# Patient Record
Sex: FEMALE | Race: WHITE | Employment: OTHER | URBAN - METROPOLITAN AREA
[De-identification: names, ages, dates, MRNs, and addresses within clinical notes are randomized per-mention and may not be internally consistent; named-entity substitution may affect disease eponyms.]

---

## 2017-03-01 DIAGNOSIS — I10 ESSENTIAL HYPERTENSION: Primary | ICD-10-CM

## 2017-03-01 NOTE — TELEPHONE ENCOUNTER
Pt calls the [facility] needing a refill of her Lisinopril 20 mg tablets; she takes these 2x daily. She would like to have this filled through her [facility] mail order pharmacy, phone number [phone].

## 2017-03-02 RX ORDER — LISINOPRIL 20 MG/1
20 TABLET ORAL DAILY
Qty: 90 TAB | Refills: 3 | Status: SHIPPED | COMMUNITY
Start: 2017-03-02 | End: 2017-03-10 | Stop reason: SDUPTHER

## 2017-03-09 ENCOUNTER — TELEPHONE (OUTPATIENT)
Dept: GERIATRIC MEDICINE | Age: 82
End: 2017-03-09

## 2017-03-09 NOTE — TELEPHONE ENCOUNTER
Pt calls our main office stating that she has received her refill of Lisinopril but the directions say to take once daily when she has been taking this twice daily. I told her that I will let you know of this. Pt can be reached at [phone]. Thank you.

## 2017-03-10 DIAGNOSIS — I10 ESSENTIAL HYPERTENSION: ICD-10-CM

## 2017-03-10 RX ORDER — LISINOPRIL 20 MG/1
20 TABLET ORAL DAILY
Qty: 180 TAB | Refills: 3 | Status: SHIPPED | OUTPATIENT
Start: 2017-03-10 | End: 2017-05-17 | Stop reason: SDUPTHER

## 2017-03-10 NOTE — TELEPHONE ENCOUNTER
Contacted pt stating Geraldine Mercado NP has sent a refill of Lisinopril to the pharmacy with directions to take twice daily. Pt verbalized appreciation for this.

## 2017-05-17 DIAGNOSIS — K21.9 GASTROESOPHAGEAL REFLUX DISEASE WITHOUT ESOPHAGITIS: ICD-10-CM

## 2017-05-17 DIAGNOSIS — E87.6 HYPOKALEMIA: ICD-10-CM

## 2017-05-17 DIAGNOSIS — E78.2 MIXED HYPERLIPIDEMIA: Primary | ICD-10-CM

## 2017-05-17 DIAGNOSIS — I10 ESSENTIAL HYPERTENSION: ICD-10-CM

## 2017-05-17 DIAGNOSIS — E78.2 MIXED HYPERLIPIDEMIA: ICD-10-CM

## 2017-05-17 RX ORDER — LISINOPRIL 20 MG/1
20 TABLET ORAL 2 TIMES DAILY
Qty: 180 TAB | Refills: 1 | Status: SHIPPED | OUTPATIENT
Start: 2017-05-17 | End: 2017-09-12 | Stop reason: SDUPTHER

## 2017-05-17 RX ORDER — FAMOTIDINE 40 MG/1
40 TABLET, FILM COATED ORAL
Qty: 30 TAB | Refills: 5 | Status: SHIPPED | COMMUNITY
Start: 2017-05-17 | End: 2017-05-17 | Stop reason: SDUPTHER

## 2017-05-17 RX ORDER — SIMVASTATIN 20 MG/1
20 TABLET, FILM COATED ORAL
Qty: 90 TAB | Refills: 1 | Status: SHIPPED | OUTPATIENT
Start: 2017-05-17 | End: 2017-07-03 | Stop reason: SDUPTHER

## 2017-05-17 RX ORDER — LISINOPRIL 20 MG/1
20 TABLET ORAL DAILY
Qty: 180 TAB | Refills: 3 | Status: SHIPPED | COMMUNITY
Start: 2017-05-17 | End: 2017-05-17 | Stop reason: SDUPTHER

## 2017-05-17 RX ORDER — FAMOTIDINE 40 MG/1
40 TABLET, FILM COATED ORAL
Qty: 90 TAB | Refills: 1 | Status: SHIPPED | OUTPATIENT
Start: 2017-05-17 | End: 2017-09-12 | Stop reason: SDUPTHER

## 2017-05-17 RX ORDER — SIMVASTATIN 40 MG/1
40 TABLET, FILM COATED ORAL
Qty: 90 TAB | Refills: 1 | Status: SHIPPED | COMMUNITY
Start: 2017-05-17 | End: 2017-05-17 | Stop reason: SDUPTHER

## 2017-05-17 RX ORDER — POTASSIUM CHLORIDE 750 MG/1
10 TABLET, EXTENDED RELEASE ORAL 3 TIMES DAILY
Qty: 90 TAB | Refills: 1 | Status: SHIPPED | OUTPATIENT
Start: 2017-05-17 | End: 2017-10-25 | Stop reason: SDUPTHER

## 2017-05-17 NOTE — TELEPHONE ENCOUNTER
Pt calls our main office stating she needs medication refills and to update the dosage information on a few medications. She states she has been taking 3 potassium capsules daily, and her current directions say to take 6 times daily. She states she currently has 40mg tablets of the Simvostatin and has been cutting them in half to take 20mg daily. She also states she has been taking 2 Lisinopril daily and her last refill said to take once daily. Pt is requesting refills of the following meds:     -Simvostatin 20mg 1x daily  -Famotidine 40mg 1x daily  -Lisinopril 20mg 2x daily    Pt uses the [facility] mail order pharmacy. I informed the pt I will notify the NP to update the dosages of her medications.

## 2017-07-03 DIAGNOSIS — E78.2 MIXED HYPERLIPIDEMIA: ICD-10-CM

## 2017-07-03 DIAGNOSIS — K21.9 GASTROESOPHAGEAL REFLUX DISEASE WITHOUT ESOPHAGITIS: Primary | ICD-10-CM

## 2017-07-03 RX ORDER — HYDROGEN PEROXIDE 3 %
20 SOLUTION, NON-ORAL MISCELLANEOUS DAILY
Status: CANCELLED | OUTPATIENT
Start: 2017-07-03

## 2017-07-03 NOTE — TELEPHONE ENCOUNTER
Pt calls to request for refills for Esomeprazole and Simvastatin. Pt states she is taking Esomeprazole 40mg tablets once daily. Pt would like these medications refilled through her [facility].

## 2017-07-06 RX ORDER — SIMVASTATIN 20 MG/1
20 TABLET, FILM COATED ORAL
Qty: 90 TAB | Refills: 1 | Status: SHIPPED | OUTPATIENT
Start: 2017-07-06 | End: 2017-10-18 | Stop reason: SDUPTHER

## 2017-07-06 RX ORDER — ESOMEPRAZOLE MAGNESIUM 40 MG/1
40 CAPSULE, DELAYED RELEASE ORAL DAILY
Qty: 90 CAP | Refills: 1 | Status: SHIPPED | OUTPATIENT
Start: 2017-07-06 | End: 2017-12-29 | Stop reason: SDUPTHER

## 2017-07-19 ENCOUNTER — OFFICE VISIT (OUTPATIENT)
Dept: FAMILY MEDICINE CLINIC | Age: 82
End: 2017-07-19

## 2017-07-19 VITALS
SYSTOLIC BLOOD PRESSURE: 134 MMHG | WEIGHT: 160.8 LBS | BODY MASS INDEX: 29.59 KG/M2 | HEART RATE: 80 BPM | TEMPERATURE: 98.4 F | RESPIRATION RATE: 18 BRPM | HEIGHT: 62 IN | DIASTOLIC BLOOD PRESSURE: 81 MMHG | OXYGEN SATURATION: 95 %

## 2017-07-19 DIAGNOSIS — E78.2 MIXED HYPERLIPIDEMIA: ICD-10-CM

## 2017-07-19 DIAGNOSIS — E83.42 HYPOMAGNESEMIA: ICD-10-CM

## 2017-07-19 DIAGNOSIS — E53.8 VITAMIN B 12 DEFICIENCY: ICD-10-CM

## 2017-07-19 DIAGNOSIS — I10 ESSENTIAL HYPERTENSION: Primary | ICD-10-CM

## 2017-07-19 DIAGNOSIS — I10 ESSENTIAL HYPERTENSION: ICD-10-CM

## 2017-07-19 DIAGNOSIS — Z96.652 H/O TOTAL KNEE REPLACEMENT, LEFT: ICD-10-CM

## 2017-07-19 PROBLEM — H25.9 AGE-RELATED CATARACT OF BOTH EYES: Status: ACTIVE | Noted: 2017-07-19

## 2017-07-19 PROBLEM — C56.9 OVARIAN CANCER (HCC): Status: ACTIVE | Noted: 2017-07-19

## 2017-07-19 PROBLEM — M21.371 FOOT DROP, BILATERAL: Status: ACTIVE | Noted: 2017-07-19

## 2017-07-19 PROBLEM — G47.00 INSOMNIA: Status: ACTIVE | Noted: 2017-07-19

## 2017-07-19 PROBLEM — Z98.890 PREVIOUS BACK SURGERY: Status: ACTIVE | Noted: 2017-07-19

## 2017-07-19 PROBLEM — Z96.659 H/O TOTAL KNEE REPLACEMENT: Status: ACTIVE | Noted: 2017-07-19

## 2017-07-19 PROBLEM — E87.6 HYPOKALEMIA: Status: ACTIVE | Noted: 2017-07-19

## 2017-07-19 PROBLEM — G62.9 NEUROPATHY: Status: ACTIVE | Noted: 2017-07-19

## 2017-07-19 PROBLEM — Z96.612 HISTORY OF REPLACEMENT OF BOTH SHOULDER JOINTS: Status: ACTIVE | Noted: 2017-07-19

## 2017-07-19 PROBLEM — M41.30 THORACOGENIC SCOLIOSIS: Status: ACTIVE | Noted: 2017-07-19

## 2017-07-19 PROBLEM — Z96.611 HISTORY OF REPLACEMENT OF BOTH SHOULDER JOINTS: Status: ACTIVE | Noted: 2017-07-19

## 2017-07-19 PROBLEM — M21.372 FOOT DROP, BILATERAL: Status: ACTIVE | Noted: 2017-07-19

## 2017-07-19 NOTE — PROGRESS NOTES
Chief Complaint   Patient presents with    Blood Pressure Check     may need labs to test mag and K+     Health Maintenance Due   Topic Date Due    ZOSTER VACCINE AGE 60>  10/09/1993    GLAUCOMA SCREENING Q2Y  10/09/1998    OSTEOPOROSIS SCREENING (DEXA)  10/09/1998    Pneumococcal 65+ Low/Medium Risk (1 of 2 - PCV13) 10/09/1998    MEDICARE YEARLY EXAM  10/09/1998    DTaP/Tdap/Td series (1 - Tdap) 03/25/2011     Coordination of Care Questions    1. Have you been to the ER, urgent care clinic since your last visit? No       Hospitalized since your last visit? No    2. Have you seen or consulted any other health care providers outside of the [address] [place] [address] since your last visit? Include any pap smears or colon screening.  No

## 2017-07-19 NOTE — MR AVS SNAPSHOT
Visit Information Date & Time Provider Department Dept. Phone Encounter #  
 7/19/2017 10:00 AM Meera Josue NP [address] [phone] [id number] Follow-up Instructions Return in about 6 months (around 1/19/2018) for for routine visit. Follow-up and Disposition History Upcoming Health Maintenance Date Due ZOSTER VACCINE AGE 60> 8/9/1993 GLAUCOMA SCREENING Q2Y 10/9/1998 OSTEOPOROSIS SCREENING (DEXA) 10/9/1998 Pneumococcal 65+ High/Highest Risk (1 of 2 - PCV13) 10/9/1998 MEDICARE YEARLY EXAM 10/9/1998 DTaP/Tdap/Td series (1 - Tdap) 3/25/2011 INFLUENZA AGE 9 TO ADULT 8/1/2017 Allergies as of 7/19/2017  Review Complete On: 7/19/2017 By: Xiao Benjamin No Known Allergies Current Immunizations  Never Reviewed Name Date  
 TD Vaccine 3/24/2011  2:38 PM  
  
 Not reviewed this visit You Were Diagnosed With   
  
 Codes Comments Essential hypertension    -  Primary ICD-10-CM: I10 
ICD-9-CM: 401.9 Mixed hyperlipidemia     ICD-10-CM: E78.2 ICD-9-CM: 272.2 Vitamin B 12 deficiency     ICD-10-CM: E53.8 ICD-9-CM: 266.2 Hypomagnesemia     ICD-10-CM: N02.67 
ICD-9-CM: 275.2 H/O total knee replacement, left     ICD-10-CM: R42.438 ICD-9-CM: V43.65 Vitals BP Pulse Temp Resp Height(growth percentile) Weight(growth percentile) 134/81 (BP 1 Location: Left arm, BP Patient Position: Sitting) 80 98.4 °F (36.9 °C) (Oral) 18 5' 2\" (1.575 m) 160 lb 12.8 oz (72.9 kg) SpO2 BMI OB Status Smoking Status 95% 29.41 kg/m2 Hysterectomy Never Smoker Vitals History BMI and BSA Data Body Mass Index Body Surface Area  
 29.41 kg/m 2 1.79 m 2 Preferred Pharmacy Pharmacy Name Phone [address] [facility], [address] [address] [place] [address] [address] Your Updated Medication List  
  
   
This list is accurate as of: 7/19/17 11:59 PM.  Always use your most recent med list.  
  
  
  
  
 amitriptyline 25 mg tablet Commonly known as:  ELAVIL TAKE ONE (1) TABLET(S) DAILY AT BEDTIME CALCIUM 500 + D 500 mg(1,250mg) -400 unit Tab Generic drug:  Calcium-Cholecalciferol (D3) Take  by mouth daily. cholecalciferol 1,000 unit tablet Commonly known as:  VITAMIN D3 Take 1,000 Units by mouth daily. esomeprazole 40 mg capsule Commonly known as:  NexIUM Take 1 Cap by mouth daily. gabapentin 800 mg tablet Commonly known as:  NEURONTIN Take 1 tablet by mouth 4  times daily  
  
 hydroCHLOROthiazide 25 mg tablet Commonly known as:  HYDRODIURIL Take 25 mg by mouth daily. lisinopril 20 mg tablet Commonly known as:  Patrice Sachi Take 1 Tab by mouth two (2) times a day. loperamide 2 mg capsule Commonly known as:  IMODIUM Take 2 mg by mouth four (4) times daily as needed for Diarrhea.  
  
 magnesium oxide 400 mg tablet Commonly known as:  MAG-OX Take 400 mg by mouth two (2) times a day. multivitamin tablet Commonly known as:  ONE A DAY Take 1 Tab by mouth daily. potassium chloride 10 mEq tablet Commonly known as:  KLOR-CON Take 1 Tab by mouth three (3) times daily. simvastatin 20 mg tablet Commonly known as:  ZOCOR Take 1 Tab by mouth nightly. TYLENOL EXTRA STRENGTH 500 mg tablet Generic drug:  acetaminophen Take 500 mg by mouth every six (6) hours as needed. VITAMIN B-12 1,000 mcg tablet Generic drug:  cyanocobalamin Take 1,000 mcg by mouth daily. We Performed the Following CBC WITH AUTOMATED DIFF [02544 CPT(R)] LIPID PANEL [92964 CPT(R)] MAGNESIUM [id number] CPT(R)] METABOLIC PANEL, COMPREHENSIVE [74710 CPT(R)] VITAMIN B12 [id number] CPT(R)] Follow-up Instructions Return in about 6 months (around 1/19/2018) for for routine visit. To-Do List   
 07/19/2017 Lab:  CBC WITH AUTOMATED DIFF Patient Instructions Magnesium (By mouth) Used as a mineral supplement to add to the magnesium in your daily diet. Brand Name(s): CalMag Thins, Coral Calcium, GNC Calcium/Magnesium with Vitamin D, Leader Magnesium, MG Plus Protein, MP Magnesium, Mag-G, Mag-Tab SR, Magimin, Magimin-Forte, Maginex, Magnacaps, Magonate, PharmAssure Magnesium, [place] Else There may be other brand names for this medicine. When This Medicine Should Not Be Used:  
Unless your doctor tells you otherwise, there is no reason for you to not use this medicine. How to Use This Medicine:  
Powder for Suspension, Liquid Filled Capsule, Capsule, Powder, Liquid, Tablet, Packet, Long Acting Tablet · Your doctor will tell you how much medicine to use. Do not use more than directed. · Follow the instructions on the medicine label if you are using this medicine without a prescription. · Drink at least six to eight (8 ounce) cups of liquid each day, unless your doctor tells you otherwise. · Measure the oral liquid medicine with a marked measuring spoon, oral syringe, or medicine cup. If a dose is missed: · Take a dose as soon as you remember. If it is almost time for your next dose, wait until then and take a regular dose. Do not take extra medicine to make up for a missed dose. How to Store and Dispose of This Medicine: · Store the medicine in a closed container at room temperature, away from heat, moisture, and direct light. · Keep all medicine out of the reach of children. Never share your medicine with anyone. · Ask your pharmacist, doctor, or health caregiver about the best way to dispose of any outdated medicine or medicine no longer needed. Drugs and Foods to Avoid: Ask your doctor or pharmacist before using any other medicine, including over-the-counter medicines, vitamins, and herbal products. · There are other medicines that may not work properly if you use them together with magnesium.  Make sure your doctor knows about all other medicines you are using. Warnings While Using This Medicine: · Make sure your doctor knows if you are pregnant or breast feeding, or if you have kidney disease. Possible Side Effects While Using This Medicine: If you notice these less serious side effects, talk with your doctor: · Nausea, diarrhea. If you notice other side effects that you think are caused by this medicine, tell your doctor. Call your doctor for medical advice about side effects. You may report side effects to FDA at 1-490-JKX-4417 © 2017 [employer]ks[employer]r [employer] Information is for End User's use only and may not be sold, redistributed or otherwise used for commercial purposes. The above information is an  only. It is not intended as medical advice for individual conditions or treatments. Talk to your doctor, nurse or pharmacist before following any medical regimen to see if it is safe and effective for you. Patient Instructions History Introducing [facility]! Cade Leon introduces Cent[employer] patient portal. Now you can access parts of your medical record, email your doctor's office, and request medication refills online. 1. In your internet browser, go to [email or web address] 2. Click on the First Time User? Click Here link in the Sign In box. You will see the New Member Sign Up page. 3. Enter your Cent[employer] Access Code exactly as it appears below. You will not need to use this code after youve completed the sign-up process. If you do not sign up before the expiration date, you must request a new code. · Cent[employer] Access Code: [email or web address] Expires: 10/12/2017 10:05 AM 
 
4. Enter the last four digits of your Social Security Number (xxxx) and Date of Birth (mm/dd/yyyy) as indicated and click Submit. You will be taken to the next sign-up page. 5. Create a Centrl ID.  This will be your Cent[employer] login ID and cannot be changed, so think of one that is secure and easy to remember. 6. Create a Fortem password. You can change your password at any time. 7. Enter your Password Reset Question and Answer. This can be used at a later time if you forget your password. 8. Enter your e-mail address. You will receive e-mail notification when new information is available in [address] 9. Click Sign Up. You can now view and download portions of your medical record. 10. Click the Download Summary menu link to download a portable copy of your medical information. If you have questions, please visit the Frequently Asked Questions section of the Fortem website. Remember, Fort[employer] is NOT to be used for urgent needs. For medical emergencies, dial 911. Now available from your iPhone and Android! Please provide this summary of care documentation to your next provider. Your primary care clinician is listed as John Duffy. If you have any questions after today's visit, please call [phone].

## 2017-07-19 NOTE — PROGRESS NOTES
Subjective:   Kwame Lopez is a 80 y.o. female  here for follow up of chronic medical conditions listed has GERD (gastroesophageal reflux disease), Gastric ulcer, Lewis's esophagus without dysplasia, Lewis's esophagus, and Diarrhea on her problem list.. She  is accompanied by patient. She lives as follows: alone and does have Advanced Directives. New Complaints today include: Blood Pressure Check (may need labs to test mag and K+)    Hypertension  Patient is here for follow-up of hypertension. She indicates that she is feeling well and denies any symptoms referable to her hypertension. She is exercising and is adherent to low salt diet. Blood pressure is well controlled at home. Use of agents associated with hypertension: none. She has been  for a year but is doing well at home alone in her apartment. She feels she should exercise more and goes to the exercise room 2X a week at [address] [place] [address]. She denies side effects from her medication. She is sleeping well, has a soft, formed BM daily, has a good appetite, and has regular activities with friends. Recent History includes: None    Review of Systems  Review of Systems   Constitutional: Negative for chills, diaphoresis, fever, malaise/fatigue and weight loss. HENT: Negative for congestion, ear discharge, ear pain, hearing loss, nosebleeds, sore throat and tinnitus. Eyes: Negative for blurred vision, double vision, photophobia, pain, discharge and redness. Respiratory: Negative for cough, hemoptysis, sputum production, shortness of breath, wheezing and stridor. Cardiovascular: Negative for chest pain, palpitations, orthopnea, claudication, leg swelling and PND. Gastrointestinal: Positive for nausea. Negative for abdominal pain, blood in stool, constipation, diarrhea, heartburn, melena and vomiting. Genitourinary: Negative for dysuria, flank pain, frequency, hematuria and urgency. Musculoskeletal: Positive for joint pain. Negative for back pain, falls, myalgias and neck pain. Bilateral foot drop, chronic. Chronic right shoulder pain. Skin: Negative for itching and rash. Neurological: Positive for tingling. Negative for dizziness, tremors, sensory change, speech change, focal weakness, seizures, loss of consciousness, weakness and headaches. Neuropathy is LE's X years since back surgery. Endo/Heme/Allergies: Negative for environmental allergies and polydipsia. Does not bruise/bleed easily. Psychiatric/Behavioral: Negative for depression, hallucinations, memory loss, substance abuse and suicidal ideas. The patient is not nervous/anxious and does not have insomnia. Geriatric Issues: Activities of Daily Living (ADLs):    She is independent in the following: ambulation, bathing and hygiene, feeding, continence, grooming, toileting and dressing  Requires assistance with the following: Transportation  Patient has changes due to:  None    Outside reports reviewed: none. Patient Active Problem List   Diagnosis Code    GERD (gastroesophageal reflux disease) K21.9    Gastric ulcer K25.9    Lewis's esophagus without dysplasia K22.70    Lewis's esophagus K22.70    Diarrhea R19.7     Current Outpatient Prescriptions   Medication Sig Dispense Refill    simvastatin (ZOCOR) 20 mg tablet Take 1 Tab by mouth nightly. 90 Tab 1    esomeprazole (NEXIUM) 40 mg capsule Take 1 Cap by mouth daily. 90 Cap 1    lisinopril (PRINIVIL, ZESTRIL) 20 mg tablet Take 1 Tab by mouth two (2) times a day. 180 Tab 1    potassium chloride (KLOR-CON) 10 mEq tablet Take 1 Tab by mouth three (3) times daily. 90 Tab 1    gabapentin (NEURONTIN) 800 mg tablet Take 1 tablet by mouth 4  times daily 360 Tab 1    cholecalciferol (VITAMIN D3) 1,000 unit tablet Take 1,000 Units by mouth daily.       amitriptyline (ELAVIL) 25 mg tablet TAKE ONE (1) TABLET(S) DAILY AT BEDTIME 30 Tab 1    multivitamin (ONE A DAY) tablet Take 1 Tab by mouth daily.  loperamide (IMODIUM) 2 mg capsule Take 2 mg by mouth four (4) times daily as needed for Diarrhea.  cyanocobalamin (VITAMIN B-12) 1,000 mcg tablet Take 1,000 mcg by mouth daily.  Calcium-Cholecalciferol, D3, (CALCIUM 500 + D) 500 mg(1,250mg) -400 unit Tab Take  by mouth daily.  acetaminophen (TYLENOL EXTRA STRENGTH) 500 mg tablet Take 500 mg by mouth every six (6) hours as needed.  magnesium oxide (MAG-OX) 400 mg tablet Take 400 mg by mouth two (2) times a day.  hydrochlorothiazide (HYDRODIURIL) 25 mg tablet Take 25 mg by mouth daily. No Known Allergies  Past Medical History:   Diagnosis Date    Arthritis     Diarrhea 6/6/2016    Foot drop     Gastric ulcer 6/14/2012    GERD (gastroesophageal reflux disease)     High cholesterol     Hypertension     Neuropathy (HCC)     Ovarian cancer ([facility] 1986    chemo x 9 mos    Scoliosis     Stroke ([facility] 2002    ? stroke with drop feet, per patient CT scans were negative     Past Surgical History:   Procedure Laterality Date    FLEXIBLE SIGMOIDOSCOPY N/A 6/6/2016    SIGMOIDOSCOPY FLEXIBLE performed [facility]    HX ORTHOPAEDIC      back    HX ORTHOPAEDIC      bilateral shoulder replacements    HX ORTHOPAEDIC      left knee replacement    HX LYDIA AND BSO      MO EGD TRANSORAL BIOPSY SINGLE/MULTIPLE  1/26/2012         MO EGD TRANSORAL BIOPSY SINGLE/MULTIPLE  6/14/2012          No family history on file. Social History   Substance Use Topics    Smoking status: Never Smoker    Smokeless tobacco: Never Used    Alcohol use No          Objective:     Visit Vitals    /81 (BP 1 Location: Left arm, BP Patient Position: Sitting)    Pulse 80    Temp 98.4 °F (36.9 °C) (Oral)    Resp 18    Ht 5' 2\" (1.575 m)    Wt 160 lb 12.8 oz (72.9 kg)    SpO2 95%    BMI 29.41 kg/m2     Physical Exam   Constitutional: She is oriented to person, place, and time.  She appears well-developed and well-nourished. No distress. HENT:   Head: Normocephalic and atraumatic. Right Ear: External ear normal.   Left Ear: External ear normal.   Nose: Nose normal.   Mouth/Throat: Oropharynx is clear and moist. No oropharyngeal exudate. Eyes: Conjunctivae and EOM are normal. Pupils are equal, round, and reactive to light. Right eye exhibits no discharge. Left eye exhibits no discharge. No scleral icterus. Neck: Normal range of motion. Neck supple. No JVD present. No tracheal deviation present. No thyromegaly present. Cardiovascular: Normal rate, regular rhythm, normal heart sounds and intact distal pulses. Exam reveals no gallop and no friction rub. No murmur heard. Pulmonary/Chest: Effort normal and breath sounds normal. No stridor. No respiratory distress. She has no wheezes. She has no rales. Abdominal: Soft. Bowel sounds are normal. She exhibits no distension and no mass. There is no tenderness. There is no rebound and no guarding. Musculoskeletal: Normal range of motion. She exhibits no edema or deformity. Splint's on feet/ankles bilaterally. Lymphadenopathy:     She has no cervical adenopathy. Neurological: She is alert and oriented to person, place, and time. She displays abnormal reflex. No cranial nerve deficit. She exhibits abnormal muscle tone. Coordination normal.   DTR's +1 LE\"s. Decreased tone in calves. Skin: Skin is warm and dry. No rash noted. She is not diaphoretic. No erythema. No pallor. Psychiatric: She has a normal mood and affect. Her behavior is normal. Judgment and thought content normal.   Nursing note and vitals reviewed.        Imaging  None    Lab Review  Results for orders placed or performed in visit on 10/58/27   METABOLIC PANEL, BASIC   Result Value Ref Range    Glucose 112 (H) 65 - 99 mg/dL    BUN 21 8 - 27 mg/dL    Creatinine 0.90 0.57 - 1.00 mg/dL    GFR est non-AA 59 (L) >59 mL/min/1.73    GFR est AA 68 >59 mL/min/1.73    BUN/Creatinine ratio 23 11 - 26    Sodium 140 136 - 144 mmol/L    Potassium 4.7 3.5 - 5.2 mmol/L    Chloride 102 97 - 106 mmol/L    CO2 26 18 - 29 mmol/L    Calcium 9.6 8.7 - 10.3 mg/dL   MAGNESIUM   Result Value Ref Range    Magnesium 1.8 1.6 - 2.3 mg/dL        Assessment/Plan:       ICD-10-CM ICD-9-CM    1. Essential hypertension B21 360.2 METABOLIC PANEL, COMPREHENSIVE      CBC WITH AUTOMATED DIFF   2. Mixed hyperlipidemia E78.2 272.2 CBC WITH AUTOMATED DIFF      LIPID PANEL   3. Vitamin B 12 deficiency E53.8 266.2 VITAMIN B12   4. Hypomagnesemia E83.42 275.2 MAGNESIUM     Follow-up Disposition:  Return in about 6 months (around 1/19/2018) for for routine visit. By:We discussed f/uKayla Patton 07 [address] [facility]  [phone]

## 2017-07-19 NOTE — PATIENT INSTRUCTIONS
Magnesium (By mouth)   Used as a mineral supplement to add to the magnesium in your daily diet. Brand Name(s): CalMag Thins, Coral Calcium, GNC Calcium/Magnesium with Vitamin D, Leader Magnesium, MG Plus Protein, MP Magnesium, Mag-G, Mag-Tab SR, Magimin, Magimin-Forte, Maginex, Magnacaps, Magonate, PharmAssure Magnesium, [place]res   There may be other brand names for this medicine. When This Medicine Should Not Be Used:   Unless your doctor tells you otherwise, there is no reason for you to not use this medicine. How to Use This Medicine:   Powder for Suspension, Liquid Filled Capsule, Capsule, Powder, Liquid, Tablet, Packet, Long Acting Tablet  · Your doctor will tell you how much medicine to use. Do not use more than directed. · Follow the instructions on the medicine label if you are using this medicine without a prescription. · Drink at least six to eight (8 ounce) cups of liquid each day, unless your doctor tells you otherwise. · Measure the oral liquid medicine with a marked measuring spoon, oral syringe, or medicine cup. If a dose is missed:   · Take a dose as soon as you remember. If it is almost time for your next dose, wait until then and take a regular dose. Do not take extra medicine to make up for a missed dose. How to Store and Dispose of This Medicine:   · Store the medicine in a closed container at room temperature, away from heat, moisture, and direct light. · Keep all medicine out of the reach of children. Never share your medicine with anyone. · Ask your pharmacist, doctor, or health caregiver about the best way to dispose of any outdated medicine or medicine no longer needed. Drugs and Foods to Avoid:   Ask your doctor or pharmacist before using any other medicine, including over-the-counter medicines, vitamins, and herbal products. · There are other medicines that may not work properly if you use them together with magnesium.  Make sure your doctor knows about all other medicines you are using. Warnings While Using This Medicine:   · Make sure your doctor knows if you are pregnant or breast feeding, or if you have kidney disease. Possible Side Effects While Using This Medicine: If you notice these less serious side effects, talk with your doctor:  · Nausea, diarrhea. If you notice other side effects that you think are caused by this medicine, tell your doctor. Call your doctor for medical advice about side effects. You may report side effects to FDA at 3-956-BJP-0938  © 2017 [facility] Information is for End User's use only and may not be sold, redistributed or otherwise used for commercial purposes. The above information is an  only. It is not intended as medical advice for individual conditions or treatments. Talk to your doctor, nurse or pharmacist before following any medical regimen to see if it is safe and effective for you.

## 2017-07-20 LAB
ALBUMIN SERPL-MCNC: 3.9 G/DL (ref 3.5–4.7)
ALBUMIN/GLOB SERPL: 1.3 {RATIO} (ref 1.2–2.2)
ALP SERPL-CCNC: 58 IU/L (ref 39–117)
ALT SERPL-CCNC: 13 IU/L (ref 0–32)
AST SERPL-CCNC: 18 IU/L (ref 0–40)
BASOPHILS # BLD AUTO: 0.1 X10E3/UL (ref 0–0.2)
BASOPHILS NFR BLD AUTO: 1 %
BILIRUB SERPL-MCNC: <0.2 MG/DL (ref 0–1.2)
BUN SERPL-MCNC: 25 MG/DL (ref 8–27)
BUN/CREAT SERPL: 29 (ref 12–28)
CALCIUM SERPL-MCNC: 9.9 MG/DL (ref 8.7–10.3)
CHLORIDE SERPL-SCNC: 96 MMOL/L (ref 96–106)
CHOLEST SERPL-MCNC: 171 MG/DL (ref 100–199)
CO2 SERPL-SCNC: 24 MMOL/L (ref 18–29)
CREAT SERPL-MCNC: 0.85 MG/DL (ref 0.57–1)
EOSINOPHIL # BLD AUTO: 0.1 X10E3/UL (ref 0–0.4)
EOSINOPHIL NFR BLD AUTO: 2 %
ERYTHROCYTE [DISTWIDTH] IN BLOOD BY AUTOMATED COUNT: 15.1 % (ref 12.3–15.4)
GLOBULIN SER CALC-MCNC: 3.1 G/DL (ref 1.5–4.5)
GLUCOSE SERPL-MCNC: 100 MG/DL (ref 65–99)
HCT VFR BLD AUTO: 30.5 % (ref 34–46.6)
HDLC SERPL-MCNC: 47 MG/DL
HGB BLD-MCNC: 9.4 G/DL (ref 11.1–15.9)
IMM GRANULOCYTES # BLD: 0 X10E3/UL (ref 0–0.1)
IMM GRANULOCYTES NFR BLD: 0 %
LDLC SERPL CALC-MCNC: 92 MG/DL (ref 0–99)
LYMPHOCYTES # BLD AUTO: 1.4 X10E3/UL (ref 0.7–3.1)
LYMPHOCYTES NFR BLD AUTO: 29 %
MAGNESIUM SERPL-MCNC: 1.8 MG/DL (ref 1.6–2.3)
MCH RBC QN AUTO: 23.9 PG (ref 26.6–33)
MCHC RBC AUTO-ENTMCNC: 30.8 G/DL (ref 31.5–35.7)
MCV RBC AUTO: 78 FL (ref 79–97)
MONOCYTES # BLD AUTO: 0.4 X10E3/UL (ref 0.1–0.9)
MONOCYTES NFR BLD AUTO: 9 %
NEUTROPHILS # BLD AUTO: 3 X10E3/UL (ref 1.4–7)
NEUTROPHILS NFR BLD AUTO: 59 %
PLATELET # BLD AUTO: 264 X10E3/UL (ref 150–379)
POTASSIUM SERPL-SCNC: 4.6 MMOL/L (ref 3.5–5.2)
PROT SERPL-MCNC: 7 G/DL (ref 6–8.5)
RBC # BLD AUTO: 3.93 X10E6/UL (ref 3.77–5.28)
SODIUM SERPL-SCNC: 135 MMOL/L (ref 134–144)
TRIGL SERPL-MCNC: 160 MG/DL (ref 0–149)
VIT B12 SERPL-MCNC: 1469 PG/ML (ref 211–946)
VLDLC SERPL CALC-MCNC: 32 MG/DL (ref 5–40)
WBC # BLD AUTO: 5 X10E3/UL (ref 3.4–10.8)

## 2017-07-21 ENCOUNTER — TELEPHONE (OUTPATIENT)
Dept: FAMILY MEDICINE CLINIC | Age: 82
End: 2017-07-21

## 2017-07-24 NOTE — TELEPHONE ENCOUNTER
Patient called today to review labs  had labs CBC with diff and CMP H/H 9.4/30.5 last test 10/12/2015 H/h 12.0/36.2, Bun/ creat 25/0.85,  Glucose 100, lipid panel triglecerids 160 lastone done  8/11 147. Patient reports does not take medication due to being unsure if she should take. Vitamin B12 1469 high on B12 1000 educated to stop taking today, magnesium 1.8 WNL. Educated patient on labs patient have F/u on Friday to discuss labs with provider.

## 2017-07-27 ENCOUNTER — TELEPHONE (OUTPATIENT)
Dept: GERIATRIC MEDICINE | Age: 82
End: 2017-07-27

## 2017-07-27 NOTE — TELEPHONE ENCOUNTER
Left voicemail for pt reminding her of appointment tomorrow, Friday, July 28th at 11:00am at the [facility]. Stated to give us a call with any questions at [phone].

## 2017-07-28 ENCOUNTER — OFFICE VISIT (OUTPATIENT)
Dept: FAMILY MEDICINE CLINIC | Age: 82
End: 2017-07-28

## 2017-07-28 VITALS
RESPIRATION RATE: 16 BRPM | SYSTOLIC BLOOD PRESSURE: 124 MMHG | HEART RATE: 88 BPM | BODY MASS INDEX: 29.74 KG/M2 | TEMPERATURE: 98.8 F | OXYGEN SATURATION: 95 % | WEIGHT: 161.6 LBS | DIASTOLIC BLOOD PRESSURE: 70 MMHG | HEIGHT: 62 IN

## 2017-07-28 DIAGNOSIS — E53.8 VITAMIN B 12 DEFICIENCY: ICD-10-CM

## 2017-07-28 DIAGNOSIS — D50.9 IRON DEFICIENCY ANEMIA, UNSPECIFIED IRON DEFICIENCY ANEMIA TYPE: Primary | ICD-10-CM

## 2017-07-28 DIAGNOSIS — K21.9 GASTROESOPHAGEAL REFLUX DISEASE WITHOUT ESOPHAGITIS: ICD-10-CM

## 2017-07-28 NOTE — PATIENT INSTRUCTIONS
Anemia: Care Instructions  Your Care Instructions    Anemia is a low level of red blood cells, which carry oxygen throughout your body. Many things can cause anemia. Lack of iron is one of the most common causes. Your body needs iron to make hemoglobin, a substance in red blood cells that carries oxygen from the lungs to your body's cells. Without enough iron, the body produces fewer and smaller red blood cells. As a result, your body's cells do not get enough oxygen, and you feel tired and weak. And you may have trouble concentrating. Bleeding is the most common cause of a lack of iron. You may have heavy menstrual bleeding or bleeding caused by conditions such as ulcers, hemorrhoids, or cancer. Regular use of aspirin or other anti-inflammatory medicines (such as ibuprofen) also can cause bleeding in some people. A lack of iron in your diet also can cause anemia, especially at times when the body needs more iron, such as during pregnancy, infancy, and the teen years. Your doctor may have prescribed iron pills. It may take several months of treatment for your iron levels to return to normal. Your doctor also may suggest that you eat foods that are rich in iron, such as meat and beans. There are many other causes of anemia. It is not always due to a lack of iron. Finding the specific cause of your anemia will help your doctor find the right treatment for you. Follow-up care is a key part of your treatment and safety. Be sure to make and go to all appointments, and call your doctor if you are having problems. It's also a good idea to know your test results and keep a list of the medicines you take. How can you care for yourself at home? · Take your medicines exactly as prescribed. Call your doctor if you think you are having a problem with your medicine. · If your doctor recommends iron pills, take them as directed:  ¨ Try to take the pills on an empty stomach about 1 hour before or 2 hours after meals. But you may need to take iron with food to avoid an upset stomach. ¨ Do not take antacids or drink milk or caffeine drinks (such as coffee, tea, or cola) at the same time or within 2 hours of the time that you take your iron. They can make it hard for your body to absorb the iron. ¨ Vitamin C (from food or supplements) helps your body absorb iron. Try taking iron pills with a glass of orange juice or some other food that is high in vitamin C, such as citrus fruits. ¨ Iron pills may cause stomach problems, such as heartburn, nausea, diarrhea, constipation, and cramps. Be sure to drink plenty of fluids, and include fruits, vegetables, and fiber in your diet each day. Iron pills often make your bowel movements dark or green. ¨ If you forget to take an iron pill, do not take a double dose of iron the next time you take a pill. ¨ Keep iron pills out of the reach of small children. An overdose of iron can be very dangerous. · Follow your doctor's advice about eating iron-rich foods. These include red meat, shellfish, poultry, eggs, beans, raisins, whole-grain bread, and leafy green vegetables. · Steam vegetables to help them keep their iron content. When should you call for help? Call 911 anytime you think you may need emergency care. For example, call if:  · You have symptoms of a heart attack. These may include:  ¨ Chest pain or pressure, or a strange feeling in the chest.  ¨ Sweating. ¨ Shortness of breath. ¨ Nausea or vomiting. ¨ Pain, pressure, or a strange feeling in the back, neck, jaw, or upper belly or in one or both shoulders or arms. ¨ Lightheadedness or sudden weakness. ¨ A fast or irregular heartbeat. After you call 911, the  may tell you to chew 1 adult-strength or 2 to 4 low-dose aspirin. Wait for an ambulance. Do not try to drive yourself. · You passed out (lost consciousness).   Call your doctor now or seek immediate medical care if:  · You have new or increased shortness of breath. · You are dizzy or lightheaded, or you feel like you may faint. · Your fatigue and weakness continue or get worse. · You have any abnormal bleeding, such as:  ¨ Nosebleeds. ¨ Vaginal bleeding that is different (heavier, more frequent, at a different time of the month) than what you are used to. ¨ Bloody or black stools, or rectal bleeding. ¨ Bloody or pink urine. Watch closely for changes in your health, and be sure to contact your doctor if:  · You do not get better as expected. Where can you learn more? Go to http://everett-ulises.info/. Enter R301 in the search box to learn more about \"Anemia: Care Instructions. \"  Current as of: October 13, 2016  Content Version: 11.3  © 4366-0353 [employer]. Care instructions adapted under license by [employer] (which disclaims liability or warranty for this information). If you have questions about a medical condition or this instruction, always ask your healthcare professional. [place] [address] any warranty or liability for your use of this information.

## 2017-07-28 NOTE — PROGRESS NOTES
Subjective:   Marcin Okeefe is a 80 y.o. female  here for follow up of chronic medical conditions listed has GERD (gastroesophageal reflux disease), Gastric ulcer, Lewis's esophagus without dysplasia, Lewis's esophagus, Diarrhea, Ovarian cancer ([facility] [address] Insomnia, Essential hypertension, Hypomagnesemia, Hypokalemia, Mixed hyperlipidemia, Thoracogenic scoliosis, Foot drop, bilateral, Age-related cataract of both eyes, Previous back surgery, H/O total knee replacement, History of replacement of both shoulder joints, and Neuropathy ([facility] [address] on her problem list.. She  is accompanied by patient. She lives as follows: alone and does have Advanced Directives. New Complaints today include: Results   Anemia  Patient presents for presents evaluation of anemia. Anemia was found by routine CBC. It has been present for 1 week. Associated signs & symptoms: none. Patient denies dark stools or changes in BM form, pattern. Recent History includes: None    Review of Systems  Review of Systems   Constitutional: Negative for chills, diaphoresis, fever, malaise/fatigue and weight loss. HENT: Negative for congestion, ear discharge, ear pain, hearing loss, nosebleeds, sore throat and tinnitus. Eyes: Negative for blurred vision, double vision, photophobia, pain, discharge and redness. Respiratory: Negative for cough, hemoptysis, shortness of breath and stridor. Cardiovascular: Negative for chest pain, palpitations, orthopnea, claudication, leg swelling and PND. Gastrointestinal: Negative for abdominal pain, blood in stool, constipation, diarrhea, heartburn, melena, nausea and vomiting. Genitourinary: Negative for dysuria, flank pain, frequency, hematuria and urgency. Musculoskeletal: Negative for myalgias. Skin: Negative for itching and rash. Neurological: Negative for dizziness, tingling, tremors, sensory change, speech change, focal weakness, seizures, loss of consciousness, weakness and headaches. Endo/Heme/Allergies: Negative for environmental allergies and polydipsia. Does not bruise/bleed easily. Psychiatric/Behavioral: Negative for depression, hallucinations, memory loss and suicidal ideas. The patient is not nervous/anxious and does not have insomnia. Geriatric Issues: Activities of Daily Living (ADLs):    She is independent in the following: ambulation, bathing and hygiene, feeding, continence, grooming, toileting and dressing  Requires assistance with the following: None. Patient has changes due to:  none    Outside reports reviewed: lab reports. Patient Active Problem List   Diagnosis Code    GERD (gastroesophageal reflux disease) K21.9    Gastric ulcer K25.9    Lewis's esophagus without dysplasia K22.70    Lewis's esophagus K22.70    Diarrhea R19.7    [facility] C56.9    Insomnia G47.00    Essential hypertension I10    Hypomagnesemia E83.42    Hypokalemia E87.6    Mixed hyperlipidemia E78.2    Thoracogenic scoliosis M41.30    Foot drop, bilateral M21.371, M21.372    Age-related cataract of both eyes H25.9    Previous back surgery Z98.890    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    History of replacement of both shoulder joints Z96.611, Z96.612    Neuropathy (HCC) G62.9     Current Outpatient Prescriptions   Medication Sig Dispense Refill    simvastatin (ZOCOR) 20 mg tablet Take 1 Tab by mouth nightly. 90 Tab 1    esomeprazole (NEXIUM) 40 mg capsule Take 1 Cap by mouth daily. 90 Cap 1    lisinopril (PRINIVIL, ZESTRIL) 20 mg tablet Take 1 Tab by mouth two (2) times a day. 180 Tab 1    potassium chloride (KLOR-CON) 10 mEq tablet Take 1 Tab by mouth three (3) times daily. 90 Tab 1    gabapentin (NEURONTIN) 800 mg tablet Take 1 tablet by mouth 4  times daily 360 Tab 1    cholecalciferol (VITAMIN D3) 1,000 unit tablet Take 1,000 Units by mouth daily.       amitriptyline (ELAVIL) 25 mg tablet TAKE ONE (1) TABLET(S) DAILY AT BEDTIME 30 Tab 1    multivitamin (ONE A DAY) tablet Take 1 Tab by mouth daily.  loperamide (IMODIUM) 2 mg capsule Take 2 mg by mouth four (4) times daily as needed for Diarrhea.  Calcium-Cholecalciferol, D3, (CALCIUM 500 + D) 500 mg(1,250mg) -400 unit Tab Take  by mouth daily.  hydrochlorothiazide (HYDRODIURIL) 25 mg tablet Take 25 mg by mouth daily.  acetaminophen (TYLENOL EXTRA STRENGTH) 500 mg tablet Take 500 mg by mouth every six (6) hours as needed.  magnesium oxide (MAG-OX) 400 mg tablet Take 400 mg by mouth two (2) times a day.  famotidine (PEPCID) 40 mg tablet       potassium chloride SA (MICRO-K) 10 mEq capsule       cyanocobalamin (VITAMIN B-12) 1,000 mcg tablet Take 1,000 mcg by mouth daily. No Known Allergies  Past Medical History:   Diagnosis Date    Arthritis     Diarrhea 6/6/2016    Foot drop     Gastric ulcer 6/14/2012    GERD (gastroesophageal reflux disease)     High cholesterol     Hypertension     Neuropathy (HCC)     Ovarian cancer ([facility] [address] 1986    chemo x 9 mos    Scoliosis     Stroke ([facility] [address] 2002    ? stroke with drop feet, per patient CT scans were negative     Past Surgical History:   Procedure Laterality Date    FLEXIBLE SIGMOIDOSCOPY N/A 6/6/2016    SIGMOIDOSCOPY FLEXIBLE performed by Shelbi Valentin MD at [facility] ENDOSCOPY    HX ORTHOPAEDIC      back    HX ORTHOPAEDIC      bilateral shoulder replacements    HX ORTHOPAEDIC      left knee replacement    HX LYDIA AND BSO      OR EGD TRANSORAL BIOPSY SINGLE/MULTIPLE  1/26/2012         OR EGD TRANSORAL BIOPSY SINGLE/MULTIPLE  6/14/2012          No family history on file.   Social History   Substance Use Topics    Smoking status: Never Smoker    Smokeless tobacco: Never Used    Alcohol use No          Objective:     Visit Vitals    /70 (BP 1 Location: Left arm, BP Patient Position: Sitting)    Pulse 88    Temp 98.8 °F (37.1 °C) (Oral)    Resp 16    Ht 5' 2\" (1.575 m)    Wt 161 lb 9.6 oz (73.3 kg)    SpO2 95%    BMI 29.56 kg/m2     Physical Exam   Constitutional: She is oriented to person, place, and time. She appears well-developed and well-nourished. No distress. HENT:   Head: Normocephalic and atraumatic. Right Ear: External ear normal.   Left Ear: External ear normal.   Nose: Nose normal.   Mouth/Throat: Oropharynx is clear and moist. No oropharyngeal exudate. Eyes: Conjunctivae and EOM are normal. Pupils are equal, round, and reactive to light. Right eye exhibits no discharge. Left eye exhibits no discharge. No scleral icterus. Neck: Normal range of motion. Neck supple. No JVD present. No tracheal deviation present. No thyromegaly present. Cardiovascular: Normal rate, regular rhythm, normal heart sounds and intact distal pulses. Exam reveals no gallop and no friction rub. No murmur heard. Pulmonary/Chest: Effort normal and breath sounds normal. No stridor. No respiratory distress. She has no wheezes. She has no rales. She exhibits no tenderness. Abdominal: Soft. Bowel sounds are normal. She exhibits no distension and no mass. There is no tenderness. There is no rebound and no guarding. Musculoskeletal: Normal range of motion. She exhibits no edema or deformity. Lymphadenopathy:     She has no cervical adenopathy. Neurological: She is alert and oriented to person, place, and time. She has normal reflexes. No cranial nerve deficit. She exhibits normal muscle tone. Coordination normal.   Skin: Skin is warm and dry. No rash noted. She is not diaphoretic. No erythema. No pallor. Psychiatric: She has a normal mood and affect. Her behavior is normal. Judgment and thought content normal.   Nursing note and vitals reviewed.        Imaging  None    Lab Review  Results for orders placed or performed in visit on 07/28/17   CBC WITH AUTOMATED DIFF   Result Value Ref Range    WBC 5.6 3.4 - 10.8 x10E3/uL    RBC 3.86 3.77 - 5.28 x10E6/uL    HGB 9.2 (L) 11.1 - 15.9 g/dL    HCT 29.7 (L) 34.0 - 46.6 % MCV 77 (L) 79 - 97 fL    MCH 23.8 (L) 26.6 - 33.0 pg    MCHC 31.0 (L) 31.5 - 35.7 g/dL    RDW 16.0 (H) 12.3 - 15.4 %    PLATELET 395 086 - 206 x10E3/uL    NEUTROPHILS 60 %    Lymphocytes 26 %    MONOCYTES 11 %    EOSINOPHILS 2 %    BASOPHILS 0 %    ABS. NEUTROPHILS 3.4 1.4 - 7.0 x10E3/uL    Abs Lymphocytes 1.4 0.7 - 3.1 x10E3/uL    ABS. MONOCYTES 0.6 0.1 - 0.9 x10E3/uL    ABS. EOSINOPHILS 0.1 0.0 - 0.4 x10E3/uL    ABS. BASOPHILS 0.0 0.0 - 0.2 x10E3/uL    IMMATURE GRANULOCYTES 1 %    ABS. IMM. GRANS. 0.0 0.0 - 0.1 x10E3/uL   IRON PROFILE   Result Value Ref Range    TIBC 333 250 - 450 ug/dL    UIBC 303 118 - 369 ug/dL    Iron 30 27 - 139 ug/dL    Iron % saturation 9 (LL) 15 - 55 %        Assessment/Plan:   If stool cards positive, send to GI. Can stop B12 secondary to high levels and will follow. Will base f/u on labs results. ICD-10-CM ICD-9-CM    1. Iron deficiency anemia, unspecified iron deficiency anemia type D50.9 280.9 AMB POC FECAL BLOOD, OCCULT, QL 3 CARDS      CBC WITH AUTOMATED DIFF      IRON PROFILE   2. Vitamin B 12 deficiency E53.8 266.2    3. Gastroesophageal reflux disease without esophagitis K21.9 530.81      Follow-up Disposition:  Return in about 3 months (around 10/28/2017) for routine f/u, anemia.     By:  Karishma Patton [address]  [phone]

## 2017-07-28 NOTE — PROGRESS NOTES
Chief Complaint   Patient presents with    Results     Health Maintenance Due   Topic Date Due    ZOSTER VACCINE AGE 60>  08/09/1993    GLAUCOMA SCREENING Q2Y  10/09/1998    OSTEOPOROSIS SCREENING (DEXA)  10/09/1998    Pneumococcal 65+ High/Highest Risk (1 of 2 - PCV13) 10/09/1998    MEDICARE YEARLY EXAM  10/09/1998    DTaP/Tdap/Td series (1 - Tdap) 03/25/2011     Coordination of Care Questions    1. Have you been to the ER, urgent care clinic since your last visit? No       Hospitalized since your last visit? No    2. Have you seen or consulted any other health care providers outside of the [address] [place] [address] since your last visit? Include any pap smears or colon screening.  No

## 2017-07-29 LAB
BASOPHILS # BLD AUTO: 0 X10E3/UL (ref 0–0.2)
BASOPHILS NFR BLD AUTO: 0 %
EOSINOPHIL # BLD AUTO: 0.1 X10E3/UL (ref 0–0.4)
EOSINOPHIL NFR BLD AUTO: 2 %
ERYTHROCYTE [DISTWIDTH] IN BLOOD BY AUTOMATED COUNT: 16 % (ref 12.3–15.4)
HCT VFR BLD AUTO: 29.7 % (ref 34–46.6)
HGB BLD-MCNC: 9.2 G/DL (ref 11.1–15.9)
IMM GRANULOCYTES # BLD: 0 X10E3/UL (ref 0–0.1)
IMM GRANULOCYTES NFR BLD: 1 %
IRON SATN MFR SERPL: 9 % (ref 15–55)
IRON SERPL-MCNC: 30 UG/DL (ref 27–139)
LYMPHOCYTES # BLD AUTO: 1.4 X10E3/UL (ref 0.7–3.1)
LYMPHOCYTES NFR BLD AUTO: 26 %
MCH RBC QN AUTO: 23.8 PG (ref 26.6–33)
MCHC RBC AUTO-ENTMCNC: 31 G/DL (ref 31.5–35.7)
MCV RBC AUTO: 77 FL (ref 79–97)
MONOCYTES # BLD AUTO: 0.6 X10E3/UL (ref 0.1–0.9)
MONOCYTES NFR BLD AUTO: 11 %
NEUTROPHILS # BLD AUTO: 3.4 X10E3/UL (ref 1.4–7)
NEUTROPHILS NFR BLD AUTO: 60 %
PLATELET # BLD AUTO: 261 X10E3/UL (ref 150–379)
RBC # BLD AUTO: 3.86 X10E6/UL (ref 3.77–5.28)
TIBC SERPL-MCNC: 333 UG/DL (ref 250–450)
UIBC SERPL-MCNC: 303 UG/DL (ref 118–369)
WBC # BLD AUTO: 5.6 X10E3/UL (ref 3.4–10.8)

## 2017-07-30 RX ORDER — POTASSIUM CHLORIDE 750 MG/1
10 CAPSULE, EXTENDED RELEASE ORAL 3 TIMES DAILY
COMMUNITY
Start: 2017-05-11 | End: 2017-10-25 | Stop reason: ALTCHOICE

## 2017-07-30 RX ORDER — FAMOTIDINE 40 MG/1
TABLET, FILM COATED ORAL
COMMUNITY
Start: 2017-06-13 | End: 2018-05-03

## 2017-07-31 NOTE — PROGRESS NOTES
Pt notified of results and recommendations per Espinoza Ambrocio. Agrees to buy and take OTC iron. Notified that this may cause constipation and she may buy OTC stool softeners if having issue. Pt to call office if still having issues after stool softener. Pt states stools usually run loose so she feels she will be okay.

## 2017-08-02 LAB
HEMOCCULT STL QL: NEGATIVE
VALID INTERNAL CONTROL?: YES

## 2017-08-04 NOTE — PROGRESS NOTES
Pt notified of results and recommendations per Aissatou Ponce.   Pt agrees and will return in 1 mo for lab obly

## 2017-09-05 ENCOUNTER — TELEPHONE (OUTPATIENT)
Dept: FAMILY MEDICINE CLINIC | Age: 82
End: 2017-09-05

## 2017-09-06 ENCOUNTER — LAB ONLY (OUTPATIENT)
Dept: FAMILY MEDICINE CLINIC | Age: 82
End: 2017-09-06

## 2017-09-06 DIAGNOSIS — D50.9 IRON DEFICIENCY ANEMIA, UNSPECIFIED IRON DEFICIENCY ANEMIA TYPE: Primary | ICD-10-CM

## 2017-09-06 NOTE — MR AVS SNAPSHOT
Visit Information Date & Time Provider Department Dept. Phone Encounter #  
 9/6/2017 11:00 AM NURSE [facility] JEANNE Maldonado8 Shakira Mora [id number] Your Appointments 10/18/2017 11:00 AM  
ROUTINE CARE with Samra Stephens MD  
[address] [place] [address] Dr ([facility]) Appt Note: 3 month f/u  
 [phone]  
[address] \[facility]\"" [address] [phone] Upcoming Health Maintenance Date Due ZOSTER VACCINE AGE 60> 8/9/1993 GLAUCOMA SCREENING Q2Y 10/9/1998 OSTEOPOROSIS SCREENING (DEXA) 10/9/1998 Pneumococcal 65+ High/Highest Risk (1 of 2 - PCV13) 10/9/1998 MEDICARE YEARLY EXAM 10/9/1998 DTaP/Tdap/Td series (1 - Tdap) 3/25/2011 INFLUENZA AGE 9 TO ADULT 8/1/2017 Allergies as of 9/6/2017  Review Complete On: 7/30/2017 By: Surjit Toledo NP No Known Allergies Current Immunizations  Never Reviewed Name Date  
 TD Vaccine 3/24/2011  2:38 PM  
  
 Not reviewed this visit You Were Diagnosed With   
  
 Codes Comments Iron deficiency anemia, unspecified iron deficiency anemia type    -  Primary ICD-10-CM: D50.9 ICD-9-CM: 280.9 Vitals OB Status Smoking Status Hysterectomy Never Smoker Preferred Pharmacy Pharmacy Name Phone [facility], [address] [place] [address] [address] Your Updated Medication List  
  
   
This list is accurate as of: 9/6/17 11:59 PM.  Always use your most recent med list.  
  
  
  
  
 amitriptyline 25 mg tablet Commonly known as:  ELAVIL TAKE ONE (1) TABLET(S) DAILY AT BEDTIME CALCIUM 500 + D 500 mg(1,250mg) -400 unit Tab Generic drug:  Calcium-Cholecalciferol (D3) Take  by mouth daily. cholecalciferol 1,000 unit tablet Commonly known as:  VITAMIN D3 Take 1,000 Units by mouth daily. esomeprazole 40 mg capsule Commonly known as:  NexIUM Take 1 Cap by mouth daily. famotidine 40 mg tablet Commonly known as:  PEPCID  
  
 gabapentin 800 mg tablet Commonly known as:  NEURONTIN Take 1 tablet by mouth 4  times daily  
  
 hydroCHLOROthiazide 25 mg tablet Commonly known as:  HYDRODIURIL Take 25 mg by mouth daily. lisinopril 20 mg tablet Commonly known as:  Marcos Peek Take 1 Tab by mouth two (2) times a day. loperamide 2 mg capsule Commonly known as:  IMODIUM Take 2 mg by mouth four (4) times daily as needed for Diarrhea.  
  
 magnesium oxide 400 mg tablet Commonly known as:  MAG-OX Take 400 mg by mouth two (2) times a day. multivitamin tablet Commonly known as:  ONE A DAY Take 1 Tab by mouth daily. * potassium chloride SA 10 mEq capsule Commonly known as:  MICRO-K  
  
 * potassium chloride 10 mEq tablet Commonly known as:  KLOR-CON Take 1 Tab by mouth three (3) times daily. simvastatin 20 mg tablet Commonly known as:  ZOCOR Take 1 Tab by mouth nightly. TYLENOL EXTRA STRENGTH 500 mg tablet Generic drug:  acetaminophen Take 500 mg by mouth every six (6) hours as needed. VITAMIN B-12 1,000 mcg tablet Generic drug:  cyanocobalamin Take 1,000 mcg by mouth daily. * Notice: This list has 2 medication(s) that are the same as other medications prescribed for you. Read the directions carefully, and ask your doctor or other care provider to review them with you. We Performed the Following CBC WITH AUTOMATED DIFF [39205 CPT(R)] FERRITIN [09987 CPT(R)] IRON PROFILE [id number] CPT(R)] To-Do List   
 09/06/2017 Lab:  FERRITIN   
  
 09/06/2017 Lab:  IRON PROFILE Introducing [facility]! Caroline Cabrera introduces [email or web address] patient portal. Now you can access parts of your medical record, email your doctor's office, and request medication refills online. 1. In your internet browser, go to [email or web address] 2. Click on the First Time User? Click Here link in the Sign In box. You will see the New Member Sign Up page. 3. Enter your Anytime DD Access Code exactly as it appears below. You will not need to use this code after youve completed the sign-up process. If you do not sign up before the expiration date, you must request a new code. · Anytime DD Access Code: [email or web address] Expires: 10/12/2017 10:05 AM 
 
4. Enter the last four digits of your Social Security Number (xxxx) and Date of Birth (mm/dd/yyyy) as indicated and click Submit. You will be taken to the next sign-up page. 5. Create a [employer] Mobile Paymentt ID. This will be your Anytime DD login ID and cannot be changed, so think of one that is secure and easy to remember. 6. Create a Anytime DD password. You can change your password at any time. 7. Enter your Password Reset Question and Answer. This can be used at a later time if you forget your password. 8. Enter your e-mail address. You will receive e-mail notification when new information is available in [address] 9. Click Sign Up. You can now view and download portions of your medical record. 10. Click the Download Summary menu link to download a portable copy of your medical information. If you have questions, please visit the Frequently Asked Questions section of the Anytime DD website. Remember, Anytime DD is NOT to be used for urgent needs. For medical emergencies, dial 911. Now available from your iPhone and Android! Please provide this summary of care documentation to your next provider. Your primary care clinician is listed as Rita Monroy. If you have any questions after today's visit, please call [phone].

## 2017-09-07 LAB
BASOPHILS # BLD AUTO: 0 X10E3/UL (ref 0–0.2)
BASOPHILS NFR BLD AUTO: 1 %
EOSINOPHIL # BLD AUTO: 0.1 X10E3/UL (ref 0–0.4)
EOSINOPHIL NFR BLD AUTO: 2 %
ERYTHROCYTE [DISTWIDTH] IN BLOOD BY AUTOMATED COUNT: 20.4 % (ref 12.3–15.4)
FERRITIN SERPL-MCNC: 24 NG/ML (ref 15–150)
HCT VFR BLD AUTO: 34.2 % (ref 34–46.6)
HGB BLD-MCNC: 10.9 G/DL (ref 11.1–15.9)
IMM GRANULOCYTES # BLD: 0 X10E3/UL (ref 0–0.1)
IMM GRANULOCYTES NFR BLD: 0 %
IRON SATN MFR SERPL: 29 % (ref 15–55)
IRON SERPL-MCNC: 77 UG/DL (ref 27–139)
LYMPHOCYTES # BLD AUTO: 1.3 X10E3/UL (ref 0.7–3.1)
LYMPHOCYTES NFR BLD AUTO: 26 %
MCH RBC QN AUTO: 27.1 PG (ref 26.6–33)
MCHC RBC AUTO-ENTMCNC: 31.9 G/DL (ref 31.5–35.7)
MCV RBC AUTO: 85 FL (ref 79–97)
MONOCYTES # BLD AUTO: 0.4 X10E3/UL (ref 0.1–0.9)
MONOCYTES NFR BLD AUTO: 9 %
NEUTROPHILS # BLD AUTO: 3.2 X10E3/UL (ref 1.4–7)
NEUTROPHILS NFR BLD AUTO: 62 %
PLATELET # BLD AUTO: 224 X10E3/UL (ref 150–379)
RBC # BLD AUTO: 4.02 X10E6/UL (ref 3.77–5.28)
TIBC SERPL-MCNC: 266 UG/DL (ref 250–450)
UIBC SERPL-MCNC: 189 UG/DL (ref 118–369)
WBC # BLD AUTO: 5 X10E3/UL (ref 3.4–10.8)

## 2017-09-08 NOTE — PROGRESS NOTES
Patient notified by phone that she has no evidence of chf on xray and labs overall good compared to her baseline.

## 2017-10-12 RX ORDER — AMITRIPTYLINE HYDROCHLORIDE 25 MG/1
TABLET, FILM COATED ORAL
Qty: 90 TAB | Refills: 1 | Status: SHIPPED | OUTPATIENT
Start: 2017-10-12 | End: 2018-01-22 | Stop reason: SDUPTHER

## 2017-10-12 RX ORDER — GABAPENTIN 800 MG/1
TABLET ORAL
Qty: 360 TAB | Refills: 1 | Status: SHIPPED | OUTPATIENT
Start: 2017-10-12 | End: 2017-12-28 | Stop reason: SDUPTHER

## 2017-10-18 DIAGNOSIS — E78.2 MIXED HYPERLIPIDEMIA: ICD-10-CM

## 2017-10-18 RX ORDER — SIMVASTATIN 20 MG/1
40 TABLET, FILM COATED ORAL
Qty: 180 TAB | Refills: 3 | Status: SHIPPED | OUTPATIENT
Start: 2017-10-18 | End: 2017-11-09 | Stop reason: DRUGHIGH

## 2017-10-20 ENCOUNTER — LAB ONLY (OUTPATIENT)
Dept: FAMILY MEDICINE CLINIC | Age: 82
End: 2017-10-20

## 2017-10-20 DIAGNOSIS — D64.9 ANEMIA, UNSPECIFIED TYPE: Primary | ICD-10-CM

## 2017-10-21 LAB
BASOPHILS # BLD AUTO: 0 X10E3/UL (ref 0–0.2)
BASOPHILS NFR BLD AUTO: 1 %
EOSINOPHIL # BLD AUTO: 0.1 X10E3/UL (ref 0–0.4)
EOSINOPHIL NFR BLD AUTO: 2 %
ERYTHROCYTE [DISTWIDTH] IN BLOOD BY AUTOMATED COUNT: 16.8 % (ref 12.3–15.4)
HCT VFR BLD AUTO: 33.9 % (ref 34–46.6)
HGB BLD-MCNC: 10.9 G/DL (ref 11.1–15.9)
IMM GRANULOCYTES # BLD: 0 X10E3/UL (ref 0–0.1)
IMM GRANULOCYTES NFR BLD: 0 %
IRON SERPL-MCNC: 58 UG/DL (ref 27–139)
LYMPHOCYTES # BLD AUTO: 1.5 X10E3/UL (ref 0.7–3.1)
LYMPHOCYTES NFR BLD AUTO: 27 %
MCH RBC QN AUTO: 28 PG (ref 26.6–33)
MCHC RBC AUTO-ENTMCNC: 32.2 G/DL (ref 31.5–35.7)
MCV RBC AUTO: 87 FL (ref 79–97)
MONOCYTES # BLD AUTO: 0.5 X10E3/UL (ref 0.1–0.9)
MONOCYTES NFR BLD AUTO: 8 %
NEUTROPHILS # BLD AUTO: 3.6 X10E3/UL (ref 1.4–7)
NEUTROPHILS NFR BLD AUTO: 62 %
PLATELET # BLD AUTO: 211 X10E3/UL (ref 150–379)
RBC # BLD AUTO: 3.89 X10E6/UL (ref 3.77–5.28)
WBC # BLD AUTO: 5.8 X10E3/UL (ref 3.4–10.8)

## 2017-10-23 NOTE — PROGRESS NOTES
Hgb stable. Iron stores good but have fallen. Dr. Frankie Page will evaluate on Wednesday October 25th.

## 2017-10-24 ENCOUNTER — TELEPHONE (OUTPATIENT)
Dept: FAMILY MEDICINE CLINIC | Age: 82
End: 2017-10-24

## 2017-10-24 NOTE — TELEPHONE ENCOUNTER
Spoke with pt and reminded her of upcoming appointment tomorrow, Wednesday, October 25th at 11:00am with Dr. Jenny Lama at the [facility]. Pt verbalized understanding.

## 2017-10-25 ENCOUNTER — OFFICE VISIT (OUTPATIENT)
Dept: FAMILY MEDICINE CLINIC | Age: 82
End: 2017-10-25

## 2017-10-25 VITALS
SYSTOLIC BLOOD PRESSURE: 114 MMHG | HEART RATE: 76 BPM | BODY MASS INDEX: 30.11 KG/M2 | TEMPERATURE: 98.4 F | WEIGHT: 163.6 LBS | OXYGEN SATURATION: 97 % | HEIGHT: 62 IN | DIASTOLIC BLOOD PRESSURE: 71 MMHG

## 2017-10-25 DIAGNOSIS — D64.9 ANEMIA DUE TO UNKNOWN MECHANISM: ICD-10-CM

## 2017-10-25 DIAGNOSIS — I10 HTN (HYPERTENSION), BENIGN: Primary | ICD-10-CM

## 2017-10-25 DIAGNOSIS — M21.372 FOOT DROP, BILATERAL: ICD-10-CM

## 2017-10-25 DIAGNOSIS — M21.371 FOOT DROP, BILATERAL: ICD-10-CM

## 2017-10-25 DIAGNOSIS — K22.70 BARRETT'S ESOPHAGUS WITHOUT DYSPLASIA: ICD-10-CM

## 2017-10-25 DIAGNOSIS — E87.6 HYPOKALEMIA: ICD-10-CM

## 2017-10-25 RX ORDER — POTASSIUM CHLORIDE 750 MG/1
10 TABLET, EXTENDED RELEASE ORAL 3 TIMES DAILY
Qty: 270 TAB | Refills: 3 | Status: SHIPPED | OUTPATIENT
Start: 2017-10-25 | End: 2018-11-08

## 2017-10-25 NOTE — MR AVS SNAPSHOT
Visit Information Date & Time Provider Department Dept. Phone Encounter #  
 10/25/2017 11:00 AM Robin Mason MD [address] [phone] [id number] Follow-up Instructions Return in about 4 months (around 2/25/2018). Follow-up and Disposition History Your Appointments 2/21/2018 11:00 AM  
ROUTINE CARE with Robin Mason MD  
[address] [place] [address] Dr ([facility]) Appt Note: 4 month f/u  
 [address] [address]  
[address] [facility] [address] [address] Upcoming Health Maintenance Date Due ZOSTER VACCINE AGE 60> 8/9/1993 GLAUCOMA SCREENING Q2Y 10/9/1998 OSTEOPOROSIS SCREENING (DEXA) 10/9/1998 Pneumococcal 65+ High/Highest Risk (1 of 2 - PCV13) 10/9/1998 MEDICARE YEARLY EXAM 10/9/1998 DTaP/Tdap/Td series (1 - Tdap) 3/25/2011 INFLUENZA AGE 9 TO ADULT 8/1/2017 Allergies as of 10/25/2017  Review Complete On: 10/25/2017 By: Robin Mason MD  
 No Known Allergies Current Immunizations  Never Reviewed Name Date  
 TD Vaccine 3/24/2011  2:38 PM  
  
 Not reviewed this visit You Were Diagnosed With   
  
 Codes Comments HTN (hypertension), benign    -  Primary ICD-10-CM: I10 
ICD-9-CM: 401.1 Foot drop, bilateral     ICD-10-CM: M21.371, M21.372 ICD-9-CM: 736.79 Hypokalemia     ICD-10-CM: E87.6 ICD-9-CM: 276.8 Lewis's esophagus without dysplasia     ICD-10-CM: K22.70 ICD-9-CM: 530.85 Anemia due to unknown mechanism     ICD-10-CM: D64.9 ICD-9-CM: 632. 9 Vitals BP Pulse Temp Height(growth percentile) Weight(growth percentile) SpO2  
 114/71 (BP 1 Location: Left arm, BP Patient Position: Sitting) 76 98.4 °F (36.9 °C) (Oral) 5' 2\" (1.575 m) 163 lb 9.6 oz (74.2 kg) 97% BMI OB Status Smoking Status 29.92 kg/m2 Hysterectomy Never Smoker Vitals History BMI and BSA Data Body Mass Index Body Surface Area  
 29.92 kg/m 2 1.8 m 2 Preferred Pharmacy Pharmacy Name Phone 305 [facility], [address] [facility] [address] Marino Alvarez Your Updated Medication List  
  
   
This list is accurate as of: 10/25/17 11:59 PM.  Always use your most recent med list.  
  
  
  
  
 amitriptyline 25 mg tablet Commonly known as:  ELAVIL TAKE 1 TABLET BY MOUTH AT  BEDTIME CALCIUM 500 + D 500 mg(1,250mg) -400 unit Tab Generic drug:  Calcium-Cholecalciferol (D3) Take  by mouth daily. cholecalciferol 1,000 unit tablet Commonly known as:  VITAMIN D3 Take 1,000 Units by mouth daily. esomeprazole 40 mg capsule Commonly known as:  NexIUM Take 1 Cap by mouth daily. * famotidine 40 mg tablet Commonly known as:  PEPCID * famotidine 40 mg tablet Commonly known as:  PEPCID Take 1 tablet by mouth  nightly  
  
 gabapentin 800 mg tablet Commonly known as:  NEURONTIN  
TAKE 1 TABLET BY MOUTH 4  TIMES DAILY  
  
 hydroCHLOROthiazide 25 mg tablet Commonly known as:  HYDRODIURIL Take 25 mg by mouth daily. lisinopril 20 mg tablet Commonly known as:  Arnold Files Take 1 tablet by mouth two  times daily  
  
 loperamide 2 mg capsule Commonly known as:  IMODIUM Take 2 mg by mouth four (4) times daily as needed for Diarrhea.  
  
 magnesium oxide 400 mg tablet Commonly known as:  MAG-OX Take 400 mg by mouth two (2) times a day. multivitamin tablet Commonly known as:  ONE A DAY Take 1 Tab by mouth daily. potassium chloride 10 mEq tablet Commonly known as:  KLOR-CON Take 1 Tab by mouth three (3) times daily. simvastatin 20 mg tablet Commonly known as:  ZOCOR Take 2 Tabs by mouth nightly. TYLENOL EXTRA STRENGTH 500 mg tablet Generic drug:  acetaminophen Take 500 mg by mouth every six (6) hours as needed. VITAMIN B-12 1,000 mcg tablet Generic drug:  cyanocobalamin Take 1,000 mcg by mouth daily. * Notice: This list has 2 medication(s) that are the same as other medications prescribed for you. Read the directions carefully, and ask your doctor or other care provider to review them with you. Prescriptions Sent to Pharmacy Refills  
 potassium chloride (KLOR-CON) 10 mEq tablet 3 Sig: Take 1 Tab by mouth three (3) times daily. Class: Normal  
 Pharmacy: [facility] Ph #: [phone] Route: Oral  
  
Follow-up Instructions Return in about 4 months (around 2/25/2018). Introducing [facility]! X[employer] Lewis introduces MusicSiren patient portal. Now you can access parts of your medical record, email your doctor's office, and request medication refills online. 1. In your internet browser, go to [email or web address] 2. Click on the First Time User? Click Here link in the Sign In box. You will see the New Member Sign Up page. 3. Enter your MusicSiren Access Code exactly as it appears below. You will not need to use this code after youve completed the sign-up process. If you do not sign up before the expiration date, you must request a new code. · MusicSiren Access Code: [email or web address] Expires: 1/18/2018 10:16 AM 
 
4. Enter the last four digits of your Social Security Number (xxxx) and Date of Birth (mm/dd/yyyy) as indicated and click Submit. You will be taken to the next sign-up page. 5. Create a Instilling Valuest ID. This will be your MusicSiren login ID and cannot be changed, so think of one that is secure and easy to remember. 6. Create a MusicSiren password. You can change your password at any time. 7. Enter your Password Reset Question and Answer. This can be used at a later time if you forget your password. 8. Enter your e-mail address. You will receive e-mail notification when new information is available in [address] 9. Click Sign Up. You can now view and download portions of your medical record. 10. Click the Download Summary menu link to download a portable copy of your medical information. If you have questions, please visit the Frequently Asked Questions section of the Squabbler website. Remember, Squabbler is NOT to be used for urgent needs. For medical emergencies, dial 911. Now available from your iPhone and Android! Please provide this summary of care documentation to your next provider. Your primary care clinician is listed as Amber Costa. If you have any questions after today's visit, please call [phone].

## 2017-10-25 NOTE — PROGRESS NOTES
HISTORY OF PRESENT ILLNESS  Graciela Schuster is a 80 y.o. female. HPI Comes in for follow up on chronic health conditions. Takes blood pressure meds for htn. Blood pressure has been under control. On potassium supplements for hypokalemia. Las BMP on 7/2017 shows normal potassium. Also has history of GERD which has been asymptomatic on current regimen. Has been doing pretty good. Denies having any shortness of breath,chest pain or dizziness. Denies having any cough,headache or fever. Denies being depressed. No bladder or bowel complaints. Other R. O.S negative. Has been active both Physically and socially. Taking medications regularly. No side effects from the medications reported. Overall satisfied with her health. Review of Systems   Respiratory: Negative for shortness of breath. Cardiovascular: Negative for chest pain. Gastrointestinal: Negative. Genitourinary: Negative. Musculoskeletal: Positive for joint pain. All other systems reviewed and are negative. Physical Exam   Constitutional: She is oriented to person, place, and time. She appears well-developed and well-nourished. Eyes: Conjunctivae and EOM are normal. Right eye exhibits no discharge. Left eye exhibits no discharge. Neck: No thyromegaly present. Cardiovascular: Normal rate, regular rhythm and normal heart sounds. Pulmonary/Chest: Effort normal and breath sounds normal. No respiratory distress. She has no wheezes. She has no rales. Abdominal: Soft. Bowel sounds are normal. She exhibits no distension. There is no tenderness. There is no rebound. Musculoskeletal: Normal range of motion. She exhibits no edema or deformity. Lymphadenopathy:     She has no cervical adenopathy. Neurological: She is alert and oriented to person, place, and time. No cranial nerve deficit. She exhibits normal muscle tone. Coordination normal.   Bilateral foot drop. Has braces on. Skin: Skin is warm. No rash noted. No erythema.    Psychiatric: Please advise.   She has a normal mood and affect. Her behavior is normal.   Nursing note and vitals reviewed. ASSESSMENT and PLAN  Diagnoses and all orders for this visit:    1. HTN (hypertension), benign  Controlled on present Regimen. Continue  the same. 2. Foot drop, bilateral    3. Hypokalemia  -     potassium chloride (KLOR-CON) 10 mEq tablet; Take 1 Tab by mouth three (3) times daily. 4. Lewis's esophagitis. Follows up with GI regularly. 5. Anemia due to unknown mechanism  Improved.

## 2017-10-25 NOTE — PROGRESS NOTES
Chief Complaint   Patient presents with    Follow-up     3 month follow up     Anemia    GERD     1. Have you been to the ER, urgent care clinic since your last visit? Hospitalized since your last visit? NO    2. Have you seen or consulted any other health care providers outside of the [address] [place] [address] since your last visit? Include any pap smears or colon screening.  NO

## 2017-11-09 ENCOUNTER — TELEPHONE (OUTPATIENT)
Dept: FAMILY MEDICINE CLINIC | Age: 82
End: 2017-11-09

## 2017-11-09 DIAGNOSIS — E78.2 MIXED HYPERLIPIDEMIA: Primary | ICD-10-CM

## 2017-11-09 RX ORDER — SIMVASTATIN 40 MG/1
40 TABLET, FILM COATED ORAL
Qty: 90 TAB | Refills: 3 | Status: SHIPPED | OUTPATIENT
Start: 2017-11-09 | End: 2020-08-14

## 2017-11-09 NOTE — TELEPHONE ENCOUNTER
Pt calls stating [address] Danielle Valera had left a voicemail stating a prior [facility] is still needed for her Simvatatin. Pt states the phone number to the prior [facility] pharmacy is [phone] and the fax number is [phone]. I informed the pt that Eusebio Severino has been working on this prior authorization for a few weeks, but I will reach out to [employer] to clarify what needs to be done at this time. Pt verbalized understanding.

## 2017-11-09 NOTE — TELEPHONE ENCOUNTER
Spoke with Allie Gerardo in the prior authorization department at [facility]. I informed Allie Gerardo that we have been working on the prior auth for the pt's simvastatin for a while and I would like to clarify what more needs to be done. I informed Allie Gerardo that Mal Klein had submitted yesterday a prior auth form for the pt to take 40 mg once daily. lAlie Gerardo states this does not need prior authorization and we need to contact the pharmacy to inform them of this change in dosage, as it is covered by the pt's insurance. Allie Gerardo states the number to the pharmacy is [phone].

## 2017-12-28 DIAGNOSIS — I10 ESSENTIAL HYPERTENSION: ICD-10-CM

## 2017-12-28 DIAGNOSIS — K21.9 GASTROESOPHAGEAL REFLUX DISEASE WITHOUT ESOPHAGITIS: ICD-10-CM

## 2017-12-28 NOTE — TELEPHONE ENCOUNTER
Ms. John Delgado called for refill request on 4 medications and ask that they are sent to [facility]

## 2017-12-29 DIAGNOSIS — K21.9 GASTROESOPHAGEAL REFLUX DISEASE WITHOUT ESOPHAGITIS: ICD-10-CM

## 2017-12-29 RX ORDER — CALCIUM CARBONATE/VITAMIN D3 500 MG-10
1 TABLET ORAL DAILY
Qty: 90 TAB | Refills: 1 | Status: SHIPPED | OUTPATIENT
Start: 2017-12-29 | End: 2020-07-01

## 2017-12-29 RX ORDER — ESOMEPRAZOLE MAGNESIUM 40 MG/1
CAPSULE, DELAYED RELEASE ORAL
Qty: 90 CAP | Refills: 1 | Status: SHIPPED | OUTPATIENT
Start: 2017-12-29 | End: 2018-05-24

## 2017-12-29 RX ORDER — LISINOPRIL 20 MG/1
20 TABLET ORAL 2 TIMES DAILY
Qty: 180 TAB | Refills: 1 | Status: SHIPPED | OUTPATIENT
Start: 2017-12-29 | End: 2018-04-21 | Stop reason: SDUPTHER

## 2017-12-29 RX ORDER — GABAPENTIN 800 MG/1
800 TABLET ORAL
Qty: 360 TAB | Refills: 1 | Status: SHIPPED | OUTPATIENT
Start: 2017-12-29 | End: 2018-03-21 | Stop reason: SDUPTHER

## 2017-12-29 RX ORDER — FAMOTIDINE 40 MG/1
40 TABLET, FILM COATED ORAL
Qty: 90 TAB | Refills: 3 | Status: SHIPPED | OUTPATIENT
Start: 2017-12-29 | End: 2018-05-24

## 2018-01-22 RX ORDER — AMITRIPTYLINE HYDROCHLORIDE 25 MG/1
25 TABLET, FILM COATED ORAL
Qty: 90 TAB | Refills: 3 | Status: SHIPPED | OUTPATIENT
Start: 2018-01-22 | End: 2018-11-08

## 2018-02-20 ENCOUNTER — TELEPHONE (OUTPATIENT)
Dept: FAMILY MEDICINE CLINIC | Age: 83
End: 2018-02-20

## 2018-02-20 NOTE — TELEPHONE ENCOUNTER
I called the patient to remind her of her appointment on February 21, 2018. Pt stated that they will be on time and will not miss their appointment for the world.

## 2018-02-21 ENCOUNTER — OFFICE VISIT (OUTPATIENT)
Dept: FAMILY MEDICINE CLINIC | Age: 83
End: 2018-02-21

## 2018-02-21 VITALS
HEART RATE: 76 BPM | BODY MASS INDEX: 30.84 KG/M2 | WEIGHT: 167.6 LBS | HEIGHT: 62 IN | RESPIRATION RATE: 14 BRPM | DIASTOLIC BLOOD PRESSURE: 75 MMHG | TEMPERATURE: 98.6 F | SYSTOLIC BLOOD PRESSURE: 126 MMHG

## 2018-02-21 DIAGNOSIS — I10 ESSENTIAL HYPERTENSION: Primary | ICD-10-CM

## 2018-02-21 DIAGNOSIS — M19.90 INFLAMMATORY ARTHRITIS: ICD-10-CM

## 2018-02-21 DIAGNOSIS — R60.9 EDEMA, UNSPECIFIED TYPE: ICD-10-CM

## 2018-02-21 RX ORDER — CEPHALEXIN 500 MG/1
CAPSULE ORAL
Refills: 0 | COMMUNITY
Start: 2018-02-16 | End: 2018-11-01 | Stop reason: CLARIF

## 2018-02-21 RX ORDER — AMOXICILLIN 500 MG/1
CAPSULE ORAL
Refills: 0 | COMMUNITY
Start: 2018-02-15 | End: 2018-05-04

## 2018-02-21 RX ORDER — HYDROCHLOROTHIAZIDE 25 MG/1
25 TABLET ORAL DAILY
Qty: 90 TAB | Refills: 3 | Status: SHIPPED | OUTPATIENT
Start: 2018-02-21 | End: 2018-11-08

## 2018-02-21 NOTE — MR AVS SNAPSHOT
55 [facility] 
[phone] Patient: Mariah Mccollum MRN: [id number] CYP:44/1/[id number] Visit Information Date & Time Provider Department Dept. Phone Encounter #  
 2/21/2018 11:00 AM Kim Mondragon NP [address] [phone] [id number] Follow-up Instructions Return in about 3 months (around 5/21/2018). Follow-up and Disposition History Your Appointments 5/21/2018 11:00 AM  
ROUTINE CARE with Kim Mondragon NP  
[facility] SENIOR CARE SERVICES [facility] ([address]) Appt Note: Pt 3 months f/u  
 [address] [address] [facility] [address] [address] Upcoming Health Maintenance Date Due ZOSTER VACCINE AGE 60> 8/9/1993 GLAUCOMA SCREENING Q2Y 10/9/1998 OSTEOPOROSIS SCREENING (DEXA) 10/9/1998 Pneumococcal 65+ High/Highest Risk (1 of 2 - PCV13) 10/9/1998 MEDICARE YEARLY EXAM 10/9/1998 DTaP/Tdap/Td series (1 - Tdap) 3/25/2011 Influenza Age 5 to Adult 8/1/2017 Allergies as of 2/21/2018  Review Complete On: 2/21/2018 By: Kim Mondragon NP No Known Allergies Current Immunizations  Never Reviewed Name Date  
 TD Vaccine 3/24/2011  2:38 PM  
  
 Not reviewed this visit You Were Diagnosed With   
  
 Codes Comments Essential hypertension    -  Primary ICD-10-CM: I10 
ICD-9-CM: 401.9 Inflammatory arthritis     ICD-10-CM: M19.90 ICD-9-CM: 714.9 Edema, unspecified type     ICD-10-CM: R60.9 ICD-9-CM: 263. 3 Vitals BP Pulse Temp Resp Height(growth percentile) Weight(growth percentile) 126/75 (BP 1 Location: Left arm, BP Patient Position: Sitting) 76 98.6 °F (37 °C) (Oral) 14 5' 2\" (1.575 m) 167 lb 9.6 oz (76 kg) BMI OB Status Smoking Status 30.65 kg/m2 Hysterectomy Never Smoker Vitals History BMI and BSA Data Body Mass Index Body Surface Area  
 30.65 kg/m 2 1.82 m 2 Preferred Pharmacy Pharmacy Name Phone 305 [facility], [address] Marino Alvarez Your Updated Medication List  
  
   
This list is accurate as of 2/21/18 11:59 PM.  Always use your most recent med list.  
  
  
  
  
 amitriptyline 25 mg tablet Commonly known as:  ELAVIL Take 1 Tab by mouth nightly. amoxicillin 500 mg capsule Commonly known as:  AMOXIL TK ONE C PO  TID UNTIL GONE Calcium-Cholecalciferol (D3) 500 mg(1,250mg) -400 unit Tab Commonly known as:  CALCIUM 500 + D Take 1 Tab by mouth daily. cephALEXin 500 mg capsule Commonly known as:  Edmund Pablo TK 4 CS PO 1 H PRIOR TO DENTAL APPT  
  
 cholecalciferol 1,000 unit tablet Commonly known as:  VITAMIN D3 Take 1,000 Units by mouth daily. esomeprazole 40 mg capsule Commonly known as:  NEXIUM  
TAKE 1 CAPSULE BY MOUTH  DAILY * famotidine 40 mg tablet Commonly known as:  PEPCID * famotidine 40 mg tablet Commonly known as:  PEPCID Take 1 Tab by mouth nightly.  
  
 gabapentin 800 mg tablet Commonly known as:  NEURONTIN Take 1 Tab by mouth four (4) times daily as needed. hydroCHLOROthiazide 25 mg tablet Commonly known as:  HYDRODIURIL Take 1 Tab by mouth daily. lisinopril 20 mg tablet Commonly known as:  Rennis Douse Take 1 Tab by mouth two (2) times a day. loperamide 2 mg capsule Commonly known as:  IMODIUM Take 2 mg by mouth four (4) times daily as needed for Diarrhea.  
  
 magnesium oxide 400 mg tablet Commonly known as:  MAG-OX Take 400 mg by mouth two (2) times a day. multivitamin tablet Commonly known as:  ONE A DAY Take 1 Tab by mouth daily. potassium chloride 10 mEq tablet Commonly known as:  KLOR-CON Take 1 Tab by mouth three (3) times daily. simvastatin 40 mg tablet Commonly known as:  ZOCOR Take 1 Tab by mouth nightly. TYLENOL EXTRA STRENGTH 500 mg tablet Generic drug:  acetaminophen Take 500 mg by mouth every six (6) hours as needed. VITAMIN B-12 1,000 mcg tablet Generic drug:  cyanocobalamin Take 1,000 mcg by mouth daily. * Notice: This list has 2 medication(s) that are the same as other medications prescribed for you. Read the directions carefully, and ask your doctor or other care provider to review them with you. Prescriptions Sent to Pharmacy Refills  
 hydroCHLOROthiazide (HYDRODIURIL) 25 mg tablet 3 Sig: Take 1 Tab by mouth daily. Class: Normal  
 Pharmacy: [address], [address] Ph #: [phone] Route: Oral  
  
We Performed the Following BALDOMERO BY MULTIPLEX FLOW IA, QL [90639 CPT(R)] C REACTIVE PROTEIN, QT [20776 CPT(R)] CBC WITH AUTOMATED DIFF [73569 CPT(R)] [address], IGG [id number] CPT(R)] RHEUMATOID FACTOR, QL [id number] CPT(R)] Follow-up Instructions Return in about 3 months (around 5/21/2018). To-Do List   
 02/21/2018 Lab:  SED RATE (ESR)   
  
 02/22/2018 Imaging:  XR HAND LT MIN 3 V   
  
 02/22/2018 Imaging:  XR HAND RT MIN 3 V Patient Instructions Arthritis: Care Instructions Your Care Instructions Arthritis, also called osteoarthritis, is a breakdown of the cartilage that cushions your joints. When the cartilage wears down, your bones rub against each other. This causes pain and stiffness. Many people have some arthritis as they age. Arthritis most often affects the joints of the spine, hands, hips, knees, or feet. You can take simple measures to protect your joints, ease your pain, and help you stay active. Follow-up care is a key part of your treatment and safety.  Be sure to make and go to all appointments, and call your doctor if you are having problems. It's also a good idea to know your test results and keep a list of the medicines you take. How can you care for yourself at home? · Stay at a healthy weight. Being overweight puts extra strain on your joints. · Talk to your doctor or physical therapist about exercises that will help ease joint pain. ¨ Stretch. You may enjoy gentle forms of yoga to help keep your joints and muscles flexible. ¨ Walk instead of jog. Other types of exercise that are less stressful on the joints include riding a bicycle, swimming, mckenzie chi, or water exercise. ¨ Lift weights. Strong muscles help reduce stress on your joints. Stronger thigh muscles, for example, take some of the stress off of the knees and hips. Learn the right way to lift weights so you do not make joint pain worse. · Take your medicines exactly as prescribed. Call your doctor if you think you are having a problem with your medicine. · Take pain medicines exactly as directed. ¨ If the doctor gave you a prescription medicine for pain, take it as prescribed. ¨ If you are not taking a prescription pain medicine, ask your doctor if you can take an over-the-counter medicine. · Use a cane, crutch, walker, or another device if you need help to get around. These can help rest your joints. You also can use other things to make life easier, such as a higher toilet seat and padded handles on kitchen utensils. · Do not sit in low chairs, which can make it hard to get up. · Put heat or cold on your sore joints as needed. Use whichever helps you most. You also can take turns with hot and cold packs. ¨ Apply heat 2 or 3 times a day for 20 to 30 minutes-using a heating pad, hot shower, or hot pack-to relieve pain and stiffness. ¨ Put ice or a cold pack on your sore joint for 10 to 20 minutes at a time. Put a thin cloth between the ice and your skin. When should you call for help? Call your doctor now or seek immediate medical care if: ? · You have sudden swelling, warmth, or pain in any joint. ? · You have joint pain and a fever or rash. ? · You have such bad pain that you cannot use a joint. ? Watch closely for changes in your health, and be sure to contact your doctor if: 
? · You have mild joint symptoms that continue even with more than 6 weeks of care at home. ? · You have stomach pain or other problems with your medicine. Where can you learn more? Go to http://everett-ulises.info/. Enter H306 in the search box to learn more about \"Arthritis: Care Instructions. \" Current as of: October 31, 2016 Content Version: 11.4 © 9564-6642 Selfie.[employer]. Care instructions adapted under license by [employer] (which disclaims liability or warranty for this information). If you have questions about a medical condition or this instruction, always ask your healthcare professional. [place] [address] any warranty or liability for your use of this information. Patient Instructions History Introducing [facility]! [facility] introduces MixCommerce patient portal. Now you can access parts of your medical record, email your doctor's office, and request medication refills online. 1. In your internet browser, go to [email or web address] 2. Click on the First Time User? Click Here link in the Sign In box. You will see the New Member Sign Up page. 3. Enter your MixCommerce Access Code exactly as it appears below. You will not need to use this code after youve completed the sign-up process. If you do not sign up before the expiration date, you must request a new code. · MixCom[employer] Access Code: [email or web address] Expires: 5/21/2018  5:21 PM 
 
4. Enter the last four digits of your Social Security Number (xxxx) and Date of Birth (mm/dd/yyyy) as indicated and click Submit. You will be taken to the next sign-up page. 5. Create a VideoBurst ID. This will be your VideoBurst login ID and cannot be changed, so think of one that is secure and easy to remember. 6. Create a VideoBurst password. You can change your password at any time. 7. Enter your Password Reset Question and Answer. This can be used at a later time if you forget your password. 8. Enter your e-mail address. You will receive e-mail notification when new information is available in [address] 9. Click Sign Up. You can now view and download portions of your medical record. 10. Click the Download Summary menu link to download a portable copy of your medical information. If you have questions, please visit the Frequently Asked Questions section of the VideoBurst website. Remember, VideoBurst is NOT to be used for urgent needs. For medical emergencies, dial 911. Now available from your iPhone and Android! Please provide this summary of care documentation to your next provider. Your primary care clinician is listed as Guillermo Luo. If you have any questions after today's visit, please call [phone].

## 2018-02-21 NOTE — PROGRESS NOTES
Brenna Macias is a 80 y.o. female      Chief Complaint   Patient presents with    Follow-up    Nocturia       1. Have you been to the ER, urgent care clinic since your last visit? Hospitalized since your last visit? No    2. Have you seen or consulted any other health care providers outside of the [address] [place] [address] since your last visit? Include any pap smears or colon screening.  No

## 2018-02-22 NOTE — PATIENT INSTRUCTIONS
Arthritis: Care Instructions  Your Care Instructions  Arthritis, also called osteoarthritis, is a breakdown of the cartilage that cushions your joints. When the cartilage wears down, your bones rub against each other. This causes pain and stiffness. Many people have some arthritis as they age. Arthritis most often affects the joints of the spine, hands, hips, knees, or feet. You can take simple measures to protect your joints, ease your pain, and help you stay active. Follow-up care is a key part of your treatment and safety. Be sure to make and go to all appointments, and call your doctor if you are having problems. It's also a good idea to know your test results and keep a list of the medicines you take. How can you care for yourself at home? · Stay at a healthy weight. Being overweight puts extra strain on your joints. · Talk to your doctor or physical therapist about exercises that will help ease joint pain. ¨ Stretch. You may enjoy gentle forms of yoga to help keep your joints and muscles flexible. ¨ Walk instead of jog. Other types of exercise that are less stressful on the joints include riding a bicycle, swimming, mckenzie chi, or water exercise. ¨ Lift weights. Strong muscles help reduce stress on your joints. Stronger thigh muscles, for example, take some of the stress off of the knees and hips. Learn the right way to lift weights so you do not make joint pain worse. · Take your medicines exactly as prescribed. Call your doctor if you think you are having a problem with your medicine. · Take pain medicines exactly as directed. ¨ If the doctor gave you a prescription medicine for pain, take it as prescribed. ¨ If you are not taking a prescription pain medicine, ask your doctor if you can take an over-the-counter medicine. · Use a cane, crutch, walker, or another device if you need help to get around. These can help rest your joints.  You also can use other things to make life easier, such as a higher toilet seat and padded handles on kitchen utensils. · Do not sit in low chairs, which can make it hard to get up. · Put heat or cold on your sore joints as needed. Use whichever helps you most. You also can take turns with hot and cold packs. ¨ Apply heat 2 or 3 times a day for 20 to 30 minutes-using a heating pad, hot shower, or hot pack-to relieve pain and stiffness. ¨ Put ice or a cold pack on your sore joint for 10 to 20 minutes at a time. Put a thin cloth between the ice and your skin. When should you call for help? Call your doctor now or seek immediate medical care if:  ? · You have sudden swelling, warmth, or pain in any joint. ? · You have joint pain and a fever or rash. ? · You have such bad pain that you cannot use a joint. ? Watch closely for changes in your health, and be sure to contact your doctor if:  ? · You have mild joint symptoms that continue even with more than 6 weeks of care at home. ? · You have stomach pain or other problems with your medicine. Where can you learn more? Go to http://everett-ulises.info/. Enter Z793 in the search box to learn more about \"Arthritis: Care Instructions. \"  Current as of: October 31, 2016  Content Version: 11.4  © 0924-0535 [employer]. Care instructions adapted under license by [employer] (which disclaims liability or warranty for this information). If you have questions about a medical condition or this instruction, always ask your healthcare professional. [place] [address] any warranty or liability for your use of this information.

## 2018-02-22 NOTE — PROGRESS NOTES
Subjective:   Silvia Dale is a 80 y.o. female  here for follow up of chronic medical conditions listed has GERD (gastroesophageal reflux disease), Gastric ulcer, Lewis's esophagus without dysplasia, Lewis's esophagus, Diarrhea, Ovarian cancer ([facility] [address] Insomnia, Essential hypertension, Hypomagnesemia, Hypokalemia, Mixed hyperlipidemia, Thoracogenic scoliosis, Foot drop, bilateral, Age-related cataract of both eyes, Previous back surgery, H/O total knee replacement, History of replacement of both shoulder joints, and Neuropathy on her problem list.. She  is accompanied by patient. She lives as follows: alone and does have Advanced Directives. New Complaints today include: Follow-up and Nocturia   Patient cut back her lisinopril because she thought it was a diuretic and was making her urinate too much. She has one month of pain and swelling in her hands/fingers. No fevers, erythema. No h/o arthritis. No fatigue, other joints affected. She has increased edema in LE's as well and no sob or chest pain. No increased sodium in diet. Recent History includes: None    Review of Systems  Review of Systems   Constitutional: Negative for chills, diaphoresis, fever, malaise/fatigue and weight loss. HENT: Negative for congestion, ear discharge and hearing loss. Eyes: Negative for blurred vision, double vision and photophobia. Respiratory: Negative for cough and shortness of breath. Cardiovascular: Positive for leg swelling. Negative for chest pain, palpitations and orthopnea. Gastrointestinal: Negative for abdominal pain, blood in stool, constipation, diarrhea, heartburn, melena, nausea and vomiting. Genitourinary: Negative for dysuria, frequency and urgency. Musculoskeletal: Positive for joint pain. Negative for falls. Skin: Negative for itching and rash. Neurological: Negative for dizziness, tingling, tremors, sensory change, weakness and headaches.    Endo/Heme/Allergies: Does not bruise/bleed easily. Psychiatric/Behavioral: Negative for depression and memory loss. The patient is not nervous/anxious and does not have insomnia. Geriatric Issues: Activities of Daily Living (ADLs):    She is independent in the following: ambulation, bathing and hygiene, feeding, continence, grooming, toileting and dressing  Requires assistance with the following: None  Patient has changes due to:  None    Outside reports reviewed: none. Patient Active Problem List   Diagnosis Code    GERD (gastroesophageal reflux disease) K21.9    Gastric ulcer K25.9    Lewis's esophagus without dysplasia K22.70    Lewis's esophagus K22.70    Diarrhea R19.7    Ovarian cancer ([facility] [address] C56.9    Insomnia G47.00    Essential hypertension I10    Hypomagnesemia E83.42    Hypokalemia E87.6    Mixed hyperlipidemia E78.2    Thoracogenic scoliosis M41.30    Foot drop, bilateral M21.371, M21.372    Age-related cataract of both eyes H25.9    Previous back surgery Z98.890    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    History of replacement of both shoulder joints Z96.611, Z96.612    Neuropathy G62.9     Current Outpatient Prescriptions   Medication Sig Dispense Refill    amoxicillin (AMOXIL) 500 mg capsule TK ONE C PO  TID UNTIL GONE  0    cephALEXin (KEFLEX) 500 mg capsule TK 4 CS PO 1 H PRIOR TO DENTAL APPT  0    hydroCHLOROthiazide (HYDRODIURIL) 25 mg tablet Take 1 Tab by mouth daily. 90 Tab 3    amitriptyline (ELAVIL) 25 mg tablet Take 1 Tab by mouth nightly. 90 Tab 3    Calcium-Cholecalciferol, D3, (CALCIUM 500 + D) 500 mg(1,250mg) -400 unit tab Take 1 Tab by mouth daily. 90 Tab 1    gabapentin (NEURONTIN) 800 mg tablet Take 1 Tab by mouth four (4) times daily as needed. 360 Tab 1    lisinopril (PRINIVIL, ZESTRIL) 20 mg tablet Take 1 Tab by mouth two (2) times a day. (Patient taking differently: Take 20 mg by mouth daily. ) 180 Tab 1    famotidine (PEPCID) 40 mg tablet Take 1 Tab by mouth nightly.  80 Tab 3    esomeprazole (NEXIUM) 40 mg capsule TAKE 1 CAPSULE BY MOUTH  DAILY 90 Cap 1    simvastatin (ZOCOR) 40 mg tablet Take 1 Tab by mouth nightly. 90 Tab 3    potassium chloride (KLOR-CON) 10 mEq tablet Take 1 Tab by mouth three (3) times daily. 270 Tab 3    famotidine (PEPCID) 40 mg tablet       cholecalciferol (VITAMIN D3) 1,000 unit tablet Take 1,000 Units by mouth daily.  multivitamin (ONE A DAY) tablet Take 1 Tab by mouth daily.  loperamide (IMODIUM) 2 mg capsule Take 2 mg by mouth four (4) times daily as needed for Diarrhea.  cyanocobalamin (VITAMIN B-12) 1,000 mcg tablet Take 1,000 mcg by mouth daily.  acetaminophen (TYLENOL EXTRA STRENGTH) 500 mg tablet Take 500 mg by mouth every six (6) hours as needed.  magnesium oxide (MAG-OX) 400 mg tablet Take 400 mg by mouth two (2) times a day. No Known Allergies  Past Medical History:   Diagnosis Date    Arthritis     Diarrhea 6/6/2016    Foot drop     Gastric ulcer 6/14/2012    GERD (gastroesophageal reflux disease)     High cholesterol     Hypertension     Neuropathy     Ovarian cancer ([facility] [address] 1986    chemo x 9 mos    Scoliosis     Stroke ([facility] [address] 2002    ? stroke with drop feet, per patient CT scans were negative     Past Surgical History:   Procedure Laterality Date    FLEXIBLE SIGMOIDOSCOPY N/A 6/6/2016    SIGMOIDOSCOPY FLEXIBLE performed by Griselda Easter, [facility] at [facility] ENDOSCOPY    HX ORTHOPAEDIC      back    HX ORTHOPAEDIC      bilateral shoulder replacements    HX ORTHOPAEDIC      left knee replacement    HX LYDIA AND BSO      HI EGD TRANSORAL BIOPSY SINGLE/MULTIPLE  1/26/2012         HI EGD TRANSORAL BIOPSY SINGLE/MULTIPLE  6/14/2012          History reviewed. No pertinent family history.   Social History   Substance Use Topics    Smoking status: Never Smoker    Smokeless tobacco: Never Used    Alcohol use No          Objective:     Visit Vitals    /75 (BP 1 Location: Left arm, BP Patient Position: Sitting)    Pulse 76    Temp 98.6 °F (37 °C) (Oral)    Resp 14    Ht 5' 2\" (1.575 m)    Wt 167 lb 9.6 oz (76 kg)    BMI 30.65 kg/m2     Physical Exam   Constitutional: She is oriented to person, place, and time. She appears well-developed and well-nourished. No distress. HENT:   Head: Normocephalic and atraumatic. Right Ear: Tympanic membrane, external ear and ear canal normal.   Left Ear: Tympanic membrane, external ear and ear canal normal.   Nose: Nose normal.   Mouth/Throat: Oropharynx is clear and moist. Mucous membranes are not dry. No oropharyngeal exudate. Eyes: Conjunctivae, EOM and lids are normal. Pupils are equal, round, and reactive to light. Right eye exhibits no discharge. No scleral icterus. Neck: Normal range of motion. Neck supple. No JVD present. No tracheal deviation present. No thyroid mass and no thyromegaly present. Cardiovascular: Normal rate, regular rhythm, S1 normal, S2 normal, normal heart sounds and intact distal pulses. Exam reveals no gallop and no friction rub. No murmur heard. Pulses:       Dorsalis pedis pulses are 1+ on the right side, and 1+ on the left side. Posterior tibial pulses are 1+ on the right side, and 1+ on the left side. Pulmonary/Chest: Effort normal and breath sounds normal. No respiratory distress. She has no wheezes. She has no rales. Abdominal: Soft. Bowel sounds are normal. She exhibits no distension and no mass. There is no hepatosplenomegaly. There is no tenderness. There is no rebound and no guarding. Musculoskeletal: She exhibits edema and tenderness. Edema of le's and hands. TTP of fingers, ellis PIP's. Lymphadenopathy:     She has no cervical adenopathy. Neurological: She is alert and oriented to person, place, and time. She has normal reflexes. She displays normal reflexes. No cranial nerve deficit. She exhibits normal muscle tone. Coordination normal.   Skin: Skin is warm, dry and intact. No rash noted.  She is not diaphoretic. No erythema. No pallor. Psychiatric: She has a normal mood and affect. Her speech is normal and behavior is normal. Judgment and thought content normal. Cognition and memory are normal.   Nursing note and vitals reviewed. Imaging  None    Lab Review  Results for orders placed or performed in visit on 10/20/17   CBC WITH AUTOMATED DIFF   Result Value Ref Range    WBC 5.8 3.4 - 10.8 x10E3/uL    RBC 3.89 3.77 - 5.28 x10E6/uL    HGB 10.9 (L) 11.1 - 15.9 g/dL    HCT 33.9 (L) 34.0 - 46.6 %    MCV 87 79 - 97 fL    MCH 28.0 26.6 - 33.0 pg    MCHC 32.2 31.5 - 35.7 g/dL    RDW 16.8 (H) 12.3 - 15.4 %    PLATELET 932 591 - 384 x10E3/uL    NEUTROPHILS 62 Not Estab. %    Lymphocytes 27 Not Estab. %    MONOCYTES 8 Not Estab. %    EOSINOPHILS 2 Not Estab. %    BASOPHILS 1 Not Estab. %    ABS. NEUTROPHILS 3.6 1.4 - 7.0 x10E3/uL    Abs Lymphocytes 1.5 0.7 - 3.1 x10E3/uL    ABS. MONOCYTES 0.5 0.1 - 0.9 x10E3/uL    ABS. EOSINOPHILS 0.1 0.0 - 0.4 x10E3/uL    ABS. BASOPHILS 0.0 0.0 - 0.2 x10E3/uL    IMMATURE GRANULOCYTES 0 Not Estab. %    ABS. IMM. GRANS. 0.0 0.0 - 0.1 x10E3/uL   IRON   Result Value Ref Range    Iron 58 27 - 139 ug/dL        Assessment/Plan:   Diagnoses and all orders for this visit:    1. Essential hypertension  -     hydroCHLOROthiazide (HYDRODIURIL) 25 mg tablet; Take 1 Tab by mouth daily. 2. Inflammatory arthritis  -     BALDOMERO BY MULTIPLEX FLOW IA, QL  -     CYCLIC CITRUL PEPTIDE AB, IGG  -     C REACTIVE PROTEIN, QT  -     SED RATE (ESR); Future  -     CBC WITH AUTOMATED DIFF  -     XR HAND RT MIN 3 V; Future  -     XR HAND LT MIN 3 V; Future  -     RHEUMATOID FACTOR, QL    3. Edema, unspecified type    Will call patient and discuss next step after labs and xrays are back. Follow-up Disposition:  Return in about 3 months (around 5/21/2018).       Disclaimer:  Advised her to call back or return to office if symptoms worsen/change/persist.  Discussed expected course/resolution/complications of diagnosis in detail with patient. Medication risks/benefits/costs/interactions/alternatives discussed with patient. She was given an after visit summary which includes diagnoses, current medications, & vitals. She expressed understanding with the diagnosis and plan.        By:  Karishma Patton [id number] [facility]  [phone]

## 2018-02-26 DIAGNOSIS — M25.541 ARTHRALGIA OF BOTH HANDS: Primary | ICD-10-CM

## 2018-02-26 DIAGNOSIS — M25.542 ARTHRALGIA OF BOTH HANDS: Primary | ICD-10-CM

## 2018-02-28 ENCOUNTER — HOSPITAL ENCOUNTER (OUTPATIENT)
Dept: GENERAL RADIOLOGY | Age: 83
Discharge: HOME | End: 2018-02-28
Payer: MEDICARE

## 2018-02-28 DIAGNOSIS — M19.90 INFLAMMATORY ARTHRITIS: ICD-10-CM

## 2018-02-28 LAB
14.3.3 ETA, RHEUM. ARTHRITIS: <0.2 NG/ML
ANA SER QL: POSITIVE
BASOPHILS # BLD AUTO: 0 X10E3/UL (ref 0–0.2)
BASOPHILS NFR BLD AUTO: 0 %
CCP IGA+IGG SERPL IA-ACNC: 4 UNITS (ref 0–19)
CCP IGA+IGG SERPL IA-ACNC: <20 UNITS
CRP SERPL-MCNC: 1.6 MG/L (ref 0–4.9)
EOSINOPHIL # BLD AUTO: 0.1 X10E3/UL (ref 0–0.4)
EOSINOPHIL NFR BLD AUTO: 1 %
ERYTHROCYTE [DISTWIDTH] IN BLOOD BY AUTOMATED COUNT: 13.7 % (ref 12.3–15.4)
ERYTHROCYTE [SEDIMENTATION RATE] IN BLOOD BY WESTERGREN METHOD: 29 MM/HR (ref 0–40)
HCT VFR BLD AUTO: 34 % (ref 34–46.6)
HGB BLD-MCNC: 11.1 G/DL (ref 11.1–15.9)
IMM GRANULOCYTES # BLD: 0 X10E3/UL (ref 0–0.1)
IMM GRANULOCYTES NFR BLD: 0 %
LYMPHOCYTES # BLD AUTO: 1.2 X10E3/UL (ref 0.7–3.1)
LYMPHOCYTES NFR BLD AUTO: 14 %
MCH RBC QN AUTO: 29.1 PG (ref 26.6–33)
MCHC RBC AUTO-ENTMCNC: 32.6 G/DL (ref 31.5–35.7)
MCV RBC AUTO: 89 FL (ref 79–97)
MONOCYTES # BLD AUTO: 0.6 X10E3/UL (ref 0.1–0.9)
MONOCYTES NFR BLD AUTO: 8 %
NEUTROPHILS # BLD AUTO: 6.2 X10E3/UL (ref 1.4–7)
NEUTROPHILS NFR BLD AUTO: 77 %
PLATELET # BLD AUTO: 252 X10E3/UL (ref 150–379)
RBC # BLD AUTO: 3.81 X10E6/UL (ref 3.77–5.28)
RHEUMATOID FACT SERPL-ACNC: 16 IU/ML (ref 0–13.9)
RHEUMATOID FACT SERPL-ACNC: 16.6 UNITS/ML
WBC # BLD AUTO: 8.1 X10E3/UL (ref 3.4–10.8)

## 2018-02-28 PROCEDURE — 73130 X-RAY EXAM OF HAND: CPT

## 2018-03-01 ENCOUNTER — TELEPHONE (OUTPATIENT)
Dept: FAMILY MEDICINE CLINIC | Age: 83
End: 2018-03-01

## 2018-03-01 NOTE — TELEPHONE ENCOUNTER
I called the patient to remind her of her appointment on March 2, 2018. Pt stated that they will be on time and will not miss their appointment for the world.

## 2018-03-02 ENCOUNTER — OFFICE VISIT (OUTPATIENT)
Dept: FAMILY MEDICINE CLINIC | Age: 83
End: 2018-03-02

## 2018-03-02 VITALS
RESPIRATION RATE: 18 BRPM | HEART RATE: 80 BPM | DIASTOLIC BLOOD PRESSURE: 75 MMHG | SYSTOLIC BLOOD PRESSURE: 107 MMHG | OXYGEN SATURATION: 98 % | TEMPERATURE: 97.9 F

## 2018-03-02 DIAGNOSIS — M19.90 INFLAMMATORY ARTHRITIS: Primary | ICD-10-CM

## 2018-03-02 DIAGNOSIS — M18.0 OSTEOARTHRITIS OF CARPOMETACARPAL JOINTS OF BOTH THUMBS, UNSPECIFIED OSTEOARTHRITIS TYPE: ICD-10-CM

## 2018-03-02 DIAGNOSIS — M94.9 DISORDER OF BONE AND CARTILAGE: ICD-10-CM

## 2018-03-02 DIAGNOSIS — Z00.00 MEDICARE ANNUAL WELLNESS VISIT, SUBSEQUENT: ICD-10-CM

## 2018-03-02 DIAGNOSIS — M89.9 DISORDER OF BONE AND CARTILAGE: ICD-10-CM

## 2018-03-02 RX ORDER — DICLOFENAC SODIUM 75 MG/1
75 TABLET, DELAYED RELEASE ORAL 2 TIMES DAILY
Qty: 28 TAB | Refills: 0 | Status: SHIPPED | OUTPATIENT
Start: 2018-03-02 | End: 2018-05-24

## 2018-03-02 NOTE — PROGRESS NOTES
This is a Subsequent Medicare Annual Wellness Exam (AWV) (Performed 12 months after IPPE or effective date of Medicare Part B enrollment)    I have reviewed the patient's medical history in detail and updated the computerized patient record. Patient does not get mammograms. Patient has had one colonoscopy. It was normal.  That was 3-4 years ago. History     Past Medical History:   Diagnosis Date    Arthritis     Diarrhea 6/6/2016    Foot drop     Gastric ulcer 6/14/2012    GERD (gastroesophageal reflux disease)     High cholesterol     Hypertension     Neuropathy     Ovarian cancer ([facility] [address] 1986    chemo x 9 mos    Scoliosis     Stroke ([facility] [address] 2002    ? stroke with drop feet, per patient CT scans were negative      Past Surgical History:   Procedure Laterality Date    FLEXIBLE SIGMOIDOSCOPY N/A 6/6/2016    SIGMOIDOSCOPY FLEXIBLE performed by Matt Gonzales MD at [facility] ENDOSCOPY    HX ORTHOPAEDIC      back    HX ORTHOPAEDIC      bilateral shoulder replacements    HX ORTHOPAEDIC      left knee replacement    HX LYDIA AND BSO      [place] EGD TRANSORAL BIOPSY SINGLE/MULTIPLE  1/26/2012         [place] EGD TRANSORAL BIOPSY SINGLE/MULTIPLE  6/14/2012          Current Outpatient Prescriptions   Medication Sig Dispense Refill    diclofenac EC (VOLTAREN) 75 mg EC tablet Take 1 Tab by mouth two (2) times a day. 28 Tab 0    amoxicillin (AMOXIL) 500 mg capsule TK ONE C PO  TID UNTIL GONE  0    cephALEXin (KEFLEX) 500 mg capsule TK 4 CS PO 1 H PRIOR TO DENTAL APPT  0    hydroCHLOROthiazide (HYDRODIURIL) 25 mg tablet Take 1 Tab by mouth daily. 90 Tab 3    amitriptyline (ELAVIL) 25 mg tablet Take 1 Tab by mouth nightly. 90 Tab 3    Calcium-Cholecalciferol, D3, (CALCIUM 500 + D) 500 mg(1,250mg) -400 unit tab Take 1 Tab by mouth daily. 90 Tab 1    gabapentin (NEURONTIN) 800 mg tablet Take 1 Tab by mouth four (4) times daily as needed.  360 Tab 1    lisinopril (PRINIVIL, ZESTRIL) 20 mg tablet Take 1 Tab by mouth two (2) times a day. (Patient taking differently: Take 20 mg by mouth daily. ) 180 Tab 1    famotidine (PEPCID) 40 mg tablet Take 1 Tab by mouth nightly. 90 Tab 3    esomeprazole (NEXIUM) 40 mg capsule TAKE 1 CAPSULE BY MOUTH  DAILY 90 Cap 1    simvastatin (ZOCOR) 40 mg tablet Take 1 Tab by mouth nightly. 90 Tab 3    potassium chloride (KLOR-CON) 10 mEq tablet Take 1 Tab by mouth three (3) times daily. 270 Tab 3    famotidine (PEPCID) 40 mg tablet       cholecalciferol (VITAMIN D3) 1,000 unit tablet Take 1,000 Units by mouth daily.  multivitamin (ONE A DAY) tablet Take 1 Tab by mouth daily.  loperamide (IMODIUM) 2 mg capsule Take 2 mg by mouth four (4) times daily as needed for Diarrhea.  cyanocobalamin (VITAMIN B-12) 1,000 mcg tablet Take 1,000 mcg by mouth daily.  acetaminophen (TYLENOL EXTRA STRENGTH) 500 mg tablet Take 500 mg by mouth every six (6) hours as needed.  magnesium oxide (MAG-OX) 400 mg tablet Take 400 mg by mouth two (2) times a day. No Known Allergies  No family history on file.   Social History   Substance Use Topics    Smoking status: Never Smoker    Smokeless tobacco: Never Used    Alcohol use No     Patient Active Problem List   Diagnosis Code    GERD (gastroesophageal reflux disease) K21.9    Gastric ulcer K25.9    Lewis's esophagus without dysplasia K22.70    Lewis's esophagus K22.70    Diarrhea R19.7    Ovarian cancer ([facility] [address] C56.9    Insomnia G47.00    Essential hypertension I10    Hypomagnesemia E83.42    Hypokalemia E87.6    Mixed hyperlipidemia E78.2    Thoracogenic scoliosis M41.30    Foot drop, bilateral M21.371, M21.372    Age-related cataract of both eyes H25.9    Previous back surgery Z98.890    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    History of replacement of both shoulder joints Z96.611, Z96.612    Neuropathy G62.9       Depression Risk Factor Screening:     PHQ over the last two weeks 2/21/2018   Little interest or pleasure in doing things Not at all   Feeling down, depressed or hopeless Not at all   Total Score PHQ 2 0     Alcohol Risk Factor Screening: You do not drink alcohol or very rarely. Functional Ability and Level of Safety:   Hearing Loss  The patient wears hearing aids. The patient needs further evaluation. Activities of Daily Living  The home contains: handrails and grab bars  Patient does total self care    Fall Risk  Fall Risk Assessment, last 12 mths 2/21/2018   Able to walk? Yes   Fall in past 12 months? No       Abuse Screen  Patient is not abused    Cognitive Screening   Evaluation of Cognitive Function:  Has your family/caregiver stated any concerns about your memory: no  Normal, MMSE    Patient Care Team   Patient Care Team:  Yasmine Hernandez MD as PCP - General (Internal Medicine)  Juliette Herrera MD (Gastroenterology)  Tylor Santos MD (Orthopedic Surgery)    Assessment/Plan   Education and counseling provided:  Are appropriate based on today's review and evaluation  Bone mass measurement (DEXA)    Diagnoses and all orders for this visit:    1. Inflammatory arthritis  -     diclofenac EC (VOLTAREN) 75 mg EC tablet; Take 1 Tab by mouth two (2) times a day. -     REFERRAL TO OCCUPATIONAL THERAPY    2. Osteoarthritis of carpometacarpal joints of both thumbs, unspecified osteoarthritis type  -     REFERRAL TO OCCUPATIONAL THERAPY    3. Medicare annual wellness visit, subsequent  -     Dexa Bone Density Study Axial ([id number]); Future    4. Disorder of bone and cartilage  -     Dexa Bone Density Study Axial ([id number]);  Future        Health Maintenance Due   Topic Date Due    ZOSTER VACCINE AGE 60>  08/09/1993    GLAUCOMA SCREENING Q2Y  10/09/1998    OSTEOPOROSIS SCREENING (DEXA)  10/09/1998    Pneumococcal 65+ High/Highest Risk (1 of 2 - PCV13) 10/09/1998    MEDICARE YEARLY EXAM  10/09/1998    DTaP/Tdap/Td series (1 - Tdap) 03/25/2011    Influenza Age 9 to Adult  08/01/2017

## 2018-03-02 NOTE — PATIENT INSTRUCTIONS

## 2018-03-05 NOTE — PROGRESS NOTES
Subjective:   Brenna Macias is a 80 y.o. female  here for follow up of chronic medical conditions listed has GERD (gastroesophageal reflux disease), Gastric ulcer, Lewis's esophagus without dysplasia, Lewis's esophagus, Diarrhea, Ovarian cancer ([facility] [address] Insomnia, Essential hypertension, Hypomagnesemia, Hypokalemia, Mixed hyperlipidemia, Thoracogenic scoliosis, Foot drop, bilateral, Age-related cataract of both eyes, Previous back surgery, H/O total knee replacement, History of replacement of both shoulder joints, and Neuropathy on her problem list.. She  is accompanied by patient. She lives as follows: alone and does have Advanced Directives. New Complaints today include: Annual Wellness Visit and Arthritis     Discussed patient's new diagnosis of erosive arthritis and treatment options for 20 minutes. Pain is worst in am's but stiffness lasts all day. Recent History includes: None    Review of Systems  Review of Systems   Constitutional: Negative for chills, fever, malaise/fatigue and weight loss. HENT: Negative for congestion, ear discharge and hearing loss. Eyes: Negative for blurred vision, double vision and photophobia. Respiratory: Negative for cough and shortness of breath. Cardiovascular: Negative for chest pain, palpitations, orthopnea and leg swelling. Gastrointestinal: Negative for abdominal pain, blood in stool, constipation, diarrhea, heartburn, melena, nausea and vomiting. Genitourinary: Negative for dysuria, frequency and urgency. Musculoskeletal: Positive for joint pain. Negative for back pain, falls, myalgias and neck pain. Fingers of hands   Skin: Negative for itching and rash. Neurological: Negative for dizziness, tingling, tremors, sensory change, weakness and headaches. Endo/Heme/Allergies: Does not bruise/bleed easily. Psychiatric/Behavioral: Negative for depression and memory loss. The patient is not nervous/anxious and does not have insomnia.     All other systems reviewed and are negative. Geriatric Issues: Activities of Daily Living (ADLs):    She is independent in the following: ambulation, bathing and hygiene, feeding, continence, grooming, toileting and dressing  Requires assistance with the following: Transportation  Patient has changes due to:  None    Outside reports reviewed: none. Patient Active Problem List   Diagnosis Code    GERD (gastroesophageal reflux disease) K21.9    Gastric ulcer K25.9    Lewis's esophagus without dysplasia K22.70    Lewis's esophagus K22.70    Diarrhea R19.7    [facility]    Insomnia G47.00    Essential hypertension I10    Hypomagnesemia E83.42    Hypokalemia E87.6    Mixed hyperlipidemia E78.2    Thoracogenic scoliosis M41.30    Foot drop, bilateral M21.371, M21.372    Age-related cataract of both eyes H25.9    Previous back surgery Z98.890    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    History of replacement of both shoulder joints Z96.611, Z96.612    Neuropathy G62.9     Current Outpatient Prescriptions   Medication Sig Dispense Refill    diclofenac EC (VOLTAREN) 75 mg EC tablet Take 1 Tab by mouth two (2) times a day. 28 Tab 0    amoxicillin (AMOXIL) 500 mg capsule TK ONE C PO  TID UNTIL GONE  0    cephALEXin (KEFLEX) 500 mg capsule TK 4 CS PO 1 H PRIOR TO DENTAL APPT  0    hydroCHLOROthiazide (HYDRODIURIL) 25 mg tablet Take 1 Tab by mouth daily. 90 Tab 3    amitriptyline (ELAVIL) 25 mg tablet Take 1 Tab by mouth nightly. 90 Tab 3    Calcium-Cholecalciferol, D3, (CALCIUM 500 + D) 500 mg(1,250mg) -400 unit tab Take 1 Tab by mouth daily. 90 Tab 1    gabapentin (NEURONTIN) 800 mg tablet Take 1 Tab by mouth four (4) times daily as needed. 360 Tab 1    lisinopril (PRINIVIL, ZESTRIL) 20 mg tablet Take 1 Tab by mouth two (2) times a day. (Patient taking differently: Take 20 mg by mouth daily. ) 180 Tab 1    famotidine (PEPCID) 40 mg tablet Take 1 Tab by mouth nightly.  90 Tab 3  esomeprazole (NEXIUM) 40 mg capsule TAKE 1 CAPSULE BY MOUTH  DAILY 90 Cap 1    simvastatin (ZOCOR) 40 mg tablet Take 1 Tab by mouth nightly. 90 Tab 3    potassium chloride (KLOR-CON) 10 mEq tablet Take 1 Tab by mouth three (3) times daily. 270 Tab 3    famotidine (PEPCID) 40 mg tablet       cholecalciferol (VITAMIN D3) 1,000 unit tablet Take 1,000 Units by mouth daily.  multivitamin (ONE A DAY) tablet Take 1 Tab by mouth daily.  loperamide (IMODIUM) 2 mg capsule Take 2 mg by mouth four (4) times daily as needed for Diarrhea.  cyanocobalamin (VITAMIN B-12) 1,000 mcg tablet Take 1,000 mcg by mouth daily.  acetaminophen (TYLENOL EXTRA STRENGTH) 500 mg tablet Take 500 mg by mouth every six (6) hours as needed.  magnesium oxide (MAG-OX) 400 mg tablet Take 400 mg by mouth two (2) times a day. No Known Allergies  Past Medical History:   Diagnosis Date    Arthritis     Diarrhea 6/6/2016    Foot drop     Gastric ulcer 6/14/2012    GERD (gastroesophageal reflux disease)     High cholesterol     Hypertension     Neuropathy     Ovarian cancer ([facility] [address] 1986    chemo x 9 mos    Scoliosis     Stroke ([facility] [address] 2002    ? stroke with drop feet, per patient CT scans were negative     Past Surgical History:   Procedure Laterality Date    FLEXIBLE SIGMOIDOSCOPY N/A 6/6/2016    SIGMOIDOSCOPY FLEXIBLE [facility] Sascha Jim [facility]COPY    HX ORTHOPAEDIC      back    HX ORTHOPAEDIC      bilateral shoulder replacements    HX ORTHOPAEDIC      left knee replacement    HX LYDIA AND BSO      [place] TRANSORAL BIOPSY SINGLE/MULTIPLE  1/26/2012         [place] TRANSORAL BIOPSY SINGLE/MULTIPLE  6/14/2012          History reviewed. No pertinent family history.   Social History   Substance Use Topics    Smoking status: Never Smoker    Smokeless tobacco: Never Used    Alcohol use No          Objective:     Visit Vitals    /75 (BP 1 Location: Left arm, BP Patient Position: Sitting)  Pulse 80    Temp 97.9 °F (36.6 °C) (Oral)    Resp 18    SpO2 98%     Physical Exam   Constitutional: She appears well-developed and well-nourished. No distress. HENT:   Head: Normocephalic. Right Ear: Tympanic membrane and ear canal normal.   Left Ear: Tympanic membrane and ear canal normal.   Mouth/Throat: Oropharynx is clear and moist. Mucous membranes are not dry. No oropharyngeal exudate. Eyes: Conjunctivae, EOM and lids are normal. Right eye exhibits no discharge. Left eye exhibits no discharge. No scleral icterus. Neck: Normal range of motion. Neck supple. No JVD present. No tracheal deviation present. No thyroid mass and no thyromegaly present. Cardiovascular: Normal rate, regular rhythm, S1 normal, S2 normal, normal heart sounds and intact distal pulses. Exam reveals no gallop and no friction rub. No murmur heard. Pulses:       Dorsalis pedis pulses are 1+ on the right side, and 1+ on the left side. Posterior tibial pulses are 1+ on the right side, and 1+ on the left side. Pulmonary/Chest: Effort normal and breath sounds normal. No respiratory distress. She has no wheezes. She has no rales. Abdominal: Soft. Bowel sounds are normal. She exhibits no distension. There is no hepatosplenomegaly. There is no tenderness. Musculoskeletal: She exhibits tenderness and deformity. She exhibits no edema. CMC's of thumbs and PIP's and DIP's of fingers, ellis index, middle and ring fingers with TTP. Lymphadenopathy:     She has no cervical adenopathy. Neurological: She is alert. Skin: Skin is intact. She is not diaphoretic. Psychiatric: She has a normal mood and affect. Her speech is normal and behavior is normal. Judgment and thought content normal. Cognition and memory are normal.   Nursing note and vitals reviewed. Imaging  None    Lab Review  one     Assessment/Plan:   Diagnoses and all orders for this visit:    1.  Inflammatory arthritis  -     diclofenac EC (VOLTAREN) 75 mg EC tablet; Take 1 Tab by mouth two (2) times a day. -     REFERRAL TO OCCUPATIONAL THERAPY    2. Osteoarthritis of carpometacarpal joints of both thumbs, unspecified osteoarthritis type  -     REFERRAL TO OCCUPATIONAL THERAPY    3. Medicare annual wellness visit, subsequent  -     Dexa Bone Density Study Axial ([id number]); Future    4. Disorder of bone and cartilage  -     Dexa Bone Density Study Axial ([id number]); Future    Spent 20 min educating patient about erosive arthritis and treatment options. First available rheumatology appt is in August.       Follow-up Disposition: Not on File      Disclaimer:  Advised her to call back or return to office if symptoms worsen/change/persist.  Discussed expected course/resolution/complications of diagnosis in detail with patient. Medication risks/benefits/costs/interactions/alternatives discussed with patient. She was given an after visit summary which includes diagnoses, current medications, & vitals. She expressed understanding with the diagnosis and plan.        By:  Karishma Patton [id number] [facility]  [phone]

## 2018-03-21 RX ORDER — GABAPENTIN 800 MG/1
TABLET ORAL
Qty: 360 TAB | Refills: 3 | Status: ON HOLD | COMMUNITY
Start: 2018-03-21 | End: 2022-10-05 | Stop reason: SDUPTHER

## 2018-03-28 DIAGNOSIS — M81.0 AGE-RELATED OSTEOPOROSIS WITHOUT CURRENT PATHOLOGICAL FRACTURE: Primary | ICD-10-CM

## 2018-04-21 DIAGNOSIS — I10 ESSENTIAL HYPERTENSION: ICD-10-CM

## 2018-04-23 RX ORDER — LISINOPRIL 20 MG/1
TABLET ORAL
Qty: 180 TAB | Refills: 3 | Status: SHIPPED | OUTPATIENT
Start: 2018-04-23 | End: 2018-11-08

## 2018-05-01 ENCOUNTER — TELEPHONE (OUTPATIENT)
Dept: FAMILY MEDICINE CLINIC | Age: 83
End: 2018-05-01

## 2018-05-03 ENCOUNTER — OFFICE VISIT (OUTPATIENT)
Dept: FAMILY MEDICINE CLINIC | Age: 83
End: 2018-05-03

## 2018-05-03 DIAGNOSIS — M94.0 COSTOCHONDRITIS, ACUTE: Primary | ICD-10-CM

## 2018-05-03 DIAGNOSIS — R07.89 CHEST PAIN, MUSCULAR: ICD-10-CM

## 2018-05-03 RX ORDER — RANITIDINE 300 MG/1
CAPSULE ORAL
COMMUNITY
End: 2020-07-01

## 2018-05-03 RX ORDER — TRAMADOL HYDROCHLORIDE 50 MG/1
TABLET ORAL
Qty: 40 TAB | Refills: 1
Start: 2018-05-03 | End: 2018-10-29

## 2018-05-03 RX ORDER — RABEPRAZOLE SODIUM 20 MG/1
20 TABLET, DELAYED RELEASE ORAL DAILY
Status: ON HOLD | COMMUNITY
End: 2020-07-04 | Stop reason: SDUPTHER

## 2018-05-04 VITALS
RESPIRATION RATE: 18 BRPM | SYSTOLIC BLOOD PRESSURE: 128 MMHG | DIASTOLIC BLOOD PRESSURE: 72 MMHG | OXYGEN SATURATION: 98 % | HEART RATE: 78 BPM | TEMPERATURE: 98 F

## 2018-05-04 NOTE — PATIENT INSTRUCTIONS
Costochondritis: Care Instructions  Your Care Instructions  You have chest pain because the cartilage of your rib cage is inflamed. This problem is called costochondritis. This type of chest wall pain may last from days to weeks. It is not a heart problem. Sometimes costochondritis occurs with a cold or the flu, and other times the exact cause is not known. Follow-up care is a key part of your treatment and safety. Be sure to make and go to all appointments, and call your doctor if you are having problems. It's also a good idea to know your test results and keep a list of the medicines you take. How can you care for yourself at home? · Take medicines for pain and inflammation exactly as directed. ¨ If the doctor gave you a prescription medicine, take it as prescribed. ¨ If you are not taking a prescription pain medicine, ask your doctor if you can take an over-the-counter medicine. ¨ Do not take two or more pain medicines at the same time unless the doctor told you to. Many pain medicines have acetaminophen, which is Tylenol. Too much acetaminophen (Tylenol) can be harmful. · It may help to use a warm compress or heating pad (set on low) on your chest. You can also try alternating heat and ice. Put ice or a cold pack on the area for 10 to 20 minutes at a time. Put a thin cloth between the ice and your skin. · Avoid any activity that strains the chest area. As your pain gets better, you can slowly return to your normal activities. · Do not use tape, an elastic bandage, a \"rib belt,\" or anything else that restricts your chest wall motion. When should you call for help? Call 911 anytime you think you may need emergency care. For example, call if:  ? · You have new or different chest pain or pressure. This may occur with:  ¨ Sweating. ¨ Shortness of breath. ¨ Nausea or vomiting. ¨ Pain that spreads from the chest to the neck, jaw, or one or both shoulders or arms. ¨ Dizziness or lightheadedness.   ¨ A fast or uneven pulse. After calling 911, chew 1 adult-strength aspirin. Wait for an ambulance. Do not try to drive yourself. ? · You have severe trouble breathing. ?Call your doctor now or seek immediate medical care if:  ? · You have a fever or cough. ? · You have any trouble breathing. ? · Your chest pain gets worse. ? Watch closely for changes in your health, and be sure to contact your doctor if:  ? · Your chest pain continues even though you are taking anti-inflammatory medicine. ? · Your chest wall pain has not improved after 5 to 7 days. Where can you learn more? Go to http://everett-ulises.info/. Enter T412 in the search box to learn more about \"Costochondritis: Care Instructions. \"  Current as of: March 20, 2017  Content Version: 11.4  © 5463-1296 [employer]. Care instructions adapted under license by [employer] (which disclaims liability or warranty for this information). If you have questions about a medical condition or this instruction, always ask your healthcare professional. [address] any warranty or liability for your use of this information.

## 2018-05-04 NOTE — PROGRESS NOTES
Subjective:   Wes Stephens is a 80 y.o. female  here for follow up of chronic medical conditions listed has GERD (gastroesophageal reflux disease), Gastric ulcer, Lewis's esophagus without dysplasia, Lewis's esophagus, Diarrhea, Ovarian cancer ([facility] [address] Insomnia, Essential hypertension, Hypomagnesemia, Hypokalemia, Mixed hyperlipidemia, Thoracogenic scoliosis, Foot drop, bilateral, Age-related cataract of both eyes, Previous back surgery, H/O total knee replacement, History of replacement of both shoulder joints, and Neuropathy on her problem list.. She  is accompanied by patient. She lives as follows: alone and does have Advanced Directives. New Complaints today include: No chief complaint on file. Recent History includes: None    Review of Systems  Review of Systems   Constitutional: Negative for chills, fever, malaise/fatigue and weight loss. HENT: Negative for congestion. Eyes: Negative for blurred vision, double vision and photophobia. Respiratory: Negative for cough and shortness of breath. Cardiovascular: Positive for chest pain. Negative for palpitations, orthopnea and leg swelling. Gastrointestinal: Positive for heartburn. Negative for abdominal pain, blood in stool, constipation, diarrhea, melena, nausea and vomiting. Genitourinary: Negative for dysuria, frequency and urgency. Musculoskeletal: Negative for falls and joint pain. Skin: Negative for itching and rash. Neurological: Negative for dizziness, tingling, tremors, sensory change, weakness and headaches. Endo/Heme/Allergies: Does not bruise/bleed easily. Psychiatric/Behavioral: Negative for depression and memory loss. The patient is not nervous/anxious and does not have insomnia. Geriatric Issues:    Activities of Daily Living (ADLs):    She is independent in the following: ambulation, bathing and hygiene, feeding, continence, grooming, toileting and dressing  Requires assistance with the following: None  Patient has changes due to:  None    Outside reports reviewed: none. Patient Active Problem List   Diagnosis Code    GERD (gastroesophageal reflux disease) K21.9    Gastric ulcer K25.9    Lewis's esophagus without dysplasia K22.70    Lewis's esophagus K22.70    Diarrhea R19.7    [facility] C56.[facility]    Insomnia G47.00    Essential hypertension I10    Hypomagnesemia E83.42    Hypokalemia E87.6    Mixed hyperlipidemia E78.2    Thoracogenic scoliosis M41.30    Foot drop, bilateral M21.371, M21.372    Age-related cataract of both eyes H25.9    Previous back surgery Z98.890    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    History of replacement of both shoulder joints Z96.611, Z96.612    Neuropathy G62.9     Current Outpatient Prescriptions   Medication Sig Dispense Refill    RABEprazole (ACIPHEX) 20 mg tablet Take 20 mg by mouth daily.  raNITIdine hcl 300 mg cap Take  by mouth.  traMADol (ULTRAM) 50 mg tablet 0.5 to one tablet po every 6 hours prn pain 40 Tab 1    lisinopril (PRINIVIL, ZESTRIL) 20 mg tablet TAKE 1 TABLET BY MOUTH TWO  TIMES DAILY 180 Tab 3    denosumab (PROLIA) 60 mg/mL injection 1 mL by SubCUTAneous route every 6 months. 1 Syringe 1    gabapentin (NEURONTIN) 800 mg tablet TAKE 1 TAB BY MOUTH FOUR  TIMES DAILY AS NEEDED. 360 Tab 3    diclofenac EC (VOLTAREN) 75 mg EC tablet Take 1 Tab by mouth two (2) times a day. 28 Tab 0    amoxicillin (AMOXIL) 500 mg capsule TK ONE C PO  TID UNTIL GONE  0    cephALEXin (KEFLEX) 500 mg capsule TK 4 CS PO 1 H PRIOR TO DENTAL APPT  0    hydroCHLOROthiazide (HYDRODIURIL) 25 mg tablet Take 1 Tab by mouth daily. 90 Tab 3    amitriptyline (ELAVIL) 25 mg tablet Take 1 Tab by mouth nightly. 90 Tab 3    Calcium-Cholecalciferol, D3, (CALCIUM 500 + D) 500 mg(1,250mg) -400 unit tab Take 1 Tab by mouth daily. 90 Tab 1    famotidine (PEPCID) 40 mg tablet Take 1 Tab by mouth nightly.  90 Tab 3    esomeprazole (NEXIUM) 40 mg capsule TAKE 1 CAPSULE BY MOUTH  DAILY 90 Cap 1    simvastatin (ZOCOR) 40 mg tablet Take 1 Tab by mouth nightly. 90 Tab 3    potassium chloride (KLOR-CON) 10 mEq tablet Take 1 Tab by mouth three (3) times daily. 270 Tab 3    cholecalciferol (VITAMIN D3) 1,000 unit tablet Take 1,000 Units by mouth daily.  multivitamin (ONE A DAY) tablet Take 1 Tab by mouth daily.  loperamide (IMODIUM) 2 mg capsule Take 2 mg by mouth four (4) times daily as needed for Diarrhea.  cyanocobalamin (VITAMIN B-12) 1,000 mcg tablet Take 1,000 mcg by mouth daily.  acetaminophen (TYLENOL EXTRA STRENGTH) 500 mg tablet Take 500 mg by mouth every six (6) hours as needed.  magnesium oxide (MAG-OX) 400 mg tablet Take 400 mg by mouth two (2) times a day. No Known Allergies  Past Medical History:   Diagnosis Date    Arthritis     Diarrhea 6/6/2016    Foot drop     Gastric ulcer 6/14/2012    GERD (gastroesophageal reflux disease)     High cholesterol     Hypertension     Neuropathy     Ovarian cancer ([facility] [address] 1986    chemo x 9 mos    Scoliosis     Stroke ([facility] [address] 2002    ? stroke with drop feet, per patient CT scans were negative     Past Surgical History:   Procedure Laterality Date    FLEXIBLE SIGMOIDOSCOPY N/A 6/6/2016    SIGMOIDOSCOPY FLEXIBLE performed by Rodrigue Miner MD at [facility] ENDOSCOPY    HX ORTHOPAEDIC      back    HX ORTHOPAEDIC      bilateral shoulder replacements    HX ORTHOPAEDIC      left knee replacement    HX LYDIA AND BSO      [place] EGD TRANSORAL BIOPSY SINGLE/MULTIPLE  1/26/2012         [place] EGD TRANSORAL BIOPSY SINGLE/MULTIPLE  6/14/2012          No family history on file. Social History   Substance Use Topics    Smoking status: Never Smoker    Smokeless tobacco: Never Used    Alcohol use No          Objective: There were no vitals taken for this visit. Physical Exam   Constitutional: She is oriented to person, place, and time. She appears well-developed and well-nourished. No distress.    HENT: Head: Normocephalic and atraumatic. Right Ear: Tympanic membrane, external ear and ear canal normal.   Left Ear: Tympanic membrane, external ear and ear canal normal.   Nose: Nose normal.   Mouth/Throat: Oropharynx is clear and moist. Mucous membranes are not dry. No oropharyngeal exudate. Eyes: Conjunctivae, EOM and lids are normal. Right eye exhibits no discharge. Left eye exhibits no discharge. No scleral icterus. Neck: Normal range of motion. No JVD present. No tracheal deviation present. No thyroid mass and no thyromegaly present. Cardiovascular: Normal rate, regular rhythm, S1 normal, S2 normal, normal heart sounds and intact distal pulses. Exam reveals no gallop and no friction rub. No murmur heard. Pulses:       Dorsalis pedis pulses are 1+ on the right side, and 1+ on the left side. Posterior tibial pulses are 1+ on the right side, and 1+ on the left side. Pulmonary/Chest: Effort normal and breath sounds normal. No respiratory distress. She has no wheezes. She has no rales. She exhibits tenderness. TTP intercostal spaces on left flank onto back   Abdominal: Soft. Bowel sounds are normal. She exhibits no distension and no mass. There is no hepatosplenomegaly. There is no tenderness. There is no rebound and no guarding. Musculoskeletal: Normal range of motion. She exhibits no edema. No vertebral TTP   Lymphadenopathy:     She has no cervical adenopathy. Neurological: She is alert and oriented to person, place, and time. She has normal reflexes. She displays normal reflexes. No cranial nerve deficit. She exhibits normal muscle tone. Coordination normal.   Skin: Skin is warm, dry and intact. No rash noted. She is not diaphoretic. No erythema. No pallor. Psychiatric: She has a normal mood and affect. Her speech is normal and behavior is normal. Cognition and memory are normal.   Nursing note and vitals reviewed.        Imaging  None    Lab Review  Results for orders placed or performed in visit on 02/21/18   BALDOMERO BY MULTIPLEX FLOW IA, QL   Result Value Ref Range    Antinuclear Antibodies Direct Positive (A) Negative   CYCLIC CITRUL PEPTIDE AB, IGG   Result Value Ref Range    CCP Antibodies IgG/IgA 4 0 - 19 units   RHEUMASSURE   Result Value Ref Range    Rheumatoid Arthritis Factor 16.6 (H) Units/mL    CCP Antibodies IgG/IgA <20 Units    14. 3.3 ETA, Rheum. Arthritis <0.20 ng/mL   C REACTIVE PROTEIN, QT   Result Value Ref Range    C-Reactive Protein, Qt 1.6 0.0 - 4.9 mg/L   CBC WITH AUTOMATED DIFF   Result Value Ref Range    WBC 8.1 3.4 - 10.8 x10E3/uL    RBC 3.81 3.77 - 5.28 x10E6/uL    HGB 11.1 11.1 - 15.9 g/dL    HCT 34.0 34.0 - 46.6 %    MCV 89 79 - 97 fL    MCH 29.1 26.6 - 33.0 pg    MCHC 32.6 31.5 - 35.7 g/dL    RDW 13.7 12.3 - 15.4 %    PLATELET 771 038 - 086 x10E3/uL    NEUTROPHILS 77 Not Estab. %    Lymphocytes 14 Not Estab. %    MONOCYTES 8 Not Estab. %    EOSINOPHILS 1 Not Estab. %    BASOPHILS 0 Not Estab. %    ABS. NEUTROPHILS 6.2 1.4 - 7.0 x10E3/uL    Abs Lymphocytes 1.2 0.7 - 3.1 x10E3/uL    ABS. MONOCYTES 0.6 0.1 - 0.9 x10E3/uL    ABS. EOSINOPHILS 0.1 0.0 - 0.4 x10E3/uL    ABS. BASOPHILS 0.0 0.0 - 0.2 x10E3/uL    IMMATURE GRANULOCYTES 0 Not Estab. %    ABS. IMM. GRANS. 0.0 0.0 - 0.1 x10E3/uL   RHEUMATOID FACTOR, QL   Result Value Ref Range    Rheumatoid factor 16.0 (H) 0.0 - 13.9 IU/mL   SED RATE (ESR)   Result Value Ref Range    Sed rate (ESR) 29 0 - 40 mm/hr        Assessment/Plan:   Diagnoses and all orders for this visit:    1. Costochondritis, acute  -     traMADol (ULTRAM) 50 mg tablet; 0.5 to one tablet po every 6 hours prn pain      Patient cannot take NSAID's at present secondary to severe GERD unresponsive to tx. Has endoscopy scheduled the end of the month.     Follow-up Disposition: Not on File      Disclaimer:  Advised her to call back or return to office if symptoms worsen/change/persist.  Discussed expected course/resolution/complications of diagnosis in detail with patient. Medication risks/benefits/costs/interactions/alternatives discussed with patient. She was given an after visit summary which includes diagnoses, current medications, & vitals. She expressed understanding with the diagnosis and plan.        By:  Karishma Patton [id number] [facility]  [phone]

## 2018-05-21 ENCOUNTER — OFFICE VISIT (OUTPATIENT)
Dept: FAMILY MEDICINE CLINIC | Age: 83
End: 2018-05-21

## 2018-05-21 VITALS
HEIGHT: 62 IN | HEART RATE: 85 BPM | TEMPERATURE: 98.7 F | OXYGEN SATURATION: 95 % | BODY MASS INDEX: 30.36 KG/M2 | DIASTOLIC BLOOD PRESSURE: 72 MMHG | RESPIRATION RATE: 16 BRPM | SYSTOLIC BLOOD PRESSURE: 120 MMHG | WEIGHT: 165 LBS

## 2018-05-21 DIAGNOSIS — I10 ESSENTIAL HYPERTENSION: ICD-10-CM

## 2018-05-21 DIAGNOSIS — K22.70 BARRETT'S ESOPHAGUS WITHOUT DYSPLASIA: ICD-10-CM

## 2018-05-21 DIAGNOSIS — M81.0 AGE-RELATED OSTEOPOROSIS WITHOUT CURRENT PATHOLOGICAL FRACTURE: Primary | ICD-10-CM

## 2018-05-21 DIAGNOSIS — M15.4 EROSIVE OSTEOARTHRITIS OF BOTH HANDS: ICD-10-CM

## 2018-05-21 NOTE — PROGRESS NOTES
Reyes Rands is a 80 y.o. female      Chief Complaint   Patient presents with    Hypertension     3 month f/u    GERD     3 month f/u       1. Have you been to the ER, urgent care clinic since your last visit? Hospitalized since your last visit? No    2. Have you seen or consulted any other health care providers outside of the [address] [place] [address] since your last visit? Include any pap smears or colon screening.  No      Health Maintenance Due   Topic Date Due    ZOSTER VACCINE AGE 60>  08/09/1993    GLAUCOMA SCREENING Q2Y  10/09/1998    Pneumococcal 65+ High/Highest Risk (1 of 2 - PCV13) 10/09/1998    DTaP/Tdap/Td series (1 - Tdap) 03/25/2011

## 2018-05-21 NOTE — PATIENT INSTRUCTIONS
Denosumab (By injection)   Denosumab ([id number])  Treats osteoporosis, bone cancer, hypercalcemia, and other bone problems in patients who have cancer. Brand Name(s): Prolia, Xgeva   There may be other brand names for this medicine. When This Medicine Should Not Be Used: This medicine is not right for everyone. You should not receive it if you had an allergic reaction to denosumab or if you are pregnant. How to Use This Medicine:   Injectable  · A doctor or other health professional will give you this medicine. This medicine is usually given as a shot under the skin of your upper arm, upper thigh, or stomach. · This medicine should come with a Medication Guide. Ask your pharmacist for a copy if you do not have one. · Missed dose: Call your doctor or pharmacist for instructions. Drugs and Foods to Avoid:   Ask your doctor or pharmacist before using any other medicine, including over-the-counter medicines, vitamins, and herbal products. · Do not use Prolia® and Xgeva® together. They contain the same medicine. · Some medicines can affect how denosumab works. Tell your doctor if you are also using medicine that weakens your immune system, including a steroid or cancer medicine. Warnings While Using This Medicine:   · This medicine may cause birth defects if either partner is using it during conception or pregnancy. Tell your doctor right away if you or your partner becomes pregnant. Use an effective form of birth control. Women who are being treated with Bernard Specter should continue using birth control for at least 5 months after the last dose. · Tell your doctor if you are breastfeeding, or if you have kidney disease, diabetes, gum disease, or an allergy to latex. Tell your doctor if you have problems with your thyroid, parathyroid, or digestive system.   · This medicine may cause the following problems:  ¨ Low calcium levels in your blood  ¨ Increased risk of broken thigh bone  ¨ Increased risk of infections  ¨ Serious skin reactions  ¨ Severe bone, joint, or muscle pain  · This medicine can cause jaw problems. You must have regular dental exams while you are being treated with this medicine. Tell your dentist that you are using this medicine. Practice good oral hygiene. · Do not suddenly stop using Prolia® without checking first with your doctor. Doing so may increase risk for more fractures. Talk to your doctor about other medicine that you can take. · Your doctor will do lab tests at regular visits to check on the effects of this medicine. Keep all appointments. Possible Side Effects While Using This Medicine:   Call your doctor right away if you notice any of these side effects:  · Allergic reaction: Itching or hives, swelling in your face or hands, swelling or tingling in your mouth or throat, chest tightness, trouble breathing  · Blistering, peeling, red skin rash  · Chest pain, fast or uneven heartbeat, trouble breathing  · Fever, chills, cough, sore throat, body aches  · Lightheadedness, dizziness, fainting  · Muscle spasms or twitching, numbness or tingling in your fingers, toes, or lips  · Pain or burning during urination, change in how much or how often you urinate  · Pain, swelling, heavy feeling, or numbness in your mouth or jaw, loose teeth or other teeth problems  · Severe bone, joint, or muscle pain  · Unusual pain in your thigh, groin, or hip  If you notice these less serious side effects, talk with your doctor:   · Diarrhea, nausea  · Redness, pain, itching, burning, swelling, or a lump under your skin where the shot was given  · Tiredness or weakness  If you notice other side effects that you think are caused by this medicine, tell your doctor. Call your doctor for medical advice about side effects.  You may report side effects to FDA at 7-819-VWR-7224  © 2017 [facility] Information is for End User's use only and may not be sold, redistributed or otherwise used for commercial purposes. The above information is an  only. It is not intended as medical advice for individual conditions or treatments. Talk to your doctor, nurse or pharmacist before following any medical regimen to see if it is safe and effective for you.

## 2018-05-21 NOTE — PROGRESS NOTES
Subjective:   Walker Martinez is a 80 y.o. female  here for follow up of chronic medical conditions listed has GERD (gastroesophageal reflux disease), Gastric ulcer, Lewis's esophagus without dysplasia, Lewis's esophagus, Diarrhea, Ovarian cancer ([address] Insomnia, Essential hypertension, Hypomagnesemia, Hypokalemia, Mixed hyperlipidemia, Thoracogenic scoliosis, Foot drop, bilateral, Age-related cataract of both eyes, Previous back surgery, H/O total knee replacement, History of replacement of both shoulder joints, and Neuropathy on her problem list.. She  is accompanied by patient. She lives as follows: a spouse and does have Advanced Directives. 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 Rheumatology appt not until August.  Spoke with their office today and they recommended I send her to a hand orthopedist and patient would like appt scheduled. She also would like to have prolia but is having trouble affording the Rx--her copay is over $400.00. We placed calls to get assistance for patient. She tried sleeping on elevated pillows to help her erosive esophagitis and now has a \"crick\" in her neck. Discussed getting bed elevators. She is taking all medication as prescribed. No other complaints today. Recent History includes: None    Review of Systems  Review of Systems   Constitutional: Negative for chills, fever, malaise/fatigue and weight loss. HENT: Negative for congestion. Eyes: Negative for blurred vision, double vision and photophobia. Respiratory: Negative for cough and shortness of breath. Cardiovascular: Negative for chest pain, palpitations, orthopnea and leg swelling. Gastrointestinal: Negative for abdominal pain, blood in stool, constipation, diarrhea, heartburn, melena, nausea and vomiting. Genitourinary: Negative for dysuria, frequency and urgency. Musculoskeletal: Positive for joint pain and neck pain. Negative for falls and myalgias. Fingers and wrists bilaterally. Right neck pain today only after sleeping on 2 pillows to help erosive esophagitis. Skin: Negative for itching and rash. Neurological: Positive for sensory change. Negative for dizziness, tingling, tremors, speech change, focal weakness, seizures, loss of consciousness, weakness and headaches. Chronic peripheral neuropathy in LE's. Endo/Heme/Allergies: Does not bruise/bleed easily. Psychiatric/Behavioral: Negative for depression and memory loss. The patient is not nervous/anxious and does not have insomnia. Geriatric Issues: Activities of Daily Living (ADLs):    She is independent in the following: ambulation, bathing and hygiene, feeding, continence, grooming, toileting and dressing  Requires assistance with the following: Transportation  Patient has changes due to:  None    Outside reports reviewed: None. Patient Active Problem List   Diagnosis Code    GERD (gastroesophageal reflux disease) K21.9    Gastric ulcer K25.9    Lewis's esophagus without dysplasia K22.70    Lewis's esophagus K22.70    Diarrhea R19.7    [facility] C56.9    Insomnia G47.00    Essential hypertension I10    Hypomagnesemia E83.42    Hypokalemia E87.6    Mixed hyperlipidemia E78.2    Thoracogenic scoliosis M41.30    Foot drop, bilateral M21.371, M21.372    Age-related cataract of both eyes H25.9    Previous back surgery Z98.890    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    History of replacement of both shoulder joints Z96.611, Z96.612    Neuropathy G62.9     Current Outpatient Prescriptions   Medication Sig Dispense Refill    denosumab (PROLIA) 60 mg/mL injection 1 mL by SubCUTAneous route every 6 months. 1 Syringe 1    RABEprazole (ACIPHEX) 20 mg tablet Take 20 mg by mouth daily.  raNITIdine hcl 300 mg cap Take  by mouth.       traMADol (ULTRAM) 50 mg tablet 0.5 to one tablet po every 6 hours prn pain 40 Tab 1    lisinopril (PRINIVIL, ZESTRIL) 20 mg tablet TAKE 1 TABLET BY MOUTH TWO  TIMES DAILY 180 Tab 3    gabapentin (NEURONTIN) 800 mg tablet TAKE 1 TAB BY MOUTH FOUR  TIMES DAILY AS NEEDED. 360 Tab 3    diclofenac EC (VOLTAREN) 75 mg EC tablet Take 1 Tab by mouth two (2) times a day. 28 Tab 0    hydroCHLOROthiazide (HYDRODIURIL) 25 mg tablet Take 1 Tab by mouth daily. 90 Tab 3    amitriptyline (ELAVIL) 25 mg tablet Take 1 Tab by mouth nightly. 90 Tab 3    simvastatin (ZOCOR) 40 mg tablet Take 1 Tab by mouth nightly. 90 Tab 3    potassium chloride (KLOR-CON) 10 mEq tablet Take 1 Tab by mouth three (3) times daily. 270 Tab 3    cholecalciferol (VITAMIN D3) 1,000 unit tablet Take 1,000 Units by mouth daily.  multivitamin (ONE A DAY) tablet Take 1 Tab by mouth daily.  loperamide (IMODIUM) 2 mg capsule Take 2 mg by mouth four (4) times daily as needed for Diarrhea.  cyanocobalamin (VITAMIN B-12) 1,000 mcg tablet Take 1,000 mcg by mouth daily.  acetaminophen (TYLENOL EXTRA STRENGTH) 500 mg tablet Take 500 mg by mouth every six (6) hours as needed.  magnesium oxide (MAG-OX) 400 mg tablet Take 400 mg by mouth two (2) times a day.  cephALEXin (KEFLEX) 500 mg capsule TK 4 CS PO 1 H PRIOR TO DENTAL APPT  0    Calcium-Cholecalciferol, D3, (CALCIUM 500 + D) 500 mg(1,250mg) -400 unit tab Take 1 Tab by mouth daily. 90 Tab 1    famotidine (PEPCID) 40 mg tablet Take 1 Tab by mouth nightly.  90 Tab 3    esomeprazole (NEXIUM) 40 mg capsule TAKE 1 CAPSULE BY MOUTH  DAILY 90 Cap 1     No Known Allergies  Past Medical History:   Diagnosis Date    Arthritis     Diarrhea 6/6/2016    Foot drop     Gastric ulcer 6/14/2012    GERD (gastroesophageal reflux disease)     High cholesterol     Hypertension     Neuropathy     [facility] cancer ([facility] [address] 1986    chemo x 9 mos    Scoliosis     Stroke ([facility] 2002    ? stroke with drop feet, per patient CT scans were negative     Past Surgical History:   Procedure Laterality Date    FLEXIBLE SIGMOIDOSCOPY N/A 6/6/2016    SIGMOIDOSCOPY FLEXIBLE performed by Darwin Richey MD at hospitals ENDOSCOPY    HX ORTHOPAEDIC      back    HX ORTHOPAEDIC      bilateral shoulder replacements    HX ORTHOPAEDIC      left knee replacement    HX LYDIA AND BSO      OH EGD TRANSORAL BIOPSY SINGLE/MULTIPLE  1/26/2012         [place] EGD TRANSORAL BIOPSY SINGLE/MULTIPLE  6/14/2012          History reviewed. No pertinent family history. Social History   Substance Use Topics    Smoking status: Never Smoker    Smokeless tobacco: Never Used    Alcohol use No          Objective:     Visit Vitals    /72 (BP 1 Location: Right arm, BP Patient Position: Sitting)    Pulse 85    Temp 98.7 °F (37.1 °C) (Oral)    Resp 16    Ht 5' 2\" (1.575 m)    Wt 165 lb (74.8 kg)    SpO2 95%    BMI 30.18 kg/m2     Physical Exam   Constitutional: She is oriented to person, place, and time. She appears well-developed and well-nourished. No distress. HENT:   Head: Normocephalic and atraumatic. Right Ear: External ear normal.   Left Ear: External ear normal.   Nose: Nose normal.   Mouth/Throat: Oropharynx is clear and moist. No oropharyngeal exudate. Bilateral hearing aids   Eyes: Conjunctivae and EOM are normal. Pupils are equal, round, and reactive to light. Right eye exhibits no discharge. Left eye exhibits no discharge. No scleral icterus. Neck: Normal range of motion. Neck supple. No JVD present. No tracheal deviation present. No thyromegaly present. Cardiovascular: Normal rate, regular rhythm, normal heart sounds and intact distal pulses. Exam reveals no gallop and no friction rub. No murmur heard. Pulmonary/Chest: Effort normal and breath sounds normal. No respiratory distress. She has no wheezes. She has no rales. Abdominal: Soft. Bowel sounds are normal. She exhibits no distension. There is no tenderness.  There is no rebound. Musculoskeletal: She exhibits edema, tenderness and deformity. Deformities of DIP's and PIP's bilaterally with edema and TTP. Wears splints on LE's secondary to peripheral neuropathy. Lymphadenopathy:     She has no cervical adenopathy. Neurological: She is alert and oriented to person, place, and time. She displays abnormal reflex. No cranial nerve deficit. She exhibits abnormal muscle tone. Coordination abnormal.   DTR's +1 LE's with decreased tone. Patient with poor balance/coordination secondary to PN. Skin: Skin is warm and dry. No rash noted. She is not diaphoretic. No erythema. No pallor. Psychiatric: She has a normal mood and affect. Her behavior is normal.   Nursing note and vitals reviewed. Imaging  None    Lab Review  Results for orders placed or performed in visit on 02/21/18   BALDOMERO BY MULTIPLEX FLOW IA, QL   Result Value Ref Range    Antinuclear Antibodies Direct Positive (A) Negative   CYCLIC CITRUL PEPTIDE AB, IGG   Result Value Ref Range    CCP Antibodies IgG/IgA 4 0 - 19 units   RHEUMASSURE   Result Value Ref Range    Rheumatoid Arthritis Factor 16.6 (H) Units/mL    CCP Antibodies IgG/IgA <20 Units    14. 3.3 ETA, Rheum. Arthritis <0.20 ng/mL   C REACTIVE PROTEIN, QT   Result Value Ref Range    C-Reactive Protein, Qt 1.6 0.0 - 4.9 mg/L   CBC WITH AUTOMATED DIFF   Result Value Ref Range    WBC 8.1 3.4 - 10.8 x10E3/uL    RBC 3.81 3.77 - 5.28 x10E6/uL    HGB 11.1 11.1 - 15.9 g/dL    HCT 34.0 34.0 - 46.6 %    MCV 89 79 - 97 fL    MCH 29.1 26.6 - 33.0 pg    MCHC 32.6 31.5 - 35.7 g/dL    RDW 13.7 12.3 - 15.4 %    PLATELET 078 159 - 931 x10E3/uL    NEUTROPHILS 77 Not Estab. %    Lymphocytes 14 Not Estab. %    MONOCYTES 8 Not Estab. %    EOSINOPHILS 1 Not Estab. %    BASOPHILS 0 Not Estab. %    ABS. NEUTROPHILS 6.2 1.4 - 7.0 x10E3/uL    Abs Lymphocytes 1.2 0.7 - 3.1 x10E3/uL    ABS. MONOCYTES 0.6 0.1 - 0.9 x10E3/uL    ABS. EOSINOPHILS 0.1 0.0 - 0.4 x10E3/uL    ABS.  BASOPHILS 0.0 0.0 - 0.2 x10E3/uL    IMMATURE GRANULOCYTES 0 Not Estab. %    ABS. IMM. GRANS. 0.0 0.0 - 0.1 x10E3/uL   RHEUMATOID FACTOR, QL   Result Value Ref Range    Rheumatoid factor 16.0 (H) 0.0 - 13.9 IU/mL   SED RATE (ESR)   Result Value Ref Range    Sed rate (ESR) 29 0 - 40 mm/hr        Assessment/Plan:   Diagnoses and all orders for this visit:    1. Age-related osteoporosis without current pathological fracture  -     denosumab (PROLIA) 60 mg/mL injection; 1 mL by SubCUTAneous route every 6 months. 2. Erosive osteoarthritis of both hands  -     REFERRAL TO ORTHOPEDICS    3. Essential hypertension    4. Lewis's esophagus without dysplasia    Called on Prolia. Patient needs to call Uma Noguera at [phone] and will do so for rohith to [place] co-pay. Follow-up Disposition:  Return in about 3 months (around 8/21/2018) for with labs. Disclaimer:  Advised her to call back or return to office if symptoms worsen/change/persist.  Discussed expected course/resolution/complications of diagnosis in detail with patient. Medication risks/benefits/costs/interactions/alternatives discussed with patient. She was given an after visit summary which includes diagnoses, current medications, & vitals. She expressed understanding with the diagnosis and plan.        By:  Karishma Patton [id number] [facility]  [phone]

## 2018-05-21 NOTE — MR AVS SNAPSHOT
[address] [facility] 
[phone] Patient: Tej Galicia MRN: [id number] UAL:47/1/1201 Visit Information Date & Time Provider Department Dept. Phone Encounter #  
 5/21/2018 11:00 AM Aguila Chappell NP [address] [phone] [id number] Follow-up Instructions Return in about 3 months (around 8/21/2018) for with labs. Follow-up and Disposition History Your Appointments 8/21/2018 11:00 AM  
New Patient with Marc Corral MD  
[address] ([facility]) Appt Note: np Arthralgia of both hands sc 2/26/18  
 [address] [facility], [facility] [address]  
[phone]  
  
   
 [address] [place] [id number]  
  
    
 8/22/2018 11:00 AM  
ROUTINE CARE with Aguila Chappell NP  
[facility] SENIOR [facility] SERVICES [facility] ([facility]) Appt Note: Blood work drawn.; Blood work drawn  
 [facility] [address] [id number]  
[address] [facility] [address] [address] Upcoming Health Maintenance Date Due ZOSTER VACCINE AGE 60> 8/9/1993 GLAUCOMA SCREENING Q2Y 10/9/1998 Pneumococcal 65+ High/Highest Risk (1 of 2 - PCV13) 10/9/1998 DTaP/Tdap/Td series (1 - Tdap) 3/25/2011 Influenza Age 5 to Adult 8/1/2018 MEDICARE YEARLY EXAM 3/3/2019 Allergies as of 5/21/2018  Review Complete On: 5/21/2018 By: Aguila Chappell NP No Known Allergies Current Immunizations  Never Reviewed Name Date  
 TD Vaccine 3/24/2011  2:38 PM  
  
 Not reviewed this visit You Were Diagnosed With   
  
 Codes Comments Age-related osteoporosis without current pathological fracture    -  Primary ICD-10-CM: M81.0 ICD-9-CM: 733.01 Erosive osteoarthritis of both hands     ICD-10-CM: M15.4 ICD-9-CM: 715.89   
 Essential hypertension     ICD-10-CM: I10 
ICD-9-CM: 401.9 Lewis's esophagus without dysplasia     ICD-10-CM: K22.70 ICD-9-CM: 530.85 Vitals BP Pulse Temp Resp Height(growth percentile) Weight(growth percentile) 120/72 (BP 1 Location: Right arm, BP Patient Position: Sitting) 85 98.7 °F (37.1 °C) (Oral) 16 5' 2\" (1.575 m) 165 lb (74.8 kg) SpO2 BMI OB Status Smoking Status 95% 30.18 kg/m2 Hysterectomy Never Smoker BMI and BSA Data Body Mass Index Body Surface Area  
 30.18 kg/m 2 1.81 m 2 Preferred Pharmacy Pharmacy Name Phone [address] [facility], [address] [place] [address] [address] Your Updated Medication List  
  
   
This list is accurate as of 5/21/18  1:27 PM.  Always use your most recent med list.  
  
  
  
  
 amitriptyline 25 mg tablet Commonly known as:  ELAVIL Take 1 Tab by mouth nightly. Calcium-Cholecalciferol (D3) 500 mg(1,250mg) -400 unit Tab Commonly known as:  CALCIUM 500 + D Take 1 Tab by mouth daily. cephALEXin 500 mg capsule Commonly known as:  Zee Leon TK 4 CS PO 1 H PRIOR TO DENTAL APPT  
  
 cholecalciferol 1,000 unit tablet Commonly known as:  VITAMIN D3 Take 1,000 Units by mouth daily. denosumab 60 mg/mL injection Commonly known as:  Jacob Grijalva 1 mL by SubCUTAneous route every 6 months. diclofenac EC 75 mg EC tablet Commonly known as:  VOLTAREN Take 1 Tab by mouth two (2) times a day. esomeprazole 40 mg capsule Commonly known as:  NEXIUM  
TAKE 1 CAPSULE BY MOUTH  DAILY  
  
 famotidine 40 mg tablet Commonly known as:  PEPCID Take 1 Tab by mouth nightly.  
  
 gabapentin 800 mg tablet Commonly known as:  NEURONTIN  
TAKE 1 TAB BY MOUTH FOUR  TIMES DAILY AS NEEDED. hydroCHLOROthiazide 25 mg tablet Commonly known as:  HYDRODIURIL Take 1 Tab by mouth daily. lisinopril 20 mg tablet Commonly known as:  Albertinapriya Barbour  
 TAKE 1 TABLET BY MOUTH TWO  TIMES DAILY  
  
 loperamide 2 mg capsule Commonly known as:  IMODIUM Take 2 mg by mouth four (4) times daily as needed for Diarrhea.  
  
 magnesium oxide 400 mg tablet Commonly known as:  MAG-OX Take 400 mg by mouth two (2) times a day. multivitamin tablet Commonly known as:  ONE A DAY Take 1 Tab by mouth daily. potassium chloride 10 mEq tablet Commonly known as:  KLOR-CON Take 1 Tab by mouth three (3) times daily. RABEprazole 20 mg tablet Commonly known as:  ACIPHEX Take 20 mg by mouth daily. raNITIdine hcl 300 mg Cap Take  by mouth. simvastatin 40 mg tablet Commonly known as:  ZOCOR Take 1 Tab by mouth nightly. traMADol 50 mg tablet Commonly known as:  ULTRAM  
0.5 to one tablet po every 6 hours prn pain TYLENOL EXTRA STRENGTH 500 mg tablet Generic drug:  acetaminophen Take 500 mg by mouth every six (6) hours as needed. VITAMIN B-12 1,000 mcg tablet Generic drug:  cyanocobalamin Take 1,000 mcg by mouth daily. Prescriptions Sent to Pharmacy Refills  
 denosumab (PROLIA) 60 mg/mL injection 1 Si mL by SubCUTAneous route every 6 months. Class: Normal  
 Pharmacy: [facility], [address]  #: [phone] Route: SubCUTAneous We Performed the Following REFERRAL TO ORTHOPEDICS [BGW220 Custom] Comments:  
 Please evaluate patient for ? Erosive osteoarthritis. RH Factor 16 but CCP, ESR and CRP are negative. DIP and PIP's with edema and deformity. Xray consistent with erosive osteoarthritis. Follow-up Instructions Return in about 3 months (around 2018) for with labs. Referral Information Referral ID Referred By Referred To  
  
 [id number] Isiah COLLINS Not Available Visits Status Start Date End Date 1 New Request 18 If your referral has a status of pending review or denied, additional information will be sent to support the outcome of this decision. Patient Instructions Denosumab (By injection) Denosumab ([id number]-YDH-[id number]-[id number]) Treats osteoporosis, bone cancer, hypercalcemia, and other bone problems in patients who have cancer. Brand Name(s): Rylie Young There may be other brand names for this medicine. When This Medicine Should Not Be Used: This medicine is not right for everyone. You should not receive it if you had an allergic reaction to denosumab or if you are pregnant. How to Use This Medicine:  
Injectable · A doctor or other health professional will give you this medicine. This medicine is usually given as a shot under the skin of your upper arm, upper thigh, or stomach. · This medicine should come with a Medication Guide. Ask your pharmacist for a copy if you do not have one. · Missed dose: Call your doctor or pharmacist for instructions. Drugs and Foods to Avoid: Ask your doctor or pharmacist before using any other medicine, including over-the-counter medicines, vitamins, and herbal products. · Do not use Prolia® and Xgeva® together. They contain the same medicine. · Some medicines can affect how denosumab works. Tell your doctor if you are also using medicine that weakens your immune system, including a steroid or cancer medicine. Warnings While Using This Medicine: · This medicine may cause birth defects if either partner is using it during conception or pregnancy. Tell your doctor right away if you or your partner becomes pregnant. Use an effective form of birth control. Women who are being treated with Shayy Spruce should continue using birth control for at least 5 months after the last dose. · Tell your doctor if you are breastfeeding, or if you have kidney disease, diabetes, gum disease, or an allergy to latex.  Tell your doctor if you have problems with your thyroid, parathyroid, or digestive system. · This medicine may cause the following problems: 
¨ Low calcium levels in your blood ¨ Increased risk of broken thigh bone ¨ Increased risk of infections ¨ Serious skin reactions ¨ Severe bone, joint, or muscle pain · This medicine can cause jaw problems. You must have regular dental exams while you are being treated with this medicine. Tell your dentist that you are using this medicine. Practice good oral hygiene. · Do not suddenly stop using Prolia® without checking first with your doctor. Doing so may increase risk for more fractures. Talk to your doctor about other medicine that you can take. · Your doctor will do lab tests at regular visits to check on the effects of this medicine. Keep all appointments. Possible Side Effects While Using This Medicine:  
Call your doctor right away if you notice any of these side effects: · Allergic reaction: Itching or hives, swelling in your face or hands, swelling or tingling in your mouth or throat, chest tightness, trouble breathing · Blistering, peeling, red skin rash · Chest pain, fast or uneven heartbeat, trouble breathing · Fever, chills, cough, sore throat, body aches · Lightheadedness, dizziness, fainting · Muscle spasms or twitching, numbness or tingling in your fingers, toes, or lips · Pain or burning during urination, change in how much or how often you urinate · Pain, swelling, heavy feeling, or numbness in your mouth or jaw, loose teeth or other teeth problems · Severe bone, joint, or muscle pain · Unusual pain in your thigh, groin, or hip If you notice these less serious side effects, talk with your doctor: · Diarrhea, nausea · Redness, pain, itching, burning, swelling, or a lump under your skin where the shot was given · Tiredness or weakness If you notice other side effects that you think are caused by this medicine, tell your doctor. Call your doctor for medical advice about side effects. You may report side effects to FDA at 9-426-FDA-3139 © 2017 [address] Information is for End User's use only and may not be sold, redistributed or otherwise used for commercial purposes. The above information is an  only. It is not intended as medical advice for individual conditions or treatments. Talk to your doctor, nurse or pharmacist before following any medical regimen to see if it is safe and effective for you. Patient Instructions History Introducing [facility]! [facility] introduces [employer] patient portal. Now you can access parts of your medical record, email your doctor's office, and request medication refills online. 1. In your internet browser, go to [email or web address] 2. Click on the First Time User? Click Here link in the Sign In box. You will see the New Member Sign Up page. 3. Enter your [employer] Access Code exactly as it appears below. You will not need to use this code after youve completed the sign-up process. If you do not sign up before the expiration date, you must request a new code. · [employer] Access Code: [email or web address] Expires: 5/21/2018  6:21 PM 
 
4. Enter the last four digits of your Social Security Number (xxxx) and Date of Birth (mm/dd/yyyy) as indicated and click Submit. You will be taken to the next sign-up page. 5. Create a Feidee ID. This will be your Feidee login ID and cannot be changed, so think of one that is secure and easy to remember. 6. Create a Fe[employer]e password. You can change your password at any time. 7. Enter your Password Reset Question and Answer. This can be used at a later time if you forget your password. 8. Enter your e-mail address. You will receive e-mail notification when new information is available in [address] 9. Click Sign Up. You can now view and download portions of your medical record. 10. Click the Download Summary menu link to download a portable copy of your medical information. If you have questions, please visit the Frequently Asked Questions section of the "[employer] website. Remember, "[employer] is NOT to be used for urgent needs. For medical emergencies, dial 911. Now available from your iPhone and Android! Please provide this summary of care documentation to your next provider. Your primary care clinician is listed as [employer]. If you have any questions after today's visit, please call [phone].

## 2018-05-24 DIAGNOSIS — M81.0 AGE-RELATED OSTEOPOROSIS WITHOUT CURRENT PATHOLOGICAL FRACTURE: Primary | ICD-10-CM

## 2018-05-24 NOTE — PERIOP NOTES
[facility]  Ambulatory Surgery Unit  Pre-operative Instructions for Endo Procedures    Procedure Date  05/29/18          Tentative Arrival Time 1610      1. On the day of your procedure, please report to the Ambulatory Surgery Unit Registration Desk and sign in at your designated time. The Ambulatory Surgery Unit is located in [facility] on the [facility] side of the [facility] across from the [address] [place] [address]. Please have all of your health insurance cards and a photo ID. 2. You must have someone with you to drive you home, as you should not drive a car for 24 hours following anesthesia. Please make arrangements for a responsible adult friend or family member to stay with you for at least the first 24 hours after your procedure. 3. Do not have anything to eat or drink (including water, gum, mints, coffee, juice) after midnight   05/28/18. This may not apply to medications prescribed by your physician. (Please note below the special instructions with medications to take the morning of your procedure.)    4. If applicable, follow the clear liquid diet and bowel prep instructions provided by your physician's office. If you do not have this information, or have any questions, please contact your physician's office. 5. We recommend you do not drink any alcoholic beverages for 24 hours before and after your procedure. 6. Contact your surgeons office for instructions on the following medications: non-steroidal anti-inflammatory drugs (i.e. Advil, Aleve), vitamins, and supplements. (Some surgeons will want you to stop these medications prior to surgery and others may allow you to take them)   **If you are currently taking Plavix, Coumadin, Aspirin and/or other blood-thinning agents, contact your surgeon for instructions. ** Your surgeon will partner with the physician prescribing these medications to determine if it is safe to stop or if you need to continue taking.  Please do not stop taking these medications without instructions from your surgeon. 7. In an effort to help prevent surgical site infection, we ask that you shower with an anti-bacterial soap (i.e. Dial or Safeguard) on the morning of your procedure. Do not apply any lotions, powders, or deodorants after showering. 8. Wear comfortable clothes. Wear glasses instead of contacts. Do not bring any jewelry or money (other than copays or fees as instructed). Do not wear make-up, particularly mascara, the morning of your procedure. Wear your hair loose or down, no ponytails, buns, salvador pins or clips. All body piercings must be removed. 9. You should understand that if you do not follow these instructions your procedure may be cancelled. If your physical condition changes (i.e. fever, cold or flu) please contact your surgeon as soon as possible. 10. It is important that you be on time. If a situation occurs where you may be late, or if you have any questions or problems, please call [phone]. 11. Your procedure time may be subject to change. You will receive a phone call the day prior to confirm your arrival time. Special Instructions: Take all medications and inhalers, as prescribed, on the morning of surgery with a sip of water EXCEPT: n/a       I understand a pre-operative phone call will be made to verify my procedure time. In the event that I am not available, I give permission for a message to be left on my answering service and/or with another person?       Yes       Preop instructions reviewed  Pt verbalized understanding.      ___________________      ___________________      ___________________  (Signature of Patient)          (Witness)                   (Date and Time)

## 2018-05-24 NOTE — PERIOP NOTES
RICHELLE Saleh office pt needs BMP for procedure. PT can arrive at clinic tomorrow at 0900 for blood drawer.     Notified pt of appointment

## 2018-05-25 ENCOUNTER — CLINICAL SUPPORT (OUTPATIENT)
Dept: FAMILY MEDICINE CLINIC | Age: 83
End: 2018-05-25

## 2018-05-25 ENCOUNTER — ANESTHESIA EVENT (OUTPATIENT)
Dept: SURGERY | Age: 83
End: 2018-05-25
Payer: MEDICARE

## 2018-05-25 VITALS
OXYGEN SATURATION: 93 % | HEIGHT: 62 IN | DIASTOLIC BLOOD PRESSURE: 69 MMHG | BODY MASS INDEX: 30.18 KG/M2 | WEIGHT: 164 LBS | SYSTOLIC BLOOD PRESSURE: 100 MMHG | RESPIRATION RATE: 16 BRPM | HEART RATE: 86 BPM | TEMPERATURE: 97 F

## 2018-05-25 DIAGNOSIS — D50.9 IRON DEFICIENCY ANEMIA, UNSPECIFIED IRON DEFICIENCY ANEMIA TYPE: ICD-10-CM

## 2018-05-25 DIAGNOSIS — I10 ESSENTIAL HYPERTENSION: Primary | ICD-10-CM

## 2018-05-25 NOTE — PROGRESS NOTES
Chief Complaint   Patient presents with    Labs     cbc, cmp       1. Have you been to the ER, urgent care clinic since your last visit? Hospitalized since your last visit? no    2. Have you seen or consulted any other health care providers outside of the [facility] since your last visit? Include any pap smears or colon screening.   no

## 2018-05-26 LAB
BASOPHILS # BLD AUTO: 0.1 X10E3/UL (ref 0–0.2)
BASOPHILS NFR BLD AUTO: 1 %
BUN SERPL-MCNC: 21 MG/DL (ref 8–27)
BUN/CREAT SERPL: 23 (ref 12–28)
CALCIUM SERPL-MCNC: 9.4 MG/DL (ref 8.7–10.3)
CHLORIDE SERPL-SCNC: 101 MMOL/L (ref 96–106)
CO2 SERPL-SCNC: 23 MMOL/L (ref 18–29)
CREAT SERPL-MCNC: 0.92 MG/DL (ref 0.57–1)
EOSINOPHIL # BLD AUTO: 0.1 X10E3/UL (ref 0–0.4)
EOSINOPHIL NFR BLD AUTO: 2 %
ERYTHROCYTE [DISTWIDTH] IN BLOOD BY AUTOMATED COUNT: 13.7 % (ref 12.3–15.4)
GFR SERPLBLD CREATININE-BSD FMLA CKD-EPI: 57 ML/MIN/1.73
GFR SERPLBLD CREATININE-BSD FMLA CKD-EPI: 66 ML/MIN/1.73
GLUCOSE SERPL-MCNC: 110 MG/DL (ref 65–99)
HCT VFR BLD AUTO: 30.4 % (ref 34–46.6)
HGB BLD-MCNC: 9.7 G/DL (ref 11.1–15.9)
IMM GRANULOCYTES # BLD: 0 X10E3/UL (ref 0–0.1)
IMM GRANULOCYTES NFR BLD: 0 %
LYMPHOCYTES # BLD AUTO: 1.5 X10E3/UL (ref 0.7–3.1)
LYMPHOCYTES NFR BLD AUTO: 20 %
MCH RBC QN AUTO: 26.8 PG (ref 26.6–33)
MCHC RBC AUTO-ENTMCNC: 31.9 G/DL (ref 31.5–35.7)
MCV RBC AUTO: 84 FL (ref 79–97)
MONOCYTES # BLD AUTO: 0.7 X10E3/UL (ref 0.1–0.9)
MONOCYTES NFR BLD AUTO: 9 %
NEUTROPHILS # BLD AUTO: 5.2 X10E3/UL (ref 1.4–7)
NEUTROPHILS NFR BLD AUTO: 68 %
PLATELET # BLD AUTO: 257 X10E3/UL (ref 150–379)
POTASSIUM SERPL-SCNC: 4.7 MMOL/L (ref 3.5–5.2)
RBC # BLD AUTO: 3.62 X10E6/UL (ref 3.77–5.28)
SODIUM SERPL-SCNC: 139 MMOL/L (ref 134–144)
WBC # BLD AUTO: 7.6 X10E3/UL (ref 3.4–10.8)

## 2018-05-29 ENCOUNTER — ANESTHESIA (OUTPATIENT)
Dept: SURGERY | Age: 83
End: 2018-05-29
Payer: MEDICARE

## 2018-05-29 ENCOUNTER — HOSPITAL ENCOUNTER (OUTPATIENT)
Age: 83
Setting detail: OUTPATIENT SURGERY
Discharge: HOME | End: 2018-05-29
Attending: SPECIALIST | Admitting: SPECIALIST
Payer: MEDICARE

## 2018-05-29 VITALS
WEIGHT: 165 LBS | HEART RATE: 73 BPM | DIASTOLIC BLOOD PRESSURE: 72 MMHG | OXYGEN SATURATION: 98 % | BODY MASS INDEX: 30.36 KG/M2 | TEMPERATURE: 98.6 F | RESPIRATION RATE: 18 BRPM | SYSTOLIC BLOOD PRESSURE: 137 MMHG | HEIGHT: 62 IN

## 2018-05-29 PROCEDURE — 74011250636 HC RX REV CODE- 250/636: Performed by: ANESTHESIOLOGY

## 2018-05-29 PROCEDURE — 74011000250 HC RX REV CODE- 250

## 2018-05-29 PROCEDURE — 88305 TISSUE EXAM BY PATHOLOGIST: CPT | Performed by: SPECIALIST

## 2018-05-29 PROCEDURE — 74011250636 HC RX REV CODE- 250/636

## 2018-05-29 PROCEDURE — 76030000002 HC AMB SURG OR TIME FIRST 0.: Performed by: SPECIALIST

## 2018-05-29 PROCEDURE — 88312 SPECIAL STAINS GROUP 1: CPT | Performed by: SPECIALIST

## 2018-05-29 PROCEDURE — 76210000050 HC AMBSU PH II REC 0.5 TO 1 HR: Performed by: SPECIALIST

## 2018-05-29 PROCEDURE — 77030019988 HC FCPS ENDOSC DISP BSC -B: Performed by: SPECIALIST

## 2018-05-29 PROCEDURE — 76060000073 HC AMB SURG ANES FIRST 0.5 HR: Performed by: SPECIALIST

## 2018-05-29 PROCEDURE — 77030021352 HC CBL LD SYS DISP COVD -B: Performed by: SPECIALIST

## 2018-05-29 PROCEDURE — 77030020255 HC SOL INJ LR 1000ML BG: Performed by: SPECIALIST

## 2018-05-29 RX ORDER — DEXTROMETHORPHAN/PSEUDOEPHED 2.5-7.5/.8
1.2 DROPS ORAL
Status: DISCONTINUED | OUTPATIENT
Start: 2018-05-29 | End: 2018-05-29 | Stop reason: HOSPADM

## 2018-05-29 RX ORDER — SODIUM CHLORIDE 9 MG/ML
75 INJECTION, SOLUTION INTRAVENOUS CONTINUOUS
Status: DISCONTINUED | OUTPATIENT
Start: 2018-05-29 | End: 2018-05-29 | Stop reason: HOSPADM

## 2018-05-29 RX ORDER — MORPHINE SULFATE 10 MG/ML
2 INJECTION, SOLUTION INTRAMUSCULAR; INTRAVENOUS
Status: DISCONTINUED | OUTPATIENT
Start: 2018-05-29 | End: 2018-05-29 | Stop reason: HOSPADM

## 2018-05-29 RX ORDER — NALOXONE HYDROCHLORIDE 0.4 MG/ML
0.4 INJECTION, SOLUTION INTRAMUSCULAR; INTRAVENOUS; SUBCUTANEOUS
Status: DISCONTINUED | OUTPATIENT
Start: 2018-05-29 | End: 2018-05-29 | Stop reason: HOSPADM

## 2018-05-29 RX ORDER — PROPOFOL 10 MG/ML
INJECTION, EMULSION INTRAVENOUS AS NEEDED
Status: DISCONTINUED | OUTPATIENT
Start: 2018-05-29 | End: 2018-05-29 | Stop reason: HOSPADM

## 2018-05-29 RX ORDER — ONDANSETRON 2 MG/ML
4 INJECTION INTRAMUSCULAR; INTRAVENOUS AS NEEDED
Status: DISCONTINUED | OUTPATIENT
Start: 2018-05-29 | End: 2018-05-29 | Stop reason: HOSPADM

## 2018-05-29 RX ORDER — FLUMAZENIL 0.1 MG/ML
0.2 INJECTION INTRAVENOUS
Status: DISCONTINUED | OUTPATIENT
Start: 2018-05-29 | End: 2018-05-29 | Stop reason: HOSPADM

## 2018-05-29 RX ORDER — SODIUM CHLORIDE 0.9 % (FLUSH) 0.9 %
5-10 SYRINGE (ML) INJECTION AS NEEDED
Status: DISCONTINUED | OUTPATIENT
Start: 2018-05-29 | End: 2018-05-29 | Stop reason: HOSPADM

## 2018-05-29 RX ORDER — SODIUM CHLORIDE 0.9 % (FLUSH) 0.9 %
5-10 SYRINGE (ML) INJECTION EVERY 8 HOURS
Status: DISCONTINUED | OUTPATIENT
Start: 2018-05-29 | End: 2018-05-29 | Stop reason: HOSPADM

## 2018-05-29 RX ORDER — MIDAZOLAM HYDROCHLORIDE 1 MG/ML
.25-5 INJECTION, SOLUTION INTRAMUSCULAR; INTRAVENOUS
Status: DISCONTINUED | OUTPATIENT
Start: 2018-05-29 | End: 2018-05-29 | Stop reason: HOSPADM

## 2018-05-29 RX ORDER — SODIUM CHLORIDE, SODIUM LACTATE, POTASSIUM CHLORIDE, CALCIUM CHLORIDE 600; 310; 30; 20 MG/100ML; MG/100ML; MG/100ML; MG/100ML
25 INJECTION, SOLUTION INTRAVENOUS CONTINUOUS
Status: DISCONTINUED | OUTPATIENT
Start: 2018-05-29 | End: 2018-05-29 | Stop reason: HOSPADM

## 2018-05-29 RX ORDER — ATROPINE SULFATE 0.1 MG/ML
0.5 INJECTION INTRAVENOUS
Status: DISCONTINUED | OUTPATIENT
Start: 2018-05-29 | End: 2018-05-29 | Stop reason: HOSPADM

## 2018-05-29 RX ORDER — DIPHENHYDRAMINE HYDROCHLORIDE 50 MG/ML
12.5 INJECTION, SOLUTION INTRAMUSCULAR; INTRAVENOUS AS NEEDED
Status: DISCONTINUED | OUTPATIENT
Start: 2018-05-29 | End: 2018-05-29 | Stop reason: HOSPADM

## 2018-05-29 RX ORDER — SODIUM CHLORIDE, SODIUM LACTATE, POTASSIUM CHLORIDE, CALCIUM CHLORIDE 600; 310; 30; 20 MG/100ML; MG/100ML; MG/100ML; MG/100ML
INJECTION, SOLUTION INTRAVENOUS
Status: DISCONTINUED | OUTPATIENT
Start: 2018-05-29 | End: 2018-05-29 | Stop reason: HOSPADM

## 2018-05-29 RX ORDER — FENTANYL CITRATE 50 UG/ML
25 INJECTION, SOLUTION INTRAMUSCULAR; INTRAVENOUS
Status: DISCONTINUED | OUTPATIENT
Start: 2018-05-29 | End: 2018-05-29 | Stop reason: HOSPADM

## 2018-05-29 RX ORDER — LIDOCAINE HYDROCHLORIDE 20 MG/ML
INJECTION, SOLUTION EPIDURAL; INFILTRATION; INTRACAUDAL; PERINEURAL AS NEEDED
Status: DISCONTINUED | OUTPATIENT
Start: 2018-05-29 | End: 2018-05-29 | Stop reason: HOSPADM

## 2018-05-29 RX ORDER — HYDROMORPHONE HYDROCHLORIDE 1 MG/ML
.2-.5 INJECTION, SOLUTION INTRAMUSCULAR; INTRAVENOUS; SUBCUTANEOUS
Status: DISCONTINUED | OUTPATIENT
Start: 2018-05-29 | End: 2018-05-29 | Stop reason: HOSPADM

## 2018-05-29 RX ADMIN — SODIUM CHLORIDE, SODIUM LACTATE, POTASSIUM CHLORIDE, AND CALCIUM CHLORIDE 25 ML/HR: 600; 310; 30; 20 INJECTION, SOLUTION INTRAVENOUS at 11:48

## 2018-05-29 RX ADMIN — LIDOCAINE HYDROCHLORIDE 100 MG: 20 INJECTION, SOLUTION EPIDURAL; INFILTRATION; INTRACAUDAL; PERINEURAL at 12:31

## 2018-05-29 RX ADMIN — PROPOFOL 20 MG: 10 INJECTION, EMULSION INTRAVENOUS at 12:39

## 2018-05-29 RX ADMIN — SODIUM CHLORIDE, SODIUM LACTATE, POTASSIUM CHLORIDE, CALCIUM CHLORIDE: 600; 310; 30; 20 INJECTION, SOLUTION INTRAVENOUS at 12:09

## 2018-05-29 RX ADMIN — PROPOFOL 20 MG: 10 INJECTION, EMULSION INTRAVENOUS at 12:35

## 2018-05-29 RX ADMIN — PROPOFOL 20 MG: 10 INJECTION, EMULSION INTRAVENOUS at 12:37

## 2018-05-29 RX ADMIN — PROPOFOL 50 MG: 10 INJECTION, EMULSION INTRAVENOUS at 12:31

## 2018-05-29 RX ADMIN — PROPOFOL 20 MG: 10 INJECTION, EMULSION INTRAVENOUS at 12:33

## 2018-05-29 NOTE — ANESTHESIA PREPROCEDURE EVALUATION
Anesthetic History   No history of anesthetic complications            Review of Systems / Medical History  Patient summary reviewed, nursing notes reviewed and pertinent labs reviewed    Pulmonary  Within defined limits                 Neuro/Psych       CVA (foot drop)    Pertinent negatives: No TIA   Cardiovascular    Hypertension          Hyperlipidemia    Exercise tolerance: <4 METS: Uses walker  Comments: H/o heart murmur  1-2009 ECHO: 60-65% EF   GI/Hepatic/Renal     GERD: poorly controlled      PUD (Gastric)    Comments: Lewis's Esoph Endo/Other        Arthritis and cancer (Ovarian CA, with 9 months chemo  1986)     Other Findings   Comments: Scoliosis  Peripheral neuropathy, feet  [place] with hearing aides           Physical Exam    Airway  Mallampati: I  TM Distance: < 4 cm  Neck ROM: normal range of motion   Mouth opening: Normal     Cardiovascular  Regular rate and rhythm,  S1 and S2 normal,  no murmur, click, rub, or gallop  Rhythm: regular  Rate: normal         Dental    Dentition: Upper partial plate     Pulmonary  Breath sounds clear to auscultation               Abdominal  GI exam deferred       Other Findings            Anesthetic Plan    ASA: 2  Anesthesia type: total IV anesthesia          Induction: Intravenous  Anesthetic plan and risks discussed with: Patient

## 2018-05-29 NOTE — ANESTHESIA POSTPROCEDURE EVALUATION
Post-Anesthesia Evaluation and Assessment    Patient: Hussein Kaba MRN: [id number]  SSN: [id number]    YOB: 1933  Age: 80 y.o. Sex: female       Cardiovascular Function/Vital Signs  Visit Vitals    /72 (BP 1 Location: Left arm, BP Patient Position: At rest)    Pulse 73    Temp 37 °C (98.6 °F)    Resp 18    Ht 5' 1.5\" (1.562 m)    Wt 74.8 kg (165 lb)    SpO2 98%    Breastfeeding No    BMI 30.67 kg/m2       Patient is status post total IV anesthesia anesthesia for Procedure(s):  ESOPHAGOGASTRODUODENOSCOPY (EGD)  ESOPHAGOGASTRODUODENAL (EGD) BIOPSY. Nausea/Vomiting: None    Postoperative hydration reviewed and adequate. Pain:  Pain Scale 1: Numeric (0 - 10) (05/29/18 1302)  Pain Intensity 1: 0 (05/29/18 1302)   Managed    Neurological Status:   Neuro (WDL): Within Defined Limits (05/29/18 1302)  Neuro  Neurologic State: Alert (05/29/18 1302)   At baseline    Mental Status and Level of Consciousness: Arousable    Pulmonary Status:   O2 Device: Room air (05/29/18 1302)   Adequate oxygenation and airway patent    Complications related to anesthesia: None    Post-anesthesia assessment completed.  No concerns    Signed By: Michelle Ricks MD     May 29, 2018

## 2018-05-29 NOTE — IP AVS SNAPSHOT
[address] [facility] 
[phone] Patient: Wes Stephens MRN: [id number] About your hospitalization You were admitted on:  May 29, 2018 You last received care in the:  [facility] PACU You were discharged on:  May 29, 2018 Why you were hospitalized Your primary diagnosis was:  Not on File Follow-up Information None Discharge Orders None A check cameron indicates which time of day the medication should be taken. My Medications CONTINUE taking these medications Instructions Each Dose to Equal  
 Morning Noon Evening Bedtime  
 amitriptyline 25 mg tablet Commonly known as:  ELAVIL Your last dose was: Your next dose is: Take 1 Tab by mouth nightly. 25 mg  
    
   
   
   
  
 Calcium-Cholecalciferol (D3) 500 mg(1,250mg) -400 unit Tab Commonly known as:  CALCIUM 500 + D Your last dose was: Your next dose is: Take 1 Tab by mouth daily. 1 Tab  
    
   
   
   
  
 cephALEXin 500 mg capsule Commonly known as:  Lyndia Rad Your last dose was: Your next dose is:    
   
   
 TK 4 CS PO 1 H PRIOR TO DENTAL APPT  
     
   
   
   
  
 cholecalciferol 1,000 unit tablet Commonly known as:  VITAMIN D3 Your last dose was: Your next dose is: Take 1,000 Units by mouth daily. 1000 Units * denosumab 60 mg/mL injection Commonly known as:  Juno Quijano Your last dose was: Your next dose is:    
   
   
 1 mL by SubCUTAneous route every 6 months. 60 mg  
    
   
   
   
  
 * denosumab 60 mg/mL injection Commonly known as:  Nancylee Slate Your last dose was: Your next dose is:    
   
   
 1 mL by SubCUTAneous route every 6 months. 60 mg  
    
   
   
   
  
 gabapentin 800 mg tablet Commonly known as:  NEURONTIN  
   
 Your last dose was: Your next dose is: TAKE 1 TAB BY MOUTH FOUR  TIMES DAILY AS NEEDED. hydroCHLOROthiazide 25 mg tablet Commonly known as:  HYDRODIURIL Your last dose was: Your next dose is: Take 1 Tab by mouth daily. 25 mg  
    
   
   
   
  
 lisinopril 20 mg tablet Commonly known as:  Valene Pencil Your last dose was: Your next dose is: TAKE 1 TABLET BY MOUTH TWO  TIMES DAILY  
     
   
   
   
  
 loperamide 2 mg capsule Commonly known as:  IMODIUM Your last dose was: Your next dose is: Take 2 mg by mouth four (4) times daily as needed for Diarrhea.  
 2 mg  
    
   
   
   
  
 magnesium oxide 400 mg tablet Commonly known as:  MAG-OX Your last dose was: Your next dose is: Take 400 mg by mouth two (2) times a day. 400 mg  
    
   
   
   
  
 multivitamin tablet Commonly known as:  ONE A DAY Your last dose was: Your next dose is: Take 1 Tab by mouth daily. 1 Tab  
    
   
   
   
  
 potassium chloride 10 mEq tablet Commonly known as:  KLOR-CON Your last dose was: Your next dose is: Take 1 Tab by mouth three (3) times daily. 10 mEq RABEprazole 20 mg tablet Commonly known as:  ACIPHEX Your last dose was: Your next dose is: Take 20 mg by mouth daily. 20 mg  
    
   
   
   
  
 raNITIdine hcl 300 mg Cap Your last dose was: Your next dose is: Take  by mouth. simvastatin 40 mg tablet Commonly known as:  ZOCOR Your last dose was: Your next dose is: Take 1 Tab by mouth nightly. 40 mg  
    
   
   
   
  
 traMADol 50 mg tablet Commonly known as:  ULTRAM  
   
Your last dose was: Your next dose is: 0.5 to one tablet po every 6 hours prn pain TYLENOL EXTRA STRENGTH 500 mg tablet Generic drug:  acetaminophen Your last dose was: Your next dose is: Take 500 mg by mouth every six (6) hours as needed. 500 mg * Notice: This list has 2 medication(s) that are the same as other medications prescribed for you. Read the directions carefully, and ask your doctor or other care provider to review them with you. Opioid Education Prescription Opioids: What You Need to Know: 
 
Prescription opioids can be used to help relieve moderate-to-severe pain and are often prescribed following a surgery or injury, or for certain health conditions. These medications can be an important part of treatment but also come with serious risks. Opioids are strong pain medicines. Examples include hydrocodone, oxycodone, fentanyl, and morphine. Heroin is an example of an illegal opioid. It is important to work with your health care provider to make sure you are getting the safest, most effective care. WHAT ARE THE RISKS AND SIDE EFFECTS OF OPIOID USE? Prescription opioids carry serious risks of addiction and overdose, especially with prolonged use. An opioid overdose, often marked by slow breathing, can cause sudden death. The use of prescription opioids can have a number of side effects as well, even when taken as directed. · Tolerance-meaning you might need to take more of a medication for the same pain relief · Physical dependence-meaning you have symptoms of withdrawal when the medication is stopped. Withdrawal symptoms can include nausea, sweating, chills, diarrhea, stomach cramps, and muscle aches. Withdrawal can last up to several weeks, depending on which drug you took and how long you took it. · Increased sensitivity to pain · Constipation · Nausea, vomiting, and dry mouth · Sleepiness and dizziness · Confusion · Depression · Low levels of testosterone that can result in lower sex drive, energy, and strength · Itching and sweating RISKS ARE GREATER WITH:      
· History of drug misuse, substance use disorder, or overdose · Mental health conditions (such as depression or anxiety) · Sleep apnea · Older age (72 years or older) · Pregnancy Avoid alcohol while taking prescription opioids. Also, unless specifically advised by your health care provider, medications to avoid include: · Benzodiazepines (such as Xanax or Valium) · Muscle relaxants (such as Soma or Flexeril) · Hypnotics (such as Ambien or Lunesta) · Other prescription opioids KNOW YOUR OPTIONS Talk to your health care provider about ways to manage your pain that don't involve prescription opioids. Some of these options may actually work better and have fewer risks and side effects. Options may include: 
· Pain relievers such as acetaminophen, ibuprofen, and naproxen · Some medications that are also used for depression or seizures · Physical therapy and exercise · Counseling to help patients learn how to cope better with triggers of pain and stress. · Application of heat or cold compress · Massage therapy · Relaxation techniques Be Informed Make sure you know the name of your medication, how much and how often to take it, and its potential risks & side effects. IF YOU ARE PRESCRIBED OPIOIDS FOR PAIN: 
· Never take opioids in greater amounts or more often than prescribed. Remember the goal is not to be pain-free but to manage your pain at a tolerable level. · Follow up with your primary care provider to: · Work together to create a plan on how to manage your pain. · Talk about ways to help manage your pain that don't involve prescription opioids. · Talk about any and all concerns and side effects. · Help prevent misuse and abuse. · Never sell or share prescription opioids · Help prevent misuse and abuse. · Store prescription opioids in a secure place and out of reach of others (this may include visitors, children, friends, and family). · Safely dispose of unused/unwanted prescription opioids: Find your community drug take-back program or your pharmacy mail-back program, or flush them down the toilet, following guidance from the Food and Drug Administration (www.fda.gov/Drugs/ResourcesForYou). · Visit www.cdc.gov/drugoverdose to learn about the risks of opioid abuse and overdose. · If you believe you may be struggling with addiction, tell your health care provider and ask for guidance or call [facility] at 7-812-107-HELP. Discharge Instructions Michaela Jacob [id number] 
10/9/1933 EGD DISCHARGE INSTRUCTIONS Discomfort: 
Sore throat- throat lozenges or warm salt water gargle 
redness at IV site- apply warm compress to area; if redness or soreness persist- contact your physician Gaseous discomfort- walking, belching will help relieve any discomfort You may not operate a vehicle for 24 hours You may not engage in an occupation involving machinery or appliances for rest of today. You may not drink alcoholic beverages for at least 24 hours Avoid making any critical decisions for at least 24 hour DIET You may resume your regular diet  however -  remember your colon is empty and a heavy meal will produce gas. Avoid these foods:  vegetables, fried / greasy foods, carbonated drinks MEDICATIONS Regarding Aspirin or Nonsteroidal medications specifically, please see below. ACTIVITY You may resume your normal daily activities. Spend the remainder of the day resting -  avoid any strenuous activity. CALL M.D. ANY SIGN OF Increasing pain, nausea, vomiting Abdominal distension (swelling) New increased bleeding (oral or rectal) Fever (chills) Pain in chest area Bloody discharge from nose or mouth Shortness of breath ONLY  Tylenol as needed for pain. Follow-up Instructions: 
 Call Dr. Kathryn Mckeon for results of procedure / biopsy in 4-5 days at telephone #  [phone] Make a follow up visit with Dr. Cr Kenyon and continue your medications for acid reflux. DO NOT TAKE SLEEPING MEDICATIONS OR ANTIANXIETY MEDICATIONS WHILE TAKING NARCOTIC PAIN MEDICATIONS,  ESPECIALLY THE NIGHT OF ANESTHESIA. CPAP PATIENTS BE SURE TO WEAR MACHINE WHENEVER NAPPING OR SLEEPING. DISCHARGE SUMMARY from Nurse The following personal items collected during your admission are returned to you:  
Dental Appliance: Dental Appliances: Partials, Uppers, At home Vision: Visual Aid: Glasses (given to daughter in law) Hearing Aid:   
Jewelry:   
Clothing:   
Other Valuables:   
Valuables sent to safe:   
 
 
PATIENT INSTRUCTIONS: 
 
 
B - Balance E - Eyes F-  Face looks uneven A-  Arms unable to move or move even S-  Speech slurred or non-existent T-  Time-call 911 as soon as signs and symptoms begin-DO NOT go Back to bed or wait to see if you get better-TIME IS BRAIN. If you have not received your influenza and/or pneumococcal vaccine, please follow up with your primary care physician. The discharge information has been reviewed with the patient and caregiver. The patient and caregiver verbalized understanding. ACO Transitions of Care [facility] offers a voluntary care coordination program to provide high quality service and care to [facility] fee-for-service beneficiaries. Megan Dooley was designed to help you enhance your health and well-being through the following services: ? Transitions of Care  support for individuals who are transitioning from one care setting to another (example: Hospital to home). ? Chronic and Complex Care Coordination  support for individuals and caregivers of those with serious or chronic illnesses or with more than one chronic (ongoing) condition and those who take a number of different medications. If you meet specific medical criteria, a [address] [place] [address] Rd may call you directly to coordinate your care with your primary care physician and your other care providers. For questions about the [facility] programs, please, contact your physicians office. For general questions or additional information about Accountable Care Organizations: 
Please visit www.medicare.gov/acos. html or call 1-800-MEDICARE (3-740.335.7475) TTY users should call 8-741.110.9232. Introducing [facility]! Andreia Sulytara introduces Alliqua patient portal. Now you can access parts of your medical record, email your doctor's office, and request medication refills online. 1. In your internet browser, go to [email or web address] 2. Click on the First Time User? Click Here link in the Sign In box. You will see the New Member Sign Up page. 3. Enter your Alliqua Access Code exactly as it appears below. You will not need to use this code after youve completed the sign-up process. If you do not sign up before the expiration date, you must request a new code. · Alliqua Access Code: [email or web address] Expires: 8/23/2018 10:50 AM 
 
4. Enter the last four digits of your Social Security Number (xxxx) and Date of Birth (mm/dd/yyyy) as indicated and click Submit. You will be taken to the next sign-up page. 5. Create a Alliqua ID. This will be your Alliqua login ID and cannot be changed, so think of one that is secure and easy to remember. 6. Create a Alliqua password. You can change your password at any time. 7. Enter your Password Reset Question and Answer. This can be used at a later time if you forget your password. 8. Enter your e-mail address. You will receive e-mail notification when new information is available in [address]. 9. Click Sign Up. You can now view and download portions of your medical record. 10. Click the Download Summary menu link to download a portable copy of your medical information. If you have questions, please visit the Frequently Asked Questions section of the Alliqua website. Remember, Alliqua is NOT to be used for urgent needs. For medical emergencies, dial 911. Now available from your iPhone and Android! Introducing Edy Junior As a [facility] patient, I wanted to make you aware of our electronic visit tool called Edy Junior. Two Tap allows you to connect within minutes with a medical provider 24 hours a day, seven days a week via a mobile device or tablet or logging into a secure website from your computer. You can access Edy Junior from anywhere in the [place]. A virtual visit might be right for you when you have a simple condition and feel like you just dont want to get out of bed, or cant get away from work for an appointment, when your regular [facility] provider is not available (evenings, weekends or holidays), or when youre out of town and need minor care. Electronic visits cost only $49 and if the [employer] provider determines a prescription is needed to treat your condition, one can be electronically transmitted to a nearby pharmacy*. Please take a moment to enroll today if you have not already done so. The enrollment process is free and takes just a few minutes. To enroll, please download the Two Tap liane to your tablet or phone, or visit [email or web address] to enroll on your computer. And, as an [address] [place] [address] patient with a [employer] account, the results of your visits will be scanned into your electronic medical record and your primary care provider will be able to view the scanned results. We urge you to continue to see your regular [facility] provider for your ongoing medical care. And while your primary care provider may not be the one available when you seek a Edy Junior virtual visit, the peace of mind you get from getting a real diagnosis real time can be priceless. For more information on Edy Junior, view our Frequently Asked Questions (FAQs) at [email or web address]. Sincerely, 
 
Kp Beard MD 
Chief Medical Officer [facility] *:  certain medications cannot be prescribed via Edy Junior Providers Seen During Your Hospitalization Provider Specialty Primary office phone Cole Gore MD Gastroenterology [phone] Your Primary Care Physician (PCP) Primary Care Physician Office Phone Office Fax 200 [address] [phone] You are allergic to the following No active allergies Recent Documentation Height Weight Breastfeeding? BMI OB Status Smoking Status 1.562 m 74.8 kg No 30.67 kg/m2 Hysterectomy Never Smoker Emergency Contacts Name Discharge Info Relation Home Work Mobile Siomara Nagy DISCHARGE CAREGIVER [3] Daughter [21] [phone] Robbie Atkins DISCHARGE CAREGIVER [3] Spouse [3] [phone] [employer]  Spouse [3] [phone] [facility]  Child [2] [phone] Patient Belongings The following personal items are in your possession at time of discharge: 
  Dental Appliances: Partials, Uppers, At home  Visual Aid: Glasses (given to daughter in law)   Hearing Aids/Status: Bilateral, With patient Please provide this summary of care documentation to your next provider. Signatures-by signing, you are acknowledging that this After Visit Summary has been reviewed with you and you have received a copy. Patient Signature:  ____________________________________________________________ Date:  ____________________________________________________________  
  
Rosario Meyers Provider Signature:  ____________________________________________________________ Date:  ____________________________________________________________

## 2018-05-29 NOTE — PERIOP NOTES
Asterisidro Dempsey  10/9/1933  [id number]    Situation:  Verbal report given from: Aby Alvarez RN  Procedure: Procedure(s):  ESOPHAGOGASTRODUODENOSCOPY (EGD)  ESOPHAGOGASTRODUODENAL (EGD) BIOPSY    Background:    Preoperative diagnosis: GERD, BARRETTS, REFLUX ESOPHAGITIS    Postoperative diagnosis: FOOD IN STOMACH, ESOPHAGITIS, HIATAL HERNIA     :  Dr. Smith Carry): Circ-1: Greg Krause RN  Circ-2: Tyler Carvalho RN  Scrub Tech-1: Morales Wallace    Specimens:   ID Type Source Tests Collected by Time Destination   1 : DISTAL ESOPHAGUS 34CM BIOPSY  Preservative Esophagus, Distal  Grant Beckman MD 5/29/2018 1237 Pathology   2 : DISTAL ESOPHAGUS 32 CM BIOPSY  Esophagus, Distal  Grant Beckman MD 5/29/2018 1238 Pathology   3 : MID ESOPHAGUS BIOPSY  Preservative Esophagus, Mid  Grant Beckman MD 5/29/2018 1239 Pathology       Assessment:  Intra-procedure medications   Propofol 130 mg      Anesthesia gave intra-procedure sedation and medications, see anesthesia flow sheet     Intravenous fluids: LR@ KVO     Vital signs stable     Abdominal assessment: round and soft nontender      Recommendation:        All side rails up, bed in low position, wheels locked. Nurse at bedside.

## 2018-05-29 NOTE — PERIOP NOTES
Permission received to review discharge instructions and discuss private health information with daughter Eather Dakin.

## 2018-05-29 NOTE — PROCEDURES
Esophagogastroduodenoscopy Procedure Note      Kitty Espinosa  10/9/1933  [id number]    Indication:  Persistent GERD on PPI with previous h/o esophagitis     Endoscopist: Scarlet Yee MD    Referring Provider:  [employer], NP    Sedation:  MAC anesthesia Propofol    Procedure Details:  After infomed consent was obtained for the procedure, with all risks and benefits of procedure explained the patient was taken to the endoscopy suite and placed in the left lateral decubitus position. Following sequential administration of sedation as per above, the endoscope was inserted into the mouth and advanced under direct vision to second portion of the duodenum. A careful inspection was made as the gastroscope was withdrawn, including a retroflexed view of the proximal stomach; findings and interventions are described below. Findings:     Esophagus:   + There was columnar mucosa proximal to the EG junction at 35 cm with some healing ulcerations superimposed. Stretched for 4 cm to 31 cm. There was friability with some contact bleeding in this distal esophagus. We performed biopsies of the distal esophagus and sent them in 2 jars. + Medium sized hiatal hernia with EG junction at 41 cm from incisors    Stomach:   + There was some retained food in the stomach c/w retained food. Duodenum:   - The bulb and post bulbar mucosa is normal in appearance to the second portion. The duodenal folds appeared normal.      Therapies:  As above    Specimen:  Specimens were collected as described and send to the laboratory. Complications:   None were encountered during the procedure. EBL: < 10 ml.           Recommendations:   -f/u path  -continue on PPI and H2 blocker  -f/u with Dr. Jaclyn Gray to discuss possible gastric emptying scan  Scarlet Yee MD  5/29/2018  12:45 PM

## 2018-05-29 NOTE — PERIOP NOTES
Dr Adrian Simmons by to inform daughter of procedural findings. VSS    Pt remains resting at this time. 1302-D/c instructions reviewed, all questions answered and instructions completed, pt tolerating liquids w/o difficulty. VSS. Pt more awake now, drowsy before. 1329-Transported via w/c to awaiting transportation.   No complaints at time of d/c home

## 2018-05-29 NOTE — DISCHARGE INSTRUCTIONS
Papo Harrington  [id number]  10/9/1933    EGD DISCHARGE INSTRUCTIONS  Discomfort:  Sore throat- throat lozenges or warm salt water gargle  redness at IV site- apply warm compress to area; if redness or soreness persist- contact your physician  Gaseous discomfort- walking, belching will help relieve any discomfort  You may not operate a vehicle for 24 hours  You may not engage in an occupation involving machinery or appliances for rest of today. You may not drink alcoholic beverages for at least 24 hours  Avoid making any critical decisions for at least 24 hour  DIET  You may resume your regular diet - however -  remember your colon is empty and a heavy meal will produce gas. Avoid these foods:  vegetables, fried / greasy foods, carbonated drinks  MEDICATIONS        Regarding Aspirin or Nonsteroidal medications specifically, please see below. ACTIVITY  You may resume your normal daily activities. Spend the remainder of the day resting -  avoid any strenuous activity. CALL M.D. ANY SIGN OF   Increasing pain, nausea, vomiting  Abdominal distension (swelling)  New increased bleeding (oral or rectal)  Fever (chills)  Pain in chest area  Bloody discharge from nose or mouth  Shortness of breath    ONLY  Tylenol as needed for pain. Follow-up Instructions:   Call Dr. Seng Todd for results of procedure / biopsy in 4-5 days at telephone #  [phone]              Make a follow up visit with Dr. Tejas Ryan and continue your medications for acid reflux. DO NOT TAKE SLEEPING MEDICATIONS OR ANTIANXIETY MEDICATIONS WHILE TAKING NARCOTIC PAIN MEDICATIONS,  ESPECIALLY THE NIGHT OF ANESTHESIA. CPAP PATIENTS BE SURE TO WEAR MACHINE WHENEVER NAPPING OR SLEEPING.     DISCHARGE SUMMARY from Nurse    The following personal items collected during your admission are returned to you:   Dental Appliance: Dental Appliances: Partials, Uppers, At home  Vision: Visual Aid: Glasses (given to daughter in law)  Hearing Aid: Jewelry:    Clothing:    Other Valuables:    Valuables sent to safe:        PATIENT INSTRUCTIONS:    After General Anesthesia or Intravenous Sedation, for 24 hours or while taking prescription Narcotics:        Someone should be with you for the next 24 hours. For your own safety, a responsible adult must drive you home. · Limit your activities  · Recommended activity: Rest today, up with assistance today. Do not climb stairs or shower unattended for the next 24 hours. · Please start with a soft bland diet and advance as tolerated (no nausea) to regular diet. · If you have a sore throat you should try the following: fluids, warm salt water gargles, or throat lozenges. If it does not improve after several days please follow up with your primary physician. · Do not drive and operate hazardous machinery  · Do not make important personal or business decisions  · Do  not drink alcoholic beverages  · If you have not urinated within 8 hours after discharge, please contact your surgeon on call. Report the following to your surgeon:  · Excessive pain, swelling, redness or odor of or around the surgical area  · Temperature over 100.5  · Nausea and vomiting lasting longer than 4 hours or if unable to take medications  · Any signs of decreased circulation or nerve impairment to extremity: change in color, persistent  numbness, tingling, coldness or increase pain      · You will receive a Post Operative Call from one of the Recovery Room Nurses on the day after your surgery to check on you. It is very important for us to know how you are recovering after your surgery. If you have an issue or need to speak with someone, please call your surgeon, do not wait for the post operative call. · You may receive an e-mail or letter in the mail from [employer] regarding your experience with us in the Ambulatory Surgery Unit. Your feedback is valuable to us and we appreciate your participation in the survey.        · If the above instructions are not adequate, please contact Soto Delgado RN, Anni anesthesia Nurse Manager or our Anesthesiologist, at [phone]. If you are having problems after your surgery, call the physician at his office number. · We wish you a speedy recovery ? What to do at Home:      *  Please give a list of your current medications to your Primary Care Provider. *  Please update this list whenever your medications are discontinued, doses are      changed, or new medications (including over-the-counter products) are added. *  Please carry medication information at all times in case of emergency situations. These are general instructions for a healthy lifestyle:    No smoking/ No tobacco products/ Avoid exposure to second hand smoke    Surgeon General's Warning:  Quitting smoking now greatly reduces serious risk to your health. Obesity, smoking, and sedentary lifestyle greatly increases your risk for illness    A healthy diet, regular physical exercise & weight monitoring are important for maintaining a healthy lifestyle    You may be retaining fluid if you have a history of heart failure or if you experience any of the following symptoms:  Weight gain of 3 pounds or more overnight or 5 pounds in a week, increased swelling in our hands or feet or shortness of breath while lying flat in bed. Please call your doctor as soon as you notice any of these symptoms; do not wait until your next office visit. Recognize signs and symptoms of STROKE:    B - Balance  E - Eyes    F-  Face looks uneven    A-  Arms unable to move or move even    S-  Speech slurred or non-existent    T-  Time-call 911 as soon as signs and symptoms begin-DO NOT go       Back to bed or wait to see if you get better-TIME IS BRAIN. If you have not received your influenza and/or pneumococcal vaccine, please follow up with your primary care physician.       The discharge information has been reviewed with the patient and caregiver. The patient and caregiver verbalized understanding.

## 2018-05-29 NOTE — H&P
Gastroenterology Outpatient History and Physical    Patient: Agusto Martinez    Physician: Eve Mixon MD    Vital Signs: Blood pressure 146/85, pulse 90, temperature 98.4 °F (36.9 °C), resp. rate 12, height 5' 1.5\" (1.562 m), weight 74.8 kg (165 lb), SpO2 97 %, not currently breastfeeding. Allergies: No Known Allergies    Chief Complaint: Persistent GERD on PPI    History of Present Illness: 79 yo WF with h/o prior ibarra's, prior esophagitis now with ongoing PRITI sxs on tx. Justification for Procedure: above    History:  Past Medical History:   Diagnosis Date    Arthritis     Diarrhea 6/6/2016    Foot drop     Gastric ulcer 6/14/2012    GERD (gastroesophageal reflux disease)     High cholesterol     Hypertension     Neuropathy     Ovarian cancer ([facility] [address] 1986    chemo x 9 mos    Scoliosis     Stroke ([facility] [address] 2002    ? stroke with drop feet, per patient CT scans were negative      Past Surgical History:   Procedure Laterality Date    FLEXIBLE SIGMOIDOSCOPY N/A 6/6/2016    SIGMOIDOSCOPY FLEXIBLE performed by Justo South [facility] at [facility]COPY    HX ORTHOPAEDIC      back    HX ORTHOPAEDIC      bilateral shoulder replacements    HX ORTHOPAEDIC      left knee replacement    HX LYDIA AND BSO  1986    MS EGD TRANSORAL BIOPSY SINGLE/MULTIPLE  1/26/2012         MS EGD TRANSORAL BIOPSY SINGLE/MULTIPLE  6/14/2012           Social History     Social History    Marital status:      Spouse name: N/A    Number of children: N/A    Years of education: N/A     Social History Main Topics    Smoking status: Never Smoker    Smokeless tobacco: Never Used    Alcohol use No    Drug use: No    Sexual activity: Not Asked     Other Topics Concern    None     Social History Narrative    History reviewed. No pertinent family history. Medications:   Prior to Admission medications    Medication Sig Start Date End Date Taking?  Authorizing Provider   RABEprazole (ACIPHEX) 20 mg tablet Take 20 mg by mouth daily. Yes Historical Provider   raNITIdine hcl 300 mg cap Take  by mouth. Yes Historical Provider   lisinopril (PRINIVIL, ZESTRIL) 20 mg tablet TAKE 1 TABLET BY MOUTH TWO  TIMES DAILY 4/23/18  Yes Osiel Saleh NP   gabapentin (NEURONTIN) 800 mg tablet TAKE 1 TAB BY MOUTH FOUR  TIMES DAILY AS NEEDED. 3/21/18  Yes Osiel Saleh NP   hydroCHLOROthiazide (HYDRODIURIL) 25 mg tablet Take 1 Tab by mouth daily. 2/21/18  Yes Osiel Saleh NP   amitriptyline (ELAVIL) 25 mg tablet Take 1 Tab by mouth nightly. 1/22/18  Yes Osiel Saleh NP   Calcium-Cholecalciferol, D3, (CALCIUM 500 + D) 500 mg(1,250mg) -400 unit tab Take 1 Tab by mouth daily. 12/29/17  Yes Osiel Saleh NP   simvastatin (ZOCOR) 40 mg tablet Take 1 Tab by mouth nightly. 11/9/17  Yes Osiel Saleh NP   potassium chloride (KLOR-CON) 10 mEq tablet Take 1 Tab by mouth three (3) times daily. 10/25/17  Yes Laurita Carpenter MD   cholecalciferol (VITAMIN D3) 1,000 unit tablet Take 1,000 Units by mouth daily. Yes Historical Provider   multivitamin (ONE A DAY) tablet Take 1 Tab by mouth daily. Yes Historical Provider   acetaminophen (TYLENOL EXTRA STRENGTH) 500 mg tablet Take 500 mg by mouth every six (6) hours as needed. Yes María Stanford MD   magnesium oxide (MAG-OX) 400 mg tablet Take 400 mg by mouth two (2) times a day. Yes María Stanford MD   denosumab (PROLIA) 60 mg/mL injection 1 mL by SubCUTAneous route every 6 months. 5/24/18   Dany Veronica NP   denosumab (PROLIA) 60 mg/mL injection 1 mL by SubCUTAneous route every 6 months. 5/24/18   Dany Veronica NP   traMADol Darreji Pickens) 50 mg tablet 0.5 to one tablet po every 6 hours prn pain 5/3/18   Osiel Saleh NP   cephALEXin (KEFLEX) 500 mg capsule TK 4 CS PO 1 H PRIOR TO DENTAL APPT 2/16/18   Historical Provider   loperamide (IMODIUM) 2 mg capsule Take 2 mg by mouth four (4) times daily as needed for Diarrhea.     Historical Provider       Physical Exam:   General: alert, no distress HEENT: Head: Normocephalic, no lesions, without obvious abnormality.    Heart: regular rate and rhythm, S1, S2 normal, no murmur, click, rub or gallop   Lungs: chest clear, no wheezing, rales, normal symmetric air entry   Abdominal: soft, NT/ND + BS   Neurological: Grossly normal   Extremities: extremities normal, atraumatic, no cyanosis or edema     Findings/Diagnosis: GERD    Plan of Care/Planned Procedure: EGD    Signed By: Scarlet Yee MD     May 29, 2018

## 2018-05-30 ENCOUNTER — CLINICAL SUPPORT (OUTPATIENT)
Dept: FAMILY MEDICINE CLINIC | Age: 83
End: 2018-05-30

## 2018-05-30 VITALS
RESPIRATION RATE: 16 BRPM | DIASTOLIC BLOOD PRESSURE: 73 MMHG | HEART RATE: 84 BPM | OXYGEN SATURATION: 95 % | HEIGHT: 60 IN | WEIGHT: 165 LBS | BODY MASS INDEX: 32.39 KG/M2 | SYSTOLIC BLOOD PRESSURE: 114 MMHG | TEMPERATURE: 98.5 F

## 2018-05-30 DIAGNOSIS — M81.0 AGE-RELATED OSTEOPOROSIS WITHOUT CURRENT PATHOLOGICAL FRACTURE: Primary | ICD-10-CM

## 2018-05-30 NOTE — PROGRESS NOTES
Chief Complaint   Patient presents with    Injection     prolia       1. Have you been to the ER, urgent care clinic since your last visit? Hospitalized since your last visit? no    2. Have you seen or consulted any other health care providers outside of the [address] [place] [address] since your last visit? Include any pap smears or colon screening.   no

## 2018-06-18 ENCOUNTER — TELEPHONE (OUTPATIENT)
Dept: FAMILY MEDICINE CLINIC | Age: 83
End: 2018-06-18

## 2018-06-18 NOTE — TELEPHONE ENCOUNTER
Pt called this evening in reference to getting a referral generated for Gastro enterology to see Dr. Margarita Barraza. Pt would like it faxed back today.

## 2018-06-27 ENCOUNTER — HOSPITAL ENCOUNTER (OUTPATIENT)
Dept: NUCLEAR MEDICINE | Age: 83
Discharge: HOME | End: 2018-06-27
Attending: PHYSICIAN ASSISTANT
Payer: MEDICARE

## 2018-06-27 DIAGNOSIS — R19.7 DIARRHEA: ICD-10-CM

## 2018-06-27 DIAGNOSIS — R10.13 EPIGASTRIC PAIN: ICD-10-CM

## 2018-06-27 DIAGNOSIS — R11.10 VOMITING: ICD-10-CM

## 2018-06-27 DIAGNOSIS — K21.9 GERD (GASTROESOPHAGEAL REFLUX DISEASE): ICD-10-CM

## 2018-06-27 DIAGNOSIS — K21.00 REFLUX ESOPHAGITIS: ICD-10-CM

## 2018-06-27 PROCEDURE — 78264 GASTRIC EMPTYING IMG STUDY: CPT

## 2018-07-19 ENCOUNTER — TELEPHONE (OUTPATIENT)
Dept: FAMILY MEDICINE CLINIC | Age: 83
End: 2018-07-19

## 2018-07-19 NOTE — TELEPHONE ENCOUNTER
I called the patient to remind her of her appointment on July 20, 2018. Pt stated that they will be on time and will not miss their appointment for the world.

## 2018-07-19 NOTE — TELEPHONE ENCOUNTER
I called this patient 5 times this morning in regards to her schedule appointments on 8/22/2018 and 11/30/2018 for her Prolia Injections. I did not get anyone on the phone and could not leave a voice message.

## 2018-07-20 ENCOUNTER — CLINICAL SUPPORT (OUTPATIENT)
Dept: FAMILY MEDICINE CLINIC | Age: 83
End: 2018-07-20

## 2018-07-20 VITALS
SYSTOLIC BLOOD PRESSURE: 125 MMHG | TEMPERATURE: 97.5 F | HEIGHT: 60 IN | WEIGHT: 158.3 LBS | BODY MASS INDEX: 31.08 KG/M2 | RESPIRATION RATE: 12 BRPM | HEART RATE: 84 BPM | OXYGEN SATURATION: 94 % | DIASTOLIC BLOOD PRESSURE: 74 MMHG

## 2018-07-20 DIAGNOSIS — D64.9 ANEMIA, UNSPECIFIED TYPE: ICD-10-CM

## 2018-07-20 DIAGNOSIS — E83.42 HYPOMAGNESEMIA: ICD-10-CM

## 2018-07-20 DIAGNOSIS — M81.0 AGE-RELATED OSTEOPOROSIS WITHOUT CURRENT PATHOLOGICAL FRACTURE: ICD-10-CM

## 2018-07-20 DIAGNOSIS — I10 ESSENTIAL HYPERTENSION: ICD-10-CM

## 2018-07-20 DIAGNOSIS — E78.5 HYPERLIPIDEMIA, UNSPECIFIED HYPERLIPIDEMIA TYPE: ICD-10-CM

## 2018-07-20 DIAGNOSIS — K27.9 PUD (PEPTIC ULCER DISEASE): ICD-10-CM

## 2018-07-20 DIAGNOSIS — E83.42 HYPOMAGNESEMIA: Primary | ICD-10-CM

## 2018-07-20 NOTE — PROGRESS NOTES
Chief Complaint   Patient presents with    Labs     Visit Vitals    /74 (BP 1 Location: Left arm, BP Patient Position: Sitting)    Pulse 84    Temp 97.5 °F (36.4 °C) (Oral)    Resp 12    Ht 5' (1.524 m)    Wt 158 lb 4.8 oz (71.8 kg)    SpO2 94%    BMI 30.92 kg/m2     1. Have you been to the ER, urgent care clinic since your last visit? Hospitalized since your last visit? No    2. Have you seen or consulted any other health care providers outside of the [address] [place] [address] since your last visit? Include any pap smears or colon screening.  No

## 2018-07-21 LAB
25(OH)D3+25(OH)D2 SERPL-MCNC: 65.9 NG/ML (ref 30–100)
ALBUMIN SERPL-MCNC: 4.1 G/DL (ref 3.5–4.7)
ALBUMIN/GLOB SERPL: 1.5 {RATIO} (ref 1.2–2.2)
ALP SERPL-CCNC: 46 IU/L (ref 39–117)
ALT SERPL-CCNC: 13 IU/L (ref 0–32)
AST SERPL-CCNC: 20 IU/L (ref 0–40)
BASOPHILS # BLD AUTO: 0 X10E3/UL (ref 0–0.2)
BASOPHILS NFR BLD AUTO: 1 %
BILIRUB SERPL-MCNC: <0.2 MG/DL (ref 0–1.2)
BUN SERPL-MCNC: 28 MG/DL (ref 8–27)
BUN/CREAT SERPL: 30 (ref 12–28)
CALCIUM SERPL-MCNC: 10.4 MG/DL (ref 8.7–10.3)
CHLORIDE SERPL-SCNC: 101 MMOL/L (ref 96–106)
CHOLEST SERPL-MCNC: 138 MG/DL (ref 100–199)
CO2 SERPL-SCNC: 25 MMOL/L (ref 20–29)
CREAT SERPL-MCNC: 0.93 MG/DL (ref 0.57–1)
EOSINOPHIL # BLD AUTO: 0.2 X10E3/UL (ref 0–0.4)
EOSINOPHIL NFR BLD AUTO: 3 %
ERYTHROCYTE [DISTWIDTH] IN BLOOD BY AUTOMATED COUNT: 15 % (ref 12.3–15.4)
GLOBULIN SER CALC-MCNC: 2.8 G/DL (ref 1.5–4.5)
GLUCOSE SERPL-MCNC: 102 MG/DL (ref 65–99)
HCT VFR BLD AUTO: 32.9 % (ref 34–46.6)
HDLC SERPL-MCNC: 46 MG/DL
HGB BLD-MCNC: 10.3 G/DL (ref 11.1–15.9)
IMM GRANULOCYTES # BLD: 0 X10E3/UL (ref 0–0.1)
IMM GRANULOCYTES NFR BLD: 0 %
LDLC SERPL CALC-MCNC: 66 MG/DL (ref 0–99)
LYMPHOCYTES # BLD AUTO: 1.9 X10E3/UL (ref 0.7–3.1)
LYMPHOCYTES NFR BLD AUTO: 32 %
MAGNESIUM SERPL-MCNC: 1.6 MG/DL (ref 1.6–2.3)
MCH RBC QN AUTO: 25.8 PG (ref 26.6–33)
MCHC RBC AUTO-ENTMCNC: 31.3 G/DL (ref 31.5–35.7)
MCV RBC AUTO: 83 FL (ref 79–97)
MONOCYTES # BLD AUTO: 0.5 X10E3/UL (ref 0.1–0.9)
MONOCYTES NFR BLD AUTO: 9 %
NEUTROPHILS # BLD AUTO: 3.2 X10E3/UL (ref 1.4–7)
NEUTROPHILS NFR BLD AUTO: 55 %
PLATELET # BLD AUTO: 236 X10E3/UL (ref 150–379)
POTASSIUM SERPL-SCNC: 5 MMOL/L (ref 3.5–5.2)
PROT SERPL-MCNC: 6.9 G/DL (ref 6–8.5)
RBC # BLD AUTO: 3.99 X10E6/UL (ref 3.77–5.28)
SODIUM SERPL-SCNC: 139 MMOL/L (ref 134–144)
TRIGL SERPL-MCNC: 132 MG/DL (ref 0–149)
TSH SERPL DL<=0.005 MIU/L-ACNC: 4.34 UIU/ML (ref 0.45–4.5)
VLDLC SERPL CALC-MCNC: 26 MG/DL (ref 5–40)
WBC # BLD AUTO: 5.8 X10E3/UL (ref 3.4–10.8)

## 2018-08-16 ENCOUNTER — OFFICE VISIT (OUTPATIENT)
Dept: RHEUMATOLOGY | Age: 83
End: 2018-08-16

## 2018-08-16 VITALS
TEMPERATURE: 98.5 F | DIASTOLIC BLOOD PRESSURE: 81 MMHG | WEIGHT: 153 LBS | RESPIRATION RATE: 18 BRPM | HEART RATE: 77 BPM | OXYGEN SATURATION: 95 % | HEIGHT: 60 IN | SYSTOLIC BLOOD PRESSURE: 127 MMHG | BODY MASS INDEX: 30.04 KG/M2

## 2018-08-16 DIAGNOSIS — M19.90 INFLAMMATORY OSTEOARTHRITIS: Primary | ICD-10-CM

## 2018-08-16 RX ORDER — PREDNISONE 5 MG/1
5 TABLET ORAL DAILY
Qty: 30 TAB | Refills: 1 | Status: SHIPPED | OUTPATIENT
Start: 2018-08-16 | End: 2018-09-15

## 2018-08-16 RX ORDER — ZINC GLUCONATE 10 MG
LOZENGE ORAL
COMMUNITY
End: 2020-07-01 | Stop reason: DRUGHIGH

## 2018-08-16 NOTE — MR AVS SNAPSHOT
[address] [facility] [address] 
[phone] Patient: Herbie Number MRN: [id number] Visit Information Date & Time Provider Department Dept. Phone Encounter #  
 8/16/2018  1:00 PM Alicia Stacy MD [facility] [phone] [id number] Follow-up Instructions Return in about 6 months (around 2/16/2019). Upcoming Health Maintenance Date Due ZOSTER VACCINE AGE 60> 8/9/1993 GLAUCOMA SCREENING Q2Y 10/9/1998 Pneumococcal 65+ High/Highest Risk (1 of 2 - PCV13) 10/9/1998 DTaP/Tdap/Td series (1 - Tdap) 3/25/2011 Influenza Age 5 to Adult 8/1/2018 MEDICARE YEARLY EXAM 3/3/2019 Allergies as of 8/16/2018  Review Complete On: 8/16/2018 By: Cassidy Carvalho LPN No Known Allergies Current Immunizations  Never Reviewed Name Date  
 TD Vaccine 3/24/2011  2:38 PM  
  
 Not reviewed this visit You Were Diagnosed With   
  
 Codes Comments Inflammatory osteoarthritis    -  Primary ICD-10-CM: M19.90 ICD-9-CM: 715.90 Vitals BP Pulse Temp Resp Height(growth percentile) Weight(growth percentile) 127/81 (BP 1 Location: Left arm, BP Patient Position: Sitting) 77 98.5 °F (36.9 °C) (Oral) 18 5' (1.524 m) 153 lb (69.4 kg) SpO2 BMI OB Status Smoking Status 95% 29.88 kg/m2 Hysterectomy Never Smoker Vitals History BMI and BSA Data Body Mass Index Body Surface Area  
 29.88 kg/m 2 1.71 m 2 Preferred Pharmacy Pharmacy Name Phone [facility] [phone], [address] AT [facility] [phone] Your Updated Medication List  
  
   
This list is accurate as of 8/16/18  2:00 PM.  Always use your most recent med list.  
  
  
  
  
 amitriptyline 25 mg tablet Commonly known as:  ELAVIL Take 1 Tab by mouth nightly. Calcium-Cholecalciferol (D3) 500 mg(1,250mg) -400 unit Tab Commonly known as:  CALCIUM 500 + D Take 1 Tab by mouth daily. cephALEXin 500 mg capsule Commonly known as:  Marleny Beatrizmer TK 4 CS PO 1 H PRIOR TO DENTAL APPT  
  
 cholecalciferol 1,000 unit tablet Commonly known as:  VITAMIN D3 Take 1,000 Units by mouth daily. * denosumab 60 mg/mL injection Commonly known as:  Mckenzie Ngay 1 mL by SubCUTAneous route every 6 months. * denosumab 60 mg/mL injection Commonly known as:  Mckenzie Nagy 1 mL by SubCUTAneous route every 6 months. gabapentin 800 mg tablet Commonly known as:  NEURONTIN  
TAKE 1 TAB BY MOUTH FOUR  TIMES DAILY AS NEEDED. hydroCHLOROthiazide 25 mg tablet Commonly known as:  HYDRODIURIL Take 1 Tab by mouth daily. lisinopril 20 mg tablet Commonly known as:  PRINIVIL, ZESTRIL  
TAKE 1 TABLET BY MOUTH TWO  TIMES DAILY  
  
 loperamide 2 mg capsule Commonly known as:  IMODIUM Take 2 mg by mouth four (4) times daily as needed for Diarrhea.  
  
 magnesium 250 mg Tab Take  by mouth.  
  
 magnesium oxide 400 mg tablet Commonly known as:  MAG-OX Take 400 mg by mouth two (2) times a day. multivitamin tablet Commonly known as:  ONE A DAY Take 1 Tab by mouth daily. potassium chloride 10 mEq tablet Commonly known as:  KLOR-CON Take 1 Tab by mouth three (3) times daily. predniSONE 5 mg tablet Commonly known as:  Christiana Roers Take 1 Tab by mouth daily for 30 days. RABEprazole 20 mg tablet Commonly known as:  ACIPHEX Take 20 mg by mouth daily. raNITIdine hcl 300 mg Cap Take  by mouth. simvastatin 40 mg tablet Commonly known as:  ZOCOR Take 1 Tab by mouth nightly. traMADol 50 mg tablet Commonly known as:  ULTRAM  
0.5 to one tablet po every 6 hours prn pain TYLENOL EXTRA STRENGTH 500 mg tablet Generic drug:  acetaminophen Take 500 mg by mouth every six (6) hours as needed. * Notice: This list has 2 medication(s) that are the same as other medications prescribed for you. Read the directions carefully, and ask your doctor or other care provider to review them with you. Prescriptions Sent to Pharmacy Refills  
 predniSONE (DELTASONE) 5 mg tablet 1 Sig: Take 1 Tab by mouth daily for 30 days. Class: Normal  
 Pharmacy: [facility] [address] [place] [address] #: [phone] Route: Oral  
  
Follow-up Instructions Return in about 6 months (around 2/16/2019). Introducing [facility]! Megan Hugo introduces [employer]365 patient portal. Now you can access parts of your medical record, email your doctor's office, and request medication refills online. 1. In your internet browser, go to [email or web address] 2. Click on the First Time User? Click Here link in the Sign In box. You will see the New Member Sign Up page. 3. Enter your SOLOMO365 Access Code exactly as it appears below. You will not need to use this code after youve completed the sign-up process. If you do not sign up before the expiration date, you must request a new code. · [employer]O365 Access Code: [email or web address] Expires: 8/23/2018 10:50 AM 
 
4. Enter the last four digits of your Social Security Number (xxxx) and Date of Birth (mm/dd/yyyy) as indicated and click Submit. You will be taken to the next sign-up page. 5. Create a SOLOMO365 ID. This will be your SOLOMO365 login ID and cannot be changed, so think of one that is secure and easy to remember. 6. Create a SOLOMO365 password. You can change your password at any time. 7. Enter your Password Reset Question and Answer. This can be used at a later time if you forget your password. 8. Enter your e-mail address. You will receive e-mail notification when new information is available in [address] 9. Click Sign Up. You can now view and download portions of your medical record. 10. Click the Download Summary menu link to download a portable copy of your medical information. If you have questions, please visit the Frequently Asked Questions section of the [employer] website. Remember, [employer] is NOT to be used for urgent needs. For medical emergencies, dial 911. Now available from your iPhone and Android! Please provide this summary of care documentation to your next provider. Your primary care clinician is listed as [employer]. If you have any questions after today's visit, please call [phone].

## 2018-08-16 NOTE — PROGRESS NOTES
CHIEF COMPLAINT  The patient was sent for rheumatology consultation by Dr. Carly Do, NP for evaluation of joint pain. HISTORY OF PRESENT ILLNESS  This is a 80 y.o.  female. Today, the patient complains of pain in the joints. Location: hand  Severity:  4 on a scale of 0-10  Timing: all day   Duration:  3 years  Modifying factors:   Context/Associated signs and symptoms: Patient comes in today complaining of stiffness and pain in her b/l DIPs & thumbs for the last 3 years. Her pain increases with activity throughout the day. She also mentions numbness and tingling in her legs due to past back surgery. She has had b/l shoulder replacement surgery [id number]. She mentions a recent diagnosis of gastroparesis. RHEUMATOLOGY REVIEW OF SYSTEMS   Positives as per HPI  Negatives as follows:  Miguel Angel Chen:  Denies unexplained persistent fevers, weight change, chronic fatigue  HEAD/EYES:   Denies eye redness, blurry vision or sudden loss of vision, dry eyes, HA, temporal artery pain  ENT:    Denies oral/nasal ulcers, recurrent sinus infections, dry mouth  RESPIRATORY:  No pleuritic pain, history of pleural effusions, hemoptysis, exertional dyspnea  CARDIOVASCULAR:  Denies chest pain, history of pericardial effusions  GASTRO:   Denies heartburn, esophageal dysmotility, abdominal pain, nausea, vomiting, diarrhea, blood in the stool  HEMATOLOGIC:  No easy bruising, purpura, swollen lymph nodes  SKIN:    Denies alopecia, ulcers, nodules, sun sensitivity, unexplained persistent rash   VASCULAR:   Denies edema, cyanosis, raynaud phenomenon  NEUROLOGIC:  Denies specific muscle weakness, paresthesias   PSYCHIATRIC:  No sleep disturbance / snoring, depression, anxiety  MSK:    No morning stiffness >1 hour, SI joint pain    MEDICAL AND SOCIAL HISTORY  This was reviewed with the patient and reviewed in the medical records.       Past Medical History:   Diagnosis Date    Arthritis     Diarrhea 6/6/2016    Foot drop     Gastric ulcer 6/14/2012    GERD (gastroesophageal reflux disease)     High cholesterol     Hypertension     Neuropathy     [facility]varian cancer ([facility] [address] 1986    chemo x 9 mos    Scoliosis     Stroke ([facility] [address] 2002    ? stroke with drop feet, per patient CT scans were negative     Past Surgical History:   Procedure Laterality Date    FLEXIBLE SIGMOIDOSCOPY N/A 6/6/2016    SIGMOIDOSCOPY FLEXIBLE performed by Agusto Pederson MD at [facility] ENDOSCOPY    HX ORTHOPAEDIC      back    HX ORTHOPAEDIC      bilateral shoulder replacements    HX ORTHOPAEDIC      left knee replacement    HX LYDIA AND BSO  1986    DC EGD TRANSORAL BIOPSY SINGLE/MULTIPLE  1/26/2012         DC EGD TRANSORAL BIOPSY SINGLE/MULTIPLE  6/14/2012          Social History   Substance Use Topics    Smoking status: Never Smoker    Smokeless tobacco: Never Used    Alcohol use No     Employment - Retired  Sleep - Good, no issues  Exercise - no    FAMILY HISTORY  No autoimmune disease in 1st degree relatives       MEDICATIONS  All the current medications were reviewed in detail. PHYSICAL EXAM  There were no vitals taken for this visit. GENERAL APPEARANCE: Well-nourished adult in no acute distress. EYES: No scleral erythema, conjunctival injection. ENT: No oral ulcer, parotid enlargement. NECK: No adenopathy, thyroid enlargement. CARDIOVASCULAR: Heart rhythm is regular. No murmur, rub, gallop. CHEST: Normal vesicular breath sounds. No wheezes, rales, pleural friction rubs. ABDOMINAL: The abdomen is soft and nontender. Liver and spleen are nonpalpable. Bowel sounds are normal.  EXTREMITIES: There is no evidence of clubbing, cyanosis, edema. SKIN: No rash, palpable purpura, digital ulcer, abnormal thickening. NEUROLOGICAL: Normal gait and station, full strength in upper and lower extremities, normal sensation to light touch. MUSCULOSKELETAL:   Upper extremities - no synovial thickening. Heberden's and Madi's nodes.  B/L 2nd & 3rd DIP swelling, R 2nd & 3rd PIP swelling. R shoulder dROM, decreased internal rotation. Lower extremities - full range of motion, no tenderness, no swelling, no synovial thickening and no deformity of joints. R knee bony prominence. LABS, RADIOLOGY AND PROCEDURES  Previous labs reviewed -Yes    RF - 16.6  BALDOMERO - positive    Previous radiology reviewed -Yes    Hand XR: Osteoarthritis and erosive osteoarthritis more likely than other inflammatory  arthritis. Previous procedures reviewed -Yes  Previous medical records reviewed/summarized -Yes    ASSESSMENT  1. Inflammatory Hand Osteoarthritis - Non pharmacologic treatment recommendations include an evaluation by occupational or physical therapy to access the ability to perform activities of daily living and receive assistive devices as needed. Education in joint protection techniques and the use of thermal agents should also be reviewed by OT/PT. For erosive/inflammatory disease plaquenil and Colchicine can be considered, however there is minimal evidence that it is beneficial. Labs are not necessary at this time. We will start prednisone 5 mg daily, and she should monitor response and contact me in 2-3 weeks. PLAN  1. Prednisone 5 mg daily, monitor symptoms and taper as directed  2. Return in 6 months or as needed for a follow up or as needed      Gay Reich MD  Adult and Pediatric Rheumatology     [address] [facility], [address] [facility], Phone [phone], Fax [phone]   E-mail: [email or web address]    Visiting  of Pediatrics    Department of Pediatrics, [facility] of [address] [place] [address], [address] [place] [address], Phone [phone], Fax [phone]  E-mail: [email or web address]    There are no Patient Instructions on file for this visit. cc:  Christian Hunt NP    Written by chantel Poon, as dictated by Natalie Goldberg.  Ariella Reich M.D.

## 2018-10-23 ENCOUNTER — HOSPITAL ENCOUNTER (OUTPATIENT)
Dept: GENERAL RADIOLOGY | Age: 83
Discharge: HOME | End: 2018-10-23
Payer: MEDICARE

## 2018-10-23 DIAGNOSIS — R52 PAIN: ICD-10-CM

## 2018-10-23 PROCEDURE — 74018 RADEX ABDOMEN 1 VIEW: CPT

## 2018-10-29 ENCOUNTER — HOSPITAL ENCOUNTER (EMERGENCY)
Age: 83
Discharge: HOME | DRG: 552 | End: 2018-10-29
Attending: EMERGENCY MEDICINE
Payer: MEDICARE

## 2018-10-29 VITALS
WEIGHT: 158 LBS | SYSTOLIC BLOOD PRESSURE: 159 MMHG | HEIGHT: 60 IN | HEART RATE: 96 BPM | OXYGEN SATURATION: 95 % | TEMPERATURE: 99.3 F | BODY MASS INDEX: 31.02 KG/M2 | RESPIRATION RATE: 16 BRPM | DIASTOLIC BLOOD PRESSURE: 87 MMHG

## 2018-10-29 DIAGNOSIS — M54.42 ACUTE BACK PAIN WITH SCIATICA, LEFT: ICD-10-CM

## 2018-10-29 DIAGNOSIS — M51.36 DDD (DEGENERATIVE DISC DISEASE), LUMBAR: Primary | ICD-10-CM

## 2018-10-29 LAB
APPEARANCE UR: CLEAR
BACTERIA URNS QL MICRO: NEGATIVE /HPF
BILIRUB UR QL: NEGATIVE
COLOR UR: NORMAL
EPITH CASTS URNS QL MICRO: NORMAL /LPF
GLUCOSE UR STRIP.AUTO-MCNC: NEGATIVE MG/DL
HGB UR QL STRIP: NEGATIVE
HYALINE CASTS URNS QL MICRO: NORMAL /LPF (ref 0–5)
KETONES UR QL STRIP.AUTO: NEGATIVE MG/DL
LEUKOCYTE ESTERASE UR QL STRIP.AUTO: NEGATIVE
NITRITE UR QL STRIP.AUTO: NEGATIVE
PH UR STRIP: 7.5 [PH] (ref 5–8)
PROT UR STRIP-MCNC: NEGATIVE MG/DL
RBC #/AREA URNS HPF: NORMAL /HPF (ref 0–5)
SP GR UR REFRACTOMETRY: 1.01 (ref 1–1.03)
UA: UC IF INDICATED,UAUC: NORMAL
UROBILINOGEN UR QL STRIP.AUTO: 0.2 EU/DL (ref 0.2–1)
WBC URNS QL MICRO: NORMAL /HPF (ref 0–4)

## 2018-10-29 PROCEDURE — A9270 NON-COVERED ITEM OR SERVICE: HCPCS | Performed by: PHYSICIAN ASSISTANT

## 2018-10-29 PROCEDURE — 74011636637 HC RX REV CODE- 636/637: Performed by: PHYSICIAN ASSISTANT

## 2018-10-29 PROCEDURE — 99283 EMERGENCY DEPT VISIT LOW MDM: CPT

## 2018-10-29 PROCEDURE — 81001 URINALYSIS AUTO W/SCOPE: CPT | Performed by: EMERGENCY MEDICINE

## 2018-10-29 PROCEDURE — 74011250637 HC RX REV CODE- 250/637: Performed by: PHYSICIAN ASSISTANT

## 2018-10-29 RX ORDER — PREDNISONE 20 MG/1
20 TABLET ORAL
Qty: 20 TAB | Refills: 0 | Status: SHIPPED | OUTPATIENT
Start: 2018-10-29 | End: 2018-11-08

## 2018-10-29 RX ORDER — HYDROCODONE BITARTRATE AND ACETAMINOPHEN 5; 325 MG/1; MG/1
1 TABLET ORAL
Status: COMPLETED | OUTPATIENT
Start: 2018-10-29 | End: 2018-10-29

## 2018-10-29 RX ORDER — HYDROCODONE BITARTRATE AND ACETAMINOPHEN 5; 325 MG/1; MG/1
1 TABLET ORAL
Qty: 10 TAB | Refills: 0 | Status: SHIPPED | OUTPATIENT
Start: 2018-10-29 | End: 2018-11-01 | Stop reason: CLARIF

## 2018-10-29 RX ORDER — PREDNISONE 10 MG/1
20 TABLET ORAL
Status: COMPLETED | OUTPATIENT
Start: 2018-10-29 | End: 2018-10-29

## 2018-10-29 RX ADMIN — HYDROCODONE BITARTRATE AND ACETAMINOPHEN 1 TABLET: 5; 325 TABLET ORAL at 19:40

## 2018-10-29 RX ADMIN — PREDNISONE 20 MG: 10 TABLET ORAL at 19:40

## 2018-10-29 NOTE — ED NOTES
Using walker to ambulate but limited movement and walking due to pain for 1 week. Sleeping in a chair since last Tuesday because cannot get from wheelchair to bed. Last bowel movement yesterday. Denies problems with urination or bowel movement. Numbness knees down from surgery in 2002. Pain since last Tuesday after climbed onto xray table. Pain left back now radiates up back.

## 2018-10-29 NOTE — DISCHARGE INSTRUCTIONS
Learning About Degenerative Disc Disease  What is degenerative disc disease? Degenerative disc disease is not really a disease. It's a term used to describe the normal changes in your spinal discs as you age. Spinal discs are small, spongy discs that separate the bones (vertebrae) that make up the spine. The discs act as shock absorbers for the spine, so it can flex, bend, and twist.  Degenerative disc disease can take place in one or more places along the spine. It most often occurs in the discs in the lower back and the neck. What causes it? As we age, our spinal discs break down, or degenerate. This breakdown causes the symptoms of degenerative disc disease in some people. When the discs break down, they can lose fluid and dry out, and their outer layers can have tiny cracks or tears. This leads to less padding and less space between the bones in the spine. The body reacts to this by making bony growths on the spine called bone spurs. These spurs can press on the spinal nerve roots or spinal cord. This can cause pain and can affect how well the nerves work. What are the symptoms? Many people with degenerative disc disease have no pain. But others have severe pain or other symptoms that limit their activities. Some of the most common symptoms are:  · Pain in the back or neck. Where the pain occurs depends on which discs are affected. · Pain that gets worse when you move, such as bending over, reaching up, or twisting. · Pain that may occur in the rear end (buttocks), arm, or leg if a nerve is pinched. · Numbness or tingling in your arm or leg. How is it diagnosed? A doctor can often diagnose degenerative disc disease while doing a physical exam. If your exam shows no signs of a serious condition, imaging tests (such as an X-ray) aren't likely to help your doctor find the cause of your symptoms.   Sometimes degenerative disc disease is found when an X-ray is taken for another reason, such as an injury or other health problem. But even if the doctor finds degenerative disc disease, that doesn't always mean that you will have symptoms. How is it treated? There are several things you can do at home to manage pain from this problem. · To relieve pain, use ice or heat (whichever feels better) on the affected area. ¨ Put ice or a cold pack on the area for 10 to 20 minutes at a time. Put a thin cloth between the ice and your skin. ¨ Put a warm water bottle, a heating pad set on low, or a warm cloth on your back. Put a thin cloth between the heating pad and your skin. Do not go to sleep with a heating pad on your skin. · Ask your doctor if you can take acetaminophen (such as Tylenol) or nonsteroidal anti-inflammatory drugs, such as ibuprofen or naproxen. Your doctor can prescribe stronger medicines if needed. Be safe with medicines. Read and follow all instructions on the label. · Get some exercise every day. Exercise is one of the best ways to help your back feel better and stay better. It's best to start each exercise slowly. You may notice a little soreness, and that's okay. But if an exercise makes your pain worse, stop doing it. Here are things you can try:  ¨ Walking. It's the simplest and maybe the best activity for your back. It gets your blood moving and helps your muscles stay strong. ¨ Exercises that gently stretch and strengthen your stomach, back, and leg muscles. The stronger those muscles are, the better they're able to protect your back. If you have constant or severe pain in your back or spine, you may need other treatments, such as physical therapy. In some cases, your doctor may suggest surgery. Follow-up care is a key part of your treatment and safety. Be sure to make and go to all appointments, and call your doctor if you are having problems. It's also a good idea to know your test results and keep a list of the medicines you take. Where can you learn more?   Go to http://tim.info/. Enter Q803 in the search box to learn more about \"Learning About Degenerative Disc Disease. \"  Current as of: March 21, 2017  Content Version: 11.5  © 4060-2675 [employer]. Care instructions adapted under license by [employer] (which disclaims liability or warranty for this information). If you have questions about a medical condition or this instruction, always ask your healthcare professional. [place] [address] any warranty or liability for your use of this information.

## 2018-10-29 NOTE — ED PROVIDER NOTES
EMERGENCY DEPARTMENT HISTORY AND PHYSICAL EXAM 
     
 
Date: 10/29/2018 Patient Name: Kulwant Villaseñor History of Presenting Illness Chief Complaint Patient presents with  Back Pain  
  lower back pain x 1 week, pt states chronic back pain but reports she was here for outpatient imaging last week and states worsening after getting off of exam table History Provided By: Patient and Patient's Daughter HPI: Kulwant Villaseñor is a 80 y.o. female, pmhx DDD, spinal fusion, HTN, who presents ambulatory to the ED c/o left low back pain, sharp, that radiates down the back of her left leg. It started last Tuesday after she was on an X-ray table and got down. Pt denies falling/injury. She has tried Tramadol with little relief. She denies saddle numbness/incontinence of bowel or bladder. She specifically denies any recent fevers, chills, nausea, vomiting, diarrhea, abd pain, CP, urinary sxs, changes in BM, or headache. She denies history of diabetes, kidney disease, liver disease, thyroid disease PCP: Paty Constantino NP There are no other complaints, changes, or physical findings at this time. Current Facility-Administered Medications Medication Dose Route Frequency Provider Last Rate Last Dose  predniSONE (DELTASONE) tablet 20 mg  20 mg Oral NOW PEGGY Membreno      
 HYDROcodone-acetaminophen [facility]) 5-325 mg per tablet 1 Tab  1 Tab Oral NOW PEGGY Membreno Current Outpatient Medications Medication Sig Dispense Refill  magnesium 250 mg tab Take  by mouth.  denosumab (PROLIA) 60 mg/mL injection 1 mL by SubCUTAneous route every 6 months. 1 Syringe 1  
 denosumab (PROLIA) 60 mg/mL injection 1 mL by SubCUTAneous route every 6 months. 1 Syringe 1  
 RABEprazole (ACIPHEX) 20 mg tablet Take 20 mg by mouth daily.  raNITIdine hcl 300 mg cap Take  by mouth.     
 traMADol (ULTRAM) 50 mg tablet 0.5 to one tablet po every 6 hours prn pain 40 Tab 1  
  lisinopril (PRINIVIL, ZESTRIL) 20 mg tablet TAKE 1 TABLET BY MOUTH TWO  TIMES DAILY 180 Tab 3  
 gabapentin (NEURONTIN) 800 mg tablet TAKE 1 TAB BY MOUTH FOUR  TIMES DAILY AS NEEDED. 360 Tab 3  cephALEXin (KEFLEX) 500 mg capsule TK 4 CS PO 1 H PRIOR TO DENTAL APPT  0  
 hydroCHLOROthiazide (HYDRODIURIL) 25 mg tablet Take 1 Tab by mouth daily. 90 Tab 3  
 amitriptyline (ELAVIL) 25 mg tablet Take 1 Tab by mouth nightly. 90 Tab 3  
 Calcium-Cholecalciferol, D3, (CALCIUM 500 + D) 500 mg(1,250mg) -400 unit tab Take 1 Tab by mouth daily. 90 Tab 1  
 simvastatin (ZOCOR) 40 mg tablet Take 1 Tab by mouth nightly. 90 Tab 3  potassium chloride (KLOR-CON) 10 mEq tablet Take 1 Tab by mouth three (3) times daily. 270 Tab 3  cholecalciferol (VITAMIN D3) 1,000 unit tablet Take 1,000 Units by mouth daily.  multivitamin (ONE A DAY) tablet Take 1 Tab by mouth daily.  loperamide (IMODIUM) 2 mg capsule Take 2 mg by mouth four (4) times daily as needed for Diarrhea.  acetaminophen (TYLENOL EXTRA STRENGTH) 500 mg tablet Take 500 mg by mouth every six (6) hours as needed.  magnesium oxide (MAG-OX) 400 mg tablet Take 400 mg by mouth two (2) times a day. Past History Past Medical History: 
Past Medical History:  
Diagnosis Date  Arthritis  Diarrhea 6/6/2016  Foot drop  Gastric ulcer 6/14/2012  GERD (gastroesophageal reflux disease)  High cholesterol  Hypertension  Neuropathy  Ovarian cancer ([address] 1986  
 chemo x 9 mos  Scoliosis  Stroke [facility]) 2002  
 ? stroke with drop feet, per patient CT scans were negative Past Surgical History: 
Past Surgical History:  
Procedure Laterality Date  HX ORTHOPAEDIC    
 back  HX ORTHOPAEDIC    
 bilateral shoulder replacements  HX ORTHOPAEDIC    
 left knee replacement Sondra  ME EGD TRANSORAL BIOPSY SINGLE/MULTIPLE  1/26/2012  NM EGD TRANSORAL BIOPSY SINGLE/MULTIPLE  6/14/2012 Family History: No family history on file. Social History: 
Social History Tobacco Use  Smoking status: Never Smoker  Smokeless tobacco: Never Used Substance Use Topics  Alcohol use: No  
 Drug use: No  
 
 
Allergies: 
No Known Allergies Review of Systems Review of Systems Constitutional: Negative for activity change, appetite change, fatigue and fever. HENT: Negative for congestion, dental problem, ear pain, rhinorrhea and sinus pressure. Eyes: Negative for photophobia, pain, discharge, redness, itching and visual disturbance. Respiratory: Negative for cough, chest tightness, shortness of breath, wheezing and stridor. Cardiovascular: Negative for chest pain and leg swelling. Gastrointestinal: Negative for abdominal distention, abdominal pain and blood in stool. Genitourinary: Negative for difficulty urinating, dysuria, flank pain, frequency, vaginal bleeding, vaginal discharge and vaginal pain. Musculoskeletal: Positive for back pain and myalgias. Negative for arthralgias, gait problem, joint swelling, neck pain and neck stiffness. Skin: Negative for rash and wound. Neurological: Negative for dizziness, syncope, weakness, numbness and headaches. Psychiatric/Behavioral: Negative for behavioral problems. The patient is not nervous/anxious. Physical Exam  
Physical Exam  
Constitutional: She is oriented to person, place, and time. Vital signs are normal. She appears well-developed and well-nourished. She does not appear ill. No distress. HENT:  
Head: Normocephalic and atraumatic. Right Ear: External ear normal.  
Left Ear: External ear normal.  
Nose: Nose normal.  
Eyes: Conjunctivae and EOM are normal. Pupils are equal, round, and reactive to light. Right eye exhibits no discharge. Left eye exhibits no discharge. No scleral icterus. Neck: Normal range of motion. Neck supple. No tracheal deviation present. Cardiovascular: Normal rate, regular rhythm, normal heart sounds and intact distal pulses. Exam reveals no gallop and no friction rub. No murmur heard. Pulmonary/Chest: Effort normal and breath sounds normal. No stridor (.now). No respiratory distress. She has no wheezes. She has no rales. She exhibits no tenderness. Musculoskeletal: She exhibits no edema. Lumbar back: She exhibits tenderness and pain. She exhibits no bony tenderness, no swelling and no deformity. Left upper leg: Normal.  
     Right foot: Normal.  
     Left foot: Normal.  
Plastic braces on bilateral lower legs. +left SLR. TTP to lumbar musculature and buttock. No contusions. No erythema. Neurological: She is alert and oriented to person, place, and time. No cranial nerve deficit. Skin: Skin is warm and dry. No rash noted. No erythema. Well healed surgical scar to lumbar spine. Psychiatric: She has a normal mood and affect. Her behavior is normal.  
Nursing note and vitals reviewed. Diagnostic Study Results Labs - Recent Results (from the past 12 hour(s)) URINALYSIS W/ REFLEX CULTURE Collection Time: 10/29/18  5:59 PM  
Result Value Ref Range Color YELLOW/STRAW Appearance CLEAR CLEAR Specific gravity 1.010 1.003 - 1.030    
 pH (UA) 7.5 5.0 - 8.0 Protein NEGATIVE  NEG mg/dL Glucose NEGATIVE  NEG mg/dL Ketone NEGATIVE  NEG mg/dL Bilirubin NEGATIVE  NEG Blood NEGATIVE  NEG Urobilinogen 0.2 0.2 - 1.0 EU/dL Nitrites NEGATIVE  NEG Leukocyte Esterase NEGATIVE  NEG    
 WBC 0-4 0 - 4 /hpf  
 RBC 0-5 0 - 5 /hpf Epithelial cells FEW FEW /lpf Bacteria NEGATIVE  NEG /hpf  
 UA:UC IF INDICATED CULTURE NOT INDICATED BY UA RESULT CNI Hyaline cast 0-2 0 - 5 /lpf Radiologic Studies - No orders to display CT Results  (Last 48 hours) None CXR Results  (Last 48 hours) None Medical Decision Making I am the first provider for this patient. I reviewed the vital signs, available nursing notes, past medical history, past surgical history, family history and social history. Vital Signs-Reviewed the patient's vital signs. Patient Vitals for the past 12 hrs: 
 Temp Pulse Resp BP SpO2  
10/29/18 1648 99.3 °F (37.4 °C) 96 16 159/87 95 % Records Reviewed: Nursing Notes, Old Medical Records and Previous Radiology Studies Provider Notes (Medical Decision Making): DDx: DDD, strain, sprain, UTI, sciatica ED Course:  
Initial assessment performed. The patients presenting problems have been discussed, and they are in agreement with the care plan formulated and outlined with them. I have encouraged them to ask questions as they arise throughout their visit. Pt defers x-rays. PROGRESS NOTE: 
 
 
 
7:41 PM 
Pt noted to be ready for discharge. Updated pt and/or family on all lab and imaging findings available. Will follow up as instructed. All questions have been answered, pt voiced understanding and agreement with plan. Narcotics were prescribed, pt was advised not to drive or operate heavy machinery. Specific return precautions provided as well as instructions to return to the ED should sx worsen at any time. Vital signs stable for discharge. SOPHIA Balderas Disposition: 
 
DISCHARGE NOTE: 
7:42 PM 
The patient's results have been reviewed with family and/or caregiver. They verbally convey their understanding and agreement of the patient's signs, symptoms, diagnosis, treatment, and prognosis. They additionally agree to follow up as recommended in the discharge instructions or to return to the Emergency Room should the patient's condition change prior to their follow-up appointment. The family and/or caregiver verbally agrees with the care-plan and all of their questions have been answered. The discharge instructions have also been provided to the them along with educational information regarding the patient's diagnosis and a list of reasons why the patient would want to return to the ER prior to their follow-up appointment should their condition change. SOPHIA Davis PLAN: 
1. Discharge Medication List as of 10/29/2018  7:45 PM  
  
START taking these medications Details  
predniSONE (DELTASONE) 20 mg tablet Take 1 Tab by mouth daily as needed. For back pain. With food. , Print, Disp-20 Tab, R-0  
  
HYDROcodone-acetaminophen (NORCO) 5-325 mg per tablet Take 1 Tab by mouth every six (6) hours as needed for Pain. Max Daily Amount: 4 Tabs., Print, Disp-10 Tab, R-0  
  
  
CONTINUE these medications which have NOT CHANGED Details  
magnesium 250 mg tab Take  by mouth., Historical Med  
  
!! denosumab (PROLIA) 60 mg/mL injection 1 mL by SubCUTAneous route every 6 months., NormalPatient has a rohith and a pharmacy card now to help cover co-pay so she will be picking this up for us to give in clinic. Thank you! ANIBAL Huang [id number] Syringe, R-1  
  
!! denosumab (PROLIA) 60 mg/mL injection 1 mL by SubCUTAneous route every 6 months., NormalPatient now has a pharmacy card to help with co-pay. Lainey Oconnor [phone] Syringe, R-1  
  
RABEprazole (ACIPHEX) 20 mg tablet Take 20 mg by mouth daily. , Historical Med  
  
raNITIdine hcl 300 mg cap Take  by mouth., Historical Med  
  
lisinopril (PRINIVIL, ZESTRIL) 20 mg tablet TAKE 1 TABLET BY MOUTH TWO  TIMES DAILY, Normal, Disp-180 Tab, R-3  
  
gabapentin (NEURONTIN) 800 mg tablet TAKE 1 TAB BY MOUTH FOUR  TIMES DAILY AS NEEDED., Mail Order, Disp-360 Tab, R-3  
  
cephALEXin (KEFLEX) 500 mg capsule TK 4 CS PO 1 H PRIOR TO DENTAL APPT, Historical Med, R-0  
  
hydroCHLOROthiazide (HYDRODIURIL) 25 mg tablet Take 1 Tab by mouth daily. , Normal, Disp-90 Tab, R-3  
  
 amitriptyline (ELAVIL) 25 mg tablet Take 1 Tab by mouth nightly., Normal, Disp-90 Tab, R-3 Calcium-Cholecalciferol, D3, (CALCIUM 500 + D) 500 mg(1,250mg) -400 unit tab Take 1 Tab by mouth daily. , Normal, Disp-90 Tab, R-1  
  
simvastatin (ZOCOR) 40 mg tablet Take 1 Tab by mouth nightly., Normal, Disp-90 Tab, R-3  
  
potassium chloride (KLOR-CON) 10 mEq tablet Take 1 Tab by mouth three (3) times daily. , Normal, Disp-270 Tab, R-3  
  
cholecalciferol (VITAMIN D3) 1,000 unit tablet Take 1,000 Units by mouth daily. , Historical Med  
  
multivitamin (ONE A DAY) tablet Take 1 Tab by mouth daily. , Historical Med  
  
loperamide (IMODIUM) 2 mg capsule Take 2 mg by mouth four (4) times daily as needed for Diarrhea., Historical Med  
  
magnesium oxide (MAG-OX) 400 mg tablet Take 400 mg by mouth two (2) times a day., Historical Med  
  
 !! - Potential duplicate medications found. Please discuss with provider. STOP taking these medications  
  
 traMADol (ULTRAM) 50 mg tablet Comments:  
Reason for Stopping:   
   
 acetaminophen (TYLENOL EXTRA STRENGTH) 500 mg tablet Comments:  
Reason for Stoppin.  
Follow-up Information Follow up With Specialties Details Why Contact Info Libby Peng NP Nurse Practitioner   [address] [facility] [address] [id number] [phone] [facility] EMERGENCY DEPT Emergency Medicine  If symptoms worsen [address] [place] [address] [address] [facility] 
[phone] Return to ED if worse Diagnosis Clinical Impression: 1. DDD (degenerative disc disease), lumbar 2. Acute back pain with sciatica, left This note will not be viewable in [address]

## 2018-11-01 ENCOUNTER — APPOINTMENT (OUTPATIENT)
Dept: GENERAL RADIOLOGY | Age: 83
DRG: 552 | End: 2018-11-01
Attending: EMERGENCY MEDICINE
Payer: MEDICARE

## 2018-11-01 ENCOUNTER — HOSPITAL ENCOUNTER (INPATIENT)
Age: 83
LOS: 7 days | Discharge: NURSING, REHAB OR LONG-TERM CARE | DRG: 552 | End: 2018-11-08
Attending: EMERGENCY MEDICINE | Admitting: INTERNAL MEDICINE
Payer: MEDICARE

## 2018-11-01 ENCOUNTER — APPOINTMENT (OUTPATIENT)
Dept: CT IMAGING | Age: 83
DRG: 552 | End: 2018-11-01
Attending: EMERGENCY MEDICINE
Payer: MEDICARE

## 2018-11-01 DIAGNOSIS — G62.9 NEUROPATHY: ICD-10-CM

## 2018-11-01 DIAGNOSIS — M79.606 LOW BACK PAIN RADIATING TO LOWER EXTREMITY: ICD-10-CM

## 2018-11-01 DIAGNOSIS — M54.9 INTRACTABLE BACK PAIN: Primary | ICD-10-CM

## 2018-11-01 DIAGNOSIS — M54.50 LOW BACK PAIN RADIATING TO LOWER EXTREMITY: ICD-10-CM

## 2018-11-01 DIAGNOSIS — F51.04 CHRONIC INSOMNIA: ICD-10-CM

## 2018-11-01 LAB
ALBUMIN SERPL-MCNC: 4 G/DL (ref 3.5–5)
ALBUMIN/GLOB SERPL: 1 {RATIO} (ref 1.1–2.2)
ALP SERPL-CCNC: 53 U/L (ref 45–117)
ALT SERPL-CCNC: 23 U/L (ref 12–78)
ANION GAP SERPL CALC-SCNC: 5 MMOL/L (ref 5–15)
APPEARANCE UR: CLEAR
AST SERPL-CCNC: 23 U/L (ref 15–37)
BACTERIA URNS QL MICRO: NEGATIVE /HPF
BASOPHILS # BLD: 0 K/UL (ref 0–0.1)
BASOPHILS NFR BLD: 0 % (ref 0–1)
BILIRUB SERPL-MCNC: 0.3 MG/DL (ref 0.2–1)
BILIRUB UR QL: NEGATIVE
BUN SERPL-MCNC: 26 MG/DL (ref 6–20)
BUN/CREAT SERPL: 25 (ref 12–20)
CALCIUM SERPL-MCNC: 9.7 MG/DL (ref 8.5–10.1)
CHLORIDE SERPL-SCNC: 104 MMOL/L (ref 97–108)
CO2 SERPL-SCNC: 26 MMOL/L (ref 21–32)
COLOR UR: ABNORMAL
CREAT SERPL-MCNC: 1.04 MG/DL (ref 0.55–1.02)
DIFFERENTIAL METHOD BLD: ABNORMAL
EOSINOPHIL # BLD: 0 K/UL (ref 0–0.4)
EOSINOPHIL NFR BLD: 0 % (ref 0–7)
EPITH CASTS URNS QL MICRO: ABNORMAL /LPF
ERYTHROCYTE [DISTWIDTH] IN BLOOD BY AUTOMATED COUNT: 14.5 % (ref 11.5–14.5)
GLOBULIN SER CALC-MCNC: 4 G/DL (ref 2–4)
GLUCOSE SERPL-MCNC: 126 MG/DL (ref 65–100)
GLUCOSE UR STRIP.AUTO-MCNC: NEGATIVE MG/DL
HCT VFR BLD AUTO: 36.3 % (ref 35–47)
HGB BLD-MCNC: 11 G/DL (ref 11.5–16)
HGB UR QL STRIP: NEGATIVE
HYALINE CASTS URNS QL MICRO: ABNORMAL /LPF (ref 0–5)
IMM GRANULOCYTES # BLD: 0.1 K/UL (ref 0–0.04)
IMM GRANULOCYTES NFR BLD AUTO: 1 % (ref 0–0.5)
KETONES UR QL STRIP.AUTO: NEGATIVE MG/DL
LEUKOCYTE ESTERASE UR QL STRIP.AUTO: ABNORMAL
LYMPHOCYTES # BLD: 0.9 K/UL (ref 0.8–3.5)
LYMPHOCYTES NFR BLD: 11 % (ref 12–49)
MCH RBC QN AUTO: 26.4 PG (ref 26–34)
MCHC RBC AUTO-ENTMCNC: 30.3 G/DL (ref 30–36.5)
MCV RBC AUTO: 87.1 FL (ref 80–99)
MONOCYTES # BLD: 0.3 K/UL (ref 0–1)
MONOCYTES NFR BLD: 4 % (ref 5–13)
NEUTS SEG # BLD: 6.9 K/UL (ref 1.8–8)
NEUTS SEG NFR BLD: 84 % (ref 32–75)
NITRITE UR QL STRIP.AUTO: NEGATIVE
NRBC # BLD: 0 K/UL (ref 0–0.01)
NRBC BLD-RTO: 0 PER 100 WBC
PH UR STRIP: 6.5 [PH] (ref 5–8)
PLATELET # BLD AUTO: 266 K/UL (ref 150–400)
PMV BLD AUTO: 10.5 FL (ref 8.9–12.9)
POTASSIUM SERPL-SCNC: 4.8 MMOL/L (ref 3.5–5.1)
PROT SERPL-MCNC: 8 G/DL (ref 6.4–8.2)
PROT UR STRIP-MCNC: NEGATIVE MG/DL
RBC # BLD AUTO: 4.17 M/UL (ref 3.8–5.2)
RBC #/AREA URNS HPF: ABNORMAL /HPF (ref 0–5)
SODIUM SERPL-SCNC: 135 MMOL/L (ref 136–145)
SP GR UR REFRACTOMETRY: 1.01 (ref 1–1.03)
UA: UC IF INDICATED,UAUC: ABNORMAL
UROBILINOGEN UR QL STRIP.AUTO: 0.2 EU/DL (ref 0.2–1)
WBC # BLD AUTO: 8.3 K/UL (ref 3.6–11)
WBC URNS QL MICRO: ABNORMAL /HPF (ref 0–4)

## 2018-11-01 PROCEDURE — 74011000258 HC RX REV CODE- 258: Performed by: INTERNAL MEDICINE

## 2018-11-01 PROCEDURE — 71045 X-RAY EXAM CHEST 1 VIEW: CPT

## 2018-11-01 PROCEDURE — 72131 CT LUMBAR SPINE W/O DYE: CPT

## 2018-11-01 PROCEDURE — 96376 TX/PRO/DX INJ SAME DRUG ADON: CPT

## 2018-11-01 PROCEDURE — 80053 COMPREHEN METABOLIC PANEL: CPT | Performed by: EMERGENCY MEDICINE

## 2018-11-01 PROCEDURE — 72100 X-RAY EXAM L-S SPINE 2/3 VWS: CPT

## 2018-11-01 PROCEDURE — 36415 COLL VENOUS BLD VENIPUNCTURE: CPT | Performed by: EMERGENCY MEDICINE

## 2018-11-01 PROCEDURE — 74011250637 HC RX REV CODE- 250/637: Performed by: INTERNAL MEDICINE

## 2018-11-01 PROCEDURE — 74011000250 HC RX REV CODE- 250: Performed by: EMERGENCY MEDICINE

## 2018-11-01 PROCEDURE — 87086 URINE CULTURE/COLONY COUNT: CPT | Performed by: EMERGENCY MEDICINE

## 2018-11-01 PROCEDURE — 81001 URINALYSIS AUTO W/SCOPE: CPT | Performed by: EMERGENCY MEDICINE

## 2018-11-01 PROCEDURE — 74011250637 HC RX REV CODE- 250/637: Performed by: EMERGENCY MEDICINE

## 2018-11-01 PROCEDURE — 85025 COMPLETE CBC W/AUTO DIFF WBC: CPT | Performed by: EMERGENCY MEDICINE

## 2018-11-01 PROCEDURE — 74011250636 HC RX REV CODE- 250/636: Performed by: EMERGENCY MEDICINE

## 2018-11-01 PROCEDURE — 96374 THER/PROPH/DIAG INJ IV PUSH: CPT

## 2018-11-01 PROCEDURE — 65270000029 HC RM PRIVATE

## 2018-11-01 PROCEDURE — 99284 EMERGENCY DEPT VISIT MOD MDM: CPT

## 2018-11-01 PROCEDURE — 96372 THER/PROPH/DIAG INJ SC/IM: CPT

## 2018-11-01 PROCEDURE — 74011250636 HC RX REV CODE- 250/636: Performed by: INTERNAL MEDICINE

## 2018-11-01 RX ORDER — FAMOTIDINE 20 MG/1
20 TABLET, FILM COATED ORAL 2 TIMES DAILY
Status: DISCONTINUED | OUTPATIENT
Start: 2018-11-01 | End: 2018-11-03

## 2018-11-01 RX ORDER — SODIUM CHLORIDE 0.9 % (FLUSH) 0.9 %
5-10 SYRINGE (ML) INJECTION AS NEEDED
Status: DISCONTINUED | OUTPATIENT
Start: 2018-11-01 | End: 2018-11-08 | Stop reason: HOSPADM

## 2018-11-01 RX ORDER — SODIUM CHLORIDE 0.9 % (FLUSH) 0.9 %
5-10 SYRINGE (ML) INJECTION AS NEEDED
Status: DISCONTINUED | OUTPATIENT
Start: 2018-11-01 | End: 2018-11-02

## 2018-11-01 RX ORDER — MORPHINE SULFATE 15 MG/1
15 TABLET ORAL
Status: DISCONTINUED | OUTPATIENT
Start: 2018-11-01 | End: 2018-11-01

## 2018-11-01 RX ORDER — MORPHINE SULFATE 10 MG/ML
5 INJECTION, SOLUTION INTRAMUSCULAR; INTRAVENOUS
Status: COMPLETED | OUTPATIENT
Start: 2018-11-01 | End: 2018-11-01

## 2018-11-01 RX ORDER — SODIUM CHLORIDE 0.9 % (FLUSH) 0.9 %
5-10 SYRINGE (ML) INJECTION EVERY 8 HOURS
Status: DISCONTINUED | OUTPATIENT
Start: 2018-11-01 | End: 2018-11-02

## 2018-11-01 RX ORDER — LISINOPRIL 20 MG/1
20 TABLET ORAL DAILY
Status: DISCONTINUED | OUTPATIENT
Start: 2018-11-02 | End: 2018-11-04

## 2018-11-01 RX ORDER — PANTOPRAZOLE SODIUM 40 MG/1
40 TABLET, DELAYED RELEASE ORAL
Status: DISCONTINUED | OUTPATIENT
Start: 2018-11-02 | End: 2018-11-08 | Stop reason: HOSPADM

## 2018-11-01 RX ORDER — MELATONIN
1000 DAILY
Status: DISCONTINUED | OUTPATIENT
Start: 2018-11-02 | End: 2018-11-08 | Stop reason: HOSPADM

## 2018-11-01 RX ORDER — ONDANSETRON 2 MG/ML
4 INJECTION INTRAMUSCULAR; INTRAVENOUS
Status: DISCONTINUED | OUTPATIENT
Start: 2018-11-01 | End: 2018-11-08 | Stop reason: HOSPADM

## 2018-11-01 RX ORDER — ONDANSETRON 4 MG/1
4 TABLET, ORALLY DISINTEGRATING ORAL
Status: COMPLETED | OUTPATIENT
Start: 2018-11-01 | End: 2018-11-01

## 2018-11-01 RX ORDER — DIAZEPAM 5 MG/1
5 TABLET ORAL
Status: COMPLETED | OUTPATIENT
Start: 2018-11-01 | End: 2018-11-01

## 2018-11-01 RX ORDER — FERROUS SULFATE, DRIED 160(50) MG
1 TABLET, EXTENDED RELEASE ORAL DAILY
Status: DISCONTINUED | OUTPATIENT
Start: 2018-11-02 | End: 2018-11-08 | Stop reason: HOSPADM

## 2018-11-01 RX ORDER — LOPERAMIDE HYDROCHLORIDE 2 MG/1
2 CAPSULE ORAL
Status: DISCONTINUED | OUTPATIENT
Start: 2018-11-01 | End: 2018-11-08 | Stop reason: HOSPADM

## 2018-11-01 RX ORDER — ACETAMINOPHEN 325 MG/1
650 TABLET ORAL
Status: DISCONTINUED | OUTPATIENT
Start: 2018-11-01 | End: 2018-11-05

## 2018-11-01 RX ORDER — AMITRIPTYLINE HYDROCHLORIDE 25 MG/1
25 TABLET, FILM COATED ORAL
Status: DISCONTINUED | OUTPATIENT
Start: 2018-11-01 | End: 2018-11-05

## 2018-11-01 RX ORDER — PRAVASTATIN SODIUM 40 MG/1
80 TABLET ORAL
Status: DISCONTINUED | OUTPATIENT
Start: 2018-11-01 | End: 2018-11-08 | Stop reason: HOSPADM

## 2018-11-01 RX ORDER — SODIUM CHLORIDE 450 MG/100ML
100 INJECTION, SOLUTION INTRAVENOUS CONTINUOUS
Status: DISPENSED | OUTPATIENT
Start: 2018-11-01 | End: 2018-11-02

## 2018-11-01 RX ORDER — MORPHINE SULFATE IN 0.9 % NACL 150MG/30ML
PATIENT CONTROLLED ANALGESIA SYRINGE INTRAVENOUS CONTINUOUS
Status: DISCONTINUED | OUTPATIENT
Start: 2018-11-01 | End: 2018-11-06

## 2018-11-01 RX ORDER — KETOROLAC TROMETHAMINE 30 MG/ML
15 INJECTION, SOLUTION INTRAMUSCULAR; INTRAVENOUS EVERY 6 HOURS
Status: COMPLETED | OUTPATIENT
Start: 2018-11-01 | End: 2018-11-03

## 2018-11-01 RX ORDER — MORPHINE SULFATE 2 MG/ML
2 INJECTION, SOLUTION INTRAMUSCULAR; INTRAVENOUS
Status: DISCONTINUED | OUTPATIENT
Start: 2018-11-01 | End: 2018-11-01

## 2018-11-01 RX ORDER — ENOXAPARIN SODIUM 100 MG/ML
40 INJECTION SUBCUTANEOUS DAILY
Status: DISCONTINUED | OUTPATIENT
Start: 2018-11-02 | End: 2018-11-03

## 2018-11-01 RX ORDER — LANOLIN ALCOHOL/MO/W.PET/CERES
400 CREAM (GRAM) TOPICAL DAILY
Status: DISCONTINUED | OUTPATIENT
Start: 2018-11-02 | End: 2018-11-07

## 2018-11-01 RX ORDER — FENTANYL CITRATE 50 UG/ML
50 INJECTION, SOLUTION INTRAMUSCULAR; INTRAVENOUS
Status: DISCONTINUED | OUTPATIENT
Start: 2018-11-01 | End: 2018-11-01

## 2018-11-01 RX ORDER — LIDOCAINE 4 G/100G
1 PATCH TOPICAL EVERY 24 HOURS
Status: DISCONTINUED | OUTPATIENT
Start: 2018-11-01 | End: 2018-11-05

## 2018-11-01 RX ORDER — THERA TABS 400 MCG
1 TAB ORAL DAILY
Status: DISCONTINUED | OUTPATIENT
Start: 2018-11-02 | End: 2018-11-08 | Stop reason: HOSPADM

## 2018-11-01 RX ORDER — HYDROCHLOROTHIAZIDE 25 MG/1
25 TABLET ORAL DAILY
Status: DISCONTINUED | OUTPATIENT
Start: 2018-11-02 | End: 2018-11-03

## 2018-11-01 RX ORDER — SODIUM CHLORIDE 0.9 % (FLUSH) 0.9 %
5-10 SYRINGE (ML) INJECTION EVERY 8 HOURS
Status: DISCONTINUED | OUTPATIENT
Start: 2018-11-01 | End: 2018-11-08 | Stop reason: HOSPADM

## 2018-11-01 RX ORDER — NALOXONE HYDROCHLORIDE 0.4 MG/ML
0.4 INJECTION, SOLUTION INTRAMUSCULAR; INTRAVENOUS; SUBCUTANEOUS AS NEEDED
Status: DISCONTINUED | OUTPATIENT
Start: 2018-11-01 | End: 2018-11-08 | Stop reason: HOSPADM

## 2018-11-01 RX ORDER — ONDANSETRON 2 MG/ML
4 INJECTION INTRAMUSCULAR; INTRAVENOUS
Status: COMPLETED | OUTPATIENT
Start: 2018-11-01 | End: 2018-11-01

## 2018-11-01 RX ADMIN — ONDANSETRON 4 MG: 2 INJECTION, SOLUTION INTRAMUSCULAR; INTRAVENOUS at 17:51

## 2018-11-01 RX ADMIN — MORPHINE SULFATE 5 MG: 10 INJECTION INTRAVENOUS at 13:41

## 2018-11-01 RX ADMIN — DIAZEPAM 5 MG: 5 TABLET ORAL at 16:34

## 2018-11-01 RX ADMIN — Medication 10 ML: at 22:10

## 2018-11-01 RX ADMIN — Medication: at 22:12

## 2018-11-01 RX ADMIN — MORPHINE SULFATE 2 MG: 2 INJECTION, SOLUTION INTRAMUSCULAR; INTRAVENOUS at 17:51

## 2018-11-01 RX ADMIN — SODIUM CHLORIDE 100 ML/HR: 450 INJECTION, SOLUTION INTRAVENOUS at 22:10

## 2018-11-01 RX ADMIN — Medication 10 ML: at 19:09

## 2018-11-01 RX ADMIN — PRAVASTATIN SODIUM 80 MG: 40 TABLET ORAL at 21:51

## 2018-11-01 RX ADMIN — MORPHINE SULFATE 2 MG: 2 INJECTION, SOLUTION INTRAMUSCULAR; INTRAVENOUS at 19:07

## 2018-11-01 RX ADMIN — AMITRIPTYLINE HYDROCHLORIDE 25 MG: 25 TABLET, FILM COATED ORAL at 21:51

## 2018-11-01 RX ADMIN — KETOROLAC TROMETHAMINE 15 MG: 30 INJECTION, SOLUTION INTRAMUSCULAR at 20:48

## 2018-11-01 RX ADMIN — ONDANSETRON 4 MG: 4 TABLET, ORALLY DISINTEGRATING ORAL at 13:41

## 2018-11-01 RX ADMIN — Medication 10 ML: at 17:55

## 2018-11-01 NOTE — ED PROVIDER NOTES
EMERGENCY DEPARTMENT HISTORY AND PHYSICAL EXAM      Date: 11/1/2018  Patient Name: Stephanie Pompa    History of Presenting Illness     Chief Complaint   Patient presents with    Back Pain     Arrives via [facility] c/o increased lower back pain since being seen on 10/29 (Monday) Pt given Rx but states medication isn't helping Pt sent back by MD for x-ray       History Provided By: Patient    HPI: Stephanie Pompa, 80 y.o. female with PMHx significant for scoliosis, HTN, arthritis, HTN, presents via wheelchair to the ED with cc of worsening lower back pain radiating down left leg x 1.5 weeks. Pt now reports numbness in left leg going up to waist. She states she was evaluated here 3 days ago where she was prescribed prednisone and hydrocodone. Pt endorses taking both with no relief of back pain. She states she saw her PCP at [facility] who prescribed her oxycodone but that did not provide any pain relief either. Pt notes she was recently started on Prolia for osteoporosis. She denies any trauma or fall prior to onset of her pain or since. Pt denies associated saddle numbness, incontinence, or dysuria. PCP: Diallo Burrows MD    Current Facility-Administered Medications   Medication Dose Route Frequency Provider Last Rate Last Dose    diazePAM (VALIUM) tablet 5 mg  5 mg Oral NOW Morro Chance,          Current Outpatient Medications   Medication Sig Dispense Refill    predniSONE (DELTASONE) 20 mg tablet Take 1 Tab by mouth daily as needed. For back pain. With food. 20 Tab 0    RABEprazole (ACIPHEX) 20 mg tablet Take 20 mg by mouth daily.  raNITIdine hcl 300 mg cap Take  by mouth.  lisinopril (PRINIVIL, ZESTRIL) 20 mg tablet TAKE 1 TABLET BY MOUTH TWO  TIMES DAILY 180 Tab 3    gabapentin (NEURONTIN) 800 mg tablet TAKE 1 TAB BY MOUTH FOUR  TIMES DAILY AS NEEDED. 360 Tab 3    hydroCHLOROthiazide (HYDRODIURIL) 25 mg tablet Take 1 Tab by mouth daily.  90 Tab 3    amitriptyline (ELAVIL) 25 mg tablet Take 1 Tab by mouth nightly. 90 Tab 3    Calcium-Cholecalciferol, D3, (CALCIUM 500 + D) 500 mg(1,250mg) -400 unit tab Take 1 Tab by mouth daily. 90 Tab 1    simvastatin (ZOCOR) 40 mg tablet Take 1 Tab by mouth nightly. 90 Tab 3    potassium chloride (KLOR-CON) 10 mEq tablet Take 1 Tab by mouth three (3) times daily. 270 Tab 3    cholecalciferol (VITAMIN D3) 1,000 unit tablet Take 1,000 Units by mouth daily.  magnesium 250 mg tab Take  by mouth.  denosumab (PROLIA) 60 mg/mL injection 1 mL by SubCUTAneous route every 6 months. 1 Syringe 1    multivitamin (ONE A DAY) tablet Take 1 Tab by mouth daily.  loperamide (IMODIUM) 2 mg capsule Take 2 mg by mouth four (4) times daily as needed for Diarrhea. Past History     Past Medical History:  Past Medical History:   Diagnosis Date    Arthritis     Diarrhea 6/6/2016    Foot drop     Gastric ulcer 6/14/2012    GERD (gastroesophageal reflux disease)     High cholesterol     Hypertension     Neuropathy     Ovarian cancer ([facility] [address] 1986    chemo x 9 mos    Scoliosis     Stroke ([facility] [address] 2002    ? stroke with drop feet, per patient CT scans were negative       Past Surgical History:  Past Surgical History:   Procedure Laterality Date    HX ORTHOPAEDIC      back    HX ORTHOPAEDIC      bilateral shoulder replacements    HX ORTHOPAEDIC      left knee replacement    HX LYDIA AND BSO  1986    CT EGD TRANSORAL BIOPSY SINGLE/MULTIPLE  1/26/2012         CT EGD TRANSORAL BIOPSY SINGLE/MULTIPLE  6/14/2012            Family History:  History reviewed. No pertinent family history. Social History:  Social History     Tobacco Use    Smoking status: Never Smoker    Smokeless tobacco: Never Used   Substance Use Topics    Alcohol use: No    Drug use: No       Allergies:  No Known Allergies      Review of Systems   Review of Systems   Constitutional: Negative. Negative for appetite change, chills, fatigue and fever. HENT: Negative.   Negative for congestion, rhinorrhea, sinus pressure and sore throat. Eyes: Negative. Respiratory: Negative. Negative for cough, choking, chest tightness, shortness of breath and wheezing. Cardiovascular: Negative. Negative for chest pain, palpitations and leg swelling. Gastrointestinal: Negative for abdominal pain, constipation, diarrhea, nausea and vomiting. Endocrine: Negative. Genitourinary: Negative. Negative for difficulty urinating, dysuria, enuresis, flank pain and urgency. Musculoskeletal: Positive for back pain (lower) and myalgias (left leg, secondary to back). Skin: Negative. Neurological: Positive for numbness (left leg). Negative for dizziness, speech difficulty, weakness, light-headedness and headaches. Psychiatric/Behavioral: Negative. All other systems reviewed and are negative. Physical Exam   Physical Exam   Constitutional: She is oriented to person, place, and time. She appears well-developed and well-nourished. She appears distressed. Elderly female, appears in pain, increasing grimacing with any small amount of movement    HENT:   Head: Normocephalic and atraumatic. Mouth/Throat: Oropharynx is clear and moist. No oropharyngeal exudate. Eyes: Conjunctivae and EOM are normal. Pupils are equal, round, and reactive to light. Neck: Normal range of motion. Neck supple. No JVD present. No tracheal deviation present. Cardiovascular: Normal rate, regular rhythm, normal heart sounds and intact distal pulses. No murmur heard. Pulmonary/Chest: Effort normal and breath sounds normal. No stridor. No respiratory distress. She has no wheezes. She has no rales. She exhibits no tenderness. Abdominal: Soft. She exhibits no distension. There is no tenderness. There is no rebound and no guarding. Musculoskeletal: Normal range of motion. She exhibits tenderness (L-spine and lumbar paraspinals). She exhibits no edema.    Neurological: She is alert and oriented to person, place, and time. No cranial nerve deficit. No gross motor or sensory deficits    Skin: Skin is warm and dry. She is not diaphoretic. Psychiatric: She has a normal mood and affect. Her behavior is normal.   Nursing note and vitals reviewed. Diagnostic Study Results     Labs -   No results found for this or any previous visit (from the past 12 hour(s)). Radiologic Studies -   CT SPINE LUMB WO CONT   Final Result      XR SPINE LUMB 2 OR 3 V   Final Result      XR CHEST PORT    (Results Pending)     CT Results  (Last 48 hours)               11/01/18 1426  CT SPINE LUMB WO CONT Final result    Impression:  IMPRESSION:    1. No acute process. 2. Fractured thoracolumbar fusion rods, stable. 3. Protruding pedicle screws, stable. 4. Neural foraminal stenoses as described above. Narrative:  CT LUMBAR SPINE WITHOUT CONTRAST:  11/1/2018 2:26 PM       INDICATION: Back pain, unspecified; pain radiating down left leg, prior surgery   . COMPARISON: 11/13/2013. TECHNIQUE: Multislice helical CT of the lumbar spine was performed without   contrast. Sagittal and coronal reformats were generated. CT dose reduction was   achieved through use of a standardized protocol tailored for this examination   and automatic exposure control for dose modulation. FINDINGS:   Extensive thoracolumbar fusion hardware is partially imaged. The rods extend off   the superior field of view. The right chance is fractured, with slight anterior   displacement (602-51). Fracture and right superolateral displacement of the left   fusion chance is visible only on  images (1-1, 1-2). Chance fractures are stable   from 2013. There are L2-S1 pedicle screws bilaterally. The bilateral S1 pedicle screws   protrude through the anterior cortex, with exuberant osteophytosis surrounding   the tip of the left S1 pedicle screw (2-74). The left L5 pedicle screw protrudes   through the anterior cortex as well.  Levoconvexity is centered around L1, with   consequent asymmetric degenerative disc disease T12-L1 on the right. There are   interbody disc grafts L3-S1. The L5-S1 disc graft is fractured (2-65, 263), with   the anterior fragments displaced slightly beyond the anterior cortices of L5 and   S1. A large osteophyte protruding into the presacral space is associated   (602-45). Extensive hardware artifact limits evaluation for canal or neural foraminal   stenoses. There is bilateral neural foraminal stenosis L5-S1, mild left neural   foraminal stenosis L4-L5, and mild right neural foraminal stenosis T12-L1. There are multiple bilateral renal cysts of varying size. Exophytic from the   right posterior interpolar region, a lobulated cyst appears denser than simple   cystic fluid on image 3-29, due to severe photopenia and scatter artifact from   lumbar hardware. However, it measures simple fluid density on image 3-27, and is   stable or only minimally increased from 2013. Tiny bilateral renal calculi are   nonobstructing. There are multiple surgical clips in the prevertebral space and   bilateral iliac regions. .               XR SPINE LUMB 2 OR 3 V (Final result)   Result time 11/01/18 12:25:55   Procedure changed from XR SPINE LUMB MIN 4 V   Final result by Mikhail, Rad Results In (11/01/18 12:24:54)                Initial Result:   Impression:    Impression: Postoperative changes without acute abnormality. Osteopenia. Narrative: Indication: Increasing lower back pain    Three views of the lumbar spine demonstrate scoliosis with thoracolumbar  stabilization rods and pedicle screws. There is no hardware complication. The  bones are osteopenic. No acute fracture. Surgical clips project over the pelvis.                      Medical Decision Making   I am the first provider for this patient.     I reviewed the vital signs, available nursing notes, past medical history, past surgical history, family history and social history. Vital Signs-Reviewed the patient's vital signs. Patient Vitals for the past 12 hrs:   Temp Pulse Resp BP SpO2   11/01/18 1108 98.3 °F (36.8 °C) 86 16 148/75 100 %       Records Reviewed: Nursing Notes and Old Medical Records    Provider Notes (Medical Decision Making):   DDx: hardware failure, sciatica, muscle strain, DJD    ED Course:   Initial assessment performed. The patients presenting problems have been discussed, and they are in agreement with the care plan formulated and outlined with them. I have encouraged them to ask questions as they arise throughout their visit. 3:00 PM  Pt able to ambulate with assistance to the bathroom. 3:40 PM  Pt currently in a comfortable position, states she feels better. Post imaging pt's pain worsened and she was requesting additional meds, pt pain began 2 weeks ago, she was placed on Hydrocodone with no effect and most recently she has been on Prednisone and Oxycodone with no improvement in her pain. Pt is at [employer] in the independent section, an attempt was made with CM assistance to transition pt over to the healthcare unit while getting her pain under better control and possible physical therapy. Message left but no return of call. Family concern that pt is requiring maximal assistance with her ADLs and they have been taking turns staying with pt at night Where she had been able to use walker for short distances such as bathroom, she is now requiring assistance due to pain. Consult Note:  4:11 PM  Margo Stock DO spoke with Severa Dalton, PA-C  Specialty: Ortho  Discussed pt's hx, disposition, and available diagnostic and imaging results. Reviewed care plans. Recommend discuss with Dr. Danny Underwood, and is available if needed. Consult Note:  4:24 PM  Margo Stock DO spoke with Tanya Chavarria NP for Dr. Danny Underwood  Specialty: Ortho  Discussed pt's hx, disposition, and available diagnostic and imaging results.  She will discuss pt with Dr. Danny Underwood once he is out of surgery. Pt can be discharged and he will f/u. Pt last seen by Dr. Angella Horner, 2014. Unclear how much change there has been in pt's hardware and how much of this may be contributing to her pain. Disposition:  Discussed with family and patient concern for her safety secondary to opiate pain relief and Valium for muscle relaxation, pt also not able to perform ADLs independently at this time due to her pain. Concern for fall risk and possible worse injury will discuss with hospitalist for admission. Routine labs and Ua ordered, [facility]    1645 Discussed with Dr. Kita Remy who will see pt in consult for possible admission. PLAN:  Admit for further work-up eval, pain management and PT    Diagnosis     Clinical Impression:   1. Intractable back pain    2. Orthopaedic hardware failure    This note is prepared by Zoey Mathew, acting as a Scribe for Lucero Cano, [address] [place] [address] DO: The scribe's documentation has been prepared under my direction and personally reviewed by me in its entirety. I confirm that the notes above accurately reflects all work, treatment, procedures, and medical decision making performed by me.

## 2018-11-01 NOTE — PROGRESS NOTES
CM consulted re: placement for [facility]. CM called Olivia with [employer] re: bed availability. CM to continue to remain available for support, discharge planning as needed.      Avis Mondragon, MSEN  [facility]  [phone]

## 2018-11-01 NOTE — ED NOTES
11:42 AM 
Laura Tomas PA-C  has evaluated the patient as the [employer] provider. They have reviewed the vital signs and the triage nurse assessment. They have talked with the patient and any available family and advised that the appropriate studies have been ordered to initiate the work up based on the clinical presentation during the assessment. The pt has been advised that they will be accommodated in the Main ED as soon as possible. Pt is here to be evaluated for lower back pain that has been ongoing for the last week. Pt was seen here three days ago previously and has experienced worsening in her pain since. She saw her PCP at [facility] and was referred here for further evaluation, to include an XR.

## 2018-11-01 NOTE — H&P
Hospitalist Admission Note    NAME: Abhishek Boswell   :  10/9/1933   MRN:  [id number]     Date/Time:  2018 7:54 PM    Patient PCP: Viktor Rubio MD  ______________________________________________________________________  Given the patient's current clinical presentation, I have a high level of concern for decompensation if discharged from the emergency department. Complex decision making was performed, which includes reviewing the patient's available past medical records, laboratory results, and x-ray films. My assessment of this patient's clinical condition and my plan of care is as follows. Assessment / Plan:  Acute intractable back pain in setting of scoliosis and arthritis s/p extensive thoracolumbar fusion  with broken hardware, +neuropathy since knee surgery :  Pt has failed steroids, hydrocodone and oxycodone for pain mgmt. Returns to ER with ongoing severe pain which started after xray last Tuesday for GI issues. Spinal surgery with Dr. Tha Dillon  per Pt.  - CT L spine with no acute process. Fractured thoracolumbar fusion rods, stable. Protruding pedicle screws, stable. Neural foraminal stenoses as described above.   - needs morphine PCA for pain mgmt; IV toradol x2 days as well  - will stop steroids at this time as she has seen no benefit in the last week  - ortho spine consulted, note that she may need additional imaging due to incomplete films of back  - PT/OT eval in AM  Hypertension, benign/essential: controlled  - con't home lisinopril, HCTZ  - prn nitrobid  GERD and gastroparesis:  Sees Dr. Mckenna Whelan, back pain started after she moved across table for abd xray 10/23  - con't PPI and H2 blocker in combination as she does at home  - six small meals  Hyperlipidemia: con't statin    Code Status: d/w Pt and her daughters in ER, she wants full code at this time  Surrogate Decision Maker: Melisa Obrien is mPOA  DVT Prophylaxis: lovenox    Baseline: walks with walker and uses wheelchair since her knee surgery 2009. Lives in [place] with her  at [facility]. Subjective:   CHIEF COMPLAINT: back pain, inability to ambulate    HISTORY OF PRESENT ILLNESS:     Jordin Braden is a 80 y.o.  female who presents with above. Pt has been at her baseline until Tuesday of last week. She underwent abdominal xray and as she was scooting across the table, she experienced severe back pain. Pain initially in her mid-lower back, but has radiated up her left back and down her left leg and inside left thigh. She says that she has not had sensation below her L knee since replacement surgery in 2009 due to nerve damage for which she takes neurontin. She has taken prednisone, hydrocortisone and oxycodone this last week, but pain has only been increasing. She says that it was worse today than ever before. She has not been able to ambulate as she usually does. No new incontinence (has some baseline stress urinary incontinence). No fever, cough or URI sx. No CP, SOB or dizziness. No nausea but appetite has been poor. No stool changes. No new edema. We were asked to admit for work up and evaluation of the above problems.      Past Medical History:   Diagnosis Date    Arthritis     Diarrhea 6/6/2016    Foot drop     Gastric ulcer 6/14/2012    Gastroparesis     GERD (gastroesophageal reflux disease)     High cholesterol     Hypertension     Neuropathy     Ovarian cancer ([address] 1986    chemo x 9 mos    Scoliosis     Stroke ([facility] [address] 2002    ? stroke with drop feet, per patient CT scans were negative        Past Surgical History:   Procedure Laterality Date    HX ORTHOPAEDIC      back    HX ORTHOPAEDIC      bilateral shoulder replacements    HX ORTHOPAEDIC      left knee replacement    HX LYDIA AND BSO  1986    [place] EGD TRANSORAL BIOPSY SINGLE/MULTIPLE  1/26/2012         [place] EGD TRANSORAL BIOPSY SINGLE/MULTIPLE  6/14/2012            Social History Tobacco Use    Smoking status: Never Smoker    Smokeless tobacco: Never Used   Substance Use Topics    Alcohol use: No         No Known Allergies     Prior to Admission medications    Medication Sig Start Date End Date Taking? Authorizing Provider   predniSONE (DELTASONE) 20 mg tablet Take 1 Tab by mouth daily as needed. For back pain. With food. 10/29/18  Yes PEGGY Davila   RABEprazole (ACIPHEX) 20 mg tablet Take 20 mg by mouth daily. Yes Provider, Historical   raNITIdine hcl 300 mg cap Take  by mouth. Yes Provider, Historical   lisinopril (PRINIVIL, ZESTRIL) 20 mg tablet TAKE 1 TABLET BY MOUTH TWO  TIMES DAILY 4/23/18  Yes Elizaar Saleh Less, NP   gabapentin (NEURONTIN) 800 mg tablet TAKE 1 TAB BY MOUTH FOUR  TIMES DAILY AS NEEDED. 3/21/18  Yes Larissa Salehle Less, NP   hydroCHLOROthiazide (HYDRODIURIL) 25 mg tablet Take 1 Tab by mouth daily. 2/21/18  Yes Larissa Salehle Less, NP   amitriptyline (ELAVIL) 25 mg tablet Take 1 Tab by mouth nightly. 1/22/18  Yes Reinaldo Salehdelle Less, NP   Calcium-Cholecalciferol, D3, (CALCIUM 500 + D) 500 mg(1,250mg) -400 unit tab Take 1 Tab by mouth daily. 12/29/17  Yes Reinaldo Salehdelle Less, NP   simvastatin (ZOCOR) 40 mg tablet Take 1 Tab by mouth nightly. 11/9/17  Yes Larissa Salehle Less, NP   potassium chloride (KLOR-CON) 10 mEq tablet Take 1 Tab by mouth three (3) times daily. 10/25/17  Yes Lucia Feng MD   cholecalciferol (VITAMIN D3) 1,000 unit tablet Take 1,000 Units by mouth daily. Yes Provider, Historical   magnesium 250 mg tab Take  by mouth. Provider, Historical   denosumab (PROLIA) 60 mg/mL injection 1 mL by SubCUTAneous route every 6 months. 5/24/18   Reinaldo Salehdelle Less, NP   multivitamin (ONE A DAY) tablet Take 1 Tab by mouth daily. Provider, Historical   loperamide (IMODIUM) 2 mg capsule Take 2 mg by mouth four (4) times daily as needed for Diarrhea.     Provider, Historical       REVIEW OF SYSTEMS:     I am not able to complete the review of systems because: The patient is intubated and sedated    The patient has altered mental status due to his acute medical problems    The patient has baseline aphasia from prior stroke(s)    The patient has baseline dementia and is not reliable historian    The patient is in acute medical distress and unable to provide information           Total of 12 systems reviewed as follows:       POSITIVE= underlined text  Negative = text not underlined  General:  fever, chills, sweats, generalized weakness, weight loss/gain,      loss of appetite   Eyes:    blurred vision, eye pain, loss of vision, double vision  ENT:    rhinorrhea, pharyngitis   Respiratory:   cough, sputum production, SOB, LEVINE, wheezing, pleuritic pain   Cardiology:   chest pain, palpitations, orthopnea, PND, edema, syncope   Gastrointestinal:  abdominal pain , N/V, diarrhea, dysphagia, constipation, bleeding   Genitourinary:  frequency, urgency, dysuria, hematuria, incontinence   Muskuloskeletal :  arthralgia, myalgia, back pain  Hematology:  easy bruising, nose or gum bleeding, lymphadenopathy   Dermatological: rash, ulceration, pruritis, color change / jaundice  Endocrine:   hot flashes or polydipsia   Neurological:  headache, dizziness, confusion, focal weakness, paresthesia,     Speech difficulties, memory loss, gait difficulty  Psychological: Feelings of anxiety, depression, agitation    Objective:   VITALS:    Visit Vitals  /67   Pulse 70   Temp 98.2 °F (36.8 °C)   Resp 16   Wt 71.7 kg (158 lb)   SpO2 95%   BMI 30.86 kg/m²       PHYSICAL EXAM:    General:    Alert, cooperative, no distress, appears stated age. HEENT: Atraumatic  Neck:  Supple, symmetrical,  thyroid: non tender  Lungs:   Per ER MD, clear to auscultation  Heart:   Per ER MD, Normal rate, regular rhythm, normal heart sounds and intact distal pulses. No murmur heard. Abdomen:   Not distended. Extremities: No cyanosis. No observed edema. Skin:     Not pale.   No apparent rashes. Psych:  Good insight. Not depressed. Not anxious or agitated. Neurologic: EOMs intact. No facial asymmetry. No aphasia or slurred speech. Alert and oriented X 4.     _______________________________________________________________________  Care Plan discussed with:    Comments   Patient x    Family  x 2 dtrs at bedside   RN x    Care Manager                    Consultant:      _______________________________________________________________________  Expected  Disposition:   Home with Family x   HH/PT/OT/RN x   SNF/LTC    PHILIPPE    ________________________________________________________________________  TOTAL TIME:  39 Minutes    Critical Care Provided     Minutes non procedure based      Comments    x Reviewed previous records   >50% of visit spent in counseling and coordination of care x Discussion with patient and/or family and questions answered       ________________________________________________________________________  Signed: Andre Bhatti MD    Procedures: see electronic medical records for all procedures/Xrays and details which were not copied into this note but were reviewed prior to creation of Plan. LAB DATA REVIEWED:    Recent Results (from the past 24 hour(s))   CBC WITH AUTOMATED DIFF    Collection Time: 11/01/18  5:30 PM   Result Value Ref Range    WBC 8.3 3.6 - 11.0 K/uL    RBC 4.17 3.80 - 5.20 M/uL    HGB 11.0 (L) 11.5 - 16.0 g/dL    HCT 36.3 35.0 - 47.0 %    MCV 87.1 80.0 - 99.0 FL    MCH 26.4 26.0 - 34.0 PG    MCHC 30.3 30.0 - 36.5 g/dL    RDW 14.5 11.5 - 14.5 %    PLATELET 939 690 - 233 K/uL    MPV 10.5 8.9 - 12.9 FL    NRBC 0.0 0  WBC    ABSOLUTE NRBC 0.00 0.00 - 0.01 K/uL    NEUTROPHILS 84 (H) 32 - 75 %    LYMPHOCYTES 11 (L) 12 - 49 %    MONOCYTES 4 (L) 5 - 13 %    EOSINOPHILS 0 0 - 7 %    BASOPHILS 0 0 - 1 %    IMMATURE GRANULOCYTES 1 (H) 0.0 - 0.5 %    ABS. NEUTROPHILS 6.9 1.8 - 8.0 K/UL    ABS. LYMPHOCYTES 0.9 0.8 - 3.5 K/UL    ABS. MONOCYTES 0.3 0.0 - 1.0 K/UL    ABS. EOSINOPHILS 0.0 0.0 - 0.4 K/UL    ABS. BASOPHILS 0.0 0.0 - 0.1 K/UL    ABS. IMM. GRANS. 0.1 (H) 0.00 - 0.04 K/UL    DF AUTOMATED     METABOLIC PANEL, COMPREHENSIVE    Collection Time: 11/01/18  5:30 PM   Result Value Ref Range    Sodium 135 (L) 136 - 145 mmol/L    Potassium 4.8 3.5 - 5.1 mmol/L    Chloride 104 97 - 108 mmol/L    CO2 26 21 - 32 mmol/L    Anion gap 5 5 - 15 mmol/L    Glucose 126 (H) 65 - 100 mg/dL    BUN 26 (H) 6 - 20 MG/DL    Creatinine 1.04 (H) 0.55 - 1.02 MG/DL    BUN/Creatinine ratio 25 (H) 12 - 20      GFR est AA >60 >60 ml/min/1.73m2    GFR est non-AA 50 (L) >60 ml/min/1.73m2    Calcium 9.7 8.5 - 10.1 MG/DL    Bilirubin, total 0.3 0.2 - 1.0 MG/DL    ALT (SGPT) 23 12 - 78 U/L    AST (SGOT) 23 15 - 37 U/L    Alk.  phosphatase 53 45 - 117 U/L    Protein, total 8.0 6.4 - 8.2 g/dL    Albumin 4.0 3.5 - 5.0 g/dL    Globulin 4.0 2.0 - 4.0 g/dL    A-G Ratio 1.0 (L) 1.1 - 2.2

## 2018-11-01 NOTE — ED NOTES
Bedside and Verbal shift change report received from Roselia Lopez RN (offgoing nurse) given to Marie David RN (oncoming nurse). Report included the following information SBAR, Kardex, ED Summary, MAR, Recent Results and Med Rec Status. Pt will be admitted for pain management. Pt recently received dose of IV morphine.

## 2018-11-02 LAB
ANION GAP SERPL CALC-SCNC: 6 MMOL/L (ref 5–15)
BUN SERPL-MCNC: 30 MG/DL (ref 6–20)
BUN/CREAT SERPL: 27 (ref 12–20)
CALCIUM SERPL-MCNC: 8.9 MG/DL (ref 8.5–10.1)
CHLORIDE SERPL-SCNC: 106 MMOL/L (ref 97–108)
CO2 SERPL-SCNC: 24 MMOL/L (ref 21–32)
CREAT SERPL-MCNC: 1.11 MG/DL (ref 0.55–1.02)
ERYTHROCYTE [DISTWIDTH] IN BLOOD BY AUTOMATED COUNT: 14.5 % (ref 11.5–14.5)
GLUCOSE SERPL-MCNC: 94 MG/DL (ref 65–100)
HCT VFR BLD AUTO: 31.2 % (ref 35–47)
HGB BLD-MCNC: 9.2 G/DL (ref 11.5–16)
MCH RBC QN AUTO: 26.4 PG (ref 26–34)
MCHC RBC AUTO-ENTMCNC: 29.5 G/DL (ref 30–36.5)
MCV RBC AUTO: 89.7 FL (ref 80–99)
NRBC # BLD: 0 K/UL (ref 0–0.01)
NRBC BLD-RTO: 0 PER 100 WBC
PLATELET # BLD AUTO: 127 K/UL (ref 150–400)
PMV BLD AUTO: 11.1 FL (ref 8.9–12.9)
POTASSIUM SERPL-SCNC: 4.7 MMOL/L (ref 3.5–5.1)
RBC # BLD AUTO: 3.48 M/UL (ref 3.8–5.2)
SODIUM SERPL-SCNC: 136 MMOL/L (ref 136–145)
WBC # BLD AUTO: 8 K/UL (ref 3.6–11)

## 2018-11-02 PROCEDURE — 80048 BASIC METABOLIC PNL TOTAL CA: CPT | Performed by: INTERNAL MEDICINE

## 2018-11-02 PROCEDURE — 74011250636 HC RX REV CODE- 250/636: Performed by: INTERNAL MEDICINE

## 2018-11-02 PROCEDURE — 74011000250 HC RX REV CODE- 250: Performed by: EMERGENCY MEDICINE

## 2018-11-02 PROCEDURE — 74011250637 HC RX REV CODE- 250/637: Performed by: INTERNAL MEDICINE

## 2018-11-02 PROCEDURE — 36415 COLL VENOUS BLD VENIPUNCTURE: CPT | Performed by: INTERNAL MEDICINE

## 2018-11-02 PROCEDURE — 97165 OT EVAL LOW COMPLEX 30 MIN: CPT

## 2018-11-02 PROCEDURE — 97162 PT EVAL MOD COMPLEX 30 MIN: CPT

## 2018-11-02 PROCEDURE — G8979 MOBILITY GOAL STATUS: HCPCS

## 2018-11-02 PROCEDURE — 85027 COMPLETE CBC AUTOMATED: CPT | Performed by: INTERNAL MEDICINE

## 2018-11-02 PROCEDURE — G8988 SELF CARE GOAL STATUS: HCPCS

## 2018-11-02 PROCEDURE — 97116 GAIT TRAINING THERAPY: CPT

## 2018-11-02 PROCEDURE — G8978 MOBILITY CURRENT STATUS: HCPCS

## 2018-11-02 PROCEDURE — 97535 SELF CARE MNGMENT TRAINING: CPT

## 2018-11-02 PROCEDURE — G8987 SELF CARE CURRENT STATUS: HCPCS

## 2018-11-02 PROCEDURE — 65270000029 HC RM PRIVATE

## 2018-11-02 RX ADMIN — Medication 10 ML: at 14:11

## 2018-11-02 RX ADMIN — FAMOTIDINE 20 MG: 20 TABLET ORAL at 11:07

## 2018-11-02 RX ADMIN — PRAVASTATIN SODIUM 80 MG: 40 TABLET ORAL at 23:54

## 2018-11-02 RX ADMIN — KETOROLAC TROMETHAMINE 15 MG: 30 INJECTION, SOLUTION INTRAMUSCULAR at 23:55

## 2018-11-02 RX ADMIN — KETOROLAC TROMETHAMINE 15 MG: 30 INJECTION, SOLUTION INTRAMUSCULAR at 11:06

## 2018-11-02 RX ADMIN — KETOROLAC TROMETHAMINE 15 MG: 30 INJECTION, SOLUTION INTRAMUSCULAR at 17:45

## 2018-11-02 RX ADMIN — GABAPENTIN 800 MG: 100 CAPSULE ORAL at 23:54

## 2018-11-02 RX ADMIN — KETOROLAC TROMETHAMINE 15 MG: 30 INJECTION, SOLUTION INTRAMUSCULAR at 02:57

## 2018-11-02 RX ADMIN — Medication 10 ML: at 06:59

## 2018-11-02 RX ADMIN — ENOXAPARIN SODIUM 40 MG: 40 INJECTION, SOLUTION INTRAVENOUS; SUBCUTANEOUS at 11:08

## 2018-11-02 RX ADMIN — FAMOTIDINE 20 MG: 20 TABLET ORAL at 17:43

## 2018-11-02 RX ADMIN — ONDANSETRON 4 MG: 2 INJECTION INTRAMUSCULAR; INTRAVENOUS at 18:46

## 2018-11-02 RX ADMIN — ONDANSETRON 4 MG: 2 INJECTION INTRAMUSCULAR; INTRAVENOUS at 23:55

## 2018-11-02 RX ADMIN — VITAMIN D, TAB 1000IU (100/BT) 1000 UNITS: 25 TAB at 11:08

## 2018-11-02 RX ADMIN — THERA TABS 1 TABLET: TAB at 11:07

## 2018-11-02 RX ADMIN — GABAPENTIN 800 MG: 100 CAPSULE ORAL at 14:10

## 2018-11-02 RX ADMIN — Medication 400 MG: at 11:07

## 2018-11-02 RX ADMIN — GABAPENTIN 800 MG: 100 CAPSULE ORAL at 17:43

## 2018-11-02 RX ADMIN — GABAPENTIN 800 MG: 100 CAPSULE ORAL at 11:07

## 2018-11-02 RX ADMIN — Medication 10 ML: at 23:55

## 2018-11-02 RX ADMIN — KETOROLAC TROMETHAMINE 15 MG: 30 INJECTION, SOLUTION INTRAMUSCULAR at 06:59

## 2018-11-02 RX ADMIN — LISINOPRIL 20 MG: 20 TABLET ORAL at 11:06

## 2018-11-02 RX ADMIN — HYDROCHLOROTHIAZIDE 25 MG: 25 TABLET ORAL at 11:06

## 2018-11-02 RX ADMIN — CALCIUM CARBONATE 500 MG (1,250 MG)-VITAMIN D3 200 UNIT TABLET 1 TABLET: at 11:07

## 2018-11-02 RX ADMIN — AMITRIPTYLINE HYDROCHLORIDE 25 MG: 25 TABLET, FILM COATED ORAL at 23:54

## 2018-11-02 RX ADMIN — PANTOPRAZOLE SODIUM 40 MG: 40 TABLET, DELAYED RELEASE ORAL at 07:00

## 2018-11-02 NOTE — ROUTINE PROCESS
Bedside and Verbal shift change report given to Ivan Dumas Dr (oncoming nurse) by Carla Anna (offgoing nurse). Report included the following information SBAR, Kardex, Intake/Output, MAR and Recent Results.

## 2018-11-02 NOTE — ED NOTES
TRANSFER - OUT REPORT:    Verbal report given to Lui Sarbaia RN(name) on Rogelio Villeda  being transferred to [phone] [facility](unit) for routine progression of care       Report consisted of patients Situation, Background, Assessment and   Recommendations(SBAR). Information from the following report(s) SBAR, Kardex, ED Summary, MAR, Recent Results and Med Rec Status was reviewed with the receiving nurse. Lines:   Peripheral IV 11/01/18 Right Arm (Active)   Site Assessment Clean, dry, & intact 11/1/2018  5:50 PM   Phlebitis Assessment 0 11/1/2018  5:50 PM   Infiltration Assessment 0 11/1/2018  5:50 PM   Dressing Status Clean, dry, & intact 11/1/2018  5:50 PM   Dressing Type 4 X 4 11/1/2018  5:50 PM       Peripheral IV 11/01/18 Left Forearm (Active)   Site Assessment Clean, dry, & intact 11/1/2018  6:02 PM   Phlebitis Assessment 0 11/1/2018  6:02 PM   Infiltration Assessment 0 11/1/2018  6:02 PM   Dressing Status Clean, dry, & intact 11/1/2018  6:02 PM   Dressing Type 4 X 4 11/1/2018  6:02 PM        Opportunity for questions and clarification was provided.       Patient transported with:   Monitor  Tech

## 2018-11-02 NOTE — PROGRESS NOTES
Hospitalist Progress Note    NAME: Desirae Carroll   :  10/9/1933   MRN:  [id number]       Assessment / Plan:  H&P done by Dr Lalit Groves reviewed. and seen and examined patient in round this morning. .    Acute intractable back pain POA   Hx of scoliosis  s/p extensive thoracolumbar fusion    neuropathy and foor drops after the back surgery as per patient. CT L spine with no acute process. Fractured thoracolumbar fusion rods, stable. Protruding pedicle screws, stable. Neural foraminal stenoses as described above. Pain controlled very well on morphine PCA   ortho spine consulted awaiting recommendations   PT/OT pending   Hypertension, benign/essential: controlled  con't home lisinopril, HCTZ  prn nitrobid  GERD and gastroparesis:   Sees Dr. Vishal Andrews  con't PPI and H2 blocker in combination. six small meals  Hyperlipidemia: con't statin  Body mass index is 30.86 kg/m².: 30.0 - 39.9 Obese  Code status: Full  Prophylaxis: Lovenox  Recommended Disposition: SAH/Rehab     Subjective:     Chief Complaint / Reason for Physician Visit  \"my back pain is much better now\". Discussed with RN events overnight. Review of Systems:  Symptom Y/N Comments  Symptom Y/N Comments   Fever/Chills n   Chest Pain n    Poor Appetite    Edema     Cough    Abdominal Pain n    Sputum    Joint Pain y Low back pain , now okay    SOB/LEVINE n   Pruritis/Rash     Nausea/vomit    Tolerating PT/OT y    Diarrhea n   Tolerating Diet     Constipation n   Other       Could NOT obtain due to:      Objective:     VITALS:   Last 24hrs VS reviewed since prior progress note.  Most recent are:  Patient Vitals for the past 24 hrs:   Temp Pulse Resp BP SpO2   18 1545 97.9 °F (36.6 °C) 70 16 95/51 94 %   18 1250 98.7 °F (37.1 °C) 69 18 117/60 95 %   18 0740 98 °F (36.7 °C) 66 18 134/63 93 %   18 0406 97.7 °F (36.5 °C) 63 16 117/57 91 %   18 0013 97.6 °F (36.4 °C) 67 20 120/59 95 %   18 2154 98.4 °F (36.9 °C) 73 17 163/76 95 % 11/01/18 2015 98.3 °F (36.8 °C) 70 16 (!) 137/115 96 %   11/01/18 1915 -- 70 16 131/67 95 %   11/01/18 1845 -- -- -- 121/60 98 %   11/01/18 1819 98.2 °F (36.8 °C) -- -- -- --   11/01/18 1804 -- -- -- -- 95 %   11/01/18 1803 -- -- -- 145/89 --       Intake/Output Summary (Last 24 hours) at 11/2/2018 1649  Last data filed at 11/2/2018 1500  Gross per 24 hour   Intake 870 ml   Output 850 ml   Net 20 ml        PHYSICAL EXAM:  General: WD, WN. Alert, cooperative, no acute distress    EENT:  EOMI. Anicteric sclerae. MMM  Resp:  CTA bilaterally, no wheezing or rales. No accessory muscle use  CV:  Regular  rhythm,  No edema  GI:  Soft, Non distended, Non tender.  +Bowel sounds  Neurologic:  Alert and oriented X 3, normal speech,   Motor 4/5 in lower ext , no sensory abnormality, mild foot drop noted on both foot , old as per patient. No deformity noted at the back. Reviewed most current lab test results and cultures  YES  Reviewed most current radiology test results   YES  Review and summation of old records today    NO  Reviewed patient's current orders and MAR    YES  PMH/ reviewed - no change compared to H&P  ________________________________________________________________________  Care Plan discussed with:    Comments   Patient y    Family      RN y    Care Manager     Consultant                        Multidiciplinary team rounds were held today with , nursing, pharmacist and clinical coordinator. Patient's plan of care was discussed; medications were reviewed and discharge planning was addressed.      ________________________________________________________________________  Total NON critical care TIME:  40 Minutes    Total CRITICAL CARE TIME Spent:   Minutes non procedure based      Comments   >50% of visit spent in counseling and coordination of care     ________________________________________________________________________  Josefa Duncan MD     Procedures: see electronic medical records for all procedures/Xrays and details which were not copied into this note but were reviewed prior to creation of Plan. LABS:  I reviewed today's most current labs and imaging studies. Pertinent labs include:  Recent Labs     11/02/18 0310 11/01/18  1730   WBC 8.0 8.3   HGB 9.2* 11.0*   HCT 31.2* 36.3   * 266     Recent Labs     11/02/18 0310 11/01/18  1730    135*   K 4.7 4.8    104   CO2 24 26   GLU 94 126*   BUN 30* 26*   CREA 1.11* 1.04*   CA 8.9 9.7   ALB  --  4.0   TBILI  --  0.3   SGOT  --  23   ALT  --  23       Signed:  Marion Gonsales MD

## 2018-11-02 NOTE — PROGRESS NOTES
Patient admitted overnight, I have seen and examined the patient this morning. She is here for sever back pain, on PCA morphine and pain controlled well and orthopedic surgery consulted, pending and my full note will follow later in the day.

## 2018-11-02 NOTE — PROGRESS NOTES
Problem: Falls - Risk of  Goal: *Absence of Falls  Document Babs Fall Risk and appropriate interventions in the flowsheet.   Outcome: Progressing Towards Goal  Fall Risk Interventions:  Mobility Interventions: Communicate number of staff needed for ambulation/transfer, Patient to call before getting OOB, Utilize walker, cane, or other assistive device, Assess mobility with egress test         Medication Interventions: Assess postural VS orthostatic hypotension, Evaluate medications/consider consulting pharmacy, Patient to call before getting OOB, Teach patient to arise slowly    Elimination Interventions: Call light in reach, Patient to call for help with toileting needs

## 2018-11-02 NOTE — CONSULTS
ORTHOPAEDIC CONSULT NOTE    Subjective:     Date of Consultation:  November 2, 2018      Markel Su is a 80 y.o. female who is being seen for LBP for 1 week after getting off an XR table for a GI study. Pt reports a hx of spine surgery with Dr. Steffi Sheehan in 2002 that resulted in bilat foot drop and she has been doing well with the exception of an episode of pain that required an LUCY in 12/2013. Noted on CT in 11/2013 the fusion rods were fracture and some movement of the pedicle screws, on CT yesterday the hardware is stable. Pt has had increased in pain which has limited her abilities to walk. Denies new numbness/tingling, incontinence, spasms. Pt has been admitted to medicine for pain management   Patient Active Problem List    Diagnosis Date Noted    Back pain 11/01/2018    Ovarian cancer ([facility] [address] 07/19/2017    Insomnia 07/19/2017    Essential hypertension 07/19/2017    Hypomagnesemia 07/19/2017    Hypokalemia 07/19/2017    Mixed hyperlipidemia 07/19/2017    Thoracogenic scoliosis 07/19/2017    Foot drop, bilateral 07/19/2017    Age-related cataract of both eyes 07/19/2017    Previous back surgery 07/19/2017    H/O total knee replacement 07/19/2017    History of replacement of both shoulder joints 07/19/2017    Neuropathy 07/19/2017    Lewis's esophagus without dysplasia 06/06/2016    Lewis's esophagus 06/06/2016    Diarrhea 06/06/2016    Gastric ulcer 06/14/2012    GERD (gastroesophageal reflux disease) 01/26/2012     History reviewed. No pertinent family history.    Social History     Tobacco Use    Smoking status: Never Smoker    Smokeless tobacco: Never Used   Substance Use Topics    Alcohol use: No     Past Medical History:   Diagnosis Date    Arthritis     Diarrhea 6/6/2016    Foot drop     Gastric ulcer 6/14/2012    Gastroparesis     GERD (gastroesophageal reflux disease)     High cholesterol     Hypertension     Neuropathy     Ovarian cancer ([address] 1986    chemo x 9 mos  Scoliosis     Stroke [facility]) 2002    ? stroke with drop feet, per patient CT scans were negative      Past Surgical History:   Procedure Laterality Date    HX ORTHOPAEDIC      back    HX ORTHOPAEDIC      bilateral shoulder replacements    HX ORTHOPAEDIC      left knee replacement    HX LYDIA AND BSO  1986    [place] EGD TRANSORAL BIOPSY SINGLE/MULTIPLE  1/26/2012         HI EGD TRANSORAL BIOPSY SINGLE/MULTIPLE  6/14/2012           Prior to Admission medications    Medication Sig Start Date End Date Taking? Authorizing Provider   predniSONE (DELTASONE) 20 mg tablet Take 1 Tab by mouth daily as needed. For back pain. With food. 10/29/18  Yes PEGGY Abdalla   RABEprazole (ACIPHEX) 20 mg tablet Take 20 mg by mouth daily. Yes Provider, Historical   raNITIdine hcl 300 mg cap Take  by mouth. Yes Provider, Historical   lisinopril (PRINIVIL, ZESTRIL) 20 mg tablet TAKE 1 TABLET BY MOUTH TWO  TIMES DAILY 4/23/18  Yes Henry Saleh NP   gabapentin (NEURONTIN) 800 mg tablet TAKE 1 TAB BY MOUTH FOUR  TIMES DAILY AS NEEDED. 3/21/18  Yes Henry Saleh NP   hydroCHLOROthiazide (HYDRODIURIL) 25 mg tablet Take 1 Tab by mouth daily. 2/21/18  Yes Henry Saleh NP   amitriptyline (ELAVIL) 25 mg tablet Take 1 Tab by mouth nightly. 1/22/18  Yes Henry Saleh NP   Calcium-Cholecalciferol, D3, (CALCIUM 500 + D) 500 mg(1,250mg) -400 unit tab Take 1 Tab by mouth daily. 12/29/17  Yes Henry Saleh NP   simvastatin (ZOCOR) 40 mg tablet Take 1 Tab by mouth nightly. 11/9/17  Yes Henry Saleh NP   potassium chloride (KLOR-CON) 10 mEq tablet Take 1 Tab by mouth three (3) times daily. 10/25/17  Yes Christi Garcia MD   cholecalciferol (VITAMIN D3) 1,000 unit tablet Take 1,000 Units by mouth daily. Yes Provider, Historical   magnesium 250 mg tab Take  by mouth.     Provider, Historical   denosumab (PROLIA) 60 mg/mL injection 1 mL by SubCUTAneous route every 6 months. 5/24/18   Henry Saleh, NP   multivitamin (ONE A DAY) tablet Take 1 Tab by mouth daily. Provider, Historical   loperamide (IMODIUM) 2 mg capsule Take 2 mg by mouth four (4) times daily as needed for Diarrhea. Provider, Historical     Current Facility-Administered Medications   Medication Dose Route Frequency    lidocaine 4 % patch 1 Patch  1 Patch TransDERmal Q24H    morphine  mg / 30 mL   IntraVENous CONTINUOUS    amitriptyline (ELAVIL) tablet 25 mg  25 mg Oral QHS    calcium-vitamin D (OS-GRUPO) 500 mg-200 unit tablet  1 Tab Oral DAILY    cholecalciferol (VITAMIN D3) tablet 1,000 Units  1,000 Units Oral DAILY    gabapentin (NEURONTIN) capsule 800 mg  800 mg Oral QID    hydroCHLOROthiazide (HYDRODIURIL) tablet 25 mg  25 mg Oral DAILY    lisinopril (PRINIVIL, ZESTRIL) tablet 20 mg  20 mg Oral DAILY    loperamide (IMODIUM) capsule 2 mg  2 mg Oral QID PRN    magnesium oxide (MAG-OX) tablet 400 mg  400 mg Oral DAILY    therapeutic multivitamin (THERAGRAN) tablet 1 Tab  1 Tab Oral DAILY    sodium chloride (NS) flush 5-10 mL  5-10 mL IntraVENous Q8H    sodium chloride (NS) flush 5-10 mL  5-10 mL IntraVENous PRN    acetaminophen (TYLENOL) tablet 650 mg  650 mg Oral Q4H PRN    naloxone (NARCAN) injection 0.4 mg  0.4 mg IntraVENous PRN    ondansetron (ZOFRAN) injection 4 mg  4 mg IntraVENous Q4H PRN    enoxaparin (LOVENOX) injection 40 mg  40 mg SubCUTAneous DAILY    nitroglycerin (NITROBID) 2 % ointment 1 Inch  1 Inch Topical Q6H PRN    ketorolac (TORADOL) injection 15 mg  15 mg IntraVENous Q6H    pravastatin (PRAVACHOL) tablet 80 mg  80 mg Oral QHS    pantoprazole (PROTONIX) tablet 40 mg  40 mg Oral ACB    famotidine (PEPCID) tablet 20 mg  20 mg Oral BID      No Known Allergies     Review of Systems:  A comprehensive review of systems was negative except for that written in the HPI.     Mental Status: no dementia    Objective:     Patient Vitals for the past 8 hrs:   BP Temp Pulse Resp SpO2   11/02/18 1545 95/51 97.9 °F (36.6 °C) 70 16 94 %   18 1250 117/60 98.7 °F (37.1 °C) 69 18 95 %     Temp (24hrs), Av.1 °F (36.7 °C), Min:97.6 °F (36.4 °C), Max:98.7 °F (37.1 °C)      Gen: Well-developed,  in no acute distress    Musc: Bilat foot drop with AFOs in place, +TTP of L spine, sensation intact starting mid shin, + pulses, + good bed mobility noted without limiting pain   Skin: No skin breakdown noted. Skin warm, pink, dry  Neuro: Cranial nerves are grossly intact, no focal motor weakness, follows commands appropriately   Psych: Good insight, oriented to person, place and time, alert    Imaging Review: all current and past studies reviewed     Labs:   Recent Results (from the past 24 hour(s))   CBC WITH AUTOMATED DIFF    Collection Time: 18  5:30 PM   Result Value Ref Range    WBC 8.3 3.6 - 11.0 K/uL    RBC 4.17 3.80 - 5.20 M/uL    HGB 11.0 (L) 11.5 - 16.0 g/dL    HCT 36.3 35.0 - 47.0 %    MCV 87.1 80.0 - 99.0 FL    MCH 26.4 26.0 - 34.0 PG    MCHC 30.3 30.0 - 36.5 g/dL    RDW 14.5 11.5 - 14.5 %    PLATELET 620 596 - 109 K/uL    MPV 10.5 8.9 - 12.9 FL    NRBC 0.0 0  WBC    ABSOLUTE NRBC 0.00 0.00 - 0.01 K/uL    NEUTROPHILS 84 (H) 32 - 75 %    LYMPHOCYTES 11 (L) 12 - 49 %    MONOCYTES 4 (L) 5 - 13 %    EOSINOPHILS 0 0 - 7 %    BASOPHILS 0 0 - 1 %    IMMATURE GRANULOCYTES 1 (H) 0.0 - 0.5 %    ABS. NEUTROPHILS 6.9 1.8 - 8.0 K/UL    ABS. LYMPHOCYTES 0.9 0.8 - 3.5 K/UL    ABS. MONOCYTES 0.3 0.0 - 1.0 K/UL    ABS. EOSINOPHILS 0.0 0.0 - 0.4 K/UL    ABS. BASOPHILS 0.0 0.0 - 0.1 K/UL    ABS. IMM.  GRANS. 0.1 (H) 0.00 - 0.04 K/UL    DF AUTOMATED     METABOLIC PANEL, COMPREHENSIVE    Collection Time: 18  5:30 PM   Result Value Ref Range    Sodium 135 (L) 136 - 145 mmol/L    Potassium 4.8 3.5 - 5.1 mmol/L    Chloride 104 97 - 108 mmol/L    CO2 26 21 - 32 mmol/L    Anion gap 5 5 - 15 mmol/L    Glucose 126 (H) 65 - 100 mg/dL    BUN 26 (H) 6 - 20 MG/DL    Creatinine 1.04 (H) 0.55 - 1.02 MG/DL    BUN/Creatinine ratio 25 (H) 12 - 20 GFR est AA >60 >60 ml/min/1.73m2    GFR est non-AA 50 (L) >60 ml/min/1.73m2    Calcium 9.7 8.5 - 10.1 MG/DL    Bilirubin, total 0.3 0.2 - 1.0 MG/DL    ALT (SGPT) 23 12 - 78 U/L    AST (SGOT) 23 15 - 37 U/L    Alk.  phosphatase 53 45 - 117 U/L    Protein, total 8.0 6.4 - 8.2 g/dL    Albumin 4.0 3.5 - 5.0 g/dL    Globulin 4.0 2.0 - 4.0 g/dL    A-G Ratio 1.0 (L) 1.1 - 2.2     URINALYSIS W/ REFLEX CULTURE    Collection Time: 11/01/18  8:41 PM   Result Value Ref Range    Color YELLOW/STRAW      Appearance CLEAR CLEAR      Specific gravity 1.015 1.003 - 1.030      pH (UA) 6.5 5.0 - 8.0      Protein NEGATIVE  NEG mg/dL    Glucose NEGATIVE  NEG mg/dL    Ketone NEGATIVE  NEG mg/dL    Bilirubin NEGATIVE  NEG      Blood NEGATIVE  NEG      Urobilinogen 0.2 0.2 - 1.0 EU/dL    Nitrites NEGATIVE  NEG      Leukocyte Esterase SMALL (A) NEG      WBC 5-10 0 - 4 /hpf    RBC 0-5 0 - 5 /hpf    Epithelial cells FEW FEW /lpf    Bacteria NEGATIVE  NEG /hpf    UA:UC IF INDICATED URINE CULTURE ORDERED (A) CNI      Hyaline cast 0-2 0 - 5 /lpf   CBC W/O DIFF    Collection Time: 11/02/18  3:10 AM   Result Value Ref Range    WBC 8.0 3.6 - 11.0 K/uL    RBC 3.48 (L) 3.80 - 5.20 M/uL    HGB 9.2 (L) 11.5 - 16.0 g/dL    HCT 31.2 (L) 35.0 - 47.0 %    MCV 89.7 80.0 - 99.0 FL    MCH 26.4 26.0 - 34.0 PG    MCHC 29.5 (L) 30.0 - 36.5 g/dL    RDW 14.5 11.5 - 14.5 %    PLATELET 540 (L) 197 - 400 K/uL    MPV 11.1 8.9 - 12.9 FL    NRBC 0.0 0  WBC    ABSOLUTE NRBC 0.00 0.00 - 4.92 K/uL   METABOLIC PANEL, BASIC    Collection Time: 11/02/18  3:10 AM   Result Value Ref Range    Sodium 136 136 - 145 mmol/L    Potassium 4.7 3.5 - 5.1 mmol/L    Chloride 106 97 - 108 mmol/L    CO2 24 21 - 32 mmol/L    Anion gap 6 5 - 15 mmol/L    Glucose 94 65 - 100 mg/dL    BUN 30 (H) 6 - 20 MG/DL    Creatinine 1.11 (H) 0.55 - 1.02 MG/DL    BUN/Creatinine ratio 27 (H) 12 - 20      GFR est AA 57 (L) >60 ml/min/1.73m2    GFR est non-AA 47 (L) >60 ml/min/1.73m2    Calcium 8.9 8.5 - 10.1 MG/DL         Impression:     Patient Active Problem List    Diagnosis Date Noted    Back pain 11/01/2018    Ovarian cancer ([facility] [address] 07/19/2017    Insomnia 07/19/2017    Essential hypertension 07/19/2017    Hypomagnesemia 07/19/2017    Hypokalemia 07/19/2017    Mixed hyperlipidemia 07/19/2017    Thoracogenic scoliosis 07/19/2017    Foot drop, bilateral 07/19/2017    Age-related cataract of both eyes 07/19/2017    Previous back surgery 07/19/2017    H/O total knee replacement 07/19/2017    History of replacement of both shoulder joints 07/19/2017    Neuropathy 07/19/2017    Lewis's esophagus without dysplasia 06/06/2016    Lewis's esophagus 06/06/2016    Diarrhea 06/06/2016    Gastric ulcer 06/14/2012    GERD (gastroesophageal reflux disease) 01/26/2012     Active Problems:    Back pain (11/1/2018)        Plan:   -  Pt is stable orthopaedically  -  Pain management as per medicine   -  After review of the chart unclear of who the ED attending spoke iwht in Dr. Maryana Avalos office as he dose not have an NP named Christina Reyes. I have spoken with pt an family and they would like to switch to Dr. Marques Art as pt is in [address] now and they want to keep everything at [address] Marques Art to see in AM and discuss any further recommendations       Dr. Marques Art aware and agrees with plan as above.         Cem Gracia, NP  Orthopedic Nurse Practitioner   [place]

## 2018-11-02 NOTE — PROGRESS NOTES
Occupational Therapy Goals  Initiated 11/2/2018  1. Patient will perform upper body dressing and lower body dressing with independence within 7 day(s). 2. Patient will perform bathing with independence within 7 day(s). 3. Patient will gather materials for ADL task with RW and independence within 7 day(s). 4. Patient will perform toilet transfers in bathroom with RW with independence within 7 day(s). 5. Patient will perform all aspects of toileting with independence within 7 day(s). Occupational Therapy EVALUATION  Patient: Dayne Tavarez (84 y.o. female)  Date: 11/2/2018  Primary Diagnosis: Back pain       Precautions:   Fall    ASSESSMENT :  Based on the objective data described below, the patient presents with increased back pain over past week, impacting pt's functional status for mobility and ADL tasks. Pt with hx of back sx 2002, knee sx 2009. Lumbar CT on admission revealed 1. No acute process. 2. Fractured thoracolumbar fusion rods, stable. 3. Protruding pedicle screws, stable. \" Pt received supine with Morphine PCA. Pt was independent for bed mobility, donned B shoes and AFOs 2* foot drop with min A while seated EOB. Pt demonstrated cross leg as well as forward flexion to adjust shoes over braces and reported using long handled shoe horn, sock aid and reacher as needed at home. Pt was supervision for all transfers, toileting tasks and ADLs at sink with her personal RW (4 wheeled). With activity, pt c/o slight increased pain, 5/10 with pt using PCA prior and post activity. Currently she is performing ADLs at supervision to min A level. Expect she is likely close to her ADL baseline but would benefit from discharging to SNF rehab at [facility]  given her increased pain and need for medication adjustments. Patient will benefit from skilled intervention to address the above impairments.   Patients rehabilitation potential is considered to be Good  Factors which may influence rehabilitation potential include:   []             None noted  []             Mental ability/status  []             Medical condition  []             Home/family situation and support systems  []             Safety awareness  [x]             Pain tolerance/management  []             Other:      PLAN :  Recommendations and Planned Interventions:  [x]               Self Care Training                  [x]        Therapeutic Activities  [x]               Functional Mobility Training    []        Cognitive Retraining  [x]               Therapeutic Exercises           [x]        Endurance Activities  [x]               Balance Training                   []        Neuromuscular Re-Education  []               Visual/Perceptual Training     [x]   Home Safety Training  [x]               Patient Education                 [x]        Family Training/Education  []               Other (comment):    Frequency/Duration: Patient will be followed by occupational therapy 3 times a week to address goals. Discharge Recommendations: Skilled Nursing Facility  Further Equipment Recommendations for Discharge: none     SUBJECTIVE:   Patient stated Daiana Ruiz was hoping to go to the healthcare section until I can get a little stronger.     OBJECTIVE DATA SUMMARY:   HISTORY:   Past Medical History:   Diagnosis Date    Arthritis     Diarrhea 6/6/2016    Foot drop     Gastric ulcer 6/14/2012    Gastroparesis     GERD (gastroesophageal reflux disease)     High cholesterol     Hypertension     Neuropathy     Ovarian cancer ([facility] [address] 1986    chemo x 9 mos    Scoliosis     Stroke ([facility] [address] 2002    ? stroke with drop feet, per patient CT scans were negative     Past Surgical History:   Procedure Laterality Date    HX ORTHOPAEDIC      back    HX ORTHOPAEDIC      bilateral shoulder replacements    HX ORTHOPAEDIC      left knee replacement    HX LYDIA AND BSO  1986    [place] EGD TRANSORAL BIOPSY SINGLE/MULTIPLE  1/26/2012         [place] EGD TRANSORAL BIOPSY SINGLE/MULTIPLE 6/14/2012            Prior Level of Function/Environment/Context: lives in long term at [facility], uses 4 wheeled RW but due to increased back pain in last week, relying more on her w/c. Pt is able to ambulate to dining mcgee with her RW. She is mod I for ADLs with occasional use of adaptive dressing aides. Occupations in which the patient is/was successful, what are the barriers preventing that success:   Performance Patterns (routines, roles, habits, and rituals):   Personal Interests and/or values:   Expanded or extensive additional review of patient history: B foot drop, back sx 2002, knee sx 2009. Hx of impaired sensation in luis area from back sx 2002- denied urinary incontinence    Home Situation  Home Environment: [address] Name: [facility]  # Steps to Enter: 0  One/Two Story Residence: One story  Living Alone: No  Support Systems: Child(oracio), Home care staff  Patient Expects to be Discharged to[de-identified] Rehabilitation facility  Current DME Used/Available at Home: Walker, rolling, Grab bars, Adaptive dressing aides, Raised toilet seat, Wheelchair  Tub or Shower Type: Shower    Hand dominance: Right    EXAMINATION OF PERFORMANCE DEFICITS:  Cognitive/Behavioral Status:  Neurologic State: Alert  Orientation Level: Oriented X4  Cognition: Follows commands  Perception: Appears intact  Perseveration: No perseveration noted  Safety/Judgement: Awareness of environment; Fall prevention;Home safety; Insight into deficits    Skin: intact    Edema: none noted    Hearing:   Auditory  Auditory Impairment: Hard of hearing, bilateral    Vision/Perceptual:    Tracking: Able to track stimulus in all quadrants w/o difficulty                           Corrective Lenses: Glasses    Range of Motion:    AROM: Generally decreased, functional                         Strength:    Strength: Generally decreased, functional                Coordination:  Coordination: Within functional limits  Fine Motor Skills-Upper: Left Intact; Right Intact(arthritis in B dip joints 2-3 fingers)    Gross Motor Skills-Upper: Left Intact; Right Intact    Tone & Sensation:       Sensation: Impaired(luis area, back of L knee, B feet 2* neuropathy)                      Balance:  Sitting: Intact  Standing: Intact; With support    Functional Mobility and Transfers for ADLs:  Bed Mobility:  Supine to Sit: Independent  Scooting: Independent    Transfers:  Sit to Stand: Supervision  Stand to Sit: Supervision  Bed to Chair: Supervision  Bathroom Mobility: Supervision/set up  Toilet Transfer : Supervision    ADL Assessment:  Feeding: Independent    Oral Facial Hygiene/Grooming: Supervision    Bathing: Stand-by assistance    Upper Body Dressing: Independent    Lower Body Dressing: Minimum assistance(don B shoes and AFOs, adjust underwear in standing)    Toileting: Stand by assistance                ADL Intervention and task modifications:    Patient instructed and indicated understanding the benefits of maintaining activity tolerance, functional mobility, and independence with self care tasks during acute stay to ensure safe return home and to baseline. Encouraged patient to increase frequency and duration OOB, be out of bed for all meals, perform daily ADLs (as approved by RN/MD regarding bathing etc), and performing functional mobility to/from bathroom. Cognitive Retraining  Safety/Judgement: Awareness of environment; Fall prevention;Home safety; Insight into deficits      Functional Measure:  Barthel Index:    Bathin  Bladder: 10  Bowels: 10  Groomin  Dressin  Feeding: 10  Mobility: 15  Stairs: 5  Toilet Use: 10  Transfer (Bed to Chair and Back): 15  Total: 85       Barthel and G-code impairment scale:  Percentage of impairment CH  0% CI  1-19% CJ  20-39% CK  40-59% CL  60-79% CM  80-99% CN  100%   Barthel Score 0-100 100 99-80 79-60 59-40 20-39 1-19   0   Barthel Score 0-20 20 17-19 13-16 9-12 5-8 1-4 0      The Barthel ADL Index: Guidelines  1. The index should be used as a record of what a patient does, not as a record of what a patient could do. 2. The main aim is to establish degree of independence from any help, physical or verbal, however minor and for whatever reason. 3. The need for supervision renders the patient not independent. 4. A patient's performance should be established using the best available evidence. Asking the patient, friends/relatives and nurses are the usual sources, but direct observation and common sense are also important. However direct testing is not needed. 5. Usually the patient's performance over the preceding 24-48 hours is important, but occasionally longer periods will be relevant. 6. Middle categories imply that the patient supplies over 50 per cent of the effort. 7. Use of aids to be independent is allowed. Yossi Parks., Barthel, D.W. ([id number]). Functional evaluation: the Barthel Index. [address] [facility] (14)2. Tereza Rodriguez pro OMEGA Pool, Wayne Dykes., Ashley Anthony., [place], [address] [place] [address] (1999). Measuring the change indisability after inpatient rehabilitation; comparison of the responsiveness of the Barthel Index and Functional [place] Measure. Journal of Neurology, Neurosurgery, and Psychiatry, 66(4), 617-304. Nicolás Escamilla NKaylaJ.CORONA, GABI Sandy, & Dieter Azevedo MANASTASIYA. (2004.) Assessment of post-stroke quality of life in cost-effectiveness studies: The usefulness of the Barthel Index and the EuroQoL-5D. Quality of Life Research, 13, 231-75         G codes: In compliance with CMSs Claims Based Outcome Reporting, the following G-code set was chosen for this patient based on their primary functional limitation being treated: The outcome measure chosen to determine the severity of the functional limitation was the barthel index with a score of 85/100 which was correlated with the impairment scale. ?  Self Care:     - CURRENT STATUS: CI - 1%-19% impaired, limited or restricted    - GOAL STATUS: CH - 0% impaired, limited or restricted    - D/C STATUS:  ---------------To be determined---------------     Occupational Therapy Evaluation Charge Determination   History Examination Decision-Making   LOW Complexity : Brief history review  LOW Complexity : 1-3 performance deficits relating to physical, cognitive , or psychosocial skils that result in activity limitations and / or participation restrictions  MEDIUM Complexity : Patient may present with comorbidities that affect occupational performnce. Miniml to moderate modification of tasks or assistance (eg, physical or verbal ) with assesment(s) is necessary to enable patient to complete evaluation       Based on the above components, the patient evaluation is determined to be of the following complexity level: LOW   Pain:  Pain Scale 1: Numeric (0 - 10)  Pain Intensity 1: 0  Pain Location 1: Back     Pain Description 1: Aching  Pain Intervention(s) 1: Repositioned  Activity Tolerance:   VSS  Please refer to the flowsheet for vital signs taken during this treatment. After treatment:   [x] Patient left in no apparent distress sitting up in chair  [] Patient left in no apparent distress in bed  [x] Call bell left within reach  [x] Nursing notified  [] Caregiver present  [] Bed alarm activated    COMMUNICATION/EDUCATION:   The patients plan of care was discussed with: Physical Therapist, Registered Nurse and . [x] Home safety education was provided and the patient/caregiver indicated understanding. [x] Patient/family have participated as able in goal setting and plan of care. [x] Patient/family agree to work toward stated goals and plan of care. [] Patient understands intent and goals of therapy, but is neutral about his/her participation. [] Patient is unable to participate in goal setting and plan of care. This patients plan of care is appropriate for delegation to [facility].     Thank you for this referral.  Shanti Whitt OT  Time Calculation: 30 mins

## 2018-11-02 NOTE — PROGRESS NOTES
Bedside and Verbal shift change report given to Rosalinda Gandara (oncoming nurse) by Daniel Reed (offgoing nurse). Report included the following information SBAR, Kardex, Intake/Output, MAR and Recent Results.

## 2018-11-02 NOTE — PROGRESS NOTES
Reason for Admission:   Back pain                    RRAT Score:  10 LOW               Plan for utilizing home health: Per recommendation                       Likelihood of Readmission: Moderate per pt acuity                          Transition of Care Plan:              Pt is a 80year old,  female,admitted with back pain. Pt was alert and oriented when meeting with CM, confirming address, emergency contact and PCP. Pt states she lives at [facility]. Pt has a walker and wheelchair. Pt no longer drives. Pt states she has had HH in the past but cannot remember the agency. Pt does not believe she has been to a SNF in the past. Pt's preferred pharmacy is the [facility] (pt dtrs  prescriptions as needed), no problems affording or accessing medications. Pt has advanced directives but not on file, CM educated pt on importance of having them in the hospital. At time of discharge pt family would like to drive pt via private vehicle if possible. CM left a voicemail for Cy Shepherd in regards to pt returning to the SNF portion. CM has not received a phone call back. CM will inform weekend CM to follow up. CM will continue to follow pt for discharge planning as needed. Care Management Interventions  PCP Verified by CM:  Yes  Mode of Transport at Discharge: Self  Transition of Care Consult (CM Consult): Discharge Planning  MyChart Signup: No  Discharge Durable Medical Equipment: No  Health Maintenance Reviewed: Yes  Physical Therapy Consult: Yes  Occupational Therapy Consult: Yes  Speech Therapy Consult: No  Current Support Network: Assisted Living  Confirm Follow Up Transport: Family  Plan discussed with Pt/Family/Caregiver: Yes  Freedom of Choice Offered: Yes  Discharge Location  Discharge Placement: Skilled nursing facility     JAMSHID Menezes, [address] [facility]    [phone]

## 2018-11-03 LAB
ALBUMIN SERPL-MCNC: 2.8 G/DL (ref 3.5–5)
ALBUMIN/GLOB SERPL: 0.9 {RATIO} (ref 1.1–2.2)
ALP SERPL-CCNC: 40 U/L (ref 45–117)
ALT SERPL-CCNC: 16 U/L (ref 12–78)
ANION GAP SERPL CALC-SCNC: 6 MMOL/L (ref 5–15)
AST SERPL-CCNC: 17 U/L (ref 15–37)
BACTERIA SPEC CULT: NORMAL
BASOPHILS # BLD: 0 K/UL (ref 0–0.1)
BASOPHILS NFR BLD: 0 % (ref 0–1)
BILIRUB SERPL-MCNC: 0.3 MG/DL (ref 0.2–1)
BUN SERPL-MCNC: 34 MG/DL (ref 6–20)
BUN/CREAT SERPL: 27 (ref 12–20)
CALCIUM SERPL-MCNC: 8.7 MG/DL (ref 8.5–10.1)
CC UR VC: NORMAL
CHLORIDE SERPL-SCNC: 102 MMOL/L (ref 97–108)
CO2 SERPL-SCNC: 27 MMOL/L (ref 21–32)
CREAT SERPL-MCNC: 1.26 MG/DL (ref 0.55–1.02)
DIFFERENTIAL METHOD BLD: ABNORMAL
EOSINOPHIL # BLD: 0.1 K/UL (ref 0–0.4)
EOSINOPHIL NFR BLD: 1 % (ref 0–7)
ERYTHROCYTE [DISTWIDTH] IN BLOOD BY AUTOMATED COUNT: 14.2 % (ref 11.5–14.5)
GLOBULIN SER CALC-MCNC: 3.1 G/DL (ref 2–4)
GLUCOSE SERPL-MCNC: 85 MG/DL (ref 65–100)
HCT VFR BLD AUTO: 31.5 % (ref 35–47)
HGB BLD-MCNC: 9.6 G/DL (ref 11.5–16)
IMM GRANULOCYTES # BLD: 0 K/UL (ref 0–0.04)
IMM GRANULOCYTES NFR BLD AUTO: 0 % (ref 0–0.5)
LYMPHOCYTES # BLD: 2.5 K/UL (ref 0.8–3.5)
LYMPHOCYTES NFR BLD: 28 % (ref 12–49)
MCH RBC QN AUTO: 26.5 PG (ref 26–34)
MCHC RBC AUTO-ENTMCNC: 30.5 G/DL (ref 30–36.5)
MCV RBC AUTO: 87 FL (ref 80–99)
MONOCYTES # BLD: 0.5 K/UL (ref 0–1)
MONOCYTES NFR BLD: 6 % (ref 5–13)
NEUTS SEG # BLD: 5.7 K/UL (ref 1.8–8)
NEUTS SEG NFR BLD: 64 % (ref 32–75)
NRBC # BLD: 0 K/UL (ref 0–0.01)
NRBC BLD-RTO: 0 PER 100 WBC
PLATELET # BLD AUTO: 210 K/UL (ref 150–400)
PMV BLD AUTO: 10 FL (ref 8.9–12.9)
POTASSIUM SERPL-SCNC: 4.3 MMOL/L (ref 3.5–5.1)
PROT SERPL-MCNC: 5.9 G/DL (ref 6.4–8.2)
RBC # BLD AUTO: 3.62 M/UL (ref 3.8–5.2)
SERVICE CMNT-IMP: NORMAL
SODIUM SERPL-SCNC: 135 MMOL/L (ref 136–145)
WBC # BLD AUTO: 8.9 K/UL (ref 3.6–11)

## 2018-11-03 PROCEDURE — 65270000029 HC RM PRIVATE

## 2018-11-03 PROCEDURE — 85025 COMPLETE CBC W/AUTO DIFF WBC: CPT | Performed by: INTERNAL MEDICINE

## 2018-11-03 PROCEDURE — 74011000250 HC RX REV CODE- 250: Performed by: EMERGENCY MEDICINE

## 2018-11-03 PROCEDURE — 80053 COMPREHEN METABOLIC PANEL: CPT | Performed by: INTERNAL MEDICINE

## 2018-11-03 PROCEDURE — 36415 COLL VENOUS BLD VENIPUNCTURE: CPT | Performed by: INTERNAL MEDICINE

## 2018-11-03 PROCEDURE — 74011250637 HC RX REV CODE- 250/637: Performed by: INTERNAL MEDICINE

## 2018-11-03 PROCEDURE — 74011250636 HC RX REV CODE- 250/636: Performed by: INTERNAL MEDICINE

## 2018-11-03 RX ORDER — ENOXAPARIN SODIUM 100 MG/ML
30 INJECTION SUBCUTANEOUS DAILY
Status: DISCONTINUED | OUTPATIENT
Start: 2018-11-04 | End: 2018-11-08 | Stop reason: HOSPADM

## 2018-11-03 RX ORDER — FAMOTIDINE 20 MG/1
20 TABLET, FILM COATED ORAL DAILY
Status: DISCONTINUED | OUTPATIENT
Start: 2018-11-04 | End: 2018-11-05

## 2018-11-03 RX ADMIN — VITAMIN D, TAB 1000IU (100/BT) 1000 UNITS: 25 TAB at 08:37

## 2018-11-03 RX ADMIN — Medication 10 ML: at 21:48

## 2018-11-03 RX ADMIN — PRAVASTATIN SODIUM 80 MG: 40 TABLET ORAL at 21:48

## 2018-11-03 RX ADMIN — ENOXAPARIN SODIUM 40 MG: 40 INJECTION, SOLUTION INTRAVENOUS; SUBCUTANEOUS at 08:36

## 2018-11-03 RX ADMIN — GABAPENTIN 800 MG: 100 CAPSULE ORAL at 21:48

## 2018-11-03 RX ADMIN — GABAPENTIN 800 MG: 100 CAPSULE ORAL at 17:15

## 2018-11-03 RX ADMIN — KETOROLAC TROMETHAMINE 15 MG: 30 INJECTION, SOLUTION INTRAMUSCULAR at 07:18

## 2018-11-03 RX ADMIN — PANTOPRAZOLE SODIUM 40 MG: 40 TABLET, DELAYED RELEASE ORAL at 08:36

## 2018-11-03 RX ADMIN — KETOROLAC TROMETHAMINE 15 MG: 30 INJECTION, SOLUTION INTRAMUSCULAR at 13:03

## 2018-11-03 RX ADMIN — GABAPENTIN 800 MG: 100 CAPSULE ORAL at 13:03

## 2018-11-03 RX ADMIN — ONDANSETRON 4 MG: 2 INJECTION INTRAMUSCULAR; INTRAVENOUS at 18:30

## 2018-11-03 RX ADMIN — Medication 10 ML: at 08:40

## 2018-11-03 RX ADMIN — Medication 400 MG: at 08:37

## 2018-11-03 RX ADMIN — AMITRIPTYLINE HYDROCHLORIDE 25 MG: 25 TABLET, FILM COATED ORAL at 21:48

## 2018-11-03 RX ADMIN — Medication 10 ML: at 13:04

## 2018-11-03 RX ADMIN — THERA TABS 1 TABLET: TAB at 08:37

## 2018-11-03 RX ADMIN — FAMOTIDINE 20 MG: 20 TABLET ORAL at 08:37

## 2018-11-03 RX ADMIN — LISINOPRIL 20 MG: 20 TABLET ORAL at 08:36

## 2018-11-03 RX ADMIN — GABAPENTIN 800 MG: 100 CAPSULE ORAL at 08:36

## 2018-11-03 RX ADMIN — HYDROCHLOROTHIAZIDE 25 MG: 25 TABLET ORAL at 08:37

## 2018-11-03 RX ADMIN — CALCIUM CARBONATE 500 MG (1,250 MG)-VITAMIN D3 200 UNIT TABLET 1 TABLET: at 08:37

## 2018-11-03 NOTE — PROGRESS NOTES
Per P & T committee protocol:  Scr: 1.26 (trending up)  Crcl: 28.9 -23ml/min (Adjusted and IDW)    Renal dosed famotidine for crcl < 50mL/min and changed lovenox to 30mg subcutaneously daily for crcl 20-30mL/min with normal body weight.      Tera Bajwa [place]

## 2018-11-03 NOTE — PROGRESS NOTES
Hospitalist Progress Note    NAME: Sonja Hutchinson   :  10/9/1933   MRN:  [id number]       Assessment / Plan:  Acute intractable back pain POA   Hx of scoliosis  s/p extensive thoracolumbar fusion 2002   neuropathy and foor drops after the back surgery as per patient. CT L spine with no acute process. Fractured thoracolumbar fusion rods, stable. Protruding pedicle screws, stable. Neural foraminal stenoses as described above. Ortho has seen patient with plans to see patient again today    Hypertension, benign/essential hx  Running on the lower end. Held hydrochlorothiazide and resume lisinopril with holding parameters for now. GERD and gastroparesis hx   Sees Dr. Enriquez Hem  con't PPI and H2 blocker in combination. six small meals    Hyperlipidemia: on statin  Body mass index is 30.86 kg/m².: 30.0 - 39.9 Obese  Code status: Full  Prophylaxis: Lovenox  Recommended Disposition: [facility]/Rehab as per 2018 hospitalist note             Subjective:     Chief Complaint / Reason for Physician Visit  With family, currently without complaints, they are aware that ortho surgeon is suppose to see pt today. Review of Systems:  Symptom Y/N Comments  Symptom Y/N Comments   Fever/Chills n   Chest Pain n    Poor Appetite    Edema     Cough    Abdominal Pain n    Sputum    Joint Pain  Low back pain , now okay    SOB/LEVINE n   Pruritis/Rash     Nausea/vomit    Tolerating PT/OT     Diarrhea    Tolerating Diet     Constipation    Other       Could NOT obtain due to:      Objective:     VITALS:   Last 24hrs VS reviewed since prior progress note.  Most recent are:  Patient Vitals for the past 24 hrs:   Temp Pulse Resp BP SpO2   18 0802 97.9 °F (36.6 °C) 77 18 110/67 91 %   18 0740 97.9 °F (36.6 °C) 77 18 110/67 92 %   18 0411 98 °F (36.7 °C) 67 16 97/59 99 %   18 0001 98 °F (36.7 °C) 74 16 120/79 100 %   18 98.1 °F (36.7 °C) 70 18 120/64 95 %   18 1545 97.9 °F (36.6 °C) 70 16 95/51 94 % 11/02/18 1250 98.7 °F (37.1 °C) 69 18 117/60 95 %       Intake/Output Summary (Last 24 hours) at 11/3/2018 7308  Last data filed at 11/2/2018 1500  Gross per 24 hour   Intake 870 ml   Output 450 ml   Net 420 ml        PHYSICAL EXAM:  General: WD, WN. Alert, cooperative, no acute distress    EENT:  Anicteric sclerae. MMM  Resp:  CTA bilaterally in anterior lung fields, no wheezing or rales. No accessory muscle use  CV:  Regular  rhythm,  S1S2  GI:  Soft, Non distended, Non tender.  +Bowel sounds  Neurologic:  Alert and oriented X 3, normal speech, mild foot drop noted on both foot , old as per patient. Reviewed most current lab test results and cultures  YES  Reviewed most current radiology test results   YES  Review and summation of old records today    NO  Reviewed patient's current orders and MAR    YES  PMH/ reviewed - no change compared to H&P  ________________________________________________________________________  Care Plan discussed with:    Comments   Patient y    Family  y    RN     Care Manager     Consultant                        Multidiciplinary team rounds were held today with , nursing, pharmacist and clinical coordinator. Patient's plan of care was discussed; medications were reviewed and discharge planning was addressed.      ______________________________________________________  Total NON critical care TIME:  30 Minutes    Total CRITICAL CARE TIME Spent:   Minutes non procedure based      Comments   >50% of visit spent in counseling and coordination of care     ________________________________________________________________________  Uziel Kingston MD       Recent Labs     11/03/18  0530 11/02/18  0310 11/01/18  1730   WBC 8.9 8.0 8.3   HGB 9.6* 9.2* 11.0*   HCT 31.5* 31.2* 36.3    127* 266     Recent Labs     11/03/18  0530 11/02/18  0310 11/01/18  1730   * 136 135*   K 4.3 4.7 4.8    106 104   CO2 27 24 26   GLU 85 94 126*   BUN 34* 30* 26*   CREA 1.26* 1.11* 1.04*   CA 8.7 8.9 9.7   ALB 2.8*  --  4.0   TBILI 0.3  --  0.3   SGOT 17  --  23   ALT 16  --  23       Signed: Denis Garibay MD

## 2018-11-03 NOTE — ROUTINE PROCESS
Bedside and Verbal shift change report given to Shonna Gatica (oncoming nurse) by Michell Falcon (offgoing nurse). Report included the following information SBAR, Kardex, Intake/Output, MAR and Recent Results.

## 2018-11-03 NOTE — CONSULTS
Subjective:     Patient ID: Mayda Garza is a 80 y.o. female. Chief Complaint: Back Pain (Arrives via WC c/o increased lower back pain since being seen on 10/29 (Monday) Pt given Rx but states medication isn't helping Pt sent back by MD for x-ray)      HPI:  Mayda Garza is a 80 y.o. female with complaints of Low back pain. The pain is sharp in quality. It has been present for one week and is there constantly. It is rated 8 out of 10 on the VAS. It is worse with standing/walking and better with nothing. Patient Active Problem List    Diagnosis Date Noted    Back pain 11/01/2018    Ovarian cancer ([facility] [address] 07/19/2017    Insomnia 07/19/2017    Essential hypertension 07/19/2017    Hypomagnesemia 07/19/2017    Hypokalemia 07/19/2017    Mixed hyperlipidemia 07/19/2017    Thoracogenic scoliosis 07/19/2017    Foot drop, bilateral 07/19/2017    Age-related cataract of both eyes 07/19/2017    Previous back surgery 07/19/2017    H/O total knee replacement 07/19/2017    History of replacement of both shoulder joints 07/19/2017    Neuropathy 07/19/2017    Lewis's esophagus without dysplasia 06/06/2016    Lewis's esophagus 06/06/2016    Diarrhea 06/06/2016    Gastric ulcer 06/14/2012    GERD (gastroesophageal reflux disease) 01/26/2012       History reviewed. No pertinent family history.      Social History     Tobacco Use    Smoking status: Never Smoker    Smokeless tobacco: Never Used   Substance Use Topics    Alcohol use: No       Past Medical History:   Diagnosis Date    Arthritis     Diarrhea 6/6/2016    Foot drop     Gastric ulcer 6/14/2012    Gastroparesis     GERD (gastroesophageal reflux disease)     High cholesterol     Hypertension     Neuropathy     Ovarian cancer ([address] 1986    chemo x 9 mos    Scoliosis     Stroke ([address] 2002    ? stroke with drop feet, per patient CT scans were negative        Past Surgical History:   Procedure Laterality Date    HX ORTHOPAEDIC back    HX ORTHOPAEDIC      bilateral shoulder replacements    HX ORTHOPAEDIC      left knee replacement    HX LYDIA AND BSO  1986    CA EGD TRANSORAL BIOPSY SINGLE/MULTIPLE  1/26/2012         CA EGD TRANSORAL BIOPSY SINGLE/MULTIPLE  6/14/2012               Current Facility-Administered Medications:     lidocaine 4 % patch 1 Patch, 1 Patch, TransDERmal, Q24H, Jay Jay Rouse DO, 1 Patch at 11/02/18 1749    morphine  mg / 30 mL, , IntraVENous, CONTINUOUS, Bernard Acuna MD    amitriptyline (ELAVIL) tablet 25 mg, 25 mg, Oral, QHS, Chantelle Flynn MD, 25 mg at 11/02/18 2354    calcium-vitamin D (OS-GRUPO) 500 mg-200 unit tablet, 1 Tab, Oral, DAILY, Chantelle Flynn MD, 1 Tab at 11/03/18 8682    cholecalciferol (VITAMIN D3) tablet 1,000 Units, 1,000 Units, Oral, DAILY, Chantelle Flynn MD, 1,000 Units at 11/03/18 3897    gabapentin (NEURONTIN) capsule 800 mg, 800 mg, Oral, QID, Chantelle Flynn MD, 800 mg at 11/03/18 1303    lisinopril (PRINIVIL, ZESTRIL) tablet 20 mg, 20 mg, Oral, DAILY, Deja REDDY MD, 20 mg at 11/03/18 0836    loperamide (IMODIUM) capsule 2 mg, 2 mg, Oral, QID PRN, Chantelle Flynn MD    magnesium oxide (MAG-OX) tablet 400 mg, 400 mg, Oral, DAILY, Chantelle Flynn MD, 400 mg at 11/03/18 9747    therapeutic multivitamin (THERAGRAN) tablet 1 Tab, 1 Tab, Oral, DAILY, Chantelle Flynn MD, 1 Tab at 11/03/18 0137    sodium chloride (NS) flush 5-10 mL, 5-10 mL, IntraVENous, Q8H, Chantelle Flynn MD, 10 mL at 11/03/18 1304    sodium chloride (NS) flush 5-10 mL, 5-10 mL, IntraVENous, PRN, Chantelle Flynn MD    acetaminophen (TYLENOL) tablet 650 mg, 650 mg, Oral, Q4H PRN, Chantelle Flynn MD    naloxone [facility]) injection 0.4 mg, 0.4 mg, IntraVENous, PRN, Chantelle Flynn MD    ondansetron [facility]) injection 4 mg, 4 mg, IntraVENous, Q4H PRN, Chantelle Flynn MD, 4 mg at 11/02/18 8931    enoxaparin (LOVENOX) injection 40 mg, 40 mg, SubCUTAneous, DAILY, Chantelle Flynn MD, 40 mg at 11/03/18 2036    nitroglycerin (NITROBID) 2 % ointment 1 Inch, 1 Inch, Topical, Q6H PRN, Garcia Miner MD    pravastatin (PRAVACHOL) tablet 80 mg, 80 mg, Oral, QHS, Garcia Minre MD, 80 mg at 11/02/18 2354    pantoprazole (PROTONIX) tablet 40 mg, 40 mg, Oral, ACB, Garcia Miner MD, 40 mg at 11/03/18 0836    famotidine (PEPCID) tablet 20 mg, 20 mg, Oral, BID, Garcia Miner MD, 20 mg at 11/03/18 0837    No Known Allergies     ROS:   No new bowel or bladder incontinence. No fever. No saddle anesthesia. Objective:     Visit Vitals  BP 98/55   Pulse 74   Temp 99.1 °F (37.3 °C)   Resp 16   Wt 71.7 kg (158 lb)   SpO2 96%   BMI 30.86 kg/m²       Body mass index is 30.86 kg/m². , a BMI over 30 is considered obese and a BMI over 40 has been associated with a higher risk of surgical complications. Constitutional: No acute distress. Well nourished. HEENT: Normocephalic. Respiratory:  No labored breathing. Cardiovascular:  No marked cyanosis. Skin:  No marked skin ulcers/lesions on bilateral upper or lower extremities. Psychiatric: Alert and oriented x3. Inspection: No gross deformity of bilateral upper or lower extremities. Musculoskeletal/Neurological:   BLE 5/5/5/5/5  Sensation intact  +1 reflexes  Pain with tr bilaterally  TTP over B SIJ        Radiographs:       Xr Spine Lumb 2 Or 3 V    Result Date: 11/1/2018  Indication: Increasing lower back pain Three views of the lumbar spine demonstrate scoliosis with thoracolumbar stabilization rods and pedicle screws. There is no hardware complication. The bones are osteopenic. No acute fracture. Surgical clips project over the pelvis. Impression: Postoperative changes without acute abnormality. Osteopenia. Xr Abd (kub)    Result Date: 10/23/2018  EXAM: KUB INDICATION: Abdomen pain  Pain, unspecified A single frontal view of the abdomen shows normal small bowel gas pattern. There is mild  colorectal stool without colorectal distention. There are no significant calcifications.  There is no apparent organomegaly. There are spinal rods and surgical clips. IMPRESSION: No acute findings. Ct Spine Lumb Wo Cont    Result Date: 11/1/2018  CT LUMBAR SPINE WITHOUT CONTRAST:  11/1/2018 2:26 PM INDICATION: Back pain, unspecified; pain radiating down left leg, prior surgery . COMPARISON: 11/13/2013. TECHNIQUE: Multislice helical CT of the lumbar spine was performed without contrast. Sagittal and coronal reformats were generated. CT dose reduction was achieved through use of a standardized protocol tailored for this examination and automatic exposure control for dose modulation. FINDINGS: Extensive thoracolumbar fusion hardware is partially imaged. The rods extend off the superior field of view. The right chance is fractured, with slight anterior displacement (602-51). Fracture and right superolateral displacement of the left fusion chance is visible only on  images (1-1, 1-2). Chance fractures are stable from 2013. There are L2-S1 pedicle screws bilaterally. The bilateral S1 pedicle screws protrude through the anterior cortex, with exuberant osteophytosis surrounding the tip of the left S1 pedicle screw (2-74). The left L5 pedicle screw protrudes through the anterior cortex as well. Levoconvexity is centered around L1, with consequent asymmetric degenerative disc disease T12-L1 on the right. There are interbody disc grafts L3-S1. The L5-S1 disc graft is fractured (2-65, 263), with the anterior fragments displaced slightly beyond the anterior cortices of L5 and S1. A large osteophyte protruding into the presacral space is associated (602-45). Extensive hardware artifact limits evaluation for canal or neural foraminal stenoses. There is bilateral neural foraminal stenosis L5-S1, mild left neural foraminal stenosis L4-L5, and mild right neural foraminal stenosis T12-L1. There are multiple bilateral renal cysts of varying size.  Exophytic from the right posterior interpolar region, a lobulated cyst appears denser than simple cystic fluid on image 3-29, due to severe photopenia and scatter artifact from lumbar hardware. However, it measures simple fluid density on image 3-27, and is stable or only minimally increased from 2013. Tiny bilateral renal calculi are nonobstructing. There are multiple surgical clips in the prevertebral space and bilateral iliac regions. .     IMPRESSION: 1. No acute process. 2. Fractured thoracolumbar fusion rods, stable. 3. Protruding pedicle screws, stable. 4. Neural foraminal stenoses as described above. Xr Chest Port    Result Date: 11/1/2018  EXAM:  XR CHEST PORT INDICATION:  HealthCare facility requirement COMPARISON:  5/23/2008 FINDINGS: A portable AP radiograph of the chest was obtained at 1640 hours. The heart size is normal. The lungs are clear of an acute process. Minimal scarring is noted at the lung bases. No pneumonia. The patient is status post bilateral shoulder replacements. There are Catalan like rods identified in the mid thoracic and upper lumbar spine. These are seen to be broken. There are broken previously. The body of the left side where the break is is now displaced 17 mm from the other chavo were previously it was displaced 3 mm. IMPRESSION: 1. No acute cardiopulmonary process 2. The Catalan like rods are broken. Assessment:     1.  Intractable back pain    B SIJ pain      Plan:     PT/OT  WBAT   Will order x-ray guided B SIJ injections, likely Monday when IR is available            Viviano Koyanagi, MD

## 2018-11-03 NOTE — PROGRESS NOTES
Patient was visited by a Spiritual Care Partner Volunteer in Orthopedic Unit on 11/3/18. Documented by Rev. Jair Guzman, [address] [place] [address] Road paging service: [phone])

## 2018-11-04 PROCEDURE — 74011000250 HC RX REV CODE- 250: Performed by: INTERNAL MEDICINE

## 2018-11-04 PROCEDURE — 65270000029 HC RM PRIVATE

## 2018-11-04 PROCEDURE — 74011250637 HC RX REV CODE- 250/637: Performed by: INTERNAL MEDICINE

## 2018-11-04 PROCEDURE — 74011250636 HC RX REV CODE- 250/636: Performed by: INTERNAL MEDICINE

## 2018-11-04 PROCEDURE — 74011000250 HC RX REV CODE- 250: Performed by: EMERGENCY MEDICINE

## 2018-11-04 RX ORDER — HYDRALAZINE HYDROCHLORIDE 50 MG/1
25 TABLET, FILM COATED ORAL
Status: DISCONTINUED | OUTPATIENT
Start: 2018-11-04 | End: 2018-11-05

## 2018-11-04 RX ORDER — SODIUM CHLORIDE 9 MG/ML
50 INJECTION, SOLUTION INTRAVENOUS CONTINUOUS
Status: DISPENSED | OUTPATIENT
Start: 2018-11-04 | End: 2018-11-04

## 2018-11-04 RX ORDER — LISINOPRIL 5 MG/1
5 TABLET ORAL DAILY
Status: DISCONTINUED | OUTPATIENT
Start: 2018-11-05 | End: 2018-11-04

## 2018-11-04 RX ORDER — FACIAL-BODY WIPES
10 EACH TOPICAL DAILY PRN
Status: DISCONTINUED | OUTPATIENT
Start: 2018-11-04 | End: 2018-11-08 | Stop reason: HOSPADM

## 2018-11-04 RX ORDER — AMOXICILLIN 250 MG
1 CAPSULE ORAL DAILY
Status: DISCONTINUED | OUTPATIENT
Start: 2018-11-04 | End: 2018-11-08 | Stop reason: HOSPADM

## 2018-11-04 RX ORDER — SODIUM CHLORIDE 9 MG/ML
500 INJECTION, SOLUTION INTRAVENOUS ONCE
Status: COMPLETED | OUTPATIENT
Start: 2018-11-04 | End: 2018-11-04

## 2018-11-04 RX ORDER — POLYETHYLENE GLYCOL 3350 17 G/17G
17 POWDER, FOR SOLUTION ORAL
Status: DISCONTINUED | OUTPATIENT
Start: 2018-11-04 | End: 2018-11-08 | Stop reason: HOSPADM

## 2018-11-04 RX ADMIN — ACETAMINOPHEN 650 MG: 325 TABLET ORAL at 08:23

## 2018-11-04 RX ADMIN — ONDANSETRON 4 MG: 2 INJECTION INTRAMUSCULAR; INTRAVENOUS at 08:23

## 2018-11-04 RX ADMIN — SODIUM CHLORIDE 50 ML/HR: 900 INJECTION, SOLUTION INTRAVENOUS at 16:57

## 2018-11-04 RX ADMIN — DOCUSATE SODIUM AND SENNOSIDES 1 TABLET: 8.6; 5 TABLET, FILM COATED ORAL at 14:19

## 2018-11-04 RX ADMIN — THERA TABS 1 TABLET: TAB at 08:24

## 2018-11-04 RX ADMIN — AMITRIPTYLINE HYDROCHLORIDE 25 MG: 25 TABLET, FILM COATED ORAL at 21:20

## 2018-11-04 RX ADMIN — BISACODYL 10 MG: 10 SUPPOSITORY RECTAL at 16:58

## 2018-11-04 RX ADMIN — GABAPENTIN 800 MG: 100 CAPSULE ORAL at 21:20

## 2018-11-04 RX ADMIN — ONDANSETRON 4 MG: 2 INJECTION INTRAMUSCULAR; INTRAVENOUS at 16:58

## 2018-11-04 RX ADMIN — ENOXAPARIN SODIUM 30 MG: 30 INJECTION, SOLUTION INTRAVENOUS; SUBCUTANEOUS at 08:23

## 2018-11-04 RX ADMIN — POLYETHYLENE GLYCOL 3350 17 G: 17 POWDER, FOR SOLUTION ORAL at 14:19

## 2018-11-04 RX ADMIN — Medication 400 MG: at 08:24

## 2018-11-04 RX ADMIN — Medication 10 ML: at 14:19

## 2018-11-04 RX ADMIN — GABAPENTIN 800 MG: 100 CAPSULE ORAL at 17:00

## 2018-11-04 RX ADMIN — PANTOPRAZOLE SODIUM 40 MG: 40 TABLET, DELAYED RELEASE ORAL at 08:24

## 2018-11-04 RX ADMIN — SODIUM CHLORIDE 500 ML: 900 INJECTION, SOLUTION INTRAVENOUS at 15:36

## 2018-11-04 RX ADMIN — Medication 10 ML: at 21:20

## 2018-11-04 RX ADMIN — ACETAMINOPHEN 650 MG: 325 TABLET ORAL at 23:45

## 2018-11-04 RX ADMIN — GABAPENTIN 800 MG: 100 CAPSULE ORAL at 08:23

## 2018-11-04 RX ADMIN — VITAMIN D, TAB 1000IU (100/BT) 1000 UNITS: 25 TAB at 08:24

## 2018-11-04 RX ADMIN — PRAVASTATIN SODIUM 80 MG: 40 TABLET ORAL at 21:20

## 2018-11-04 RX ADMIN — Medication 10 ML: at 05:40

## 2018-11-04 RX ADMIN — GABAPENTIN 800 MG: 100 CAPSULE ORAL at 14:19

## 2018-11-04 RX ADMIN — FAMOTIDINE 20 MG: 20 TABLET ORAL at 08:24

## 2018-11-04 RX ADMIN — CALCIUM CARBONATE 500 MG (1,250 MG)-VITAMIN D3 200 UNIT TABLET 1 TABLET: at 08:24

## 2018-11-04 NOTE — PROGRESS NOTES
Dr. Eliazar Beltrán notified for blood pressure via dinamap reading 74/46, manually 76/40. MD stated she would place orders now.

## 2018-11-04 NOTE — PROGRESS NOTES
Reviewed the chart and the discharge plan is for patient to transfer to skilled health care at [facility]. Have sent referral to [facility] in [place].

## 2018-11-04 NOTE — PROGRESS NOTES
Bedside and Verbal shift change report given to Maria Dolores SHRESTHA (oncoming nurse) by Danielito Penny (offgoing nurse). Report included the following information SBAR, Kardex, Intake/Output, MAR, Accordion and Recent Results.

## 2018-11-04 NOTE — PROGRESS NOTES
Hospitalist Progress Note    NAME: Sonja Hutchinson   :  10/9/1933   MRN:  [id number]       Assessment / Plan:  Acute intractable back pain POA   Hx of scoliosis  s/p extensive thoracolumbar fusion 2002   neuropathy and foor drops after the back surgery as per patient. CT L spine with no acute process. Fractured thoracolumbar fusion rods, stable. Protruding pedicle screws, stable. Neural foraminal stenoses as described above. Tentative plans for injection Monday. Pain management, on pca  Pending injection for Monday that may resolve the pain. Will go ahead and order Palliative care consult to see patient for any guidance post injection for pain management. Hypertension, benign/essential hx  Running on the lower end. Held hydrochlorothiazide and resume lisinopril with holding parameters for now. GERD and gastroparesis hx   Sees Dr. Enriquez Hem  con't PPI and H2 blocker in combination. six small meals    Constipation  Pt admits to using lomotil for history of diarrhea but she states has been awhile. Bowel regimen ordered    2018 Low grad temp 99.6 without leukocytosis. Monitor for now. CXR in am with low threshold to initiate abx/culture    Hyperlipidemia: on statin  Body mass index is 30.86 kg/m².: 30.0 - 39.9 Obese  Code status: Full  Prophylaxis: Lovenox  Recommended Disposition: skilled facility, (refer to CM note)             Subjective:     Chief Complaint / Reason for Physician Visit  constipation    Review of Systems:  Symptom Y/N Comments  Symptom Y/N Comments   Fever/Chills n   Chest Pain n    Poor Appetite    Edema     Cough    Abdominal Pain n    Sputum    Joint Pain      SOB/LEVINE n   Pruritis/Rash     Nausea/vomit    Tolerating PT/OT     Diarrhea    Tolerating Diet     Constipation    Other       Could NOT obtain due to:      Objective:     VITALS:   Last 24hrs VS reviewed since prior progress note.  Most recent are:  Patient Vitals for the past 24 hrs:   Temp Pulse Resp BP SpO2 11/04/18 0726 99.6 °F (37.6 °C) 93 18 102/55 91 %   11/04/18 0446 99.5 °F (37.5 °C) 91 18 115/55 96 %   11/04/18 0025 98 °F (36.7 °C) 86 18 103/59 96 %   11/03/18 2022 98.4 °F (36.9 °C) 76 18 108/57 96 %   11/03/18 1715 98.1 °F (36.7 °C) 74 18 95/59 97 %     No intake or output data in the 24 hours ending 11/04/18 1230     PHYSICAL EXAM:  General: WD, WN. Alert, cooperative, no acute distress    EENT:  Anicteric sclerae. MMM  Resp:  CTA bilaterally in anterior lung fields, no wheezing or rales. No accessory muscle use  CV:  Regular  rhythm,  S1S2  GI:  Soft, Non distended, Non tender.  +Bowel sounds  Neurologic:  Alert and oriented X 3, normal speech, mild foot drop noted on both foot , old as per patient. Reviewed most current lab test results and cultures  YES  Reviewed most current radiology test results   YES  Review and summation of old records today    NO  Reviewed patient's current orders and MAR    YES  PMH/SH reviewed - no change compared to H&P  ________________________________________________________________________  Care Plan discussed with:    Comments   Patient y    Family  y    RN y    Care Manager     Consultant                        Multidiciplinary team rounds were held today with , nursing, pharmacist and clinical coordinator. Patient's plan of care was discussed; medications were reviewed and discharge planning was addressed.      ______________________________________________________  Total NON critical care TIME:  25 Minutes    Total CRITICAL CARE TIME Spent:   Minutes non procedure based      Comments   >50% of visit spent in counseling and coordination of care     ________________________________________________________________________  Nikhil Deluca MD       Recent Labs     11/03/18  0530 11/02/18  0310 11/01/18  1730   WBC 8.9 8.0 8.3   HGB 9.6* 9.2* 11.0*   HCT 31.5* 31.2* 36.3    127* 266     Recent Labs     11/03/18  0530 11/02/18  0310 11/01/18  1730   * 136 135*   K 4.3 4.7 4.8    106 104   CO2 27 24 26   GLU 85 94 126*   BUN 34* 30* 26*   CREA 1.26* 1.11* 1.04*   CA 8.7 8.9 9.7   ALB 2.8*  --  4.0   TBILI 0.3  --  0.3   SGOT 17  --  23   ALT 16  --  23       Signed: Josefa Chatterjee MD

## 2018-11-04 NOTE — PROGRESS NOTES
Paged by floor nurse of drop in bp, pt receiving morphine (has prn naloxone order)  Confirmed lisinopril medication was held--now discontinued the medication. Per staff, pt is still awake and O2 sat okay. Last echo back in 2009 with normal EF. Ordered small IVF bolus and start maintanence fluids.

## 2018-11-04 NOTE — PROGRESS NOTES
Dr. Amor Troncoso notified due to pt's last bowel movement date of 10/31. Call back number [phone] given.

## 2018-11-05 ENCOUNTER — APPOINTMENT (OUTPATIENT)
Dept: GENERAL RADIOLOGY | Age: 83
DRG: 552 | End: 2018-11-05
Attending: INTERNAL MEDICINE
Payer: MEDICARE

## 2018-11-05 ENCOUNTER — APPOINTMENT (OUTPATIENT)
Dept: INTERVENTIONAL RADIOLOGY/VASCULAR | Age: 83
DRG: 552 | End: 2018-11-05
Attending: NURSE PRACTITIONER
Payer: MEDICARE

## 2018-11-05 LAB
ALBUMIN SERPL-MCNC: 2.2 G/DL (ref 3.5–5)
ALBUMIN/GLOB SERPL: 0.6 {RATIO} (ref 1.1–2.2)
ALP SERPL-CCNC: 40 U/L (ref 45–117)
ALT SERPL-CCNC: 14 U/L (ref 12–78)
ANION GAP SERPL CALC-SCNC: 5 MMOL/L (ref 5–15)
AST SERPL-CCNC: 14 U/L (ref 15–37)
BILIRUB SERPL-MCNC: 0.3 MG/DL (ref 0.2–1)
BUN SERPL-MCNC: 44 MG/DL (ref 6–20)
BUN/CREAT SERPL: 37 (ref 12–20)
CALCIUM SERPL-MCNC: 8.2 MG/DL (ref 8.5–10.1)
CHLORIDE SERPL-SCNC: 107 MMOL/L (ref 97–108)
CO2 SERPL-SCNC: 25 MMOL/L (ref 21–32)
CREAT SERPL-MCNC: 1.2 MG/DL (ref 0.55–1.02)
GLOBULIN SER CALC-MCNC: 3.4 G/DL (ref 2–4)
GLUCOSE BLD STRIP.AUTO-MCNC: 143 MG/DL (ref 65–100)
GLUCOSE SERPL-MCNC: 144 MG/DL (ref 65–100)
MAGNESIUM SERPL-MCNC: 2 MG/DL (ref 1.6–2.4)
POTASSIUM SERPL-SCNC: 4.3 MMOL/L (ref 3.5–5.1)
PROT SERPL-MCNC: 5.6 G/DL (ref 6.4–8.2)
SERVICE CMNT-IMP: ABNORMAL
SODIUM SERPL-SCNC: 137 MMOL/L (ref 136–145)

## 2018-11-05 PROCEDURE — 71045 X-RAY EXAM CHEST 1 VIEW: CPT

## 2018-11-05 PROCEDURE — 82962 GLUCOSE BLOOD TEST: CPT

## 2018-11-05 PROCEDURE — 74011250636 HC RX REV CODE- 250/636: Performed by: RADIOLOGY

## 2018-11-05 PROCEDURE — 80053 COMPREHEN METABOLIC PANEL: CPT | Performed by: INTERNAL MEDICINE

## 2018-11-05 PROCEDURE — 74011250637 HC RX REV CODE- 250/637: Performed by: INTERNAL MEDICINE

## 2018-11-05 PROCEDURE — 85025 COMPLETE CBC W/AUTO DIFF WBC: CPT | Performed by: INTERNAL MEDICINE

## 2018-11-05 PROCEDURE — 77030003666 HC NDL SPINAL BD -A

## 2018-11-05 PROCEDURE — 65270000029 HC RM PRIVATE

## 2018-11-05 PROCEDURE — 74011250636 HC RX REV CODE- 250/636: Performed by: INTERNAL MEDICINE

## 2018-11-05 PROCEDURE — 3E0U33Z INTRODUCTION OF ANTI-INFLAMMATORY INTO JOINTS, PERCUTANEOUS APPROACH: ICD-10-PCS | Performed by: RADIOLOGY

## 2018-11-05 PROCEDURE — 51798 US URINE CAPACITY MEASURE: CPT

## 2018-11-05 PROCEDURE — 27096 INJECT SACROILIAC JOINT: CPT

## 2018-11-05 PROCEDURE — 36415 COLL VENOUS BLD VENIPUNCTURE: CPT | Performed by: INTERNAL MEDICINE

## 2018-11-05 PROCEDURE — 76450000000

## 2018-11-05 PROCEDURE — 83735 ASSAY OF MAGNESIUM: CPT | Performed by: INTERNAL MEDICINE

## 2018-11-05 RX ORDER — DEXAMETHASONE SODIUM PHOSPHATE 10 MG/ML
10 INJECTION INTRAMUSCULAR; INTRAVENOUS ONCE
Status: COMPLETED | OUTPATIENT
Start: 2018-11-05 | End: 2018-11-05

## 2018-11-05 RX ORDER — LIDOCAINE HYDROCHLORIDE 20 MG/ML
20 INJECTION, SOLUTION INFILTRATION; PERINEURAL ONCE
Status: COMPLETED | OUTPATIENT
Start: 2018-11-05 | End: 2018-11-05

## 2018-11-05 RX ORDER — SODIUM CHLORIDE 9 MG/ML
75 INJECTION, SOLUTION INTRAVENOUS CONTINUOUS
Status: DISPENSED | OUTPATIENT
Start: 2018-11-05 | End: 2018-11-05

## 2018-11-05 RX ORDER — LIDOCAINE HYDROCHLORIDE 10 MG/ML
2 INJECTION, SOLUTION EPIDURAL; INFILTRATION; INTRACAUDAL; PERINEURAL ONCE
Status: COMPLETED | OUTPATIENT
Start: 2018-11-05 | End: 2018-11-05

## 2018-11-05 RX ORDER — OXYCODONE HYDROCHLORIDE 5 MG/1
2.5 TABLET ORAL
Status: DISCONTINUED | OUTPATIENT
Start: 2018-11-05 | End: 2018-11-07

## 2018-11-05 RX ORDER — SODIUM CHLORIDE 9 MG/ML
500 INJECTION, SOLUTION INTRAVENOUS ONCE
Status: COMPLETED | OUTPATIENT
Start: 2018-11-05 | End: 2018-11-05

## 2018-11-05 RX ORDER — MIDODRINE HYDROCHLORIDE 5 MG/1
5 TABLET ORAL
Status: DISCONTINUED | OUTPATIENT
Start: 2018-11-05 | End: 2018-11-06

## 2018-11-05 RX ORDER — ACETAMINOPHEN 325 MG/1
650 TABLET ORAL 3 TIMES DAILY
Status: DISCONTINUED | OUTPATIENT
Start: 2018-11-05 | End: 2018-11-08 | Stop reason: HOSPADM

## 2018-11-05 RX ORDER — HYDROCORTISONE SODIUM SUCCINATE 100 MG/2ML
50 INJECTION, POWDER, FOR SOLUTION INTRAMUSCULAR; INTRAVENOUS 3 TIMES DAILY
Status: DISCONTINUED | OUTPATIENT
Start: 2018-11-05 | End: 2018-11-05

## 2018-11-05 RX ORDER — LIDOCAINE HYDROCHLORIDE 10 MG/ML
2 INJECTION, SOLUTION EPIDURAL; INFILTRATION; INTRACAUDAL; PERINEURAL ONCE
Status: DISPENSED | OUTPATIENT
Start: 2018-11-05 | End: 2018-11-05

## 2018-11-05 RX ORDER — DEXAMETHASONE SODIUM PHOSPHATE 10 MG/ML
10 INJECTION INTRAMUSCULAR; INTRAVENOUS ONCE
Status: DISPENSED | OUTPATIENT
Start: 2018-11-05 | End: 2018-11-05

## 2018-11-05 RX ADMIN — ACETAMINOPHEN 650 MG: 325 TABLET ORAL at 21:19

## 2018-11-05 RX ADMIN — Medication 10 ML: at 21:19

## 2018-11-05 RX ADMIN — ACETAMINOPHEN 650 MG: 325 TABLET ORAL at 15:02

## 2018-11-05 RX ADMIN — CALCIUM CARBONATE 500 MG (1,250 MG)-VITAMIN D3 200 UNIT TABLET 1 TABLET: at 14:50

## 2018-11-05 RX ADMIN — PRAVASTATIN SODIUM 80 MG: 40 TABLET ORAL at 21:19

## 2018-11-05 RX ADMIN — MIDODRINE HYDROCHLORIDE 5 MG: 5 TABLET ORAL at 14:50

## 2018-11-05 RX ADMIN — HYDROCORTISONE SODIUM SUCCINATE 50 MG: 100 INJECTION, POWDER, FOR SOLUTION INTRAMUSCULAR; INTRAVENOUS at 21:19

## 2018-11-05 RX ADMIN — Medication 10 ML: at 05:52

## 2018-11-05 RX ADMIN — PANTOPRAZOLE SODIUM 40 MG: 40 TABLET, DELAYED RELEASE ORAL at 14:50

## 2018-11-05 RX ADMIN — SODIUM CHLORIDE 500 ML: 900 INJECTION, SOLUTION INTRAVENOUS at 10:56

## 2018-11-05 RX ADMIN — MIDODRINE HYDROCHLORIDE 5 MG: 5 TABLET ORAL at 16:55

## 2018-11-05 RX ADMIN — DEXAMETHASONE SODIUM PHOSPHATE 20 MG: 10 INJECTION, SOLUTION INTRAMUSCULAR; INTRAVENOUS at 11:49

## 2018-11-05 RX ADMIN — VITAMIN D, TAB 1000IU (100/BT) 1000 UNITS: 25 TAB at 14:50

## 2018-11-05 RX ADMIN — Medication 10 ML: at 15:02

## 2018-11-05 RX ADMIN — LIDOCAINE HYDROCHLORIDE 2 ML: 10 INJECTION, SOLUTION EPIDURAL; INFILTRATION; INTRACAUDAL; PERINEURAL at 11:49

## 2018-11-05 RX ADMIN — LIDOCAINE HYDROCHLORIDE 200 MG: 20 INJECTION, SOLUTION INFILTRATION; PERINEURAL at 11:49

## 2018-11-05 RX ADMIN — SODIUM CHLORIDE 500 ML: 900 INJECTION, SOLUTION INTRAVENOUS at 08:24

## 2018-11-05 NOTE — PROGRESS NOTES
Spiritual Care Assessment/Progress Note  [facility]      NAME: Alicia Richardson      MRN: [id number]  AGE: 80 y.o. SEX: female  Mu-ism Affiliation: Denominational   Language: English     11/5/2018     Total Time (in minutes): 20     Spiritual Assessment begun in [facility] through conversation with:         [x]Patient        [] Family    [] Friend(s)        Reason for Consult: Palliative Care, Initial/Spiritual Assessment     Spiritual beliefs: (Please include comment if needed)     [] Identifies with a kyle tradition:         [] Supported by a kyle community:            [] Claims no spiritual orientation:           [] Seeking spiritual identity:                [] Adheres to an individual form of spirituality:           [x] Not able to assess:                           Identified resources for coping:      [] Prayer                               [] Music                  [] Guided Imagery     [] Family/friends                 [] Pet visits     [] Devotional reading                         [x] Unknown     [] Other:                                          Interventions offered during this visit: (See comments for more details)    Patient Interventions: Prayer (assurance of), Iconic (affirming the presence of God/Higher Power)           Plan of Care:     [] Support spiritual and/or cultural needs    [] Support AMD and/or advance care planning process      [] Support grieving process   [] Coordinate Rites and/or Rituals    [] Coordination with community clergy   [] No spiritual needs identified at this time   [] Detailed Plan of Care below (See Comments)  [] Make referral to Music Therapy  [] Make referral to Pet Therapy     [] Make referral to Addiction services  [] Make referral to [facility]  [] Make referral to Spiritual Care Partner  [] No future visits requested        [] Follow up visits as needed     Comments: After a knock on the door, the patient appeared to have been sleeping. An introduction and purpose of visit explanation were provided to the patient. The patient appeared to have a difficult time keeping her eyes open. After explaining that as , spiritual care is provided on patient. The patient appeared to understand the purpose of the visit and declined any support.     [address] [facility], [place], [address] [facility] Assistance Page [phone])

## 2018-11-05 NOTE — PROGRESS NOTES
ORTHO  SPINE PROGRESS NOTE    2018  Admit Date: 2018  Admit Diagnosis: Back pain  Procedure:   Post Op day: * No surgery found *    Subjective:     Dayne Tavarez is a patient who has complaints Of back pain. We were consulted on this lady over the weekend for back pain with fractured hardware. She has history of having thoracolumbar spine fusion done with Dr. Tony Adhikari in  and admitted with intractable back pain. She was evaluated by Dr. Nicole Torres on Saturday and order placed for IR to perform bilateral SI joint injections. She has met with case management and patient has been accepted [facility],[address]. Review of Systems: A review of systems was negative. Objective:     PT/OT:   Distance Ambulated:           Time Ambulated (min):        Ambulation Response: Activity Response: Fairly tolerated  Assistive Device:              Assistive Device: Walker (comment), Fall prevention device    Vital Signs:    Blood pressure (!) 68/47, pulse 85, temperature 98.3 °F (36.8 °C), resp. rate 16, weight 71.7 kg (158 lb), SpO2 93 %. Temp (24hrs), Av.3 °F (37.4 °C), Min:98.3 °F (36.8 °C), Max:100.1 °F (37.8 °C)      No intake/output data recorded.    1901 -  0700  In: -   Out: 1 [Urine:475]    LAB:    Recent Labs     18  0530   HGB 9.6*   WBC 8.9          Wound/Drain Assessment:  Drain:      Dressing:     Physical Exam:  Neurological: no deficit  5/5 strength bilateral lower extremity    Assessment:      Patient Active Problem List   Diagnosis Code    GERD (gastroesophageal reflux disease) K21.9    Gastric ulcer K25.9    Lewis's esophagus without dysplasia K22.70    Lewis's esophagus K22.70    Diarrhea R19.7    Ovarian cancer ([facility] [address] C56.9    Insomnia G47.00    Essential hypertension I10    Hypomagnesemia E83.42    Hypokalemia E87.6    Mixed hyperlipidemia E78.2    Thoracogenic scoliosis M41.30    Foot drop, bilateral M21.371, M21.372    Age-related cataract of both eyes H25.9    Previous back surgery Z98.890    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    History of replacement of both shoulder joints Z96.611, Z96.612    Neuropathy G62.9    Back pain M54.9       Plan:     Continue PT/OT/Rehab  Discontinue:   Consult: PT  and OT   order placed for bilateral SI joint injection with intervention Radiology         Signed By: PEGGY Alvarez

## 2018-11-05 NOTE — PROGRESS NOTES
Problem: Falls - Risk of  Goal: *Absence of Falls  Document Babs Fall Risk and appropriate interventions in the flowsheet.   Outcome: Resolved/Met Date Met: 11/05/18  Fall Risk Interventions:  Mobility Interventions: Assess mobility with egress test, Communicate number of staff needed for ambulation/transfer, OT consult for ADLs, Patient to call before getting OOB, PT Consult for mobility concerns, PT Consult for assist device competence, Strengthening exercises (ROM-active/passive), Utilize walker, cane, or other assistive device, Utilize gait belt for transfers/ambulation         Medication Interventions: Assess postural VS orthostatic hypotension, Evaluate medications/consider consulting pharmacy, Patient to call before getting OOB, Teach patient to arise slowly, Utilize gait belt for transfers/ambulation    Elimination Interventions: Call light in reach, Elevated toilet seat, Patient to call for help with toileting needs, Toilet paper/wipes in reach, Toileting schedule/hourly rounds    History of Falls Interventions: Consult care management for discharge planning, Door open when patient unattended, Evaluate medications/consider consulting pharmacy, Room close to nurse's station, Utilize gait belt for transfer/ambulation

## 2018-11-05 NOTE — CONSULTS
Palliative Medicine Consult  Tien: [phone] ([phone]    Patient Name: Kurt Gomez  YOB: 1933    Date of Initial Consult: 11/5/18  Reason for Consult: pain management  Requesting Provider: Dr. Shey Bullock  Primary Care Physician: Adeola Leon MD     SUMMARY:   Kurt Gomez is a 80 y.o. with a past history of scoliosis/arthritis s/p extensive thoracolumbar fusion 2002, now with broken hardware, s/p LUCY 12/2013 (effective), neuropathy since knee surgery 2009, hx ovarian cancer s/p chemo 9 months, GERD/Lewis's esophagus, gastroparesis followed by Dr. Robina Lama, who was admitted on 11/1/2018 from home with a diagnosis of 1 week of intractable back pain started when sliding across xray table for GI xray, not relieved with outpatient pain management. Current medical issues leading to Palliative Medicine involvement include: intractable back pain, has been on morphine PCA since admission, now s/p bilateral SI joint steroid injection this morning.,  Has had trouble with hypotension last 24-48 hours. Patient is . She lives in [facility]. Has 3 adult daughters Isreal Aguilar. PALLIATIVE DIAGNOSES:   1. Lower back pain 3/10, improved since admission  2. Hypotension  3. Chronic neuropathic pain  4. Constipation due to pain medications       PLAN:   1. Pain  1. Morphine PCA d/funmi  2. Hoping for some pain relief from Bilateral SI joint injection this morning. (pain currently 3/10 - much improved from admission)  3. Tylenol 650 mg PO TID  4. If she needs something stronger than tylenol, would suggest trying oxycodone 2.5mg PO Q4h PRN  2. Hypotension ? Medication related ? Morphine, elavil. 1. Morphine has already been stopped. 2. IVF bolus given  3. Hold elavil for now (she takes this chronically for insomnia)  3. Bowel regimen - titrate as needed  4. Initial consult note routed to primary continuity provider  5.  Communicated plan of care with: Palliative IDT       GOALS OF CARE / TREATMENT PREFERENCES:     GOALS OF CARE:  Patient/Health Care Proxy Stated Goals: Prolong life      TREATMENT PREFERENCES:   Code Status: Full Code    Advance Care Planning:  Advance Care Planning 11/3/2018   Patient's Healthcare Decision Maker is: -   Primary Decision Maker Name -   Primary Decision Maker Phone Number -   Primary Decision Maker Relationship to Patient -   Confirm Advance Directive None       Medical Interventions: Full interventions   Other Instructions: Other:    As far as possible, the palliative care team has discussed with patient / health care proxy about goals of care / treatment preferences for patient. HISTORY:     History obtained from: chart, patient    CHIEF COMPLAINT: back pain    HPI/SUBJECTIVE:    The patient is:   [x] Verbal and participatory  [] Non-participatory due to:     80year old female, was scooting across table for abdominal xray when got some sharp lower back pain. Has tried a few things outpatient and nothing was helping (hydrocodone, no help, oxycodone no help, tramadol no help). Chronically she does not take any opioids for pain. She does take elavil at bedtime for insomnia and she takes neurontin for neuropathy. She is independent in caring for herself. She uses a walker, WC around house when needs hands free  And a scooter to get long distance to mailbox and to get her hair done.   Clinical Pain Assessment (nonverbal scale for severity on nonverbal patients):   Clinical Pain Assessment  Severity: 3  Location: lower back, midline  Character: sharp  Duration: one week  Effect: hard to move  Factors: worse with walking  Frequency: constant          Duration: for how long has pt been experiencing pain (e.g., 2 days, 1 month, years)  Frequency: how often pain is an issue (e.g., several times per day, once every few days, constant)     FUNCTIONAL ASSESSMENT:     Palliative Performance Scale (PPS):  PPS: 50 PSYCHOSOCIAL/SPIRITUAL SCREENING:     Palliative IDT has assessed this patient for cultural preferences / practices and a referral made as appropriate to needs (Cultural Services, Patient Advocacy, Ethics, etc.)    Advance Care Planning:  Advance Care Planning 11/3/2018   Patient's Healthcare Decision Maker is: -   Primary Decision Maker Name -   Primary Decision Maker Phone Number -   Primary Decision Maker Relationship to Patient -   Confirm Advance Directive None       Any spiritual / Buddhism concerns:  [] Yes /  [x] No    Caregiver Burnout:  [] Yes /  [x] No /  [] No Caregiver Present      Anticipatory grief assessment:   [x] Normal  / [] Maladaptive       ESAS Anxiety: Anxiety: 0    ESAS Depression: Depression: 0        REVIEW OF SYSTEMS:     Positive and pertinent negative findings in ROS are noted above in HPI. The following systems were [x] reviewed / [] unable to be reviewed as noted in HPI  Other findings are noted below. Systems: constitutional, ears/nose/mouth/throat, respiratory, gastrointestinal, genitourinary, musculoskeletal, integumentary, neurologic, psychiatric, endocrine. Positive findings noted below. Modified ESAS Completed by: provider   Fatigue: 5 Drowsiness: 0   Depression: 0 Pain: 3   Anxiety: 0 Nausea: 0   Anorexia: 0 Dyspnea: 0     Constipation: Yes     Stool Occurrence(s): 1        PHYSICAL EXAM:     From RN flowsheet:  Wt Readings from Last 3 Encounters:   11/01/18 71.7 kg (158 lb)   10/29/18 71.7 kg (158 lb)   08/16/18 69.4 kg (153 lb)     Blood pressure (!) 74/50, pulse 83, temperature 98.3 °F (36.8 °C), resp. rate 16, weight 71.7 kg (158 lb), SpO2 98 %.     Pain Scale 1: Visual  Pain Intensity 1: 0  Pain Onset 1: ongoing  Pain Location 1: Back  Pain Orientation 1: Posterior  Pain Description 1: Aching  Pain Intervention(s) 1: Encouraged PCA  Last bowel movement, if known:     Constitutional: pt is awake, lying in hospital bed NAD  Pleasant engaging in conversation   no respiratory distress  Insight normal, able to give medical history      HISTORY:     Active Problems:    Back pain (11/1/2018)      Past Medical History:   Diagnosis Date    Arthritis     Diarrhea 6/6/2016    Foot drop     Gastric ulcer 6/14/2012    Gastroparesis     GERD (gastroesophageal reflux disease)     High cholesterol     Hypertension     Neuropathy     Ovarian cancer ([facility] [address] 1986    chemo x 9 mos    Scoliosis     Stroke ([facility] [address] 2002    ? stroke with drop feet, per patient CT scans were negative      Past Surgical History:   Procedure Laterality Date    HX ORTHOPAEDIC      back    HX ORTHOPAEDIC      bilateral shoulder replacements    HX ORTHOPAEDIC      left knee replacement    HX LYDIA AND BSO  1986    HI EGD TRANSORAL BIOPSY SINGLE/MULTIPLE  1/26/2012         HI EGD TRANSORAL BIOPSY SINGLE/MULTIPLE  6/14/2012           History reviewed. No pertinent family history. History reviewed, no pertinent family history.   Social History     Tobacco Use    Smoking status: Never Smoker    Smokeless tobacco: Never Used   Substance Use Topics    Alcohol use: No     No Known Allergies   Current Facility-Administered Medications   Medication Dose Route Frequency    iopamidol (ISOVUE-M 200) 41 % contrast solution 3 mL  3 mL IntraSPINal ONCE    dexamethasone (PF) (DECADRON) injection 10 mg  10 mg Intra artICUlar ONCE    lidocaine (PF) (XYLOCAINE) 10 mg/mL (1 %) injection 2 mL  2 mL SubCUTAneous ONCE    iopamidol (ISOVUE-M 200) 41 % contrast solution 3 mL  3 mL IntraSPINal ONCE    midodrine (PROAMITINE) tablet 5 mg  5 mg Oral TID WITH MEALS    acetaminophen (TYLENOL) tablet 650 mg  650 mg Oral TID    oxyCODONE IR (ROXICODONE) tablet 2.5 mg  2.5 mg Oral Q4H PRN    polyethylene glycol (MIRALAX) packet 17 g  17 g Oral BID PRN    bisacodyl (DULCOLAX) suppository 10 mg  10 mg Rectal DAILY PRN    senna-docusate (PERICOLACE) 8.6-50 mg per tablet 1 Tab  1 Tab Oral DAILY    hydrALAZINE (APRESOLINE) tablet 25 mg  25 mg Oral Q8H PRN    famotidine (PEPCID) tablet 20 mg  20 mg Oral DAILY    enoxaparin (LOVENOX) injection 30 mg  30 mg SubCUTAneous DAILY    lidocaine 4 % patch 1 Patch  1 Patch TransDERmal Q24H    morphine  mg / 30 mL   IntraVENous CONTINUOUS    calcium-vitamin D (OS-GRUPO) 500 mg-200 unit tablet  1 Tab Oral DAILY    cholecalciferol (VITAMIN D3) tablet 1,000 Units  1,000 Units Oral DAILY    gabapentin (NEURONTIN) capsule 800 mg  800 mg Oral QID    loperamide (IMODIUM) capsule 2 mg  2 mg Oral QID PRN    magnesium oxide (MAG-OX) tablet 400 mg  400 mg Oral DAILY    therapeutic multivitamin (THERAGRAN) tablet 1 Tab  1 Tab Oral DAILY    sodium chloride (NS) flush 5-10 mL  5-10 mL IntraVENous Q8H    sodium chloride (NS) flush 5-10 mL  5-10 mL IntraVENous PRN    naloxone (NARCAN) injection 0.4 mg  0.4 mg IntraVENous PRN    ondansetron (ZOFRAN) injection 4 mg  4 mg IntraVENous Q4H PRN    nitroglycerin (NITROBID) 2 % ointment 1 Inch  1 Inch Topical Q6H PRN    pravastatin (PRAVACHOL) tablet 80 mg  80 mg Oral QHS    pantoprazole (PROTONIX) tablet 40 mg  40 mg Oral ACB          LAB AND IMAGING FINDINGS:     Lab Results   Component Value Date/Time    WBC 8.9 11/03/2018 05:30 AM    HGB 9.6 (L) 11/03/2018 05:30 AM    PLATELET 851 40/90/7919 05:30 AM     Lab Results   Component Value Date/Time    Sodium 135 (L) 11/03/2018 05:30 AM    Potassium 4.3 11/03/2018 05:30 AM    Chloride 102 11/03/2018 05:30 AM    CO2 27 11/03/2018 05:30 AM    BUN 34 (H) 11/03/2018 05:30 AM    Creatinine 1.26 (H) 11/03/2018 05:30 AM    Calcium 8.7 11/03/2018 05:30 AM    Magnesium 1.6 07/20/2018 12:00 AM      Lab Results   Component Value Date/Time    AST (SGOT) 17 11/03/2018 05:30 AM    Alk.  phosphatase 40 (L) 11/03/2018 05:30 AM    Protein, total 5.9 (L) 11/03/2018 05:30 AM    Albumin 2.8 (L) 11/03/2018 05:30 AM    Globulin 3.1 11/03/2018 05:30 AM     Lab Results   Component Value Date/Time    INR 1.3 (H) 01/24/2009 06:40 AM Prothrombin time 15.1 (H) 01/24/2009 06:40 AM      Lab Results   Component Value Date/Time    Iron 58 10/20/2017 11:49 AM    TIBC 266 09/06/2017 12:00 AM    Iron % saturation 29 09/06/2017 12:00 AM    Ferritin 24 09/06/2017 12:00 AM      No results found for: PH, PCO2, PO2  No components found for: [place]   Lab Results   Component Value Date/Time    CK 96 01/23/2009 10:50 AM    CK - MB 1.2 01/23/2009 10:50 AM                Total time:   Counseling / coordination time, spent as noted above:   > 50% counseling / coordination?:     Prolonged service was provided for  []30 min   []75 min in face to face time in the presence of the patient, spent as noted above. Time Start:   Time End:   Note: this can only be billed with 40796 (initial) or 80595 (follow up). If multiple start / stop times, list each separately.

## 2018-11-05 NOTE — PROGRESS NOTES
Physical Therapy  Attempting to see patient again this pm for PT. Patient remains hypotensive. Will defer therapy today and follow back tomorrow.   Thank you,  Maggy Carr, PT

## 2018-11-05 NOTE — PROGRESS NOTES
Physical Therapy  Patient hypotensive today and has recently returned from IR for SI injections. Will defer therapy at this time and follow back later today.   Thank you,  Ivelisse Mcnulty, PT

## 2018-11-05 NOTE — PROGRESS NOTES
Patient laying in bed . The daughter at bedside. Continuous pulse oximetry in place at bedside via dinamap . Blood pressure continues to be low at 75/46 HR 84. Dr. Devonte Roque notified and another NS bolus at 500 ml noted. Patient alert and oriented x 4 speech clear.

## 2018-11-05 NOTE — PROGRESS NOTES
Occupational Therapy  Chart reviewed. Pt from IR and remains hypotensive. Currently receiving boluses. Will defer at this time.  Perla Quintanilla, OT

## 2018-11-05 NOTE — PROGRESS NOTES
CM sent updated referral to [facility] per request. CM will continue to remain available for support, discharge planning as needed. 8:40 a.m.  CM received call from [facility] with [facility]. [facility] is able to accept. Anticipate discharge for tomorrow, 11/6/18. CM to discuss transportation needs with pt and family. No further questions or needs identified at this time. CM will continue to remain available for support, discharge planning as needed.      Jb Jones, MSW  [facility]  [phone]

## 2018-11-05 NOTE — INTERDISCIPLINARY ROUNDS
Bedside interdisciplinary rounds were held today to discuss patient plan of care and outcomes. The following members were present: Physician, Nurse, Clinical Care Leader, Pharmacy, Physical Therapy, and Case Management. Plan:  Lovenox, Protonix. Morphine PCA still infusing. PT/OT ordered - deferred today r/t IR and hypotension. Palliative consult for assistance with pain management. Dr. Amalia Gonzalez on board. [facility] at discharge.

## 2018-11-05 NOTE — ROUTINE PROCESS
Bedside and Verbal shift change report given to Shonna Gatica (oncoming nurse) by Manasa Hughes (offgoing nurse). Report included the following information SBAR, Kardex, Intake/Output, MAR and Recent Results.

## 2018-11-06 LAB
ARTERIAL PATENCY WRIST A: YES
ATRIAL RATE: 87 BPM
ATRIAL RATE: 98 BPM
BASE DEFICIT BLDA-SCNC: 4.5 MMOL/L
BASOPHILS # BLD: 0 K/UL (ref 0–0.1)
BASOPHILS NFR BLD: 0 % (ref 0–1)
BDY SITE: ABNORMAL
CALCULATED P AXIS, ECG09: 41 DEGREES
CALCULATED R AXIS, ECG10: -48 DEGREES
CALCULATED R AXIS, ECG10: -55 DEGREES
CALCULATED T AXIS, ECG11: 91 DEGREES
CALCULATED T AXIS, ECG11: 95 DEGREES
DIAGNOSIS, 93000: NORMAL
DIAGNOSIS, 93000: NORMAL
DIFFERENTIAL METHOD BLD: ABNORMAL
EOSINOPHIL # BLD: 0 K/UL (ref 0–0.4)
EOSINOPHIL NFR BLD: 0 % (ref 0–7)
ERYTHROCYTE [DISTWIDTH] IN BLOOD BY AUTOMATED COUNT: 14.6 % (ref 11.5–14.5)
GAS FLOW.O2 O2 DELIVERY SYS: 3 L/MIN
HCO3 BLDA-SCNC: 19 MMOL/L (ref 22–26)
HCT VFR BLD AUTO: 27.2 % (ref 35–47)
HGB BLD-MCNC: 8.4 G/DL (ref 11.5–16)
IMM GRANULOCYTES # BLD: 0 K/UL (ref 0–0.04)
IMM GRANULOCYTES NFR BLD AUTO: 0 % (ref 0–0.5)
LYMPHOCYTES # BLD: 0.6 K/UL (ref 0.8–3.5)
LYMPHOCYTES NFR BLD: 5 % (ref 12–49)
MCH RBC QN AUTO: 26.6 PG (ref 26–34)
MCHC RBC AUTO-ENTMCNC: 30.9 G/DL (ref 30–36.5)
MCV RBC AUTO: 86.1 FL (ref 80–99)
MONOCYTES # BLD: 0.3 K/UL (ref 0–1)
MONOCYTES NFR BLD: 3 % (ref 5–13)
NEUTS SEG # BLD: 10.7 K/UL (ref 1.8–8)
NEUTS SEG NFR BLD: 92 % (ref 32–75)
NRBC # BLD: 0 K/UL (ref 0–0.01)
NRBC BLD-RTO: 0 PER 100 WBC
P-R INTERVAL, ECG05: 174 MS
PCO2 BLDA: 32 MMHG (ref 35–45)
PH BLDA: 7.4 [PH] (ref 7.35–7.45)
PLATELET # BLD AUTO: 224 K/UL (ref 150–400)
PMV BLD AUTO: 10.6 FL (ref 8.9–12.9)
PO2 BLDA: 79 MMHG (ref 80–100)
Q-T INTERVAL, ECG07: 322 MS
Q-T INTERVAL, ECG07: 352 MS
QRS DURATION, ECG06: 122 MS
QRS DURATION, ECG06: 126 MS
QTC CALCULATION (BEZET), ECG08: 423 MS
QTC CALCULATION (BEZET), ECG08: 437 MS
RBC # BLD AUTO: 3.16 M/UL (ref 3.8–5.2)
RBC MORPH BLD: ABNORMAL
SAO2 % BLD: 96 % (ref 92–97)
SAO2% DEVICE SAO2% SENSOR NAME: ABNORMAL
SPECIMEN SITE: ABNORMAL
TROPONIN I SERPL-MCNC: 0.08 NG/ML
TROPONIN I SERPL-MCNC: <0.05 NG/ML
VENTRICULAR RATE, ECG03: 111 BPM
VENTRICULAR RATE, ECG03: 87 BPM
WBC # BLD AUTO: 11.6 K/UL (ref 3.6–11)

## 2018-11-06 PROCEDURE — 74011250636 HC RX REV CODE- 250/636: Performed by: INTERNAL MEDICINE

## 2018-11-06 PROCEDURE — 65270000029 HC RM PRIVATE

## 2018-11-06 PROCEDURE — 93005 ELECTROCARDIOGRAM TRACING: CPT

## 2018-11-06 PROCEDURE — 74011250637 HC RX REV CODE- 250/637: Performed by: INTERNAL MEDICINE

## 2018-11-06 PROCEDURE — 84484 ASSAY OF TROPONIN QUANT: CPT | Performed by: INTERNAL MEDICINE

## 2018-11-06 PROCEDURE — 77010033678 HC OXYGEN DAILY

## 2018-11-06 PROCEDURE — 94760 N-INVAS EAR/PLS OXIMETRY 1: CPT

## 2018-11-06 PROCEDURE — 82803 BLOOD GASES ANY COMBINATION: CPT | Performed by: NURSE PRACTITIONER

## 2018-11-06 PROCEDURE — 97116 GAIT TRAINING THERAPY: CPT

## 2018-11-06 PROCEDURE — 36600 WITHDRAWAL OF ARTERIAL BLOOD: CPT | Performed by: NURSE PRACTITIONER

## 2018-11-06 PROCEDURE — 36415 COLL VENOUS BLD VENIPUNCTURE: CPT | Performed by: INTERNAL MEDICINE

## 2018-11-06 PROCEDURE — 97530 THERAPEUTIC ACTIVITIES: CPT

## 2018-11-06 RX ORDER — MIDODRINE HYDROCHLORIDE 5 MG/1
5 TABLET ORAL 2 TIMES DAILY WITH MEALS
Status: DISCONTINUED | OUTPATIENT
Start: 2018-11-06 | End: 2018-11-06

## 2018-11-06 RX ORDER — GABAPENTIN 100 MG/1
200 CAPSULE ORAL 2 TIMES DAILY
Status: DISCONTINUED | OUTPATIENT
Start: 2018-11-06 | End: 2018-11-08 | Stop reason: HOSPADM

## 2018-11-06 RX ORDER — GABAPENTIN 300 MG/1
300 CAPSULE ORAL
Status: DISCONTINUED | OUTPATIENT
Start: 2018-11-06 | End: 2018-11-08 | Stop reason: HOSPADM

## 2018-11-06 RX ADMIN — MIDODRINE HYDROCHLORIDE 5 MG: 5 TABLET ORAL at 09:18

## 2018-11-06 RX ADMIN — CALCIUM CARBONATE 500 MG (1,250 MG)-VITAMIN D3 200 UNIT TABLET 1 TABLET: at 09:17

## 2018-11-06 RX ADMIN — GABAPENTIN 200 MG: 100 CAPSULE ORAL at 13:22

## 2018-11-06 RX ADMIN — OXYCODONE HYDROCHLORIDE 2.5 MG: 5 TABLET ORAL at 20:39

## 2018-11-06 RX ADMIN — ENOXAPARIN SODIUM 30 MG: 30 INJECTION, SOLUTION INTRAVENOUS; SUBCUTANEOUS at 09:19

## 2018-11-06 RX ADMIN — GABAPENTIN 300 MG: 300 CAPSULE ORAL at 20:08

## 2018-11-06 RX ADMIN — OXYCODONE HYDROCHLORIDE 2.5 MG: 5 TABLET ORAL at 09:34

## 2018-11-06 RX ADMIN — OXYCODONE HYDROCHLORIDE 2.5 MG: 5 TABLET ORAL at 14:02

## 2018-11-06 RX ADMIN — PRAVASTATIN SODIUM 80 MG: 40 TABLET ORAL at 21:15

## 2018-11-06 RX ADMIN — ACETAMINOPHEN 650 MG: 325 TABLET ORAL at 21:14

## 2018-11-06 RX ADMIN — ACETAMINOPHEN 650 MG: 325 TABLET ORAL at 09:19

## 2018-11-06 RX ADMIN — MIDODRINE HYDROCHLORIDE 5 MG: 5 TABLET ORAL at 17:29

## 2018-11-06 RX ADMIN — Medication 10 ML: at 13:23

## 2018-11-06 RX ADMIN — THERA TABS 1 TABLET: TAB at 09:17

## 2018-11-06 RX ADMIN — ACETAMINOPHEN 650 MG: 325 TABLET ORAL at 17:29

## 2018-11-06 RX ADMIN — PANTOPRAZOLE SODIUM 40 MG: 40 TABLET, DELAYED RELEASE ORAL at 09:17

## 2018-11-06 RX ADMIN — Medication 10 ML: at 06:09

## 2018-11-06 RX ADMIN — VITAMIN D, TAB 1000IU (100/BT) 1000 UNITS: 25 TAB at 09:18

## 2018-11-06 RX ADMIN — Medication 400 MG: at 09:17

## 2018-11-06 NOTE — PROGRESS NOTES
Called by RN regarding paroxysmal afib and ventricular bigeminy. Patient with Hypotension earlier in the day for which her Morphine PCA was stopped and she was given IVF's and midodrine was started. Patient's most recent /57 and HR 59 on telemetry, sinus bradycardia per report. RN noted dysrhythmias after solucortef was started for pain per review of daily progress note. Patient also s/p SI joint Corticosteroid injection earlier today in IR. I encouraged RN to obtain the labs ordered at 17:30 on 11/5 (CBC, BMP and Mg) and I discontinued the solucortef.

## 2018-11-06 NOTE — PROGRESS NOTES
Patient on 2L O2 per NC. Patient on continuous O2 monitoring, probe attached to toe due to patient having shellac nail polish. Sats decreasing from 85% to 78% to 73% to a low of 61%. Patient resp increasing to 28-30 per min. Personal oximeter used on right index finger where half of nail visible under shellac. Poor reading obtained, momentary 81% viewed. With patient history of DVT, increase in respirations, and low O2 readings, Rapid Response called. Forehead O2 monitor tubed from CCU. Blood gases ordered and drawn. STAT EKG ordered and obtained. Dr. Mo Mathews paged and informed of RR call.       Forehead monitor resulted in much more accurate reading, 96% on 4L O2 per NC.

## 2018-11-06 NOTE — PROGRESS NOTES
responded to Rapid Response Team (RRT) request.  Patient lying in bed while staff are providing care. No family members are present. Provided ministry of presence and spiritual listening. Will continue to follow up as needed and upon request.    Visited by Rev. Felix Enamorado, [address] [place] [address] Road paging service: [phone])

## 2018-11-06 NOTE — PROGRESS NOTES
Called with following information provided by RN: \"patient appears to be in sustained controlled afib. bp 95/56, resting hr , had an episode of diaphoresis ([id number]-143). at this time, skin is warm/dry. with c/o occasional sob. 95% on 2l nc. patient does not appear to be in distress at this time. labs sent as per order. \" Interval labs (seen late last evening without significant interval change (Leukocytosis may be related to corticosteroids and slight decrease in Hgb may be related to IVFs). Serial troponin measurement sent as well as EKG.  Routine Cardiology consult to be called later this am.

## 2018-11-06 NOTE — PROGRESS NOTES
Bedside and Verbal shift change report given to noni SHRESTHA (oncoming nurse) by Liliana Nevarez (offgoing nurse). Report included the following information SBAR, Kardex, Accordion, Recent Results, Med Rec Status and Cardiac Rhythm afib.

## 2018-11-06 NOTE — CONSULTS
Palliative Medicine Consult  Tien: [phone] ([phone])    Patient Name: Kulwant Villaseñor  YOB: 1933    Date of Initial Consult: 11/5/18  Reason for Consult: pain management  Requesting Provider: Dr. Alana Ramos  Primary Care Physician: Fausto Mcgovern MD     SUMMARY:   Kulwant Villaseñor is a 80 y.o. with a past history of scoliosis/arthritis s/p extensive thoracolumbar fusion 2002, now with broken hardware, s/p LUCY 12/2013 (effective), neuropathy since knee surgery 2009, hx ovarian cancer s/p chemo 9 months, GERD/Lewis's esophagus, gastroparesis followed by Dr. Vannessa Louis, who was admitted on 11/1/2018 from home with a diagnosis of 1 week of intractable back pain started when sliding across xray table for GI xray, not relieved with outpatient pain management. Current medical issues leading to Palliative Medicine involvement include: intractable back pain, has been on morphine PCA since admission, now s/p bilateral SI joint steroid injection this morning.,  Has had trouble with hypotension last 24-48 hours. Patient is . She lives in [facility]. Has 3 adult daughters Hermann Grajeda. PALLIATIVE DIAGNOSES:   1. Lower back pain 3/10, improved since admission  2. Hypotension  3. Chronic neuropathic pain , feet  4. Constipation due to pain medications       PLAN:   1. Pain- well controlled in back with current regimen  1. S/p bilateral SI joint injection 911/5/18)  2. Continue Tylenol 650 mg PO TID  3. Continue oxycodone 2.5mg POQ4hr PRN (give before PT/OT). She reports this as effective, no issues with sedation. Recommend continuing this for SNF and let it be titrated off there as her pain improves. Patient educated to watch for constipation. 2. Hypotension yesterday (resolved with stopping morphine, elavil). She takes elavil chronically at [facility] for insomnia. I recommend this be put on hold permanently and see how she does.   She might not need it anymore and it was likely part of her hypotension issues. We discussed how it's always good to re-examine the med list and minimize as possible. 3. Neuropathy is much worse today without the gabapentin. Recommend restarting at home dose. (it was stopped yesterday I think during hypotension)   4. Bowel regimen - titrate as needed  5. Initial consult note routed to primary continuity provider  6. Communicated plan of care with: Palliative IDT    Will sign off- please call as needed. GOALS OF CARE / TREATMENT PREFERENCES:     GOALS OF CARE:  Patient/Health Care Proxy Stated Goals: Prolong life      TREATMENT PREFERENCES:   Code Status: Full Code    Advance Care Planning:  Advance Care Planning 11/3/2018   Patient's Healthcare Decision Maker is: -   Primary Decision Maker Name -   Primary Decision Maker Phone Number -   Primary Decision Maker Relationship to Patient -   Confirm Advance Directive None       Medical Interventions: Full interventions   Other Instructions: Other:    As far as possible, the palliative care team has discussed with patient / health care proxy about goals of care / treatment preferences for patient. HISTORY:     History obtained from: chart, patient    CHIEF COMPLAINT: back pain    HPI/SUBJECTIVE:    The patient is:   [x] Verbal and participatory  [] Non-participatory due to:     80year old female, was scooting across table for abdominal xray when got some sharp lower back pain. Has tried a few things outpatient and nothing was helping (hydrocodone, no help, oxycodone no help, tramadol no help). Chronically she does not take any opioids for pain. She does take elavil at bedtime for insomnia and she takes neurontin for neuropathy. She is independent in caring for herself. She uses a walker, WC around house when needs hands free  And a scooter to get long distance to mailbox and to get her hair done.   Clinical Pain Assessment (nonverbal scale for severity on nonverbal patients):   Clinical Pain Assessment  Severity: 3  Location: lower back, midline  Character: sharp  Duration: one week  Effect: hard to move  Factors: worse with walking  Frequency: constant          Duration: for how long has pt been experiencing pain (e.g., 2 days, 1 month, years)  Frequency: how often pain is an issue (e.g., several times per day, once every few days, constant)     FUNCTIONAL ASSESSMENT:     Palliative Performance Scale (PPS):  PPS: 50       PSYCHOSOCIAL/SPIRITUAL SCREENING:     Palliative IDT has assessed this patient for cultural preferences / practices and a referral made as appropriate to needs (Cultural Services, Patient Advocacy, Ethics, etc.)    Advance Care Planning:  Advance Care Planning 11/3/2018   Patient's Healthcare Decision Maker is: -   Primary Decision Maker Name -   Primary Decision Maker Phone Number -   Primary Decision Maker Relationship to Patient -   Confirm Advance Directive None       Any spiritual / Denominational concerns:  [] Yes /  [x] No    Caregiver Burnout:  [] Yes /  [x] No /  [] No Caregiver Present      Anticipatory grief assessment:   [x] Normal  / [] Maladaptive       ESAS Anxiety: Anxiety: 0    ESAS Depression: Depression: 0        REVIEW OF SYSTEMS:     Positive and pertinent negative findings in ROS are noted above in HPI. The following systems were [x] reviewed / [] unable to be reviewed as noted in HPI  Other findings are noted below. Systems: constitutional, ears/nose/mouth/throat, respiratory, gastrointestinal, genitourinary, musculoskeletal, integumentary, neurologic, psychiatric, endocrine. Positive findings noted below.   Modified ESAS Completed by: provider   Fatigue: 5 Drowsiness: 0   Depression: 0 Pain: 3   Anxiety: 0 Nausea: 0   Anorexia: 0 Dyspnea: 0     Constipation: Yes     Stool Occurrence(s): 1        PHYSICAL EXAM:     From RN flowsheet:  Wt Readings from Last 3 Encounters:   11/01/18 71.7 kg (158 lb)   10/29/18 71.7 kg (158 lb) 08/16/18 69.4 kg (153 lb)     Blood pressure 116/67, pulse 84, temperature 98.3 °F (36.8 °C), resp. rate 16, weight 71.7 kg (158 lb), SpO2 98 %. Pain Scale 1: Numeric (0 - 10)  Pain Intensity 1: 6  Pain Onset 1: surgery & prior  Pain Location 1: Back  Pain Orientation 1: Lower  Pain Description 1: Aching  Pain Intervention(s) 1: Medication (see MAR), Repositioned  Last bowel movement, if known:     Constitutional: pt is awake, lying in hospital bed NAD  Pleasant engaging in conversation   no respiratory distress  Insight normal, able to give medical history      HISTORY:     Active Problems:    Back pain (11/1/2018)      Past Medical History:   Diagnosis Date    Arthritis     Diarrhea 6/6/2016    Foot drop     Gastric ulcer 6/14/2012    Gastroparesis     GERD (gastroesophageal reflux disease)     High cholesterol     Hypertension     Neuropathy     Ovarian cancer ([facility] [address] 1986    chemo x 9 mos    Scoliosis     Stroke ([facility] [address] 2002    ? stroke with drop feet, per patient CT scans were negative      Past Surgical History:   Procedure Laterality Date    HX ORTHOPAEDIC      back    HX ORTHOPAEDIC      bilateral shoulder replacements    HX ORTHOPAEDIC      left knee replacement    HX LYDIA AND BSO  1986    DC EGD TRANSORAL BIOPSY SINGLE/MULTIPLE  1/26/2012         DC EGD TRANSORAL BIOPSY SINGLE/MULTIPLE  6/14/2012           History reviewed. No pertinent family history. History reviewed, no pertinent family history.   Social History     Tobacco Use    Smoking status: Never Smoker    Smokeless tobacco: Never Used   Substance Use Topics    Alcohol use: No     No Known Allergies   Current Facility-Administered Medications   Medication Dose Route Frequency    midodrine (PROAMITINE) tablet 5 mg  5 mg Oral BID WITH MEALS    acetaminophen (TYLENOL) tablet 650 mg  650 mg Oral TID    oxyCODONE IR (ROXICODONE) tablet 2.5 mg  2.5 mg Oral Q4H PRN    polyethylene glycol (MIRALAX) packet 17 g  17 g Oral BID PRN  bisacodyl (DULCOLAX) suppository 10 mg  10 mg Rectal DAILY PRN    senna-docusate (PERICOLACE) 8.6-50 mg per tablet 1 Tab  1 Tab Oral DAILY    enoxaparin (LOVENOX) injection 30 mg  30 mg SubCUTAneous DAILY    calcium-vitamin D (OS-GRUPO) 500 mg-200 unit tablet  1 Tab Oral DAILY    cholecalciferol (VITAMIN D3) tablet 1,000 Units  1,000 Units Oral DAILY    loperamide (IMODIUM) capsule 2 mg  2 mg Oral QID PRN    magnesium oxide (MAG-OX) tablet 400 mg  400 mg Oral DAILY    therapeutic multivitamin (THERAGRAN) tablet 1 Tab  1 Tab Oral DAILY    sodium chloride (NS) flush 5-10 mL  5-10 mL IntraVENous Q8H    sodium chloride (NS) flush 5-10 mL  5-10 mL IntraVENous PRN    naloxone (NARCAN) injection 0.4 mg  0.4 mg IntraVENous PRN    ondansetron (ZOFRAN) injection 4 mg  4 mg IntraVENous Q4H PRN    pravastatin (PRAVACHOL) tablet 80 mg  80 mg Oral QHS    pantoprazole (PROTONIX) tablet 40 mg  40 mg Oral ACB          LAB AND IMAGING FINDINGS:     Lab Results   Component Value Date/Time    WBC 11.6 (H) 11/05/2018 11:15 PM    HGB 8.4 (L) 11/05/2018 11:15 PM    PLATELET 650 27/56/4573 11:15 PM     Lab Results   Component Value Date/Time    Sodium 137 11/05/2018 11:15 PM    Potassium 4.3 11/05/2018 11:15 PM    Chloride 107 11/05/2018 11:15 PM    CO2 25 11/05/2018 11:15 PM    BUN 44 (H) 11/05/2018 11:15 PM    Creatinine 1.20 (H) 11/05/2018 11:15 PM    Calcium 8.2 (L) 11/05/2018 11:15 PM    Magnesium 2.0 11/05/2018 11:15 PM      Lab Results   Component Value Date/Time    AST (SGOT) 14 (L) 11/05/2018 11:15 PM    Alk.  phosphatase 40 (L) 11/05/2018 11:15 PM    Protein, total 5.6 (L) 11/05/2018 11:15 PM    Albumin 2.2 (L) 11/05/2018 11:15 PM    Globulin 3.4 11/05/2018 11:15 PM     Lab Results   Component Value Date/Time    INR 1.3 (H) 01/24/2009 06:40 AM    Prothrombin time 15.1 (H) 01/24/2009 06:40 AM      Lab Results   Component Value Date/Time    Iron 58 10/20/2017 11:49 AM    TIBC 266 09/06/2017 12:00 AM    Iron % saturation 29 09/06/2017 12:00 AM    Ferritin 24 09/06/2017 12:00 AM      Lab Results   Component Value Date/Time    pH 7.40 11/06/2018 09:55 AM    PCO2 32 (L) 11/06/2018 09:55 AM    PO2 79 (L) 11/06/2018 09:55 AM     No components found for: [place]   Lab Results   Component Value Date/Time    CK 96 01/23/2009 10:50 AM    CK - MB 1.2 01/23/2009 10:50 AM                Total time:   Counseling / coordination time, spent as noted above:   > 50% counseling / coordination?:     Prolonged service was provided for  []30 min   []75 min in face to face time in the presence of the patient, spent as noted above. Time Start:   Time End:   Note: this can only be billed with 09619 (initial) or 60725 (follow up). If multiple start / stop times, list each separately.

## 2018-11-06 NOTE — PROGRESS NOTES
Hospitalist Progress Note    NAME: Joellen Davis   :  10/9/1933   MRN:  [id number]       Assessment / Plan:  80 y.o.  female with pmh of gastroparesis, GERD, HTN, ovarian cancer, scoliosis, severe arthritis and prior CVA who developed severe back pain a week before presentation and was treated with prednisone, hydrocortisone and oxycodone as outpatient. She presented to ED with severe back pain. Acute intractable back pain in setting of scoliosis and arthritis s/p extensive thoracolumbar fusion  with broken hardware, +neuropathy since knee surgery :    Pt has failed steroids, hydrocodone and oxycodone for pain mgmt.    - CT L spine with no acute process. Fractured thoracolumbar fusion rods, stable. Protruding pedicle screws, stable. Neural foraminal stenoses as described above. - morphine PCA at admit but stopped due to persistent hypotension on .    - : Pt udnerwent bilateral sacroiliac joint steroid injection by IR. Pain is not controlled yet. Palliative team consulted, recommendations reviewed and agreed. Oxycodone, tylenol prn and gabapentin. Persistent hypotension  Multifactorial due to morphine PCA, elavil, dehydration due to poor oral intake. : Patient with BP in the 70/40s that slightly improved after IV fluid bolus. IV hydrocortisone and midodrine added. Holding morphine PCA, elavil, gabapentin  Echo minnie telemetry ordered. She received IV hydrocortisone for possible secondary AI but developed tachycardia and dysrrhytmia. : BP is better. Change midodrine to bid and might stop it tomorrow.      Hypertension, benign/essential  Now hypotensive  Holding home lisinopril and HCTZ    GERD and gastroparesis:   On home PPI and H2 blocker   - six small meals    Hyperlipidemia: con't statin    Hyperlipidemia: on statin  Body mass index is 30.86 kg/m².: 30.0 - 39.9 Obese  Code status: Full  Prophylaxis: Lovenox  Recommended Disposition: skilled facility, (refer to CM note)     Subjective:     Chief Complaint / Reason for Physician Visit  Patient has no complains. No CP. No SOB. No diarrhea. No vomiting. Tolerating diet well. Review of Systems:  Symptom Y/N Comments  Symptom Y/N Comments   Fever/Chills n   Chest Pain n    Poor Appetite    Edema n    Cough n   Abdominal Pain n    Sputum n   Joint Pain      SOB/LEVINE n   Pruritis/Rash     Nausea/vomit n   Tolerating PT/OT     Diarrhea n   Tolerating Diet y      Constipation    Other       Could NOT obtain due to:      Objective:     VITALS:   Last 24hrs VS reviewed since prior progress note. Most recent are:  Patient Vitals for the past 24 hrs:   Temp Pulse Resp BP SpO2   11/06/18 1500 -- 85 -- 127/70 99 %   11/06/18 1126 98.3 °F (36.8 °C) 84 16 116/67 98 %   11/06/18 1001 98 °F (36.7 °C) 92 18 108/59 95 %   11/06/18 0952 -- -- -- -- 95 %   11/06/18 0947 96.7 °F (35.9 °C) 91 24 130/73 (!) 81 %   11/06/18 0943 96.7 °F (35.9 °C) 91 24 130/73 (!) 81 %   11/06/18 0840 97.9 °F (36.6 °C) (!) 106 16 100/58 97 %   11/06/18 0400 98.3 °F (36.8 °C) 96 16 90/67 96 %   11/06/18 0026 97.7 °F (36.5 °C) (!) 53 16 94/49 94 %   11/05/18 1941 97.8 °F (36.6 °C) 65 16 96/50 97 %       Intake/Output Summary (Last 24 hours) at 11/6/2018 1814  Last data filed at 11/6/2018 0857  Gross per 24 hour   Intake 300 ml   Output --   Net 300 ml        PHYSICAL EXAM:  General: Alert, cooperative, no acute pain or distress    EENT:  Anicteric sclerae. Resp:  CTA bilaterally, no wheezing or rales. CV:  Regular rate,  S1S2  GI:  Soft, Non distended, Non tender.  +Bowel sounds  Neurologic:  Alert and oriented X 3, normal speech.     Reviewed most current lab test results and cultures  YES  Reviewed most current radiology test results   YES  Review and summation of old records today    NO  Reviewed patient's current orders and MAR    YES  PMH/SH reviewed - no change compared to H&P  ________________________________________________________________________  ________________________________________________________________________  Carly Hameed MD       Recent Labs     11/05/18  2315   WBC 11.6*   HGB 8.4*   HCT 27.2*        Recent Labs     11/05/18 2315      K 4.3      CO2 25   *   BUN 44*   CREA 1.20*   CA 8.2*   MG 2.0   ALB 2.2*   TBILI 0.3   SGOT 14*   ALT 14       Signed: Carly Hameed MD

## 2018-11-06 NOTE — PROGRESS NOTES
Problem: Mobility Impaired (Adult and Pediatric)  Goal: *Acute Goals and Plan of Care (Insert Text)  Physical Therapy Goals  Initiated 11/2/2018  1. Patient will move from supine to sit and sit to supine , scoot up and down and roll side to side in bed with independence within 7 day(s). 2.  Patient will transfer from bed to chair and chair to bed with modified independence using the least restrictive device within 7 day(s). 3.  Patient will perform sit to stand with independence within 7 day(s). 4.  Patient will ambulate with modified independence for 375 feet with the least restrictive device within 7 day(s). physical Therapy TREATMENT  Patient: Carlin Aguirre (27 y.o. female)  Date: 11/6/2018  Diagnosis: Back pain <principal problem not specified>      Precautions: Fall  Chart, physical therapy assessment, plan of care and goals were reviewed. ASSESSMENT:  Patient resting in bed this afternoon awaiting PT. Pt had an episode this AM where she became diaphoretic and there was a rapid response. Pt now stable with no symptoms other than back pain which is better now with 50% oxycodone. Daughter in room. Patient anxious to get up to walk, and agreed to sit in the chair after ambulation, however was unable to sit in chair due to such increased pain and fatigue. Patient able to work with PT today as BP was stable. However, she needed more assist than previously, especially during her gait training. In addition, pt needed mod to max A to don her socks and AFOs in sitting. Pts endurance is poor; she gets SOB , HR to 115, increased pain and fatigue despite 2 standing rest breaks. Total distance 100 ft. Pulse o2 was 93 after walk. Patient will most likely need to go to the healthcare section of [facility] at discharge and will need therapy to be able [place]. Progression toward goals:  []    Improving appropriately and progressing toward goals  [x]    Improving slowly and progressing toward goals  []    Not making progress toward goals and plan of care will be adjusted     PLAN:  Patient continues to benefit from skilled intervention to address the above impairments. Continue treatment per established plan of care. Discharge Recommendations:  Rehab  Further Equipment Recommendations for Discharge:  none     SUBJECTIVE:   Patient stated I feel better because I had oxy. I must keep up with it. Lefty Brianash    OBJECTIVE DATA SUMMARY:   Critical Behavior:  Neurologic State: Alert  Orientation Level: Oriented X4  Cognition: Follows commands  Safety/Judgement: Awareness of environment, Fall prevention, Home safety, Insight into deficits  Functional Mobility Training:  Bed Mobility:     Supine to Sit: Minimum assistance  - pt needed cueing to help with pain and to use log roll              Transfers:  Sit to Stand: Contact guard assistance  Stand to Sit: Contact guard assistance                             Balance:  Sitting: Intact  Standing: Intact; With support  Ambulation/Gait Training:  Distance (ft): 100 Feet (ft)  Assistive Device: Walker, rolling  Ambulation - Level of Assistance: Minimal assistance        Gait Abnormalities: Antalgic, scoliosis and kyphotic   Patient has old foot drop and uses steppage gait with bilateral AFOs        Base of Support: Widened     Speed/Kaylynn: Pace decreased (<100 feet/min)  Step Length: Left shortened;Right shortened                 Neuro Re-Education:    Therapeutic Exercises:     Pain:  Pain Scale 1: Numeric (0 - 10)  Pain Intensity 1: 5  Pain Location 1: Back  Pain Orientation 1: Lower  Pain Description 1: Sharp  Pain Intervention(s) 1: Medication (see MAR); Position  Activity Tolerance:   Fair, limited endurance today with activity  Please refer to the flowsheet for vital signs taken during this treatment.   After treatment:   []    Patient left in no apparent distress sitting up in chair  [x]    Patient left in no apparent distress in bed  [x]    Call gambino left within reach  [x]    Nursing notified  [x]    Caregiver present  []    Bed alarm activated    COMMUNICATION/COLLABORATION:   The patients plan of care was discussed with: Physical Therapist and Registered Nurse    Jaimie Hebert, PT   Time Calculation: 40 mins

## 2018-11-06 NOTE — PROGRESS NOTES
Pt currently not medically stable for discharge. CM notified [facility] of discharge delay via [employer] and left a HIPPA compliant voicemail requesting a return call re: above information. CM will continue to remain available for support, discharge planing as needed. 10:55 a.m.  CM sent updated referral via [employer] per request of [facility]. CM informed Isadora Anthony with [facility] of discharge delay and will continue to update facility with discharge plan. CM will continue to remain available for support, discharge planning as needed.      Gilberto Bowers, MSW  [facility]  [phone]

## 2018-11-06 NOTE — PROGRESS NOTES
Rapid Response Note:  4117 Rapid Response paged to 9219 for Hung Villar, 81 yo female patient, due to patient's RN concerns. RN reports that patient was sitting up in the chair, became tachypenic and low O2 sat and reporting pain in lower left back. Upon arrival to room at [phone] [id number] to find patient supine in bed with HOB elevated, appears in minimal distress, ARNP at bedside assessing patient. Anamaria Joiner, RN supervisor arrived, Marcela LAM at bedside, Diana Sinclair RN at bedside. Report given by Diana Sinclair RN. RT at bedside. Patient on 3LNC supplemental O2, rapid assessment completed. Airway patent, lungs CTA bilaterally with equal rise and fall of chest, pulses regular and easily palpable, heart sounds S1/S2 with no skip/rub/murmur/gallop, pupils equal and reactive to light, A&Ox4, GCS 15. Dr. Jordan Nagel arrive at 7933, patient assessed, EKG obtained at 0958 no obvious ST segment changes, regular rhythm. Patient's SPO2 was being measured on patient's fingers with fake fingernails and fingernail paint on them. When SPO2 moved to forehead, SPO2 94-96%. Patient positioned for comfort, reports relief in pain. Rapid ended at 1001 with patient remaining in room. Will follow up on later this afternoon. Follow up on patient @ 1140: Patient lying in bed with HOB elevated, appears in no acute distress. States, \"I feel much better. \" Skin pink/warm/dry

## 2018-11-06 NOTE — PROGRESS NOTES
Hospitalist Progress Note    NAME: Judah March   :  10/9/1933   MRN:  [id number]       Assessment / Plan:  80 y.o.  female with pmh of gastroparesis, GERD, HTN, ovarian cancer, scoliosis, severe arthritis and prior CVA who developed severe back pain a week before presentation and was treated with prednisone, hydrocortisone and oxycodone as outpatient. She presented to ED with severe back pain. Acute intractable back pain in setting of scoliosis and arthritis s/p extensive thoracolumbar fusion  with broken hardware, +neuropathy since knee surgery :    Pt has failed steroids, hydrocodone and oxycodone for pain mgmt.    - CT L spine with no acute process. Fractured thoracolumbar fusion rods, stable. Protruding pedicle screws, stable. Neural foraminal stenoses as described above. - On morphine PCA for pain mgmt but developed persistent hypotension today. - plan for bilateral sacroiliac joint steroid injection today by IR. Persistent hypotension  Multifactorial due to morphine PCA, secondary adrenal insufficiency given 1 week history of systemic steroid use, dehydration due to poor oral intake. Patient with BP in the 70/40s this morning that slightly improved after IV fluid bolus. IV hydrocortisone and midodrine added. Holding morphine PCA, gabapentin and lidocaine patch. IVF. Echo minnie telemetry ordered. Hypertension, benign/essential  Now hypotensive  Holding home lisinopril and HCTZ    GERD and gastroparesis:   On home PPI and H2 blocker   - six small meals    Hyperlipidemia: con't statin    Hyperlipidemia: on statin  Body mass index is 30.86 kg/m².: 30.0 - 39.9 Obese  Code status: Full  Prophylaxis: Lovenox  Recommended Disposition: skilled facility, (refer to CM note)     Subjective:     Chief Complaint / Reason for Physician Visit  Patient reports chronic dizziness. No CP. No SOB. No diarrhea. No vomiting. Tolerating liquid diet well.      Review of Systems:  Symptom Y/N Comments  Symptom Y/N Comments   Fever/Chills n   Chest Pain n    Poor Appetite    Edema n    Cough n   Abdominal Pain n    Sputum n   Joint Pain      SOB/LEVINE n   Pruritis/Rash     Nausea/vomit n   Tolerating PT/OT     Diarrhea n   Tolerating Diet y      Constipation    Other       Could NOT obtain due to:      Objective:     VITALS:   Last 24hrs VS reviewed since prior progress note. Most recent are:  Patient Vitals for the past 24 hrs:   Temp Pulse Resp BP SpO2   11/05/18 1805 -- 70 -- (!) 83/52 96 %   11/05/18 1646 97.9 °F (36.6 °C) 68 14 (!) 76/46 96 %   11/05/18 1448 98.7 °F (37.1 °C) 82 16 (!) 62/50 96 %   11/05/18 1326 -- 83 16 (!) 74/50 98 %   11/05/18 1033 -- 84 -- (!) 75/46 97 %   11/05/18 0816 98.3 °F (36.8 °C) 85 16 (!) 68/47 93 %   11/05/18 0810 -- -- -- -- 93 %   11/05/18 0351 99.7 °F (37.6 °C) 98 16 (!) 84/67 94 %   11/05/18 0047 100.1 °F (37.8 °C) 92 16 96/59 95 %   11/04/18 1950 99.7 °F (37.6 °C) 80 16 100/55 94 %       Intake/Output Summary (Last 24 hours) at 11/5/2018 1900  Last data filed at 11/5/2018 0434  Gross per 24 hour   Intake --   Output 476 ml   Net -476 ml        PHYSICAL EXAM:  General: Alert, cooperative, no acute pain or distress    EENT:  Anicteric sclerae. Resp:  CTA bilaterally, no wheezing or rales. CV:  Regular rate,  S1S2  GI:  Soft, Non distended, Non tender.  +Bowel sounds  Neurologic:  Alert and oriented X 3, normal speech.     Reviewed most current lab test results and cultures  YES  Reviewed most current radiology test results   YES  Review and summation of old records today    NO  Reviewed patient's current orders and MAR    YES  PMH/SH reviewed - no change compared to H&P  ________________________________________________________________________  ________________________________________________________________________  May MD Tamica       [facility]     11/03/18  0530   WBC 8.9   HGB 9.6*   HCT 31.5*        [facility]     11/03/18 0530   NA 135*   K 4.3      CO2 27   GLU 85   BUN 34*   CREA 1.26*   CA 8.7   ALB 2.8*   TBILI 0.3   SGOT 17   ALT 16       Signed: Elias Ash MD

## 2018-11-06 NOTE — PROGRESS NOTES
patient appears to be in sustained controlled afib. bp 95/56, resting hr , had an episode of diaphoresis (bs-143). at this time, skin is warm/dry. with c/o occasional sob. 95% on 2l nc.  patient does not appear to be in distress at this time. labs sent as per order. Notification sent to on call physician via telemed. Will continue to monitor.

## 2018-11-06 NOTE — PROGRESS NOTES
Gabapentin Dosing Adjustment    PTA Regimen:  800mg QID (prn)  Ordered Regimen:  800mg QID    Summary from Palliative Medicine:  past history of scoliosis/arthritis s/p extensive thoracolumbar fusion 2002, now with broken hardware, s/p LUCY 12/2013 (effective), neuropathy since knee surgery 2009, hx ovarian cancer s/p chemo 9 months, GERD/Lewis's esophagus, gastroparesis followed by Dr. Perla Smith, who was admitted on 11/1/2018 from home with a diagnosis of 1 week of intractable back pain started when sliding across xray table for GI xray, not relieved with outpatient pain management. Current medical issues leading to Palliative Medicine involvement include: intractable back pain, has been on morphine PCA since admission, now s/p bilateral SI joint steroid injection this morning  Recommended dosing:  not to exceed 700mg daily in divided dose    Adjustment for renal function:  200mg BID am + noon, 300mg qhs    Estimated Creatinine Clearance: 30.3 mL/min (A) (based on SCr of 1.2 mg/dL (H)).   Estimated Creatinine Clearance (using IBW):24.6 mL/min    Thank you,  Dung Melendez, [facility]

## 2018-11-07 ENCOUNTER — APPOINTMENT (OUTPATIENT)
Dept: GENERAL RADIOLOGY | Age: 83
DRG: 552 | End: 2018-11-07
Attending: INTERNAL MEDICINE
Payer: MEDICARE

## 2018-11-07 LAB
ANION GAP SERPL CALC-SCNC: 6 MMOL/L (ref 5–15)
BASOPHILS # BLD: 0 K/UL (ref 0–0.1)
BASOPHILS NFR BLD: 0 % (ref 0–1)
BUN SERPL-MCNC: 53 MG/DL (ref 6–20)
BUN/CREAT SERPL: 47 (ref 12–20)
CALCIUM SERPL-MCNC: 8.5 MG/DL (ref 8.5–10.1)
CHLORIDE SERPL-SCNC: 106 MMOL/L (ref 97–108)
CO2 SERPL-SCNC: 25 MMOL/L (ref 21–32)
CREAT SERPL-MCNC: 1.12 MG/DL (ref 0.55–1.02)
DIFFERENTIAL METHOD BLD: ABNORMAL
EOSINOPHIL # BLD: 0 K/UL (ref 0–0.4)
EOSINOPHIL NFR BLD: 0 % (ref 0–7)
ERYTHROCYTE [DISTWIDTH] IN BLOOD BY AUTOMATED COUNT: 14.9 % (ref 11.5–14.5)
GLUCOSE SERPL-MCNC: 107 MG/DL (ref 65–100)
HCT VFR BLD AUTO: 22.9 % (ref 35–47)
HCT VFR BLD AUTO: 28.1 % (ref 35–47)
HGB BLD-MCNC: 7.1 G/DL (ref 11.5–16)
HGB BLD-MCNC: 8.5 G/DL (ref 11.5–16)
IMM GRANULOCYTES # BLD: 0.1 K/UL (ref 0–0.04)
IMM GRANULOCYTES NFR BLD AUTO: 1 % (ref 0–0.5)
LYMPHOCYTES # BLD: 1.4 K/UL (ref 0.8–3.5)
LYMPHOCYTES NFR BLD: 12 % (ref 12–49)
MCH RBC QN AUTO: 26.3 PG (ref 26–34)
MCHC RBC AUTO-ENTMCNC: 31 G/DL (ref 30–36.5)
MCV RBC AUTO: 84.8 FL (ref 80–99)
MONOCYTES # BLD: 1.1 K/UL (ref 0–1)
MONOCYTES NFR BLD: 9 % (ref 5–13)
NEUTS SEG # BLD: 9.1 K/UL (ref 1.8–8)
NEUTS SEG NFR BLD: 78 % (ref 32–75)
NRBC # BLD: 0 K/UL (ref 0–0.01)
NRBC BLD-RTO: 0 PER 100 WBC
PLATELET # BLD AUTO: 285 K/UL (ref 150–400)
PMV BLD AUTO: 10.9 FL (ref 8.9–12.9)
POTASSIUM SERPL-SCNC: 4.3 MMOL/L (ref 3.5–5.1)
RBC # BLD AUTO: 2.7 M/UL (ref 3.8–5.2)
SODIUM SERPL-SCNC: 137 MMOL/L (ref 136–145)
WBC # BLD AUTO: 11.7 K/UL (ref 3.6–11)

## 2018-11-07 PROCEDURE — 77010033678 HC OXYGEN DAILY

## 2018-11-07 PROCEDURE — 85025 COMPLETE CBC W/AUTO DIFF WBC: CPT | Performed by: INTERNAL MEDICINE

## 2018-11-07 PROCEDURE — 74011250636 HC RX REV CODE- 250/636: Performed by: INTERNAL MEDICINE

## 2018-11-07 PROCEDURE — 36415 COLL VENOUS BLD VENIPUNCTURE: CPT | Performed by: INTERNAL MEDICINE

## 2018-11-07 PROCEDURE — 94760 N-INVAS EAR/PLS OXIMETRY 1: CPT

## 2018-11-07 PROCEDURE — 80048 BASIC METABOLIC PNL TOTAL CA: CPT | Performed by: INTERNAL MEDICINE

## 2018-11-07 PROCEDURE — 97116 GAIT TRAINING THERAPY: CPT

## 2018-11-07 PROCEDURE — 74011250637 HC RX REV CODE- 250/637: Performed by: INTERNAL MEDICINE

## 2018-11-07 PROCEDURE — 65270000029 HC RM PRIVATE

## 2018-11-07 PROCEDURE — 93306 TTE W/DOPPLER COMPLETE: CPT

## 2018-11-07 PROCEDURE — 85018 HEMOGLOBIN: CPT | Performed by: INTERNAL MEDICINE

## 2018-11-07 PROCEDURE — 74018 RADEX ABDOMEN 1 VIEW: CPT

## 2018-11-07 PROCEDURE — 97530 THERAPEUTIC ACTIVITIES: CPT

## 2018-11-07 RX ORDER — LANOLIN ALCOHOL/MO/W.PET/CERES
1.5 CREAM (GRAM) TOPICAL
Status: DISCONTINUED | OUTPATIENT
Start: 2018-11-07 | End: 2018-11-08 | Stop reason: HOSPADM

## 2018-11-07 RX ORDER — OXYCODONE HYDROCHLORIDE 5 MG/1
5 TABLET ORAL
Status: DISCONTINUED | OUTPATIENT
Start: 2018-11-07 | End: 2018-11-08 | Stop reason: HOSPADM

## 2018-11-07 RX ADMIN — THERA TABS 1 TABLET: TAB at 09:04

## 2018-11-07 RX ADMIN — OXYCODONE HYDROCHLORIDE 5 MG: 5 TABLET ORAL at 20:24

## 2018-11-07 RX ADMIN — Medication 10 ML: at 15:48

## 2018-11-07 RX ADMIN — ACETAMINOPHEN 650 MG: 325 TABLET ORAL at 15:48

## 2018-11-07 RX ADMIN — ACETAMINOPHEN 650 MG: 325 TABLET ORAL at 09:04

## 2018-11-07 RX ADMIN — Medication 400 MG: at 09:04

## 2018-11-07 RX ADMIN — Medication 10 ML: at 06:02

## 2018-11-07 RX ADMIN — LOPERAMIDE HYDROCHLORIDE 2 MG: 2 CAPSULE ORAL at 12:30

## 2018-11-07 RX ADMIN — VITAMIN D, TAB 1000IU (100/BT) 1000 UNITS: 25 TAB at 09:04

## 2018-11-07 RX ADMIN — OXYCODONE HYDROCHLORIDE 2.5 MG: 5 TABLET ORAL at 12:28

## 2018-11-07 RX ADMIN — OXYCODONE HYDROCHLORIDE 2.5 MG: 5 TABLET ORAL at 09:03

## 2018-11-07 RX ADMIN — CALCIUM CARBONATE 500 MG (1,250 MG)-VITAMIN D3 200 UNIT TABLET 1 TABLET: at 09:04

## 2018-11-07 RX ADMIN — GABAPENTIN 300 MG: 300 CAPSULE ORAL at 20:24

## 2018-11-07 RX ADMIN — GABAPENTIN 200 MG: 100 CAPSULE ORAL at 09:04

## 2018-11-07 RX ADMIN — OXYCODONE HYDROCHLORIDE 5 MG: 5 TABLET ORAL at 15:48

## 2018-11-07 RX ADMIN — ENOXAPARIN SODIUM 30 MG: 30 INJECTION, SOLUTION INTRAVENOUS; SUBCUTANEOUS at 09:06

## 2018-11-07 RX ADMIN — Medication 10 ML: at 06:03

## 2018-11-07 RX ADMIN — GABAPENTIN 200 MG: 100 CAPSULE ORAL at 12:28

## 2018-11-07 RX ADMIN — PANTOPRAZOLE SODIUM 40 MG: 40 TABLET, DELAYED RELEASE ORAL at 06:01

## 2018-11-07 NOTE — CONSULTS
Palliative Medicine Consult  Tien: [phone] ([phone])    Patient Name: Dayne Tavarez  YOB: 1933    Date of Initial Consult: 11/5/18  Reason for Consult: pain management  Requesting Provider: Dr. Sherry Shea  Primary Care Physician: Elham Ly MD     SUMMARY:   Dayne Tavarez is a 80 y.o. with a past history of scoliosis/arthritis s/p extensive thoracolumbar fusion 2002, now with broken hardware, s/p LUCY 12/2013 (effective), neuropathy since knee surgery 2009, hx ovarian cancer s/p chemo 9 months, GERD/Lewis's esophagus, gastroparesis followed by Dr. Yanira Ruiz, who was admitted on 11/1/2018 from home with a diagnosis of 1 week of intractable back pain started when sliding across xray table for GI xray, not relieved with outpatient pain management. Current medical issues leading to Palliative Medicine involvement include: intractable back pain, has been on morphine PCA since admission, now s/p bilateral SI joint steroid injection this morning.,  Has had trouble with hypotension last 24-48 hours. Patient is . She lives in [facility]. Has 3 adult daughters Ebony Moreno. PALLIATIVE DIAGNOSES:   1. Acute Lower back pain . 2. Chronic insomnia   3. Hypotension resolved  4. Chronic neuropathic pain , feet  5. Constipation due to pain medications       PLAN:    I was called by Joe Pacheco for uncontrol pain. 1Pain- not controlled, with current regimen  1. S/p bilateral SI joint injection 911/5/18)  2. Continue Tylenol 650 mg PO TID  3.  patient reports 2.5 mg of oxycodone is not effective , for pain during activity, Increase oxycodone 5 mg q 4 hrs prn  (give before PT/OT). Recommend continuing this for SNF and let it be titrated off there as her pain improves. 2. Hypotension  (resolved with stopping morphine, elavil).    3 insomnia : She takes elavil chronically at beditime for insomnia suggested not to continue elavil because of effect on cognition  And postural hypotension , will place her on low dose melatonin. 4. Neuropathy on chronic gabapentin , placed back on gabapentin . 5. Bowel regimen - on hold due to diarrhea. 6.  Initial consult note routed to primary continuity provider  7. .Communicated plan of care with: Palliative IDTand bed side Rn Leif Cutler . GOALS OF CARE / TREATMENT PREFERENCES:     GOALS OF CARE:  Patient/Health Care Proxy Stated Goals: Prolong life      TREATMENT PREFERENCES:   Code Status: Full Code    Advance Care Planning:  Advance Care Planning 11/3/2018   Patient's Healthcare Decision Maker is: -   Primary Decision Maker Name -   Primary Decision Maker Phone Number -   Primary Decision Maker Relationship to Patient -   Confirm Advance Directive None       Medical Interventions: Full interventions   Other Instructions: Other:    As far as possible, the palliative care team has discussed with patient / health care proxy about goals of care / treatment preferences for patient. HISTORY:     History obtained from: chart, patient    CHIEF COMPLAINT: left flank pain and back pain    HPI/SUBJECTIVE:    The patient is:   [x] Verbal and participatory  [] Non-participatory due to:     80year old female, was scooting across table for abdominal xray when got some sharp lower back pain. Has tried a few things outpatient and nothing was helping (hydrocodone, no help, oxycodone no help, tramadol no help). Chronically she does not take any opioids for pain. She does take elavil at bedtime for insomnia and she takes neurontin for neuropathy. She is independent in caring for herself. She uses a walker, WC around house when needs hands free  And a scooter to get long distance to mailbox and to get her hair done. 11/7/18 : she c/o pain in left flank  And back , with actvity , cannot lie on left side , no pain when laying still.   Clinical Pain Assessment (nonverbal scale for severity on nonverbal patients):   Clinical Pain Assessment  Severity: 7  Location: left side of back  Character: sharp  Duration: one week  Effect: hard to lie in certain position . Factors: worse with siting posture  Frequency: constant           Duration: for how long has pt been experiencing pain (e.g., 2 days, 1 month, years)  Frequency: how often pain is an issue (e.g., several times per day, once every few days, constant)     FUNCTIONAL ASSESSMENT:     Palliative Performance Scale (PPS):  PPS: 50       PSYCHOSOCIAL/SPIRITUAL SCREENING:     Palliative IDT has assessed this patient for cultural preferences / practices and a referral made as appropriate to needs (Cultural Services, Patient Advocacy, Ethics, etc.)    Advance Care Planning:  Advance Care Planning 11/3/2018   Patient's Healthcare Decision Maker is: -   Primary Decision Maker Name -   Primary Decision Maker Phone Number -   Primary Decision Maker Relationship to Patient -   Confirm Advance Directive None       Any spiritual / Rastafari concerns:  [] Yes /  [x] No    Caregiver Burnout:  [] Yes /  [x] No /  [] No Caregiver Present      Anticipatory grief assessment:   [x] Normal  / [] Maladaptive       ESAS Anxiety: Anxiety: 0    ESAS Depression: Depression: 0        REVIEW OF SYSTEMS:     Positive and pertinent negative findings in ROS are noted above in HPI. The following systems were [x] reviewed / [] unable to be reviewed as noted in HPI  Other findings are noted below. Systems: constitutional, ears/nose/mouth/throat, respiratory, gastrointestinal, genitourinary, musculoskeletal, integumentary, neurologic, psychiatric, endocrine. Positive findings noted below.   Modified ESAS Completed by: provider   Fatigue: 6 Drowsiness: 0   Depression: 0 Pain: 7   Anxiety: 0 Nausea: 0   Anorexia: 0 Dyspnea: 0     Constipation: No     Stool Occurrence(s): 1        PHYSICAL EXAM:     From RN flowsheet:  Wt Readings from Last 3 Encounters:   11/01/18 158 lb (71.7 kg)   10/29/18 158 lb (71.7 kg)   08/16/18 153 lb (69.4 kg)     Blood pressure 113/54, pulse 64, temperature 97.7 °F (36.5 °C), resp. rate 18, weight 158 lb (71.7 kg), SpO2 100 %. Pain Scale 1: Numeric (0 - 10)  Pain Intensity 1: 7  Pain Onset 1: surgery & PTA  Pain Location 1: Back  Pain Orientation 1: Lower, Left  Pain Description 1: Intermittent, Sharp  Pain Intervention(s) 1: Medication (see MAR)  Last bowel movement, if known:     Constitutional: pt is awake, lying in hospital bed NAD  Pleasant engaging in conversation   no respiratory distress  Heart : regular rhythm   Insight normal, able to give medical history      HISTORY:     Active Problems:    Back pain (11/1/2018)      Past Medical History:   Diagnosis Date    Arthritis     Diarrhea 6/6/2016    Foot drop     Gastric ulcer 6/14/2012    Gastroparesis     GERD (gastroesophageal reflux disease)     High cholesterol     Hypertension     Neuropathy     Ovarian cancer ([address] 1986    chemo x 9 mos    Scoliosis     Stroke [address] [address] 2002    ? stroke with drop feet, per patient CT scans were negative      Past Surgical History:   Procedure Laterality Date    HX ORTHOPAEDIC      back    HX ORTHOPAEDIC      bilateral shoulder replacements    HX ORTHOPAEDIC      left knee replacement    HX LYDIA AND BSO  1986    ID EGD TRANSORAL BIOPSY SINGLE/MULTIPLE  1/26/2012         ID EGD TRANSORAL BIOPSY SINGLE/MULTIPLE  6/14/2012           History reviewed. No pertinent family history. History reviewed, no pertinent family history.   Social History     Tobacco Use    Smoking status: Never Smoker    Smokeless tobacco: Never Used   Substance Use Topics    Alcohol use: No     No Known Allergies   Current Facility-Administered Medications   Medication Dose Route Frequency    gabapentin (NEURONTIN) capsule 200 mg  200 mg Oral BID    And    gabapentin (NEURONTIN) capsule 300 mg  300 mg Oral QHS    acetaminophen (TYLENOL) tablet 650 mg  650 mg Oral TID    oxyCODONE IR (ROXICODONE) tablet 2.5 mg  2.5 mg Oral Q4H PRN    polyethylene glycol (MIRALAX) packet 17 g  17 g Oral BID PRN    bisacodyl (DULCOLAX) suppository 10 mg  10 mg Rectal DAILY PRN    senna-docusate (PERICOLACE) 8.6-50 mg per tablet 1 Tab  1 Tab Oral DAILY    enoxaparin (LOVENOX) injection 30 mg  30 mg SubCUTAneous DAILY    calcium-vitamin D (OS-GRUPO) 500 mg-200 unit tablet  1 Tab Oral DAILY    cholecalciferol (VITAMIN D3) tablet 1,000 Units  1,000 Units Oral DAILY    loperamide (IMODIUM) capsule 2 mg  2 mg Oral QID PRN    therapeutic multivitamin (THERAGRAN) tablet 1 Tab  1 Tab Oral DAILY    sodium chloride (NS) flush 5-10 mL  5-10 mL IntraVENous Q8H    sodium chloride (NS) flush 5-10 mL  5-10 mL IntraVENous PRN    naloxone (NARCAN) injection 0.4 mg  0.4 mg IntraVENous PRN    ondansetron (ZOFRAN) injection 4 mg  4 mg IntraVENous Q4H PRN    pravastatin (PRAVACHOL) tablet 80 mg  80 mg Oral QHS    pantoprazole (PROTONIX) tablet 40 mg  40 mg Oral ACB          LAB AND IMAGING FINDINGS:     Lab Results   Component Value Date/Time    WBC 11.7 (H) 11/07/2018 05:35 AM    HGB 7.1 (L) 11/07/2018 05:35 AM    PLATELET 714 88/99/3333 05:35 AM     Lab Results   Component Value Date/Time    Sodium 137 11/07/2018 05:35 AM    Potassium 4.3 11/07/2018 05:35 AM    Chloride 106 11/07/2018 05:35 AM    CO2 25 11/07/2018 05:35 AM    BUN 53 (H) 11/07/2018 05:35 AM    Creatinine 1.12 (H) 11/07/2018 05:35 AM    Calcium 8.5 11/07/2018 05:35 AM    Magnesium 2.0 11/05/2018 11:15 PM      Lab Results   Component Value Date/Time    AST (SGOT) 14 (L) 11/05/2018 11:15 PM    Alk.  phosphatase 40 (L) 11/05/2018 11:15 PM    Protein, total 5.6 (L) 11/05/2018 11:15 PM    Albumin 2.2 (L) 11/05/2018 11:15 PM    Globulin 3.4 11/05/2018 11:15 PM     Lab Results   Component Value Date/Time    INR 1.3 (H) 01/24/2009 06:40 AM    Prothrombin time 15.1 (H) 01/24/2009 06:40 AM      Lab Results   Component Value Date/Time    Iron 58 10/20/2017 11:49 AM TIBC 266 09/06/2017 12:00 AM    Iron % saturation 29 09/06/2017 12:00 AM    Ferritin 24 09/06/2017 12:00 AM      Lab Results   Component Value Date/Time    pH 7.40 11/06/2018 09:55 AM    PCO2 32 (L) 11/06/2018 09:55 AM    PO2 79 (L) 11/06/2018 09:55 AM     No components found for: [place]   Lab Results   Component Value Date/Time    CK 96 01/23/2009 10:50 AM    CK - MB 1.2 01/23/2009 10:50 AM                Total time:   Counseling / coordination time, spent as noted above:   > 50% counseling / coordination?:     Prolonged service was provided for  []30 min   []75 min in face to face time in the presence of the patient, spent as noted above. Time Start:   Time End:   Note: this can only be billed with 12683 (initial) or 50645 (follow up). If multiple start / stop times, list each separately.

## 2018-11-07 NOTE — CONSULTS
CARDIOLOGY CONSULT    Patient ID:  Patient: Kurt Gomez  MRN: [id number]  Age: 80 y.o.  : 10/9/1933    Date of  Admission: 2018 11:35 AM   PCP:  Adeola Leon MD    Assessment: 1. Paroxysmal atrial fibrillation with RVR, minimal to no symptoms. Back in sinus. 2. Chronic LBBB. 3. PVC's. 4. Hyperlipidemia. 5. Diabetes mellitus type 2.  6. Hypercholesterolemia. 7. CKD stage 3.  8. Anemia NOS. 9. Scoliosis/arthritis s/p extensive thoracolumbar fusion , now with broken hardware, s/p LUCY 2013 (effective), neuropathy since knee surgery . 10. History of ovarian cancer s/p chemo. 11. GERD/Lewis's esophagus, gastroparesis. 12. Back pain. Fractured thoracolumbar fusion rods, stable. Protruding pedicle screws, stable. 4. Neural foraminal stenoses. 13. Walks with a walker. Plan:     1. We talked about atrial fibrillation, risk factors, stroke risk, treatments, etc.  2. No need for cardioversion as she is back in sinus. 3. Antiarrhythmic therapy may be avoided for now, especially if minimal sx. 4. Discussed anticoagulation therapy option given the stroke risk (age, DM, gender) and this is not straightforward. We talked about agents (warfarin vs. NOAC's), as well as compromise dosing given the anemia, bleeding and falls risk. I would favor Eliquis 2.5 mg bid to start given the circumstance and see how she does. If greenlight from primary team, then start. If not, then ASA daily. Would have to have F/U labwork for anemia per PCP longterm. 5. She is not a good candidate for Afib ablation. 6. Echo pending. 7. Continue statin. All questions answered for patient and daughter. [x]       High complexity decision making was performed in this patient. Kurt Gomez is a 80 y.o. female with a history of back pain under evaluation and treatment.   Today, she had a paroxysm of atrial fibrillation that lasted about an hour, resolved spontaneously. She did not feel this, but was made aware of the episode and I was consulted. A new dx. She is naive to anticoagulation. Denies syncope, dizziness, palpitations. No orthopnea, PND, or worsening edema. No TIA or stroke symptoms. An echo done in 2009:  \"The aortic valve is trileaflet and non-thickened and without   stenosis.  The aortic root and left atrium are normal size.  The mitral and   tricuspid valves are structurally normal with normal leaflet excursion.  The   left ventricle is normal size with normal wall thickness.  There are no left   ventricular or segmental wall motion abnormalities.  The left ventricular   systolic function is normal with an estimated left ventricular ejection   fraction of 60-65%.  The right atrium and right ventricle are normal size.    There is no pericardial effusion.  The interatrial septum is intact.  Doppler examination is remarkable for trace tricuspid regurgitation, not outside normal limits. \"    Per the hospitalist:   \"85 y.o.   female with pmh of gastroparesis, GERD, HTN, ovarian cancer, scoliosis, severe arthritis and prior CVA who developed severe back pain a week before presentation and was treated with prednisone, hydrocortisone and oxycodone as outpatient. She presented to ED with severe back pain. \"    Past Medical History:   Diagnosis Date    Arthritis     Diarrhea 6/6/2016    Foot drop     Gastric ulcer 6/14/2012    Gastroparesis     GERD (gastroesophageal reflux disease)     High cholesterol     Hypertension     Neuropathy     Ovarian cancer ([address] 1986    chemo x 9 mos    Scoliosis     Stroke ([facility] [address] [address] 2002    ? stroke with drop feet, per patient CT scans were negative        Past Surgical History:   Procedure Laterality Date    HX ORTHOPAEDIC      back    HX ORTHOPAEDIC      bilateral shoulder replacements    HX ORTHOPAEDIC      left knee replacement    HX LYDIA AND BSO  1986    RI EGD TRANSORAL BIOPSY SINGLE/MULTIPLE 1/26/2012         IN EGD TRANSORAL BIOPSY SINGLE/MULTIPLE  6/14/2012            Social History     Tobacco Use    Smoking status: Never Smoker    Smokeless tobacco: Never Used   Substance Use Topics    Alcohol use: No        History reviewed. No pertinent family history. No Known Allergies       Current Facility-Administered Medications   Medication Dose Route Frequency    gabapentin (NEURONTIN) capsule 200 mg  200 mg Oral BID    And    gabapentin (NEURONTIN) capsule 300 mg  300 mg Oral QHS    acetaminophen (TYLENOL) tablet 650 mg  650 mg Oral TID    oxyCODONE IR (ROXICODONE) tablet 2.5 mg  2.5 mg Oral Q4H PRN    polyethylene glycol (MIRALAX) packet 17 g  17 g Oral BID PRN    bisacodyl (DULCOLAX) suppository 10 mg  10 mg Rectal DAILY PRN    senna-docusate (PERICOLACE) 8.6-50 mg per tablet 1 Tab  1 Tab Oral DAILY    enoxaparin (LOVENOX) injection 30 mg  30 mg SubCUTAneous DAILY    calcium-vitamin D (OS-RGUPO) 500 mg-200 unit tablet  1 Tab Oral DAILY    cholecalciferol (VITAMIN D3) tablet 1,000 Units  1,000 Units Oral DAILY    loperamide (IMODIUM) capsule 2 mg  2 mg Oral QID PRN    magnesium oxide (MAG-OX) tablet 400 mg  400 mg Oral DAILY    therapeutic multivitamin (THERAGRAN) tablet 1 Tab  1 Tab Oral DAILY    sodium chloride (NS) flush 5-10 mL  5-10 mL IntraVENous Q8H    sodium chloride (NS) flush 5-10 mL  5-10 mL IntraVENous PRN    naloxone (NARCAN) injection 0.4 mg  0.4 mg IntraVENous PRN    ondansetron (ZOFRAN) injection 4 mg  4 mg IntraVENous Q4H PRN    pravastatin (PRAVACHOL) tablet 80 mg  80 mg Oral QHS    pantoprazole (PROTONIX) tablet 40 mg  40 mg Oral ACB       Review of Symptoms:  See HPI as well.   General: negative for fever, chills, sweats, weakness, weight loss   Eyes: negative for blurred vision, eye pain, loss of vision, diplopia   Ear Nose and Throat: negative for rhinorrhea, pharyngitis, otalgia, tinnitus, speech or swallowing difficulties   Respiratory: negative for SOB, cough, sputum production, wheezing, LEVINE, pleuritic pain   Cardiology: negative for chest pain, palpitations, orthopnea, PND, edema, syncope   Gastrointestinal: negative for abdominal pain, N/V, dysphagia, change in bowel habits, bleeding   Genitourinary: negative for frequency, urgency, dysuria, hematuria, incontinence   Muskuloskeletal : +back pain, negative for myalgia   Hematology: negative for easy bruising, bleeding, lymphadenopathy   Dermatological: negative for rash, ulceration, mole change, new lesion   Endocrine: negative for hot flashes or polydipsia   Neurological: negative for headache, dizziness, confusion, focal weakness, paresthesia, memory loss, gait disturbance   Psychological: negative for anxiety, depression, agitation       Objective:      Physical Exam:  Temp (24hrs), Av.8 °F (36.6 °C), Min:96.7 °F (35.9 °C), Max:98.5 °F (36.9 °C)    Patient Vitals for the past 8 hrs:   Pulse   18 82   18 1500 85    Patient Vitals for the past 8 hrs:   Resp   18 18    Patient Vitals for the past 8 hrs:   BP   18 114/61   18 1500 127/70          Intake/Output Summary (Last 24 hours) at 2018 2152  Last data filed at 2018 0857  Gross per 24 hour   Intake 300 ml   Output --   Net 300 ml       Nondiaphoretic, not in acute distress. Elderly female. Supple, no palpable thyromegaly. No scleral icterus, mucous membranes moist, conjuctivae pink, no xanthelasma. Unlabored, clear to auscultation bilaterally, symmetric air movement. Regular rate and rhythm, no murmur, pericardial rub, knock, or gallop. No JVD or peripheral edema. No carotid bruit. Palpable radial and DP pulses bilaterally. Abdomen, soft, nontender, nondistended. No abdominal bruit or pulstatile masses. Extremities without cyanosis or clubbing. Muscle tone and bulk normal.  Skin warm and dry. No rashes or ulcers. Neuro grossly nonfocal.  No tremor.   Awake and appropriate. CARDIOGRAPHICS and STUDIES, I reviewed:    Telemetry:  SR currently. Afib earlier with RVR. ECG:  SR with LBBB, normal OH. Echo:  PENDING. Labs:  Recent Labs     11/06/18  0504   TROIQ <0.05     Lab Results   Component Value Date/Time    Cholesterol, total 138 07/20/2018 12:00 AM    HDL Cholesterol 46 07/20/2018 12:00 AM    LDL, calculated 66 07/20/2018 12:00 AM    Triglyceride 132 07/20/2018 12:00 AM    CHOL/HDL Ratio 3.2 09/20/2010 10:16 AM     No results for input(s): INR, PTP, APTT in the last 72 hours.     No lab exists for component: INREXT   Recent Labs     11/05/18  2315      K 4.3      CO2 25   BUN 44*   CREA 1.20*   *   CA 8.2*   ALB 2.2*   WBC 11.6*   HGB 8.4*   HCT 27.2*        Recent Labs     11/05/18  2315   SGOT 14*   AP 40*   TP 5.6*   ALB 2.2*   GLOB 3.4     No components found for: [place]  Recent Labs     11/06/18  0955   PH 7.40   PCO2 32*   PO2 79*           Karen Orosco MD  11/6/2018

## 2018-11-07 NOTE — PROGRESS NOTES
Bedside and Verbal shift change report given to Rashida (oncoming nurse) by Hannah Lima (offgoing nurse). Report included the following information SBAR, Kardex, Intake/Output, MAR and Recent Results.

## 2018-11-07 NOTE — PROGRESS NOTES
Ortho  Patient seen and evaluated this morning. Resting comfortably in bed with no complaints of pain. She was admitted with back pain and has been evaluated by primary medicine as well as pain management. She has had bilateral SI injections. She has history of having thoracolumbar fusion done in 2002  Lumbar spine minimal tenderness palpation  5/5 strength bilateral lower extremities  Sacroiliitis and low back pain status post thoracolumbar fusion with fractured hardware. No urgent indication for any sort of surgical intervention. Would recommend she follow with Dr. Marques De La Garza for pain management following rehab.       [facility]  Dr. Lashell Byrne aware with the above assessment and plan

## 2018-11-07 NOTE — PROGRESS NOTES
Problem: Falls - Risk of  Goal: *Absence of Falls  Document Babs Fall Risk and appropriate interventions in the flowsheet. Outcome: Progressing Towards Goal  Fall Risk Interventions:  Mobility Interventions: Patient to call before getting OOB         Medication Interventions: Evaluate medications/consider consulting pharmacy    Elimination Interventions: Call light in reach    History of Falls Interventions: Consult care management for discharge planning, Door open when patient unattended, Evaluate medications/consider consulting pharmacy, Room close to nurse's station, Utilize gait belt for transfer/ambulation        Problem: Pressure Injury - Risk of  Goal: *Prevention of pressure injury  Document Cristino Scale and appropriate interventions in the flowsheet.   Outcome: Progressing Towards Goal  Pressure Injury Interventions:  Sensory Interventions: Assess changes in LOC    Moisture Interventions: Absorbent underpads    Activity Interventions: Increase time out of bed    Mobility Interventions: Float heels, HOB 30 degrees or less    Nutrition Interventions: Document food/fluid/supplement intake

## 2018-11-07 NOTE — PROGRESS NOTES
Hospitalist Progress Note    NAME: Carlin Aguirre   :  10/9/1933   MRN:  [id number]       Assessment / Plan:  80 y.o.  female with pmh of gastroparesis, GERD, HTN, ovarian cancer, scoliosis, severe arthritis and prior CVA who developed severe back pain a week before presentation and was treated with prednisone, hydrocortisone and oxycodone as outpatient. She presented to ED with severe back pain. Acute intractable back pain in setting of scoliosis and arthritis s/p extensive thoracolumbar fusion  with broken hardware, +neuropathy since knee surgery :    Pt has failed steroids, hydrocodone and oxycodone for pain mgmt.    - CT L spine with no acute process. Fractured thoracolumbar fusion rods, stable. Protruding pedicle screws, stable. Neural foraminal stenoses as described above. - morphine PCA at admit but stopped due to persistent hypotension on .    - : Pt underwent bilateral sacroiliac joint steroid injection by IR. Pain is not controlled yet. Palliative team consulted, recommendations reviewed and agreed. Oxycodone, tylenol prn and gabapentin. Persistent hypotension  Afib with rvr  Multifactorial due to morphine PCA, elavil, dehydration due to poor oral intake  : Patient with BP in the 70/40s that slightly improved after IV fluid bolus. IV hydrocortisone and midodrine added. Holding morphine PCA, elavil, gabapentin  She received IV hydrocortisone for possible secondary AI but developed tachycardia and dysrrhytmia. Received midodrine for 2 days. Cardiology is following, recommendation for Eliquis if anemia stable. HR is controlled. BP remains in the low normal range. Close monitor of BP. Anemia  Could be dilutional from fluid hydration  Hb at 7.1 today from 11 at admit  No external bleeding. Close monitor. Repeat hb this afternoon. Check fecal occult blood.     Diarrhea, subacute  Patient reports 3 week history of loose stools, it has not changed since admission. Unlikely C diff given no signs of sepsis even after 3 weeks without treatment. Check stool culture and KUB    Hypertension, benign/essential  BP is in low. Holding home lisinopril and HCTZ    GERD and gastroparesis  On home PPI and H2 blocker  Six small meals    Hyperlipidemia  On statin    Body mass index is 30.86 kg/m².: 30.0 - 39.9 Obese  Code status: Full  Prophylaxis: Lovenox  Recommended Disposition: skilled facility, (refer to CM note)     Subjective:     Chief Complaint / Reason for Physician Visit  Patient has no complains. No CP. No SOB. No diarrhea. No vomiting. Tolerating diet well. Review of Systems:  Symptom Y/N Comments  Symptom Y/N Comments   Fever/Chills n   Chest Pain n    Poor Appetite    Edema n    Cough n   Abdominal Pain n    Sputum n   Joint Pain      SOB/LEVINE n   Pruritis/Rash     Nausea/vomit n   Tolerating PT/OT     Diarrhea n   Tolerating Diet y      Constipation    Other       Could NOT obtain due to:      Objective:     VITALS:   Last 24hrs VS reviewed since prior progress note. Most recent are:  Patient Vitals for the past 24 hrs:   Temp Pulse Resp BP SpO2   11/07/18 1109 97.7 °F (36.5 °C) 64 18 113/54 100 %   11/07/18 0902 98.3 °F (36.8 °C) 67 16 104/53 95 %   11/07/18 0321 98.1 °F (36.7 °C) 64 16 96/52 100 %   11/07/18 0047 97.5 °F (36.4 °C) 68 16 101/49 94 %   11/06/18 2034 98.5 °F (36.9 °C) 82 18 114/61 92 %   11/06/18 1500 -- 85 -- 127/70 99 %     No intake or output data in the 24 hours ending 11/07/18 1422     PHYSICAL EXAM:  General: Alert, cooperative, no acute pain or distress    EENT:  Anicteric sclerae. Resp:  CTA bilaterally, no wheezing or rales. CV:  Regular rate,  S1S2  GI:  Soft, Non distended, Non tender.  +Bowel sounds  Neurologic:  Alert and oriented X 3, normal speech.     Reviewed most current lab test results and cultures  YES  Reviewed most current radiology test results   YES  Review and summation of old records today    NO  Reviewed patient's current orders and MAR    YES  PMH/ reviewed - no change compared to H&P  ________________________________________________________________________  ________________________________________________________________________  Carly Hameed MD       Recent Labs     11/07/18  0535 11/05/18  2315   WBC 11.7* 11.6*   HGB 7.1* 8.4*   HCT 22.9* 27.2*    224     Recent Labs     11/07/18  0535 11/05/18  2315    137   K 4.3 4.3    107   CO2 25 25   * 144*   BUN 53* 44*   CREA 1.12* 1.20*   CA 8.5 8.2*   MG  --  2.0   ALB  --  2.2*   TBILI  --  0.3   SGOT  --  14*   ALT  --  14       Signed: Carly Hameed MD

## 2018-11-07 NOTE — PROGRESS NOTES
Physical Therapy  Attempted to see patient for PT visit but she was just receiving pain meds from nursing so will wait till after lunch.   Whitney Hirsch

## 2018-11-07 NOTE — PROGRESS NOTES
CM sent updates to [facility] as requested. Per Aissatou Steve may not have a bed available for this afternoon but will check. Olivia stated they will have a bed available for tomorrow, 11/8/18. CM anticipates discharge for this afternoon vs. Tomorrow pending echo and medical stability. CM will continue to remain available for support, discharge planning as needed.      Carla Mayo, MSW  [facility]  [phone]

## 2018-11-07 NOTE — PROGRESS NOTES
Problem: Mobility Impaired (Adult and Pediatric)  Goal: *Acute Goals and Plan of Care (Insert Text)  Physical Therapy Goals  Initiated 11/2/2018  1. Patient will move from supine to sit and sit to supine , scoot up and down and roll side to side in bed with independence within 7 day(s). 2.  Patient will transfer from bed to chair and chair to bed with modified independence using the least restrictive device within 7 day(s). 3.  Patient will perform sit to stand with independence within 7 day(s). 4.  Patient will ambulate with modified independence for 375 feet with the least restrictive device within 7 day(s). physical Therapy TREATMENT  Patient: Sonja Hutchinson (60 y.o. female)  Date: 11/7/2018  Diagnosis: Back pain <principal problem not specified>      Precautions: Fall  Chart, physical therapy assessment, plan of care and goals were reviewed. ASSESSMENT:  Physical therapy visit completed on an 80year old female admitted with back pain. Patient received in bed and agreeable to participate, but would like to decrease ambulation distance since back pain exacerbated yesterday after therapy. Patient is at stand by assist level for bed mobility and able to come to sit on edge of bed and find center of balance. Patient instructed in back precautions and importance of trunk stability with functional activities. Patient has bilateral drop foot so applied socks and AFOs with patient seated at edge of bed. Patient ambulated 2 x 30 feet with RW and one seated rest break. Gait rajinder is decreased and patient uses steppage compensation to clear floor. Patient was noted to have LEVINE and sats at 85% post activity, applied 1 liter 02 and sats increased to 94%. Patient instructed in deep breathing technique. Patient rated pain as 4/10 after ambulation. Patient seated in chair with call bell in reach and nursing aware of decreased sats post ambulation. .  Progression toward goals:  []    Improving appropriately and progressing toward goals  [x]    Improving slowly and progressing toward goals  []    Not making progress toward goals and plan of care will be adjusted     PLAN:  Patient continues to benefit from skilled intervention to address the above impairments. Continue treatment per established plan of care. Discharge Recommendations:  Skilled Nursing Facility  Further Equipment Recommendations for Discharge:       SUBJECTIVE:   Patient stated I just want to get better.     OBJECTIVE DATA SUMMARY:   Critical Behavior:  Neurologic State: Alert  Orientation Level: Oriented X4  Cognition: Follows commands  Safety/Judgement: Awareness of environment, Fall prevention, Home safety, Insight into deficits  Functional Mobility Training:  Bed Mobility:     Supine to Sit: Stand-by assistance     Scooting: Stand-by assistance        Transfers:  Sit to Stand: Contact guard assistance  Stand to Sit: Contact guard assistance                             Balance:  Sitting: Intact  Standing: Intact; With support  Ambulation/Gait Training:  Distance (ft): 60 Feet (ft)  Assistive Device: Walker, rolling  Ambulation - Level of Assistance: Contact guard assistance        Gait Abnormalities: Foot drop; Steppage gait        Base of Support: Widened                             Stairs: Therapeutic Exercises: Ankle pumps  Pain:  Pain Scale 1: Numeric (0 - 10)  Pain Intensity 1: 5  Pain Location 1: Back  Pain Orientation 1: Lower; Left  Pain Description 1: Aching  Pain Intervention(s) 1: Medication (see MAR)  Activity Tolerance:   Fair, SOB with exertion  Please refer to the flowsheet for vital signs taken during this treatment.   After treatment:   [x]    Patient left in no apparent distress sitting up in chair  []    Patient left in no apparent distress in bed  [x]    Call bell left within reach  [x]    Nursing notified  []    Caregiver present  []    Bed alarm activated    COMMUNICATION/COLLABORATION:   The patients plan of care was discussed with: Registered Nurse    Shelley Mccormick   Time Calculation: 39 mins

## 2018-11-08 VITALS
SYSTOLIC BLOOD PRESSURE: 115 MMHG | TEMPERATURE: 97.7 F | WEIGHT: 158 LBS | DIASTOLIC BLOOD PRESSURE: 53 MMHG | BODY MASS INDEX: 30.86 KG/M2 | OXYGEN SATURATION: 97 % | HEART RATE: 65 BPM | RESPIRATION RATE: 16 BRPM

## 2018-11-08 LAB
ALBUMIN SERPL-MCNC: 2.4 G/DL (ref 3.5–5)
ALBUMIN/GLOB SERPL: 0.7 {RATIO} (ref 1.1–2.2)
ALP SERPL-CCNC: 46 U/L (ref 45–117)
ALT SERPL-CCNC: 17 U/L (ref 12–78)
AST SERPL-CCNC: 17 U/L (ref 15–37)
BASOPHILS # BLD: 0 K/UL (ref 0–0.1)
BASOPHILS NFR BLD: 0 % (ref 0–1)
BILIRUB DIRECT SERPL-MCNC: <0.1 MG/DL (ref 0–0.2)
BILIRUB SERPL-MCNC: 0.2 MG/DL (ref 0.2–1)
DIFFERENTIAL METHOD BLD: ABNORMAL
EOSINOPHIL # BLD: 0.2 K/UL (ref 0–0.4)
EOSINOPHIL NFR BLD: 2 % (ref 0–7)
ERYTHROCYTE [DISTWIDTH] IN BLOOD BY AUTOMATED COUNT: 15 % (ref 11.5–14.5)
GLOBULIN SER CALC-MCNC: 3.6 G/DL (ref 2–4)
HCT VFR BLD AUTO: 25.6 % (ref 35–47)
HGB BLD-MCNC: 7.7 G/DL (ref 11.5–16)
IMM GRANULOCYTES # BLD: 0.1 K/UL (ref 0–0.04)
IMM GRANULOCYTES NFR BLD AUTO: 1 % (ref 0–0.5)
LYMPHOCYTES # BLD: 1.7 K/UL (ref 0.8–3.5)
LYMPHOCYTES NFR BLD: 16 % (ref 12–49)
MCH RBC QN AUTO: 26.1 PG (ref 26–34)
MCHC RBC AUTO-ENTMCNC: 30.1 G/DL (ref 30–36.5)
MCV RBC AUTO: 86.8 FL (ref 80–99)
MONOCYTES # BLD: 0.7 K/UL (ref 0–1)
MONOCYTES NFR BLD: 7 % (ref 5–13)
NEUTS SEG # BLD: 8 K/UL (ref 1.8–8)
NEUTS SEG NFR BLD: 74 % (ref 32–75)
NRBC # BLD: 0 K/UL (ref 0–0.01)
NRBC BLD-RTO: 0 PER 100 WBC
PLATELET # BLD AUTO: 299 K/UL (ref 150–400)
PMV BLD AUTO: 10.9 FL (ref 8.9–12.9)
PROT SERPL-MCNC: 6 G/DL (ref 6.4–8.2)
RBC # BLD AUTO: 2.95 M/UL (ref 3.8–5.2)
WBC # BLD AUTO: 10.8 K/UL (ref 3.6–11)

## 2018-11-08 PROCEDURE — 94760 N-INVAS EAR/PLS OXIMETRY 1: CPT

## 2018-11-08 PROCEDURE — 74011250637 HC RX REV CODE- 250/637: Performed by: INTERNAL MEDICINE

## 2018-11-08 PROCEDURE — 36415 COLL VENOUS BLD VENIPUNCTURE: CPT

## 2018-11-08 PROCEDURE — 85025 COMPLETE CBC W/AUTO DIFF WBC: CPT

## 2018-11-08 PROCEDURE — 74011250636 HC RX REV CODE- 250/636: Performed by: INTERNAL MEDICINE

## 2018-11-08 PROCEDURE — 80076 HEPATIC FUNCTION PANEL: CPT

## 2018-11-08 RX ORDER — ASCORBIC ACID 500 MG
1.5 TABLET ORAL
Qty: 1 TAB | Refills: 0 | Status: SHIPPED
Start: 2018-11-08 | End: 2022-08-12

## 2018-11-08 RX ORDER — AMOXICILLIN 250 MG
1 CAPSULE ORAL DAILY
Qty: 30 TAB | Refills: 1 | Status: SHIPPED
Start: 2018-11-09 | End: 2020-07-01

## 2018-11-08 RX ORDER — ACETAMINOPHEN 325 MG/1
650 TABLET ORAL 3 TIMES DAILY
Qty: 1 TAB | Refills: 0 | Status: SHIPPED
Start: 2018-11-08 | End: 2020-07-01

## 2018-11-08 RX ORDER — OXYCODONE HYDROCHLORIDE 5 MG/1
5 TABLET ORAL
Qty: 20 TAB | Refills: 0 | Status: SHIPPED | OUTPATIENT
Start: 2018-11-08 | End: 2019-06-30

## 2018-11-08 RX ADMIN — PRAVASTATIN SODIUM 80 MG: 40 TABLET ORAL at 00:07

## 2018-11-08 RX ADMIN — CALCIUM CARBONATE 500 MG (1,250 MG)-VITAMIN D3 200 UNIT TABLET 1 TABLET: at 08:11

## 2018-11-08 RX ADMIN — GABAPENTIN 200 MG: 100 CAPSULE ORAL at 08:11

## 2018-11-08 RX ADMIN — THERA TABS 1 TABLET: TAB at 08:11

## 2018-11-08 RX ADMIN — ENOXAPARIN SODIUM 30 MG: 30 INJECTION, SOLUTION INTRAVENOUS; SUBCUTANEOUS at 08:14

## 2018-11-08 RX ADMIN — VITAMIN D, TAB 1000IU (100/BT) 1000 UNITS: 25 TAB at 09:59

## 2018-11-08 RX ADMIN — OXYCODONE HYDROCHLORIDE 5 MG: 5 TABLET ORAL at 08:11

## 2018-11-08 RX ADMIN — DOCUSATE SODIUM AND SENNOSIDES 1 TABLET: 8.6; 5 TABLET, FILM COATED ORAL at 08:11

## 2018-11-08 RX ADMIN — Medication 10 ML: at 06:32

## 2018-11-08 RX ADMIN — ACETAMINOPHEN 650 MG: 325 TABLET ORAL at 08:11

## 2018-11-08 RX ADMIN — Medication 10 ML: at 00:07

## 2018-11-08 RX ADMIN — ACETAMINOPHEN 650 MG: 325 TABLET ORAL at 00:07

## 2018-11-08 RX ADMIN — GABAPENTIN 200 MG: 100 CAPSULE ORAL at 11:55

## 2018-11-08 RX ADMIN — OXYCODONE HYDROCHLORIDE 5 MG: 5 TABLET ORAL at 12:30

## 2018-11-08 RX ADMIN — PANTOPRAZOLE SODIUM 40 MG: 40 TABLET, DELAYED RELEASE ORAL at 06:32

## 2018-11-08 RX ADMIN — MELATONIN TAB 3 MG 1.5 MG: 3 TAB at 00:07

## 2018-11-08 NOTE — PROGRESS NOTES
Problem: Falls - Risk of  Goal: *Absence of Falls  Document Babs Fall Risk and appropriate interventions in the flowsheet. Outcome: Progressing Towards Goal  Fall Risk Interventions:  Mobility Interventions: Patient to call before getting OOB         Medication Interventions: Patient to call before getting OOB    Elimination Interventions: Call light in reach    History of Falls Interventions: Consult care management for discharge planning, Door open when patient unattended, Evaluate medications/consider consulting pharmacy, Room close to nurse's station, Utilize gait belt for transfer/ambulation        Problem: Pressure Injury - Risk of  Goal: *Prevention of pressure injury  Document Cristino Scale and appropriate interventions in the flowsheet.   Outcome: Progressing Towards Goal  Pressure Injury Interventions:  Sensory Interventions: Assess changes in LOC    Moisture Interventions: Absorbent underpads    Activity Interventions: Increase time out of bed    Mobility Interventions: Float heels, HOB 30 degrees or less    Nutrition Interventions: Document food/fluid/supplement intake

## 2018-11-08 NOTE — PROGRESS NOTES
CM anticipates discharge for today. CM sent updates to [facility]. Per Olivia with admissions, pt will be going to room B214A. Nursing to call report to [phone] once discharge order has been placed. Pt's daughter to transport at discharge. Please send hard scripts, discharge papers and MAR with patient upon discharge. CM will continue to remain available for support, discharge planning as needed.      Angelia Little, MSW  7 [facility]  [phone]

## 2018-11-08 NOTE — DISCHARGE INSTRUCTIONS
HOSPITALIST DISCHARGE INSTRUCTIONS    NAME: Delio Guo   :  10/9/1933   MRN:  [id number]     Date/Time:  2018 11:49 AM    ADMIT DATE: 2018   DISCHARGE DATE: 2018     Attending Physician: Huey Lamas MD    DISCHARGE DIAGNOSIS:  Acute intractable back pain in setting of scoliosis and arthritis s/p extensive thoracolumbar fusion  with broken hardware, +neuropathy since knee surgery   Persistent hypotension  Afib with rvr  Anemia  GERD  Gastroparesis  Subacute diarrhea    Medications: Per above medication reconciliation. Pain Management: per above medications    Recommended diet: Cardiac Diet    Recommended activity: Activity as tolerated    Required Lab work: CBC, BMP and magnesium on 18    Code status: Full        Outside physician follow up: Follow-up Information     Follow up With Specialties Details Why Contact Info    Jocelyn Kelley MD [facility] Schedule an appointment as soon as possible for a visit in 1 week  [address]  [phone]      Tyrese Doe MD Orthopedic Surgery Schedule an appointment as soon as possible for a visit in 3 weeks  [address]  [address] [facility],[address]  [phone]       Casper Holm MD Cardiology In 2 weeks  [address]  [address]  [address] [phone]                 Skilled nursing facility/ SNF MD responsible for above on discharge. Information obtained by :  I understand that if any problems occur once I am at home I am to contact my physician. I understand and acknowledge receipt of the instructions indicated above.                                                                                                                                            Physician's or R.N.'s Signature                                                                  Date/Time Patient or Repres

## 2018-11-08 NOTE — PROGRESS NOTES
Bedside and Verbal shift change report given to [facility] (oncoming nurse) by Marc Sánchez (offgoing nurse). Report included the following information SBAR, Kardex, Procedure Summary, Intake/Output, MAR and Recent Results.

## 2018-11-08 NOTE — PROGRESS NOTES
CARDIOLOGY Progress Note    Patient ID:  Patient: Markel Su  MRN: [id number]  Age: 80 y.o.  : 10/9/1933    Date of  Admission: 2018 11:35 AM   PCP:  Slime Kenney MD    Assessment: 1. Paroxysmal atrial fibrillation with RVR, minimal to no symptoms. Back in sinus. 2. Chronic LBBB. 3. PVC's. 4. Echo EF 60%, mild valve disease. 5. Hyperlipidemia. 6. Diabetes mellitus type 2.  7. Hypercholesterolemia. 8. CKD stage 3.  9. Anemia NOS. 10. Scoliosis/arthritis s/p extensive thoracolumbar fusion , now with broken hardware, s/p LUCY 2013 (effective), neuropathy since knee surgery . 11. History of ovarian cancer s/p chemo. 12. GERD/Lewis's esophagus, gastroparesis. 13. Back pain. Fractured thoracolumbar fusion rods, stable. Protruding pedicle screws, stable. 4. Neural foraminal stenoses. 14. Walks with a walker. Plan:     1. Holding on antiarrhythmic therapy given the low burden of arrhythmia and minimal sx.  2. She is aware that she may not be a chronic anticoagulation candidate. Considering the anemia (hgb 7.7 today), I would NOT use such therapy. 3. Not a good candidate for a Watchman device (JOHN occlusion). 4. Not a good candidate for Afib ablation. 5. Continue statin. All questions answered for patient and daughter. If sx escalate or tachycardia becomes prolonged, then can approach her about antiarrhythmic therapy (as an OP). Will sign off. [x]       High complexity decision making was performed in this patient. Markel Su is a 80 y.o. female with a history of back pain under evaluation and treatment. Today, she had a paroxysm of atrial fibrillation that lasted about an hour, resolved spontaneously. She did not feel this, but was made aware of the episode and I was consulted. A new dx. She is naive to anticoagulation. Her Hgb is 7.7 today. TODAY:  Denies syncope, dizziness, palpitations.   No orthopnea, PND, or worsening edema. No TIA or stroke symptoms. No Known Allergies       Current Facility-Administered Medications   Medication Dose Route Frequency    oxyCODONE IR (ROXICODONE) tablet 5 mg  5 mg Oral Q4H PRN    melatonin tablet 1.5 mg  1.5 mg Oral QHS    gabapentin (NEURONTIN) capsule 200 mg  200 mg Oral BID    And    gabapentin (NEURONTIN) capsule 300 mg  300 mg Oral QHS    acetaminophen (TYLENOL) tablet 650 mg  650 mg Oral TID    polyethylene glycol (MIRALAX) packet 17 g  17 g Oral BID PRN    bisacodyl (DULCOLAX) suppository 10 mg  10 mg Rectal DAILY PRN    senna-docusate (PERICOLACE) 8.6-50 mg per tablet 1 Tab  1 Tab Oral DAILY    enoxaparin (LOVENOX) injection 30 mg  30 mg SubCUTAneous DAILY    calcium-vitamin D (OS-GRUPO) 500 mg-200 unit tablet  1 Tab Oral DAILY    cholecalciferol (VITAMIN D3) tablet 1,000 Units  1,000 Units Oral DAILY    loperamide (IMODIUM) capsule 2 mg  2 mg Oral QID PRN    therapeutic multivitamin (THERAGRAN) tablet 1 Tab  1 Tab Oral DAILY    sodium chloride (NS) flush 5-10 mL  5-10 mL IntraVENous Q8H    sodium chloride (NS) flush 5-10 mL  5-10 mL IntraVENous PRN    naloxone (NARCAN) injection 0.4 mg  0.4 mg IntraVENous PRN    ondansetron (ZOFRAN) injection 4 mg  4 mg IntraVENous Q4H PRN    pravastatin (PRAVACHOL) tablet 80 mg  80 mg Oral QHS    pantoprazole (PROTONIX) tablet 40 mg  40 mg Oral ACB       Review of Symptoms:   Respiratory: negative for SOB, cough, sputum production, wheezing, LEVINE, pleuritic pain   Gastrointestinal: negative for abdominal pain, N/V, dysphagia, visible bleeding   Genitourinary: negative for frequency, urgency, dysuria, gross hematuria, incontinence      Objective:      Physical Exam:  Patient Vitals for the past 12 hrs:   Temp Pulse Resp BP SpO2   11/08/18 0717 98 °F (36.7 °C) 65 16 97/49 96 %   11/08/18 0440 98.4 °F (36.9 °C) 61 16 95/54 95 %   11/08/18 0004 97.7 °F (36.5 °C) 72 16 101/57 --       Nondiaphoretic, not in acute distress. Elderly female. Supple, no palpable thyromegaly. No scleral icterus, mucous membranes moist, conjuctivae pink, no xanthelasma. Unlabored, clear to auscultation bilaterally, symmetric air movement. Regular rate and rhythm, no murmur, pericardial rub, knock, or gallop. No JVD or peripheral edema. No carotid bruit. Palpable radial and DP pulses bilaterally. Abdomen, soft, nontender, nondistended. No abdominal bruit or pulstatile masses. Extremities without cyanosis or clubbing. Muscle tone and bulk normal.  Skin warm and dry. No rashes or ulcers. Neuro grossly nonfocal.  No tremor. Awake and appropriate. CARDIOGRAPHICS and STUDIES, I reviewed:    Telemetry:  SR currently. ECG:  SR with LBBB, normal [place]. Echo:  EF 60%, minimal valve disease. Labs:  Recent Labs     11/06/18 2159 11/06/18  0504   TROIQ 0.08* <0.05     Lab Results   Component Value Date/Time    Cholesterol, total 138 07/20/2018 12:00 AM    HDL Cholesterol 46 07/20/2018 12:00 AM    LDL, calculated 66 07/20/2018 12:00 AM    Triglyceride 132 07/20/2018 12:00 AM    CHOL/HDL Ratio 3.2 09/20/2010 10:16 AM     No results for input(s): INR, PTP, APTT in the last 72 hours.     No lab exists for component: Sage April   Recent Labs     11/08/18 0515 11/07/18  1830 11/07/18  0535 11/05/18  2315   NA  --   --  137 137   K  --   --  4.3 4.3   CL  --   --  106 107   CO2  --   --  25 25   BUN  --   --  53* 44*   CREA  --   --  1.12* 1.20*   GLU  --   --  107* 144*   CA  --   --  8.5 8.2*   ALB 2.4*  --   --  2.2*   WBC 10.8  --  11.7* 11.6*   HGB 7.7* 8.5* 7.1* 8.4*   HCT 25.6* 28.1* 22.9* 27.2*     --  285 224     Recent Labs     11/08/18 0515 11/05/18  2315   SGOT 17 14*   AP 46 40*   TP 6.0* 5.6*   ALB 2.4* 2.2*   GLOB 3.6 3.4     No components found for: Tony Kylah  Recent Labs     11/06/18  0955   PH 7.40   PCO2 32*   PO2 79*           Karen Orosco MD  11/8/2018

## 2018-11-08 NOTE — PROGRESS NOTES
[facility] to [address]                                                                        80 y.o.   female    [address]   Room: [facility] [place] 3 ORTHOPEDICS  Unit Phone# :  [phone]      [address]  [facility] [address]  [address] [address]  Dept: [phone]  Loc: [phone]                    SITUATION     Admitted:  11/1/2018         Attending Provider:  Elijah Oliva*       Consultations:  IP CONSULT TO ORTHOPEDIC SURGERY  IP CONSULT TO INTERVENTIONAL RADIOLOGY  IP CONSULT TO CARDIOLOGY    PCP:  Zechariah Iniguez MD   [phone]    Treatment Team: Attending Provider: Jahaira Richards MD; Consulting Provider: Nicolle Bolivar MD; Utilization Review: Nena Poe RN; Care Manager: Arslan Malagon; Care Manager: Hershel Osgood; Consulting Provider: Miguel A Landers MD; Care Manager: Molina Montgomery; Consulting Provider: Ni Sylvester MD    Admitting Dx:  Back pain       Principal Problem: <principal problem not specified>    * No surgery found * of      BY: * Surgery not found *             ON: * No surgery found *                  Code Status: Full Code                Advance Directives:   Advance Care Planning 11/3/2018   Patient's Healthcare Decision Maker is: -   Primary Decision Maker Name -   Primary Decision Maker Phone Number -   Primary Decision Maker Relationship to Patient -   Confirm Advance Directive None    (Send w/patient)   Yes Not W Pt       Isolation:  There are currently no Active Isolations       MDRO: No current active infections    Pain Medications given:  Oxycodone 5 mg    Last dose: 11/8/2018 at  12:16 p.m.     Special Equipment needed: no  Type of equipment:      (Not currently on dialysis)  (Not currently on dialysis)  (Not currently on dialysis)     BACKGROUND     Allergies:  No Known Allergies    Past Medical History:   Diagnosis Date    Arthritis     Diarrhea 6/6/2016    Foot drop     Gastric ulcer 6/14/2012    Gastroparesis     GERD (gastroesophageal reflux disease)     High cholesterol     Hypertension     Neuropathy     Ovarian cancer ([facility] [address] 1986    chemo x 9 mos    Scoliosis     Stroke ([facility] [address] 2002    ? stroke with drop feet, per patient CT scans were negative       Past Surgical History:   Procedure Laterality Date    HX ORTHOPAEDIC      back    HX ORTHOPAEDIC      bilateral shoulder replacements    HX ORTHOPAEDIC      left knee replacement    HX LYDIA AND BSO  1986    RI EGD TRANSORAL BIOPSY SINGLE/MULTIPLE  1/26/2012         RI EGD TRANSORAL BIOPSY SINGLE/MULTIPLE  6/14/2012            Medications Prior to Admission   Medication Sig    predniSONE (DELTASONE) 20 mg tablet Take 1 Tab by mouth daily as needed. For back pain. With food.  RABEprazole (ACIPHEX) 20 mg tablet Take 20 mg by mouth daily.  raNITIdine hcl 300 mg cap Take  by mouth.  lisinopril (PRINIVIL, ZESTRIL) 20 mg tablet TAKE 1 TABLET BY MOUTH TWO  TIMES DAILY    gabapentin (NEURONTIN) 800 mg tablet TAKE 1 TAB BY MOUTH FOUR  TIMES DAILY AS NEEDED.  hydroCHLOROthiazide (HYDRODIURIL) 25 mg tablet Take 1 Tab by mouth daily.  amitriptyline (ELAVIL) 25 mg tablet Take 1 Tab by mouth nightly.  Calcium-Cholecalciferol, D3, (CALCIUM 500 + D) 500 mg(1,250mg) -400 unit tab Take 1 Tab by mouth daily.  simvastatin (ZOCOR) 40 mg tablet Take 1 Tab by mouth nightly.  potassium chloride (KLOR-CON) 10 mEq tablet Take 1 Tab by mouth three (3) times daily.  cholecalciferol (VITAMIN D3) 1,000 unit tablet Take 1,000 Units by mouth daily.  multivitamin (ONE A DAY) tablet Take 1 Tab by mouth daily.  magnesium 250 mg tab Take  by mouth.  denosumab (PROLIA) 60 mg/mL injection 1 mL by SubCUTAneous route every 6 months.  loperamide (IMODIUM) 2 mg capsule Take 2 mg by mouth four (4) times daily as needed for Diarrhea.        Hard scripts included in transfer packet yes    Vaccinations:    Immunization History   Administered Date(s) Administered    TD Vaccine 03/24/2011       Readmission Risks:    Known Risks: Fall        The Charlson CoMorbitiy Index tool is an evidenced based tool that has more automatic generated information. The tool looks at many different items such as the age of the patient, how many times they were admitted in the last calendar year, current length of stay in the hospital and their diagnosis. All of these items are pulled automatically from information documented in the chart from various places and will generate a score that predicts whether a patient is at low (less than 13), medium (13-20) or high (21 or greater) risk of being readmitted.         ASSESSMENT                Temp: 97.7 °F (36.5 °C) (11/08/18 1046) Pulse (Heart Rate): 65 (11/08/18 1046)     Resp Rate: 16 (11/08/18 1046)           BP: 115/53 (11/08/18 1046)     O2 Sat (%): 97 % (11/08/18 1046)     Weight: 71.7 kg (158 lb)    Height: (not recorded)       If above not within 1 hour of discharge:    BP:_____  P:____  R:____ T:_____ O2 Sat: ___%  O2: ______    Active Orders   Diet    DIET DENTAL SOFT (SOFT SOLID) Six Small Meals         Orientation: oriented to time, place, person and situation     Active Behaviors: None                                   Active Lines/Drains:  (Peg Tube / [place] / CL or S/L?): no    Urinary Status: Voiding     Last BM: Last Bowel Movement Date: 11/07/18     Skin Integrity: Incision (comment)(injection area back)             Mobility: Slightly limited   Weight Bearing Status: WBAT (Weight Bearing as Tolerated)      Gait Training  Assistive Device: Walker, rolling  Ambulation - Level of Assistance: Contact guard assistance  Distance (ft): 60 Feet (ft)         Lab Results   Component Value Date/Time    Glucose 107 (H) 11/07/2018 05:35 AM    INR 1.3 (H) 01/24/2009 06:40 AM    INR 1.6 (H) 01/23/2009 04:30 AM    HGB 7.7 (L) 11/08/2018 05:15 AM    HGB 8.5 (L) 11/07/2018 06:30 PM        RECOMMENDATION     See After Visit Summary (AVS) for:  · Discharge instructions  · After [address]   · Special equipment needed (entered pre-discharge by Care Management)  · Medication Reconciliation    · Follow up Appointment(s)         Report given/sent by:  Felipe Etienne                    Verbal report given to: Amberly Horta  FAXED to:  N/A       Estimated discharge time:  11/8/2018 at 12:50 p.m.

## 2018-11-08 NOTE — DISCHARGE SUMMARY
Hospitalist Discharge Summary     Patient ID:  Mekhi De Paz  [id number]  80 y.o.  10/9/1933    PCP on record: Jenna Jasmine MD    Admit date: 11/1/2018  Discharge date and time: 11/8/2018      DISCHARGE DIAGNOSIS:  Acute intractable back pain in setting of scoliosis and arthritis s/p extensive thoracolumbar fusion 2002 with broken hardware, neuropathy since knee surgery 2009  Persistent hypotension  Afib with rvr  Anemia  GERD  Gastroparesis  Subacute diarrhea      CONSULTATIONS:  IP CONSULT TO ORTHOPEDIC SURGERY  IP CONSULT TO INTERVENTIONAL RADIOLOGY  IP CONSULT TO CARDIOLOGY    ______________________________________________________________________  DISCHARGE SUMMARY/HOSPITAL COURSE:  for full details see H&P, daily progress notes, labs, consult notes. 80 y.o.   female with pmh of gastroparesis, GERD, HTN, ovarian cancer, scoliosis, severe arthritis and prior CVA who developed severe back pain a week before presentation and was treated with prednisone, hydrocortisone and oxycodone as outpatient. She presented to ED with intractable back  in setting of scoliosis and arthritis s/p extensive thoracolumbar fusion 2002 with broken hardware, neuropathy since knee surgery 2009. Pt failed steroids, hydrocodone and oxycodone as outpatient. She was started on morphine PCA with some relief of her pain but she developed hypotension and this was discontinued. She underwent bilateral sacroiliac joint steroid injection by IR with improvement of her back pain and was started on oxycodone with good pain control. CT L spine with no acute process. Fractured thoracolumbar fusion rods, stable. Protruding pedicle screws, stable. Neural foraminal stenoses as described above. Echocardiogram with LVEF 60%. Her hospital course was complicated by persistent hypotension secondary to morphine PCA, and elavil.  These medications were stopped as well as her home BP meds, and she received IVF and midodrine for a day with improvement of her BP.     She also had paroxysmal afib, cardiology consulted. Not candidate for anticoagulation due to anemia. Hb remained in the 7-8 range. No sings of bleeding. PLAN:   - CBC in 1 week  - Wean off of opiates in the next 3- 4 weeks.    - f/u with PCP   - F/u in the cardiology clinic in 1 month    _______________________________________________________________________  Patient seen and examined by me on discharge day. Pertinent Findings:  Gen:    Not in acute pain or distress  Chest: Clear to auscultation bilaterally  CVS:   Regular rate. No LE edema  Abd:  Soft, not distended, not tender  Neuro:  Alert,Ox3.   _______________________________________________________________________  DISCHARGE MEDICATIONS:   Current Discharge Medication List      START taking these medications    Details   melatonin 300 mcg tab Take 1.5 mg by mouth nightly. Qty: 1 Tab, Refills: 0      senna-docusate (PERICOLACE) 8.6-50 mg per tablet Take 1 Tab by mouth daily. Qty: 30 Tab, Refills: 1      oxyCODONE IR (ROXICODONE) 5 mg immediate release tablet Take 1 Tab by mouth two (2) times daily as needed. Max Daily Amount: 10 mg. As needed for severe pain  Qty: 20 Tab, Refills: 0    Associated Diagnoses: Intractable back pain; Low back pain radiating to lower extremity      acetaminophen (TYLENOL) 325 mg tablet Take 2 Tabs by mouth three (3) times daily. Qty: 1 Tab, Refills: 0         CONTINUE these medications which have NOT CHANGED    Details   RABEprazole (ACIPHEX) 20 mg tablet Take 20 mg by mouth daily. raNITIdine hcl 300 mg cap Take  by mouth.      gabapentin (NEURONTIN) 800 mg tablet TAKE 1 TAB BY MOUTH FOUR  TIMES DAILY AS NEEDED. Qty: 360 Tab, Refills: 3      Calcium-Cholecalciferol, D3, (CALCIUM 500 + D) 500 mg(1,250mg) -400 unit tab Take 1 Tab by mouth daily. Qty: 90 Tab, Refills: 1      simvastatin (ZOCOR) 40 mg tablet Take 1 Tab by mouth nightly.   Qty: 90 Tab, Refills: 3    Associated Diagnoses: Mixed hyperlipidemia      cholecalciferol (VITAMIN D3) 1,000 unit tablet Take 1,000 Units by mouth daily. multivitamin (ONE A DAY) tablet Take 1 Tab by mouth daily. magnesium 250 mg tab Take  by mouth. denosumab (PROLIA) 60 mg/mL injection 1 mL by SubCUTAneous route every 6 months. Qty: 1 Syringe, Refills: 1    Comments: Patient has a rohith and a pharmacy card now to help cover co-pay so she will be picking this up for us to give in clinic. Thank you! Suad aSha, [address] [place] [address]  Associated Diagnoses: Age-related osteoporosis without current pathological fracture      loperamide (IMODIUM) 2 mg capsule Take 2 mg by mouth four (4) times daily as needed for Diarrhea. STOP taking these medications       predniSONE (DELTASONE) 20 mg tablet Comments:   Reason for Stopping:         lisinopril (PRINIVIL, ZESTRIL) 20 mg tablet Comments:   Reason for Stopping:         hydroCHLOROthiazide (HYDRODIURIL) 25 mg tablet Comments:   Reason for Stopping:         amitriptyline (ELAVIL) 25 mg tablet Comments:   Reason for Stopping:         potassium chloride (KLOR-CON) 10 mEq tablet Comments:   Reason for Stopping:               My Recommended Diet, Activity, Wound Care, and follow-up labs are listed in the patient's Discharge Insturctions which I have personally completed and reviewed.     ______________________________________________________________________    Risk of deterioration: Low    Condition at Discharge:  Stable  ______________________________________________________________________    Disposition  SNF/LTC  ______________________________________________________________________    Care Plan discussed with:   Patient, Family, RN, Care Manager, Consultant    ______________________________________________________________________    Code Status: Full Code  ______________________________________________________________________      Follow up with:   PCP : Fausto Mcgovern MD  Follow-up Information     Follow up With Specialties Details Why Contact Info    Zaida Cha MD [facility] Schedule an appointment as soon as possible for a visit in 1 week  [address]  [phone]      Kevin Carroll MD Orthopedic Surgery Schedule an appointment as soon as possible for a visit in 3 weeks  [address]  [address]      Lennice Sacks, MD Cardiology In 2 weeks  [address]  [address]  [address] [phone]                Total time in minutes spent coordinating this discharge (includes going over instructions, follow-up, prescriptions, and preparing report for sign off to her PCP) :  40 minutes    Signed:  Iker Nava MD

## 2018-11-08 NOTE — PROGRESS NOTES
CARDIOLOGY Progress Note    Patient ID:  Patient: Desirae Carroll  MRN: [id number]  Age: 80 y.o.  : 10/9/1933    Date of  Admission: 2018 11:35 AM   PCP:  Marcia Vargas MD    Assessment: 1. Paroxysmal atrial fibrillation with RVR, minimal to no symptoms. Back in sinus. 2. Chronic LBBB. 3. PVC's. 4. Echo EF 60%, mild valve disease. 5. Hyperlipidemia. 6. Diabetes mellitus type 2.  7. Hypercholesterolemia. 8. CKD stage 3.  9. Anemia NOS. 10. Scoliosis/arthritis s/p extensive thoracolumbar fusion , now with broken hardware, s/p LUCY 2013 (effective), neuropathy since knee surgery . 11. History of ovarian cancer s/p chemo. 12. GERD/Lewis's esophagus, gastroparesis. 13. Back pain. Fractured thoracolumbar fusion rods, stable. Protruding pedicle screws, stable. 4. Neural foraminal stenoses. 14. Walks with a walker. Plan:     1. Antiarrhythmic therapy given low burden of arrhythmia and minimal sx.  2. She is aware that she may not be a chronic anticoagulation candidate. Consider Eliquis 2.5 mg bid to start IF her anemia is not worsening. H&H late today pending. 3. She is not a good candidate for Afib ablation. 4. Continue statin. All questions answered for patient and daughter. [x]       High complexity decision making was performed in this patient. Desirae Carroll is a 80 y.o. female with a history of back pain under evaluation and treatment. Today, she had a paroxysm of atrial fibrillation that lasted about an hour, resolved spontaneously. She did not feel this, but was made aware of the episode and I was consulted. A new dx. She is naive to anticoagulation. Denies syncope, dizziness, palpitations. No orthopnea, PND, or worsening edema. No TIA or stroke symptoms.        No Known Allergies       Current Facility-Administered Medications   Medication Dose Route Frequency    oxyCODONE IR (ROXICODONE) tablet 5 mg  5 mg Oral Q4H PRN  melatonin tablet 1.5 mg  1.5 mg Oral QHS    gabapentin (NEURONTIN) capsule 200 mg  200 mg Oral BID    And    gabapentin (NEURONTIN) capsule 300 mg  300 mg Oral QHS    acetaminophen (TYLENOL) tablet 650 mg  650 mg Oral TID    polyethylene glycol (MIRALAX) packet 17 g  17 g Oral BID PRN    bisacodyl (DULCOLAX) suppository 10 mg  10 mg Rectal DAILY PRN    senna-docusate (PERICOLACE) 8.6-50 mg per tablet 1 Tab  1 Tab Oral DAILY    enoxaparin (LOVENOX) injection 30 mg  30 mg SubCUTAneous DAILY    calcium-vitamin D (OS-GRUPO) 500 mg-200 unit tablet  1 Tab Oral DAILY    cholecalciferol (VITAMIN D3) tablet 1,000 Units  1,000 Units Oral DAILY    loperamide (IMODIUM) capsule 2 mg  2 mg Oral QID PRN    therapeutic multivitamin (THERAGRAN) tablet 1 Tab  1 Tab Oral DAILY    sodium chloride (NS) flush 5-10 mL  5-10 mL IntraVENous Q8H    sodium chloride (NS) flush 5-10 mL  5-10 mL IntraVENous PRN    naloxone (NARCAN) injection 0.4 mg  0.4 mg IntraVENous PRN    ondansetron (ZOFRAN) injection 4 mg  4 mg IntraVENous Q4H PRN    pravastatin (PRAVACHOL) tablet 80 mg  80 mg Oral QHS    pantoprazole (PROTONIX) tablet 40 mg  40 mg Oral ACB       Review of Symptoms:   Respiratory: negative for SOB, cough, sputum production, wheezing, LEVINE, pleuritic pain   Gastrointestinal: negative for abdominal pain, N/V, dysphagia, change in bowel habits, bleeding   Genitourinary: negative for frequency, urgency, dysuria, hematuria, incontinence      Objective:      Physical Exam:  Patient Vitals for the past 12 hrs:   Temp Pulse Resp BP SpO2   11/07/18 1109 97.7 °F (36.5 °C) 64 18 113/54 100 %   11/07/18 0902 98.3 °F (36.8 °C) 67 16 104/53 95 %       Nondiaphoretic, not in acute distress. Elderly female. Supple, no palpable thyromegaly. No scleral icterus, mucous membranes moist, conjuctivae pink, no xanthelasma. Unlabored, clear to auscultation bilaterally, symmetric air movement.   Regular rate and rhythm, no murmur, pericardial rub, knock, or gallop. No JVD or peripheral edema. No carotid bruit. Palpable radial and DP pulses bilaterally. Abdomen, soft, nontender, nondistended. No abdominal bruit or pulstatile masses. Extremities without cyanosis or clubbing. Muscle tone and bulk normal.  Skin warm and dry. No rashes or ulcers. Neuro grossly nonfocal.  No tremor. Awake and appropriate. CARDIOGRAPHICS and STUDIES, I reviewed:    Telemetry:  SR currently. ECG:  SR with LBBB, normal [place]. Echo:  EF 60%, minimal valve disease. Labs:  Recent Labs     11/06/18  2159 11/06/18  0504   TROIQ 0.08* <0.05     Lab Results   Component Value Date/Time    Cholesterol, total 138 07/20/2018 12:00 AM    HDL Cholesterol 46 07/20/2018 12:00 AM    LDL, calculated 66 07/20/2018 12:00 AM    Triglyceride 132 07/20/2018 12:00 AM    CHOL/HDL Ratio 3.2 09/20/2010 10:16 AM     No results for input(s): INR, PTP, APTT in the last 72 hours.     No lab exists for component: Jennifer Vickers   Recent Labs     11/07/18  0535 11/05/18  2315    137   K 4.3 4.3    107   CO2 25 25   BUN 53* 44*   CREA 1.12* 1.20*   * 144*   CA 8.5 8.2*   ALB  --  2.2*   WBC 11.7* 11.6*   HGB 7.1* 8.4*   HCT 22.9* 27.2*    224     Recent Labs     11/05/18  2315   SGOT 14*   AP 40*   TP 5.6*   ALB 2.2*   GLOB 3.4     No components found for: Tony Kylah  Recent Labs     11/06/18  0955   PH 7.40   PCO2 32*   PO2 79*           Nickie Nazario MD  11/7/2018

## 2018-11-14 NOTE — PERIOP NOTES
[facility] Ambulatory Surgery Unit Pre-operative Instructions Procedure Date  11/15/18            Tentative Arrival Time 1000 1. On the day of your procedure, please report to the Ambulatory Surgery Unit Registration Desk and sign in at your designated time. The Ambulatory Surgery Unit is located in [facility] on the [facility] side of the [facility] across from the [address] [place] [address]. Please have all of your health insurance cards and a photo ID. 2. You must have someone with you to drive you home as directed by your surgeon. 3. You may have a light breakfast and take normal morning medications. 4. We recommend you do not drink any alcoholic beverages for 24 hours before and after your procedure. 5. Contact your surgeons office for instructions on the following medications: non-steroidal anti-inflammatory drugs (i.e. Advil, Aleve), vitamins, and supplements. (Some surgeons will want you to stop these medications prior to surgery and others may allow you to take them) **If you are currently taking Plavix, Coumadin, Aspirin and/or other blood-thinning agents, contact your surgeon for instructions. ** Your surgeon will partner with the physician prescribing these medications to determine if it is safe to stop or if you need to continue taking. Please do not stop taking these medications without instructions from your surgeon. 6. In an effort to help prevent surgical site infection, we ask that you shower with an anti-bacterial soap (i.e. Dial or Safeguard) on the morning of your procedure. Do not apply any lotions, powders, or deodorants after showering. 7. Wear comfortable clothes. Wear glasses instead of contacts. Do not bring any jewelry or money (other than copays or fees as instructed). Do not wear make-up, particularly mascara, the morning of your procedure. Wear your hair loose or down, no ponytails, buns, salvador pins or clips.  All body piercings must be removed. 8. You should understand that if you do not follow these instructions your procedure may be cancelled. If your physical condition changes (i.e. fever, cold or flu) please contact your surgeon as soon as possible. 9. It is important that you be on time. If a situation occurs where you may be late, or if you have any questions or problems, please call [phone]. 
 
10. Your procedure time may be subject to change. You will receive a phone call the day prior to confirm your arrival time. I understand a pre-operative phone call will be made to verify my procedure time. In the event that I am not available, I give permission for a message to be left on my answering service and/or with another person? yes Preop instructions reviewed  Pt verbalized understanding.  
 
 
 ___________________      ___________________      ___________________ 
(Signature of Patient)          (Witness)                   (Date and Time)

## 2018-11-15 ENCOUNTER — HOSPITAL ENCOUNTER (OUTPATIENT)
Dept: GENERAL RADIOLOGY | Age: 83
Setting detail: OUTPATIENT SURGERY
Discharge: HOME | End: 2018-11-15
Attending: PHYSICAL MEDICINE & REHABILITATION
Payer: COMMERCIAL

## 2018-11-15 ENCOUNTER — HOSPITAL ENCOUNTER (OUTPATIENT)
Age: 83
Setting detail: OUTPATIENT SURGERY
Discharge: HOME | End: 2018-11-15
Attending: PHYSICAL MEDICINE & REHABILITATION | Admitting: PHYSICAL MEDICINE & REHABILITATION
Payer: COMMERCIAL

## 2018-11-15 ENCOUNTER — APPOINTMENT (OUTPATIENT)
Dept: GENERAL RADIOLOGY | Age: 83
End: 2018-11-15
Attending: PHYSICAL MEDICINE & REHABILITATION
Payer: COMMERCIAL

## 2018-11-15 VITALS
WEIGHT: 152 LBS | RESPIRATION RATE: 14 BRPM | DIASTOLIC BLOOD PRESSURE: 70 MMHG | BODY MASS INDEX: 29.84 KG/M2 | OXYGEN SATURATION: 95 % | SYSTOLIC BLOOD PRESSURE: 119 MMHG | HEIGHT: 60 IN | TEMPERATURE: 98.3 F | HEART RATE: 75 BPM

## 2018-11-15 PROCEDURE — 74011000250 HC RX REV CODE- 250: Performed by: PHYSICAL MEDICINE & REHABILITATION

## 2018-11-15 PROCEDURE — 74011250636 HC RX REV CODE- 250/636: Performed by: PHYSICAL MEDICINE & REHABILITATION

## 2018-11-15 PROCEDURE — 76210000046 HC AMBSU PH II REC FIRST 0.5 HR: Performed by: PHYSICAL MEDICINE & REHABILITATION

## 2018-11-15 PROCEDURE — 72020 X-RAY EXAM OF SPINE 1 VIEW: CPT

## 2018-11-15 PROCEDURE — 76030000002 HC AMB SURG OR TIME FIRST 0.: Performed by: PHYSICAL MEDICINE & REHABILITATION

## 2018-11-15 RX ORDER — BUPIVACAINE HYDROCHLORIDE 5 MG/ML
5 INJECTION, SOLUTION EPIDURAL; INTRACAUDAL ONCE
Status: DISCONTINUED | OUTPATIENT
Start: 2018-11-15 | End: 2018-11-15 | Stop reason: HOSPADM

## 2018-11-15 RX ORDER — METHYLPREDNISOLONE ACETATE 40 MG/ML
INJECTION, SUSPENSION INTRA-ARTICULAR; INTRALESIONAL; INTRAMUSCULAR; SOFT TISSUE AS NEEDED
Status: DISCONTINUED | OUTPATIENT
Start: 2018-11-15 | End: 2018-11-15 | Stop reason: HOSPADM

## 2018-11-15 RX ORDER — LIDOCAINE HYDROCHLORIDE 20 MG/ML
INJECTION, SOLUTION EPIDURAL; INFILTRATION; INTRACAUDAL; PERINEURAL AS NEEDED
Status: DISCONTINUED | OUTPATIENT
Start: 2018-11-15 | End: 2018-11-15 | Stop reason: HOSPADM

## 2018-11-15 RX ORDER — LIDOCAINE HYDROCHLORIDE 20 MG/ML
10 INJECTION, SOLUTION INFILTRATION; PERINEURAL ONCE
Status: DISCONTINUED | OUTPATIENT
Start: 2018-11-15 | End: 2018-11-15 | Stop reason: HOSPADM

## 2018-11-15 RX ORDER — METHYLPREDNISOLONE ACETATE 40 MG/ML
40 INJECTION, SUSPENSION INTRA-ARTICULAR; INTRALESIONAL; INTRAMUSCULAR; SOFT TISSUE ONCE
Status: DISCONTINUED | OUTPATIENT
Start: 2018-11-15 | End: 2018-11-15 | Stop reason: HOSPADM

## 2018-11-15 RX ORDER — BUPIVACAINE HYDROCHLORIDE 5 MG/ML
INJECTION, SOLUTION EPIDURAL; INTRACAUDAL AS NEEDED
Status: DISCONTINUED | OUTPATIENT
Start: 2018-11-15 | End: 2018-11-15 | Stop reason: HOSPADM

## 2018-11-15 NOTE — OP NOTES
SI Steroid Injection Operative Report    Indications: This is a 80 y.o. female who presents with LBP. She was positive for Sacroiliitis. The patient was admitted for surgery as conservative measures have failed. Date of Surgery: 11/15/2018    Preoperative Diagnosis: Sacroiliitis    Postoperative Diagnosis: Sacroiliitis    Surgeon(s) and Role:     * Adelfo Grijalva MD - Primary     Procedure:  Procedure(s):  BILATERAL SACROILIAC JOINT INJECTION    Procedure in Detail:  After appropriate informed consent was obtained, the patient was taken to the operating suite and placed in the prone position on the operating table on appropriate padding. The LS region was prepped and draped in the usual sterile fashion. Intraoperative fluoroscopy was used to localize the LS spine. The skin was infiltrated with 2% lidocaine. An 22-g needle was advanced into the Scooby SI joints under fluoroscopic guidance. Next, 2ml of 0.5% marcaine and 80mg of Depo-Medrol were injected. The needle was removed from the patient. The patient was then turned back into the supine position on the stretcher and was taken to the Recovery Room in stable condition.     Estimated Blood Loss:  none     Specimens: None       Drains: None          Complications:  None    Signed By: Suleman Hair MD                        November 15, 2018

## 2018-11-15 NOTE — DISCHARGE INSTRUCTIONS
Dr. Joe Spicer Discharge Instructions    You had a sacroiliac joint injection today. The steroids will slowly become effective, reducing your pain, over the next 2 weeks. You should begin feeling better after a few days, but it may take up to 2 weeks to notice the difference. The benefit you get from your injection will last a variable amount of time, depending on the severity of your spine problem. You may need a series of injections, up to three (3) per year, depending on your response to this one.  PAIN: Most people do not have any increase in pain after this injection. Hoever, you might experience some soreness in your low back. If this happens, putting an ice pack over the sore area will help,   Bandage: You will have a small bandage covering the site of the injection. You may remove it once you get home.  Restrictions: Someone should drive you home after the injection. After that, you have no restrictions. You may resume your normal level of activity. You may take a shower or bath, and you may eat normally. You should continue your current exercises and /or therapy routine.  Medications: Continue your current medications as prescribed. If your pain decreases, you may reduce the amount of your pain medicines. If you stopped taking anticoagulants or blood-thinners before the injection, start them tomorrow. If you have diabetes, your blood sugar may be elevated for a few days. Call your primary doctor with any questions.   Call Dr. Dali Davis at [phone] if you experience:   Fever (101 degrees Fahrenheit or greater)   Nausea or vomiting   Headache unrelieved by your normal pain medicine   Redness or swelling at the injection site that lasts more than 1 day   New numbness, tingling, weakness, or pain that you didnt have before the injection  Follow- up appointment: 2-4 weeks      If still having pain in 1-2 weeks, call office at [phone] for a follow up appointment. DISCHARGE SUMMARY from Nurse    The following personal items collected during your admission are returned to you:   Dental Appliance: Dental Appliances: None  Vision: Visual Aid: Glasses  Hearing Aid:    Jewelry: Jewelry: None  Clothing: Clothing: With patient  Other Valuables: Other Valuables: None  Valuables sent to safe: If you were given prescriptions, please review the written information on prescribed medications. · You will receive a Post Operative Call from one of the Recovery Room Nurses on the day after your surgery to check on you. It is very important for us to know how you are recovering after your surgery. · You may receive an e-mail or letter in the mail from [facility] regarding your experience with us in the Ambulatory Surgery Unit. Your feedback is valuable to us and we appreciate your participation in the survey. If you have not had your influenza or pneumococcal vaccines, please follow up with your primary care physician. The discharge information has been reviewed with the patient. The patient verbalized understanding.

## 2018-11-15 NOTE — PERIOP NOTES
1215 Pt arrived via stretcher into PACU from OR procedure with Dr. Oneyda Peng awake and alert. Received report from RN.  
 
4330 Called out for ride to pull around front. Pt. Alert. Denies pain or chill. Discharge instructions reviewed with patient. Allowed and answered any and all questions. Patient state ready for discharge. Confirmed all belongings with patient

## 2018-11-15 NOTE — PERIOP NOTES
Neuro:  Push/Pull assessment:  LUE Response: equal  
 LLE Response: equal 
 RUE Response: equal 
 RLE Response: equal

## 2018-11-15 NOTE — PERIOP NOTES
Sheila Reddy 10/9/1933 
[id number] Situation: 
Verbal report given from: GENA Brooks RN Procedure: Procedure(s): BILATERAL SACROILIAC JOINT INJECTION Background: 
 
Preoperative diagnosis: Bilateral sacroiliitis ([address] [M46.1] Postoperative diagnosis: Bilateral sacroiliitis ([facility]) [M46.1] :  Dr. Nils Rodriguez Assistant(s): Circ-1: Kee Azevedo RN Local Nurse Monitor: Benito Amor RN Radiology Technician: Juana Leija Scrub RN-1: Jerry Herrera RN Specimens: * No specimens in log * Assessment: 
Intra-procedure medications Anesthesia gave intra-procedure sedation and medications, see anesthesia flow sheet Intravenous fluids: Courtney Makos Vital signs stable Recommendation: 
 
Permission to share finding with Elida Bradshaw : yes

## 2018-11-15 NOTE — H&P
Procedural Case Note 11/15/2018    (11:36 AM) Tico Jerome 10/9/1933   (80 y.o.) 
 
[id number] CC:  pain ROS:   Complete ROS obtained, no CP, no SOB, no N or V 
 
PMH:    
Past Medical History:  
Diagnosis Date  Arthritis  Diarrhea 6/6/2016  Foot drop  Gastric ulcer 6/14/2012  Gastroparesis  GERD (gastroesophageal reflux disease)  High cholesterol  Hypertension  Neuropathy  Ovarian cancer ([facility] 1986  
 chemo x 9 mos  Scoliosis  Stroke [facility]) 2002  
 ? stroke with drop feet, per patient CT scans were negative ALLERGIES:   No Known Allergies MEDS:    
No current facility-administered medications for this encounter. Visit Vitals Ht 5' (1.524 m) Wt 68.9 kg (152 lb) BMI 29.69 kg/m² PE: 
Gen: NAD Head: normocephalic Heart: RRR Lungs: CTA lc Abd: NT, ND, soft Neuro: awake and alert Skin: intact IMPRESSION:   sacroiliitis Note:  The clinical status was discussed in detail with the patient. The procedure was again discussed and described in detail. All understand and accept the planned procedure and risks; reject other forms of treatment. All questions are answered.  
 
Margo Guaman MD

## 2018-12-18 NOTE — PROGRESS NOTES
Problem: Mobility Impaired (Adult and Pediatric)  Goal: *Acute Goals and Plan of Care (Insert Text)  Physical Therapy Goals  Initiated 11/2/2018  1. Patient will move from supine to sit and sit to supine , scoot up and down and roll side to side in bed with independence within 7 day(s). 2.  Patient will transfer from bed to chair and chair to bed with modified independence using the least restrictive device within 7 day(s). 3.  Patient will perform sit to stand with independence within 7 day(s). 4.  Patient will ambulate with modified independence for 375 feet with the least restrictive device within 7 day(s). physical Therapy EVALUATION  Patient: Markel Su (25 y.o. female)  Date: 11/2/2018  Primary Diagnosis: Back pain       Precautions: Fall    ASSESSMENT :  Based on the objective data described below, the patient presents with decreased balance and mobility skills following admission yesterday for intractable back pain . Her pain is currently under good control using a PCA. She was lying in bed when PT arrived. Agreed to assessment and cleared by nursing for mobility. PTA - she lived in an PERFECTO at [facility] alone. She has bilateral foot drop well managed with bilateral AFOs which were available in her room for use. In her apartment she mobilized using a wheelchair to allow her to use her hands, but would ambulate with her rolling walker to the community dining room. She denies any falls in the last 12 months  Currently independent with rolling and supine to sit transfers. Sit to stand was supervision and bed to chair with RW was sba. She tolerated ambulation for about 120 feet with RW and cg assistance. Distance was limited by increasing back pain. Her gait pattern is limited by increased path deviations and trunk sway. Patient was left up in the bedside chair with all needs met when the session ended. Recommend Rehab at [facility] per patients request upon discharge.  No DME needs at [facility] as needed for nausea  Dizziness - rest/sleeping, hydration and keeping temperature down with Tylenol  BRAT diet  Hydration - water, ginger ale, gatorade this time. Patient will benefit from skilled intervention to address the above impairments. Patients rehabilitation potential is considered to be Good  Factors which may influence rehabilitation potential include:   []         None noted  []         Mental ability/status  []         Medical condition  [x]         Home/family situation and support systems  []         Safety awareness  []         Pain tolerance/management  []         Other:      PLAN :  Recommendations and Planned Interventions:  []           Bed Mobility Training             [x]    Neuromuscular Re-Education  [x]           Transfer Training                   []    Orthotic/Prosthetic Training  [x]           Gait Training                         []    Modalities  [x]           Therapeutic Exercises           []    Edema Management/Control  [x]           Therapeutic Activities            [x]    Patient and Family Training/Education  []           Other (comment):    Frequency/Duration: Patient will be followed by physical therapy  5 times a week to address goals. Discharge Recommendations: [facility] rehab at [facility].   Further Equipment Recommendations for Discharge: None     SUBJECTIVE:   Patient stated I feel more comfortable now that the pain is better    OBJECTIVE DATA SUMMARY:   HISTORY:    Past Medical History:   Diagnosis Date    Arthritis     Diarrhea 6/6/2016    Foot drop     Gastric ulcer 6/14/2012    Gastroparesis     GERD (gastroesophageal reflux disease)     High cholesterol     Hypertension     Neuropathy     O[facility] ([facility] [address] 1986    chemo x 9 mos    Scoliosis     Stroke ([facility] 2002    ? stroke with drop feet, per patient CT scans were negative     Past Surgical History:   Procedure Laterality Date    HX ORTHOPAEDIC      back    HX ORTHOPAEDIC      bilateral shoulder replacements    HX ORTHOPAEDIC      left knee replacement    HX LYDIA AND BSO  1986    [place] EGD TRANSORAL BIOPSY SINGLE/MULTIPLE  1/26/2012         [place] EGD TRANSORAL BIOPSY SINGLE/MULTIPLE  6/14/2012          Prior Level of Function/Home Situation: she lived in an PERFECTO at [facility] alone. She has bilateral foot drop well managed with bilateral AFOs which were available in her room for use. In her apartment she mobilized using a wheelchair to allow her to use her hands, but would ambulate with her rolling walker to the community dining room. She denies any falls in the last 12 months    Personal factors and/or comorbidities impacting plan of care: lives alone    Home Situation  [address] Name: [facility]  # Steps to Enter: 0  One/Two Story Residence: One story  Living Alone: No  Support Systems: Child(oracio), Home care staff  Patient Expects to be Discharged to[de-identified] Rehabilitation facility  Current DME Used/Available at Home: Walker, rolling, Grab bars, Adaptive dressing aides, Raised toilet seat, Wheelchair  Tub or Shower Type: Shower    EXAMINATION/PRESENTATION/DECISION MAKING:   Critical Behavior:  Neurologic State: Alert  Orientation Level: Oriented X4  Cognition: Follows commands  Safety/Judgement: Awareness of environment, Fall prevention, Home safety, Insight into deficits  Hearing:   Auditory  Auditory Impairment: Hard of hearing, bilateral  Skin:    Edema:   Range Of Motion:  AROM: Generally decreased, functional                       Strength:    Strength: Generally decreased, functional                    Tone & Sensation:                  Sensation: Impaired(luis area, back of L knee, B feet 2* neuropathy)               Coordination:  Coordination: Within functional limits  Vision:   Tracking: Able to track stimulus in all quadrants w/o difficulty  Corrective Lenses: Glasses  Functional Mobility:  Bed Mobility:     Supine to Sit: Independent     Scooting: Independent  Transfers:  Sit to Stand: Supervision  Stand to Sit: Supervision        Bed to Chair: Supervision Balance:   Sitting: Intact  Standing: Intact; With support  Ambulation/Gait Training:  Distance (ft): 120 Feet (ft)  Assistive Device: Walker, rolling;Gait belt  Ambulation - Level of Assistance: Contact guard assistance     Gait Description (WDL): Exceptions to WDL  Gait Abnormalities: Path deviations;Trunk sway increased        Base of Support: Widened     Speed/Kaylynn: Pace decreased (<100 feet/min)                       Functional Measure:  Barthel Index:    Bathin  Bladder: 10  Bowels: 10  Groomin  Dressin  Feeding: 10  Mobility: 15  Stairs: 5  Toilet Use: 10  Transfer (Bed to Chair and Back): 15  Total: 85       Barthel and G-code impairment scale:  Percentage of impairment CH  0% CI  1-19% CJ  20-39% CK  40-59% CL  60-79% CM  80-99% CN  100%   Barthel Score 0-100 100 99-80 79-60 59-40 20-39 1-19   0   Barthel Score 0-20 20 17-19 13-16 9-12 5-8 1-4 0      The Barthel ADL Index: Guidelines  1. The index should be used as a record of what a patient does, not as a record of what a patient could do. 2. The main aim is to establish degree of independence from any help, physical or verbal, however minor and for whatever reason. 3. The need for supervision renders the patient not independent. 4. A patient's performance should be established using the best available evidence. Asking the patient, friends/relatives and nurses are the usual sources, but direct observation and common sense are also important. However direct testing is not needed. 5. Usually the patient's performance over the preceding 24-48 hours is important, but occasionally longer periods will be relevant. 6. Middle categories imply that the patient supplies over 50 per cent of the effort. 7. Use of aids to be independent is allowed. Montse Cui., Barthel, DROSA MARIA. (). Functional evaluation: the Barthel Index. [address] [facility] (14)2. OMEGA Boyle, Eliana Dooley.Geno., Cristino, [address] [place] [address] ().  Measuring the change indisability after inpatient rehabilitation; comparison of the responsiveness of the Barthel Index and Functional [place] Measure. Journal of Neurology, Neurosurgery, and Psychiatry, 66(4), 715-575. FREIDA Day, GABI Sandy, & Mynor Flores M.A. (2004.) Assessment of post-stroke quality of life in cost-effectiveness studies: The usefulness of the Barthel Index and the EuroQoL-5D. Quality of Life Research, 13, 352-70         G codes: In compliance with CMSs Claims Based Outcome Reporting, the following G-code set was chosen for this patient based on their primary functional limitation being treated: The outcome measure chosen to determine the severity of the functional limitation was the Barthel with a score of 85/100 which was correlated with the impairment scale. ? Mobility - Walking and Moving Around:     - CURRENT STATUS: CI - 1%-19% impaired, limited or restricted    - GOAL STATUS: CH - 0% impaired, limited or restricted    - D/C STATUS:  ---------------To be determined---------------      Physical Therapy Evaluation Charge Determination   History Examination Presentation Decision-Making   HIGH Complexity :3+ comorbidities / personal factors will impact the outcome/ POC  MEDIUM Complexity : 3 Standardized tests and measures addressing body structure, function, activity limitation and / or participation in recreation  MEDIUM Complexity : Evolving with changing characteristics  Other outcome measures Barthel  MEDIUM      Based on the above components, the patient evaluation is determined to be of the following complexity level: MEDIUM    Pain:  Pain Scale 1: Numeric (0 - 10)  Pain Intensity 1: 0              Activity Tolerance:   Fair  Please refer to the flowsheet for vital signs taken during this treatment.   After treatment:   [x]         Patient left in no apparent distress sitting up in chair  []         Patient left in no apparent distress in bed  [x] Call bell left within reach  [x]         Nursing notified  []         Caregiver present  []         Bed alarm activated    COMMUNICATION/EDUCATION:   The patients plan of care was discussed with: Occupational Therapist, Registered Nurse,  and NP. [x]         Fall prevention education was provided and the patient/caregiver indicated understanding. [x]         Patient/family have participated as able in goal setting and plan of care. [x]         Patient/family agree to work toward stated goals and plan of care. []         Patient understands intent and goals of therapy, but is neutral about his/her participation. []         Patient is unable to participate in goal setting and plan of care.     Thank you for this referral.  Rasheeda Dawn, PT   Time Calculation: 35 mins

## 2019-02-07 LAB
T4 SERPL-MCNC: 6.8 UG/DL (ref 4.5–12)
TSH SERPL DL<=0.005 MIU/L-ACNC: 2.87 UIU/ML (ref 0.45–4.5)

## 2019-02-27 ENCOUNTER — HOSPITAL ENCOUNTER (INPATIENT)
Age: 84
LOS: 6 days | Discharge: NURSING, REHAB OR LONG-TERM CARE | DRG: 390 | End: 2019-03-05
Attending: EMERGENCY MEDICINE | Admitting: SURGERY
Payer: MEDICARE

## 2019-02-27 ENCOUNTER — APPOINTMENT (OUTPATIENT)
Dept: CT IMAGING | Age: 84
DRG: 390 | End: 2019-02-27
Attending: PHYSICIAN ASSISTANT
Payer: MEDICARE

## 2019-02-27 DIAGNOSIS — K56.609 SMALL BOWEL OBSTRUCTION (HCC): Primary | ICD-10-CM

## 2019-02-27 LAB
ANION GAP SERPL CALC-SCNC: 9 MMOL/L (ref 5–15)
APPEARANCE UR: CLEAR
BACTERIA URNS QL MICRO: NEGATIVE /HPF
BASOPHILS # BLD: 0.1 K/UL (ref 0–0.1)
BASOPHILS NFR BLD: 1 % (ref 0–1)
BILIRUB UR QL CFM: NEGATIVE
BUN SERPL-MCNC: 41 MG/DL (ref 6–20)
BUN/CREAT SERPL: 41 (ref 12–20)
CALCIUM SERPL-MCNC: 10.6 MG/DL (ref 8.5–10.1)
CHLORIDE SERPL-SCNC: 100 MMOL/L (ref 97–108)
CO2 SERPL-SCNC: 30 MMOL/L (ref 21–32)
COLOR UR: ABNORMAL
CREAT SERPL-MCNC: 1.01 MG/DL (ref 0.55–1.02)
DIFFERENTIAL METHOD BLD: ABNORMAL
EOSINOPHIL # BLD: 0 K/UL (ref 0–0.4)
EOSINOPHIL NFR BLD: 0 % (ref 0–7)
EPITH CASTS URNS QL MICRO: ABNORMAL /LPF
ERYTHROCYTE [DISTWIDTH] IN BLOOD BY AUTOMATED COUNT: 16.2 % (ref 11.5–14.5)
GLUCOSE SERPL-MCNC: 121 MG/DL (ref 65–100)
GLUCOSE UR STRIP.AUTO-MCNC: NEGATIVE MG/DL
HCT VFR BLD AUTO: 33 % (ref 35–47)
HGB BLD-MCNC: 9.4 G/DL (ref 11.5–16)
HGB UR QL STRIP: NEGATIVE
IMM GRANULOCYTES # BLD AUTO: 0 K/UL (ref 0–0.04)
IMM GRANULOCYTES NFR BLD AUTO: 0 % (ref 0–0.5)
KETONES UR QL STRIP.AUTO: NEGATIVE MG/DL
LACTATE SERPL-SCNC: 1.1 MMOL/L (ref 0.4–2)
LEUKOCYTE ESTERASE UR QL STRIP.AUTO: NEGATIVE
LIPASE SERPL-CCNC: 123 U/L (ref 73–393)
LYMPHOCYTES # BLD: 0.8 K/UL (ref 0.8–3.5)
LYMPHOCYTES NFR BLD: 6 % (ref 12–49)
MCH RBC QN AUTO: 22 PG (ref 26–34)
MCHC RBC AUTO-ENTMCNC: 28.5 G/DL (ref 30–36.5)
MCV RBC AUTO: 77.3 FL (ref 80–99)
MONOCYTES # BLD: 1.1 K/UL (ref 0–1)
MONOCYTES NFR BLD: 8 % (ref 5–13)
NEUTS SEG # BLD: 10.9 K/UL (ref 1.8–8)
NEUTS SEG NFR BLD: 85 % (ref 32–75)
NITRITE UR QL STRIP.AUTO: NEGATIVE
NRBC # BLD: 0 K/UL (ref 0–0.01)
NRBC BLD-RTO: 0 PER 100 WBC
PH UR STRIP: 6.5 [PH] (ref 5–8)
PLATELET # BLD AUTO: 316 K/UL (ref 150–400)
PMV BLD AUTO: 9.9 FL (ref 8.9–12.9)
POTASSIUM SERPL-SCNC: 3.6 MMOL/L (ref 3.5–5.1)
PROT UR STRIP-MCNC: ABNORMAL MG/DL
RBC # BLD AUTO: 4.27 M/UL (ref 3.8–5.2)
RBC #/AREA URNS HPF: ABNORMAL /HPF (ref 0–5)
SODIUM SERPL-SCNC: 139 MMOL/L (ref 136–145)
SP GR UR REFRACTOMETRY: 1.01 (ref 1–1.03)
TROPONIN I SERPL-MCNC: <0.05 NG/ML
UA: UC IF INDICATED,UAUC: ABNORMAL
UROBILINOGEN UR QL STRIP.AUTO: 0.2 EU/DL (ref 0.2–1)
WBC # BLD AUTO: 12.9 K/UL (ref 3.6–11)
WBC URNS QL MICRO: ABNORMAL /HPF (ref 0–4)

## 2019-02-27 PROCEDURE — 65270000029 HC RM PRIVATE

## 2019-02-27 PROCEDURE — 99283 EMERGENCY DEPT VISIT LOW MDM: CPT

## 2019-02-27 PROCEDURE — 84484 ASSAY OF TROPONIN QUANT: CPT

## 2019-02-27 PROCEDURE — 81001 URINALYSIS AUTO W/SCOPE: CPT

## 2019-02-27 PROCEDURE — 87186 SC STD MICRODIL/AGAR DIL: CPT

## 2019-02-27 PROCEDURE — 80048 BASIC METABOLIC PNL TOTAL CA: CPT

## 2019-02-27 PROCEDURE — 36415 COLL VENOUS BLD VENIPUNCTURE: CPT

## 2019-02-27 PROCEDURE — 87077 CULTURE AEROBIC IDENTIFY: CPT

## 2019-02-27 PROCEDURE — 74011250636 HC RX REV CODE- 250/636: Performed by: PHYSICIAN ASSISTANT

## 2019-02-27 PROCEDURE — 83605 ASSAY OF LACTIC ACID: CPT

## 2019-02-27 PROCEDURE — 87086 URINE CULTURE/COLONY COUNT: CPT

## 2019-02-27 PROCEDURE — 74177 CT ABD & PELVIS W/CONTRAST: CPT

## 2019-02-27 PROCEDURE — 74011636320 HC RX REV CODE- 636/320: Performed by: EMERGENCY MEDICINE

## 2019-02-27 PROCEDURE — 74011250637 HC RX REV CODE- 250/637: Performed by: PHYSICIAN ASSISTANT

## 2019-02-27 PROCEDURE — 83690 ASSAY OF LIPASE: CPT

## 2019-02-27 PROCEDURE — 99222 1ST HOSP IP/OBS MODERATE 55: CPT | Performed by: SURGERY

## 2019-02-27 PROCEDURE — 96361 HYDRATE IV INFUSION ADD-ON: CPT

## 2019-02-27 PROCEDURE — 96360 HYDRATION IV INFUSION INIT: CPT

## 2019-02-27 PROCEDURE — 85025 COMPLETE CBC W/AUTO DIFF WBC: CPT

## 2019-02-27 RX ORDER — LORAZEPAM 2 MG/ML
0.5 INJECTION INTRAMUSCULAR ONCE
Status: DISCONTINUED | OUTPATIENT
Start: 2019-02-27 | End: 2019-02-27

## 2019-02-27 RX ORDER — HYDROMORPHONE HYDROCHLORIDE 1 MG/ML
0.5 INJECTION, SOLUTION INTRAMUSCULAR; INTRAVENOUS; SUBCUTANEOUS
Status: DISCONTINUED | OUTPATIENT
Start: 2019-02-27 | End: 2019-03-05 | Stop reason: HOSPADM

## 2019-02-27 RX ORDER — SODIUM CHLORIDE 9 MG/ML
125 INJECTION, SOLUTION INTRAVENOUS CONTINUOUS
Status: DISCONTINUED | OUTPATIENT
Start: 2019-02-28 | End: 2019-02-28

## 2019-02-27 RX ORDER — ONDANSETRON 4 MG/1
4 TABLET, ORALLY DISINTEGRATING ORAL
Status: COMPLETED | OUTPATIENT
Start: 2019-02-27 | End: 2019-02-27

## 2019-02-27 RX ORDER — SODIUM CHLORIDE 0.9 % (FLUSH) 0.9 %
10 SYRINGE (ML) INJECTION
Status: COMPLETED | OUTPATIENT
Start: 2019-02-27 | End: 2019-02-27

## 2019-02-27 RX ORDER — ONDANSETRON 2 MG/ML
4 INJECTION INTRAMUSCULAR; INTRAVENOUS
Status: DISCONTINUED | OUTPATIENT
Start: 2019-02-27 | End: 2019-03-05 | Stop reason: HOSPADM

## 2019-02-27 RX ORDER — HYDROMORPHONE HYDROCHLORIDE 1 MG/ML
0.5 INJECTION, SOLUTION INTRAMUSCULAR; INTRAVENOUS; SUBCUTANEOUS ONCE
Status: DISCONTINUED | OUTPATIENT
Start: 2019-02-27 | End: 2019-02-27

## 2019-02-27 RX ADMIN — IOPAMIDOL 100 ML: 755 INJECTION, SOLUTION INTRAVENOUS at 19:38

## 2019-02-27 RX ADMIN — SODIUM CHLORIDE 1000 ML: 900 INJECTION, SOLUTION INTRAVENOUS at 18:58

## 2019-02-27 RX ADMIN — ONDANSETRON 4 MG: 4 TABLET, ORALLY DISINTEGRATING ORAL at 18:58

## 2019-02-27 RX ADMIN — Medication 10 ML: at 19:38

## 2019-02-28 ENCOUNTER — APPOINTMENT (OUTPATIENT)
Dept: GENERAL RADIOLOGY | Age: 84
DRG: 390 | End: 2019-02-28
Attending: SURGERY
Payer: MEDICARE

## 2019-02-28 LAB
ANION GAP SERPL CALC-SCNC: 6 MMOL/L (ref 5–15)
BASOPHILS # BLD: 0 K/UL (ref 0–0.1)
BASOPHILS NFR BLD: 0 % (ref 0–1)
BUN SERPL-MCNC: 40 MG/DL (ref 6–20)
BUN/CREAT SERPL: 44 (ref 12–20)
CALCIUM SERPL-MCNC: 9.5 MG/DL (ref 8.5–10.1)
CHLORIDE SERPL-SCNC: 102 MMOL/L (ref 97–108)
CO2 SERPL-SCNC: 34 MMOL/L (ref 21–32)
CREAT SERPL-MCNC: 0.91 MG/DL (ref 0.55–1.02)
DIFFERENTIAL METHOD BLD: ABNORMAL
EOSINOPHIL # BLD: 0 K/UL (ref 0–0.4)
EOSINOPHIL NFR BLD: 0 % (ref 0–7)
ERYTHROCYTE [DISTWIDTH] IN BLOOD BY AUTOMATED COUNT: 16.2 % (ref 11.5–14.5)
GLUCOSE SERPL-MCNC: 118 MG/DL (ref 65–100)
HCT VFR BLD AUTO: 31.6 % (ref 35–47)
HGB BLD-MCNC: 9 G/DL (ref 11.5–16)
IMM GRANULOCYTES # BLD AUTO: 0 K/UL (ref 0–0.04)
IMM GRANULOCYTES NFR BLD AUTO: 0 % (ref 0–0.5)
LYMPHOCYTES # BLD: 1 K/UL (ref 0.8–3.5)
LYMPHOCYTES NFR BLD: 9 % (ref 12–49)
MCH RBC QN AUTO: 22 PG (ref 26–34)
MCHC RBC AUTO-ENTMCNC: 28.5 G/DL (ref 30–36.5)
MCV RBC AUTO: 77.3 FL (ref 80–99)
MONOCYTES # BLD: 1 K/UL (ref 0–1)
MONOCYTES NFR BLD: 9 % (ref 5–13)
NEUTS SEG # BLD: 9.4 K/UL (ref 1.8–8)
NEUTS SEG NFR BLD: 82 % (ref 32–75)
NRBC # BLD: 0 K/UL (ref 0–0.01)
NRBC BLD-RTO: 0 PER 100 WBC
PLATELET # BLD AUTO: 321 K/UL (ref 150–400)
PMV BLD AUTO: 10.1 FL (ref 8.9–12.9)
POTASSIUM SERPL-SCNC: 3.2 MMOL/L (ref 3.5–5.1)
RBC # BLD AUTO: 4.09 M/UL (ref 3.8–5.2)
RBC MORPH BLD: ABNORMAL
SODIUM SERPL-SCNC: 142 MMOL/L (ref 136–145)
WBC # BLD AUTO: 11.4 K/UL (ref 3.6–11)

## 2019-02-28 PROCEDURE — 36415 COLL VENOUS BLD VENIPUNCTURE: CPT

## 2019-02-28 PROCEDURE — 85025 COMPLETE CBC W/AUTO DIFF WBC: CPT

## 2019-02-28 PROCEDURE — 80048 BASIC METABOLIC PNL TOTAL CA: CPT

## 2019-02-28 PROCEDURE — 74011000250 HC RX REV CODE- 250: Performed by: SURGERY

## 2019-02-28 PROCEDURE — 74019 RADEX ABDOMEN 2 VIEWS: CPT

## 2019-02-28 PROCEDURE — 74011636320 HC RX REV CODE- 636/320: Performed by: SURGERY

## 2019-02-28 PROCEDURE — C9113 INJ PANTOPRAZOLE SODIUM, VIA: HCPCS | Performed by: SURGERY

## 2019-02-28 PROCEDURE — 99232 SBSQ HOSP IP/OBS MODERATE 35: CPT | Performed by: SURGERY

## 2019-02-28 PROCEDURE — 65270000029 HC RM PRIVATE

## 2019-02-28 PROCEDURE — 77030038269 HC DRN EXT URIN PURWCK BARD -A

## 2019-02-28 PROCEDURE — 74011250636 HC RX REV CODE- 250/636: Performed by: SURGERY

## 2019-02-28 RX ORDER — DEXTROSE, SODIUM CHLORIDE, AND POTASSIUM CHLORIDE 5; .45; .15 G/100ML; G/100ML; G/100ML
25 INJECTION INTRAVENOUS CONTINUOUS
Status: DISCONTINUED | OUTPATIENT
Start: 2019-02-28 | End: 2019-03-05

## 2019-02-28 RX ADMIN — SODIUM CHLORIDE 40 MG: 9 INJECTION INTRAMUSCULAR; INTRAVENOUS; SUBCUTANEOUS at 09:07

## 2019-02-28 RX ADMIN — SODIUM CHLORIDE 125 ML/HR: 900 INJECTION, SOLUTION INTRAVENOUS at 00:00

## 2019-02-28 RX ADMIN — DEXTROSE MONOHYDRATE, SODIUM CHLORIDE, AND POTASSIUM CHLORIDE 125 ML/HR: 50; 4.5; 1.49 INJECTION, SOLUTION INTRAVENOUS at 09:06

## 2019-02-28 RX ADMIN — DIATRIZOATE MEGLUMINE AND DIATRIZOATE SODIUM 30 ML: 660; 100 LIQUID ORAL; RECTAL at 10:04

## 2019-02-28 NOTE — H&P
Surgery History and Physcial 
 
Subjective:  
  
Kwame Lopez is a 80 y.o. female who presents for evaluation of nausea, vomiting, presumed dehydration. Her symptoms started with protracted coughing at [facility] yesterday which was followed by several episodes of vomiting. She has been unable to eat today and has had no BM or flatus since yesterday. She has h/o hysterectomy for ovarian cancer in the 80's, no other abdominal surgeries. She has had back surgery and suffers from peripheral neuropathy. She has never had a bowel obstruction before. Patient Active Problem List  
 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  
Diagnosis Date  Arthritis  Diarrhea 6/6/2016  Foot drop  Gastric ulcer 6/14/2012  Gastroparesis  GERD (gastroesophageal reflux disease)  High cholesterol  Hypertension  Neuropathy  Ovarian cancer ([facility] [address] 1986  
 chemo x 9 mos  Scoliosis  Stroke [facility]) 2002  
 ? stroke with drop feet, per patient CT scans were negative Past Surgical History:  
Procedure Laterality Date 2021 Jh Tyler Pau N/A 6/6/2016  SIGMOIDOSCOPY FLEXIBLE performed by Korin Corral MD at [facility] ENDOSCOPY  
  ORTHOPAEDIC    
 back  
 HX ORTHOPAEDIC    
 bilateral shoulder replacements  HX ORTHOPAEDIC    
 left knee replacement [facility]  OK EGD TRANSORAL BIOPSY SINGLE/MULTIPLE  1/26/2012  OK EGD TRANSORAL BIOPSY SINGLE/MULTIPLE  6/14/2012 Social History Tobacco Use  Smoking status: Never Smoker  Smokeless tobacco: Never Used Substance Use Topics  Alcohol use: No  
  
History reviewed. No pertinent family history. Prior to Admission medications Medication Sig Start Date End Date Taking? Authorizing Provider  
melatonin 300 mcg tab Take 1.5 mg by mouth nightly. 11/8/18   Nerissa Arroyo MD  
senna-docusate (PERICOLACE) 8.6-50 mg per tablet Take 1 Tab by mouth daily. 11/9/18   Nerissa Arroyo MD  
oxyCODONE IR (ROXICODONE) 5 mg immediate release tablet Take 1 Tab by mouth two (2) times daily as needed. Max Daily Amount: 10 mg. As needed for severe pain 11/8/18   Nerisas Arroyo MD  
acetaminophen (TYLENOL) 325 mg tablet Take 2 Tabs by mouth three (3) times daily. 11/8/18   Nerissa Arroyo MD  
magnesium 250 mg tab Take  by mouth. Provider, Historical  
denosumab (PROLIA) 60 mg/mL injection 1 mL by SubCUTAneous route every 6 months. 5/24/18   Arnaldo Saleh NP  
RABEprazole (ACIPHEX) 20 mg tablet Take 20 mg by mouth daily. Provider, Historical  
raNITIdine hcl 300 mg cap Take  by mouth. Provider, Historical  
gabapentin (NEURONTIN) 800 mg tablet TAKE 1 TAB BY MOUTH FOUR  TIMES DAILY AS NEEDED. 3/21/18   Arnaldo Saleh NP Calcium-Cholecalciferol, D3, (CALCIUM 500 + D) 500 mg(1,250mg) -400 unit tab Take 1 Tab by mouth daily. 12/29/17   Arnaldo Saleh NP  
simvastatin (ZOCOR) 40 mg tablet Take 1 Tab by mouth nightly. 11/9/17   Arnaldo Saleh NP  
cholecalciferol (VITAMIN D3) 1,000 unit tablet Take 1,000 Units by mouth daily.     Provider, Historical  
 multivitamin (ONE A DAY) tablet Take 1 Tab by mouth daily. Provider, Historical  
loperamide (IMODIUM) 2 mg capsule Take 2 mg by mouth four (4) times daily as needed for Diarrhea. Provider, Historical  
 
No Known Allergies Review of Systems Constitutional: Positive for appetite change. Negative for chills, diaphoresis and fever. Respiratory: Positive for cough. Negative for shortness of breath and wheezing. Cardiovascular: Negative for chest pain and palpitations. Gastrointestinal: Positive for constipation, nausea and vomiting. Negative for abdominal pain and diarrhea. Musculoskeletal: Negative for myalgias. Hematological: Does not bruise/bleed easily. Objective:  
 
Visit Vitals /75 Pulse 99 Temp 98.3 °F (36.8 °C) Resp 16 Ht 5' (1.524 m) Wt 150 lb (68 kg) SpO2 96% BMI 29.29 kg/m² Physical Exam  
Constitutional: She appears well-developed and well-nourished. No distress. HENT:  
Head: Normocephalic and atraumatic. Cardiovascular: Normal rate, regular rhythm, normal heart sounds and intact distal pulses. Pulmonary/Chest: Breath sounds normal. She has no wheezes. She has no rales. Abdominal: Soft. Bowel sounds are normal. She exhibits no distension and no mass. There is no hepatosplenomegaly. There is tenderness in the periumbilical area. There is no rigidity, no rebound and no guarding. No hernia. Musculoskeletal: Normal range of motion. Lymphadenopathy:  
  She has no cervical adenopathy. Imaging:  images and reports reviewed Lab Review:   
Recent Results (from the past 24 hour(s)) METABOLIC PANEL, BASIC Collection Time: 02/27/19  5:47 PM  
Result Value Ref Range Sodium 139 136 - 145 mmol/L Potassium 3.6 3.5 - 5.1 mmol/L Chloride 100 97 - 108 mmol/L  
 CO2 30 21 - 32 mmol/L Anion gap 9 5 - 15 mmol/L Glucose 121 (H) 65 - 100 mg/dL BUN 41 (H) 6 - 20 MG/DL  Creatinine 1.01 0.55 - 1.02 MG/DL  
 BUN/Creatinine ratio 41 (H) 12 - 20 GFR est AA >60 >60 ml/min/1.73m2 GFR est non-AA 52 (L) >60 ml/min/1.73m2 Calcium 10.6 (H) 8.5 - 10.1 MG/DL  
LIPASE Collection Time: 02/27/19  5:47 PM  
Result Value Ref Range Lipase 123 73 - 393 U/L  
TROPONIN I Collection Time: 02/27/19  5:47 PM  
Result Value Ref Range Troponin-I, Qt. <0.05 <0.05 ng/mL CBC WITH AUTOMATED DIFF Collection Time: 02/27/19  7:08 PM  
Result Value Ref Range WBC 12.9 (H) 3.6 - 11.0 K/uL  
 RBC 4.27 3.80 - 5.20 M/uL HGB 9.4 (L) 11.5 - 16.0 g/dL HCT 33.0 (L) 35.0 - 47.0 % MCV 77.3 (L) 80.0 - 99.0 FL  
 MCH 22.0 (L) 26.0 - 34.0 PG  
 MCHC 28.5 (L) 30.0 - 36.5 g/dL  
 RDW 16.2 (H) 11.5 - 14.5 % PLATELET 211 804 - 636 K/uL MPV 9.9 8.9 - 12.9 FL  
 NRBC 0.0 0  WBC ABSOLUTE NRBC 0.00 0.00 - 0.01 K/uL NEUTROPHILS 85 (H) 32 - 75 % LYMPHOCYTES 6 (L) 12 - 49 % MONOCYTES 8 5 - 13 % EOSINOPHILS 0 0 - 7 % BASOPHILS 1 0 - 1 % IMMATURE GRANULOCYTES 0 0.0 - 0.5 % ABS. NEUTROPHILS 10.9 (H) 1.8 - 8.0 K/UL  
 ABS. LYMPHOCYTES 0.8 0.8 - 3.5 K/UL  
 ABS. MONOCYTES 1.1 (H) 0.0 - 1.0 K/UL  
 ABS. EOSINOPHILS 0.0 0.0 - 0.4 K/UL  
 ABS. BASOPHILS 0.1 0.0 - 0.1 K/UL  
 ABS. IMM. GRANS. 0.0 0.00 - 0.04 K/UL  
 DF AUTOMATED URINALYSIS W/ REFLEX CULTURE Collection Time: 02/27/19  8:49 PM  
Result Value Ref Range Color YELLOW/STRAW Appearance CLEAR CLEAR Specific gravity 1.010 1.003 - 1.030    
 pH (UA) 6.5 5.0 - 8.0 Protein TRACE (A) NEG mg/dL Glucose NEGATIVE  NEG mg/dL Ketone NEGATIVE  NEG mg/dL Blood NEGATIVE  NEG Urobilinogen 0.2 0.2 - 1.0 EU/dL Nitrites NEGATIVE  NEG Leukocyte Esterase NEGATIVE  NEG    
 WBC 5-10 0 - 4 /hpf  
 RBC 0-5 0 - 5 /hpf Epithelial cells FEW FEW /lpf Bacteria NEGATIVE  NEG /hpf  
 UA:UC IF INDICATED URINE CULTURE ORDERED (A) CNI    
BILIRUBIN, CONFIRM Collection Time: 02/27/19  8:49 PM  
Result Value Ref Range Bilirubin UA, confirm NEGATIVE  NEG Assessment:  
 
Abdominal pain, suspect mechanical SBO with transition point in the LLQ, likely related to prior hysterectomy remotely. Plan: I recommend proceeding with Conservative therapy:  Observation, Bowel rest and NGT decompression. She has already had a canister full of feculent appearing NG o/p. Films in am and likely administration of water soluble contrast via NGT tomorrow to promote resolution and determine whether complete or partial obstruction. Signed By: Baron Jong MD, [address] [facility] [facility] February 27, 2019

## 2019-02-28 NOTE — PROGRESS NOTES
Reason for Admission:  SBO  
               
RRAT Score:  17 Do you (patient/family) have any concerns for transition/discharge? Plan for utilizing home health:    TBD Likelihood of readmission? Moderate Transition of Care Plan:    Patient is independent with ADL/IADL. Patient does not drive, but has supportive daughters that assist with transportation. Patient has received therapy at [facility] and has been in the [facility] part of [facility] in the past.  CM offered to have someone help pt with an advance care plan while in hospital and pt declined. No needs or concerns at this time. CM will continue to follow. PCP- Dr Christa Molina [facility]- [facility] [address] [id number] Care Management Interventions PCP Verified by CM: Yes(Dr Sue) Mode of Transport at Discharge: Other (see comment)(Daughters) Transition of Care Consult (CM Consult): Discharge Planning Discharge Durable Medical Equipment: No(Shower chair, walker w/c and a scooter) Physical Therapy Consult: No 
Occupational Therapy Consult: No 
Speech Therapy Consult: No 
Current Support Network: Lives Alone(Patient lives in an apartment at [facility]. No steps to enter building/apartment) Confirm Follow Up Transport: Other (see comment)(Daughter) Plan discussed with Pt/Family/Caregiver: Yes [facility] Ext [phone]

## 2019-02-28 NOTE — ED PROVIDER NOTES
EMERGENCY DEPARTMENT HISTORY AND PHYSICAL EXAM 
 
Date: 2/27/2019 Patient Name: Kam Royal History of Presenting Illness Chief Complaint Patient presents with  Vomiting Vomiting since last night, seen by nurse at [facility] and sent here for possible dehydration  Cough Cough since yesterday, vomiting reported after cough History Provided By: Patient and Patient's Daughter HPI: Kam Royal is a 80 y.o. female with a PMH of hx of ovarian cancer, GERD, htn, arthritis,  who presents with cc of nausea and vomiting that started last night. Pt denies eating anything out of the ordinary and denies any abdominal pain. She also admits to a dry  cough that started yesterday but denies any wheezing, nasal congestion. Pt states she has a tickle in her throat that causes her to cough at some times. She has had no bowel movements today but states she is always constipated. She has not been able to eat anything today but has been keeping ginger ale down. Pt denies any  fevers, chills, chest pain, shortness of breath, headache, rash, diarrhea, sweating or weight loss. All other ROS negative at this time Pt is in no acute distress and is speaking in full sentences\ PCP: Heidi Arceo MD 
 
Current Facility-Administered Medications Medication Dose Route Frequency Provider Last Rate Last Dose  docusate sodium (COLACE) capsule 100 mg  100 mg Oral BID Broderick Barrios MD   100 mg at 03/02/19 1139  polyethylene glycol (MIRALAX) packet 17 g  17 g Oral BID Broderick Barrios MD   17 g at 03/02/19 1140  diphenhydrAMINE (BENADRYL) injection 12.5 mg  12.5 mg IntraVENous Q6H PRN Broderick Barrios MD   12.5 mg at 03/01/19 1120  
 gabapentin (NEURONTIN) capsule 800 mg  800 mg Oral BID Broderick Barrios MD   800 mg at 03/02/19 6887  dextrose 5% - 0.45% NaCl with KCl 20 mEq/L infusion  125 mL/hr IntraVENous CONTINUOUS Broderick Barrios  mL/hr at 02/28/19 5871 111 mL/hr at 02/28/19 0906  
 HYDROmorphone (PF) (DILAUDID) injection 0.5 mg  0.5 mg IntraVENous Q2H PRN Elisha Suarez MD   0.5 mg at 03/01/19 1009  ondansetron (ZOFRAN) injection 4 mg  4 mg IntraVENous Q4H PRN Elisha Suarez MD      
 pantoprazole (PROTONIX) 40 mg in sodium chloride 0.9% 10 mL injection  40 mg IntraVENous DAILY Elisha Suarez MD   40 mg at 03/02/19 8090 Past History Past Medical History: 
Past Medical History:  
Diagnosis Date  Arthritis  Diarrhea 6/6/2016  Foot drop  Gastric ulcer 6/14/2012  Gastroparesis  GERD (gastroesophageal reflux disease)  High cholesterol  Hypertension  Neuropathy  Ovarian cancer ([address] 1986  
 chemo x 9 mos  Scoliosis  Stroke [facility]) 2002  
 ? stroke with drop feet, per patient CT scans were negative Past Surgical History: 
Past Surgical History:  
Procedure Laterality Date 2021 Jh Mai N/A 6/6/2016 SIGMOIDOSCOPY FLEXIBLE performed by Julissa Banerjee MD at [facility] ENDOSCOPY  
 HX ORTHOPAEDIC    
 back  HX ORTHOPAEDIC    
 bilateral shoulder replacements  HX ORTHOPAEDIC    
 left knee replacement [facility]  SD EGD TRANSORAL BIOPSY SINGLE/MULTIPLE  1/26/2012  SD EGD TRANSORAL BIOPSY SINGLE/MULTIPLE  6/14/2012 Family History: 
History reviewed. No pertinent family history. Social History: 
Social History Tobacco Use  Smoking status: Never Smoker  Smokeless tobacco: Never Used Substance Use Topics  Alcohol use: No  
 Drug use: No  
 
 
Allergies: 
No Known Allergies Review of Systems Review of Systems Constitutional: Negative. Negative for chills and fever. HENT: Negative. Eyes: Negative. Respiratory: Negative. Negative for shortness of breath. Cardiovascular: Negative. Negative for chest pain. Gastrointestinal: Positive for nausea and vomiting. Negative for abdominal pain and diarrhea. Endocrine: Negative. Genitourinary: Negative. Musculoskeletal: Negative. Skin: Negative. Allergic/Immunologic: Negative. Neurological: Negative. Negative for headaches. Hematological: Negative. Psychiatric/Behavioral: Negative. All other systems reviewed and are negative. Physical Exam  
 
Vitals:  
 03/02/19 0216 03/02/19 6271 03/02/19 1154 03/02/19 1546 BP: 155/79 110/67 114/85 92/53 Pulse: 74 88 94 85 Resp: 17 16 16 16 Temp: 98.1 °F (36.7 °C) 98 °F (36.7 °C) 98.2 °F (36.8 °C) 98.5 °F (36.9 °C) SpO2: 96% 96% 95% 98% Weight:      
Height:      
 
Physical Exam  
Constitutional: She is oriented to person, place, and time. She appears well-developed and well-nourished. No distress. HENT:  
Head: Normocephalic and atraumatic. Right Ear: External ear normal.  
Left Ear: External ear normal.  
Nose: Nose normal.  
Mouth/Throat: Posterior oropharyngeal erythema present. Eyes: Conjunctivae and EOM are normal. Pupils are equal, round, and reactive to light. Neck: Normal range of motion. Neck supple. No tracheal deviation present. Cardiovascular: Normal rate, regular rhythm, normal heart sounds and intact distal pulses. Pulmonary/Chest: Effort normal and breath sounds normal. No respiratory distress. She has no wheezes. Abdominal: Soft. Normal appearance and bowel sounds are normal. She exhibits no distension. There is tenderness in the periumbilical area. There is no rebound, no CVA tenderness, no tenderness at McBurney's point and negative Garcia's sign. Musculoskeletal: Normal range of motion. She exhibits no edema, tenderness or deformity. Lymphadenopathy:  
  She has no cervical adenopathy. Neurological: She is alert and oriented to person, place, and time. She has normal reflexes. She displays normal reflexes. No cranial nerve deficit. She exhibits normal muscle tone. Coordination normal.  
Skin: Skin is warm and dry. She is not diaphoretic. No pallor. Psychiatric: She has a normal mood and affect. Her behavior is normal. Judgment and thought content normal.  
Nursing note and vitals reviewed. Diagnostic Study Results Labs - No results found for this or any previous visit (from the past 12 hour(s)). Radiologic Studies -  
XR ABD FLAT/ ERECT Final Result  
 impression: NG tube in place. Mild remaining small bowel distention. Contrast  
went through the small bowel, small bowel distention may be related to partial  
obstruction or ileus. XR ABD FLAT/ ERECT Final Result IMPRESSION:  Persistent subtle radiographic SBO pattern. CT ABD PELV W CONT Final Result IMPRESSION:   
1. Small bowel obstruction. 2. 1.9 cm x 2.2 cm hyperdense lesion in the interpolar  region of the left  
kidney. Recommend renal mass CT for further evaluation. XR ABD FLAT/ ERECT    (Results Pending) CT Results  (Last 48 hours) None CXR Results  (Last 48 hours) None Medical Decision Making I am the first provider for this patient. I reviewed the vital signs, available nursing notes, past medical history, past surgical history, family history and social history. Vital Signs-Reviewed the patient's vital signs. Records Reviewed: Nursing Notes, Old Medical Records, Previous Radiology Studies and Previous Laboratory Studies Disposition: 
admit Follow-up Information Follow up With Specialties Details Why Contact Info Author MD [facility]  MD will see patient, when patient returns home to [facility]. No need to schedule an appointment [address] [place] [address] [place] [address] [phone] Current Discharge Medication List  
  
 
 
Provider Notes (Medical Decision Making):  
Patient presents with abdominal pain.   DDx: Gastroenteritis, SBO, appendicitis, colitis, IBD, diverticulitis, mesenteric ischemia, AAA or descending dissection, ACS, ureteral stone. Will get labs and CT Abdomen. 
 
7:00pm pt was given meds that were ordered 3 hours ago. Pt has been very cooperative and understanding. Has  Had no more emesis and is lightly sipping on ginger ale 9:15 discussed with family and pt regarding diagnosis and that pt will be admitted- pt and family agree CONSULT NOTE:  
9:34 PM 
PEGGY Mancilla spoke with Prachi Irwin Specialty: Surgery Discussed pt's hx, disposition, and available diagnostic and imaging results. Reviewed care plans. Consultant agrees with plans as outlined for admission Procedures: 
Procedures Diagnosis Clinical Impression: 1. Small bowel obstruction ([address]

## 2019-02-28 NOTE — PROGRESS NOTES
Admit Date: 2019 POD * No surgery found * Procedure:  * No surgery found * Subjective:  
 
Patient has no new complaints. Objective:  
 
Blood pressure 112/66, pulse 85, temperature 98.4 °F (36.9 °C), resp. rate 17, height 5' (1.524 m), weight 150 lb (68 kg), SpO2 98 %. Temp (24hrs), Av.5 °F (36.9 °C), Min:98.3 °F (36.8 °C), Max:98.9 °F (37.2 °C) Physical Exam:  GENERAL: alert, cooperative, no distress, appears stated age, LUNG: clear to auscultation bilaterally, HEART: regular rate and rhythm, ABDOMEN: soft, mildly tender and mildly distended. Bowel sounds normal. No masses,  no organomegaly, EXTREMITIES:  extremities normal, atraumatic, no cyanosis or edema Labs:  
Recent Results (from the past 24 hour(s)) METABOLIC PANEL, BASIC Collection Time: 19  5:47 PM  
Result Value Ref Range Sodium 139 136 - 145 mmol/L Potassium 3.6 3.5 - 5.1 mmol/L Chloride 100 97 - 108 mmol/L  
 CO2 30 21 - 32 mmol/L Anion gap 9 5 - 15 mmol/L Glucose 121 (H) 65 - 100 mg/dL BUN 41 (H) 6 - 20 MG/DL Creatinine 1.01 0.55 - 1.02 MG/DL  
 BUN/Creatinine ratio 41 (H) 12 - 20 GFR est AA >60 >60 ml/min/1.73m2 GFR est non-AA 52 (L) >60 ml/min/1.73m2 Calcium 10.6 (H) 8.5 - 10.1 MG/DL  
LIPASE Collection Time: 19  5:47 PM  
Result Value Ref Range Lipase 123 73 - 393 U/L  
TROPONIN I Collection Time: 19  5:47 PM  
Result Value Ref Range Troponin-I, Qt. <0.05 <0.05 ng/mL CBC WITH AUTOMATED DIFF Collection Time: 19  7:08 PM  
Result Value Ref Range WBC 12.9 (H) 3.6 - 11.0 K/uL  
 RBC 4.27 3.80 - 5.20 M/uL HGB 9.4 (L) 11.5 - 16.0 g/dL HCT 33.0 (L) 35.0 - 47.0 % MCV 77.3 (L) 80.0 - 99.0 FL  
 MCH 22.0 (L) 26.0 - 34.0 PG  
 MCHC 28.5 (L) 30.0 - 36.5 g/dL  
 RDW 16.2 (H) 11.5 - 14.5 % PLATELET 897 338 - 025 K/uL MPV 9.9 8.9 - 12.9 FL  
 NRBC 0.0 0  WBC ABSOLUTE NRBC 0.00 0.00 - 0.01 K/uL NEUTROPHILS 85 (H) 32 - 75 % LYMPHOCYTES 6 (L) 12 - 49 % MONOCYTES 8 5 - 13 % EOSINOPHILS 0 0 - 7 % BASOPHILS 1 0 - 1 % IMMATURE GRANULOCYTES 0 0.0 - 0.5 % ABS. NEUTROPHILS 10.9 (H) 1.8 - 8.0 K/UL  
 ABS. LYMPHOCYTES 0.8 0.8 - 3.5 K/UL  
 ABS. MONOCYTES 1.1 (H) 0.0 - 1.0 K/UL  
 ABS. EOSINOPHILS 0.0 0.0 - 0.4 K/UL  
 ABS. BASOPHILS 0.1 0.0 - 0.1 K/UL  
 ABS. IMM. GRANS. 0.0 0.00 - 0.04 K/UL  
 DF AUTOMATED URINALYSIS W/ REFLEX CULTURE Collection Time: 02/27/19  8:49 PM  
Result Value Ref Range Color YELLOW/STRAW Appearance CLEAR CLEAR Specific gravity 1.010 1.003 - 1.030    
 pH (UA) 6.5 5.0 - 8.0 Protein TRACE (A) NEG mg/dL Glucose NEGATIVE  NEG mg/dL Ketone NEGATIVE  NEG mg/dL Blood NEGATIVE  NEG Urobilinogen 0.2 0.2 - 1.0 EU/dL Nitrites NEGATIVE  NEG Leukocyte Esterase NEGATIVE  NEG    
 WBC 5-10 0 - 4 /hpf  
 RBC 0-5 0 - 5 /hpf Epithelial cells FEW FEW /lpf Bacteria NEGATIVE  NEG /hpf  
 UA:UC IF INDICATED URINE CULTURE ORDERED (A) CNI    
BILIRUBIN, CONFIRM Collection Time: 02/27/19  8:49 PM  
Result Value Ref Range Bilirubin UA, confirm NEGATIVE  NEG    
LACTIC ACID Collection Time: 02/27/19 10:42 PM  
Result Value Ref Range Lactic acid 1.1 0.4 - 2.0 MMOL/L  
METABOLIC PANEL, BASIC Collection Time: 02/28/19  2:50 AM  
Result Value Ref Range Sodium 142 136 - 145 mmol/L Potassium 3.2 (L) 3.5 - 5.1 mmol/L Chloride 102 97 - 108 mmol/L  
 CO2 34 (H) 21 - 32 mmol/L Anion gap 6 5 - 15 mmol/L Glucose 118 (H) 65 - 100 mg/dL BUN 40 (H) 6 - 20 MG/DL Creatinine 0.91 0.55 - 1.02 MG/DL  
 BUN/Creatinine ratio 44 (H) 12 - 20 GFR est AA >60 >60 ml/min/1.73m2 GFR est non-AA 59 (L) >60 ml/min/1.73m2 Calcium 9.5 8.5 - 10.1 MG/DL  
CBC WITH AUTOMATED DIFF Collection Time: 02/28/19  2:50 AM  
Result Value Ref Range WBC 11.4 (H) 3.6 - 11.0 K/uL  
 RBC 4.09 3.80 - 5.20 M/uL HGB 9.0 (L) 11.5 - 16.0 g/dL HCT 31.6 (L) 35.0 - 47.0 % MCV 77.3 (L) 80.0 - 99.0 FL  
 MCH 22.0 (L) 26.0 - 34.0 PG  
 MCHC 28.5 (L) 30.0 - 36.5 g/dL  
 RDW 16.2 (H) 11.5 - 14.5 % PLATELET 670 009 - 195 K/uL MPV 10.1 8.9 - 12.9 FL  
 NRBC 0.0 0  WBC ABSOLUTE NRBC 0.00 0.00 - 0.01 K/uL NEUTROPHILS 82 (H) 32 - 75 % LYMPHOCYTES 9 (L) 12 - 49 % MONOCYTES 9 5 - 13 % EOSINOPHILS 0 0 - 7 % BASOPHILS 0 0 - 1 % IMMATURE GRANULOCYTES 0 0.0 - 0.5 % ABS. NEUTROPHILS 9.4 (H) 1.8 - 8.0 K/UL  
 ABS. LYMPHOCYTES 1.0 0.8 - 3.5 K/UL  
 ABS. MONOCYTES 1.0 0.0 - 1.0 K/UL  
 ABS. EOSINOPHILS 0.0 0.0 - 0.4 K/UL  
 ABS. BASOPHILS 0.0 0.0 - 0.1 K/UL  
 ABS. IMM. GRANS. 0.0 0.00 - 0.04 K/UL  
 DF AUTOMATED    
 RBC COMMENTS ANISOCYTOSIS 1+ 
    
 RBC COMMENTS MICROCYTOSIS 1+ 
    
 RBC COMMENTS HYPOCHROMIA 1+ 
    
 RBC COMMENTS OVALOCYTES PRESENT Data Review images and reports reviewed Assessment:  
 
Principal Problem: 
  Small bowel obstruction ([facility] [address] (2/27/2019) Active Problems: 
  SBO (small bowel obstruction) ([facility] [address] (2/27/2019) Plan/Recommendations/Medical Decision Making: Pt with large NG o/p in ED last night with NGT placement. I/Os only show 200 cc output but she had at least 1 full canister, so volume unclear. Strict recording of I/Os. Films pending this am. 
Continue NGT decompression today, no return of bowel function yet. Water soluble contrast per NGT today, repeat films in am. 
Change to d5 1/2NS with 20 meq/L KCl Leland Schultz. Kp Monge MD, [address] [facility]

## 2019-02-28 NOTE — PROGRESS NOTES
TRANSFER - IN REPORT: 
 
Verbal report received from Brain Benitez (name) on Kitty Espinosa  being received from ED(unit) for routine progression of care Report consisted of patients Situation, Background, Assessment and  
Recommendations(SBAR). Information from the following report(s) SBAR, Kardex, Intake/Output, MAR, Recent Results and Med Rec Status was reviewed with the receiving nurse. Opportunity for questions and clarification was provided. Assessment completed upon patients arrival to unit and care assumed.

## 2019-02-28 NOTE — PROGRESS NOTES
Problem: Pressure Injury - Risk of 
Goal: *Prevention of pressure injury Document Cristino Scale and appropriate interventions in the flowsheet. Outcome: Progressing Towards Goal 
Pressure Injury Interventions: 
  
 
  
 
  
 
  
 
  
 
  
 
  
 
 
 
 
Problem: Falls - Risk of 
Goal: *Absence of Falls Document Cornelious Booty Fall Risk and appropriate interventions in the flowsheet. Outcome: Progressing Towards Goal 
Fall Risk Interventions:

## 2019-02-28 NOTE — PROGRESS NOTES
General Surgery End of Shift Nursing Note Bedside shift change report given to [facility] (oncoming nurse) by Noelle Briggs (offgoing nurse). Report included the following information SBAR, Kardex, ED Summary, Intake/Output, MAR, Recent Results and Med Rec Status. Shift worked:   7p-7a Summary of shift:    Patient had good night, slept well and voided well this shift. NGT remains in place with 200ml out Issues for physician to address:   none Too Pepper

## 2019-02-28 NOTE — ROUTINE PROCESS
TRANSFER - OUT REPORT: 
 
Verbal report given to Demetrio(name) on Kristel Matter  being transferred to [facility](unit) for routine progression of care Report consisted of patients Situation, Background, Assessment and  
Recommendations(SBAR). Information from the following report(s) SBAR was reviewed with the receiving nurse. Lines:  
Peripheral IV 02/27/19 Left Antecubital (Active) Opportunity for questions and clarification was provided. Patient transported with: 
 [employer]

## 2019-03-01 ENCOUNTER — APPOINTMENT (OUTPATIENT)
Dept: GENERAL RADIOLOGY | Age: 84
DRG: 390 | End: 2019-03-01
Attending: SURGERY
Payer: MEDICARE

## 2019-03-01 LAB
BACTERIA SPEC CULT: ABNORMAL
CC UR VC: ABNORMAL
SERVICE CMNT-IMP: ABNORMAL

## 2019-03-01 PROCEDURE — 74011000250 HC RX REV CODE- 250: Performed by: SURGERY

## 2019-03-01 PROCEDURE — 65270000029 HC RM PRIVATE

## 2019-03-01 PROCEDURE — 99232 SBSQ HOSP IP/OBS MODERATE 35: CPT | Performed by: SURGERY

## 2019-03-01 PROCEDURE — C9113 INJ PANTOPRAZOLE SODIUM, VIA: HCPCS | Performed by: SURGERY

## 2019-03-01 PROCEDURE — 74011250636 HC RX REV CODE- 250/636: Performed by: SURGERY

## 2019-03-01 PROCEDURE — 74019 RADEX ABDOMEN 2 VIEWS: CPT

## 2019-03-01 PROCEDURE — 74011250637 HC RX REV CODE- 250/637: Performed by: SURGERY

## 2019-03-01 PROCEDURE — 77030038269 HC DRN EXT URIN PURWCK BARD -A

## 2019-03-01 RX ORDER — DIPHENHYDRAMINE HYDROCHLORIDE 50 MG/ML
12.5 INJECTION, SOLUTION INTRAMUSCULAR; INTRAVENOUS
Status: DISCONTINUED | OUTPATIENT
Start: 2019-03-01 | End: 2019-03-05 | Stop reason: HOSPADM

## 2019-03-01 RX ADMIN — DIPHENHYDRAMINE HYDROCHLORIDE 12.5 MG: 50 INJECTION, SOLUTION INTRAMUSCULAR; INTRAVENOUS at 11:20

## 2019-03-01 RX ADMIN — SODIUM CHLORIDE 40 MG: 9 INJECTION INTRAMUSCULAR; INTRAVENOUS; SUBCUTANEOUS at 10:03

## 2019-03-01 RX ADMIN — GABAPENTIN 800 MG: 100 CAPSULE ORAL at 20:19

## 2019-03-01 RX ADMIN — HYDROMORPHONE HYDROCHLORIDE 0.5 MG: 1 INJECTION, SOLUTION INTRAMUSCULAR; INTRAVENOUS; SUBCUTANEOUS at 10:09

## 2019-03-01 NOTE — PROGRESS NOTES
General Surgery End of Shift Nursing Note 
  Bedside shift change report given to [facility] (oncoming nurse) by Jennifer Oconnor (offgoing nurse). Report included the following information SBAR, Kardex, ED Summary, Intake/Output, MAR, Recent Results and Med Rec Status. 
  
Shift worked:   7p-7a Summary of shift:    Patient had good night, slept well and voided well with use of purewick NGT remains in place with 200ml out, no complaints of pain. Up to bedside x 1, xray pending Issues for physician to address:   none  
  
  
  
Jerica Lennon

## 2019-03-01 NOTE — PROGRESS NOTES
Admit Date: 2019 POD * No surgery found * Procedure:  * No surgery found * Subjective:  
 
Patient has no new complaints. No BM or flatus yet. Scant NG o/p past 24 hours. Objective:  
 
Blood pressure 145/76, pulse 74, temperature 99 °F (37.2 °C), resp. rate 16, height 5' (1.524 m), weight 150 lb (68 kg), SpO2 95 %. Temp (24hrs), Av.6 °F (37 °C), Min:98.2 °F (36.8 °C), Max:99 °F (37.2 °C) Physical Exam:  GENERAL: alert, cooperative, no distress, appears stated age, LUNG: clear to auscultation bilaterally, HEART: regular rate and rhythm, ABDOMEN: soft, mildly tender and mildly distended. Bowel sounds normal. No masses,  no organomegaly, EXTREMITIES:  extremities normal, atraumatic, no cyanosis or edema Labs:  
No results found for this or any previous visit (from the past 24 hour(s)). Data Review images and reports reviewed Assessment:  
 
Principal Problem: 
  Small bowel obstruction ([address] (2019) Active Problems: 
  SBO (small bowel obstruction) ([facility] (2019) Plan/Recommendations/Medical Decision Making:  
 
Films improved with all contrast from yesterday now in colon. Scant small bowel gas and AFLs Scant NG o/p 
D/c NGT and CLD trial. 
 
 
Robb Kocher D. Brena Rakers, MD, [facility]

## 2019-03-01 NOTE — PROGRESS NOTES
Bedside shift change report given to Debbi Peña RN (oncoming nurse) by Sonya Fleming RN (offgoing nurse). Report included the following information SBAR, Kardex, Intake/Output and MAR.

## 2019-03-01 NOTE — PROGRESS NOTES
Bedside shift change report given to Layo Orosco RN (oncoming nurse) by Jarred Dudley RN (offgoing nurse). Report included the following information SBAR, Kardex, Intake/Output and MAR.

## 2019-03-01 NOTE — PROGRESS NOTES
Pharmacy Automatic Renal Dosing Protocol Labs: 
Recent Labs  
  19 
0250 19 
1908 19 
1747 CREA 0.91  --  1.01  
BUN 40*  --  41* WBC 11.4* 12.9*  -- Temp (24hrs), Av.4 °F (36.9 °C), Min:97.8 °F (36.6 °C), Max:99 °F (37.2 °C) Creatinine Clearance (mL/min) or Dialysis: 32.5 Impression/Plan: · Will change gabapentin 800mg PO QID x 3 days to gapabentin 800mg PO BID x 3 days Pharmacy will follow daily and adjust medications as appropriate for renal function and/or serum levels. Thank you, Jocelyn Stein PHARMD 
 
Renal Dosing 
[email or web address] pdf

## 2019-03-01 NOTE — PROGRESS NOTES
Problem: Pressure Injury - Risk of 
Goal: *Prevention of pressure injury Document Cristino Scale and appropriate interventions in the flowsheet. Outcome: Progressing Towards Goal 
Pressure Injury Interventions: Activity Interventions: PT/OT evaluation Mobility Interventions: Float heels Nutrition Interventions: Document food/fluid/supplement intake Problem: Falls - Risk of 
Goal: *Absence of Falls Document Ellisen Masters Fall Risk and appropriate interventions in the flowsheet. Outcome: Progressing Towards Goal 
Fall Risk Interventions: 
Mobility Interventions: Communicate number of staff needed for ambulation/transfer Medication Interventions: Patient to call before getting OOB Elimination Interventions: Call light in reach

## 2019-03-02 PROCEDURE — 74011250637 HC RX REV CODE- 250/637: Performed by: SURGERY

## 2019-03-02 PROCEDURE — C9113 INJ PANTOPRAZOLE SODIUM, VIA: HCPCS | Performed by: SURGERY

## 2019-03-02 PROCEDURE — 74011250636 HC RX REV CODE- 250/636: Performed by: SURGERY

## 2019-03-02 PROCEDURE — 65270000029 HC RM PRIVATE

## 2019-03-02 PROCEDURE — 74011000250 HC RX REV CODE- 250: Performed by: SURGERY

## 2019-03-02 PROCEDURE — 99232 SBSQ HOSP IP/OBS MODERATE 35: CPT | Performed by: SURGERY

## 2019-03-02 RX ORDER — POLYETHYLENE GLYCOL 3350 17 G/17G
17 POWDER, FOR SOLUTION ORAL 2 TIMES DAILY
Status: DISCONTINUED | OUTPATIENT
Start: 2019-03-02 | End: 2019-03-04

## 2019-03-02 RX ORDER — SODIUM CHLORIDE 0.9 % (FLUSH) 0.9 %
SYRINGE (ML) INJECTION
Status: COMPLETED
Start: 2019-03-02 | End: 2019-03-02

## 2019-03-02 RX ORDER — DOCUSATE SODIUM 100 MG/1
100 CAPSULE, LIQUID FILLED ORAL 2 TIMES DAILY
Status: DISCONTINUED | OUTPATIENT
Start: 2019-03-02 | End: 2019-03-05 | Stop reason: HOSPADM

## 2019-03-02 RX ADMIN — GABAPENTIN 800 MG: 100 CAPSULE ORAL at 17:56

## 2019-03-02 RX ADMIN — DOCUSATE SODIUM 100 MG: 100 CAPSULE, LIQUID FILLED ORAL at 17:56

## 2019-03-02 RX ADMIN — DOCUSATE SODIUM 100 MG: 100 CAPSULE, LIQUID FILLED ORAL at 11:39

## 2019-03-02 RX ADMIN — GABAPENTIN 800 MG: 100 CAPSULE ORAL at 08:04

## 2019-03-02 RX ADMIN — POLYETHYLENE GLYCOL 3350 17 G: 17 POWDER, FOR SOLUTION ORAL at 11:40

## 2019-03-02 RX ADMIN — SODIUM CHLORIDE 40 MG: 9 INJECTION INTRAMUSCULAR; INTRAVENOUS; SUBCUTANEOUS at 09:31

## 2019-03-02 RX ADMIN — Medication 10 ML: at 09:21

## 2019-03-02 NOTE — PROGRESS NOTES
Admit Date: 2019 POD * No surgery found * Procedure:  * No surgery found * Subjective:  
 
Patient feels worse today. + small amount of flatus and small BM. Not taking much po liquids. Objective:  
 
Blood pressure 110/67, pulse 88, temperature 98 °F (36.7 °C), resp. rate 16, height 5' (1.524 m), weight 150 lb (68 kg), SpO2 96 %. Temp (24hrs), Av.1 °F (36.7 °C), Min:97.8 °F (36.6 °C), Max:98.4 °F (36.9 °C) Physical Exam:  GENERAL: alert, cooperative, no distress, appears stated age, LUNG: clear to auscultation bilaterally, HEART: regular rate and rhythm, ABDOMEN: soft, mildly tender and mildly distended. Bowel sounds normal. No masses,  no organomegaly, EXTREMITIES:  extremities normal, atraumatic, no cyanosis or edema Labs:  
No results found for this or any previous visit (from the past 24 hour(s)). Data Review images and reports reviewed Assessment:  
 
Principal Problem: 
  Small bowel obstruction ([address] (2019) Active Problems: 
  SBO (small bowel obstruction) ([address] (2019) Plan/Recommendations/Medical Decision Making:  
 
Films improved with all contrast in colon yesterday but not really opened up yet. Scant small bowel gas and AFLs Stay on clear liquids today. Colace and miralax Films in am. 
 
 
Leland Schultz. Kp Monge MD, [facility] [address] Inpatient Surgical Special[facility]

## 2019-03-02 NOTE — PROGRESS NOTES
Bedside shift change report given to [address] [place] [address] (oncoming nurse) by Kiel Francis RN (offgoing nurse). Report included the following information SBAR, Kardex, Intake/Output and MAR.

## 2019-03-03 ENCOUNTER — APPOINTMENT (OUTPATIENT)
Dept: GENERAL RADIOLOGY | Age: 84
DRG: 390 | End: 2019-03-03
Attending: SURGERY
Payer: MEDICARE

## 2019-03-03 LAB
ANION GAP SERPL CALC-SCNC: 6 MMOL/L (ref 5–15)
BUN SERPL-MCNC: 10 MG/DL (ref 6–20)
BUN/CREAT SERPL: 17 (ref 12–20)
CALCIUM SERPL-MCNC: 7.7 MG/DL (ref 8.5–10.1)
CHLORIDE SERPL-SCNC: 110 MMOL/L (ref 97–108)
CO2 SERPL-SCNC: 26 MMOL/L (ref 21–32)
CREAT SERPL-MCNC: 0.58 MG/DL (ref 0.55–1.02)
GLUCOSE SERPL-MCNC: 103 MG/DL (ref 65–100)
POTASSIUM SERPL-SCNC: 3.6 MMOL/L (ref 3.5–5.1)
SODIUM SERPL-SCNC: 142 MMOL/L (ref 136–145)

## 2019-03-03 PROCEDURE — 74011000250 HC RX REV CODE- 250: Performed by: SURGERY

## 2019-03-03 PROCEDURE — 80048 BASIC METABOLIC PNL TOTAL CA: CPT

## 2019-03-03 PROCEDURE — 74011250637 HC RX REV CODE- 250/637: Performed by: SURGERY

## 2019-03-03 PROCEDURE — 74011250636 HC RX REV CODE- 250/636: Performed by: SURGERY

## 2019-03-03 PROCEDURE — 77030038269 HC DRN EXT URIN PURWCK BARD -A

## 2019-03-03 PROCEDURE — 65270000029 HC RM PRIVATE

## 2019-03-03 PROCEDURE — 36415 COLL VENOUS BLD VENIPUNCTURE: CPT

## 2019-03-03 PROCEDURE — 74019 RADEX ABDOMEN 2 VIEWS: CPT

## 2019-03-03 PROCEDURE — C9113 INJ PANTOPRAZOLE SODIUM, VIA: HCPCS | Performed by: SURGERY

## 2019-03-03 RX ADMIN — SODIUM CHLORIDE 40 MG: 9 INJECTION INTRAMUSCULAR; INTRAVENOUS; SUBCUTANEOUS at 11:01

## 2019-03-03 RX ADMIN — GABAPENTIN 800 MG: 100 CAPSULE ORAL at 17:11

## 2019-03-03 RX ADMIN — DOCUSATE SODIUM 100 MG: 100 CAPSULE, LIQUID FILLED ORAL at 17:11

## 2019-03-03 RX ADMIN — DEXTROSE MONOHYDRATE, SODIUM CHLORIDE, AND POTASSIUM CHLORIDE 125 ML/HR: 50; 4.5; 1.49 INJECTION, SOLUTION INTRAVENOUS at 17:11

## 2019-03-03 RX ADMIN — DIPHENHYDRAMINE HYDROCHLORIDE 12.5 MG: 50 INJECTION, SOLUTION INTRAMUSCULAR; INTRAVENOUS at 21:42

## 2019-03-03 RX ADMIN — POLYETHYLENE GLYCOL 3350 17 G: 17 POWDER, FOR SOLUTION ORAL at 11:01

## 2019-03-03 RX ADMIN — DOCUSATE SODIUM 100 MG: 100 CAPSULE, LIQUID FILLED ORAL at 11:01

## 2019-03-03 RX ADMIN — GABAPENTIN 800 MG: 100 CAPSULE ORAL at 11:00

## 2019-03-03 NOTE — PROGRESS NOTES
SURGERY PROGRESS NOTE Admit Date: 2019 Subjective:  
 
Patient complains of frequent loose stools. denies nausea and abdominal pain. Tolerating liquids Objective:  
 
Visit Vitals /57 Pulse 70 Temp 98.2 °F (36.8 °C) Resp 16 Ht 5' (1.524 m) Wt 68 kg (150 lb) SpO2 99% BMI 29.29 kg/m² Temp (24hrs), Av.2 °F (36.8 °C), Min:98 °F (36.7 °C), Max:98.5 °F (36.9 °C) No intake/output data recorded.  1901 -  0700 In: 65 [P.O.:530] Out: 1200 [Urine:1200] Physical Exam:   
General:  alert, cooperative, no distress, appears stated age Abdomen: soft, bowel sounds active, non-tender Assessment:  
 
Principal Problem: 
  Small bowel obstruction ([address] (2019) Active Problems: 
  SBO (small bowel obstruction) ([address] (2019) 
 
resolved SBO Plan:  
 
 
Regular diet D/C planning for tomorrow

## 2019-03-03 NOTE — PROGRESS NOTES
Spiritual Care Partner Volunteer visited patient in General Surgery on March 1, 2019. Documented by: 
URMIL[facility] [place] [employer]  Paging Service 213-[facility]([id number])

## 2019-03-03 NOTE — PROGRESS NOTES
Progress Note Patient: Esther Paz MRN: [id number]  SSN: [id number] YOB: 1933  Age: 80 y.o. Sex: female Admit Date: 2/27/2019 LOS: 4 days Subjective:  
 
Bedside and Verbal shift change report given to [address]  (oncoming nurse) by Cliff Baird RN  (offgoing nurse). Report included the following information SBAR, Kardex, Intake/Output and MAR. Objective:  
 
Vitals:  
 03/02/19 2330 03/03/19 0222 03/03/19 0640 03/03/19 1114 BP: 108/65 108/55 131/57 116/73 Pulse: 81 77 70 69 Resp: 16 16 16 18 Temp: 98 °F (36.7 °C) 98.3 °F (36.8 °C) 98.2 °F (36.8 °C) 98.4 °F (36.9 °C) SpO2: 96% 99% 99% 100% Weight:      
Height:      
  
 
Intake and Output: 
Current Shift: 03/03 0701 - 03/03 1900 In: 2497.9 [I.V.:2497.9] Out: - Last three shifts: 03/01 1901 - 03/03 0700 In: 4907.1 [P.O.:530; I.V.:4377.1] Out: 1200 [Urine:1200] Physical Exam:  
GENERAL: alert, cooperative, no distress, appears stated age Lab/Data Review: All lab results for the last 24 hours reviewed. Assessment:  
 
Principal Problem: 
  Small bowel obstruction ([facility] (2/27/2019) Active Problems: 
  SBO (small bowel obstruction) ([facility] (2/27/2019) Plan:  
Pt states that she is too weak to go home alone to her apt. Was asking about rehab before returning home Signed By: Monisha Lozada RN   
 March 3, 2019

## 2019-03-04 ENCOUNTER — APPOINTMENT (OUTPATIENT)
Dept: GENERAL RADIOLOGY | Age: 84
DRG: 390 | End: 2019-03-04
Attending: SURGERY
Payer: MEDICARE

## 2019-03-04 PROCEDURE — 71046 X-RAY EXAM CHEST 2 VIEWS: CPT

## 2019-03-04 PROCEDURE — 74011250637 HC RX REV CODE- 250/637: Performed by: SURGERY

## 2019-03-04 PROCEDURE — 74011000250 HC RX REV CODE- 250: Performed by: SURGERY

## 2019-03-04 PROCEDURE — 97530 THERAPEUTIC ACTIVITIES: CPT | Performed by: OCCUPATIONAL THERAPIST

## 2019-03-04 PROCEDURE — 74011250636 HC RX REV CODE- 250/636: Performed by: SURGERY

## 2019-03-04 PROCEDURE — 97166 OT EVAL MOD COMPLEX 45 MIN: CPT | Performed by: OCCUPATIONAL THERAPIST

## 2019-03-04 PROCEDURE — C9113 INJ PANTOPRAZOLE SODIUM, VIA: HCPCS | Performed by: SURGERY

## 2019-03-04 PROCEDURE — 97161 PT EVAL LOW COMPLEX 20 MIN: CPT

## 2019-03-04 PROCEDURE — 97530 THERAPEUTIC ACTIVITIES: CPT

## 2019-03-04 PROCEDURE — 65270000029 HC RM PRIVATE

## 2019-03-04 RX ORDER — ACETAMINOPHEN 325 MG/1
650 TABLET ORAL
Status: DISCONTINUED | OUTPATIENT
Start: 2019-03-04 | End: 2019-03-05 | Stop reason: HOSPADM

## 2019-03-04 RX ORDER — CALCIUM CARBONATE 200(500)MG
400 TABLET,CHEWABLE ORAL
Status: DISCONTINUED | OUTPATIENT
Start: 2019-03-04 | End: 2019-03-05 | Stop reason: HOSPADM

## 2019-03-04 RX ORDER — CALCIUM POLYCARBOPHIL 625 MG
1 TABLET ORAL DAILY
Status: DISCONTINUED | OUTPATIENT
Start: 2019-03-04 | End: 2019-03-05 | Stop reason: HOSPADM

## 2019-03-04 RX ORDER — PANTOPRAZOLE SODIUM 40 MG/1
40 TABLET, DELAYED RELEASE ORAL
Status: DISCONTINUED | OUTPATIENT
Start: 2019-03-04 | End: 2019-03-05 | Stop reason: HOSPADM

## 2019-03-04 RX ADMIN — ACETAMINOPHEN 650 MG: 325 TABLET ORAL at 21:52

## 2019-03-04 RX ADMIN — DEXTROSE MONOHYDRATE, SODIUM CHLORIDE, AND POTASSIUM CHLORIDE 125 ML/HR: 50; 4.5; 1.49 INJECTION, SOLUTION INTRAVENOUS at 12:08

## 2019-03-04 RX ADMIN — ONDANSETRON 4 MG: 2 INJECTION INTRAMUSCULAR; INTRAVENOUS at 03:59

## 2019-03-04 RX ADMIN — PANTOPRAZOLE SODIUM 40 MG: 40 TABLET, DELAYED RELEASE ORAL at 16:16

## 2019-03-04 RX ADMIN — DOCUSATE SODIUM 100 MG: 100 CAPSULE, LIQUID FILLED ORAL at 09:30

## 2019-03-04 RX ADMIN — ACETAMINOPHEN 650 MG: 325 TABLET ORAL at 16:12

## 2019-03-04 RX ADMIN — POLYETHYLENE GLYCOL 3350 17 G: 17 POWDER, FOR SOLUTION ORAL at 09:29

## 2019-03-04 RX ADMIN — DEXTROSE MONOHYDRATE, SODIUM CHLORIDE, AND POTASSIUM CHLORIDE 125 ML/HR: 50; 4.5; 1.49 INJECTION, SOLUTION INTRAVENOUS at 04:03

## 2019-03-04 RX ADMIN — SODIUM CHLORIDE 40 MG: 9 INJECTION INTRAMUSCULAR; INTRAVENOUS; SUBCUTANEOUS at 09:29

## 2019-03-04 RX ADMIN — GABAPENTIN 800 MG: 100 CAPSULE ORAL at 09:29

## 2019-03-04 RX ADMIN — DIPHENHYDRAMINE HYDROCHLORIDE 12.5 MG: 50 INJECTION, SOLUTION INTRAMUSCULAR; INTRAVENOUS at 21:55

## 2019-03-04 RX ADMIN — CALCIUM CARBONATE (ANTACID) CHEW TAB 500 MG 400 MG: 500 CHEW TAB at 12:40

## 2019-03-04 RX ADMIN — DOCUSATE SODIUM 100 MG: 100 CAPSULE, LIQUID FILLED ORAL at 21:52

## 2019-03-04 NOTE — PROGRESS NOTES
Problem: Mobility Impaired (Adult and Pediatric) Goal: *Acute Goals and Plan of Care (Insert Text) Physical Therapy Goals Initiated 3/4/2019 1. Patient will move from supine to sit and sit to supine , scoot up and down and roll side to side in bed with independence within 7 day(s). 2.  Patient will transfer from bed to chair and chair to bed with modified independence using the least restrictive device within 7 day(s). 3.  Patient will perform sit to stand with modified independence within 7 day(s). 4.  Patient will ambulate with modified independence for 150 feet with the least restrictive device within 7 day(s). physical Therapy EVALUATION Patient: Boris Yun (4 y.o. female) Date: 3/4/2019 Primary Diagnosis: SBO (small bowel obstruction) ([facility] [C26.453] Precautions:     
 
ASSESSMENT : 
Based on the objective data described below, the patient presents with impaired functional mobility secondary to mild standing balance and gait impairments, EDWARD foot drop (baseline), generalized weakness, and decreased activity tolerance following admission for SBO. Pt received supine in bed and agreeable to PT evaluation. Pt cleared by nursing for mobility. Pt without pain complaints, however did note that she was experiencing increased nausea and reflux this AM. Pt with EDWARD foot drop at baseline and typically wears EDWARD AFOs, however braces not present in the hospital and patient does not ambulate any distances without braces. Bed mobility performed with SBA/supervision. Sitting balance intact. She performed transfers with CGA and RW support (sit<>stand and bed>chair), demonstrating mildly unsteady gait while ambulating backwards to chair. Good tolerance for minimal activity, however unable to progress due to lack of AFOs. Pt was left sitting in bedside chair with all needs met and RN aware following therapy session.  Pt is likely close to her functional baseline, therefore pt return [place] with HHPT/[place] services at discharge. If patient with lack of support needed for safe discharge, recommend healthcare at [facility]. PT will continue to follow to address mobility impairments as noted above. Recommend with nursing patient to complete as able in order to maintain strength, endurance and independence: OOB to chair 3x/day with CG assist. Thank you for your assistance. Patient will benefit from skilled intervention to address the above impairments. Patients rehabilitation potential is considered to be Fair Factors which may influence rehabilitation potential include:  
[]         None noted 
[]         Mental ability/status [x]         Medical condition 
[]         Home/family situation and support systems 
[]         Safety awareness 
[]         Pain tolerance/management 
[]         Other: PLAN : 
Recommendations and Planned Interventions: 
[x]           Bed Mobility Training             []    Neuromuscular Re-Education 
[x]           Transfer Training                   []    Orthotic/Prosthetic Training 
[x]           Gait Training                         []    Modalities [x]           Therapeutic Exercises           []    Edema Management/Control 
[x]           Therapeutic Activities            [x]    Patient and Family Training/Education 
[]           Other (comment): Frequency/Duration: Patient will be followed by physical therapy  3 times a week to address goals. Discharge Recommendations: Return to [place] at [facility] with HHPT/OT services Further Equipment Recommendations for Discharge: None - Pt reports that she was being evaluated by therapy services at [facility] for new W/C and RW SUBJECTIVE:  
Patient stated I have a lot of reflux.  OBJECTIVE DATA SUMMARY:  
HISTORY:   
Past Medical History:  
Diagnosis Date  Arthritis  Diarrhea 6/6/2016  Foot drop  Gastric ulcer 6/14/2012  Gastroparesis  GERD (gastroesophageal reflux disease)  High cholesterol  Hypertension  Neuropathy  Ovarian cancer ([address] 1986  
 chemo x 9 mos  Scoliosis  Stroke [facility]) 2002  
 ? stroke with drop feet, per patient CT scans were negative Past Surgical History:  
Procedure Laterality Date 2021 Jh Mai N/A 6/6/2016 SIGMOIDOSCOPY FLEXIBLE performed by Radha Rebolledo MD at [facility] ENDOSCOPY  
 HX ORTHOPAEDIC    
 back  HX ORTHOPAEDIC    
 bilateral shoulder replacements  HX ORTHOPAEDIC    
 left knee replacement [place]  VT EGD TRANSORAL BIOPSY SINGLE/MULTIPLE  1/26/2012  VT EGD TRANSORAL BIOPSY SINGLE/MULTIPLE  6/14/2012 Prior Level of Function/Home Situation: Pt is mod I with RW for gait from her apartment to dining room, uses electric scooter for long community distances, and scoots around apartment in manual W/C. Lives at [facility] in [place]. Lives alone, but has family in the area. Does not drive. Denies falls. Independent for ADLs. Personal factors and/or comorbidities impacting plan of care: Neuropathy; EDWARD foot drop; HTN; scoliosis Home Situation Home Environment: Independent living([facility]) One/Two Story Residence: Other (Comment)(3 stories= pt lives on 3rd floor) Living Alone: Yes Support Systems: Family member(s) Patient Expects to be Discharged to[de-identified] Other (comment)(independent living) Current DME Used/Available at Home: Kavitha [place], rolling, Wheelchair, Shower chair, Grab bars(Scooter) Tub or Shower Type: Shower EXAMINATION/PRESENTATION/DECISION MAKING: Critical Behavior: 
Neurologic State: Alert Orientation Level: Oriented X4 Cognition: Appropriate decision making, Appropriate for age attention/concentration, Appropriate safety awareness Safety/Judgement: Awareness of environment Hearing: Auditory Auditory Impairment: Hard of hearing, bilateralSkin:  Intact Edema: None Range Of Motion: AROM: Generally decreased, functional 
  
  
  
  
  
  
  
Strength:   
Strength: Generally decreased, functional 
  
  
  
  
  
  
Tone & Sensation:  
Tone: Abnormal(bilateral foot drop) Sensation: Impaired(bilateral feet to knees) Coordination: 
Coordination: Grossly decreased, non-functional(bilateral foot drop (baseline)) Vision:  
Tracking: Able to track stimulus in all quadrants w/o difficulty Corrective Lenses: Glasses Functional Mobility: 
Bed Mobility: 
Rolling: Supervision Supine to Sit: Stand-by assistance Scooting: Supervision; Additional time Transfers: 
Sit to Stand: Contact guard assistance Stand to Sit: Contact guard assistance Bed to Chair: Contact guard assistance(with rolling walker) Balance:  
Sitting: Intact Standing: Intact; With supportAmbulation/Gait Training:Distance (ft): 3 Feet (ft)(bed>chair) Assistive Device: Gait belt;Walker, rolling Ambulation - Level of Assistance: Contact guard assistance;Assist x1;Additional time; Adaptive equipment Gait Description (WDL): Exceptions to [facility] Gait Abnormalities: Decreased step clearance; Step to gait Base of Support: Narrowed Speed/Kaylynn: Slow;Shuffled Step Length: Left shortened;Right shortened Functional Measure: 
Barthel Index: 
 
Bathin Bladder: 5 Bowels: 10 
Groomin Dressin Feeding: 10 Mobility: 5 Stairs: 0 Toilet Use: 5 Transfer (Bed to Chair and Back): 10 Total: 55/100 Percentage of impairment  
0% 1-19% 20-39% 40-59% 60-79% 80-99% 100% Barthel Score 0-100 100 99-80 79-60 59-40 20-39 1-19 
 0 The Barthel ADL Index: Guidelines 1. The index should be used as a record of what a patient does, not as a record of what a patient could do. 2. The main aim is to establish degree of independence from any help, physical or verbal, however minor and for whatever reason. 3. The need for supervision renders the patient not independent. 4. A patient's performance should be established using the best available evidence. Asking the patient, friends/relatives and nurses are the usual sources, but direct observation and common sense are also important. However direct testing is not needed. 5. Usually the patient's performance over the preceding 24-48 hours is important, but occasionally longer periods will be relevant. 6. Middle categories imply that the patient supplies over 50 per cent of the effort. 7. Use of aids to be independent is allowed. Rasheeda Dexter., Barthel, DKaylaW. (1142). Functional evaluation: the Barthel Index. [facility] (14)2. Adela Goddard pro OMEGA Pool, Yo Chong., Shantal Nielsen., Cristino, [address] (1999). Measuring the change indisability after inpatient rehabilitation; comparison of the responsiveness of the Barthel Index and Functional [place] Measure. Journal of Neurology, Neurosurgery, and Psychiatry, 66(4), 124-655. John Gilman, N.J.A, GABI Sandy, & Elliott Hodge M.A. (2004.) Assessment of post-stroke quality of life in cost-effectiveness studies: The usefulness of the Barthel Index and the EuroQoL-5D. [facility], 13, 134-45 Physical Therapy Evaluation Charge Determination History Examination Presentation Decision-Making HIGH Complexity :3+ comorbidities / personal factors will impact the outcome/ POC  HIGH Complexity : 4+ Standardized tests and measures addressing body structure, function, activity limitation and / or participation in recreation  LOW Complexity : Stable, uncomplicated  Other outcome measures Barthel Index  MEDIUM Based on the above components, the patient evaluation is determined to be of the following complexity level: LOW Pain: 
Pain Scale 1: Numeric (0 - 10) Activity Tolerance:  
Fair - increased nausea/reflux; no pain complaints Please refer to the flowsheet for vital signs taken during this treatment. After treatment:  
[x]         Patient left in no apparent distress sitting up in chair 
[]         Patient left in no apparent distress in bed 
[x]         Call bell left within reach [x]         Nursing notified 
[]         Caregiver present 
[]         Bed alarm activated COMMUNICATION/EDUCATION:  
The patients plan of care was discussed with: Physical Therapist, Occupational Therapist, Registered Nurse and . [x]         Fall prevention education was provided and the patient/caregiver indicated understanding. [x]         Patient/family have participated as able in goal setting and plan of care. [x]         Patient/family agree to work toward stated goals and plan of care. []         Patient understands intent and goals of therapy, but is neutral about his/her participation. []         Patient is unable to participate in goal setting and plan of care. Thank you for this referral. 
Mihir Schaefer, PT, DPT Time Calculation: 20 mins

## 2019-03-04 NOTE — PROGRESS NOTES
Problem: Self Care Deficits Care Plan (Adult) Goal: *Acute Goals and Plan of Care (Insert Text) Occupational Therapy Goals: 
Initiated 3/4/2019 1. Patient will perform bathing with modified independence within 7 days. 2. Patient will perform toileting with modified independence within 7 days. 3. Patient will perform upper body dressing and lower body dressing with modified independence within 7 days. 4. Patient will transfer from toilet with modified independence using the least restrictive device and appropriate durable medical equipment within 7 days. Occupational Therapy EVALUATION Patient: Aster Dempsey (29 y.o. female) Date: 3/4/2019 Primary Diagnosis: SBO (small bowel obstruction) ([facility] [address] [W86.800] Precautions: fall ASSESSMENT : 
Based on the objective data described below, the patient presents with close to baseline mobility and ADLs. Pt has chronic bilateral foot drop and did not have AFOs with her. Supervision for supine to sit edge of bed. Good dynamic seated balance. CGA overall for sit to stand, standing balance and stand pivot transfer to bedside chair. Pt is close to her baseline. Recommend return to independent living with initial increased assist or very short term SNF for rehab if assist cannot be obtained. Antwan Bueno Patient will benefit from skilled intervention to address the above impairments. Patients rehabilitation potential is considered to be Fair Factors which may influence rehabilitation potential include:  
[]             None noted [x]             Mental ability/status []             Medical condition []             Home/family situation and support systems []             Safety awareness []             Pain tolerance/management 
[]             Other: PLAN : 
Recommendations and Planned Interventions: 
[x]               Self Care Training                  [x]        Therapeutic Activities [x]               Functional Mobility Training    []        Cognitive Retraining 
[x]               Therapeutic Exercises           []        Endurance Activities [x]               Balance Training                   []        Neuromuscular Re-Education []               Visual/Perceptual Training     [x]   Home Safety Training 
[x]               Patient Education                 [x]        Family Training/Education []               Other (comment): Frequency/Duration: Patient will be followed by occupational therapy 3 times a week to address goals. Discharge Recommendations: Home Health vs. SNF pending pts support at home Further Equipment Recommendations for Discharge: none SUBJECTIVE:  
Patient stated I was doing everything for myself. I live in independent living.  OBJECTIVE DATA SUMMARY:  
HISTORY:  
Past Medical History:  
Diagnosis Date  Arthritis  Diarrhea 6/6/2016  Foot drop  Gastric ulcer 6/14/2012  Gastroparesis  GERD (gastroesophageal reflux disease)  High cholesterol  Hypertension  Neuropathy  Ovarian cancer ([facility] [address] 1986  
 chemo x 9 mos  Scoliosis  Stroke [facility]) 2002  
 ? stroke with drop feet, per patient CT scans were negative Past Surgical History:  
Procedure Laterality Date 2021 Peñalozavenus Tyler Pau N/A 6/6/2016 SIGMOIDOSCOPY FLEXIBLE performed by Melissa Sullivan MD at [facility] ENDOSCOPY  
 HX ORTHOPAEDIC    
 back  HX ORTHOPAEDIC    
 bilateral shoulder replacements  HX ORTHOPAEDIC    
 left knee replacement [facility]  MT EGD TRANSORAL BIOPSY SINGLE/MULTIPLE  1/26/2012  MT EGD TRANSORAL BIOPSY SINGLE/MULTIPLE  6/14/2012 Prior Level of Function/Environment/Context: lives at independent living, performed ADLS on her own, limited walking only stand pivots with RW to wheelchair (therapy OP was working on getting pt a new wheelchair), uses a scooter to go to meals, able to perform ADLS on her own, when standing pt leans against surface with backs of LE to stabilize to use BUE to pull up pants, has 4 wheeled rolling walker without seat Expanded or extensive additional review of patient history:  
 
Home Situation Home Environment: Independent living([facility]) One/Two Story Residence: Other (Comment)(3 stories= pt lives on 3rd floor) Living Alone: Yes Support Systems: Family member(s) Patient Expects to be Discharged to[de-identified] Other (comment)(independent living) Current DME Used/Available at Home: [employer] [place], rolling, Wheelchair, Shower chair, Grab bars(Scooter) Tub or Shower Type: Shower Hand dominance: RightEXAMINATION OF PERFORMANCE DEFICITS: 
Cognitive/Behavioral Status: 
Neurologic State: Alert Orientation Level: Oriented X4 Cognition: Appropriate decision making; Appropriate for age attention/concentration; Appropriate safety awareness Perception: Appears intact Perseveration: No perseveration noted Safety/Judgement: Awareness of environment Hearing: Auditory Auditory Impairment: Hard of hearing, bilateral 
Vision/Perceptual:   
Tracking: Able to track stimulus in all quadrants w/o difficulty Corrective Lenses: Glasses Range of Motion: 
 
AROM: Generally decreased, functional 
  
  
  
  
  
  
  
Strength: 
 
Strength: Generally decreased, functional 
  
  
  
  
Coordination: 
Coordination: Grossly decreased, non-functional(bilateral foot drop (baseline)) Fine Motor Skills-Upper: Left Intact; Right Intact Gross Motor Skills-Upper: Left Intact; Right Intact Tone & Sensation: 
 
Tone: Abnormal(bilateral foot drop) Sensation: Impaired(bilateral feet to knees) Balance: 
Sitting: Intact Standing: Intact; With support Functional Mobility and Transfers for ADLs:Bed Mobility: 
Rolling: Supervision Supine to Sit: Stand-by assistance Scooting: Supervision; Additional time Transfers: 
Sit to Stand: Contact guard assistance Stand to Sit: Contact guard assistance Bed to Chair: Contact guard assistance(with rolling walker) Bathroom Mobility: Contact guard assistance(uses wheelchair for prior mode of transport) Toilet Transfer : Contact guard assistance ADL Assessment: 
Feeding: Independent Oral Facial Hygiene/Grooming: Setup Bathing: Contact guard assistance Upper Body Dressing: Setup Lower Body Dressing: Contact guard assistance Toileting: Contact guard assistance ADL Intervention and task modifications: SBA to don slippers with back edge of bed, set up seated to comb hair seated EOB, CGA for sit to stand and to stand pivot to bedside chair with RW 
Cognitive Retraining Safety/Judgement: Awareness of environment Functional Measure: 
Barthel Index: 
 
Bathin Bladder: 5 Bowels: 10 
Groomin Dressin Feeding: 10 Mobility: 5 Stairs: 0 Toilet Use: 5 Transfer (Bed to Chair and Back): 10 Total: 55/100 Percentage of impairment  
0% 1-19% 20-39% 40-59% 60-79% 80-99% 100% Barthel Score 0-100 100 99-80 79-60 59-40 20-39 1-19 
 0 The Barthel ADL Index: Guidelines 1. The index should be used as a record of what a patient does, not as a record of what a patient could do. 2. The main aim is to establish degree of independence from any help, physical or verbal, however minor and for whatever reason. 3. The need for supervision renders the patient not independent. 4. A patient's performance should be established using the best available evidence. Asking the patient, friends/relatives and nurses are the usual sources, but direct observation and common sense are also important. However direct testing is not needed. 5. Usually the patient's performance over the preceding 24-48 hours is important, but occasionally longer periods will be relevant. 6. Middle categories imply that the patient supplies over 50 per cent of the effort. 7. Use of aids to be independent is allowed. Adriana Muse., BarthelNYDIA. (1677). Functional evaluation: the Barthel Index. [address] (14)2. OMEGA Ross, Danelle Hernandez., Guychristiana Duffy., [place], [address] (1999). Measuring the change indisability after inpatient rehabilitation; comparison of the responsiveness of the Barthel Index and Functional Graham Measure. Journal of Neurology, Neurosurgery, and Psychiatry, 66(4), 988-861. FREIDA Cuellar, GABI Sandy, & Zachary Curtis M.A. (2004.) Assessment of post-stroke quality of life in cost-effectiveness studies: The usefulness of the Barthel Index and the EuroQoL-5D. [facility], 14, 373-38 Occupational Therapy Evaluation Charge Determination History Examination Decision-Making MEDIUM Complexity : Expanded review of history including physical, cognitive and psychosocial  history  MEDIUM Complexity : 3-5 performance deficits relating to physical, cognitive , or psychosocial skils that result in activity limitations and / or participation restrictions MEDIUM Complexity : Patient may present with comorbidities that affect occupational performnce. Miniml to moderate modification of tasks or assistance (eg, physical or verbal ) with assesment(s) is necessary to enable patient to complete evaluation Based on the above components, the patient evaluation is determined to be of the following complexity level: MEDIUM Pain: 
Pain Scale 1: Numeric (0 - 10) Activity Tolerance:  
 
Please refer to the flowsheet for vital signs taken during this treatment. After treatment:  
[x] Patient left in no apparent distress sitting up in chair 
[] Patient left in no apparent distress in bed 
[x] Call bell left within reach [x] Nursing notified 
[] Caregiver present 
[] Bed alarm activated COMMUNICATION/EDUCATION:  
 The patients plan of care was discussed with: Physical Therapist, Registered Nurse and patient. [x] Home safety education was provided and the patient/caregiver indicated understanding. [x] Patient  have participated as able in goal setting and plan of care. [x] Patient agree to work toward stated goals and plan of care. [] Patient understands intent and goals of therapy, but is neutral about his/her participation. [] Patient is unable to participate in goal setting and plan of care. This patients plan of care is appropriate for delegation to [facility]. Thank you for this referral. 
Angela Palafox OTR/L Time Calculation: 21 mins

## 2019-03-04 NOTE — PROGRESS NOTES
SURGERY PROGRESS NOTE Admit Date: 2019 Subjective:  
 
Patient complains of loose BMs and reflux Objective:  
 
Visit Vitals BP 95/69 Pulse 78 Temp 98.3 °F (36.8 °C) Resp 18 Ht 5' (1.524 m) Wt 68 kg (150 lb) SpO2 98% BMI 29.29 kg/m² Temp (24hrs), Av.2 °F (36.8 °C), Min:98.1 °F (36.7 °C), Max:98.4 °F (36.9 °C) No intake/output data recorded.  1901 -  0700 In: 3338.8 [P.O.:370; I.V.:2968.8] Out: 1200 [Urine:1200] Physical Exam:   
General:  alert, cooperative, no distress, appears stated age Abdomen: soft, bowel sounds active, non-tender Assessment:  
 
Principal Problem: 
  Small bowel obstruction ([address] (2019) Active Problems: 
  SBO (small bowel obstruction) ([address] (2019) SBO resolved. Has some post obstructive diarrhea.   Will observe overnight to ensure improvement in loose stools and plan D/C in the am to SNF

## 2019-03-04 NOTE — PROGRESS NOTES
Problem: Pressure Injury - Risk of 
Goal: *Prevention of pressure injury Document Cristino Scale and appropriate interventions in the flowsheet. Outcome: Progressing Towards Goal 
Pressure Injury Interventions: 
  
 
Moisture Interventions: Internal/External urinary devices Activity Interventions: PT/OT evaluation Mobility Interventions: Float heels Nutrition Interventions: Document food/fluid/supplement intake, Discuss nutritional consult with provider Problem: Falls - Risk of 
Goal: *Absence of Falls Document Mariam Mirza Fall Risk and appropriate interventions in the flowsheet. Outcome: Progressing Towards Goal 
Fall Risk Interventions: 
Mobility Interventions: Communicate number of staff needed for ambulation/transfer Medication Interventions: Teach patient to arise slowly, Patient to call before getting OOB Elimination Interventions: Call light in reach

## 2019-03-04 NOTE — PROGRESS NOTES
General Surgery CM completed chart review. Noted that Pt is a possible discharge for today. CM noted therapy note from today at 9:13 AM:  Full Eval to follow: \"Recommend patient to discharge to [facility] at [facility] with HHPT/OT and increased family/facility assistance pending progress with skilled acute care physical therapy. If patient unable to have increased assistance as needed, recommend healthcare at [facility]. \" 
 
CM called Diony Oscar in Admissions at [facility] and had to leave a [phone] to give her that information and see if they have any input on where they would like Pt to go. CM received a call from one of the DTR. . Did not leave name and she was wondering whether she needed [facility] or go back to her Independent Living apt. CM called first DTR, Steph Simpson and she is walking into hospital now. CM will discuss with Pt and DTR once she comes in.  
 
2:12 PM  
CM talked to Pt/DTR and she would like to go to Healthcare at discharge. Dr. Jayy Dunaway stated that he wanted to \"observe overnight to ensure improvement in loose stools and plan DC in the AM to [facility]. \"   
 
CM called and left another VM for Nataliia at [facility] to let her know anticipated DC plan. CM called and left talked to DTR who was at [facility] with Diony Dumontan. Plan is to go to Healthcare/SNF on 3/5/19. Diony Oscar direct line is [phone]. Fax is [phone]. Diony Oscar asked for recent MD and therapy notes and chest x ray to be faxed to [phone]. CM will work on that now. DTR can transport her in the car tomorrow, 3/5/19.   
 
2:26 PM  
CM checked and there is no chest xray on file. CM called Dr. Leeanne Gonzalez Office to see if he would be willing to order a chest XRay.  
 
2:40 PM  
CM talked to  [facility] and requested Chest XRAY for placement. I will fax MD/Therapy notes for there review now. CM will continue to monitor discharge plan. Martha Hall, IVETH Ext [phone]

## 2019-03-04 NOTE — PROGRESS NOTES
Orders received, chart reviewed and patient evaluated by physical therapy. Recommend patient to discharge to [facility] at [facility] with HHPT/OT and increased family/facility assistance pending progress with skilled acute care physical therapy. If patient unable to have increased assistance as needed, recommend healthcare at [facility]. Recommend with nursing patient to complete as able in order to maintain strength, endurance and independence: OOB to chair 3x/day with CGA. Thank you for your assistance. Full evaluation to follow.   
 
Thank you, 
Michel Hitchcock, PT, DPT

## 2019-03-05 VITALS
DIASTOLIC BLOOD PRESSURE: 57 MMHG | BODY MASS INDEX: 29.45 KG/M2 | RESPIRATION RATE: 16 BRPM | OXYGEN SATURATION: 98 % | SYSTOLIC BLOOD PRESSURE: 119 MMHG | HEIGHT: 60 IN | WEIGHT: 150 LBS | TEMPERATURE: 98 F | HEART RATE: 74 BPM

## 2019-03-05 PROCEDURE — 74011250637 HC RX REV CODE- 250/637: Performed by: SURGERY

## 2019-03-05 RX ORDER — ATORVASTATIN CALCIUM 40 MG/1
40 TABLET, FILM COATED ORAL
Status: DISCONTINUED | OUTPATIENT
Start: 2019-03-05 | End: 2019-03-05 | Stop reason: HOSPADM

## 2019-03-05 RX ORDER — FAMOTIDINE 20 MG/1
20 TABLET, FILM COATED ORAL
Status: DISCONTINUED | OUTPATIENT
Start: 2019-03-05 | End: 2019-03-05 | Stop reason: HOSPADM

## 2019-03-05 RX ADMIN — DOCUSATE SODIUM 100 MG: 100 CAPSULE, LIQUID FILLED ORAL at 08:47

## 2019-03-05 RX ADMIN — GABAPENTIN 800 MG: 100 CAPSULE ORAL at 10:28

## 2019-03-05 RX ADMIN — PANTOPRAZOLE SODIUM 40 MG: 40 TABLET, DELAYED RELEASE ORAL at 06:30

## 2019-03-05 RX ADMIN — CALCIUM POLYCARBOPHIL 625 MG: 625 TABLET, FILM COATED ORAL at 08:47

## 2019-03-05 NOTE — DISCHARGE INSTRUCTIONS
Patient Education        Bowel Blockage (Intestinal Obstruction): Care Instructions  Your Care Instructions  A bowel blockage, also called an intestinal obstruction, can prevent gas, fluids, or solids from moving through the intestines normally. It can cause constipation and, rarely, diarrhea. You may have pain, nausea, vomiting, and cramping. Most of the time, complete blockages require a stay in the hospital and possibly surgery. But if your bowel is only partly blocked, your doctor may tell you to wait until it clears on its own and you are able to pass gas and stool. If so, there are things you can do at home to help make you feel better. If you have had surgery for a bowel blockage, there are things you can do at home to make sure you heal well. You can also make some changes to keep your bowel from becoming blocked again. Follow-up care is a key part of your treatment and safety. Be sure to make and go to all appointments, and call your doctor if you are having problems. It's also a good idea to know your test results and keep a list of the medicines you take. How can you care for yourself at home? If your doctor has told you to wait at home for a blockage to clear on its own:  · Follow your doctor's instructions. These may include eating a liquid diet to avoid complete blockage. · Be safe with medicines. Take your medicines exactly as prescribed. Call your doctor if you think you are having a problem with your medicine. · Put a heating pad set on low on your belly to relieve mild cramps and pain. To prevent another blockage  · Try to eat smaller amounts of food more often. For example, have 5 or 6 small meals throughout the day instead of 2 or 3 large meals. · Chew your food very well. Try to chew each bite about 20 times or until it is liquid. · Avoid high-fiber foods and raw fruits and vegetables with skins, husks, strings, or seeds.  These can form a ball of undigested material that can cause a blockage if a part of your bowel is scarred or narrowed. · Check with your doctor before you eat whole-grain products or use a fiber supplement such as Citrucel or Metamucil. · To help you have regular bowel movements, eat at regular times, do not strain during a bowel movement, and drink at least 8 to 10 glasses of water each day. If you have kidney, heart, or liver disease and have to limit fluids, talk with your doctor or before you increase the amount of fluids you drink. · Drink high-calorie liquid formulas if your doctor says to. Severe symptoms may make it hard for your body to take in vitamins and minerals. · Get regular exercise. It helps you digest your food better. Get at least 30 minutes of physical activity on most days of the week. Walking is a good choice. When should you call for help? Call your doctor now or seek immediate medical care if:    · You have a fever.     · You are vomiting.     · You have new or worse belly pain.     · You cannot pass stools or gas.    Watch closely for changes in your health, and be sure to contact your doctor if you have any problems. Where can you learn more? Go to http://everett-ulises.info/. Enter C843 in the search box to learn more about \"Bowel Blockage (Intestinal Obstruction): Care Instructions. \"  Current as of: March 27, 2018  Content Version: 11.9  © 6999-4499 Healthwise, Incorporated. Care instructions adapted under license by [employer] (which disclaims liability or warranty for this information). If you have questions about a medical condition or this instruction, always ask your healthcare professional. [place] [address] any warranty or liability for your use of this information. Please call [phone] to make a follow up appointment with Stefany Ramirez or Akhil Wilson in 2 weeks     [employer] of [address] [facility] [place], [address] [facility], [address] [place] [address] [phone]  Fax [phone]

## 2019-03-05 NOTE — PROGRESS NOTES
CM phoned Chelle Atkins w/[facility] regarding d/c to healthcare today. Chelle Atkins requested CM fax a chest x-ray and stated that the DON requested info regarding whether patient is voiding, has formed stools and if n/v has been resolved. CM faxed Chelle Atkins w/[facility] patients chest x-ray to [phone]. CM phoned Chelle Atkins and left a VM stating that pt is incontinent, wears a brief, pt's last stool was yesterday and was loose, but RN stated that the Miralax pt is receiving may be the cause of loose stool and that the n/v has been resolved. 12:15pm  Per request, CM faxed d/c summary, updated nursing notes and therapy notes to DON at [facility] ([phone]). CM updated pt and daughter. Patiens daughter is in room waiting to transport pt to [facility].     Norma Brochure  Ext [phone]

## 2019-03-05 NOTE — PROGRESS NOTES
Spiritual Care Assessment/Progress Note  [facility]      NAME: Esther Paz      MRN: [id number]  AGE: 80 y.o.  SEX: female  Protestant Affiliation: Hinduism   Language: English     3/5/2019     Total Time (in minutes): 14     Spiritual Assessment begun in [facility] GENERAL SURGERY through conversation with:         [x]Patient        [] Family    [] Friend(s)        Reason for Consult: Initial/Spiritual assessment, patient floor     Spiritual beliefs: (Please include comment if needed)     [x] Identifies with a kyle tradition:         [x] Supported by a kyle community:            [] Claims no spiritual orientation:           [] Seeking spiritual identity:                [] Adheres to an individual form of spirituality:           [] Not able to assess:                           Identified resources for coping:      [] Prayer                               [] Music                  [] Guided Imagery     [x] Family/friends                 [] Pet visits     [] Devotional reading                         [] Unknown     [] Other:                                           Interventions offered during this visit: (See comments for more details)    Patient Interventions: Affirmation of emotions/emotional suffering, Affirmation of kyle, Initial/Spiritual assessment, patient floor, Initial visit, Protestant beliefs/image of God discussed           Plan of Care:     [] Support spiritual and/or cultural needs    [] Support AMD and/or advance care planning process      [] Support grieving process   [] Coordinate Rites and/or Rituals    [] Coordination with community clergy   [] No spiritual needs identified at this time   [] Detailed Plan of Care below (See Comments)  [] Make referral to Music Therapy  [] Make referral to Pet Therapy     [] Make referral to Addiction services  [] Make referral to [facility]  [] Make referral to Spiritual Care Partner  [] No future visits requested        [x] Follow up visits as needed     Comments: Initial spiritual assessment in General Surgery. Patient/family shared about concerns. Provided effective listening. Consulted with patient. Advised of  Availability. URMILA Kline [phone])

## 2019-03-05 NOTE — PROGRESS NOTES
General Surgery End of Shift Nursing Note Bedside shift change report given to Christina SHRESTHA (oncoming nurse) by Toby Lancaster RN (offgoing nurse). Report included the following information SBAR, Kardex and MAR. Shift worked:   7a to 7p Summary of shift:    Patient in room most of day Should be discharged tomorrow. Issues for physician to address:   none Number times ambulated in hallway past shift: 0 Number of times OOB to chair past shift: 3 Pain Management: 
Current medication: tylenol Patient states pain is manageable on current pain medication: YES 
 
GI: 
 
Current diet:  DIET REGULAR Tolerating current diet: YES Passing flatus: YES Last Bowel Movement: today Appearance: loose Respiratory: 
 
Incentive Spirometer at bedside: YES Patient instructed on use: YES Patient Safety: 
 
Falls Score: 1 Bed Alarm On? No 
Sitter?  No 
 
Fuentes Johnson RN

## 2019-03-05 NOTE — PROGRESS NOTES
General Surgery End of Shift Nursing Note    Bedside shift change report given to [employer] (oncoming nurse) by Nadia Alves (offgoing nurse). Report included the following information SBAR, Kardex, Intake/Output, MAR and Recent Results. Shift worked:   7p-7a   Summary of shift: Iv infiltrated infusion stopped. Patient stated PICC team had to insert her IV because she is a \"Hard Stick\". Called placed to MD waiting for return call day shift nurse made aware. Issues for physician to address:   none     Number times ambulated in hallway past shift: 0    Number of times OOB to chair past shift: 0    Pain Management:  Current medication: Tylenol  Patient states pain is manageable on current pain medication: YES    GI:    Current diet:  DIET REGULAR    Tolerating current diet: YES  Passing flatus: YES  Last Bowel Movement: yesterday       Respiratory:    Incentive Spirometer at bedside: NO  Patient instructed on use: NO    Patient Safety:    Falls Score: 3  Bed Alarm On? No  Sitter?  No    Christina Ku

## 2019-03-05 NOTE — PROGRESS NOTES
Pt. DC via WC , accompanied by a volunteer and a family member. DC instructions given, Pt. Verbalizes understanding. Report called to [facility] [phone]. No answer, message left to return call for report.

## 2019-03-05 NOTE — DISCHARGE SUMMARY
DISCHARGE SUMMARY      Patient ID:  Marcin Okeefe  [id number]  80 y.o.  10/9/1933    Admit date: 2/27/2019    Discharge date and time: No discharge date for patient encounter. Admitting Physician: Leonardo Smiley MD     Discharge Physician: Lo Ramos MD      Admission Diagnoses: SBO (small bowel obstruction) [facility]) [K56.609]    Discharge Diagnoses: Principal Problem:    Small bowel obstruction ([address] (2/27/2019)    Active Problems:    SBO (small bowel obstruction) ([facility] [address] (2/27/2019)        Procedures: * No surgery found *    Consults: general surgery        Hospital Course:   Admitted with SBO. Improved with bowel rest. On discharge patient is without pain, ambulating, and tolerating her diet and diarrhea resolved. .      D/C Disposition: home     Patient Instructions:   Current Discharge Medication List      CONTINUE these medications which have NOT CHANGED    Details   melatonin 300 mcg tab Take 1.5 mg by mouth nightly. Qty: 1 Tab, Refills: 0      senna-docusate (PERICOLACE) 8.6-50 mg per tablet Take 1 Tab by mouth daily. Qty: 30 Tab, Refills: 1      acetaminophen (TYLENOL) 325 mg tablet Take 2 Tabs by mouth three (3) times daily. Qty: 1 Tab, Refills: 0      magnesium 250 mg tab Take  by mouth. RABEprazole (ACIPHEX) 20 mg tablet Take 20 mg by mouth daily. raNITIdine hcl 300 mg cap Take  by mouth Daily (before dinner). gabapentin (NEURONTIN) 800 mg tablet TAKE 1 TAB BY MOUTH FOUR  TIMES DAILY AS NEEDED. Qty: 360 Tab, Refills: 3      Calcium-Cholecalciferol, D3, (CALCIUM 500 + D) 500 mg(1,250mg) -400 unit tab Take 1 Tab by mouth daily. Qty: 90 Tab, Refills: 1      simvastatin (ZOCOR) 40 mg tablet Take 1 Tab by mouth nightly. Qty: 90 Tab, Refills: 3    Associated Diagnoses: Mixed hyperlipidemia      cholecalciferol (VITAMIN D3) 1,000 unit tablet Take 1,000 Units by mouth daily. multivitamin (ONE A DAY) tablet Take 1 Tab by mouth daily.       oxyCODONE IR (ROXICODONE) 5 mg immediate release tablet Take 1 Tab by mouth two (2) times daily as needed. Max Daily Amount: 10 mg. As needed for severe pain  Qty: 20 Tab, Refills: 0    Associated Diagnoses: Intractable back pain; Low back pain radiating to lower extremity      denosumab (PROLIA) 60 mg/mL injection 1 mL by SubCUTAneous route every 6 months. Qty: 1 Syringe, Refills: 1    Comments: Patient has a rohith and a pharmacy card now to help cover co-pay so she will be picking this up for us to give in clinic. Thank you! Pranav Beltran, [address] [place] [address]  Associated Diagnoses: Age-related osteoporosis without current pathological fracture      loperamide (IMODIUM) 2 mg capsule Take 2 mg by mouth four (4) times daily as needed for Diarrhea.              Diet: resume pre-hospital diet    Follow-up in 2 weeks       Signed:  Carroll Esquivel MD  3/5/2019  11:09 AM

## 2019-06-30 ENCOUNTER — APPOINTMENT (OUTPATIENT)
Dept: GENERAL RADIOLOGY | Age: 84
End: 2019-06-30
Attending: EMERGENCY MEDICINE
Payer: MEDICARE

## 2019-06-30 ENCOUNTER — HOSPITAL ENCOUNTER (EMERGENCY)
Age: 84
Discharge: HOME | End: 2019-06-30
Attending: EMERGENCY MEDICINE
Payer: MEDICARE

## 2019-06-30 VITALS
OXYGEN SATURATION: 90 % | RESPIRATION RATE: 14 BRPM | TEMPERATURE: 97.9 F | DIASTOLIC BLOOD PRESSURE: 88 MMHG | SYSTOLIC BLOOD PRESSURE: 134 MMHG | HEIGHT: 60 IN | WEIGHT: 150 LBS | BODY MASS INDEX: 29.45 KG/M2 | HEART RATE: 59 BPM

## 2019-06-30 DIAGNOSIS — R42 VERTIGO: Primary | ICD-10-CM

## 2019-06-30 DIAGNOSIS — R53.1 WEAKNESS: ICD-10-CM

## 2019-06-30 LAB
ALBUMIN SERPL-MCNC: 3.4 G/DL (ref 3.5–5)
ALBUMIN/GLOB SERPL: 0.9 {RATIO} (ref 1.1–2.2)
ALP SERPL-CCNC: 48 U/L (ref 45–117)
ALT SERPL-CCNC: 16 U/L (ref 12–78)
ANION GAP SERPL CALC-SCNC: 4 MMOL/L (ref 5–15)
APPEARANCE UR: CLEAR
AST SERPL-CCNC: 22 U/L (ref 15–37)
BACTERIA URNS QL MICRO: NEGATIVE /HPF
BASOPHILS # BLD: 0.1 K/UL (ref 0–0.1)
BASOPHILS NFR BLD: 1 % (ref 0–1)
BILIRUB SERPL-MCNC: 0.2 MG/DL (ref 0.2–1)
BILIRUB UR QL: NEGATIVE
BNP SERPL-MCNC: 220 PG/ML
BUN SERPL-MCNC: 23 MG/DL (ref 6–20)
BUN/CREAT SERPL: 27 (ref 12–20)
CALCIUM SERPL-MCNC: 9.4 MG/DL (ref 8.5–10.1)
CHLORIDE SERPL-SCNC: 105 MMOL/L (ref 97–108)
CK SERPL-CCNC: 70 U/L (ref 26–192)
CO2 SERPL-SCNC: 31 MMOL/L (ref 21–32)
COLOR UR: NORMAL
COMMENT, HOLDF: NORMAL
CREAT SERPL-MCNC: 0.85 MG/DL (ref 0.55–1.02)
DIFFERENTIAL METHOD BLD: ABNORMAL
EOSINOPHIL # BLD: 0.1 K/UL (ref 0–0.4)
EOSINOPHIL NFR BLD: 2 % (ref 0–7)
EPITH CASTS URNS QL MICRO: NORMAL /LPF
ERYTHROCYTE [DISTWIDTH] IN BLOOD BY AUTOMATED COUNT: 16.4 % (ref 11.5–14.5)
GLOBULIN SER CALC-MCNC: 3.8 G/DL (ref 2–4)
GLUCOSE SERPL-MCNC: 116 MG/DL (ref 65–100)
GLUCOSE UR STRIP.AUTO-MCNC: NEGATIVE MG/DL
HCT VFR BLD AUTO: 29 % (ref 35–47)
HGB BLD-MCNC: 8 G/DL (ref 11.5–16)
HGB UR QL STRIP: NEGATIVE
HYALINE CASTS URNS QL MICRO: NORMAL /LPF (ref 0–5)
IMM GRANULOCYTES # BLD AUTO: 0 K/UL (ref 0–0.04)
IMM GRANULOCYTES NFR BLD AUTO: 0 % (ref 0–0.5)
KETONES UR QL STRIP.AUTO: NEGATIVE MG/DL
LEUKOCYTE ESTERASE UR QL STRIP.AUTO: NEGATIVE
LYMPHOCYTES # BLD: 1.2 K/UL (ref 0.8–3.5)
LYMPHOCYTES NFR BLD: 22 % (ref 12–49)
MCH RBC QN AUTO: 20.6 PG (ref 26–34)
MCHC RBC AUTO-ENTMCNC: 27.6 G/DL (ref 30–36.5)
MCV RBC AUTO: 74.6 FL (ref 80–99)
MONOCYTES # BLD: 0.5 K/UL (ref 0–1)
MONOCYTES NFR BLD: 9 % (ref 5–13)
NEUTS SEG # BLD: 3.7 K/UL (ref 1.8–8)
NEUTS SEG NFR BLD: 66 % (ref 32–75)
NITRITE UR QL STRIP.AUTO: NEGATIVE
NRBC # BLD: 0 K/UL (ref 0–0.01)
NRBC BLD-RTO: 0 PER 100 WBC
PH UR STRIP: 7 [PH] (ref 5–8)
PLATELET # BLD AUTO: 286 K/UL (ref 150–400)
PMV BLD AUTO: 10.2 FL (ref 8.9–12.9)
POTASSIUM SERPL-SCNC: 3.6 MMOL/L (ref 3.5–5.1)
PROT SERPL-MCNC: 7.2 G/DL (ref 6.4–8.2)
PROT UR STRIP-MCNC: NEGATIVE MG/DL
RBC # BLD AUTO: 3.89 M/UL (ref 3.8–5.2)
RBC #/AREA URNS HPF: NORMAL /HPF (ref 0–5)
RBC MORPH BLD: ABNORMAL
SAMPLES BEING HELD,HOLD: NORMAL
SODIUM SERPL-SCNC: 140 MMOL/L (ref 136–145)
SP GR UR REFRACTOMETRY: 1.02 (ref 1–1.03)
TROPONIN I SERPL-MCNC: <0.05 NG/ML
TROPONIN I SERPL-MCNC: <0.05 NG/ML
UA: UC IF INDICATED,UAUC: NORMAL
UROBILINOGEN UR QL STRIP.AUTO: 0.2 EU/DL (ref 0.2–1)
WBC # BLD AUTO: 5.6 K/UL (ref 3.6–11)
WBC URNS QL MICRO: NORMAL /HPF (ref 0–4)

## 2019-06-30 PROCEDURE — 83880 ASSAY OF NATRIURETIC PEPTIDE: CPT

## 2019-06-30 PROCEDURE — 74011250636 HC RX REV CODE- 250/636: Performed by: EMERGENCY MEDICINE

## 2019-06-30 PROCEDURE — 36415 COLL VENOUS BLD VENIPUNCTURE: CPT

## 2019-06-30 PROCEDURE — 96360 HYDRATION IV INFUSION INIT: CPT

## 2019-06-30 PROCEDURE — 71046 X-RAY EXAM CHEST 2 VIEWS: CPT

## 2019-06-30 PROCEDURE — 82550 ASSAY OF CK (CPK): CPT

## 2019-06-30 PROCEDURE — 99285 EMERGENCY DEPT VISIT HI MDM: CPT

## 2019-06-30 PROCEDURE — 85025 COMPLETE CBC W/AUTO DIFF WBC: CPT

## 2019-06-30 PROCEDURE — 93005 ELECTROCARDIOGRAM TRACING: CPT

## 2019-06-30 PROCEDURE — 80053 COMPREHEN METABOLIC PANEL: CPT

## 2019-06-30 PROCEDURE — 81001 URINALYSIS AUTO W/SCOPE: CPT

## 2019-06-30 PROCEDURE — 96361 HYDRATE IV INFUSION ADD-ON: CPT

## 2019-06-30 PROCEDURE — 84484 ASSAY OF TROPONIN QUANT: CPT

## 2019-06-30 RX ORDER — MECLIZINE HYDROCHLORIDE 25 MG/1
25 TABLET ORAL
Qty: 15 TAB | Refills: 0 | Status: SHIPPED | OUTPATIENT
Start: 2019-06-30 | End: 2019-07-05

## 2019-06-30 RX ORDER — MECLIZINE HCL 12.5 MG 12.5 MG/1
25 TABLET ORAL
Status: COMPLETED | OUTPATIENT
Start: 2019-06-30 | End: 2019-06-30

## 2019-06-30 RX ORDER — HYDROCHLOROTHIAZIDE 25 MG/1
25 TABLET ORAL DAILY
COMMUNITY
End: 2020-05-14

## 2019-06-30 RX ADMIN — MECLIZINE 25 MG: 12.5 TABLET ORAL at 14:43

## 2019-06-30 RX ADMIN — SODIUM CHLORIDE 500 ML: 900 INJECTION, SOLUTION INTRAVENOUS at 10:05

## 2019-06-30 NOTE — DISCHARGE INSTRUCTIONS
Patient Education     Patient Education        Dizziness: Care Instructions  Your Care Instructions  Dizziness is the feeling of unsteadiness or fuzziness in your head. It is different than having vertigo, which is a feeling that the room is spinning or that you are moving or falling. It is also different from lightheadedness, which is the feeling that you are about to faint. It can be hard to know what causes dizziness. Some people feel dizzy when they have migraine headaches. Sometimes bouts of flu can make you feel dizzy. Some medical conditions, such as heart problems or high blood pressure, can make you feel dizzy. Many medicines can cause dizziness, including medicines for high blood pressure, pain, or anxiety. If a medicine causes your symptoms, your doctor may recommend that you stop or change the medicine. If it is a problem with your heart, you may need medicine to help your heart work better. If there is no clear reason for your symptoms, your doctor may suggest watching and waiting for a while to see if the dizziness goes away on its own. Follow-up care is a key part of your treatment and safety. Be sure to make and go to all appointments, and call your doctor if you are having problems. It's also a good idea to know your test results and keep a list of the medicines you take. How can you care for yourself at home? · If your doctor recommends or prescribes medicine, take it exactly as directed. Call your doctor if you think you are having a problem with your medicine. · Do not drive while you feel dizzy. · Try to prevent falls. Steps you can take include:  ? Using nonskid mats, adding grab bars near the tub, and using night-lights. ? Clearing your home so that walkways are free of anything you might trip on.  ? Letting family and friends know that you have been feeling dizzy. This will help them know how to help you. When should you call for help?   Call 911 anytime you think you may need emergency care. For example, call if:    · You passed out (lost consciousness).     · You have dizziness along with symptoms of a heart attack. These may include:  ? Chest pain or pressure, or a strange feeling in the chest.  ? Sweating. ? Shortness of breath. ? Nausea or vomiting. ? Pain, pressure, or a strange feeling in the back, neck, jaw, or upper belly or in one or both shoulders or arms. ? Lightheadedness or sudden weakness. ? A fast or irregular heartbeat.     · You have symptoms of a stroke. These may include:  ? Sudden numbness, tingling, weakness, or loss of movement in your face, arm, or leg, especially on only one side of your body. ? Sudden vision changes. ? Sudden trouble speaking. ? Sudden confusion or trouble understanding simple statements. ? Sudden problems with walking or balance. ? A sudden, severe headache that is different from past headaches.    Call your doctor now or seek immediate medical care if:    · You feel dizzy and have a fever, headache, or ringing in your ears.     · You have new or increased nausea and vomiting.     · Your dizziness does not go away or comes back.    Watch closely for changes in your health, and be sure to contact your doctor if:    · You do not get better as expected. Where can you learn more? Go to http://everett-ulises.info/. Enter T871 in the search box to learn more about \"Dizziness: Care Instructions. \"  Current as of: September 23, 2018  Content Version: 11.9  © 9447-5885 [employer]. Care instructions adapted under license by [employer] (which disclaims liability or warranty for this information). If you have questions about a medical condition or this instruction, always ask your healthcare professional. [place] [address] any warranty or liability for your use of this information.          Patient Education        Vertigo: Care Instructions  Your Care Instructions    Vertigo is the feeling that you or your surroundings are moving when there is no actual movement. It is often described as a feeling of spinning, whirling, falling, or tilting. Vertigo may make you vomit or feel nauseated. You may have trouble standing or walking and may lose your balance. Vertigo is often related to an inner ear problem, but it can have other more serious causes. If vertigo continues, you may need more tests to find its cause. Follow-up care is a key part of your treatment and safety. Be sure to make and go to all appointments, and call your doctor if you are having problems. It's also a good idea to know your test results and keep a list of the medicines you take. How can you care for yourself at home? · Do not lie flat on your back. Prop yourself up slightly. This may reduce the spinning feeling. Keep your eyes open. · Move slowly so that you do not fall. · If your doctor recommends medicine, take it exactly as directed. · Do not drive while you are having vertigo. Certain exercises, called Ruiz-Daroff exercises, can help decrease vertigo. To do Ruiz-Daroff exercises:  · Sit on the edge of a bed or sofa and quickly lie down on the side that causes the worst vertigo. Lie on your side with your ear down. · Stay in this position for at least 30 seconds or until the vertigo goes away. · Sit up. If this causes vertigo, wait for it to stop. · Repeat the procedure on the other side. · Repeat this 10 times. Do these exercises 2 times a day until the vertigo is gone. When should you call for help? Call 911 anytime you think you may need emergency care. For example, call if:    · You passed out (lost consciousness).     · You have symptoms of a stroke. These may include:  ? Sudden numbness, tingling, weakness, or loss of movement in your face, arm, or leg, especially on only one side of your body. ? Sudden vision changes. ? Sudden trouble speaking.   ? Sudden confusion or trouble understanding simple statements. ? Sudden problems with walking or balance. ? A sudden, severe headache that is different from past headaches.    Call your doctor now or seek immediate medical care if:    · Vertigo occurs with a fever, a headache, or ringing in your ears.     · You have new or increased nausea and vomiting.    Watch closely for changes in your health, and be sure to contact your doctor if:    · Vertigo gets worse or happens more often.     · Vertigo has not gotten better after 2 weeks. Where can you learn more? Go to http://everett-ulises.info/. Enter L466 in the search box to learn more about \"Vertigo: Care Instructions. \"  Current as of: March 27, 2018  Content Version: 11.9  © 2208-3315 [employer]. Care instructions adapted under license by [employer] (which disclaims liability or warranty for this information). If you have questions about a medical condition or this instruction, always ask your healthcare professional. [place] [address] any warranty or liability for your use of this information.

## 2019-06-30 NOTE — ED PROVIDER NOTES
EMERGENCY DEPARTMENT HISTORY AND PHYSICAL EXAM      Date: 6/30/2019  Patient Name: Gracie Abdi  Patient Age and Sex: 80 y.o. female    History of Presenting Illness     Chief Complaint   Patient presents with    Shortness of Breath     pt reports dizziness and shortness of breath beginning Thursday, worse today    Dizziness       History Provided By: Patient    HPI: Gracie Abdi, 80 y.o. female with PMHx significant for an SBO in March, which imiproved with bowel rest,  scoliosis and arthritis s/p extensive thoracolumbar fusion 2002 with broken hardware, neuropathy, persistent hypotension, afib, anemia, GERD and gastroparesis, presents to the ED with SOB and dizziness, both of which began on Thursday but were worse today. Past Medical History:   Diagnosis Date    Arthritis     Diarrhea 6/6/2016    Foot drop     Gastric ulcer 6/14/2012    Gastroparesis     GERD (gastroesophageal reflux disease)     High cholesterol     Hypertension     Neuropathy     Ovarian cancer ([facility] [address] 1986    chemo x 9 mos    Scoliosis     Stroke ([facility] [address] 2002    ? stroke with drop feet, per patient CT scans were negative     Past Surgical History:   Procedure Laterality Date    FLEXIBLE SIGMOIDOSCOPY N/A 6/6/2016    SIGMOIDOSCOPY FLEXIBLE performed by Ynes Gold MD at [facility] ENDOSCOPY    HX ORTHOPAEDIC      back    HX ORTHOPAEDIC      bilateral shoulder replacements    HX ORTHOPAEDIC      left knee replacement    HX LYDIA AND BSO  1986    NC EGD TRANSORAL BIOPSY SINGLE/MULTIPLE  1/26/2012         NC EGD TRANSORAL BIOPSY SINGLE/MULTIPLE  6/14/2012            Pt denies any other alleviating or exacerbating factors. Additionally, pt specifically denies any recent fever, chills, headache, nausea, vomiting, abdominal pain, CP, SOB, lightheadedness, dizziness, numbness, weakness, BLE swelling, heart palpitations, urinary sxs, diarrhea, constipation, melena, hematochezia, cough, or congestion.      PCP: Tyler Rodgers, MD    There are no other complaints, changes or physical findings at this time. Past History   Past Medical History:  Past Medical History:   Diagnosis Date    Arthritis     Diarrhea 6/6/2016    Foot drop     Gastric ulcer 6/14/2012    Gastroparesis     GERD (gastroesophageal reflux disease)     High cholesterol     Hypertension     Neuropathy     Ovar[facility] [address] ([facility] [address] 1986    chemo x 9 mos    Scoliosis     Stroke ([facility] [address] 2002    ? stroke with drop feet, per patient CT scans were negative       Past Surgical History:  Past Surgical History:   Procedure Laterality Date    FLEXIBLE SIGMOIDOSCOPY N/A 6/6/2016    SIGMOIDOSCOPY FLEXIBLE performed by Karin Carter MD at [facility] ENDOSCOPY    HX ORTHOPAEDIC      back    HX ORTHOPAEDIC      bilateral shoulder replacements    HX ORTHOPAEDIC      left knee replacement    HX LYDIA AND BSO  1986    MI EGD TRANSORAL BIOPSY SINGLE/MULTIPLE  1/26/2012         MI EGD TRANSORAL BIOPSY SINGLE/MULTIPLE  6/14/2012            Family History:  No family history on file. Social History:  Social History     Tobacco Use    Smoking status: Never Smoker    Smokeless tobacco: Never Used   Substance Use Topics    Alcohol use: No    Drug use: No       Allergies:  No Known Allergies    Current Medications:  No current facility-administered medications on file prior to encounter. Current Outpatient Medications on File Prior to Encounter   Medication Sig Dispense Refill    hydroCHLOROthiazide (HYDRODIURIL) 25 mg tablet Take 25 mg by mouth daily.  apixaban (ELIQUIS) 2.5 mg tablet Take 2.5 mg by mouth two (2) times a day.  melatonin 300 mcg tab Take 1.5 mg by mouth nightly. (Patient taking differently: Take 5 mg by mouth nightly. 2 hours before bedtime  OTC) 1 Tab 0    senna-docusate (PERICOLACE) 8.6-50 mg per tablet Take 1 Tab by mouth daily. 30 Tab 1    acetaminophen (TYLENOL) 325 mg tablet Take 2 Tabs by mouth three (3) times daily.  1 Tab 0    magnesium 250 mg tab Take  by mouth.  denosumab (PROLIA) 60 mg/mL injection 1 mL by SubCUTAneous route every 6 months. 1 Syringe 1    RABEprazole (ACIPHEX) 20 mg tablet Take 20 mg by mouth daily.  raNITIdine hcl 300 mg cap Take  by mouth Daily (before dinner).  gabapentin (NEURONTIN) 800 mg tablet TAKE 1 TAB BY MOUTH FOUR  TIMES DAILY AS NEEDED. 360 Tab 3    Calcium-Cholecalciferol, D3, (CALCIUM 500 + D) 500 mg(1,250mg) -400 unit tab Take 1 Tab by mouth daily. 90 Tab 1    simvastatin (ZOCOR) 40 mg tablet Take 1 Tab by mouth nightly. 90 Tab 3    cholecalciferol (VITAMIN D3) 1,000 unit tablet Take 1,000 Units by mouth daily.  multivitamin (ONE A DAY) tablet Take 1 Tab by mouth daily. Review of Systems   Review of Systems   Constitutional: Negative. Negative for appetite change, chills and fever. HENT: Negative for congestion, ear pain, rhinorrhea, sinus pain, trouble swallowing and voice change. Respiratory: Negative for cough, chest tightness, shortness of breath, wheezing and stridor. Cardiovascular: Negative for chest pain, palpitations and leg swelling. Gastrointestinal: Negative for abdominal pain, blood in stool, constipation, diarrhea, nausea and vomiting. Genitourinary: Negative for difficulty urinating, dysuria, flank pain, frequency and hematuria. Musculoskeletal: Negative for arthralgias and joint swelling. Skin: Negative. Neurological: Negative for dizziness, syncope, weakness, numbness and headaches. All other systems reviewed and are negative. Physical Exam   Physical Exam   Constitutional: She is oriented to person, place, and time. She appears well-developed and well-nourished. No distress. HENT:   Head: Atraumatic. Mouth/Throat: Oropharynx is clear and moist.   Eyes: Pupils are equal, round, and reactive to light. Conjunctivae and EOM are normal. No scleral icterus. Neck: Normal range of motion. Neck supple. No JVD present.    Cardiovascular: Normal rate, regular rhythm, normal heart sounds and intact distal pulses. Pulmonary/Chest: Effort normal and breath sounds normal. She exhibits no tenderness. Abdominal: Soft. Bowel sounds are normal. She exhibits no distension. There is no tenderness. Musculoskeletal: Normal range of motion. She exhibits no edema. Neurological: She is alert and oriented to person, place, and time. No cranial nerve deficit. Skin: Skin is warm and dry. She is not diaphoretic. Nursing note and vitals reviewed.       Diagnostic Study Results     Labs -  Recent Results (from the past 24 hour(s))   EKG, 12 LEAD, INITIAL    Collection Time: 06/30/19  9:20 AM   Result Value Ref Range    Ventricular Rate 86 BPM    Atrial Rate 86 BPM    P-R Interval 158 ms    QRS Duration 120 ms    Q-T Interval 368 ms    QTC Calculation (Bezet) 440 ms    Calculated P Axis 33 degrees    Calculated R Axis -41 degrees    Calculated T Axis 80 degrees    Diagnosis       Sinus rhythm with occasional premature ventricular complexes and premature   atrial complexes  Left axis deviation  Left ventricular hypertrophy with QRS widening and repolarization abnormality  Cannot rule out Septal infarct , age undetermined  When compared with ECG of 06-NOV-2018 09:58,  premature ventricular complexes are now present  premature atrial complexes are now present  Left bundle branch block is no longer present  Minimal criteria for Septal infarct are now present     CBC WITH AUTOMATED DIFF    Collection Time: 06/30/19  9:41 AM   Result Value Ref Range    WBC 5.6 3.6 - 11.0 K/uL    RBC 3.89 3.80 - 5.20 M/uL    HGB 8.0 (L) 11.5 - 16.0 g/dL    HCT 29.0 (L) 35.0 - 47.0 %    MCV 74.6 (L) 80.0 - 99.0 FL    MCH 20.6 (L) 26.0 - 34.0 PG    MCHC 27.6 (L) 30.0 - 36.5 g/dL    RDW 16.4 (H) 11.5 - 14.5 %    PLATELET 920 652 - 103 K/uL    MPV 10.2 8.9 - 12.9 FL    NRBC 0.0 0  WBC    ABSOLUTE NRBC 0.00 0.00 - 0.01 K/uL    NEUTROPHILS 66 32 - 75 %    LYMPHOCYTES 22 12 - 49 %    MONOCYTES 9 5 - 13 %    EOSINOPHILS 2 0 - 7 %    BASOPHILS 1 0 - 1 %    IMMATURE GRANULOCYTES 0 0.0 - 0.5 %    ABS. NEUTROPHILS 3.7 1.8 - 8.0 K/UL    ABS. LYMPHOCYTES 1.2 0.8 - 3.5 K/UL    ABS. MONOCYTES 0.5 0.0 - 1.0 K/UL    ABS. EOSINOPHILS 0.1 0.0 - 0.4 K/UL    ABS. BASOPHILS 0.1 0.0 - 0.1 K/UL    ABS. IMM. GRANS. 0.0 0.00 - 0.04 K/UL    DF AUTOMATED      RBC COMMENTS MICROCYTOSIS  1+        RBC COMMENTS HYPOCHROMIA  2+        RBC COMMENTS POLYCHROMASIA  1+       METABOLIC PANEL, COMPREHENSIVE    Collection Time: 06/30/19  9:41 AM   Result Value Ref Range    Sodium 140 136 - 145 mmol/L    Potassium 3.6 3.5 - 5.1 mmol/L    Chloride 105 97 - 108 mmol/L    CO2 31 21 - 32 mmol/L    Anion gap 4 (L) 5 - 15 mmol/L    Glucose 116 (H) 65 - 100 mg/dL    BUN 23 (H) 6 - 20 MG/DL    Creatinine 0.85 0.55 - 1.02 MG/DL    BUN/Creatinine ratio 27 (H) 12 - 20      GFR est AA >60 >60 ml/min/1.73m2    GFR est non-AA >60 >60 ml/min/1.73m2    Calcium 9.4 8.5 - 10.1 MG/DL    Bilirubin, total 0.2 0.2 - 1.0 MG/DL    ALT (SGPT) 16 12 - 78 U/L    AST (SGOT) 22 15 - 37 U/L    Alk. phosphatase 48 45 - 117 U/L    Protein, total 7.2 6.4 - 8.2 g/dL    Albumin 3.4 (L) 3.5 - 5.0 g/dL    Globulin 3.8 2.0 - 4.0 g/dL    A-G Ratio 0.9 (L) 1.1 - 2.2     CK W/ REFLX CKMB    Collection Time: 06/30/19  9:41 AM   Result Value Ref Range    CK 70 26 - 192 U/L   TROPONIN I    Collection Time: 06/30/19  9:41 AM   Result Value Ref Range    Troponin-I, Qt. <0.05 <0.05 ng/mL   SAMPLES BEING HELD    Collection Time: 06/30/19  9:41 AM   Result Value Ref Range    SAMPLES BEING HELD blue     COMMENT        Add-on orders for these samples will be processed based on acceptable specimen integrity and analyte stability, which may vary by analyte.    NT-PRO BNP    Collection Time: 06/30/19  9:41 AM   Result Value Ref Range    NT pro- <450 PG/ML   URINALYSIS W/ REFLEX CULTURE    Collection Time: 06/30/19 10:10 AM   Result Value Ref Range    Color YELLOW/STRAW      Appearance CLEAR CLEAR      Specific gravity 1.016 1.003 - 1.030      pH (UA) 7.0 5.0 - 8.0      Protein NEGATIVE  NEG mg/dL    Glucose NEGATIVE  NEG mg/dL    Ketone NEGATIVE  NEG mg/dL    Bilirubin NEGATIVE  NEG      Blood NEGATIVE  NEG      Urobilinogen 0.2 0.2 - 1.0 EU/dL    Nitrites NEGATIVE  NEG      Leukocyte Esterase NEGATIVE  NEG      WBC 0-4 0 - 4 /hpf    RBC 0-5 0 - 5 /hpf    Epithelial cells FEW FEW /lpf    Bacteria NEGATIVE  NEG /hpf    UA:UC IF INDICATED CULTURE NOT INDICATED BY UA RESULT CNI      Hyaline cast 0-2 0 - 5 /lpf       Radiologic Studies -   XR CHEST PA LAT   Final Result    impression: No acute changes, bibasilar atelectasis. CT Results  (Last 48 hours)    None        CXR Results  (Last 48 hours)               06/30/19 0957  XR CHEST PA LAT Final result    Impression:   impression: No acute changes, bibasilar atelectasis. Narrative:  Clinical indication: Weakness and dizziness. Frontal and lateral views of the chest obtained, comparison March 4. Minimal   bibasilar atelectasis once again seen with shallow inspiration. The heart size   is stable, ectasia of the thoracic aorta once again seen. Right shoulder   prosthesis. Prior back surgery. Medical Decision Making   I am the first provider for this patient. I reviewed the vital signs, available nursing notes, past medical history, past surgical history, family history and social history. Vital Signs-Reviewed the patient's vital signs. Patient Vitals for the past 24 hrs:   Temp Pulse Resp BP SpO2   06/30/19 1030  60 18 (!) 135/91 98 %   06/30/19 0928  77 19 146/84 97 %   06/30/19 0914 97.9 °F (36.6 °C) 80 18 170/63 99 %       Pulse Oximetry Analysis - 99% on RA    Cardiac Monitor:   Rate: 70 bpm  Rhythm: Normal Sinus Rhythm      ED EKG interpretation:  Rhythm: normal sinus rhythm; and regular .  Rate (approx.): 86; Axis: left axis deviation; P wave: normal; QRS interval: normal; ST/T wave: non-specific changes; Other findings: PVCs, PACs, no acute ischemic changes. This EKG was interpreted by Lance Brown M.D. Records Reviewed: Nursing Notes, Old Medical Records, Previous electrocardiograms, Previous Radiology Studies and Previous Laboratory Studies    Provider Notes (Medical Decision Making):   Ms. Marissa Posadas presents to the ED today with the main concern of dizziness. She describes her symptoms as recurrent episodes of positional vertigo with symptoms-free intervals between the episodes. She is currently symptom free but afraid to look to the left because this exacerbates the symptoms. Her neuro exam is unremarkable. The symptoms she has today are similar to what she has experienced in the past. She has previously improved with meclizine, which is what I have given her today with good effect. Unlikely central cause of vertigo such as posterior mass or stroke as there are no associated red flag symptoms including diplopia, dysmetria, dysarthria, ataxia, unilateral numbness or weakness. NEURO EXAM  Mental status: A/Ox3  CN II-XII tested and intact, no nystagmus. Sensation intact to sharp/dull differentiation in all extremities. Motor: Normal tone and bulk. No abnormal movements appreciated. No pronator drift. Strength tested and 5/5 in bilateral wrist flexion/extension, elbow flexion/extension, shoulder abduction, straight leg raise, knee flexion/extension, ankle dorsiflexion/plantarflexion. Patient ambulates with a steady gait. Coordination: Finger to nose and heel to shin testing intact bilaterally. Reflexes: Brachioradialis, biceps, and patellar reflexes WNL and symmetric bilaterally. Babinski with downgoing toes bilaterally.       Presentation of different dizziness types and differential diagnosis  Types Main differential diagnosis   Acute prolonged spontaneous dizziness/vertigo Vestibular neuritis/labyrinthitis  Stroke   Recurrent spontaneous dizziness/vertigo Vertebrobasilar transient ischemic attack  Psychogenic dizziness  Meniere's disease  Vestibular migraine   Recurrent positional vertigo Benign paroxysmal positional vertigo   Chronic dizziness and imbalance Degenerative brain disorders  Bilateral vestibulopathy  Psychogenic dizziness   Sol Rodney. Approach to dizziness in the emergency department. Clin Exp Emerg Med. 2015;2(2):7540. Published 2015 Jun 30. doi:10.68930/ceem. 15.026          ED Course:   Initial assessment performed. The patients presenting problems have been discussed, and they are in agreement with the care plan formulated and outlined with them. I have encouraged them to ask questions as they arise throughout their visit. HYPERTENSION COUNSELING   Education was provided to the patient today regarding their hypertension. Patient is made aware of their elevated blood pressure and is instructed to follow up this week with their Primary Care for a recheck. Patient is counseled regarding consequences of chronic, uncontrolled hypertension including kidney disease, heart disease, stroke or even death. Patient states their understanding and agrees to follow up this week. Additionally, during their visit, I discussed sodium restriction, maintaining ideal body weight and regular exercise program as physiologic means to achieve blood pressure control. The patient will strive towards this. I reviewed our electronic medical record system for any past medical records that were available that may contribute to the patient's current condition, the nursing notes and vital signs from today's visit. Arabella Gill MD    Medications Administered During ED Course:  Medications   sodium chloride 0.9 % bolus infusion 500 mL (0 mL IntraVENous IV Completed 6/30/19 1135)   meclizine (ANTIVERT) tablet 25 mg (25 mg Oral Given 6/30/19 1443)       Progress Note:  Patient progress:      Patient has been reassessed and reports feeling better and symptoms have improved after ED treatment.  Arvind Yuan is able to tolerate PO and ambulate per baseline. Her results have been reviewed with her. She has been counseled regarding her diagnosis. She verbally conveys understanding and agreement of the signs, symptoms, diagnosis, treatment with Meclizine and prognosis and additionally agrees to follow up as recommended with Vestibular Rehab or Neurology. She will also f/up with Dr. Cristino Hunter MD in 24 - 48 hours. She also agrees with the care-plan and conveys that all of her questions have been answered. I have also put together some discharge instructions for her that include: 1) educational information regarding their diagnosis, 2) how to care for their diagnosis at home, as well a 3) list of reasons why they would want to return to the ED prior to their follow-up appointment, should their condition change. I have answered all questions to the patient's satisfaction. Strict return precautions given. She both understood and agreed with plan as discussed above. Vital signs stable for discharge. Critical Care Time: none    Disposition: DISCHARGE     The pt is ready for discharge, feels considerably better, has normal vital signs and feels comfortable going home. I think this is reasonable as no findings today suggest a life-threatening condition. The pt's signs, symptoms, diagnosis, and discharge instructions have been discussed in detail and pt has conveyed their understanding. Written discharge instructions provided. The pt is to follow up as recommended or return to ER should their symptoms worsen. Plan has been discussed, all questions answered and pt is in agreement. Heather Lauren MD      Diagnosis     Clinical Impression:   1. Vertigo    2. Weakness        Attestation:  I personally performed the services described in this documentation on this date 6/30/2019 for patient Shruti Ramirez.   Heather Lauren MD    Please note that this dictation was completed with [employer], the computer voice recognition software. Quite often unanticipated grammatical, syntax, homophones, and other interpretive errors are inadvertently transcribed by the computer software. Please disregard these errors. Please excuse any errors that have escaped final proofreading.

## 2019-07-01 LAB
ATRIAL RATE: 86 BPM
CALCULATED P AXIS, ECG09: 33 DEGREES
CALCULATED R AXIS, ECG10: -41 DEGREES
CALCULATED T AXIS, ECG11: 80 DEGREES
DIAGNOSIS, 93000: NORMAL
P-R INTERVAL, ECG05: 158 MS
Q-T INTERVAL, ECG07: 368 MS
QRS DURATION, ECG06: 120 MS
QTC CALCULATION (BEZET), ECG08: 440 MS
VENTRICULAR RATE, ECG03: 86 BPM

## 2019-08-01 LAB
ALBUMIN SERPL-MCNC: 4 G/DL (ref 3.5–4.7)
ALBUMIN/GLOB SERPL: 1.5 {RATIO} (ref 1.2–2.2)
ALP SERPL-CCNC: 42 IU/L (ref 39–117)
ALT SERPL-CCNC: 9 IU/L (ref 0–32)
AST SERPL-CCNC: 14 IU/L (ref 0–40)
BASOPHILS # BLD AUTO: 0 X10E3/UL (ref 0–0.2)
BASOPHILS NFR BLD AUTO: 1 %
BILIRUB SERPL-MCNC: 0.3 MG/DL (ref 0–1.2)
BUN SERPL-MCNC: 17 MG/DL (ref 8–27)
BUN/CREAT SERPL: 20 (ref 12–28)
CALCIUM SERPL-MCNC: 10.1 MG/DL (ref 8.7–10.3)
CHLORIDE SERPL-SCNC: 102 MMOL/L (ref 96–106)
CO2 SERPL-SCNC: 26 MMOL/L (ref 20–29)
CREAT SERPL-MCNC: 0.86 MG/DL (ref 0.57–1)
EOSINOPHIL # BLD AUTO: 0.2 X10E3/UL (ref 0–0.4)
EOSINOPHIL NFR BLD AUTO: 3 %
ERYTHROCYTE [DISTWIDTH] IN BLOOD BY AUTOMATED COUNT: 16.4 % (ref 12.3–15.4)
GLOBULIN SER CALC-MCNC: 2.6 G/DL (ref 1.5–4.5)
GLUCOSE SERPL-MCNC: 95 MG/DL (ref 65–99)
HCT VFR BLD AUTO: 26.3 % (ref 34–46.6)
HGB BLD-MCNC: 7.9 G/DL (ref 11.1–15.9)
IMM GRANULOCYTES # BLD AUTO: 0 X10E3/UL (ref 0–0.1)
IMM GRANULOCYTES NFR BLD AUTO: 0 %
LYMPHOCYTES # BLD AUTO: 1.4 X10E3/UL (ref 0.7–3.1)
LYMPHOCYTES NFR BLD AUTO: 23 %
MCH RBC QN AUTO: 21.5 PG (ref 26.6–33)
MCHC RBC AUTO-ENTMCNC: 30 G/DL (ref 31.5–35.7)
MCV RBC AUTO: 72 FL (ref 79–97)
MONOCYTES # BLD AUTO: 0.5 X10E3/UL (ref 0.1–0.9)
MONOCYTES NFR BLD AUTO: 8 %
NEUTROPHILS # BLD AUTO: 3.9 X10E3/UL (ref 1.4–7)
NEUTROPHILS NFR BLD AUTO: 65 %
PLATELET # BLD AUTO: 297 X10E3/UL (ref 150–450)
POTASSIUM SERPL-SCNC: 4.4 MMOL/L (ref 3.5–5.2)
PROT SERPL-MCNC: 6.6 G/DL (ref 6–8.5)
RBC # BLD AUTO: 3.67 X10E6/UL (ref 3.77–5.28)
SODIUM SERPL-SCNC: 141 MMOL/L (ref 134–144)
T3 SERPL-MCNC: 98 NG/DL (ref 71–180)
T4 SERPL-MCNC: 6.3 UG/DL (ref 4.5–12)
TSH SERPL DL<=0.005 MIU/L-ACNC: 3.23 UIU/ML (ref 0.45–4.5)
WBC # BLD AUTO: 6 X10E3/UL (ref 3.4–10.8)

## 2020-05-14 RX ORDER — HYDROCHLOROTHIAZIDE 25 MG/1
TABLET ORAL
Qty: 90 TAB | Refills: 5 | Status: SHIPPED | OUTPATIENT
Start: 2020-05-14 | End: 2020-11-05

## 2020-07-01 ENCOUNTER — APPOINTMENT (OUTPATIENT)
Dept: GENERAL RADIOLOGY | Age: 85
DRG: 381 | End: 2020-07-01
Attending: PHYSICIAN ASSISTANT
Payer: MEDICARE

## 2020-07-01 ENCOUNTER — HOSPITAL ENCOUNTER (INPATIENT)
Age: 85
LOS: 3 days | Discharge: HOME HEALTH | DRG: 381 | End: 2020-07-04
Attending: EMERGENCY MEDICINE | Admitting: INTERNAL MEDICINE
Payer: MEDICARE

## 2020-07-01 ENCOUNTER — APPOINTMENT (OUTPATIENT)
Dept: CT IMAGING | Age: 85
DRG: 381 | End: 2020-07-01
Attending: EMERGENCY MEDICINE
Payer: MEDICARE

## 2020-07-01 DIAGNOSIS — K92.2 UPPER GI BLEED: Primary | ICD-10-CM

## 2020-07-01 DIAGNOSIS — D50.0 ANEMIA, BLOOD LOSS: ICD-10-CM

## 2020-07-01 DIAGNOSIS — I48.91 ATRIAL FIBRILLATION WITH RAPID VENTRICULAR RESPONSE (HCC): ICD-10-CM

## 2020-07-01 LAB
ALBUMIN SERPL-MCNC: 3.4 G/DL (ref 3.5–5)
ALBUMIN/GLOB SERPL: 0.9 {RATIO} (ref 1.1–2.2)
ALP SERPL-CCNC: 49 U/L (ref 45–117)
ALT SERPL-CCNC: 15 U/L (ref 12–78)
ANION GAP SERPL CALC-SCNC: 8 MMOL/L (ref 5–15)
APPEARANCE UR: CLEAR
AST SERPL-CCNC: 13 U/L (ref 15–37)
ATRIAL RATE: 163 BPM
BACTERIA URNS QL MICRO: NEGATIVE /HPF
BASOPHILS # BLD: 0 K/UL (ref 0–0.1)
BASOPHILS NFR BLD: 0 % (ref 0–1)
BILIRUB SERPL-MCNC: 0.4 MG/DL (ref 0.2–1)
BILIRUB UR QL: NEGATIVE
BUN SERPL-MCNC: 29 MG/DL (ref 6–20)
BUN/CREAT SERPL: 38 (ref 12–20)
CALCIUM SERPL-MCNC: 8.9 MG/DL (ref 8.5–10.1)
CALCULATED R AXIS, ECG10: -42 DEGREES
CALCULATED T AXIS, ECG11: 116 DEGREES
CHLORIDE SERPL-SCNC: 102 MMOL/L (ref 97–108)
CO2 SERPL-SCNC: 27 MMOL/L (ref 21–32)
COLOR UR: ABNORMAL
CREAT SERPL-MCNC: 0.77 MG/DL (ref 0.55–1.02)
DIAGNOSIS, 93000: NORMAL
DIFFERENTIAL METHOD BLD: ABNORMAL
EOSINOPHIL # BLD: 0 K/UL (ref 0–0.4)
EOSINOPHIL NFR BLD: 0 % (ref 0–7)
EPITH CASTS URNS QL MICRO: ABNORMAL /LPF
ERYTHROCYTE [DISTWIDTH] IN BLOOD BY AUTOMATED COUNT: 18.6 % (ref 11.5–14.5)
GASTROCULT GAST QL: POSITIVE
GLOBULIN SER CALC-MCNC: 3.8 G/DL (ref 2–4)
GLUCOSE SERPL-MCNC: 142 MG/DL (ref 65–100)
GLUCOSE UR STRIP.AUTO-MCNC: NEGATIVE MG/DL
HCT VFR BLD AUTO: 26.9 % (ref 35–47)
HCT VFR BLD AUTO: 30.7 % (ref 35–47)
HGB BLD-MCNC: 7.2 G/DL (ref 11.5–16)
HGB BLD-MCNC: 8.6 G/DL (ref 11.5–16)
HGB UR QL STRIP: NEGATIVE
HYALINE CASTS URNS QL MICRO: ABNORMAL /LPF (ref 0–5)
IMM GRANULOCYTES # BLD AUTO: 0.1 K/UL (ref 0–0.04)
IMM GRANULOCYTES NFR BLD AUTO: 1 % (ref 0–0.5)
INR PPP: 1.1 (ref 0.9–1.1)
KETONES UR QL STRIP.AUTO: 15 MG/DL
LEUKOCYTE ESTERASE UR QL STRIP.AUTO: NEGATIVE
LIPASE SERPL-CCNC: 130 U/L (ref 73–393)
LYMPHOCYTES # BLD: 0.7 K/UL (ref 0.8–3.5)
LYMPHOCYTES NFR BLD: 5 % (ref 12–49)
MCH RBC QN AUTO: 17.9 PG (ref 26–34)
MCHC RBC AUTO-ENTMCNC: 26.8 G/DL (ref 30–36.5)
MCV RBC AUTO: 66.7 FL (ref 80–99)
MONOCYTES # BLD: 0.6 K/UL (ref 0–1)
MONOCYTES NFR BLD: 4 % (ref 5–13)
NEUTS SEG # BLD: 13.3 K/UL (ref 1.8–8)
NEUTS SEG NFR BLD: 90 % (ref 32–75)
NITRITE UR QL STRIP.AUTO: NEGATIVE
NRBC # BLD: 0 K/UL (ref 0–0.01)
NRBC BLD-RTO: 0 PER 100 WBC
PH GAST: 4 [PH] (ref 1.5–3.5)
PH UR STRIP: 7.5 [PH] (ref 5–8)
PLATELET # BLD AUTO: 316 K/UL (ref 150–400)
PMV BLD AUTO: 10 FL (ref 8.9–12.9)
POTASSIUM SERPL-SCNC: 2.8 MMOL/L (ref 3.5–5.1)
PROT SERPL-MCNC: 7.2 G/DL (ref 6.4–8.2)
PROT UR STRIP-MCNC: NEGATIVE MG/DL
PROTHROMBIN TIME: 11.4 SEC (ref 9–11.1)
Q-T INTERVAL, ECG07: 364 MS
QRS DURATION, ECG06: 130 MS
QTC CALCULATION (BEZET), ECG08: 557 MS
RBC # BLD AUTO: 4.03 M/UL (ref 3.8–5.2)
RBC #/AREA URNS HPF: ABNORMAL /HPF (ref 0–5)
RBC MORPH BLD: ABNORMAL
SODIUM SERPL-SCNC: 137 MMOL/L (ref 136–145)
SP GR UR REFRACTOMETRY: 1.01 (ref 1–1.03)
TROPONIN I SERPL-MCNC: <0.05 NG/ML
UA: UC IF INDICATED,UAUC: ABNORMAL
UROBILINOGEN UR QL STRIP.AUTO: 0.2 EU/DL (ref 0.2–1)
VENTRICULAR RATE, ECG03: 141 BPM
WBC # BLD AUTO: 14.7 K/UL (ref 3.6–11)
WBC URNS QL MICRO: ABNORMAL /HPF (ref 0–4)

## 2020-07-01 PROCEDURE — 84484 ASSAY OF TROPONIN QUANT: CPT

## 2020-07-01 PROCEDURE — 74011250636 HC RX REV CODE- 250/636: Performed by: EMERGENCY MEDICINE

## 2020-07-01 PROCEDURE — 77030040361 HC SLV COMPR DVT MDII -B

## 2020-07-01 PROCEDURE — 86900 BLOOD TYPING SEROLOGIC ABO: CPT

## 2020-07-01 PROCEDURE — 96374 THER/PROPH/DIAG INJ IV PUSH: CPT

## 2020-07-01 PROCEDURE — 99285 EMERGENCY DEPT VISIT HI MDM: CPT

## 2020-07-01 PROCEDURE — 81001 URINALYSIS AUTO W/SCOPE: CPT

## 2020-07-01 PROCEDURE — 74018 RADEX ABDOMEN 1 VIEW: CPT

## 2020-07-01 PROCEDURE — C9113 INJ PANTOPRAZOLE SODIUM, VIA: HCPCS | Performed by: INTERNAL MEDICINE

## 2020-07-01 PROCEDURE — 74178 CT ABD&PLV WO CNTR FLWD CNTR: CPT

## 2020-07-01 PROCEDURE — C9113 INJ PANTOPRAZOLE SODIUM, VIA: HCPCS | Performed by: PHYSICIAN ASSISTANT

## 2020-07-01 PROCEDURE — 85025 COMPLETE CBC W/AUTO DIFF WBC: CPT

## 2020-07-01 PROCEDURE — 86923 COMPATIBILITY TEST ELECTRIC: CPT

## 2020-07-01 PROCEDURE — 36430 TRANSFUSION BLD/BLD COMPNT: CPT

## 2020-07-01 PROCEDURE — 80053 COMPREHEN METABOLIC PANEL: CPT

## 2020-07-01 PROCEDURE — 74011250636 HC RX REV CODE- 250/636: Performed by: INTERNAL MEDICINE

## 2020-07-01 PROCEDURE — 85018 HEMOGLOBIN: CPT

## 2020-07-01 PROCEDURE — 36415 COLL VENOUS BLD VENIPUNCTURE: CPT

## 2020-07-01 PROCEDURE — 74011250636 HC RX REV CODE- 250/636

## 2020-07-01 PROCEDURE — 74011250636 HC RX REV CODE- 250/636: Performed by: PHYSICIAN ASSISTANT

## 2020-07-01 PROCEDURE — 74011000258 HC RX REV CODE- 258: Performed by: INTERNAL MEDICINE

## 2020-07-01 PROCEDURE — 65660000001 HC RM ICU INTERMED STEPDOWN

## 2020-07-01 PROCEDURE — 85610 PROTHROMBIN TIME: CPT

## 2020-07-01 PROCEDURE — 74011000250 HC RX REV CODE- 250: Performed by: PHYSICIAN ASSISTANT

## 2020-07-01 PROCEDURE — 96376 TX/PRO/DX INJ SAME DRUG ADON: CPT

## 2020-07-01 PROCEDURE — 83690 ASSAY OF LIPASE: CPT

## 2020-07-01 PROCEDURE — 96375 TX/PRO/DX INJ NEW DRUG ADDON: CPT

## 2020-07-01 PROCEDURE — 82271 OCCULT BLOOD OTHER SOURCES: CPT

## 2020-07-01 PROCEDURE — C9113 INJ PANTOPRAZOLE SODIUM, VIA: HCPCS | Performed by: EMERGENCY MEDICINE

## 2020-07-01 PROCEDURE — P9016 RBC LEUKOCYTES REDUCED: HCPCS

## 2020-07-01 PROCEDURE — 93005 ELECTROCARDIOGRAM TRACING: CPT

## 2020-07-01 PROCEDURE — 74011000250 HC RX REV CODE- 250: Performed by: INTERNAL MEDICINE

## 2020-07-01 PROCEDURE — 74011636320 HC RX REV CODE- 636/320: Performed by: EMERGENCY MEDICINE

## 2020-07-01 RX ORDER — OXYCODONE AND ACETAMINOPHEN 5; 325 MG/1; MG/1
1 TABLET ORAL
COMMUNITY
End: 2022-08-12

## 2020-07-01 RX ORDER — MORPHINE SULFATE 2 MG/ML
1 INJECTION, SOLUTION INTRAMUSCULAR; INTRAVENOUS
Status: DISCONTINUED | OUTPATIENT
Start: 2020-07-01 | End: 2020-07-04 | Stop reason: HOSPADM

## 2020-07-01 RX ORDER — POTASSIUM CHLORIDE 7.45 MG/ML
10 INJECTION INTRAVENOUS
Status: COMPLETED | OUTPATIENT
Start: 2020-07-01 | End: 2020-07-01

## 2020-07-01 RX ORDER — SODIUM CHLORIDE 9 MG/ML
125 INJECTION, SOLUTION INTRAVENOUS CONTINUOUS
Status: DISCONTINUED | OUTPATIENT
Start: 2020-07-01 | End: 2020-07-01

## 2020-07-01 RX ORDER — ONDANSETRON 2 MG/ML
INJECTION INTRAMUSCULAR; INTRAVENOUS
Status: COMPLETED
Start: 2020-07-01 | End: 2020-07-01

## 2020-07-01 RX ORDER — SODIUM CHLORIDE 0.9 % (FLUSH) 0.9 %
5-40 SYRINGE (ML) INJECTION AS NEEDED
Status: DISCONTINUED | OUTPATIENT
Start: 2020-07-01 | End: 2020-07-04 | Stop reason: HOSPADM

## 2020-07-01 RX ORDER — ONDANSETRON 2 MG/ML
4 INJECTION INTRAMUSCULAR; INTRAVENOUS
Status: DISCONTINUED | OUTPATIENT
Start: 2020-07-01 | End: 2020-07-04 | Stop reason: HOSPADM

## 2020-07-01 RX ORDER — FAMOTIDINE 20 MG/1
20 TABLET, FILM COATED ORAL DAILY
COMMUNITY

## 2020-07-01 RX ORDER — LANOLIN ALCOHOL/MO/W.PET/CERES
250 CREAM (GRAM) TOPICAL DAILY
COMMUNITY

## 2020-07-01 RX ORDER — SODIUM CHLORIDE 0.9 % (FLUSH) 0.9 %
10 SYRINGE (ML) INJECTION
Status: COMPLETED | OUTPATIENT
Start: 2020-07-01 | End: 2020-07-01

## 2020-07-01 RX ORDER — LORAZEPAM 2 MG/ML
0.3 INJECTION INTRAMUSCULAR
Status: DISCONTINUED | OUTPATIENT
Start: 2020-07-01 | End: 2020-07-04 | Stop reason: HOSPADM

## 2020-07-01 RX ORDER — METOCLOPRAMIDE HYDROCHLORIDE 5 MG/ML
10 INJECTION INTRAMUSCULAR; INTRAVENOUS
Status: COMPLETED | OUTPATIENT
Start: 2020-07-01 | End: 2020-07-01

## 2020-07-01 RX ORDER — DEXTROSE MONOHYDRATE AND SODIUM CHLORIDE 5; .9 G/100ML; G/100ML
50 INJECTION, SOLUTION INTRAVENOUS CONTINUOUS
Status: DISCONTINUED | OUTPATIENT
Start: 2020-07-01 | End: 2020-07-04

## 2020-07-01 RX ORDER — SODIUM CHLORIDE 0.9 % (FLUSH) 0.9 %
5-40 SYRINGE (ML) INJECTION EVERY 8 HOURS
Status: DISCONTINUED | OUTPATIENT
Start: 2020-07-01 | End: 2020-07-04 | Stop reason: HOSPADM

## 2020-07-01 RX ORDER — ONDANSETRON 2 MG/ML
4 INJECTION INTRAMUSCULAR; INTRAVENOUS
Status: COMPLETED | OUTPATIENT
Start: 2020-07-01 | End: 2020-07-01

## 2020-07-01 RX ORDER — PANTOPRAZOLE SODIUM 40 MG/10ML
40 INJECTION, POWDER, LYOPHILIZED, FOR SOLUTION INTRAVENOUS
Status: COMPLETED | OUTPATIENT
Start: 2020-07-01 | End: 2020-07-01

## 2020-07-01 RX ORDER — SODIUM CHLORIDE 9 MG/ML
250 INJECTION, SOLUTION INTRAVENOUS AS NEEDED
Status: DISCONTINUED | OUTPATIENT
Start: 2020-07-01 | End: 2020-07-04 | Stop reason: HOSPADM

## 2020-07-01 RX ADMIN — SODIUM CHLORIDE 40 MG: 9 INJECTION, SOLUTION INTRAMUSCULAR; INTRAVENOUS; SUBCUTANEOUS at 22:35

## 2020-07-01 RX ADMIN — DEXTROSE MONOHYDRATE AND SODIUM CHLORIDE 100 ML/HR: 5; .9 INJECTION, SOLUTION INTRAVENOUS at 22:44

## 2020-07-01 RX ADMIN — SODIUM CHLORIDE 125 ML/HR: 900 INJECTION, SOLUTION INTRAVENOUS at 16:33

## 2020-07-01 RX ADMIN — POTASSIUM CHLORIDE 10 MEQ: 7.46 INJECTION, SOLUTION INTRAVENOUS at 18:12

## 2020-07-01 RX ADMIN — IOPAMIDOL 100 ML: 755 INJECTION, SOLUTION INTRAVENOUS at 14:47

## 2020-07-01 RX ADMIN — SODIUM CHLORIDE 40 MG: 9 INJECTION, SOLUTION INTRAMUSCULAR; INTRAVENOUS; SUBCUTANEOUS at 16:31

## 2020-07-01 RX ADMIN — Medication 10 ML: at 22:37

## 2020-07-01 RX ADMIN — Medication 10 ML: at 14:04

## 2020-07-01 RX ADMIN — ONDANSETRON 4 MG: 2 INJECTION INTRAMUSCULAR; INTRAVENOUS at 12:17

## 2020-07-01 RX ADMIN — POTASSIUM CHLORIDE 10 MEQ: 7.46 INJECTION, SOLUTION INTRAVENOUS at 16:31

## 2020-07-01 RX ADMIN — PANTOPRAZOLE SODIUM 40 MG: 40 INJECTION, POWDER, FOR SOLUTION INTRAVENOUS at 13:09

## 2020-07-01 RX ADMIN — SODIUM CHLORIDE 1000 ML: 900 INJECTION, SOLUTION INTRAVENOUS at 14:03

## 2020-07-01 RX ADMIN — METOCLOPRAMIDE 10 MG: 5 INJECTION, SOLUTION INTRAMUSCULAR; INTRAVENOUS at 13:09

## 2020-07-01 RX ADMIN — LORAZEPAM 0.3 MG: 2 INJECTION INTRAMUSCULAR; INTRAVENOUS at 22:40

## 2020-07-01 NOTE — ED PROVIDER NOTES
EMERGENCY DEPARTMENT HISTORY AND PHYSICAL EXAM      Date: 7/1/2020  Patient Name: Noel Ibrahim  Patient Age and Sex: 80 y.o. female     History of Presenting Illness     Chief Complaint   Patient presents with    Vomiting     n/v/d c/o back pain. History Provided By: Patient    HPI: Noel Ibrahim is an 80-year-old female with a history of atrial fibrillation on Eliquis presenting for nausea and vomiting. Patient states that this morning she started having sudden onset of nausea and vomiting. Patient states that she did also take some MiraLAX yesterday and thinks that this all began because of it. States she has been a little constipated and took the MiraLAX and then today began having diarrhea with the nausea and vomiting. Patient states that the vomitus has been dark in nature. Has been taking her Eliquis including this morning. Patient denies any history of GI bleeds in the past.  EMS reported that she was very tachycardic and had slightly low blood pressures in route and looked pale so they started some fluids on her. Noted to be in A. fib with RVR on their monitor. Coffee-ground emesis was also noted. Patient denies any melena, hematochezia, abdominal pain. There are no other complaints, changes, or physical findings at this time. PCP: Shaila Medrano NP    No current facility-administered medications on file prior to encounter. Current Outpatient Medications on File Prior to Encounter   Medication Sig Dispense Refill    magnesium oxide (MAG-OX) 400 mg tablet Take 400 mg by mouth daily.  famotidine (PEPCID) 20 mg tablet Take 20 mg by mouth daily.  oxyCODONE-acetaminophen (Percocet) 5-325 mg per tablet Take 1 Tab by mouth every eight (8) hours as needed for Pain.  hydroCHLOROthiazide (HYDRODIURIL) 25 mg tablet TAKE 1 TABLET BY MOUTH  DAILY 90 Tab 5    apixaban (ELIQUIS) 2.5 mg tablet Take 2.5 mg by mouth two (2) times a day.       melatonin 300 mcg tab Take 1.5 mg by mouth nightly. (Patient taking differently: Take 5 mg by mouth nightly. 2 hours before bedtime  OTC) 1 Tab 0    RABEprazole (ACIPHEX) 20 mg tablet Take 20 mg by mouth daily.  gabapentin (NEURONTIN) 800 mg tablet TAKE 1 TAB BY MOUTH FOUR  TIMES DAILY AS NEEDED. 360 Tab 3    simvastatin (ZOCOR) 40 mg tablet Take 1 Tab by mouth nightly. 90 Tab 3    cholecalciferol (VITAMIN D3) 1,000 unit tablet Take 1,000 Units by mouth daily.  multivitamin (ONE A DAY) tablet Take 1 Tab by mouth daily.  [DISCONTINUED] senna-docusate (PERICOLACE) 8.6-50 mg per tablet Take 1 Tab by mouth daily. 30 Tab 1    [DISCONTINUED] acetaminophen (TYLENOL) 325 mg tablet Take 2 Tabs by mouth three (3) times daily. 1 Tab 0    [DISCONTINUED] magnesium 250 mg tab Take  by mouth.  [DISCONTINUED] denosumab (PROLIA) 60 mg/mL injection 1 mL by SubCUTAneous route every 6 months. 1 Syringe 1    [DISCONTINUED] raNITIdine hcl 300 mg cap Take  by mouth Daily (before dinner).  [DISCONTINUED] Calcium-Cholecalciferol, D3, (CALCIUM 500 + D) 500 mg(1,250mg) -400 unit tab Take 1 Tab by mouth daily.  80 Tab 1       Past History     Past Medical History:  Past Medical History:   Diagnosis Date    Arthritis     Diarrhea 6/6/2016    Foot drop     Gastric ulcer 6/14/2012    Gastroparesis     GERD (gastroesophageal reflux disease)     High cholesterol     Hypertension     Neuropathy     Ovarian cancer ([address] 1986    chemo x 9 mos    Scoliosis     Stroke ([address] [address] [address] 2002    ? stroke with drop feet, per patient CT scans were negative       Past Surgical History:  Past Surgical History:   Procedure Laterality Date    FLEXIBLE SIGMOIDOSCOPY N/A 6/6/2016    SIGMOIDOSCOPY FLEXIBLE performed by [facility]atu Smith [facility] at [facility] ENDOSCOPY    HX ORTHOPAEDIC      back    HX ORTHOPAEDIC      bilateral shoulder replacements    HX ORTHOPAEDIC      left knee replacement    HX LYDIA AND BSO  1986    CT EGD TRANSORAL BIOPSY SINGLE/MULTIPLE  1/26/2012         MD EGD TRANSORAL BIOPSY SINGLE/MULTIPLE  6/14/2012            Family History:  History reviewed. No pertinent family history. Social History:  Social History     Tobacco Use    Smoking status: Never Smoker    Smokeless tobacco: Never Used   Substance Use Topics    Alcohol use: No    Drug use: No       Allergies:  No Known Allergies      Review of Systems   Review of Systems   Constitutional: Negative for chills and fever. Respiratory: Negative for cough and shortness of breath. Cardiovascular: Negative for chest pain. Gastrointestinal: Positive for abdominal distention, constipation, diarrhea, nausea and vomiting. Negative for abdominal pain. Genitourinary: Negative for dysuria, frequency and hematuria. Neurological: Negative for weakness and numbness. All other systems reviewed and are negative. Physical Exam   Physical Exam  Vitals signs and nursing note reviewed. Constitutional:       General: She is in acute distress. Appearance: She is well-developed. Comments: Patient holding emesis bag near her mouth. Dark brown coffee-ground emesis around her mouth. HENT:      Head: Normocephalic and atraumatic. Eyes:      Conjunctiva/sclera: Conjunctivae normal.   Neck:      Musculoskeletal: Normal range of motion and neck supple. Cardiovascular:      Rate and Rhythm: Tachycardia present. Rhythm irregular. Pulmonary:      Effort: Pulmonary effort is normal. No respiratory distress. Breath sounds: Normal breath sounds. Abdominal:      General: There is distension. Palpations: Abdomen is soft. Tenderness: There is no abdominal tenderness. Comments: Mild abdominal distention and patient states that it just feels bloated. No tenderness   Musculoskeletal: Normal range of motion. Skin:     General: Skin is warm and dry. Coloration: Skin is pale. Comments: Patient appears very pale   Neurological:      General: No focal deficit present.       Mental Status: She is alert and oriented to person, place, and time. Mental status is at baseline. Psychiatric:         Mood and Affect: Mood normal.          Diagnostic Study Results     Labs -     Recent Results (from the past 12 hour(s))   EKG, 12 LEAD, INITIAL    Collection Time: 07/01/20 12:31 PM   Result Value Ref Range    Ventricular Rate 141 BPM    Atrial Rate 163 BPM    QRS Duration 130 ms    Q-T Interval 364 ms    QTC Calculation (Bezet) 557 ms    Calculated R Axis -42 degrees    Calculated T Axis 116 degrees    Diagnosis       Atrial fibrillation with rapid ventricular response with premature   ventricular or aberrantly conducted complexes  Left axis deviation  Left bundle branch block  When compared with ECG of 30-JUN-2019 09:20,  Atrial fibrillation has replaced Sinus rhythm  Vent. rate has increased BY  55 BPM  Left bundle branch block is now present    Confirmed by Vishal Conn ([id number]) on 7/1/2020 6:48:16 PM     CBC WITH AUTOMATED DIFF    Collection Time: 07/01/20  1:10 PM   Result Value Ref Range    WBC 14.7 (H) 3.6 - 11.0 K/uL    RBC 4.03 3.80 - 5.20 M/uL    HGB 7.2 (L) 11.5 - 16.0 g/dL    HCT 26.9 (L) 35.0 - 47.0 %    MCV 66.7 (L) 80.0 - 99.0 FL    MCH 17.9 (L) 26.0 - 34.0 PG    MCHC 26.8 (L) 30.0 - 36.5 g/dL    RDW 18.6 (H) 11.5 - 14.5 %    PLATELET 461 135 - 625 K/uL    MPV 10.0 8.9 - 12.9 FL    NRBC 0.0 0  WBC    ABSOLUTE NRBC 0.00 0.00 - 0.01 K/uL    NEUTROPHILS 90 (H) 32 - 75 %    LYMPHOCYTES 5 (L) 12 - 49 %    MONOCYTES 4 (L) 5 - 13 %    EOSINOPHILS 0 0 - 7 %    BASOPHILS 0 0 - 1 %    IMMATURE GRANULOCYTES 1 (H) 0.0 - 0.5 %    ABS. NEUTROPHILS 13.3 (H) 1.8 - 8.0 K/UL    ABS. LYMPHOCYTES 0.7 (L) 0.8 - 3.5 K/UL    ABS. MONOCYTES 0.6 0.0 - 1.0 K/UL    ABS. EOSINOPHILS 0.0 0.0 - 0.4 K/UL    ABS. BASOPHILS 0.0 0.0 - 0.1 K/UL    ABS. IMM.  GRANS. 0.1 (H) 0.00 - 0.04 K/UL    DF SMEAR SCANNED      RBC COMMENTS ANISOCYTOSIS  1+        RBC COMMENTS MICROCYTOSIS  2+        RBC COMMENTS HYPOCHROMIA  3+        RBC COMMENTS OVALOCYTES  PRESENT       METABOLIC PANEL, COMPREHENSIVE    Collection Time: 07/01/20  1:10 PM   Result Value Ref Range    Sodium 137 136 - 145 mmol/L    Potassium 2.8 (L) 3.5 - 5.1 mmol/L    Chloride 102 97 - 108 mmol/L    CO2 27 21 - 32 mmol/L    Anion gap 8 5 - 15 mmol/L    Glucose 142 (H) 65 - 100 mg/dL    BUN 29 (H) 6 - 20 MG/DL    Creatinine 0.77 0.55 - 1.02 MG/DL    BUN/Creatinine ratio 38 (H) 12 - 20      GFR est AA >60 >60 ml/min/1.73m2    GFR est non-AA >60 >60 ml/min/1.73m2    Calcium 8.9 8.5 - 10.1 MG/DL    Bilirubin, total 0.4 0.2 - 1.0 MG/DL    ALT (SGPT) 15 12 - 78 U/L    AST (SGOT) 13 (L) 15 - 37 U/L    Alk.  phosphatase 49 45 - 117 U/L    Protein, total 7.2 6.4 - 8.2 g/dL    Albumin 3.4 (L) 3.5 - 5.0 g/dL    Globulin 3.8 2.0 - 4.0 g/dL    A-G Ratio 0.9 (L) 1.1 - 2.2     LIPASE    Collection Time: 07/01/20  1:10 PM   Result Value Ref Range    Lipase 130 73 - 393 U/L   PROTHROMBIN TIME + INR    Collection Time: 07/01/20  1:10 PM   Result Value Ref Range    INR 1.1 0.9 - 1.1      Prothrombin time 11.4 (H) 9.0 - 11.1 sec   TYPE & SCREEN    Collection Time: 07/01/20  1:10 PM   Result Value Ref Range    Crossmatch Expiration 07/04/2020     ABO/Rh(D) B POSITIVE     Antibody screen NEG     Unit number [id number]     Blood component type RC LR     Unit division 00     Status of unit ISSUED     Crossmatch result Compatible    TROPONIN I    Collection Time: 07/01/20  1:10 PM   Result Value Ref Range    Troponin-I, Qt. <0.05 <0.05 ng/mL   OCCULT BLOOD, GASTRIC    Collection Time: 07/01/20  1:18 PM   Result Value Ref Range    OCCULT BLOOD,GASTRIC Positive (A) NEG      pH,GASTRIC 4.0 (H) 1.5 - 3.5     URINALYSIS W/ REFLEX CULTURE    Collection Time: 07/01/20  2:28 PM   Result Value Ref Range    Color YELLOW/STRAW      Appearance CLEAR CLEAR      Specific gravity 1.012 1.003 - 1.030      pH (UA) 7.5 5.0 - 8.0      Protein Negative NEG mg/dL    Glucose Negative NEG mg/dL Ketone 15 (A) NEG mg/dL    Bilirubin Negative NEG      Blood Negative NEG      Urobilinogen 0.2 0.2 - 1.0 EU/dL    Nitrites Negative NEG      Leukocyte Esterase Negative NEG      WBC 0-4 0 - 4 /hpf    RBC 0-5 0 - 5 /hpf    Epithelial cells FEW FEW /lpf    Bacteria Negative NEG /hpf    UA:UC IF INDICATED CULTURE NOT INDICATED BY UA RESULT CNI      Hyaline cast 0-2 0 - 5 /lpf       Radiologic Studies -   XR ABD (KUB)   Final Result    impression: NG tube in place. CT ABD PELV W WO CONT   Final Result   IMPRESSION:      1. Equivocal source of bleeding in posterior aspect of proximal gastric body,   although This could represent ingested material. Ultimately, endoscopic   correlation may be necessary. 2. Small-moderate hiatal hernia. 3. Moderate retained fecal material in rectum and distal sigmoid colon. 4. Bilateral renal cysts. CT Results  (Last 48 hours)               07/01/20 1447  CT ABD PELV W WO CONT Final result    Impression:  IMPRESSION:       1. Equivocal source of bleeding in posterior aspect of proximal gastric body,   although This could represent ingested material. Ultimately, endoscopic   correlation may be necessary. 2. Small-moderate hiatal hernia. 3. Moderate retained fecal material in rectum and distal sigmoid colon. 4. Bilateral renal cysts. Narrative:  EXAM: CT ABD PELV W WO CONT       INDICATION: nvd, back pain, symptoms since this morning. Patient on Eliquis. .   Vomited is coffee-ground/dark in nature. COMPARISON: CT 2/27/2019. CONTRAST: 100 mL of Isovue-370. TECHNIQUE:     Multislice helical CT was performed of the abdomen and pelvis prior to IV   contrast administration, during the arterial phase of contrast administration,   and during the venous phase of contrast administration. Multiplanar reformations   were performed. Oral contrast was not administered.  CT dose reduction was   achieved through use of a standardized protocol tailored for this examination   and automatic exposure control for dose modulation. FINDINGS:    LOWER THORAX: Lungs are clear. Cardiac size is upper limits of normal. Coronary   atherosclerotic calcifications are shown. LIVER: No mass. BILIARY TREE: Gallbladder is within normal limits. CBD is not dilated. SPLEEN: within normal limits. PANCREAS: No mass or ductal dilatation. ADRENALS: Unremarkable. KIDNEYS: Several bilateral renal cysts again shown measuring up to 11 cm in   size. STOMACH: Small-moderate hiatal hernia. Visualized distal esophagus mildly   distended with intraluminal material. Mild gastric distention. Dependent high   density material in the posterior proximal body with equivocal mural thickening. There is equivocal increase amount of the radio dense material on the delayed   phase images though assessment is rendered suboptimal by the presence of   surgical artifacts. SMALL BOWEL: No dilatation or wall thickening. COLON: Moderate rectal and distal sigmoid distention with fecal material.   APPENDIX: Unremarkable. PERITONEUM: No ascites or pneumoperitoneum. RETROPERITONEUM: Abundant aortic and peripheral arterial atherosclerotic   calcifications retroperitoneal clips. No mass or aneurysm is revealed. REPRODUCTIVE ORGANS: Status post hysterectomy. URINARY BLADDER: No mass or calculus. BONES: Posterior spinal fixation hardware again shown through lumbar spine and   sacrum at L2-S1. Interbody graft material shown at L3-4 through L5-S1. ABDOMINAL WALL: No mass or hernia. ADDITIONAL COMMENTS: N/A               CXR Results  (Last 48 hours)    None            Medical Decision Making   I am the first provider for this patient. I reviewed the vital signs, available nursing notes, past medical history, past surgical history, family history and social history. Vital Signs-Reviewed the patient's vital signs.   Patient Vitals for the past 12 hrs:   Temp Pulse Resp BP SpO2   07/01/20 2002 98.4 °F (36.9 °C) 74 18 113/58 94 %   07/01/20 1630 -- 97 15 116/66 94 %   07/01/20 1605 98.1 °F (36.7 °C) 94 14 107/84 97 %   07/01/20 1600 -- 92 11 107/54 96 %   07/01/20 1550 97.8 °F (36.6 °C) 93 12 105/48 95 %   07/01/20 1530 -- (!) 101 15 108/57 97 %   07/01/20 1500 -- 95 21 111/49 95 %   07/01/20 1400 -- (!) 105 18 114/70 96 %   07/01/20 1245 -- (!) 133 23 -- 97 %   07/01/20 1215 -- (!) 120 26 105/61 99 %   07/01/20 1205 97.7 °F (36.5 °C) 100 20 119/59 100 %       Records Reviewed: Nursing Notes and Old Medical Records    Provider Notes (Medical Decision Making):   Patient presenting with nausea, vomiting, diarrhea and coffee-ground emesis. .  DDx: upper gi bleed 2/2 PUD, Esophageal varices; Iron intake, pepto bismol. Will get labs, T+S, and treat anemia as needed. ED Course:   Initial assessment performed. The patients presenting problems have been discussed, and they are in agreement with the care plan formulated and outlined with them. I have encouraged them to ask questions as they arise throughout their visit. ED Course as of Jul 01 2106 Wed Jul 01, 2020   1303 EKG interpreted by me. Shows atrial fibrillation with a heart rate of 141. No ST elevations depressions concerning for ischemia. Old left bundle branch block. [JS]   1348 Hemoglobin came back at 7.2. Blood is already been ordered. GI consult already placed. [JS]   3707 Updated patient's daughter as well as patient about plan for GI consult, blood and admission. She will likely need endoscopy. Heart rates are already improved to 103. [JS]   56 Spoke with Dr. Kristyn Polk as well as PA for GI who recommended GI bleeding scan, keeping her n.p.o., twice daily Protonix, and an NG tube.     [JS]      ED Course User Index  [JS] Corry Handley MD     Critical Care Time:   CRITICAL CARE NOTE :    1:03 PM    IMPENDING DETERIORATION -Cardiovascular and Metabolic  ASSOCIATED RISK FACTORS - Hypotension, Shock, Bleeding and Metabolic changes  MANAGEMENT- Bedside Assessment and Supervision of Care  INTERPRETATION -  CT Scan, ECG, Blood Pressure and Cardiac Output Measures   INTERVENTIONS - hemodynamic mngmt and Metobolic interventions  CASE REVIEW - Hospitalist, Medical Sub-Specialist, Nursing and Family  TREATMENT RESPONSE -Improved  PERFORMED BY - Self    NOTES   :    I have spent 50 minutes of critical care time involved in lab review, consultations with specialist, family decision- making, bedside attention and documentation. During this entire length of time I was immediately available to the patient . Disposition:    Admission Note:  Patient is being admitted to the hospital by Dr. Dale Baig, Service: Hospitalist.  The results of their tests and reasons for their admission have been discussed with them and available family. They convey agreement and understanding for the need to be admitted and for their admission diagnosis. Diagnosis     Clinical Impression:   1. Upper GI bleed    2. Anemia, blood loss    3. Atrial fibrillation with rapid ventricular response (HCC)        Attestations:  Tosha Hutchinson M.D. Please note that this dictation was completed with [employer], the computer voice recognition software. Quite often unanticipated grammatical, syntax, homophones, and other interpretive errors are inadvertently transcribed by the computer software. Please disregard these errors. Please excuse any errors that have escaped final proofreading. Thank you.

## 2020-07-01 NOTE — ED NOTES
Report given to [facility] on recieving unit. Pt waiting to be transported at this time.

## 2020-07-01 NOTE — PROGRESS NOTES
Pharmacy Clarification of Prior to Admission Medication Regimen     The patient was  interviewed regarding clarification of the prior to admission medication regimen. Patient present in room and obtained permission from patient to discuss drug regimen with visitor(s) present. Patient was questioned regarding use of any other inhalers, topical products, over the counter medications, herbal medications, vitamin products or ophthalmic/nasal/otic medication use. Medication list included in paperwork from [facility] was outdated and incorrect. Reviewed current medications with patient and patient's daughter. Information Obtained From: Patient    Pertinent Pharmacy Findings:  Updated patients preferred outpatient [facility]  Identified High Alert Medication Information  Current Anticoagulants:  Name: Eliquis 2.5mg taken 7/1/20 at 1030. PTA medication list was corrected to the following:     Prior to Admission Medications   Prescriptions Last Dose Informant Taking? RABEprazole (ACIPHEX) 20 mg tablet 7/1/2020 at 0930 Self Yes   Sig: Take 20 mg by mouth daily. apixaban (ELIQUIS) 2.5 mg tablet 7/1/2020 at 1030 Self Yes   Sig: Take 2.5 mg by mouth two (2) times a day. cholecalciferol (VITAMIN D3) 1,000 unit tablet 7/1/2020 at 1030 Self Yes   Sig: Take 1,000 Units by mouth daily. famotidine (PEPCID) 20 mg tablet 6/30/2020 at 2100 Self Yes   Sig: Take 20 mg by mouth daily. gabapentin (NEURONTIN) 800 mg tablet 7/1/2020 at 1030 Self Yes   Sig: TAKE 1 TAB BY MOUTH FOUR  TIMES DAILY AS NEEDED. hydroCHLOROthiazide (HYDRODIURIL) 25 mg tablet 6/30/2020 at 2100 Self Yes   Sig: TAKE 1 TABLET BY MOUTH  DAILY   magnesium oxide (MAG-OX) 400 mg tablet 6/24/2020 at Unknown time Self Yes   Sig: Take 400 mg by mouth daily. melatonin 300 mcg tab 6/30/2020 at 2100 Self Yes   Sig: Take 1.5 mg by mouth nightly.    Patient taking differently: Take 5 mg by mouth nightly. 2 hours before bedtime  OTC   multivitamin (ONE A DAY) tablet 6/30/2020 at 0900 Self Yes   Sig: Take 1 Tab by mouth daily. oxyCODONE-acetaminophen (Percocet) 5-325 mg per tablet 7/1/2020 at 1030  Yes   Sig: Take 1 Tab by mouth every eight (8) hours as needed for Pain.   simvastatin (ZOCOR) 40 mg tablet 6/30/2020 at 2100 Self Yes   Sig: Take 1 Tab by mouth nightly.       Facility-Administered Medications: None          Thank you,  Samia Quevedo, ESTELAhT  Medication History

## 2020-07-01 NOTE — ACP (ADVANCE CARE PLANNING)
Advance Care Planning Note      NAME: Leo Willams   :  10/9/1933   MRN:  [id number]     Date/Time:  2020 5:08 PM    Active Diagnoses:  Hospital Problems  Date Reviewed: 2019          Codes Class Noted POA    Upper GI bleed ICD-10-CM: K92.2  ICD-9-CM: 578.9  2020 Unknown        Atrial fibrillation with rapid ventricular response [facility]) ICD-10-CM: I48.91  ICD-9-CM: 427.31  2020 Unknown              These active diagnoses are of sufficient risk that focused discussion on advance care planning is indicated in order to allow the patient to thoughtfully consider personal goals of care, and if situations arise that prevent the ability to personally give input, to ensure appropriate representation of their personal desires for different levels and aggressiveness of care. Discussion:   Code status addressed and wants to be a DNR / DNI. Patient wants central line and vasopressors if needed. Patient would also want a feeding tube, if needed, for nutritional support. Patient  would like to assign her daughter   Zita Williamson [phone]       as the surrogate decision maker. Persons present and participating in discussion: Josefa Tello MD, daughter Karmen Caballero       Time Spent:   Total time spent face-to-face in education and discussion:   17  minutes.          Jana Mccallum MD   Hospitalist

## 2020-07-01 NOTE — CONSULTS
CARDIOLOGY CONSULT    Patient ID:  Patient: Gokul Lemon  MRN: [id number]  Age: 80 y.o.  : 10/9/1933    Date of  Admission: 2020 11:50 AM   PCP:  Shakira Triplett NP     Assessment: 1. Paroxysmal atrial fibrillation with RVR, minimal to no symptoms. In sinus currently, appeared to be in Afib earlier with RVR. 2. Acute blood loss anemia with GI bleeding. 3. Chronic LBBB. 4. PVC's. 5. Last echo EF 60%, mild valve disease. 6. Hyperlipidemia. 7. Diabetes mellitus type 2.  8. Hypercholesterolemia. 9. CKD stage 3.  10. GERD/Lewis's esophagus, gastroparesis. 11. Scoliosis/arthritis s/p extensive thoracolumbar fusion , now with broken hardware, s/p LUCY 2013 (effective), neuropathy since knee surgery . 12. History of ovarian cancer s/p chemo. 13. Back pain. Fractured thoracolumbar fusion rods, stable. Protruding pedicle screws, stable. 4. Neural foraminal stenoses. 14. Walks with a walker.        Plan:      1. In the past, holding on antiarrhythmic therapy given the low burden of arrhythmia and minimal sx. If can't anticoagulate, may consider a low dose of prophylactic medication for Afib suppression. 2. Considering the anemia and bleeding risk, I would NOT use anticoagulation or antiplatelet therapy. Very reluctant to do it in 2019, gave it a try later, now know that it is not something to do going forward. 3. Not a good candidate for a Watchman device (JOHN occlusion) as it requires 6 weeks of anticoagulation upfront. 4. Not a good candidate for Afib ablation as it requires 2-3 months of anticoagulation post-procedure. 5. Continue statin.     Agree with EGD tomorrow.       [x]? High complexity decision making was performed in this patient.     Gokul Lemon is a 80 y.o. female admitted with N/V and GI bleeding. She is anemic with Hgb 7's. Last took Eliquis this AM.  She denies syncope, dizziness, palpitations.   Family present in the room.       Past Medical History:   Diagnosis Date    Arthritis     Diarrhea 6/6/2016    Foot drop     Gastric ulcer 6/14/2012    Gastroparesis     GERD (gastroesophageal reflux disease)     High cholesterol     Hypertension     Neuropathy     Ovarian cancer ([facility] [address] 1986    chemo x 9 mos    Scoliosis     Stroke ([facility] [address] 2002    ? stroke with drop feet, per patient CT scans were negative        Past Surgical History:   Procedure Laterality Date    FLEXIBLE SIGMOIDOSCOPY N/A 6/6/2016    SIGMOIDOSCOPY FLEXIBLE performed by Karel Sauceda [facility] at [facility] ENDOSCOPY    HX ORTHOPAEDIC      back    HX ORTHOPAEDIC      bilateral shoulder replacements    HX ORTHOPAEDIC      left knee replacement    HX LYDIA AND BSO  1986    CA EGD TRANSORAL BIOPSY SINGLE/MULTIPLE  1/26/2012         CA EGD TRANSORAL BIOPSY SINGLE/MULTIPLE  6/14/2012            Social History     Tobacco Use    Smoking status: Never Smoker    Smokeless tobacco: Never Used   Substance Use Topics    Alcohol use: No        History reviewed. No pertinent family history.      No Known Allergies       Current Facility-Administered Medications   Medication Dose Route Frequency    promethazine (PHENERGAN) 25 mg in 0.9% sodium chloride 50 mL IVPB  25 mg IntraVENous ONCE PRN    0.9% sodium chloride infusion 250 mL  250 mL IntraVENous PRN    potassium chloride 10 mEq in 100 ml IVPB  10 mEq IntraVENous NOW    potassium chloride 10 mEq in 100 ml IVPB  10 mEq IntraVENous NOW    pantoprazole (PROTONIX) 40 mg in 0.9% sodium chloride 10 mL injection  40 mg IntraVENous Q12H    0.9% sodium chloride infusion  125 mL/hr IntraVENous CONTINUOUS    sodium chloride (NS) flush 5-40 mL  5-40 mL IntraVENous Q8H    sodium chloride (NS) flush 5-40 mL  5-40 mL IntraVENous PRN    dextrose 5% and 0.9% NaCl infusion  100 mL/hr IntraVENous CONTINUOUS    ondansetron (ZOFRAN) injection 4 mg  4 mg IntraVENous Q6H PRN    morphine injection 1 mg  1 mg IntraVENous Q4H PRN     Current Outpatient Medications   Medication Sig    magnesium oxide (MAG-OX) 400 mg tablet Take 400 mg by mouth daily.  famotidine (PEPCID) 20 mg tablet Take 20 mg by mouth daily.  oxyCODONE-acetaminophen (Percocet) 5-325 mg per tablet Take 1 Tab by mouth every eight (8) hours as needed for Pain.  hydroCHLOROthiazide (HYDRODIURIL) 25 mg tablet TAKE 1 TABLET BY MOUTH  DAILY    apixaban (ELIQUIS) 2.5 mg tablet Take 2.5 mg by mouth two (2) times a day.  melatonin 300 mcg tab Take 1.5 mg by mouth nightly. (Patient taking differently: Take 5 mg by mouth nightly. 2 hours before bedtime  OTC)    RABEprazole (ACIPHEX) 20 mg tablet Take 20 mg by mouth daily.  gabapentin (NEURONTIN) 800 mg tablet TAKE 1 TAB BY MOUTH FOUR  TIMES DAILY AS NEEDED.  simvastatin (ZOCOR) 40 mg tablet Take 1 Tab by mouth nightly.  cholecalciferol (VITAMIN D3) 1,000 unit tablet Take 1,000 Units by mouth daily.  multivitamin (ONE A DAY) tablet Take 1 Tab by mouth daily. Review of Symptoms:  Noncontributory except as noted above.   General: negative for fever, chills, sweats, weakness, weight loss   Eyes: negative for blurred vision, eye pain, loss of vision, diplopia   Ear Nose and Throat: negative for rhinorrhea, pharyngitis, otalgia, tinnitus, speech or swallowing difficulties   Respiratory: negative for SOB, cough, sputum production, wheezing, LEVINE, pleuritic pain   Cardiology: negative for chest pain, palpitations, orthopnea, PND, edema, syncope   Gastrointestinal: negative for abdominal pain, N/V, dysphagia, change in bowel habits, bleeding   Genitourinary: negative for frequency, urgency, dysuria, hematuria, incontinence   Muskuloskeletal : negative for arthralgia, myalgia   Hematology: negative for easy bruising, bleeding, lymphadenopathy   Dermatological: negative for rash, ulceration, mole change, new lesion   Endocrine: negative for hot flashes or polydipsia   Neurological: negative for headache, dizziness, confusion, focal weakness, paresthesia, memory loss, gait disturbance   Psychological: negative for anxiety, depression, agitation       Objective:      Physical Exam:  Temp (24hrs), Av.9 °F (36.6 °C), Min:97.7 °F (36.5 °C), Max:98.1 °F (36.7 °C)    Patient Vitals for the past 8 hrs:   Pulse   20 1605 94   20 1600 92   20 1550 93   20 1530 (!) 101   20 1500 95   20 1400 (!) 105   20 1245 (!) 133   20 1215 (!) 120   20 1205 100    Patient Vitals for the past 8 hrs:   Resp   20 1605 14   20 1600 11   20 1550 12   20 1530 15   20 1500 21   20 1400 18   20 1245 23   20 1215 26   20 1205 20    Patient Vitals for the past 8 hrs:   BP   20 1605 107/84   20 1600 107/54   20 1550 105/48   20 1530 108/57   20 1500 111/49   20 1400 114/70   20 1215 105/61   20 1205 119/59      No intake or output data in the 24 hours ending 20 1656    Nondiaphoretic, not in acute distress. Pale. Supple, no palpable thyromegaly. No scleral icterus, mucous membranes moist, no xanthelasma. Unlabored, clear to auscultation bilaterally anteriorly, symmetric air movement. Regular rate and rhythm, no murmur, pericardial rub, knock, or gallop. No JVD or peripheral edema. Palpable radial pulses bilaterally. Abdomen, soft, nontender, nondistended. Extremities without cyanosis or clubbing. Muscle tone and bulk normal.  Skin warm and dry. No rashes or ulcers. Neuro grossly nonfocal.  No tremor. Awake and appropriate. CARDIOGRAPHICS and STUDIES, I reviewed:    Telemetry:  SR 70's. ECG:   Afib with RVR, though artifact noted at baseline. LBBB 130 msec.        Labs:  Recent Labs     20  1310   TROIQ <0.05     Lab Results   Component Value Date/Time    Cholesterol, total 138 2018 12:00 AM    HDL Cholesterol 46 2018 12:00 AM    LDL, calculated 66 2018 12:00 AM Triglyceride 132 07/20/2018 12:00 AM    CHOL/HDL Ratio 3.2 09/20/2010 10:16 AM     Recent Labs     07/01/20  1310   INR 1.1   PTP 11.4*      Recent Labs     07/01/20  1310      K 2.8*      CO2 27   BUN 29*   CREA 0.77   *   CA 8.9   ALB 3.4*   WBC 14.7*   HGB 7.2*   HCT 26.9*        Recent Labs     07/01/20  1310   AP 49   TP 7.2   ALB 3.4*   GLOB 3.8   LPSE 130     No components found for: GLPOC  No results for input(s): PH, PCO2, PO2 in the last 72 hours.       Signed By: Graham Rivera MD     July 1, 2020

## 2020-07-01 NOTE — PROGRESS NOTES
[facility]. 2100 H&H drawn and sent to lab. 2130 Pt requesting gabapentin that she usually takes at home. Dr. Momo Huang notifed. (See Dr. Jennifer Lopes note). 0530 Pt's hemoglobin this morning is 7.1. On call tele hospitalist notified. Orders received. 2460 Pt's blood consent did not come up with patient from ER. Talked to charge RN, Mechelle Arreola in ER. Unable to locate consent. Dr. Iveth Baird notified. States he will be to floor shortly to get consent.

## 2020-07-01 NOTE — CONSULTS
Gastroenterology Consult     Referring Physician: Dr. Annette Tapia Date: 7/1/2020     Subjective:     Chief Complaint: coffee ground emesis     History of Present Illness: Keila Lu is a 80 y.o. female who is seen in consultation for coffee ground emesis. Starting vomiting today. EMS was called. Vomited 5 times PTA and 10 times since arrival to ER. Emesis described as black. Was clammy and pale and light headed. Takes Eliquis for Afib. Last dose was this AM.  Takes rabeprazole daily. No ASA or NSAID use. No stomach pain. Had a brown stool in ER.        P 105-133. /61. Hgb on arrival 7.2. Last hgb for comparison was 7.9 a year ago. BUN 29, Crt 0.77. Trop negative.  K 2.8. CT scan is pending. A unit of blood has been ordered. Most recent EGD 5/29/18:   Findings:      Esophagus:   +          There was columnar mucosa proximal to the EG junction at 35 cm with some healing ulcerations superimposed. Stretched for 4 cm to 31 cm. There was friability with some contact bleeding in this distal esophagus. We performed biopsies of the distal esophagus and sent them in 2 jars. +          Medium sized hiatal hernia with EG junction at 41 cm from incisors     Stomach:   +          There was some retained food in the stomach c/w retained food.     Duodenum:   -           The bulb and post bulbar mucosa is normal in appearance to the second portion.  The duodenal folds appeared normal.      Past Medical History:   Diagnosis Date    Arthritis     Diarrhea 6/6/2016    Foot drop     Gastric ulcer 6/14/2012    Gastroparesis     GERD (gastroesophageal reflux disease)     High cholesterol     Hypertension     Neuropathy     Ovarian cancer ([facility] [address] 1986    chemo x 9 mos    Scoliosis     Stroke [facility]) 2002    ? stroke with drop feet, per patient CT scans were negative     Past Surgical History:   Procedure Laterality Date    FLEXIBLE SIGMOIDOSCOPY N/A 6/6/2016    SIGMOIDOSCOPY FLEXIBLE performed by T[facility]ero [facility] at [facility] ENDOSCOPY    HX ORTHOPAEDIC      back    HX ORTHOPAEDIC      bilateral shoulder replacements    HX ORTHOPAEDIC      left knee replacement    HX LYDIA AND BSO  1986    MT EGD TRANSORAL BIOPSY SINGLE/MULTIPLE  1/26/2012         MT EGD TRANSORAL BIOPSY SINGLE/MULTIPLE  6/14/2012           No family history on file. Social History     Tobacco Use    Smoking status: Never Smoker    Smokeless tobacco: Never Used   Substance Use Topics    Alcohol use: No      No Known Allergies  Current Facility-Administered Medications   Medication Dose Route Frequency    promethazine (PHENERGAN) 25 mg in 0.9% sodium chloride 50 mL IVPB  25 mg IntraVENous ONCE PRN    0.9% sodium chloride infusion 250 mL  250 mL IntraVENous PRN    potassium chloride 10 mEq in 100 ml IVPB  10 mEq IntraVENous NOW    potassium chloride 10 mEq in 100 ml IVPB  10 mEq IntraVENous NOW    pantoprazole (PROTONIX) 40 mg in 0.9% sodium chloride 10 mL injection  40 mg IntraVENous Q12H     Current Outpatient Medications   Medication Sig    magnesium oxide (MAG-OX) 400 mg tablet Take 400 mg by mouth daily.  famotidine (PEPCID) 20 mg tablet Take 20 mg by mouth daily.  oxyCODONE-acetaminophen (Percocet) 5-325 mg per tablet Take 1 Tab by mouth every eight (8) hours as needed for Pain.  hydroCHLOROthiazide (HYDRODIURIL) 25 mg tablet TAKE 1 TABLET BY MOUTH  DAILY    apixaban (ELIQUIS) 2.5 mg tablet Take 2.5 mg by mouth two (2) times a day.  melatonin 300 mcg tab Take 1.5 mg by mouth nightly. (Patient taking differently: Take 5 mg by mouth nightly. 2 hours before bedtime  OTC)    RABEprazole (ACIPHEX) 20 mg tablet Take 20 mg by mouth daily.  gabapentin (NEURONTIN) 800 mg tablet TAKE 1 TAB BY MOUTH FOUR  TIMES DAILY AS NEEDED.  simvastatin (ZOCOR) 40 mg tablet Take 1 Tab by mouth nightly.  cholecalciferol (VITAMIN D3) 1,000 unit tablet Take 1,000 Units by mouth daily.     multivitamin (ONE A DAY) tablet Take 1 Tab by mouth daily. Review of Systems:  A detailed 10 organ review of systems is obtained with pertinent positives as listed in the History of Present Illness and Past Medical History. A detailed review of systems was performed as follows:  Constitutional:  Negative  Eyes:  No ocular sensitivity to the sun, blurred vision or double vision. ENMT:  No nose or sinus problems. Respiratory: No coughing, wheezing or sob  Cardiac:  No chest pain, exertional chest pain or palpitations  Gastrointestinal:  See history of the present illness  :   No pain with urination or hematuria  Musculoskeletal:  No arthritis or hot swollen joints. Endocrine:  No thyroid disease or diabetes  Psychiatric: No depression or feeling blue  Integumentary:  No skin rash or sensitivity to the sun. Neurologic:  No stroke or seizure; no numbness or tingling of the extremities. Heme-Lymphatic:  See HPI    Objective:     Physical Exam:  Visit Vitals  /70   Pulse (!) 105   Temp 97.7 °F (36.5 °C)   Resp 18   Ht 5' (1.524 m)   Wt 68 kg (150 lb)   SpO2 96%   BMI 29.29 kg/m²        Gen: pale elderly white female, lying flat NAD  Skin:  Extremities and face reveal no rashes. HEENT: Sclerae anicteric. Black discoloration around lips. Cardiovascular: tachycardic  Respiratory:  Comfortable breathing with no accessory muscle use. Clear breath sounds with no wheezes, rales, or rhonchi. GI:  Abdomen nondistended, soft, and mildly tender in epigastrium. Normal active bowel sounds. No enlargement of the liver or spleen. No masses palpable. Rectal:  Deferred, brown stool per nursing  Musculoskeletal:  No pitting edema of the lower legs. Neurological:  Gross memory appears intact. Patient is alert and oriented. Psychiatric:  Mood appears appropriate with judgement intact. Lymphatic:  No cervical or supraclavicular adenopathy.     Lab/Data Review:  Lab Results   Component Value Date/Time    WBC 14.7 (H) 07/01/2020 01:10 PM    HGB 7.2 (L) 07/01/2020 01:10 PM    HCT 26.9 (L) 07/01/2020 01:10 PM    PLATELET 845 54/84/6270 01:10 PM    MCV 66.7 (L) 07/01/2020 01:10 PM     Lab Results   Component Value Date/Time    Sodium 137 07/01/2020 01:10 PM    Potassium 2.8 (L) 07/01/2020 01:10 PM    Chloride 102 07/01/2020 01:10 PM    CO2 27 07/01/2020 01:10 PM    Anion gap 8 07/01/2020 01:10 PM    Glucose 142 (H) 07/01/2020 01:10 PM    BUN 29 (H) 07/01/2020 01:10 PM    Creatinine 0.77 07/01/2020 01:10 PM    BUN/Creatinine ratio 38 (H) 07/01/2020 01:10 PM    GFR est AA >60 07/01/2020 01:10 PM    GFR est non-AA >60 07/01/2020 01:10 PM    Calcium 8.9 07/01/2020 01:10 PM    Bilirubin, total 0.4 07/01/2020 01:10 PM    Alk. phosphatase 49 07/01/2020 01:10 PM    Protein, total 7.2 07/01/2020 01:10 PM    Albumin 3.4 (L) 07/01/2020 01:10 PM    Globulin 3.8 07/01/2020 01:10 PM    A-G Ratio 0.9 (L) 07/01/2020 01:10 PM    ALT (SGPT) 15 07/01/2020 01:10 PM    AST (SGOT) 13 (L) 07/01/2020 01:10 PM           Assessment/Plan:     Active Problems:   UGIB  Chronic Anticoagulation  Anemia       Keep NPO due to vomiting. Start BID IV PPI and hold Eliquis. Agree with blood transfusion for symptomatic UGIB. Monitor hgb. Place NGT to LWS to reduce risk of vomiting and aspiration. Follow CT results. We will plan for EGD tomorrow (off Eliquis x 24hrs) to further evaluate the UGIB. I discussed risks/benefits with patient and she is willing to proceed. The patient was counseled at length about the risks of deborah Covid-19 during their perioperative period and any recovery window from their procedure. The patient was made aware that deborah Covid-19  may worsen their prognosis for recovering from their procedure and lend to a higher morbidity and/or mortality risk. All material risks, benefits, and reasonable alternatives including postponing the procedure were discussed. The patient does  wish to proceed with the procedure at this time.       Plan formulated by Dr. Erik Zhu and I discussed the plan with  Dr. Len Iglesias.     PEGGY Duenas  07/01/20  3:08 PM

## 2020-07-01 NOTE — H&P
Hospitalist Admission Note    NAME: Nel Hope   :  10/9/1933   MRN:  [id number]     Date/Time:  2020 4:52 PM    Patient PCP: Abel Hernández NP  ______________________________________________________________________  Given the patient's current clinical presentation, I have a high level of concern for decompensation if discharged from the emergency department. Complex decision making was performed, which includes reviewing the patient's available past medical records, laboratory results, and x-ray films. My assessment of this patient's clinical condition and my plan of care is as follows. Assessment / Plan: Active upper GI bleed with coffee-ground emesis  We will admit to stepdown unit, patient received a unit of PRBC in the ED, check H&H every 6 hours, monitor blood pressure, NG tube. Continue with IV Protonix 4040 mg twice daily. PRN Zofran , plan for EGD tomorrow  CT abdomen and pelvis with contrast showed:  1. Equivocal source of bleeding in posterior aspect of proximal gastric body,  although This could represent ingested material. Ultimately, endoscopic  correlation may be necessary. 2. Small-moderate hiatal hernia. 3. Moderate retained fecal material in rectum and distal sigmoid colon. 4. Bilateral renal cysts    A. fib with RVR  Currently resolved with IV fluid bolus, Eliquis because of GI bleed   Rate currently around 97    Leucocytosis, most likely reactive to GI bleed  Monitor    Hypokalemia k 2.8  S/p IVK in ED   Repeat BMP tomorrow am   Check mg     Hypertension  BP meds on hold  Chronic back pain  PRN morphine while n.p.o.   Code Status: DNR, discussed with the patient and daughter at bedside  Surrogate Decision Maker:  [facility] Daughter [phone]                         DVT Prophylaxis: SCDs  GI Prophylaxis: PPI    Baseline: Ambulatory      Subjective:   CHIEF COMPLAINT: Nausea vomiting of coffee-ground emesis    HISTORY OF PRESENT ILLNESS:     Jarred Carrizales [place] is a 80 y.o.  female from [facility] [address] with past medical history of A. fib on Eliquis, spinal stenosis, GERD, hypertension who presents with persistent nausea and vomiting. Patient reported being nauseous since yesterday, felt dizzy and lightheaded today and almost passed out, had a coffee-ground emesis. She reported having back pain for the last 3 weeks, was started on oxycodone but denies taking any NSAIDs . He reported taking his Eliquis this morning. patient also reported being constipated, took some MiraLAX and had some diarrhea. EMS found her to be tachycardic and hypotensive. Received IV fluid bolus prior to arrival to the ED . In the ED, she was found to be afebrile, tachycardic with heart rate 133, with soft blood pressure. Review of her labs showed that her WBC count was elevated, hemoglobin 7.2, k of 2.8 . CT abdomen and pelvis with contrast showed Equivocal source of bleeding in posterior aspect of proximal gastric body,although This could represent ingested material.  Her EKG showed A. fib with RVR stop  GI was consulted, advised EGD tomorrow a.m. as patient had already taken her Eliquis this morning. GI also recommended NG-tube insertion   Patient received IV fluid bolus, potassium and IV Protonix  When I saw the patient, heart rate was already below 100 and NG tube coffee-ground emesis    We were asked to admit for work up and evaluation of the above problems.      Past Medical History:   Diagnosis Date    Arthritis     Diarrhea 6/6/2016    Foot drop     Gastric ulcer 6/14/2012    Gastroparesis     GERD (gastroesophageal reflux disease)     High cholesterol     Hypertension     Neuropathy     Ovarian cancer ([facility] [address] 1986    chemo x 9 mos    Scoliosis     Stroke [facility]) 2002    ? stroke with drop feet, per patient CT scans were negative        Past Surgical History:   Procedure Laterality Date    FLEXIBLE SIGMOIDOSCOPY N/A 6/6/2016    SIGMOIDOSCOPY FLEXIBLE performed by Marylen Christen, MD at [facility] ENDOSCOPY    HX ORTHOPAEDIC      back    HX ORTHOPAEDIC      bilateral shoulder replacements    HX ORTHOPAEDIC      left knee replacement    HX LYDIA AND BSO  1986    DC EGD TRANSORAL BIOPSY SINGLE/MULTIPLE  1/26/2012         DC EGD TRANSORAL BIOPSY SINGLE/MULTIPLE  6/14/2012            Social History     Tobacco Use    Smoking status: Never Smoker    Smokeless tobacco: Never Used   Substance Use Topics    Alcohol use: No        History reviewed. No pertinent family history. No Known Allergies     Prior to Admission medications    Medication Sig Start Date End Date Taking? Authorizing Provider   magnesium oxide (MAG-OX) 400 mg tablet Take 400 mg by mouth daily. Yes Provider, Historical   famotidine (PEPCID) 20 mg tablet Take 20 mg by mouth daily. Yes Provider, Historical   oxyCODONE-acetaminophen (Percocet) 5-325 mg per tablet Take 1 Tab by mouth every eight (8) hours as needed for Pain. Yes Provider, Historical   hydroCHLOROthiazide (HYDRODIURIL) 25 mg tablet TAKE 1 TABLET BY MOUTH  DAILY 5/14/20  Yes Ingrid Schultz MD   apixaban (ELIQUIS) 2.5 mg tablet Take 2.5 mg by mouth two (2) times a day. Yes Other, MD María   melatonin 300 mcg tab Take 1.5 mg by mouth nightly. Patient taking differently: Take 5 mg by mouth nightly. 2 hours before bedtime  OTC 11/8/18  Yes Yaquelin Love MD   RABEprazole (ACIPHEX) 20 mg tablet Take 20 mg by mouth daily. Yes Provider, Historical   gabapentin (NEURONTIN) 800 mg tablet TAKE 1 TAB BY MOUTH FOUR  TIMES DAILY AS NEEDED. 3/21/18  Yes Toya Saleh NP   simvastatin (ZOCOR) 40 mg tablet Take 1 Tab by mouth nightly. 11/9/17  Yes Toya Saleh NP   cholecalciferol (VITAMIN D3) 1,000 unit tablet Take 1,000 Units by mouth daily. Yes Provider, Historical   multivitamin (ONE A DAY) tablet Take 1 Tab by mouth daily.    Yes Provider, Historical       REVIEW OF SYSTEMS:     I am not able to complete the review of systems because: The patient is intubated and sedated    The patient has altered mental status due to his acute medical problems    The patient has baseline aphasia from prior stroke(s)    The patient has baseline dementia and is not reliable historian    The patient is in acute medical distress and unable to provide information           Total of 12 systems reviewed as follows:       POSITIVE= underlined text  Negative = text not underlined  General:  fever, chills, sweats, generalized weakness, weight loss/gain,      loss of appetite   Eyes:    blurred vision, eye pain, loss of vision, double vision  ENT:    rhinorrhea, pharyngitis   Respiratory:   cough, sputum production, SOB, LEVINE, wheezing, pleuritic pain   Cardiology:   chest pain, palpitations, orthopnea, PND, edema, syncope   Gastrointestinal:  abdominal pain , N/V, diarrhea, dysphagia, constipation, bleeding   Genitourinary:  frequency, urgency, dysuria, hematuria, incontinence   Muskuloskeletal :  arthralgia, myalgia, back pain  Hematology:  easy bruising, nose or gum bleeding, lymphadenopathy   Dermatological: rash, ulceration, pruritis, color change / jaundice  Endocrine:   hot flashes or polydipsia   Neurological:  headache, dizziness, confusion, focal weakness, paresthesia,     Speech difficulties, memory loss, gait difficulty  Psychological: Feelings of anxiety, depression, agitation    Objective:   VITALS:    Visit Vitals  /84   Pulse 94   Temp 98.1 °F (36.7 °C)   Resp 14   Ht 5' (1.524 m)   Wt 68 kg (150 lb)   SpO2 97%   BMI 29.29 kg/m²       PHYSICAL EXAM:    General:    Alert, cooperative, no distress, appears stated age. HEENT: Atraumatic, anicteric sclerae, pink conjunctivae     No oral ulcers, mucosa moist, throat clear, dentition fair  Neck:  Supple, symmetrical,  thyroid: non tender  Lungs:   Clear to auscultation bilaterally. No Wheezing or Rhonchi. No rales. Chest wall:  No tenderness  No Accessory muscle use.   Heart: Regular  rhythm,  No  murmur   No edema  Abdomen:   Soft, non-tender. Not distended. Bowel sounds normal, NG tube with coffee-ground emesis  Extremities: No cyanosis. No clubbing,      Skin turgor normal, Capillary refill normal, Radial dial pulse 2+  Skin:     Not pale. Not Jaundiced  No rashes   Psych:  Good insight. Not depressed. Not anxious or agitated. Neurologic: EOMs intact. No facial asymmetry. No aphasia or slurred speech. Symmetrical strength, Sensation grossly intact. Alert and oriented X 4.     _______________________________________________________________________  Care Plan discussed with:    Comments   Patient x    Family  x  daughter at bedside   RN x    Care Manager                    Consultant:  x    _______________________________________________________________________  Expected  Disposition:   Home with Family    HH/PT/OT/RN    SNF/LTC    PHILIPPE    ________________________________________________________________________  TOTAL TIME: 72 Minutes    Critical Care Provided     Minutes non procedure based      Comments    x Reviewed previous records   >50% of visit spent in counseling and coordination of care x Discussion with patient and/or family and questions answered       ________________________________________________________________________  Signed: Jacob Piedra MD    Procedures: see electronic medical records for all procedures/Xrays and details which were not copied into this note but were reviewed prior to creation of Plan.     LAB DATA REVIEWED:    Recent Results (from the past 24 hour(s))   EKG, 12 LEAD, INITIAL    Collection Time: 07/01/20 12:31 PM   Result Value Ref Range    Ventricular Rate 141 BPM    Atrial Rate 163 BPM    QRS Duration 130 ms    Q-T Interval 364 ms    QTC Calculation (Bezet) 557 ms    Calculated R Axis -42 degrees    Calculated T Axis 116 degrees    Diagnosis       Atrial fibrillation with rapid ventricular response with premature   ventricular or aberrantly conducted complexes  Left axis deviation  Left bundle branch block  When compared with ECG of 30-JUN-2019 09:20,  Atrial fibrillation has replaced Sinus rhythm  Vent. rate has increased BY  55 BPM  Left bundle branch block is now present  Minimal criteria for Septal infarct are no longer present     CBC WITH AUTOMATED DIFF    Collection Time: 07/01/20  1:10 PM   Result Value Ref Range    WBC 14.7 (H) 3.6 - 11.0 K/uL    RBC 4.03 3.80 - 5.20 M/uL    HGB 7.2 (L) 11.5 - 16.0 g/dL    HCT 26.9 (L) 35.0 - 47.0 %    MCV 66.7 (L) 80.0 - 99.0 FL    MCH 17.9 (L) 26.0 - 34.0 PG    MCHC 26.8 (L) 30.0 - 36.5 g/dL    RDW 18.6 (H) 11.5 - 14.5 %    PLATELET 608 176 - 798 K/uL    MPV 10.0 8.9 - 12.9 FL    NRBC 0.0 0  WBC    ABSOLUTE NRBC 0.00 0.00 - 0.01 K/uL    NEUTROPHILS 90 (H) 32 - 75 %    LYMPHOCYTES 5 (L) 12 - 49 %    MONOCYTES 4 (L) 5 - 13 %    EOSINOPHILS 0 0 - 7 %    BASOPHILS 0 0 - 1 %    IMMATURE GRANULOCYTES 1 (H) 0.0 - 0.5 %    ABS. NEUTROPHILS 13.3 (H) 1.8 - 8.0 K/UL    ABS. LYMPHOCYTES 0.7 (L) 0.8 - 3.5 K/UL    ABS. MONOCYTES 0.6 0.0 - 1.0 K/UL    ABS. EOSINOPHILS 0.0 0.0 - 0.4 K/UL    ABS. BASOPHILS 0.0 0.0 - 0.1 K/UL    ABS. IMM.  GRANS. 0.1 (H) 0.00 - 0.04 K/UL    DF SMEAR SCANNED      RBC COMMENTS ANISOCYTOSIS  1+        RBC COMMENTS MICROCYTOSIS  2+        RBC COMMENTS HYPOCHROMIA  3+        RBC COMMENTS OVALOCYTES  PRESENT       METABOLIC PANEL, COMPREHENSIVE    Collection Time: 07/01/20  1:10 PM   Result Value Ref Range    Sodium 137 136 - 145 mmol/L    Potassium 2.8 (L) 3.5 - 5.1 mmol/L    Chloride 102 97 - 108 mmol/L    CO2 27 21 - 32 mmol/L    Anion gap 8 5 - 15 mmol/L    Glucose 142 (H) 65 - 100 mg/dL    BUN 29 (H) 6 - 20 MG/DL    Creatinine 0.77 0.55 - 1.02 MG/DL    BUN/Creatinine ratio 38 (H) 12 - 20      GFR est AA >60 >60 ml/min/1.73m2    GFR est non-AA >60 >60 ml/min/1.73m2    Calcium 8.9 8.5 - 10.1 MG/DL    Bilirubin, total 0.4 0.2 - 1.0 MG/DL    ALT (SGPT) 15 12 - 78 U/L    AST (SGOT) 13 (L) 15 - 37 U/L    Alk.  phosphatase 49 45 - 117 U/L    Protein, total 7.2 6.4 - 8.2 g/dL    Albumin 3.4 (L) 3.5 - 5.0 g/dL    Globulin 3.8 2.0 - 4.0 g/dL    A-G Ratio 0.9 (L) 1.1 - 2.2     LIPASE    Collection Time: 07/01/20  1:10 PM   Result Value Ref Range    Lipase 130 73 - 393 U/L   PROTHROMBIN TIME + INR    Collection Time: 07/01/20  1:10 PM   Result Value Ref Range    INR 1.1 0.9 - 1.1      Prothrombin time 11.4 (H) 9.0 - 11.1 sec   TYPE & SCREEN    Collection Time: 07/01/20  1:10 PM   Result Value Ref Range    Crossmatch Expiration 07/04/2020     ABO/Rh(D) B POSITIVE     Antibody screen NEG     Unit number [id number]     Blood component type RC LR     Unit division 00     Status of unit ISSUED     Crossmatch result Compatible    TROPONIN I    Collection Time: 07/01/20  1:10 PM   Result Value Ref Range    Troponin-I, Qt. <0.05 <0.05 ng/mL   OCCULT BLOOD, GASTRIC    Collection Time: 07/01/20  1:18 PM   Result Value Ref Range    OCCULT BLOOD,GASTRIC Positive (A) NEG      pH,GASTRIC 4.0 (H) 1.5 - 3.5     URINALYSIS W/ REFLEX CULTURE    Collection Time: 07/01/20  2:28 PM   Result Value Ref Range    Color YELLOW/STRAW      Appearance CLEAR CLEAR      Specific gravity 1.012 1.003 - 1.030      pH (UA) 7.5 5.0 - 8.0      Protein Negative NEG mg/dL    Glucose Negative NEG mg/dL    Ketone 15 (A) NEG mg/dL    Bilirubin Negative NEG      Blood Negative NEG      Urobilinogen 0.2 0.2 - 1.0 EU/dL    Nitrites Negative NEG      Leukocyte Esterase Negative NEG      WBC 0-4 0 - 4 /hpf    RBC 0-5 0 - 5 /hpf    Epithelial cells FEW FEW /lpf    Bacteria Negative NEG /hpf    UA:UC IF INDICATED CULTURE NOT INDICATED BY UA RESULT CNI      Hyaline cast 0-2 0 - 5 /lpf

## 2020-07-01 NOTE — ED TRIAGE NOTES
Pt in from [address] [facility] via EMS for n/v/d. Pt reports being on opiate pain meds for the last week for severe back pain. Pt observed vomiting multiple times while being unloaded from EMS, emesis appears dark brown. Pt monitored X3.

## 2020-07-02 ENCOUNTER — ANESTHESIA (OUTPATIENT)
Dept: ENDOSCOPY | Age: 85
DRG: 381 | End: 2020-07-02
Payer: MEDICARE

## 2020-07-02 ENCOUNTER — ANESTHESIA EVENT (OUTPATIENT)
Dept: ENDOSCOPY | Age: 85
DRG: 381 | End: 2020-07-02
Payer: MEDICARE

## 2020-07-02 LAB
ANION GAP SERPL CALC-SCNC: 6 MMOL/L (ref 5–15)
BASOPHILS # BLD: 0.1 K/UL (ref 0–0.1)
BASOPHILS NFR BLD: 1 % (ref 0–1)
BUN SERPL-MCNC: 22 MG/DL (ref 6–20)
BUN/CREAT SERPL: 31 (ref 12–20)
CALCIUM SERPL-MCNC: 8.4 MG/DL (ref 8.5–10.1)
CHLORIDE SERPL-SCNC: 106 MMOL/L (ref 97–108)
CO2 SERPL-SCNC: 27 MMOL/L (ref 21–32)
CREAT SERPL-MCNC: 0.7 MG/DL (ref 0.55–1.02)
DIFFERENTIAL METHOD BLD: ABNORMAL
EOSINOPHIL # BLD: 0.1 K/UL (ref 0–0.4)
EOSINOPHIL NFR BLD: 1 % (ref 0–7)
ERYTHROCYTE [DISTWIDTH] IN BLOOD BY AUTOMATED COUNT: 19.4 % (ref 11.5–14.5)
GLUCOSE SERPL-MCNC: 109 MG/DL (ref 65–100)
HCT VFR BLD AUTO: 25.8 % (ref 35–47)
HCT VFR BLD AUTO: 28.1 % (ref 35–47)
HCT VFR BLD AUTO: 31.3 % (ref 35–47)
HGB BLD-MCNC: 7.1 G/DL (ref 11.5–16)
HGB BLD-MCNC: 8 G/DL (ref 11.5–16)
HGB BLD-MCNC: 8.9 G/DL (ref 11.5–16)
IMM GRANULOCYTES # BLD AUTO: 0 K/UL (ref 0–0.04)
IMM GRANULOCYTES NFR BLD AUTO: 0 % (ref 0–0.5)
INR PPP: 1.1 (ref 0.9–1.1)
LYMPHOCYTES # BLD: 1.8 K/UL (ref 0.8–3.5)
LYMPHOCYTES NFR BLD: 20 % (ref 12–49)
MAGNESIUM SERPL-MCNC: 1 MG/DL (ref 1.6–2.4)
MCH RBC QN AUTO: 19.5 PG (ref 26–34)
MCHC RBC AUTO-ENTMCNC: 27.5 G/DL (ref 30–36.5)
MCV RBC AUTO: 70.7 FL (ref 80–99)
MONOCYTES # BLD: 0.9 K/UL (ref 0–1)
MONOCYTES NFR BLD: 10 % (ref 5–13)
NEUTS SEG # BLD: 5.9 K/UL (ref 1.8–8)
NEUTS SEG NFR BLD: 68 % (ref 32–75)
NRBC # BLD: 0 K/UL (ref 0–0.01)
NRBC BLD-RTO: 0 PER 100 WBC
PLATELET # BLD AUTO: 256 K/UL (ref 150–400)
PMV BLD AUTO: 10.6 FL (ref 8.9–12.9)
POTASSIUM SERPL-SCNC: 3 MMOL/L (ref 3.5–5.1)
PROTHROMBIN TIME: 11.4 SEC (ref 9–11.1)
RBC # BLD AUTO: 3.65 M/UL (ref 3.8–5.2)
RBC MORPH BLD: ABNORMAL
SODIUM SERPL-SCNC: 139 MMOL/L (ref 136–145)
WBC # BLD AUTO: 8.8 K/UL (ref 3.6–11)

## 2020-07-02 PROCEDURE — 36430 TRANSFUSION BLD/BLD COMPNT: CPT

## 2020-07-02 PROCEDURE — 74011250636 HC RX REV CODE- 250/636: Performed by: HOSPITALIST

## 2020-07-02 PROCEDURE — 85018 HEMOGLOBIN: CPT

## 2020-07-02 PROCEDURE — 0W3P8ZZ CONTROL BLEEDING IN GASTROINTESTINAL TRACT, VIA NATURAL OR ARTIFICIAL OPENING ENDOSCOPIC: ICD-10-PCS | Performed by: SPECIALIST

## 2020-07-02 PROCEDURE — 83735 ASSAY OF MAGNESIUM: CPT

## 2020-07-02 PROCEDURE — 74011000250 HC RX REV CODE- 250: Performed by: INTERNAL MEDICINE

## 2020-07-02 PROCEDURE — 77030010936 HC CLP LIG BSC -C: Performed by: SPECIALIST

## 2020-07-02 PROCEDURE — 74011250636 HC RX REV CODE- 250/636: Performed by: NURSE ANESTHETIST, CERTIFIED REGISTERED

## 2020-07-02 PROCEDURE — 76060000031 HC ANESTHESIA FIRST 0.5 HR: Performed by: SPECIALIST

## 2020-07-02 PROCEDURE — P9016 RBC LEUKOCYTES REDUCED: HCPCS

## 2020-07-02 PROCEDURE — 36415 COLL VENOUS BLD VENIPUNCTURE: CPT

## 2020-07-02 PROCEDURE — 85025 COMPLETE CBC W/AUTO DIFF WBC: CPT

## 2020-07-02 PROCEDURE — 76040000019: Performed by: SPECIALIST

## 2020-07-02 PROCEDURE — 85610 PROTHROMBIN TIME: CPT

## 2020-07-02 PROCEDURE — 65660000001 HC RM ICU INTERMED STEPDOWN

## 2020-07-02 PROCEDURE — 74011250637 HC RX REV CODE- 250/637: Performed by: SPECIALIST

## 2020-07-02 PROCEDURE — 74011000250 HC RX REV CODE- 250: Performed by: NURSE ANESTHETIST, CERTIFIED REGISTERED

## 2020-07-02 PROCEDURE — 74011000258 HC RX REV CODE- 258: Performed by: INTERNAL MEDICINE

## 2020-07-02 PROCEDURE — 80048 BASIC METABOLIC PNL TOTAL CA: CPT

## 2020-07-02 PROCEDURE — 74011250637 HC RX REV CODE- 250/637: Performed by: INTERNAL MEDICINE

## 2020-07-02 PROCEDURE — 74011250636 HC RX REV CODE- 250/636: Performed by: INTERNAL MEDICINE

## 2020-07-02 PROCEDURE — C9113 INJ PANTOPRAZOLE SODIUM, VIA: HCPCS | Performed by: INTERNAL MEDICINE

## 2020-07-02 RX ORDER — SUCRALFATE 1 G/1
1 TABLET ORAL
Status: DISCONTINUED | OUTPATIENT
Start: 2020-07-02 | End: 2020-07-04 | Stop reason: HOSPADM

## 2020-07-02 RX ORDER — FLUMAZENIL 0.1 MG/ML
0.2 INJECTION INTRAVENOUS
Status: DISCONTINUED | OUTPATIENT
Start: 2020-07-02 | End: 2020-07-02 | Stop reason: HOSPADM

## 2020-07-02 RX ORDER — ACETAMINOPHEN 325 MG/1
650 TABLET ORAL
Status: DISCONTINUED | OUTPATIENT
Start: 2020-07-02 | End: 2020-07-04 | Stop reason: HOSPADM

## 2020-07-02 RX ORDER — FENTANYL CITRATE 50 UG/ML
50 INJECTION, SOLUTION INTRAMUSCULAR; INTRAVENOUS
Status: DISCONTINUED | OUTPATIENT
Start: 2020-07-02 | End: 2020-07-02 | Stop reason: HOSPADM

## 2020-07-02 RX ORDER — PROPOFOL 10 MG/ML
INJECTION, EMULSION INTRAVENOUS AS NEEDED
Status: DISCONTINUED | OUTPATIENT
Start: 2020-07-02 | End: 2020-07-02 | Stop reason: HOSPADM

## 2020-07-02 RX ORDER — LIDOCAINE HYDROCHLORIDE 20 MG/ML
INJECTION, SOLUTION EPIDURAL; INFILTRATION; INTRACAUDAL; PERINEURAL AS NEEDED
Status: DISCONTINUED | OUTPATIENT
Start: 2020-07-02 | End: 2020-07-02 | Stop reason: HOSPADM

## 2020-07-02 RX ORDER — MAGNESIUM SULFATE 4 G/50ML
4 INJECTION INTRAVENOUS ONCE
Status: DISCONTINUED | OUTPATIENT
Start: 2020-07-02 | End: 2020-07-02

## 2020-07-02 RX ORDER — NALOXONE HYDROCHLORIDE 0.4 MG/ML
0.4 INJECTION, SOLUTION INTRAMUSCULAR; INTRAVENOUS; SUBCUTANEOUS
Status: DISCONTINUED | OUTPATIENT
Start: 2020-07-02 | End: 2020-07-02 | Stop reason: HOSPADM

## 2020-07-02 RX ORDER — SODIUM CHLORIDE 0.9 % (FLUSH) 0.9 %
5-40 SYRINGE (ML) INJECTION EVERY 8 HOURS
Status: DISCONTINUED | OUTPATIENT
Start: 2020-07-02 | End: 2020-07-04 | Stop reason: HOSPADM

## 2020-07-02 RX ORDER — SODIUM CHLORIDE 0.9 % (FLUSH) 0.9 %
5-40 SYRINGE (ML) INJECTION AS NEEDED
Status: DISCONTINUED | OUTPATIENT
Start: 2020-07-02 | End: 2020-07-04 | Stop reason: HOSPADM

## 2020-07-02 RX ORDER — PROPAFENONE HYDROCHLORIDE 150 MG/1
150 TABLET, FILM COATED ORAL EVERY 8 HOURS
Status: DISCONTINUED | OUTPATIENT
Start: 2020-07-03 | End: 2020-07-04

## 2020-07-02 RX ORDER — MAGNESIUM SULFATE HEPTAHYDRATE 40 MG/ML
4 INJECTION, SOLUTION INTRAVENOUS ONCE
Status: COMPLETED | OUTPATIENT
Start: 2020-07-02 | End: 2020-07-02

## 2020-07-02 RX ORDER — POTASSIUM CHLORIDE 7.45 MG/ML
10 INJECTION INTRAVENOUS
Status: COMPLETED | OUTPATIENT
Start: 2020-07-02 | End: 2020-07-02

## 2020-07-02 RX ORDER — SODIUM CHLORIDE 9 MG/ML
250 INJECTION, SOLUTION INTRAVENOUS AS NEEDED
Status: DISCONTINUED | OUTPATIENT
Start: 2020-07-02 | End: 2020-07-04 | Stop reason: HOSPADM

## 2020-07-02 RX ORDER — SODIUM CHLORIDE 9 MG/ML
INJECTION, SOLUTION INTRAVENOUS
Status: DISCONTINUED | OUTPATIENT
Start: 2020-07-02 | End: 2020-07-02 | Stop reason: HOSPADM

## 2020-07-02 RX ORDER — MIDAZOLAM HYDROCHLORIDE 1 MG/ML
.25-5 INJECTION, SOLUTION INTRAMUSCULAR; INTRAVENOUS
Status: DISCONTINUED | OUTPATIENT
Start: 2020-07-02 | End: 2020-07-02 | Stop reason: HOSPADM

## 2020-07-02 RX ADMIN — GABAPENTIN 800 MG: 300 CAPSULE ORAL at 22:31

## 2020-07-02 RX ADMIN — SUCRALFATE 1 G: 1 TABLET ORAL at 14:57

## 2020-07-02 RX ADMIN — ACETAMINOPHEN 650 MG: 325 TABLET ORAL at 21:23

## 2020-07-02 RX ADMIN — GABAPENTIN 800 MG: 300 CAPSULE ORAL at 18:16

## 2020-07-02 RX ADMIN — LIDOCAINE HYDROCHLORIDE 50 MG: 20 INJECTION, SOLUTION EPIDURAL; INFILTRATION; INTRACAUDAL; PERINEURAL at 10:47

## 2020-07-02 RX ADMIN — SODIUM CHLORIDE: 9 INJECTION, SOLUTION INTRAVENOUS at 10:41

## 2020-07-02 RX ADMIN — Medication 10 ML: at 12:28

## 2020-07-02 RX ADMIN — Medication 10 ML: at 22:32

## 2020-07-02 RX ADMIN — PROPOFOL 40 MG: 10 INJECTION, EMULSION INTRAVENOUS at 10:47

## 2020-07-02 RX ADMIN — PROPOFOL 30 MG: 10 INJECTION, EMULSION INTRAVENOUS at 10:48

## 2020-07-02 RX ADMIN — DEXTROSE MONOHYDRATE AND SODIUM CHLORIDE 100 ML/HR: 5; .9 INJECTION, SOLUTION INTRAVENOUS at 22:53

## 2020-07-02 RX ADMIN — SODIUM CHLORIDE 40 MG: 9 INJECTION, SOLUTION INTRAMUSCULAR; INTRAVENOUS; SUBCUTANEOUS at 09:16

## 2020-07-02 RX ADMIN — POTASSIUM CHLORIDE 10 MEQ: 7.46 INJECTION, SOLUTION INTRAVENOUS at 18:22

## 2020-07-02 RX ADMIN — PROPOFOL 10 MG: 10 INJECTION, EMULSION INTRAVENOUS at 10:55

## 2020-07-02 RX ADMIN — PROPOFOL 10 MG: 10 INJECTION, EMULSION INTRAVENOUS at 10:53

## 2020-07-02 RX ADMIN — SODIUM CHLORIDE 40 MG: 9 INJECTION, SOLUTION INTRAMUSCULAR; INTRAVENOUS; SUBCUTANEOUS at 22:27

## 2020-07-02 RX ADMIN — POTASSIUM CHLORIDE 10 MEQ: 7.46 INJECTION, SOLUTION INTRAVENOUS at 20:34

## 2020-07-02 RX ADMIN — SUCRALFATE 1 G: 1 TABLET ORAL at 22:31

## 2020-07-02 RX ADMIN — DEXTROSE MONOHYDRATE AND SODIUM CHLORIDE 100 ML/HR: 5; .9 INJECTION, SOLUTION INTRAVENOUS at 08:48

## 2020-07-02 RX ADMIN — PROPOFOL 10 MG: 10 INJECTION, EMULSION INTRAVENOUS at 10:50

## 2020-07-02 RX ADMIN — SUCRALFATE 1 G: 1 TABLET ORAL at 18:16

## 2020-07-02 RX ADMIN — POTASSIUM CHLORIDE 10 MEQ: 7.46 INJECTION, SOLUTION INTRAVENOUS at 15:07

## 2020-07-02 RX ADMIN — MAGNESIUM SULFATE HEPTAHYDRATE 4 G: 40 INJECTION, SOLUTION INTRAVENOUS at 15:15

## 2020-07-02 RX ADMIN — Medication 10 ML: at 15:07

## 2020-07-02 RX ADMIN — PROPOFOL 10 MG: 10 INJECTION, EMULSION INTRAVENOUS at 10:51

## 2020-07-02 RX ADMIN — POTASSIUM CHLORIDE 10 MEQ: 7.46 INJECTION, SOLUTION INTRAVENOUS at 16:39

## 2020-07-02 RX ADMIN — PROPOFOL 10 MG: 10 INJECTION, EMULSION INTRAVENOUS at 10:49

## 2020-07-02 NOTE — ROUTINE PROCESS
Gokul Ion  10/9/1933  [id number]    Situation:  Verbal report received from: NANDO Fernández RN  Procedure: Procedure(s):  ESOPHAGOGASTRODUODENOSCOPY (EGD)  RESOLUTION CLIP    Background:    Preoperative diagnosis: coffee ground emesis  Postoperative diagnosis: ulcerative Esophagitis, hiatal hernia, GE junction ulcer    :  Dr. Pricilla Patrick  Assistant(s): Endoscopy Technician-1: Lloyd Pulido  Endoscopy RN-1: Rudy Daly RN    Specimens: * No specimens in log *  H. Pylori  no    Assessment:  Intra-procedure medications   Anesthesia gave intra-procedure sedation and medications, see anesthesia flow sheet yes    Intravenous fluids: NS@ KVO     Vital signs stable     Abdominal assessment: round and soft     Recommendation:  Discharge patient per MD order.   Return to floor [phone] to share finding with family or friend yes

## 2020-07-02 NOTE — PROCEDURES
Esophagogastroduodenoscopy Procedure Note      Leighann Nguyen  10/9/1933  [id number]    Indication:  N/V with Hematemesis and UGI bleed     Endoscopist: Matthew Guadalupe MD    Referring Provider:  Na Potter NP    Sedation:  MAC anesthesia Propofol    Procedure Details:  After infomed consent was obtained for the procedure, with all risks and benefits of procedure explained the patient was taken to the endoscopy suite and placed in the left lateral decubitus position. Following sequential administration of sedation as per above, the endoscope was inserted into the mouth and advanced under direct vision to second portion of the duodenum. A careful inspection was made as the gastroscope was withdrawn, including a retroflexed view of the proximal stomach; findings and interventions are described below. Findings:     Esophagus:   + NG tube in place with dark material (mixture of clot and food debris at GE junction)    ++ Friability and slight oozing at GE junction with linear ulceration s/p Single clip placement. There was also LA Grade B ulcerative esophagitis in the lower 1/3 of esophagus. We removed NG tube. + 4 cm Hiatal hernia    Stomach:   + Dark clot mixed with food particles in stomach but NO Ulcers seen in stomach. Mild erythema but no active bleeding. Duodenum:   - NORMAL bulb and 2nd portion. Therapies:  See above    Specimen: Specimens were collected as described and send to the laboratory. Complications:   None were encountered during the procedure. EBL:  < 10 ml.           Recommendations:   -Continue IV PPI BID  -Carafate QID  -stop anticoagulation for now  -sips of clears    Matthew Guadalupe MD  7/2/2020  11:01 AM

## 2020-07-02 NOTE — PROGRESS NOTES
Problem: Falls - Risk of  Goal: *Absence of Falls  Description: Document Tracey Dear Fall Risk and appropriate interventions in the flowsheet. Outcome: Progressing Towards Goal  Note: Fall Risk Interventions:                                Problem: Falls - Risk of  Goal: *Absence of Falls  Description: Document Babs Fall Risk and appropriate interventions in the flowsheet.   Outcome: Progressing Towards Goal  Note: Fall Risk Interventions:

## 2020-07-02 NOTE — PERIOP NOTES
TRANSFER - OUT REPORT:    Verbal report given to Daksha SHRESTHA on Mars Rizo  being transferred to [facility](unit) for routine progression of care       Report consisted of patients Situation, Background, Assessment and   Recommendations(SBAR). Information from the following report(s) Procedure Summary was reviewed with the receiving nurse. Opportunity for questions and clarification was provided.

## 2020-07-02 NOTE — ANESTHESIA PREPROCEDURE EVALUATION
Anesthetic History   No history of anesthetic complications            Review of Systems / Medical History  Patient summary reviewed, nursing notes reviewed and pertinent labs reviewed    Pulmonary  Within defined limits                 Neuro/Psych       CVA (foot drop)    Pertinent negatives: No TIA   Cardiovascular    Hypertension        Dysrhythmias : atrial fibrillation  Hyperlipidemia    Exercise tolerance: <4 METS: Uses walker  Comments: H/o heart murmur  1-2009 ECHO: 60-65% EF  Currently in Sinus Rhythm   GI/Hepatic/Renal     GERD: poorly controlled      PUD (Gastric)    Comments: Lewis's Esoph Endo/Other        Arthritis, cancer (Ovarian CA, with 9 months chemo  1986) and anemia     Other Findings   Comments: Hb 7.1 - receiving one unit PRBC's  Scoliosis  Peripheral neuropathy, feet  [place] with hearing aides  NGT in place         Physical Exam    Airway  Mallampati: I  TM Distance: < 4 cm  Neck ROM: normal range of motion   Mouth opening: Normal     Cardiovascular  Regular rate and rhythm,  S1 and S2 normal,  no murmur, click, rub, or gallop  Rhythm: regular  Rate: normal         Dental    Dentition: Upper partial plate     Pulmonary  Breath sounds clear to auscultation               Abdominal  GI exam deferred       Other Findings            Anesthetic Plan    ASA: 3  Anesthesia type: total IV anesthesia and general          Induction: Intravenous  Anesthetic plan and risks discussed with: Patient      Propofol MAC

## 2020-07-02 NOTE — PROGRESS NOTES
CARDIOLOGY Progress Note    Patient ID:  Patient: Cammie Clemons  MRN: [id number]  Age: 80 y.o.  : 10/9/1933    Date of  Admission: 2020 11:50 AM   PCP:  Artemio Palomino NP     Assessment: 1. Paroxysmal atrial fibrillation with RVR, minimal to no symptoms. In sinus currently, appeared to be in Afib earlier with RVR. 2. Acute blood loss anemia with GI bleeding. 3. Chronic LBBB. 4. PVC's. 5. Last echo EF 60%, mild valve disease. 6. Hyperlipidemia. 7. Diabetes mellitus type 2.  8. Hypercholesterolemia. 9. CKD stage 3.  10. GERD/Lewis's esophagus, gastroparesis. 11. Scoliosis/arthritis s/p extensive thoracolumbar fusion , now with broken hardware, s/p LUCY 2013 (effective), neuropathy since knee surgery . 12. History of ovarian cancer s/p chemo. 13. Back pain. Fractured thoracolumbar fusion rods, stable. Protruding pedicle screws, stable. 4. Neural foraminal stenoses. 14. Walks with a walker. 15. DNR.        Plan:      1. In the past, was holding on antiarrhythmic therapy given the low burden of arrhythmia and minimal sx. If can't anticoagulate though, will use a low dose of prophylactic medication for Afib suppression. Start propafenone  mg po TID, may discharge her on only BID dosing of this drug given her age. 2. Considering the anemia and bleeding risk, I would NOT use anticoagulation or antiplatelet therapy. Very reluctant to do it in 2019, gave it a try later, as of 2020 have to stop it. 3. Not a good candidate for a Watchman device (OJHN occlusion) as it requires 6 weeks of anticoagulation upfront. 4. Not a good candidate for Afib ablation as it requires 2-3 months of anticoagulation post-procedure. 5. Continue statin.     I spoke with Dr. Liliana Chang, he'll consider an EGD in 2 months. If she has healed, then will open up the discussion on whether low dose anticoagulation can be reattempted.       [x]?        High complexity decision making was performed in this patient.     Lemuel Cheatham is a 80 y.o. female admitted with N/V and GI bleeding. She is anemic with Hgb 7's. Last took Eliquis this AM.  She denies syncope, dizziness, palpitations. Family present in the room.       Past Medical History:   Diagnosis Date    Arthritis     Diarrhea 6/6/2016    Foot drop     Gastric ulcer 6/14/2012    Gastroparesis     GERD (gastroesophageal reflux disease)     High cholesterol     Hypertension     Neuropathy     Ovarian cancer ([facility] 1986    chemo x 9 mos    Scoliosis     Stroke ([facility] 2002    ? stroke with drop feet, per patient CT scans were negative        Past Surgical History:   Procedure Laterality Date    FLEXIBLE SIGMOIDOSCOPY N/A 6/6/2016    SIGMOIDOSCOPY FLEXIBLE performed by Krishna Dexter [facility] ENDOSCOPY    HX ORTHOPAEDIC      back    HX ORTHOPAEDIC      bilateral shoulder replacements    HX ORTHOPAEDIC      left knee replacement    HX LYDIA AND BSO  1986    IN EGD TRANSORAL BIOPSY SINGLE/MULTIPLE  1/26/2012         IN EGD TRANSORAL BIOPSY SINGLE/MULTIPLE  6/14/2012            Social History     Tobacco Use    Smoking status: Never Smoker    Smokeless tobacco: Never Used   Substance Use Topics    Alcohol use: No        History reviewed. No pertinent family history.      No Known Allergies       Current Facility-Administered Medications   Medication Dose Route Frequency    0.9% sodium chloride infusion 250 mL  250 mL IntraVENous PRN    sodium chloride (NS) flush 5-40 mL  5-40 mL IntraVENous Q8H    sodium chloride (NS) flush 5-40 mL  5-40 mL IntraVENous PRN    sucralfate (CARAFATE) tablet 1 g  1 g Oral AC&HS    potassium chloride 10 mEq in 100 ml IVPB  10 mEq IntraVENous Q2H    magnesium sulfate 4 g/100 mL IVPB  4 g IntraVENous ONCE    gabapentin (NEURONTIN) capsule 800 mg  800 mg Per NG tube QID    0.9% sodium chloride infusion 250 mL  250 mL IntraVENous PRN    pantoprazole (PROTONIX) 40 mg in 0.9% sodium chloride 10 mL injection  40 mg IntraVENous Q12H    sodium chloride (NS) flush 5-40 mL  5-40 mL IntraVENous Q8H    sodium chloride (NS) flush 5-40 mL  5-40 mL IntraVENous PRN    dextrose 5% and 0.9% NaCl infusion  100 mL/hr IntraVENous CONTINUOUS    ondansetron (ZOFRAN) injection 4 mg  4 mg IntraVENous Q6H PRN    morphine injection 1 mg  1 mg IntraVENous Q4H PRN    LORazepam (ATIVAN) injection 0.3 mg  0.3 mg IntraVENous QHS PRN       Review of Symptoms:  Noncontributory except as noted above.   General: negative for fever, chills, sweats, weakness, weight loss   Eyes: negative for blurred vision, eye pain, loss of vision, diplopia   Ear Nose and Throat: negative for rhinorrhea, pharyngitis, otalgia, tinnitus, speech or swallowing difficulties   Respiratory: negative for SOB, cough, sputum production, wheezing, LEVINE, pleuritic pain   Cardiology: negative for chest pain, palpitations, orthopnea, PND, edema, syncope   Gastrointestinal: negative for abdominal pain, N/V, dysphagia, change in bowel habits, bleeding   Genitourinary: negative for frequency, urgency, dysuria, hematuria, incontinence   Muskuloskeletal : negative for arthralgia, myalgia   Hematology: negative for easy bruising, bleeding, lymphadenopathy   Dermatological: negative for rash, ulceration, mole change, new lesion   Endocrine: negative for hot flashes or polydipsia   Neurological: negative for headache, dizziness, confusion, focal weakness, paresthesia, memory loss, gait disturbance   Psychological: negative for anxiety, depression, agitation       Objective:      Physical Exam:  Temp (24hrs), Av.1 °F (36.7 °C), Min:97.6 °F (36.4 °C), Max:98.4 °F (36.9 °C)    Patient Vitals for the past 8 hrs:   Pulse   20 1453 76   20 1218 72   20 1138 68   20 1132 84   20 1126 84   20 1119 72   20 1116 70   20 1101 71   20 1006 70   20 0855 74   20 0846 75    Patient Vitals for the past 8 hrs:   Resp 07/02/20 1453 18   07/02/20 1218 18   07/02/20 1138 22   07/02/20 1132 18   07/02/20 1126 16   07/02/20 1119 16   07/02/20 1116 14   07/02/20 1101 15   07/02/20 1006 16   07/02/20 0855 16   07/02/20 0846 18    Patient Vitals for the past 8 hrs:   BP   07/02/20 1453 158/70   07/02/20 1218 154/83   07/02/20 1138 168/71   07/02/20 1132 159/78   07/02/20 1126 150/79   07/02/20 1119 (!) 145/91   07/02/20 1116 140/79   07/02/20 1101 126/65   07/02/20 1006 149/69   07/02/20 0855 137/61   07/02/20 0846 141/57          Intake/Output Summary (Last 24 hours) at 7/2/2020 1643  Last data filed at 7/2/2020 1128  Gross per 24 hour   Intake 270 ml   Output --   Net 270 ml       Nondiaphoretic, not in acute distress. Pale. Supple, no palpable thyromegaly. No scleral icterus, mucous membranes moist, no xanthelasma. Unlabored, clear to auscultation bilaterally anteriorly, symmetric air movement. Regular rate and rhythm, no murmur, pericardial rub, knock, or gallop. No JVD or peripheral edema. Palpable radial pulses bilaterally. Abdomen, soft, nontender, nondistended. Extremities without cyanosis or clubbing. Muscle tone and bulk normal.  Skin warm and dry. No rashes or ulcers. Neuro grossly nonfocal.  No tremor. Awake and appropriate. CARDIOGRAPHICS and STUDIES, I reviewed:    Telemetry:  SR 70's. ECG:   Afib with RVR, though artifact noted at baseline. LBBB 130 msec.        Labs:  Recent Labs     07/01/20  1310   TROIQ <0.05     Lab Results   Component Value Date/Time    Cholesterol, total 138 07/20/2018 12:00 AM    HDL Cholesterol 46 07/20/2018 12:00 AM    LDL, calculated 66 07/20/2018 12:00 AM    Triglyceride 132 07/20/2018 12:00 AM    CHOL/HDL Ratio 3.2 09/20/2010 10:16 AM     Recent Labs     07/02/20  0335 07/01/20  1310   INR 1.1 1.1   PTP 11.4* 11.4*      Recent Labs     07/02/20  0335 07/01/20  2101 07/01/20  1310     --  137   K 3.0*  --  2.8*     --  102   CO2 27  --  27   BUN 22*  --  29* CREA 0.70  --  0.77   *  --  142*   CA 8.4*  --  8.9   ALB  --   --  3.4*   WBC 8.8  --  14.7*   HGB 7.1* 8.6* 7.2*   HCT 25.8* 30.7* 26.9*     --  316     Recent Labs     07/01/20  1310   AP 49   TP 7.2   ALB 3.4*   GLOB 3.8   LPSE 130     No components found for: GLPOC  No results for input(s): PH, PCO2, PO2 in the last 72 hours.       Signed By: Harish Kate MD     July 2, 2020

## 2020-07-02 NOTE — ANESTHESIA POSTPROCEDURE EVALUATION
Procedure(s):  ESOPHAGOGASTRODUODENOSCOPY (EGD)  RESOLUTION CLIP.    total IV anesthesia    Anesthesia Post Evaluation        Patient location during evaluation: PACU  Note status: Adequate. Level of consciousness: responsive to verbal stimuli and sleepy but conscious  Pain management: satisfactory to patient  Airway patency: patent  Anesthetic complications: no  Cardiovascular status: acceptable  Respiratory status: acceptable  Hydration status: acceptable  Comments: +Post-Anesthesia Evaluation and Assessment    Patient: Usha Oakes MRN: [id number]  SSN: [id number]   YOB: 1933  Age: 80 y.o. Sex: female      Cardiovascular Function/Vital Signs    /71   Pulse 68   Temp 36.4 °C (97.6 °F)   Resp 22   Ht 5' (1.524 m)   Wt 68 kg (150 lb)   SpO2 91%   Breastfeeding No   BMI 29.29 kg/m²     Patient is status post Procedure(s):  ESOPHAGOGASTRODUODENOSCOPY (EGD)  RESOLUTION CLIP. Nausea/Vomiting: Controlled. Postoperative hydration reviewed and adequate. Pain:  Pain Scale 1: Numeric (0 - 10) (07/02/20 1138)  Pain Intensity 1: 0 (07/02/20 1138)   Managed. Neurological Status: At baseline. Mental Status and Level of Consciousness: Arousable. Pulmonary Status:   O2 Device: Room air (07/02/20 1138)   Adequate oxygenation and airway patent. Complications related to anesthesia: None    Post-anesthesia assessment completed. No concerns.     Signed By: Lior Delacruz MD    7/2/2020  Post anesthesia nausea and vomiting:  controlled      INITIAL Post-op Vital signs:   Vitals Value Taken Time   /71 7/2/2020 11:38 AM   Temp 36.4 °C (97.6 °F) 7/2/2020 11:16 AM   Pulse 68 7/2/2020 11:38 AM   Resp 22 7/2/2020 11:38 AM   SpO2 91 % 7/2/2020 11:38 AM

## 2020-07-02 NOTE — ACP (ADVANCE CARE PLANNING)
Request by IVETH Macedo to assist with advance medical directive. Explained document to patient and pt's daughter, who were present in the room. Assisted patient in completing the document. Made copy for patients chart and returned original document and copies to the patient. Naga Rodriguez MDiv.  Staff   Request  Support/Spiritual Care Services via [facility]

## 2020-07-02 NOTE — PROGRESS NOTES
1750  TRANSFER - IN REPORT:    Verbal report received from HENRIETTA Clark(name) on Hua Chavarria  being received from ED(unit) for routine progression of care      Report consisted of patients Situation, Background, Assessment and   Recommendations(SBAR). Information from the following report(s) SBAR, Kardex, [facility] and Cardiac Rhythm NSR was reviewed with the receiving nurse. Opportunity for questions and clarification was provided. Assessment completed upon patients arrival to unit and care assumed. 0450  Patient arrived to room      1900   Bedside shift change report given to Bryan Gan RN (oncoming nurse) by Nida Vargas RN (offgoing nurse). Report included the following information SBAR, Kardex, MAR and Cardiac Rhythm NSR.

## 2020-07-02 NOTE — PROGRESS NOTES
Spiritual Care Assessment/Progress Note  [facility]      NAME: Yenni Uribe      MRN: [id number]  AGE: 80 y.o.  SEX: female  Nondenominational Affiliation: Buddhism   Language: English     7/2/2020     Total Time (in minutes): 36     Spiritual Assessment begun in [facility] CARDIOPULMONARY CARE through conversation with:         [x]Patient        [x] Family    [] Friend(s)        Reason for Consult: Advance medical directive consult     Spiritual beliefs: (Please include comment if needed)     [] Identifies with a kyle tradition:         [] Supported by a kyle community:            [] Claims no spiritual orientation:           [] Seeking spiritual identity:                [] Adheres to an individual form of spirituality:           [x] Not able to assess:                           Identified resources for coping:      [] Prayer                               [] Music                  [] Guided Imagery     [x] Family/friends                 [] Pet visits     [] Devotional reading                         [] Unknown     [] Other:                                            Interventions offered during this visit: (See comments for more details)    Patient Interventions: Advance medical directive consult, Advance medical directive completed, Initial/Spiritual assessment, patient floor     Family/Friend(s): Advance medical directive consult, Advance medical directive completed     Plan of Care:     [] Support spiritual and/or cultural needs    [x] Support AMD and/or advance care planning process      [] Support grieving process   [] Coordinate Rites and/or Rituals    [] Coordination with community clergy   [] No spiritual needs identified at this time   [] Detailed Plan of Care below (See Comments)  [] Make referral to Music Therapy  [] Make referral to Pet Therapy     [] Make referral to Addiction services  [] Make referral to [facility]  [] Make referral to Spiritual Care Partner  [] No future visits requested        [x] Follow up visits as needed     Comments: Request by IVETH Hunt to assist with advance medical directive. Explained document to patient and pt's daughter, who were present in the room. Assisted patient in completing the document. Made copy for patients chart and returned original document and copies to the patient. Yvette Sadler MDiv.  Staff   Request  Support/Spiritual Care Services via [address] [place] [address]

## 2020-07-02 NOTE — PROGRESS NOTES
Hospitalist Progress Note    NAME: Lemuel Cheatham   :  10/9/1933   MRN:  [id number]       Assessment / Plan:  Upper GIB POA  Ulcerative esophagitis  -GI consulted appreciated, s/p EGD   -continue to hold [facility], discussion re long term resumption of AC (at low dose) ongoing. OP follow-up with GI, possible GI in 2 months to eval healing of ulcerations   -am cbc    Esophagus:   +  NG tube in place with dark material (mixture of clot and food debris at GE junction)    ++ Friability and slight oozing at GE junction with linear ulceration s/p Single clip placement. There was also LA Grade B ulcerative esophagitis in the lower 1/3 of esophagus. We removed NG tube. +  4 cm Hiatal hernia  Stomach:   + Dark clot mixed with food particles in stomach but NO Ulcers seen in stomach. Mild erythema but no active bleeding. Duodenum:   -   NORMAL bulb and 2nd portion. CT abdomen and pelvis with contrast showed:  1. Equivocal source of bleeding in posterior aspect of proximal gastric body,  although This could represent ingested material. Ultimately, endoscopic  correlation may be necessary. 2. Small-moderate hiatal hernia. 3. Moderate retained fecal material in rectum and distal sigmoid colon. 4. Bilateral renal cysts     A. fib with RVR  Currently resolved with IV fluid bolus, Eliquis because of GI bleed   Rate currently around 80  -appreciate cardiology consult  -not watchman or ablation candidate d/t need of temporary anticoagulation in both cases  -note plan for propafenone to suppress Afib in light of inability to anticoagulate     Leucocytosis, most likely reactive to GI bleed  -resolved     Hypokalemia k 2.8  S/p IVK in ED   Repeat BMP tomorrow am   Check mg      Hypertension  BP meds on hold  Chronic back pain  PRN morphine while n.p.o.   Code Status: DNR, discussed with the patient and daughter at bedside  Surrogate Decision Maker:     Subjective:     Chief Complaint / Reason for Physician Visit  No acute distress, resting in bed, Dtr at bedside    Discussed with RN events overnight. Review of Systems:  Symptom Y/N Comments  Symptom Y/N Comments   Fever/Chills n   Chest Pain n    Poor Appetite n   Edema     Cough n   Abdominal Pain n    Sputum n   Joint Pain     SOB/LEVINE n   Pruritis/Rash     Nausea/vomit n   Tolerating PT/OT     Diarrhea n   Tolerating Diet     Constipation n   Other       Could NOT obtain due to:      Objective:     VITALS:   Last 24hrs VS reviewed since prior progress note. Most recent are:  Patient Vitals for the past 24 hrs:   Temp Pulse Resp BP SpO2   07/02/20 1218 98.3 °F (36.8 °C) 72 18 154/83 91 %   07/02/20 1138 -- 68 22 168/71 91 %   07/02/20 1132 -- 84 18 159/78 92 %   07/02/20 1126 -- 84 16 150/79 91 %   07/02/20 1119 -- 72 16 (!) 145/91 94 %   07/02/20 1116 97.6 °F (36.4 °C) 70 14 140/79 94 %   07/02/20 1101 -- 71 15 126/65 95 %   07/02/20 1006 -- 70 16 149/69 100 %   07/02/20 0855 98 °F (36.7 °C) 74 16 137/61 94 %   07/02/20 0846 98.2 °F (36.8 °C) 75 18 141/57 95 %   07/02/20 0819 98 °F (36.7 °C) 72 18 133/67 94 %   07/02/20 0804 97.8 °F (36.6 °C) 71 18 123/59 96 %   07/02/20 0721 98.2 °F (36.8 °C) 78 18 136/57 94 %   07/02/20 0352 98 °F (36.7 °C) 74 18 138/69 96 %   07/01/20 2249 98 °F (36.7 °C) 74 20 116/54 95 %   07/01/20 2002 98.4 °F (36.9 °C) 74 18 113/58 94 %   07/01/20 1630 -- 97 15 116/66 94 %   07/01/20 1605 98.1 °F (36.7 °C) 94 14 107/84 97 %   07/01/20 1600 -- 92 11 107/54 96 %   07/01/20 1550 97.8 °F (36.6 °C) 93 12 105/48 95 %   07/01/20 1530 -- (!) 101 15 108/57 97 %   07/01/20 1500 -- 95 21 111/49 95 %   07/01/20 1400 -- (!) 105 18 114/70 96 %   07/01/20 1245 -- (!) 133 23 -- 97 %       Intake/Output Summary (Last 24 hours) at 7/2/2020 1233  Last data filed at 7/2/2020 1128  Gross per 24 hour   Intake 270 ml   Output --   Net 270 ml        PHYSICAL EXAM:  General: WD, WN. Alert, cooperative, no acute distress    EENT:  EOMI. Anicteric sclerae.  MMM  Resp:  CTA bilaterally, no wheezing or rales. No accessory muscle use  CV:  Regular  rhythm,  No edema  GI:  Soft, Non distended, Non tender.  +Bowel sounds  Neurologic:  Alert and oriented X 3, normal speech,   Psych:   Good insight. Not anxious nor agitated  Skin:  No rashes. No jaundice    Reviewed most current lab test results and cultures  YES  Reviewed most current radiology test results   YES  Review and summation of old records today    NO  Reviewed patient's current orders and MAR    YES  PMH/SH reviewed - no change compared to H&P  ________________________________________________________________________  Care Plan discussed with:    Comments   Patient x    Family  x daughter   RN x    Care Manager     Consultant                        Multidiciplinary team rounds were held today with , nursing, pharmacist and clinical coordinator. Patient's plan of care was discussed; medications were reviewed and discharge planning was addressed. ________________________________________________________________________  Total NON critical care TIME: 35   Minutes    Total CRITICAL CARE TIME Spent:   Minutes non procedure based      Comments   >50% of visit spent in counseling and coordination of care x    ________________________________________________________________________  Vearl Naomi, DO     Procedures: see electronic medical records for all procedures/Xrays and details which were not copied into this note but were reviewed prior to creation of Plan. LABS:  I reviewed today's most current labs and imaging studies.   Pertinent labs include:  Recent Labs     07/02/20  0335 07/01/20  2101 07/01/20  1310   WBC 8.8  --  14.7*   HGB 7.1* 8.6* 7.2*   HCT 25.8* 30.7* 26.9*     --  316     Recent Labs     07/02/20  0335 07/01/20  1310    137   K 3.0* 2.8*    102   CO2 27 27   * 142*   BUN 22* 29*   CREA 0.70 0.77   CA 8.4* 8.9   MG 1.0*  --    ALB  --  3.4*   TBILI  --  0.4 ALT  --  15   INR 1.1 1.1       Signed: Janes Dawkins DO

## 2020-07-02 NOTE — ROUTINE PROCESS
Pt on gabapentin at home, requesting.  - d/w nursing, Pt still with coffee grounds in , feel it is not wise to put anything in her stomach at this time  - ativan 0.3mg qhs prn ordered in case she has difficulty sleeping/pain without her gabapentin

## 2020-07-02 NOTE — PERIOP NOTES
Endoscope was pre-cleaned at the bedside immediately following procedure by Minerva Morin ET     glasses returned to patient      .   Medications     Medication Rate/Dose/Volume Action Route Date Time   Administering User Audit    lidocaine (PF) 2% (mg) 50 mg Given IntraVENous 07/02/20  1047  WasMarilee herrera CRNA     propofol 10 mg/mL (mg) 40 mg Given IntraVENous 07/02/20  1047  Wass, Marilee B, CRNA      30 mg Given IntraVENous  1048  Wass, Marilee B, CRNA      10 mg Given IntraVENous  1049  Wass, Marilee B, CRNA      10 mg Given IntraVENous  1050  Wass, Marilee B, CRNA      10 mg Given IntraVENous  1051  Wass, Marilee B, CRNA      10 mg Given IntraVENous  1053  Wass, Marilee B, CRNA      10 mg Given IntraVENous  1055  Wass, Ramiro Phillip B CRNA     NS (mL)  New Bag IntraVENous 07/02/20  1041  WassMarilee B, CRNA      250 mL Stopped IntraVENous  1100  Bethel Cahyo B CRNA

## 2020-07-02 NOTE — PROGRESS NOTES
Reason for Admission:   Upper GI Bleed, Atrial fibrillation w/RVR                  RUR Score:  19 Moderate risk for admission                PCP: First and Last name:  Brunilda Baugh NP   Name of Practice:    Are you a current patient: Yes/No: Yes   Approximate date of last visit: 6/30/2020   Can you participate in a virtual visit if needed: No    Do you (patient/family) have any concerns for transition/discharge? None               Plan for utilizing home health:   Pt has not had home health in the past. Pt is receptive to home health at d/c. Current Advanced Directive/Advance Care Plan:  Pt is DNR code status. Pt does not have an ACP on file. CM addressed with pt about AMD. Pt stated that she would like to address AMD. CM called Spiritual Care Services to have AMD addressed. Transition of Care Plan:       Home vs Home with Home Health  Follow-up Appointments  2nd IM Letter    CM met with pt at bedside to discuss d/c plan. Pt was alert and oriented. CM verified pt's demographics, insurance and PCP. Pt is a 81 y/o  female admitted to [facility] on 7/1/2020 for Upper GI Bleed and atrial fibrillation w/ RVR. Pt's NP is Brunilda Baugh. Pt sees NP every 3-4 months. Pt uses [address] [employer] for Rx. Pt is in independent living at [facility]. Pt is independent with ADL's and IADL's. Pt does not drive. Pt's daughters transport when necessary. Pt has a scooter, wheelchair, walker, shower chair, and high toilet seat. Pt had not had home health or been in a SNF in a the past. Pt has been in [facility] in the past. Pt is DNR code status. Pt does not have an ACP on file. Pt's daughter will transport at discharge. CM discussed with pt about home health. Pt stated that she is not sure if she would need home health at this time. Pt stated that she would like to make that determination closer to d/c. Care Management Interventions  PCP Verified by CM: Yes(Pt's NP is Brunilda Baugh.  Pt sees NP every 3-4 months.)  Palliative Care Criteria Met (RRAT>21 & CHF Dx)?: No  Mode of Transport at Discharge: Other (see comment)(Pt's daughter will transport at discharge. )  Transition of Care Consult (CM Consult): Discharge Planning(Home vs Home with [address])  Discharge Durable Medical Equipment: No(Pt has a scooter, wheelchair, walker, shower chair, and high toilet seat.)  Physical Therapy Consult: No  Occupational Therapy Consult: No  Speech Therapy Consult: No  Current Support Network: Other, Lives Alone( Pt is in independent living at [facility].)  Confirm Follow Up Transport: Family(Pt does not drive. Pt's daughters transport when necessary.)  [facility] Provided?: No  Discharge Location  Discharge Placement: (Home vs Home with [address] [facility])    CM will continue to follow patient for discharge planning needs and arrange for services as deemed necessary.     Sherly Rojas [address]  [phone]

## 2020-07-02 NOTE — PROGRESS NOTES
Blood started 0826  Blood completed in Endoscopy at 1030.    1215  Patient arrived back on unit. No complaints of pain.

## 2020-07-02 NOTE — PROGRESS NOTES
Received report from Michael moreno, [facility]. Assumed care of patient.

## 2020-07-03 ENCOUNTER — HOME HEALTH ADMISSION (OUTPATIENT)
Dept: HOME HEALTH SERVICES | Facility: HOME HEALTH | Age: 85
End: 2020-07-03
Payer: MEDICARE

## 2020-07-03 LAB
ABO + RH BLD: NORMAL
ANION GAP SERPL CALC-SCNC: 4 MMOL/L (ref 5–15)
BASOPHILS # BLD: 0.1 K/UL (ref 0–0.1)
BASOPHILS NFR BLD: 1 % (ref 0–1)
BLD PROD TYP BPU: NORMAL
BLD PROD TYP BPU: NORMAL
BLOOD GROUP ANTIBODIES SERPL: NORMAL
BPU ID: NORMAL
BPU ID: NORMAL
BUN SERPL-MCNC: 9 MG/DL (ref 6–20)
BUN/CREAT SERPL: 13 (ref 12–20)
CALCIUM SERPL-MCNC: 8.5 MG/DL (ref 8.5–10.1)
CHLORIDE SERPL-SCNC: 110 MMOL/L (ref 97–108)
CO2 SERPL-SCNC: 28 MMOL/L (ref 21–32)
CREAT SERPL-MCNC: 0.69 MG/DL (ref 0.55–1.02)
CROSSMATCH RESULT,%XM: NORMAL
CROSSMATCH RESULT,%XM: NORMAL
DIFFERENTIAL METHOD BLD: ABNORMAL
EOSINOPHIL # BLD: 0.3 K/UL (ref 0–0.4)
EOSINOPHIL NFR BLD: 3 % (ref 0–7)
ERYTHROCYTE [DISTWIDTH] IN BLOOD BY AUTOMATED COUNT: 21 % (ref 11.5–14.5)
GLUCOSE SERPL-MCNC: 101 MG/DL (ref 65–100)
HCT VFR BLD AUTO: 31.4 % (ref 35–47)
HGB BLD-MCNC: 8.9 G/DL (ref 11.5–16)
IMM GRANULOCYTES # BLD AUTO: 0 K/UL (ref 0–0.04)
IMM GRANULOCYTES NFR BLD AUTO: 0 % (ref 0–0.5)
LYMPHOCYTES # BLD: 1.6 K/UL (ref 0.8–3.5)
LYMPHOCYTES NFR BLD: 18 % (ref 12–49)
MCH RBC QN AUTO: 20.9 PG (ref 26–34)
MCHC RBC AUTO-ENTMCNC: 28.3 G/DL (ref 30–36.5)
MCV RBC AUTO: 73.9 FL (ref 80–99)
MONOCYTES # BLD: 0.6 K/UL (ref 0–1)
MONOCYTES NFR BLD: 6 % (ref 5–13)
NEUTS SEG # BLD: 6.5 K/UL (ref 1.8–8)
NEUTS SEG NFR BLD: 72 % (ref 32–75)
NRBC # BLD: 0 K/UL (ref 0–0.01)
NRBC BLD-RTO: 0 PER 100 WBC
PLATELET # BLD AUTO: 244 K/UL (ref 150–400)
PMV BLD AUTO: 10 FL (ref 8.9–12.9)
POTASSIUM SERPL-SCNC: 3.1 MMOL/L (ref 3.5–5.1)
RBC # BLD AUTO: 4.25 M/UL (ref 3.8–5.2)
SODIUM SERPL-SCNC: 142 MMOL/L (ref 136–145)
SPECIMEN EXP DATE BLD: NORMAL
STATUS OF UNIT,%ST: NORMAL
STATUS OF UNIT,%ST: NORMAL
UNIT DIVISION, %UDIV: 0
UNIT DIVISION, %UDIV: 0
WBC # BLD AUTO: 9.1 K/UL (ref 3.6–11)

## 2020-07-03 PROCEDURE — 74011000258 HC RX REV CODE- 258: Performed by: INTERNAL MEDICINE

## 2020-07-03 PROCEDURE — 74011000250 HC RX REV CODE- 250: Performed by: INTERNAL MEDICINE

## 2020-07-03 PROCEDURE — 74011250636 HC RX REV CODE- 250/636: Performed by: INTERNAL MEDICINE

## 2020-07-03 PROCEDURE — 80048 BASIC METABOLIC PNL TOTAL CA: CPT

## 2020-07-03 PROCEDURE — 74011250637 HC RX REV CODE- 250/637: Performed by: INTERNAL MEDICINE

## 2020-07-03 PROCEDURE — C9113 INJ PANTOPRAZOLE SODIUM, VIA: HCPCS | Performed by: INTERNAL MEDICINE

## 2020-07-03 PROCEDURE — 36415 COLL VENOUS BLD VENIPUNCTURE: CPT

## 2020-07-03 PROCEDURE — 74011250637 HC RX REV CODE- 250/637: Performed by: SPECIALIST

## 2020-07-03 PROCEDURE — 85025 COMPLETE CBC W/AUTO DIFF WBC: CPT

## 2020-07-03 PROCEDURE — 77030038269 HC DRN EXT URIN PURWCK BARD -A

## 2020-07-03 PROCEDURE — 30233N1 TRANSFUSION OF NONAUTOLOGOUS RED BLOOD CELLS INTO PERIPHERAL VEIN, PERCUTANEOUS APPROACH: ICD-10-PCS | Performed by: INTERNAL MEDICINE

## 2020-07-03 PROCEDURE — 65660000001 HC RM ICU INTERMED STEPDOWN

## 2020-07-03 RX ADMIN — DEXTROSE MONOHYDRATE AND SODIUM CHLORIDE 100 ML/HR: 5; .9 INJECTION, SOLUTION INTRAVENOUS at 08:54

## 2020-07-03 RX ADMIN — Medication 10 ML: at 06:20

## 2020-07-03 RX ADMIN — Medication 10 ML: at 21:24

## 2020-07-03 RX ADMIN — PROPAFENONE HYDROCHLORIDE 150 MG: 150 TABLET, FILM COATED ORAL at 21:24

## 2020-07-03 RX ADMIN — GABAPENTIN 800 MG: 300 CAPSULE ORAL at 21:23

## 2020-07-03 RX ADMIN — SUCRALFATE 1 G: 1 TABLET ORAL at 21:23

## 2020-07-03 RX ADMIN — ACETAMINOPHEN 650 MG: 325 TABLET ORAL at 08:34

## 2020-07-03 RX ADMIN — SUCRALFATE 1 G: 1 TABLET ORAL at 08:34

## 2020-07-03 RX ADMIN — SUCRALFATE 1 G: 1 TABLET ORAL at 14:19

## 2020-07-03 RX ADMIN — ACETAMINOPHEN 650 MG: 325 TABLET ORAL at 16:05

## 2020-07-03 RX ADMIN — GABAPENTIN 800 MG: 300 CAPSULE ORAL at 17:51

## 2020-07-03 RX ADMIN — SODIUM CHLORIDE 40 MG: 9 INJECTION, SOLUTION INTRAMUSCULAR; INTRAVENOUS; SUBCUTANEOUS at 08:34

## 2020-07-03 RX ADMIN — Medication 10 ML: at 16:05

## 2020-07-03 RX ADMIN — SUCRALFATE 1 G: 1 TABLET ORAL at 16:30

## 2020-07-03 RX ADMIN — GABAPENTIN 800 MG: 300 CAPSULE ORAL at 14:19

## 2020-07-03 RX ADMIN — PROPAFENONE HYDROCHLORIDE 150 MG: 150 TABLET, FILM COATED ORAL at 06:27

## 2020-07-03 RX ADMIN — Medication 10 ML: at 14:00

## 2020-07-03 RX ADMIN — ACETAMINOPHEN 650 MG: 325 TABLET ORAL at 23:41

## 2020-07-03 RX ADMIN — PROPAFENONE HYDROCHLORIDE 150 MG: 150 TABLET, FILM COATED ORAL at 16:05

## 2020-07-03 RX ADMIN — GABAPENTIN 800 MG: 300 CAPSULE ORAL at 08:34

## 2020-07-03 RX ADMIN — SODIUM CHLORIDE 40 MG: 9 INJECTION, SOLUTION INTRAMUSCULAR; INTRAVENOUS; SUBCUTANEOUS at 21:23

## 2020-07-03 NOTE — PROGRESS NOTES
Problem: Falls - Risk of  Goal: *Absence of Falls  Description: Document Laurel Davalos Fall Risk and appropriate interventions in the flowsheet.   Outcome: Progressing Towards Goal  Note: Fall Risk Interventions:  Mobility Interventions: Assess mobility with egress test, Bed/chair exit alarm, Communicate number of staff needed for ambulation/transfer, OT consult for ADLs, Patient to call before getting OOB, PT Consult for mobility concerns, PT Consult for assist device competence, Strengthening exercises (ROM-active/passive), Utilize walker, cane, or other assistive device         Medication Interventions: Assess postural VS orthostatic hypotension, Bed/chair exit alarm, Evaluate medications/consider consulting pharmacy, Patient to call before getting OOB, Teach patient to arise slowly, Utilize gait belt for transfers/ambulation    Elimination Interventions: Bed/chair exit alarm, Patient to call for help with toileting needs, Stay With Me (per policy), Toilet paper/wipes in reach, Toileting schedule/hourly rounds

## 2020-07-03 NOTE — PROGRESS NOTES
Problem: Falls - Risk of  Goal: *Absence of Falls  Description: Document Wanda Guevara Fall Risk and appropriate interventions in the flowsheet.   Outcome: Progressing Towards Goal  Note: Fall Risk Interventions:  Mobility Interventions: Bed/chair exit alarm         Medication Interventions: Bed/chair exit alarm    Elimination Interventions: Bed/chair exit alarm

## 2020-07-03 NOTE — PROGRESS NOTES
CARDIOLOGY Progress Note    Patient ID:  Patient: Ghassan William  MRN: [id number]  Age: 80 y.o.  : 10/9/1933    Date of  Admission: 2020 11:50 AM   PCP:  Brandy Hughes NP     Assessment: 1. Paroxysmal atrial fibrillation with RVR, minimal to no symptoms. In sinus currently, appeared to be in Afib earlier with RVR. 2. Acute blood loss anemia with GI bleeding. 3. Chronic LBBB. 4. PVC's. 5. Last echo EF 60%, mild valve disease. 6. Hyperlipidemia. 7. Diabetes mellitus type 2.  8. Hypercholesterolemia. 9. CKD stage 3.  10. GERD/Lewis's esophagus, gastroparesis. 11. Scoliosis/arthritis s/p extensive thoracolumbar fusion , now with broken hardware, s/p LUCY 2013 (effective), neuropathy since knee surgery . 12. History of ovarian cancer s/p chemo. 13. Back pain. Fractured thoracolumbar fusion rods, stable. Protruding pedicle screws, stable. 4. Neural foraminal stenoses. 14. Walks with a walker. 15. DNR.        Plan:      1. In the past, was holding on antiarrhythmic therapy given the low burden of arrhythmia and minimal sx. If can't anticoagulate though, will use a low dose of prophylactic medication for Afib suppression. Start propafenone  mg po TID, may discharge her on only BID dosing of this drug given her age. 2. Considering the anemia and bleeding risk, I would NOT use anticoagulation or antiplatelet therapy. Very reluctant to do it in 2019, gave it a try later, as of 2020 have to stop it. 3. Not a good candidate for a Watchman device (JOHN occlusion) as it requires 6 weeks of anticoagulation upfront. 4. Not a good candidate for Afib ablation as it requires 2-3 months of anticoagulation post-procedure. 5. Continue statin.     I spoke with Dr. Grace Harvey, he'll consider an EGD in 2 months. If she has healed, then will open up the discussion on whether low dose anticoagulation can be reattempted. 7/3/2020:   So far maintining sinus on low dose propafenone, taper fluids, stop once tolerating po.         [x]? High complexity decision making was performed in this patient.     Nel Hope is a 80 y.o. female admitted with N/V and GI bleeding. She is anemic with Hgb 7's. Last took Eliquis this AM.  She denies syncope, dizziness, palpitations. Family present in the room.       Past Medical History:   Diagnosis Date    Arthritis     Diarrhea 6/6/2016    Foot drop     Gastric ulcer 6/14/2012    Gastroparesis     GERD (gastroesophageal reflux disease)     High cholesterol     Hypertension     Neuropathy     Ovarian cancer ([facility] [address] 1986    chemo x 9 mos    Scoliosis     Stroke ([facility] [address] 2002    ? stroke with drop feet, per patient CT scans were negative        Past Surgical History:   Procedure Laterality Date    FLEXIBLE SIGMOIDOSCOPY N/A 6/6/2016    SIGMOIDOSCOPY FLEXIBLE performed by Marylen Christen, MD at [facility] ENDOSCOPY    HX ORTHOPAEDIC      back    HX ORTHOPAEDIC      bilateral shoulder replacements    HX ORTHOPAEDIC      left knee replacement    HX LYDIA AND BSO  1986    [place] EGD TRANSORAL BIOPSY SINGLE/MULTIPLE  1/26/2012         [place] EGD TRANSORAL BIOPSY SINGLE/MULTIPLE  6/14/2012            Social History     Tobacco Use    Smoking status: Never Smoker    Smokeless tobacco: Never Used   Substance Use Topics    Alcohol use: No        History reviewed. No pertinent family history.      No Known Allergies       Current Facility-Administered Medications   Medication Dose Route Frequency    0.9% sodium chloride infusion 250 mL  250 mL IntraVENous PRN    sodium chloride (NS) flush 5-40 mL  5-40 mL IntraVENous Q8H    sodium chloride (NS) flush 5-40 mL  5-40 mL IntraVENous PRN    sucralfate (CARAFATE) tablet 1 g  1 g Oral AC&HS    gabapentin (NEURONTIN) capsule 800 mg  800 mg Per NG tube QID    propafenone (RYTHMOL) tablet 150 mg  150 mg Oral Q8H    acetaminophen (TYLENOL) tablet 650 mg  650 mg Oral Q4H PRN    0.9% sodium chloride infusion 250 mL  250 mL IntraVENous PRN    pantoprazole (PROTONIX) 40 mg in 0.9% sodium chloride 10 mL injection  40 mg IntraVENous Q12H    sodium chloride (NS) flush 5-40 mL  5-40 mL IntraVENous Q8H    sodium chloride (NS) flush 5-40 mL  5-40 mL IntraVENous PRN    dextrose 5% and 0.9% NaCl infusion  50 mL/hr IntraVENous CONTINUOUS    ondansetron (ZOFRAN) injection 4 mg  4 mg IntraVENous Q6H PRN    morphine injection 1 mg  1 mg IntraVENous Q4H PRN    LORazepam (ATIVAN) injection 0.3 mg  0.3 mg IntraVENous QHS PRN       Review of Symptoms:  Noncontributory except as noted above.   General: negative for fever, chills, sweats, weakness, weight loss   Eyes: negative for blurred vision, eye pain, loss of vision, diplopia   Ear Nose and Throat: negative for rhinorrhea, pharyngitis, otalgia, tinnitus, speech or swallowing difficulties   Respiratory: negative for SOB, cough, sputum production, wheezing, LEVINE, pleuritic pain   Cardiology: negative for chest pain, palpitations, orthopnea, PND, edema, syncope   Gastrointestinal: negative for abdominal pain, N/V, dysphagia, change in bowel habits, bleeding   Genitourinary: negative for frequency, urgency, dysuria, hematuria, incontinence   Muskuloskeletal : negative for arthralgia, myalgia   Hematology: negative for easy bruising, bleeding, lymphadenopathy   Dermatological: negative for rash, ulceration, mole change, new lesion   Endocrine: negative for hot flashes or polydipsia   Neurological: negative for headache, dizziness, confusion, focal weakness, paresthesia, memory loss, gait disturbance   Psychological: negative for anxiety, depression, agitation       Objective:      Physical Exam:  Temp (24hrs), Av °F (36.7 °C), Min:97.7 °F (36.5 °C), Max:98.3 °F (36.8 °C)    Patient Vitals for the past 8 hrs:   Pulse   20 1104 68   20 0739 62   20 0626 62   20 0432 63    Patient Vitals for the past 8 hrs:   Resp   20 1104 16   20 0739 14 07/03/20 0432 20    Patient Vitals for the past 8 hrs:   BP   07/03/20 1104 106/66   07/03/20 0739 137/68   07/03/20 0432 143/67          Intake/Output Summary (Last 24 hours) at 7/3/2020 1133  Last data filed at 7/3/2020 0435  Gross per 24 hour   Intake --   Output 1700 ml   Net -1700 ml       Nondiaphoretic, not in acute distress. Pale. Supple, no palpable thyromegaly. No scleral icterus, mucous membranes moist, no xanthelasma. Unlabored, clear to auscultation bilaterally anteriorly, symmetric air movement. Regular rate and rhythm, no murmur, pericardial rub, knock, or gallop. No JVD or peripheral edema. Palpable radial pulses bilaterally. Abdomen, soft, nontender, nondistended. Extremities without cyanosis or clubbing. Muscle tone and bulk normal.  Skin warm and dry. No rashes or ulcers. Neuro grossly nonfocal.  No tremor. Awake and appropriate. CARDIOGRAPHICS and STUDIES, I reviewed:    Telemetry:  SR 70's. ECG:   Afib with RVR, though artifact noted at baseline. LBBB 130 msec.        Labs:  Recent Labs     07/01/20  1310   TROIQ <0.05     Lab Results   Component Value Date/Time    Cholesterol, total 138 07/20/2018 12:00 AM    HDL Cholesterol 46 07/20/2018 12:00 AM    LDL, calculated 66 07/20/2018 12:00 AM    Triglyceride 132 07/20/2018 12:00 AM    CHOL/HDL Ratio 3.2 09/20/2010 10:16 AM     Recent Labs     07/02/20  0335 07/01/20  1310   INR 1.1 1.1   PTP 11.4* 11.4*      Recent Labs     07/03/20  0419 07/02/20  2256 07/02/20  1720 07/02/20  0335  07/01/20  1310     --   --  139  --  137   K 3.1*  --   --  3.0*  --  2.8*   *  --   --  106  --  102   CO2 28  --   --  27  --  27   BUN 9  --   --  22*  --  29*   CREA 0.69  --   --  0.70  --  0.77   *  --   --  109*  --  142*   CA 8.5  --   --  8.4*  --  8.9   ALB  --   --   --   --   --  3.4*   WBC 9.1  --   --  8.8  --  14.7*   HGB 8.9* 8.9* 8.0* 7.1*   < > 7.2*   HCT 31.4* 31.3* 28.1* 25.8*   < > 26.9*     --   -- 256  --  316    < > = values in this interval not displayed. Recent Labs     07/01/20  1310   AP 49   TP 7.2   ALB 3.4*   GLOB 3.8   LPSE 130     No components found for: GLPOC  No results for input(s): PH, PCO2, PO2 in the last 72 hours.       Signed By: Dawson Walker MD     July 3, 2020

## 2020-07-03 NOTE — PROGRESS NOTES
DEMETRIUS: Home with Home Health and Follow-up Appointments    1:35pm- CM met with pt and pt's daughter at bedside to discuss d/c plan. CM informed pt about what June Love stated that needing the healthcare side in the future. CM discussed with pt about HH. CM provided pt with [facility] list. Pt selected [facility]. [address] document signed by pt and placed in pt's bedside chart. Referral sent to [facility] via [facility]. 1:25pm- CM called Junealison Love via phone to inquired about the healthcare side. June Love stated that pt is able to call admissions to be assessed if she feels that she may need to go to the healthcare side in the future. 1:15pm- CM met with pt and pt's daughter at bedside to discuss plan. Pt inquired with CM if she has able to go the healthcare side if she needed when she goes home if she continues to have issues with her back. CM informed pt that CM will call June Ivan and inquired about her question. CM will continue to follow patient for discharge planning needs and arrange for services as deemed necessary.     Sherly Mcneil [address] [place] [address]  [phone]

## 2020-07-03 NOTE — PROGRESS NOTES
Problem: Falls - Risk of  Goal: *Absence of Falls  Description: Document Scar Torres Fall Risk and appropriate interventions in the flowsheet.   Outcome: Progressing Towards Goal  Note: Fall Risk Interventions:  Mobility Interventions: Bed/chair exit alarm, Communicate number of staff needed for ambulation/transfer, OT consult for ADLs, Patient to call before getting OOB, PT Consult for mobility concerns, PT Consult for assist device competence, Utilize walker, cane, or other assistive device         Medication Interventions: Bed/chair exit alarm, Evaluate medications/consider consulting pharmacy, Patient to call before getting OOB, Teach patient to arise slowly    Elimination Interventions: Bed/chair exit alarm, Call light in reach, Patient to call for help with toileting needs, Toileting schedule/hourly rounds              Problem: Patient Education: Go to Patient Education Activity  Goal: Patient/Family Education  Outcome: Progressing Towards Goal     Problem: Patient Education: Go to Patient Education Activity  Goal: Patient/Family Education  Outcome: Progressing Towards Goal     Problem: Upper and Lower GI Bleed: Day 1  Goal: Off Pathway (Use only if patient is Off Pathway)  Outcome: Progressing Towards Goal  Goal: Activity/Safety  Outcome: Progressing Towards Goal  Goal: Consults, if ordered  Outcome: Progressing Towards Goal  Goal: Diagnostic Test/Procedures  Outcome: Progressing Towards Goal  Goal: Nutrition/Diet  Outcome: Progressing Towards Goal  Goal: Discharge Planning  Outcome: Progressing Towards Goal  Goal: Medications  Outcome: Progressing Towards Goal  Goal: Respiratory  Outcome: Progressing Towards Goal  Goal: Treatments/Interventions/Procedures  Outcome: Progressing Towards Goal  Goal: Psychosocial  Outcome: Progressing Towards Goal  Goal: *Optimal pain control at patient's stated goal  Outcome: Progressing Towards Goal  Goal: *Hemodynamically stable  Outcome: Progressing Towards Goal  Goal: *Demonstrates progressive activity  Outcome: Progressing Towards Goal     Problem: Pressure Injury - Risk of  Goal: *Prevention of pressure injury  Description: Document Cristino Scale and appropriate interventions in the flowsheet.   Outcome: Progressing Towards Goal  Note: Pressure Injury Interventions:  Sensory Interventions: Assess changes in LOC, Discuss PT/OT consult with provider, Float heels, Keep linens dry and wrinkle-free, Minimize linen layers, Pressure redistribution bed/mattress (bed type)    Moisture Interventions: Absorbent underpads, Apply protective barrier, creams and emollients, Check for incontinence Q2 hours and as needed, Internal/External urinary devices, Minimize layers    Activity Interventions: Increase time out of bed, Pressure redistribution bed/mattress(bed type), PT/OT evaluation    Mobility Interventions: Float heels, HOB 30 degrees or less, Pressure redistribution bed/mattress (bed type), PT/OT evaluation    Nutrition Interventions: Document food/fluid/supplement intake    Friction and Shear Interventions: Apply protective barrier, creams and emollients, HOB 30 degrees or less, Lift sheet, Minimize layers, Transferring/repositioning devices                Problem: Patient Education: Go to Patient Education Activity  Goal: Patient/Family Education  Outcome: Progressing Towards Goal

## 2020-07-03 NOTE — PROGRESS NOTES
0700   Bedside shift change report given to Noah Crowley RN (oncoming nurse) by Flaco Zurita RN (offgoing nurse). Report included the following information SBAR, Kardex, MAR and Cardiac Rhythm NSR BBB. 1045  Attempted to page GI to advance patient's diet orders. Not successful with answering service. Will follow up. [address] to Dr. Ant Wesley in reference to patient's blood work H&H every six hours. Per Dr. Ant Wesley, H&H can be drawn daily instead of every six hours. 1900   Bedside shift change report given to Kenny Barrios, HENRIETTA (oncoming nurse) by Noah Crowley RN (offgoing nurse). Report included the following information SBAR, Kardex, MAR and Cardiac Rhythm NSR BBB AV 1st degree.

## 2020-07-03 NOTE — PROGRESS NOTES
GI Progress Note Kay Rizvi for Muna Ashml)  NAME:Katrin Bullock TZQ:[id number] KLT:[id number]   ATTG: Dr. Shalonda Garcia  PCP: Kiya Talbert NP  Date/Time:  7/3/2020 12:28 PM     Primary GI: Dr. Muna Mccormack    Reason for following: upper GI bleeding    Assessment:   · Ulcerative esophagitis,m GEJ ulcer s/p hemoclip 7/2  · Acute post hemorrhagic anemia  · afib w/ RVR on eliquis (held), LBBB, DM II, HLD, CKD III, Lewis's esophagus, scoliosis, chronic back pain     Plan:   · PPI BID  · Continue QID carafate to complete 10 days  · Continue to hold anticoagulation, defer to cardiology colleagues about longterm resumption  · Diet to full liquid  · If she's doing well on full liquid and hgb stable, I think she would be safe for D/C home tomorrow. Subjective:   Discussed with RN events overnight. Family at bedside. Reviewed EGD, labs, plan of care. EGD yesterday with GEJ ulcer, clipped. Hgb stable today. Last transfusion 7/2 @ 8 am.    Scant stool yesterday/overnight. Review of Systems:  Symptom Y/N Comments  Symptom Y/N Comments   Fever/Chills n   Chest Pain n    Cough n   Headaches n    Sputum n   Joint Pain n    SOB/LEVINE n   Pruritis/Rash n    Tolerating Diet y   Other       Could NOT obtain due to:      Objective:   VITALS:   Last 24hrs VS reviewed since prior progress note. Most recent are:  Visit Vitals  /66   Pulse 68   Temp 98 °F (36.7 °C)   Resp 16   Ht 5' (1.524 m)   Wt 68 kg (150 lb)   SpO2 98%   Breastfeeding No   BMI 29.29 kg/m²       Intake/Output Summary (Last 24 hours) at 7/3/2020 1228  Last data filed at 7/3/2020 0435  Gross per 24 hour   Intake --   Output 1700 ml   Net -1700 ml     PHYSICAL EXAM:  General: WD, WN. Alert, cooperative, no acute distress    HEENT: NC, Atraumatic. Anicteric sclerae. Lungs:  CTA Bilaterally. No Wheezing/Rhonchi/Rales.   Heart:  Regular  rhythm,  No murmur (), No Rubs, No Gallops  Abdomen: Soft, Non distended, Non tender.  +Bowel sounds, no HSM  Neurologic:  Alert and oriented X 3. No acute neurological distress   Psych:   Good insight. Not anxious nor agitated. Lab and Radiology Data Reviewed: (see below)    Medications Reviewed: (see below)  PMH/SH reviewed - no change compared to H&P  ________________________________________________________________________  Total time spent with patient: 15 minutes ________________________________________________________________________  Care Plan discussed with:  Patient x   Family  x   RN               Consultant:       Amanda Hussein MD     Procedures: see electronic medical records for all procedures/Xrays and details which were not copied into this note but were reviewed prior to creation of Plan. LABS:  Recent Labs     07/03/20  0419 07/02/20  2256  07/02/20  0335   WBC 9.1  --   --  8.8   HGB 8.9* 8.9*   < > 7.1*   HCT 31.4* 31.3*   < > 25.8*     --   --  256    < > = values in this interval not displayed. Recent Labs     07/03/20  0419 07/02/20  0335 07/01/20  1310    139 137   K 3.1* 3.0* 2.8*   * 106 102   CO2 28 27 27   BUN 9 22* 29*   CREA 0.69 0.70 0.77   * 109* 142*   CA 8.5 8.4* 8.9   MG  --  1.0*  --      Recent Labs     07/01/20  1310   AP 49   TP 7.2   ALB 3.4*   GLOB 3.8   LPSE 130     Recent Labs     07/02/20  0335 07/01/20  1310   INR 1.1 1.1   PTP 11.4* 11.4*      No results for input(s): FE, TIBC, PSAT, FERR in the last 72 hours. Lab Results   Component Value Date/Time    Folate 50.4 (H) 06/16/2010 10:35 AM     No results for input(s): PH, PCO2, PO2 in the last 72 hours. No results for input(s): CPK, CKMB in the last 72 hours.     No lab exists for component: TROPONINI  Lab Results   Component Value Date/Time    Color YELLOW/STRAW 07/01/2020 02:28 PM    Appearance CLEAR 07/01/2020 02:28 PM    Specific gravity 1.012 07/01/2020 02:28 PM    Specific gravity 1.010 02/27/2019 08:49 PM    pH (UA) 7.5 07/01/2020 02:28 PM    Protein Negative 07/01/2020 02:28 PM    Glucose Negative 07/01/2020 02:28 PM    Ketone 15 (A) 07/01/2020 02:28 PM    Bilirubin Negative 07/01/2020 02:28 PM    Urobilinogen 0.2 07/01/2020 02:28 PM    Nitrites Negative 07/01/2020 02:28 PM    Leukocyte Esterase Negative 07/01/2020 02:28 PM    Epithelial cells FEW 07/01/2020 02:28 PM    Bacteria Negative 07/01/2020 02:28 PM    WBC 0-4 07/01/2020 02:28 PM    RBC 0-5 07/01/2020 02:28 PM       MEDICATIONS:  Current Facility-Administered Medications   Medication Dose Route Frequency    0.9% sodium chloride infusion 250 mL  250 mL IntraVENous PRN    sodium chloride (NS) flush 5-40 mL  5-40 mL IntraVENous Q8H    sodium chloride (NS) flush 5-40 mL  5-40 mL IntraVENous PRN    sucralfate (CARAFATE) tablet 1 g  1 g Oral AC&HS    gabapentin (NEURONTIN) capsule 800 mg  800 mg Per NG tube QID    propafenone (RYTHMOL) tablet 150 mg  150 mg Oral Q8H    acetaminophen (TYLENOL) tablet 650 mg  650 mg Oral Q4H PRN    0.9% sodium chloride infusion 250 mL  250 mL IntraVENous PRN    pantoprazole (PROTONIX) 40 mg in 0.9% sodium chloride 10 mL injection  40 mg IntraVENous Q12H    sodium chloride (NS) flush 5-40 mL  5-40 mL IntraVENous Q8H    sodium chloride (NS) flush 5-40 mL  5-40 mL IntraVENous PRN    dextrose 5% and 0.9% NaCl infusion  50 mL/hr IntraVENous CONTINUOUS    ondansetron (ZOFRAN) injection 4 mg  4 mg IntraVENous Q6H PRN    morphine injection 1 mg  1 mg IntraVENous Q4H PRN    LORazepam (ATIVAN) injection 0.3 mg  0.3 mg IntraVENous QHS PRN

## 2020-07-04 VITALS
RESPIRATION RATE: 18 BRPM | SYSTOLIC BLOOD PRESSURE: 153 MMHG | HEIGHT: 60 IN | WEIGHT: 150 LBS | OXYGEN SATURATION: 98 % | TEMPERATURE: 97.8 F | HEART RATE: 66 BPM | DIASTOLIC BLOOD PRESSURE: 64 MMHG | BODY MASS INDEX: 29.45 KG/M2

## 2020-07-04 LAB
HCT VFR BLD AUTO: 30.9 % (ref 35–47)
HGB BLD-MCNC: 8.5 G/DL (ref 11.5–16)

## 2020-07-04 PROCEDURE — 36415 COLL VENOUS BLD VENIPUNCTURE: CPT

## 2020-07-04 PROCEDURE — 97535 SELF CARE MNGMENT TRAINING: CPT | Performed by: OCCUPATIONAL THERAPIST

## 2020-07-04 PROCEDURE — 74011250636 HC RX REV CODE- 250/636: Performed by: INTERNAL MEDICINE

## 2020-07-04 PROCEDURE — 74011250637 HC RX REV CODE- 250/637: Performed by: INTERNAL MEDICINE

## 2020-07-04 PROCEDURE — 85018 HEMOGLOBIN: CPT

## 2020-07-04 PROCEDURE — 97165 OT EVAL LOW COMPLEX 30 MIN: CPT | Performed by: OCCUPATIONAL THERAPIST

## 2020-07-04 PROCEDURE — 74011250637 HC RX REV CODE- 250/637: Performed by: SPECIALIST

## 2020-07-04 PROCEDURE — 97161 PT EVAL LOW COMPLEX 20 MIN: CPT

## 2020-07-04 PROCEDURE — 74011000250 HC RX REV CODE- 250: Performed by: INTERNAL MEDICINE

## 2020-07-04 PROCEDURE — C9113 INJ PANTOPRAZOLE SODIUM, VIA: HCPCS | Performed by: INTERNAL MEDICINE

## 2020-07-04 PROCEDURE — 97530 THERAPEUTIC ACTIVITIES: CPT

## 2020-07-04 PROCEDURE — 74011000258 HC RX REV CODE- 258: Performed by: INTERNAL MEDICINE

## 2020-07-04 PROCEDURE — 97116 GAIT TRAINING THERAPY: CPT

## 2020-07-04 RX ORDER — SUCRALFATE 1 G/1
1 TABLET ORAL
Qty: 40 TAB | Refills: 0 | Status: SHIPPED | OUTPATIENT
Start: 2020-07-04 | End: 2020-07-14

## 2020-07-04 RX ORDER — PROPAFENONE HYDROCHLORIDE 150 MG/1
150 TABLET, FILM COATED ORAL 2 TIMES DAILY
Qty: 60 TAB | Refills: 2 | Status: SHIPPED | OUTPATIENT
Start: 2020-07-04

## 2020-07-04 RX ORDER — RABEPRAZOLE SODIUM 20 MG/1
20 TABLET, DELAYED RELEASE ORAL 2 TIMES DAILY
Qty: 60 TAB | Refills: 3 | Status: SHIPPED | OUTPATIENT
Start: 2020-07-04

## 2020-07-04 RX ORDER — POTASSIUM CHLORIDE 750 MG/1
40 TABLET, FILM COATED, EXTENDED RELEASE ORAL
Status: DISCONTINUED | OUTPATIENT
Start: 2020-07-04 | End: 2020-07-04

## 2020-07-04 RX ORDER — BUTALBITAL, ACETAMINOPHEN AND CAFFEINE 50; 325; 40 MG/1; MG/1; MG/1
1 TABLET ORAL
Status: DISCONTINUED | OUTPATIENT
Start: 2020-07-04 | End: 2020-07-04 | Stop reason: HOSPADM

## 2020-07-04 RX ORDER — POTASSIUM CHLORIDE 750 MG/1
40 TABLET, FILM COATED, EXTENDED RELEASE ORAL
Status: COMPLETED | OUTPATIENT
Start: 2020-07-04 | End: 2020-07-04

## 2020-07-04 RX ORDER — POTASSIUM CHLORIDE 750 MG/1
10 TABLET, FILM COATED, EXTENDED RELEASE ORAL DAILY
Status: DISCONTINUED | OUTPATIENT
Start: 2020-07-05 | End: 2020-07-04 | Stop reason: HOSPADM

## 2020-07-04 RX ORDER — HYDROCHLOROTHIAZIDE 25 MG/1
25 TABLET ORAL DAILY
Status: DISCONTINUED | OUTPATIENT
Start: 2020-07-04 | End: 2020-07-04 | Stop reason: HOSPADM

## 2020-07-04 RX ORDER — PROPAFENONE HYDROCHLORIDE 150 MG/1
150 TABLET, FILM COATED ORAL 2 TIMES DAILY
Status: DISCONTINUED | OUTPATIENT
Start: 2020-07-04 | End: 2020-07-04 | Stop reason: HOSPADM

## 2020-07-04 RX ORDER — POTASSIUM CHLORIDE 750 MG/1
10 TABLET, FILM COATED, EXTENDED RELEASE ORAL DAILY
Qty: 30 TAB | Refills: 3 | Status: SHIPPED | OUTPATIENT
Start: 2020-07-05

## 2020-07-04 RX ADMIN — GABAPENTIN 800 MG: 300 CAPSULE ORAL at 08:56

## 2020-07-04 RX ADMIN — SODIUM CHLORIDE 40 MG: 9 INJECTION, SOLUTION INTRAMUSCULAR; INTRAVENOUS; SUBCUTANEOUS at 08:56

## 2020-07-04 RX ADMIN — POTASSIUM CHLORIDE 40 MEQ: 750 TABLET, FILM COATED, EXTENDED RELEASE ORAL at 11:07

## 2020-07-04 RX ADMIN — Medication 10 ML: at 13:26

## 2020-07-04 RX ADMIN — Medication 10 ML: at 07:03

## 2020-07-04 RX ADMIN — MORPHINE SULFATE 1 MG: 2 INJECTION, SOLUTION INTRAMUSCULAR; INTRAVENOUS at 02:31

## 2020-07-04 RX ADMIN — SUCRALFATE 1 G: 1 TABLET ORAL at 08:56

## 2020-07-04 RX ADMIN — DEXTROSE MONOHYDRATE AND SODIUM CHLORIDE 50 ML/HR: 5; .9 INJECTION, SOLUTION INTRAVENOUS at 01:36

## 2020-07-04 RX ADMIN — GABAPENTIN 800 MG: 300 CAPSULE ORAL at 13:26

## 2020-07-04 RX ADMIN — Medication 10 ML: at 13:27

## 2020-07-04 RX ADMIN — HYDROCHLOROTHIAZIDE 25 MG: 25 TABLET ORAL at 11:06

## 2020-07-04 RX ADMIN — SUCRALFATE 1 G: 1 TABLET ORAL at 11:06

## 2020-07-04 RX ADMIN — PROPAFENONE HYDROCHLORIDE 150 MG: 150 TABLET, FILM COATED ORAL at 07:02

## 2020-07-04 RX ADMIN — BUTALBITAL, ACETAMINOPHEN, AND CAFFEINE 1 TABLET: 50; 325; 40 TABLET ORAL at 11:06

## 2020-07-04 NOTE — PROGRESS NOTES
Problem: Self Care Deficits Care Plan (Adult)  Goal: *Acute Goals and Plan of Care (Insert Text)  Description: FUNCTIONAL STATUS PRIOR TO ADMISSION: Lives in [place] [address] at [facility]. Patient reports being Independent with basic self-care from w/c level, but uses the RW for transfers. Patient has all necessary equipment. History of B foot drop; wears B AFO. HOME SUPPORT: Patient has 3 daughters that live locally. Occupational Therapy Goals  Initiated 7/4/2020  1. Patient will perform bathing while seated with modified independence within 7 day(s). 2.  Patient will perform lower body dressing with modified independence within 7 day(s). 3.  Patient will perform toilet transfers with modified independence within 7 day(s). 4.  Patient will perform all aspects of toileting with modified independence within 7 day(s). 5.  Patient will independently utilize energy conservation and recommended equipment during ADL within 7 day(s). Outcome: Progressing Towards Goal    OCCUPATIONAL THERAPY EVALUATION  Patient: Florin Mc (43 y.o. female)  Date: 7/4/2020  Primary Diagnosis: Upper GI bleed [K92.2]  Atrial fibrillation with rapid ventricular response (HCC) [I48.91]  Procedure(s) (LRB):  ESOPHAGOGASTRODUODENOSCOPY (EGD) (N/A)  RESOLUTION CLIP (N/A) 2 Days Post-Op   Precautions:  DNR    ASSESSMENT  Based on the objective data described below, the patient presents with deficits in self-care, primarily due to decreased activity tolerance, decreased dynamic balance, and general weakness (including B foot drop). She typically uses the w/c in her apartment and manages basic self-care from seated level. She uses a sock aid for compression stockings. She is functioning slightly below her baseline level and will benefit from skilled OT treatment to maximize independence and safety in ADL. Current Level of Function Impacting Discharge (ADLs/self-care): Mod A to S    Functional Outcome Measure:   The patient scored 45/100 on the Barthel Index. Patient will benefit from skilled therapy intervention to address the above noted impairments. PLAN :  Recommendations and Planned Interventions: self care training, functional mobility training, balance training, therapeutic activities, endurance activities, patient education, home safety training, and family training/education    Frequency/Duration: Patient will be followed by occupational therapy 3 times a week to address goals. Recommendation for discharge: (in order for the patient to meet his/her long term goals)  Occupational therapy at least 2 days/week in the home     This discharge recommendation:  A follow-up discussion with the attending provider and/or case management is planned    IF patient discharges home will need the following DME: TBD; possible dressing equipment       SUBJECTIVE:   Patient stated I hope I'll be able to go back home today.     OBJECTIVE DATA SUMMARY:   HISTORY:   Past Medical History:   Diagnosis Date    Arthritis     Diarrhea 6/6/2016    Foot drop     Gastric ulcer 6/14/2012    Gastroparesis     GERD (gastroesophageal reflux disease)     High cholesterol     Hypertension     Neuropathy     Ovarian cancer ([address] 1986    chemo x 9 mos    Scoliosis     Stroke [facility]) 2002    ? stroke with drop feet, per patient CT scans were negative     Past Surgical History:   Procedure Laterality Date    FLEXIBLE SIGMOIDOSCOPY N/A 6/6/2016    SIGMOIDOSCOPY FLEXIBLE performed by Teddy Tang MD at [facility] ENDOSCOPY    HX ORTHOPAEDIC      back    HX ORTHOPAEDIC      bilateral shoulder replacements    HX ORTHOPAEDIC      left knee replacement    HX LYDIA AND BSO  1986    [facility] EGD TRANSORAL BIOPSY SINGLE/MULTIPLE  1/26/2012         [facility] EGD TRANSORAL BIOPSY SINGLE/MULTIPLE  6/14/2012            Expanded or extensive additional review of patient history:     Home Situation  Home Environment: [place] [address] Name: Sisi Reich  One/James Story Residence: One story  Living Alone: Yes  Support Systems: Child(oracio)  Patient Expects to be Discharged to[de-identified] Independent living facility  Current DME Used/Available at Home: [address], rolling, Wheelchair, Wheelchair, power, Grab bars, Shower chair, Raised toilet seat  Tub or Shower Type: Shower    Hand dominance: Right    EXAMINATION OF PERFORMANCE DEFICITS:  Cognitive/Behavioral Status:  Neurologic State: Alert  Orientation Level: Oriented X4  Cognition: Appropriate for age attention/concentration; Follows commands  Perception: Appears intact  Perseveration: No perseveration noted  Safety/Judgement: Awareness of environment; Insight into deficits;Home safety; Fall prevention    Hearing: Auditory  Auditory Impairment: Hard of hearing, bilateral    Vision/Perceptual:                           Acuity: Within Defined Limits(h/o B cataract removal)    Corrective Lenses: Glasses    Range of Motion:  AROM: Generally decreased, functional(B foot drop)                         Strength:  Strength: Generally decreased, functional                Coordination:     Fine Motor Skills-Upper: Left Intact; Right Intact(grossly)    Gross Motor Skills-Upper: Left Intact; Right Intact    Balance:  Sitting: Intact  Standing: Impaired  Standing - Static: Good  Standing - Dynamic : Fair    Functional Mobility and Transfers for ADLs:  Bed Mobility:  Supine to Sit: Supervision; Additional time  Scooting: Supervision; Additional time    Transfers:  Sit to Stand: Stand-by assistance  Toilet Transfer : Contact guard assistance    ADL Assessment:  Feeding: Independent    Oral Facial Hygiene/Grooming: Independent(seated)    Bathing: Minimum assistance    Upper Body Dressing: Supervision    Lower Body Dressing: Moderate assistance    Toileting: Moderate assistance                ADL Intervention and task modifications:  Discussed safety and fall prevention during ADL. Encouraged her to be out of bed as much as possible.  Initiated discussion of strategies for lower body ADL and equipment. Cognitive Retraining  Safety/Judgement: Awareness of environment; Insight into deficits;Home safety; Fall prevention    Functional Measure:  Barthel Index:    Bathin  Bladder: 5  Bowels: 5  Groomin  Dressin  Feeding: 10  Mobility: 0  Stairs: 0  Toilet Use: 5  Transfer (Bed to Chair and Back): 10  Total: 45/100        The Barthel ADL Index: Guidelines  1. The index should be used as a record of what a patient does, not as a record of what a patient could do. 2. The main aim is to establish degree of independence from any help, physical or verbal, however minor and for whatever reason. 3. The need for supervision renders the patient not independent. 4. A patient's performance should be established using the best available evidence. Asking the patient, friends/relatives and nurses are the usual sources, but direct observation and common sense are also important. However direct testing is not needed. 5. Usually the patient's performance over the preceding 24-48 hours is important, but occasionally longer periods will be relevant. 6. Middle categories imply that the patient supplies over 50 per cent of the effort. 7. Use of aids to be independent is allowed. Leann Cowart., Barthel, DKaylaW. (3590). Functional evaluation: the Barthel Index. [facility] (14)2. Elsy Barrera pro OMEGA Pool, Michele Phillips., Surjit Calvo., [place], [address] [place] [address] (). Measuring the change indisability after inpatient rehabilitation; comparison of the responsiveness of the Barthel Index and Functional [place] Measure. Journal of Neurology, Neurosurgery, and Psychiatry, 66(4), 986-438. Pasquale Haas, N.J.A, GABI Sandy, & Ellis Avalos, MKaylaA. (2004.) Assessment of post-stroke quality of life in cost-effectiveness studies: The usefulness of the Barthel Index and the EuroQoL-5D.  Quality of Life Research, 15, 176-85        Occupational Therapy Evaluation Charge Determination   History Examination Decision-Making   LOW Complexity : Brief history review  LOW Complexity : 1-3 performance deficits relating to physical, cognitive , or psychosocial skils that result in activity limitations and / or participation restrictions  LOW Complexity : No comorbidities that affect functional and no verbal or physical assistance needed to complete eval tasks       Based on the above components, the patient evaluation is determined to be of the following complexity level: LOW   Pain Rating:  No complaints    Activity Tolerance:   Fair  Please refer to the flowsheet for vital signs taken during this treatment. After treatment patient left in no apparent distress:    Sitting in chair and Call bell within reach    COMMUNICATION/EDUCATION:   The patients plan of care was discussed with: Physical therapist and Registered nurse. Patient/family agree to work toward stated goals and plan of care. This patients plan of care is appropriate for delegation to [facility].     Thank you for this referral.  Jann Donohue OTR/L  Time Calculation: 27 mins

## 2020-07-04 NOTE — PROGRESS NOTES
Bedside shift change report given to Bear Young (oncoming nurse) by Nancy Peralta (offgoing nurse).  Report included the following information SBAR.       4114 - Dr. Latia Johnson aware of elevated BP's and HA    1050 - Notified Dr. Celine Giordano of persistent JOSÉ    [phone] - CM and RN verified with patient and her daughter that patient will have assistance returning to independent living at [facility]

## 2020-07-04 NOTE — PROGRESS NOTES
DEMETRIUS:   1) Home with Gabriel Gabriel RN and PT   2) Home with solangedeclan appts   3) Home with new AMD and a new DDNR     D/C Order Noted-   2:06 PM- CM spoke with RN Director Mrs. Mandi Taveras who states they cannot assist pt with getting into her home after the hospital- not even to carry a bag, Mrs. Mandi Taveras stated pt will need to wait until Monday to discharge. After further discussion they will take pt back today in independent living. Pt's dtr contacting them for assistance getting pt back into the building. Pt is cleared to d/c from CM perspective, RN informed. 11:49 AM- CM received a phone call from pt's dtr Olivia Pelletierx- Medicare pt has received, reviewed, and signed 2nd IM letter informing them of their right to appeal the discharge. Verbal consent per pt request. Pt's dtr requested CM to reach out to Ryan to see who was going to help pt back into her apartment since family is not allowed in, CM did but nobody is there in admission or administration, CM was transferred to the RN Supervisor ([phone]), left a HIPPA compliant VM, waiting on a return phone call. 11:26 AM- CM spoke with pt regarding d/c- pt has been accepted with [address] [place] [address] [facility] RN and PT). Pt completed AMD with the [facility], to be uploaded in the computer. CM spoke with attending as well who is going to complete a DDNR with pt as she is listed as a DNR but does not have a DDNR, attending completing one. AVS updated with all information needed. CM attempted to address 2ND IM letter- pt requested CM contact her daughter Olivia Mcdonald who will be here to pick her up, CM left a HIPPA compliant VM, waiting on a return phone call. Care Management Interventions  PCP Verified by CM:  Yes  Palliative Care Criteria Met (RRAT>21 & CHF Dx)?: No  Mode of Transport at Discharge: Self  Transition of Care Consult (CM Consult): Discharge Planning  MyChart Signup: No  Discharge Durable Medical Equipment: No  Health Maintenance Reviewed: Yes  Physical Therapy Consult: Yes  Occupational Therapy Consult: Yes  Speech Therapy Consult: No  Current Support Network:  Other(Independent Living )  Confirm Follow Up Transport: Family  The Patient and/or Patient Representative was Provided with a Choice of Provider and Agrees with the Discharge Plan?: Yes  Name of the Patient Representative Who was Provided with a Choice of Provider and Agrees with the Discharge Plan: Spoke with pt's dtr 13[address] Parisa Rogers of Choice List was Provided with Basic Dialogue that Supports the Patient's Individualized Plan of Care/Goals, Treatment Preferences and Shares the Quality Data Associated with the Providers?: Yes   Resource Information Provided?: No  Discharge Location  Discharge Placement: Home with home health     JAMSHID Zelaya, [address] [facility] Manager   [phone]

## 2020-07-04 NOTE — PROGRESS NOTES
DISCHARGE SUMMARY FROM NURSE    The patient is stable for discharge. I have reviewed the discharge instructions with the patient and pt's daughter. The patient verbalized understanding. All questions were fully answered. The patient verbalized no complaints. Hard scripts and medication handouts were given and reviewed with the patient. Appropriate educational materials and medication side effects teaching were provided also provided. Cardiac monitor and IV line(s) were removed. The following personal items collected during your admission were returned to the patient/family  Home medications:   Dental Appliance: Dental Appliances: At home  Vision: Visual Aid: Glasses, With patient  Hearing Aid: Hearing Aid: Bilateral, With patient  Jewelry: Jewelry: None  Clothing: Clothing: Shirt  Other Valuables: Other Valuables: None  Valuables sent to safe:      OR _________________________________________________________________________________________    There were no personal belongings, valuables or home medications left at patient's bedside,  or safe.

## 2020-07-04 NOTE — PROGRESS NOTES
CARDIOLOGY Progress Note    Patient ID:  Patient: Ghassan William  MRN: [id number]  Age: 80 y.o.  : 10/9/1933    Date of  Admission: 2020 11:50 AM   PCP:  Brandy Hughes NP     Assessment: 1. Paroxysmal atrial fibrillation with RVR, minimal to no symptoms. In sinus currently, appeared to be in Afib earlier with RVR. 2. Acute blood loss anemia with GI bleeding. 3. Chronic LBBB. 4. PVC's. 5. Last echo EF 60%, mild valve disease. 6. Hyperlipidemia. 7. Diabetes mellitus type 2.  8. Hypercholesterolemia. 9. CKD stage 3.  10. GERD/Lewis's esophagus, gastroparesis. 11. Scoliosis/arthritis s/p extensive thoracolumbar fusion , now with broken hardware, s/p LUCY 2013 (effective), neuropathy since knee surgery . 12. History of ovarian cancer s/p chemo. 13. Back pain. Fractured thoracolumbar fusion rods, stable. Protruding pedicle screws, stable. 4. Neural foraminal stenoses. 14. Walks with a walker. 15. DNR.        Plan:      1. In the past, was holding on antiarrhythmic therapy given the low burden of arrhythmia and minimal sx. If can't anticoagulate though, will use a low dose of prophylactic medication for Afib suppression. Start propafenone  mg po TID, may discharge her on only BID dosing of this drug given her age. 2. Considering the anemia and bleeding risk, I would NOT use anticoagulation or antiplatelet therapy. Very reluctant to do it in 2019, gave it a try later, as of 2020 have to stop it. 3. Not a good candidate for a Watchman device (JOHN occlusion) as it requires 6 weeks of anticoagulation upfront. 4. Not a good candidate for Afib ablation as it requires 2-3 months of anticoagulation post-procedure. 5. Continue statin.     I spoke with Dr. Grace Harvey, he'll consider an EGD in 2 months. If she has healed, then will open up the discussion on whether low dose anticoagulation can be reattempted. 7/3/2020:   So far maintining sinus on low dose propafenone, taper fluids, stop once tolerating po.    7/4/2020:   Decrease propafenone to bid, stop fluids, resume hCTZ, replete K. Possible home later today, follow up with Dr. Gabriel Liu.         [x]? High complexity decision making was performed in this patient.     Magalie Bass is a 80 y.o. female admitted with N/V and GI bleeding. She denies syncope, dizziness, palpitations. +HA today.       Past Medical History:   Diagnosis Date    Arthritis     Diarrhea 6/6/2016    Foot drop     Gastric ulcer 6/14/2012    Gastroparesis     GERD (gastroesophageal reflux disease)     High cholesterol     Hypertension     Neuropathy     [address] [facility] ([facility] 1986    chemo x 9 mos    Scoliosis     Stroke ([facility] 2002    ? stroke with drop feet, per patient CT scans were negative        Past Surgical History:   Procedure Laterality Date    FLEXIBLE SIGMOIDOSCOPY N/A 6/6/2016    SIGMOIDOSCOPY FLEXIBLE performed by Eusebia Rivers MD at [facility] ENDOSCOPY    HX ORTHOPAEDIC      back    HX ORTHOPAEDIC      bilateral shoulder replacements    HX ORTHOPAEDIC      left knee replacement    HX LYDIA AND BSO  1986    [place] EGD TRANSORAL BIOPSY SINGLE/MULTIPLE  1/26/2012         [place] EGD TRANSORAL BIOPSY SINGLE/MULTIPLE  6/14/2012            Social History     Tobacco Use    Smoking status: Never Smoker    Smokeless tobacco: Never Used   Substance Use Topics    Alcohol use: No        History reviewed. No pertinent family history.      No Known Allergies       Current Facility-Administered Medications   Medication Dose Route Frequency    propafenone (RYTHMOL) tablet 150 mg  150 mg Oral BID    potassium chloride SR (KLOR-CON 10) tablet 40 mEq  40 mEq Oral NOW    hydroCHLOROthiazide (HYDRODIURIL) tablet 25 mg  25 mg Oral DAILY    [START ON 7/5/2020] potassium chloride SR (KLOR-CON 10) tablet 10 mEq  10 mEq Oral DAILY    0.9% sodium chloride infusion 250 mL  250 mL IntraVENous PRN    sodium chloride (NS) flush 5-40 mL  5-40 mL IntraVENous Q8H    sodium chloride (NS) flush 5-40 mL  5-40 mL IntraVENous PRN    sucralfate (CARAFATE) tablet 1 g  1 g Oral AC&HS    gabapentin (NEURONTIN) capsule 800 mg  800 mg Per NG tube QID    acetaminophen (TYLENOL) tablet 650 mg  650 mg Oral Q4H PRN    0.9% sodium chloride infusion 250 mL  250 mL IntraVENous PRN    pantoprazole (PROTONIX) 40 mg in 0.9% sodium chloride 10 mL injection  40 mg IntraVENous Q12H    sodium chloride (NS) flush 5-40 mL  5-40 mL IntraVENous Q8H    sodium chloride (NS) flush 5-40 mL  5-40 mL IntraVENous PRN    ondansetron (ZOFRAN) injection 4 mg  4 mg IntraVENous Q6H PRN    morphine injection 1 mg  1 mg IntraVENous Q4H PRN    LORazepam (ATIVAN) injection 0.3 mg  0.3 mg IntraVENous QHS PRN       Review of Symptoms:  Noncontributory except as noted above.   General: negative for fever, chills, sweats, weakness, weight loss   Eyes: negative for blurred vision, eye pain, loss of vision, diplopia   Ear Nose and Throat: negative for rhinorrhea, pharyngitis, otalgia, tinnitus, speech or swallowing difficulties   Respiratory: negative for SOB, cough, sputum production, wheezing, LEVINE, pleuritic pain   Cardiology: negative for chest pain, palpitations, orthopnea, PND, edema, syncope   Gastrointestinal: negative for abdominal pain, N/V, dysphagia, change in bowel habits, bleeding   Genitourinary: negative for frequency, urgency, dysuria, hematuria, incontinence   Muskuloskeletal : negative for arthralgia, myalgia   Hematology: negative for easy bruising, bleeding, lymphadenopathy   Dermatological: negative for rash, ulceration, mole change, new lesion   Endocrine: negative for hot flashes or polydipsia   Neurological: negative for headache, dizziness, confusion, focal weakness, paresthesia, memory loss, gait disturbance   Psychological: negative for anxiety, depression, agitation       Objective:      Physical Exam:  Temp (24hrs), Av.8 °F (36.6 °C), Min:97.5 °F (36.4 °C), Max:98.1 °F (36.7 °C)    Patient Vitals for the past 8 hrs:   Pulse   07/04/20 0911 75   07/04/20 0757 60   07/04/20 0710 60   07/04/20 0409 64    Patient Vitals for the past 8 hrs:   Resp   07/04/20 0757 18   07/04/20 0710 18   07/04/20 0409 18    Patient Vitals for the past 8 hrs:   BP   07/04/20 0911 (!) 177/100   07/04/20 0757 152/68   07/04/20 0710 169/74   07/04/20 0409 166/75          Intake/Output Summary (Last 24 hours) at 7/4/2020 0956  Last data filed at 7/4/2020 0746  Gross per 24 hour   Intake 350 ml   Output 1350 ml   Net -1000 ml       Nondiaphoretic, not in acute distress. Pale. Supple, no palpable thyromegaly. No scleral icterus, mucous membranes moist, no xanthelasma. Unlabored, clear to auscultation bilaterally anteriorly, symmetric air movement. Regular rate and rhythm, no murmur, pericardial rub, knock, or gallop. No JVD or peripheral edema. Palpable radial pulses bilaterally. Abdomen, soft, nontender, nondistended. Extremities without cyanosis or clubbing. Muscle tone and bulk normal.  Skin warm and dry. No rashes or ulcers. Neuro grossly nonfocal.  No tremor. Awake and appropriate. CARDIOGRAPHICS and STUDIES, I reviewed:    Telemetry:  SR 70's. ECG:   Afib with RVR, though artifact noted at baseline. LBBB 130 msec.        Labs:  Recent Labs     07/01/20  1310   TROIQ <0.05     Lab Results   Component Value Date/Time    Cholesterol, total 138 07/20/2018 12:00 AM    HDL Cholesterol 46 07/20/2018 12:00 AM    LDL, calculated 66 07/20/2018 12:00 AM    Triglyceride 132 07/20/2018 12:00 AM    CHOL/HDL Ratio 3.2 09/20/2010 10:16 AM     Recent Labs     07/02/20  0335 07/01/20  1310   INR 1.1 1.1   PTP 11.4* 11.4*      Recent Labs     07/04/20  0357 07/03/20  0419 07/02/20  2256  07/02/20  0335  07/01/20  1310   NA  --  142  --   --  139  --  137   K  --  3.1*  --   --  3.0*  --  2.8*   CL  --  110*  --   --  106  --  102   CO2  --  28  --   --  27  --  27   BUN  --  9  --   --  22* --  29*   CREA  --  0.69  --   --  0.70  --  0.77   GLU  --  101*  --   --  109*  --  142*   CA  --  8.5  --   --  8.4*  --  8.9   ALB  --   --   --   --   --   --  3.4*   WBC  --  9.1  --   --  8.8  --  14.7*   HGB 8.5* 8.9* 8.9*   < > 7.1*   < > 7.2*   HCT 30.9* 31.4* 31.3*   < > 25.8*   < > 26.9*   PLT  --  244  --   --  256  --  316    < > = values in this interval not displayed. Recent Labs     07/01/20  1310   AP 49   TP 7.2   ALB 3.4*   GLOB 3.8   LPSE 130     No components found for: GLPOC  No results for input(s): PH, PCO2, PO2 in the last 72 hours.       Signed By: Brii Sanchez MD     July 4, 2020

## 2020-07-04 NOTE — DISCHARGE SUMMARY
Hospitalist Discharge Summary     Patient ID:  Alda Brittle  [id number]  80 y.o.  10/9/1933  7/1/2020    PCP on record: Harika Lynch NP    Admit date: 7/1/2020  Discharge date and time: 7/4/2020    DISCHARGE DIAGNOSIS:    See below    CONSULTATIONS:  IP CONSULT TO GASTROENTEROLOGY  IP CONSULT TO CARDIOLOGY    Excerpted HPI from H&P of Ra Moreland MD:  Skinny Pena is a 80 y.o.  female from [facility] with past medical history of A. fib on Eliquis, spinal stenosis, GERD, hypertension who presents with persistent nausea and vomiting. Patient reported being nauseous since yesterday, felt dizzy and lightheaded today and almost passed out, had a coffee-ground emesis. She reported having back pain for the last 3 weeks, was started on oxycodone but denies taking any NSAIDs . He reported taking his Eliquis this morning. patient also reported being constipated, took some MiraLAX and had some diarrhea. EMS found her to be tachycardic and hypotensive. Received IV fluid bolus prior to arrival to the ED . In the ED, she was found to be afebrile, tachycardic with heart rate 133, with soft blood pressure. Review of her labs showed that her WBC count was elevated, hemoglobin 7.2, k of 2.8 . CT abdomen and pelvis with contrast showed Equivocal source of bleeding in posterior aspect of proximal gastric body,although This could represent ingested material.  Her EKG showed A. fib with RVR stop  GI was consulted, advised EGD tomorrow a.m. as patient had already taken her Eliquis this morning. GI also recommended NG-tube insertion   Patient received IV fluid bolus, potassium and IV Protonix  When I saw the patient, heart rate was already below 100 and NG tube coffee-ground emesis    ______________________________________________________________________  DISCHARGE SUMMARY/HOSPITAL COURSE:  for full details see H&P, daily progress notes, labs, consult notes.      Upper GIB POA  Ulcerative esophagitis  -GI consulted appreciated, s/p EGD 7/2  [facility], discussion re long term resumption of AC (at low dose) ongoing. OP follow-up with GI, possible GI in 2 months to eval healing of ulcerations   -am cbc  -Hb stable 8.9. no hematemesis/n/v. No other signs of active bleeding  -cont carafate qid for 10 days  -cont PPI bid  -avoid spicy foods, chocolate, peppermint, caffeine  -outpatient follow-up with Dr Tasneem Stevenson to discuss repeat EGD     Esophagus:   +  NG tube in place with dark material (mixture of clot and food debris at GE junction)    ++ Friability and slight oozing at GE junction with linear ulceration s/p Single clip placement.  There was also LA Grade B ulcerative esophagitis in the lower 1/3 of esophagus.  We removed NG tube. +  4 cm Hiatal hernia  Stomach:   + Dark clot mixed with food particles in stomach but NO Ulcers seen in stomach.  Mild erythema but no active bleeding.    Duodenum:   -   NORMAL bulb and 2nd portion.     CT abdomen and pelvis with contrast showed:  1. Equivocal source of bleeding in posterior aspect of proximal gastric body,  although This could represent ingested material. Ultimately, endoscopic  correlation may be necessary. 2. Small-moderate hiatal hernia. 3. Moderate retained fecal material in rectum and distal sigmoid colon.   4. Bilateral renal cysts     A. fib with RVR  Currently resolved with IV fluid bolus, Eliquis because of GI bleed   Rate currently around 97  -appreciate cardiology consult  -not watchman or ablation candidate d/t need of temporary anticoagulation in both cases  -note plan for propafenone to suppress Afib in light of inability to anticoagulate  -cont propafenone for Afib suppression  -outpatient discussion with GI and cardiology re consideration of restarting AC at lower dose in the future     Leucocytosis, most likely reactive to GI bleed  -resolved     Hypokalemia k 2.8  S/p IVK in ED   Repeat BMP tomorrow am   Check mg      Hypertension  BP meds on hold  Chronic back pain  -cont gabapentin    _______________________________________________________________________  Patient seen and examined by me on discharge day. Pertinent Findings:  Gen:    Not in distress  Chest: Clear lungs  CVS:   Regular rhythm. No edema  Abd:  Soft, not distended, not tender  Neuro:  Alert, oriented x 3  _______________________________________________________________________  Current Discharge Medication List      START taking these medications    Details   sucralfate (CARAFATE) 1 gram tablet Take 1 Tab by mouth Before breakfast, lunch, dinner and at bedtime for 10 days. Qty: 40 Tab, Refills: 0      propafenone (RYTHMOL) 150 mg tablet Take 1 Tab by mouth two (2) times a day. Qty: 60 Tab, Refills: 2      potassium chloride SR (KLOR-CON 10) 10 mEq tablet Take 1 Tab by mouth daily. Qty: 30 Tab, Refills: 3         CONTINUE these medications which have CHANGED    Details   RABEprazole (ACIPHEX) 20 mg tablet Take 1 Tab by mouth two (2) times a day. Qty: 60 Tab, Refills: 3         CONTINUE these medications which have NOT CHANGED    Details   magnesium oxide (MAG-OX) 400 mg tablet Take 400 mg by mouth daily. famotidine (PEPCID) 20 mg tablet Take 20 mg by mouth daily. oxyCODONE-acetaminophen (Percocet) 5-325 mg per tablet Take 1 Tab by mouth every eight (8) hours as needed for Pain.      hydroCHLOROthiazide (HYDRODIURIL) 25 mg tablet TAKE 1 TABLET BY MOUTH  DAILY  Qty: 90 Tab, Refills: 5      melatonin 300 mcg tab Take 1.5 mg by mouth nightly. Qty: 1 Tab, Refills: 0      gabapentin (NEURONTIN) 800 mg tablet TAKE 1 TAB BY MOUTH FOUR  TIMES DAILY AS NEEDED. Qty: 360 Tab, Refills: 3      simvastatin (ZOCOR) 40 mg tablet Take 1 Tab by mouth nightly. Qty: 90 Tab, Refills: 3    Associated Diagnoses: Mixed hyperlipidemia      cholecalciferol (VITAMIN D3) 1,000 unit tablet Take 1,000 Units by mouth daily.       multivitamin (ONE A DAY) tablet Take 1 Tab by mouth daily. STOP taking these medications       apixaban (ELIQUIS) 2.5 mg tablet Comments:   Reason for Stopping:         magnesium 250 mg tab Comments:   Reason for Stopping:                 Patient Follow Up Instructions: Activity: PT/OT per Home Health  Diet: Resume previous diet with above modifications  Wound Care: None needed    Follow-up as below  Follow-up tests/labs repeat EGD 2 months    Follow-up Information     Follow up With Specialties Details Why [address] [place] [address]  This is your home health agency. If you have not heard from them in 2-3 days, please give them a call.   [address] [place] [address]  [address] [place] [address], Marilou Quintanilla NP Nurse Practitioner           ________________________________________________________________    Risk of deterioration: Moderate    Condition at Discharge:  Stable  __________________________________________________________________    Disposition  Home with family and home health services    ____________________________________________________________________    Code Status: DNR/DNI  ___________________________________________________________________      Total time in minutes spent coordinating this discharge (includes going over instructions, follow-up, prescriptions, and preparing report for sign off to her PCP) :  35 minutes    Signed:  Deepthi Toro DO

## 2020-07-04 NOTE — PROGRESS NOTES
Problem: Mobility Impaired (Adult and Pediatric)  Goal: *Acute Goals and Plan of Care (Insert Text)  Description: FUNCTIONAL STATUS PRIOR TO ADMISSION: Patient was modified independent using a rollator, wheelchair and power wheelchair for functional mobility. Use of walker in apartment, wheelchair and power w/c in community. BLE ankle AFO. HOME SUPPORT PRIOR TO ADMISSION: The patient lived alone, independent living apartment at [facility], with 3 local daughters to provide assistance as needed. Physical Therapy Goals  Initiated 7/4/2020  1. Patient will move from supine to sit and sit to supine , scoot up and down and roll side to side in bed with independence within 7 day(s). 2.  Patient will transfer from bed to chair and chair to bed with modified independence using the least restrictive device within 7 day(s). 3.  Patient will perform sit to stand with modified independence within 7 day(s). 4.  Patient will ambulate with modified independence for 50 feet with the least restrictive device within 7 day(s). Outcome: Progressing Towards Goal  PHYSICAL THERAPY EVALUATION  Patient: Lc Moncada (83 y.o. female)  Date: 7/4/2020  Primary Diagnosis: Upper GI bleed [K92.2]  Atrial fibrillation with rapid ventricular response (HCC) [I48.91]  Procedure(s) (LRB):  ESOPHAGOGASTRODUODENOSCOPY (EGD) (N/A)  RESOLUTION CLIP (N/A) 2 Days Post-Op   Precautions: DNR    ASSESSMENT  Based on the objective data described below, the patient presents with generalized weakness and decreased activity tolerance/endurance. She reports she is close to baseline. Tolerated transferring to chair and compensates for bilateral foot drop well. Patients bilateral AFO braces are at home. She is hypertensive throughout but stable, nurse aware. Plan is for patient to return to independent living apartment. [facility].  (She resides at [facility] and has discussed transferring to healthcare if needed.) Encouraged her to increase time up OOB while admitted. Current Level of Function Impacting Discharge (mobility/balance): SBA - CGA    Functional Outcome Measure: The patient scored 45/100 on the Barthel Index outcome measure which is indicative of moderate dependency for functional mobility.  (although patient reports close to baseline)     Other factors to consider for discharge: lives alone     Patient will benefit from skilled therapy intervention to address the above noted impairments. PLAN :  Recommendations and Planned Interventions: bed mobility training, transfer training, gait training, therapeutic exercises, patient and family training/education and therapeutic activities      Frequency/Duration: Patient will be followed by physical therapy:  3 times a week to address goals. Recommendation for discharge: (in order for the patient to meet his/her long term goals)  Physical therapy at least 2 days/week in the home     This discharge recommendation:  Has not yet been discussed the attending provider and/or case management    IF patient discharges home will need the following DME: patient owns DME required for discharge       SUBJECTIVE:   Patient stated I think I am going home today.     OBJECTIVE DATA SUMMARY:   HISTORY:    Past Medical History:   Diagnosis Date    Arthritis     Diarrhea 6/6/2016    Foot drop     Gastric ulcer 6/14/2012    Gastroparesis     GERD (gastroesophageal reflux disease)     High cholesterol     Hypertension     Neuropathy     Ovarian cancer ([facility] [address] [facility] 1986    chemo x 9 mos    Scoliosis     Stroke ([facility] 2002    ? stroke with drop feet, per patient CT scans were negative     Past Surgical History:   Procedure Laterality Date    FLEXIBLE SIGMOIDOSCOPY N/A 6/6/2016    SIGMOIDOSCOPY FLEXIBLE performed by Jessi Meade MD at [facility] ENDOSCOPY    HX ORTHOPAEDIC      back    HX ORTHOPAEDIC      bilateral shoulder replacements    HX ORTHOPAEDIC      left knee replacement  HX LYDIA AND BSO  1986    SC EGD TRANSORAL BIOPSY SINGLE/MULTIPLE  1/26/2012         SC EGD TRANSORAL BIOPSY SINGLE/MULTIPLE  6/14/2012            Personal factors and/or comorbidities impacting plan of care:     Home Situation  Home Environment: [place] [address] Name: Jennifer Mcneil  One/Two Story Residence: One story  Living Alone: Yes  Support Systems: Child(oracio)  Patient Expects to be Discharged to[de-identified] Independent living facility  Current DME Used/Available at Home: Walker, rolling, Wheelchair, Wheelchair, power, Grab bars, Shower chair, Raised toilet seat  Tub or Shower Type: Shower    EXAMINATION/PRESENTATION/DECISION MAKING:   Critical Behavior:  Neurologic State: Alert  Orientation Level: Oriented X4  Cognition: Appropriate for age attention/concentration, Follows commands  Safety/Judgement: Awareness of environment, Insight into deficits, Home safety, Fall prevention  Hearing: Auditory  Auditory Impairment: Hard of hearing, bilateral  Range Of Motion:  AROM: Generally decreased, functional(B foot drop)                       Strength:    Strength: Generally decreased, functional                    Vision:   Acuity: Within Defined Limits(h/o B cataract removal)  Corrective Lenses: Glasses  Functional Mobility:  Bed Mobility:     Supine to Sit: Supervision; Additional time     Scooting: Supervision; Additional time  Transfers:  Sit to Stand: Stand-by assistance  Stand to Sit: Stand-by assistance        Bed to Chair: Contact guard assistance; Additional time(use of RW)              Balance:   Sitting: Intact  Standing: Impaired  Standing - Static: Good  Standing - Dynamic : Fair  Ambulation/Gait Training:  Distance (ft): 5 Feet (ft)  Assistive Device: Walker, rolling  Ambulation - Level of Assistance: Contact guard assistance; Additional time        Gait Abnormalities: Decreased step clearance; Foot drop; Steppage gait        Base of Support: Narrowed     Speed/Kaylynn: Slow  Step Length: Left shortened;Right shortened                  Slow, compensates for BLE foot drop with steppage gait      Functional Measure:  Barthel Index:    Bathin  Bladder: 5  Bowels: 5  Groomin  Dressin  Feeding: 10  Mobility: 0  Stairs: 0  Toilet Use: 5  Transfer (Bed to Chair and Back): 10  Total: 45/100     The Barthel ADL Index: Guidelines  1. The index should be used as a record of what a patient does, not as a record of what a patient could do. 2. The main aim is to establish degree of independence from any help, physical or verbal, however minor and for whatever reason. 3. The need for supervision renders the patient not independent. 4. A patient's performance should be established using the best available evidence. Asking the patient, friends/relatives and nurses are the usual sources, but direct observation and common sense are also important. However direct testing is not needed. 5. Usually the patient's performance over the preceding 24-48 hours is important, but occasionally longer periods will be relevant. 6. Middle categories imply that the patient supplies over 50 per cent of the effort. 7. Use of aids to be independent is allowed. Mike Casey., Barthel, D.W. (8540). Functional evaluation: the Barthel Index. [facility] (14)2. Joseph Mccray, OMEGA, Terese Castleman., Nathan Purdy., [place], [address] [place] [address] (). Measuring the change indisability after inpatient rehabilitation; comparison of the responsiveness of the Barthel Index and Functional [place] Measure. Journal of Neurology, Neurosurgery, and Psychiatry, 66(4), 102-710. Christa Valerio, N.J.A, GABI Sandy, & Nicky Maza MKaylaA. (2004.) Assessment of post-stroke quality of life in cost-effectiveness studies: The usefulness of the Barthel Index and the EuroQoL-5D.  Quality of Life Research, 15, 597-22         Physical Therapy Evaluation Charge Determination   History Examination Presentation Decision-Making   MEDIUM  Complexity : 1-2 comorbidities / personal factors will impact the outcome/ POC  LOW Complexity : 1-2 Standardized tests and measures addressing body structure, function, activity limitation and / or participation in recreation  LOW Complexity : Stable, uncomplicated  Other outcome measures Barthel Index  MEDIUM      Based on the above components, the patient evaluation is determined to be of the following complexity level: MEDIUM    Pain Rating:  No c/o pain    Activity Tolerance:   Good  Please refer to the flowsheet for vital signs taken during this treatment. After treatment patient left in no apparent distress:   Sitting in chair and Call bell within reach    COMMUNICATION/EDUCATION:   The patients plan of care was discussed with: Occupational therapist and Registered nurse. Fall prevention education was provided and the patient/caregiver indicated understanding., Patient/family have participated as able in goal setting and plan of care. , and Patient/family agree to work toward stated goals and plan of care.     Thank you for this referral.  Mini Mathis, PT, DPT   Time Calculation: 27 mins

## 2020-07-06 ENCOUNTER — HOME CARE VISIT (OUTPATIENT)
Dept: SCHEDULING | Facility: HOME HEALTH | Age: 85
End: 2020-07-06
Payer: MEDICARE

## 2020-07-06 ENCOUNTER — PATIENT OUTREACH (OUTPATIENT)
Dept: CARDIOLOGY CLINIC | Age: 85
End: 2020-07-06

## 2020-07-06 ENCOUNTER — HOME CARE VISIT (OUTPATIENT)
Dept: HOME HEALTH SERVICES | Facility: HOME HEALTH | Age: 85
End: 2020-07-06
Payer: MEDICARE

## 2020-07-06 VITALS
HEIGHT: 60 IN | TEMPERATURE: 99.2 F | SYSTOLIC BLOOD PRESSURE: 132 MMHG | OXYGEN SATURATION: 97 % | RESPIRATION RATE: 16 BRPM | WEIGHT: 150 LBS | DIASTOLIC BLOOD PRESSURE: 82 MMHG | HEART RATE: 92 BPM | BODY MASS INDEX: 29.45 KG/M2

## 2020-07-06 PROCEDURE — G0299 HHS/HOSPICE OF RN EA 15 MIN: HCPCS

## 2020-07-06 PROCEDURE — G0151 HHCP-SERV OF PT,EA 15 MIN: HCPCS

## 2020-07-06 PROCEDURE — 3331090002 HH PPS REVENUE DEBIT

## 2020-07-06 PROCEDURE — 3331090001 HH PPS REVENUE CREDIT

## 2020-07-06 PROCEDURE — 400013 HH SOC

## 2020-07-06 NOTE — PROGRESS NOTES
Patient was admitted to [facility] on 7/1 and discharged on 7/4 for GIB. Patient was contacted within 1 business days of discharge. Top Discharge Challenges to be reviewed by the provider   Additional needs identified to be addressed with provider yes  home health care-[facility], medications-see below and labs-see below   Abnormal labs at discharge:  2020  4:26 AM - Mikhail, Lab In [facility]     Component Value Flag Ref Range Units Status   HGB 8.5  Low   11.5 - 16.0 g/dL Final   HCT 30.9  Low   35.0 - 47.0 % Final       Potassium 3.1  Low   3.5 - 5.1 mmol/L Final       Med changes at discharge:  Current Discharge Medication List             START taking these medications     Details   sucralfate (CARAFATE) 1 gram tablet Take 1 Tab by mouth Before breakfast, lunch, dinner and at bedtime for 10 days. Qty: 40 Tab, Refills: 0       propafenone (RYTHMOL) 150 mg tablet Take 1 Tab by mouth two (2) times a day. Qty: 60 Tab, Refills: 2       potassium chloride SR (KLOR-CON 10) 10 mEq tablet Take 1 Tab by mouth daily. Qty: 30 Tab, Refills: 3                 CONTINUE these medications which have CHANGED     Details   RABEprazole (ACIPHEX) 20 mg tablet Take 1 Tab by mouth two (2) times a day.   Qty: 60 Tab, Refills: 3         STOP taking these medications         apixaban (ELIQUIS) 2.5 mg tablet Comments:   Reason for Stopping:            magnesium 250 mg tab Comments:   Reason for Stopping:      Per GI hospital note:    Assessment: ·   Ulcerative esophagitis,m GEJ ulcer s/p hemoclip 7/2  · Acute post hemorrhagic anemia  · afib w/ RVR on eliquis (held), LBBB, DM II, HLD, CKD III, Lewis's esophagus, scoliosis, chronic back pain          Plan:  ·   PPI BID  · Continue QID carafate to complete 10 days  · Continue to hold anticoagulation, defer to cardiology colleagues about longterm resumption  · Diet to full liquid  · If she's doing well on full liquid and hgb stable, I think she would be safe for D/C home tomorrow. Patient to follow up with Dr Falguni Bautista EGD in 2 months and to discuss wheter to restart patient on low dose anticoagulant. Per hospitalist note:  A. fib with RVR  Currently resolved with IV fluid bolus, Eliquis because of GI bleed   Rate currently around 80  -appreciate cardiology consult  -not watchman or ablation candidate d/t need of temporary anticoagulation in both cases  -note plan for propafenone to suppress Afib in light of inability to anticoagulate  -cont propafenone for Afib suppression  -outpatient discussion with GI and cardiology re consideration of restarting AC at lower dose in the future    Discussed COVID-19 related testing which was not done at this time. Test results were not done. Patient informed of results, if available? na   Method of communication with provider : Unable to find number for PCP--called Swapnil Bhat, no answer at main number       Advance Care Planning:   Does patient have an Advance Directive:  reviewed and current     Inpatient Readmission Risk score: 27  Was this a readmission? no     Patients top risk factors for readmission: functional physical ability and medical condition  Interventions to address risk factors:  PT/OT, patient education, family involvement, follow ups with MDs post discharge. Care Transition Nurse (CTN) contacted the family by telephone to perform post hospital discharge assessment. Verified name and  with family as identifiers. Provided introduction to self, and explanation of the CTN role. CTN reviewed discharge instructions, medical action plan and red flags with family who verbalized understanding. Family given an opportunity to ask questions and does not have any further questions or concerns at this time. The family agrees to contact the PCP office for questions related to their healthcare.      Medication reconciliation was performed with patient, who verbalizes understanding of administration of home medications. Advised obtaining a 90-day supply of all daily and as-needed medications. Referral to Pharm D needed: no     Home Health/Outpatient orders at discharge: home health care-PT/OT/SN  [address]: JOSEFINA ASIF [facility]  Date of initial visit: 7/6/20    Durable Medical Equipment ordered at discharge: none    Covid Risk Education    Patient is on 14 day quarantine at [address] Patient has following risk factors of: diabetes. Education provided regarding infection prevention, and signs and symptoms of COVID-19 and when to seek medical attention with patient who verbalized understanding. Discussed exposure protocols and quarantine From CDC: Are you at higher risk for severe illness?  and given an opportunity for questions and concerns. The patient agrees to contact the COVID-19 hotline [phone] or PCP office for questions related to COVID-19. For more information on steps you can take to protect yourself, see CDC's How to Protect Yourself     Patient/family/caregiver given information for [employer] and agrees to enroll no  Patient's preferred e-mail: declines  Patient's preferred phone number: declines    Discussed follow-up appointments. If no appointment was previously scheduled, appointment scheduling offered: yes  [facility] follow up appointment(s): No future appointments. Non-[facility] follow up appointment(s): PCP ISABELLE Connelly pending, Dr Jef Grijalva 7/22/20, EUSEBIO Cee pending  Plan for follow-up call in 5-7 days based on severity of symptoms and risk factors. CTN provided contact information for future needs. Goals      Prevent complications post hospitalization. 07/06/20  CTN spoke to patient to review discharge instructions/red flags to prevent readmission:    Per pt and daughter, patient is quarantined for 14 days in [address] Appts:  PCP---ISABELLE Connelly  CTN unable to locate number for NP Elaine Connelly, spoke to patient daughter who states she works at [employer].  CTN tried to call main number at [facility], no answer. CTN will re attempt to contact PCP later today. UPDATE: CTN called [facility], was transferred to [facility], [facility] nurse. CTN LM requesting appt with NP and to have a call back to discuss discharge. Cards--Dr Judy Marshall states pt has appt for 7/22 with Lorene Hunter NP. She will call today to see if they want appt rescheduled. Dr Steve Tomas, GI---daughter states she will call today to make appt. [facility]---patient states they are coming today at 1200. Red Flags:    --meds: confirmed with pt that she is not to retake eliquis at this time. Patient states she has all new medications and taking as prescribed. Med rec completed. ---patient reports she is having no adverse GI symptoms, no s/sx unusual bleeding in stools, no abdominal pain. CTN discussed diet with patient to slowly increase PO intake, chew foods well, avoid chocolate , caffeine, spicy foods. Patient reports she has gastroparesis so she usually eats small frequent meals on regular basis. ---patient educated on s/sx stroke, to call 911 for CP, palpitations, or sob.      CTN will follow up in one week, daughter and pt both have CTN contact information---mkrw

## 2020-07-07 VITALS
TEMPERATURE: 99.2 F | RESPIRATION RATE: 18 BRPM | DIASTOLIC BLOOD PRESSURE: 82 MMHG | HEART RATE: 74 BPM | OXYGEN SATURATION: 96 % | SYSTOLIC BLOOD PRESSURE: 132 MMHG

## 2020-07-07 PROCEDURE — 3331090001 HH PPS REVENUE CREDIT

## 2020-07-07 PROCEDURE — 3331090002 HH PPS REVENUE DEBIT

## 2020-07-08 PROCEDURE — 3331090002 HH PPS REVENUE DEBIT

## 2020-07-08 PROCEDURE — 3331090001 HH PPS REVENUE CREDIT

## 2020-07-09 ENCOUNTER — HOME CARE VISIT (OUTPATIENT)
Dept: SCHEDULING | Facility: HOME HEALTH | Age: 85
End: 2020-07-09
Payer: MEDICARE

## 2020-07-09 ENCOUNTER — HOME CARE VISIT (OUTPATIENT)
Dept: HOME HEALTH SERVICES | Facility: HOME HEALTH | Age: 85
End: 2020-07-09
Payer: MEDICARE

## 2020-07-09 VITALS
OXYGEN SATURATION: 96 % | RESPIRATION RATE: 18 BRPM | TEMPERATURE: 98.8 F | SYSTOLIC BLOOD PRESSURE: 138 MMHG | DIASTOLIC BLOOD PRESSURE: 82 MMHG | HEART RATE: 70 BPM

## 2020-07-09 PROCEDURE — 3331090002 HH PPS REVENUE DEBIT

## 2020-07-09 PROCEDURE — 3331090001 HH PPS REVENUE CREDIT

## 2020-07-09 PROCEDURE — G0151 HHCP-SERV OF PT,EA 15 MIN: HCPCS

## 2020-07-10 PROCEDURE — 3331090001 HH PPS REVENUE CREDIT

## 2020-07-10 PROCEDURE — 3331090002 HH PPS REVENUE DEBIT

## 2020-07-11 PROCEDURE — 3331090002 HH PPS REVENUE DEBIT

## 2020-07-11 PROCEDURE — 3331090001 HH PPS REVENUE CREDIT

## 2020-07-12 PROCEDURE — 3331090001 HH PPS REVENUE CREDIT

## 2020-07-12 PROCEDURE — 3331090002 HH PPS REVENUE DEBIT

## 2020-07-13 PROCEDURE — 3331090001 HH PPS REVENUE CREDIT

## 2020-07-13 PROCEDURE — 3331090002 HH PPS REVENUE DEBIT

## 2020-07-14 ENCOUNTER — HOME CARE VISIT (OUTPATIENT)
Dept: SCHEDULING | Facility: HOME HEALTH | Age: 85
End: 2020-07-14
Payer: MEDICARE

## 2020-07-14 VITALS
HEART RATE: 69 BPM | DIASTOLIC BLOOD PRESSURE: 90 MMHG | SYSTOLIC BLOOD PRESSURE: 140 MMHG | RESPIRATION RATE: 20 BRPM | OXYGEN SATURATION: 97 % | TEMPERATURE: 98.3 F

## 2020-07-14 VITALS
HEART RATE: 69 BPM | TEMPERATURE: 98.3 F | OXYGEN SATURATION: 97 % | SYSTOLIC BLOOD PRESSURE: 140 MMHG | RESPIRATION RATE: 18 BRPM | DIASTOLIC BLOOD PRESSURE: 90 MMHG

## 2020-07-14 PROCEDURE — G0300 HHS/HOSPICE OF LPN EA 15 MIN: HCPCS

## 2020-07-14 PROCEDURE — 3331090002 HH PPS REVENUE DEBIT

## 2020-07-14 PROCEDURE — 3331090001 HH PPS REVENUE CREDIT

## 2020-07-14 PROCEDURE — G0151 HHCP-SERV OF PT,EA 15 MIN: HCPCS

## 2020-07-15 PROCEDURE — 3331090001 HH PPS REVENUE CREDIT

## 2020-07-15 PROCEDURE — 3331090002 HH PPS REVENUE DEBIT

## 2020-07-16 ENCOUNTER — HOME CARE VISIT (OUTPATIENT)
Dept: SCHEDULING | Facility: HOME HEALTH | Age: 85
End: 2020-07-16
Payer: MEDICARE

## 2020-07-16 PROCEDURE — 3331090002 HH PPS REVENUE DEBIT

## 2020-07-16 PROCEDURE — G0299 HHS/HOSPICE OF RN EA 15 MIN: HCPCS

## 2020-07-16 PROCEDURE — 3331090001 HH PPS REVENUE CREDIT

## 2020-07-17 ENCOUNTER — HOME CARE VISIT (OUTPATIENT)
Dept: SCHEDULING | Facility: HOME HEALTH | Age: 85
End: 2020-07-17
Payer: MEDICARE

## 2020-07-17 VITALS
SYSTOLIC BLOOD PRESSURE: 126 MMHG | HEART RATE: 68 BPM | TEMPERATURE: 99 F | OXYGEN SATURATION: 97 % | DIASTOLIC BLOOD PRESSURE: 72 MMHG | RESPIRATION RATE: 18 BRPM

## 2020-07-17 PROCEDURE — 3331090002 HH PPS REVENUE DEBIT

## 2020-07-17 PROCEDURE — 3331090001 HH PPS REVENUE CREDIT

## 2020-07-17 PROCEDURE — G0151 HHCP-SERV OF PT,EA 15 MIN: HCPCS

## 2020-07-18 PROCEDURE — 3331090002 HH PPS REVENUE DEBIT

## 2020-07-18 PROCEDURE — 3331090001 HH PPS REVENUE CREDIT

## 2020-07-19 PROCEDURE — 3331090001 HH PPS REVENUE CREDIT

## 2020-07-19 PROCEDURE — 3331090002 HH PPS REVENUE DEBIT

## 2020-07-20 ENCOUNTER — HOME CARE VISIT (OUTPATIENT)
Dept: SCHEDULING | Facility: HOME HEALTH | Age: 85
End: 2020-07-20
Payer: MEDICARE

## 2020-07-20 VITALS
OXYGEN SATURATION: 100 % | RESPIRATION RATE: 18 BRPM | TEMPERATURE: 98.6 F | SYSTOLIC BLOOD PRESSURE: 158 MMHG | HEART RATE: 64 BPM | DIASTOLIC BLOOD PRESSURE: 88 MMHG

## 2020-07-20 VITALS
DIASTOLIC BLOOD PRESSURE: 80 MMHG | SYSTOLIC BLOOD PRESSURE: 130 MMHG | HEART RATE: 72 BPM | OXYGEN SATURATION: 95 % | TEMPERATURE: 99.1 F | RESPIRATION RATE: 18 BRPM

## 2020-07-20 VITALS
HEART RATE: 77 BPM | TEMPERATURE: 97.5 F | DIASTOLIC BLOOD PRESSURE: 70 MMHG | OXYGEN SATURATION: 95 % | RESPIRATION RATE: 18 BRPM | SYSTOLIC BLOOD PRESSURE: 130 MMHG | WEIGHT: 142 LBS | BODY MASS INDEX: 27.73 KG/M2

## 2020-07-20 PROCEDURE — G0151 HHCP-SERV OF PT,EA 15 MIN: HCPCS

## 2020-07-20 PROCEDURE — 3331090001 HH PPS REVENUE CREDIT

## 2020-07-20 PROCEDURE — G0300 HHS/HOSPICE OF LPN EA 15 MIN: HCPCS

## 2020-07-20 PROCEDURE — 3331090002 HH PPS REVENUE DEBIT

## 2020-07-21 PROCEDURE — 3331090001 HH PPS REVENUE CREDIT

## 2020-07-21 PROCEDURE — 3331090002 HH PPS REVENUE DEBIT

## 2020-07-22 PROCEDURE — 3331090002 HH PPS REVENUE DEBIT

## 2020-07-22 PROCEDURE — 3331090001 HH PPS REVENUE CREDIT

## 2020-07-23 ENCOUNTER — PATIENT OUTREACH (OUTPATIENT)
Dept: CASE MANAGEMENT | Age: 85
End: 2020-07-23

## 2020-07-23 PROCEDURE — 3331090001 HH PPS REVENUE CREDIT

## 2020-07-23 PROCEDURE — 3331090002 HH PPS REVENUE DEBIT

## 2020-07-23 NOTE — PROGRESS NOTES
Goals      Prevent complications post hospitalization. 07/06/20  CTN spoke to patient to review discharge instructions/red flags to prevent readmission:    Per pt and daughter, patient is quarantined for 14 days in [place] [address]. Appts:  PCP---NP Mechelle Tellez  CTN unable to locate number for NP Mechelle Tellez, spoke to patient daughter who states she works at [employer]. CTN tried to call main number at [employer], no answer. CTN will re attempt to contact PCP later today. UPDATE: CTN called [employer], was transferred to [facility], clinic nurse. CTN LM requesting appt with NP and to have a call back to discuss discharge. Cards--Dr Negro Donohue states pt has appt for 7/22 with Cem Diaz NP. She will call today to see if they want appt rescheduled. Dr Kimani Kenny, GI---daughter states she will call today to make appt. [facility]---patient states they are coming today at 1200. Red Flags:    --meds: confirmed with pt that she is not to retake eliquis at this time. Patient states she has all new medications and taking as prescribed. Med rec completed. ---patient reports she is having no adverse GI symptoms, no s/sx unusual bleeding in stools, no abdominal pain. CTN discussed diet with patient to slowly increase PO intake, chew foods well, avoid chocolate , caffeine, spicy foods. Patient reports she has gastroparesis so she usually eats small frequent meals on regular basis. ---patient educated on s/sx stroke, to call 911 for CP, palpitations, or sob. CTN will follow up in one week, daughter and pt both have CTN contact information---mkrw    07/23/20  CTN spoke to patient for updates. --Patient reports she is no longer having any s/sx GI bleeding.    ---Patient reports her main problem right now is increasing back pain---[facility] saw ortho MD yesterday and has an injuction scheduled for Monday 7/27/20.    ---Patient continues with [facility] SN and PT services.  She states both are coming tomorrow. ---Pt reports she remains off of blood thinners. She states she has an appt with cards however it was the same day she had another appt so her daughter cancelled it and rescheduled. CTN contacted Dr Destiny Griffith office--pt appt is 8/11/20. Patient informed CTN that she is aware that she needs to be put back on something (anticoagulant) for her afib and that Hetal Wren told her that there were 3-4 options he wanted to discuss at this appt. CTN reviewed s/sx stroke and instructed pt to call 911 if she had any symptoms. Pt verbalzies understanding.     CTN will follow up next week---cassie

## 2020-07-24 ENCOUNTER — HOME CARE VISIT (OUTPATIENT)
Dept: SCHEDULING | Facility: HOME HEALTH | Age: 85
End: 2020-07-24
Payer: MEDICARE

## 2020-07-24 PROCEDURE — G0151 HHCP-SERV OF PT,EA 15 MIN: HCPCS

## 2020-07-24 PROCEDURE — 3331090002 HH PPS REVENUE DEBIT

## 2020-07-24 PROCEDURE — 3331090001 HH PPS REVENUE CREDIT

## 2020-07-25 VITALS
RESPIRATION RATE: 18 BRPM | SYSTOLIC BLOOD PRESSURE: 144 MMHG | HEART RATE: 71 BPM | DIASTOLIC BLOOD PRESSURE: 80 MMHG | TEMPERATURE: 99 F | OXYGEN SATURATION: 97 %

## 2020-07-25 PROCEDURE — 3331090002 HH PPS REVENUE DEBIT

## 2020-07-25 PROCEDURE — 3331090001 HH PPS REVENUE CREDIT

## 2020-07-26 PROCEDURE — 3331090001 HH PPS REVENUE CREDIT

## 2020-07-26 PROCEDURE — 3331090002 HH PPS REVENUE DEBIT

## 2020-07-27 PROCEDURE — 3331090002 HH PPS REVENUE DEBIT

## 2020-07-27 PROCEDURE — 3331090001 HH PPS REVENUE CREDIT

## 2020-07-28 ENCOUNTER — PATIENT OUTREACH (OUTPATIENT)
Dept: CASE MANAGEMENT | Age: 85
End: 2020-07-28

## 2020-07-28 PROCEDURE — 3331090001 HH PPS REVENUE CREDIT

## 2020-07-28 PROCEDURE — 3331090002 HH PPS REVENUE DEBIT

## 2020-07-28 NOTE — PROGRESS NOTES
Goals      Prevent complications post hospitalization. 07/06/20  CTN spoke to patient to review discharge instructions/red flags to prevent readmission:    Per pt and daughter, patient is quarantined for 14 days in [place] [address]. Appts:  PCP---NP Avelino Umanzor  CTN unable to locate number for NP Avelino Umanzor, spoke to patient daughter who states she works at [employer]. CTN tried to call main number at [employer], no answer. CTN will re attempt to contact PCP later today. UPDATE: CTN called [employer], was transferred to [facility], clinic nurse. CTN LM requesting appt with NP and to have a call back to discuss discharge. Cards--Dr Beyer Part states pt has appt for 7/22 with Wei Israel NP. She will call today to see if they want appt rescheduled. Dr Gregorio Garcia, GI---daughter states she will call today to make appt. [facility]---patient states they are coming today at 1200. Red Flags:    --meds: confirmed with pt that she is not to retake eliquis at this time. Patient states she has all new medications and taking as prescribed. Med rec completed. ---patient reports she is having no adverse GI symptoms, no s/sx unusual bleeding in stools, no abdominal pain. CTN discussed diet with patient to slowly increase PO intake, chew foods well, avoid chocolate , caffeine, spicy foods. Patient reports she has gastroparesis so she usually eats small frequent meals on regular basis. ---patient educated on s/sx stroke, to call 911 for CP, palpitations, or sob. CTN will follow up in one week, daughter and pt both have CTN contact information---mkrw    07/23/20  CTN spoke to patient for updates. --Patient reports she is no longer having any s/sx GI bleeding.    ---Patient reports her main problem right now is increasing back pain---[facility] saw ortho MD yesterday and has an injuction scheduled for Monday 7/27/20.    ---Patient continues with [facility] SN and PT services.  She states both are coming tomorrow. ---Pt reports she remains off of blood thinners. She states she has an appt with cards however it was the same day she had another appt so her daughter cancelled it and rescheduled. CTN contacted Dr Claudia Singh office--pt appt is 8/11/20. Patient informed CTN that she is aware that she needs to be put back on something (anticoagulant) for her afib and that [employer] told her that there were 3-4 options he wanted to discuss at this appt. CTN reviewed s/sx stroke and instructed pt to call 911 if she had any symptoms. Pt verbalzies understanding. CTN will follow up next week---mkrw      07/28/20  Patient contacted CTN to report that she feels \"something in my urine when I have to pee\" . She states she does not see any foreign bodies in the toilet however at times her urine is not clear. She states it does not feel normal to her. CTN asked patient if she had a way to contact her PCP--she reports she has [employer] cell number and that lately she has been coming to patient's apt . CTN instructed her to call PCP to report her symptoms and to get advice from her---informed pt that it may be a UTI. Patient agrees and states she will call PCP today.  CTN will follow up ---mkrw

## 2020-07-29 ENCOUNTER — HOME CARE VISIT (OUTPATIENT)
Dept: SCHEDULING | Facility: HOME HEALTH | Age: 85
End: 2020-07-29
Payer: MEDICARE

## 2020-07-29 ENCOUNTER — HOME CARE VISIT (OUTPATIENT)
Dept: HOME HEALTH SERVICES | Facility: HOME HEALTH | Age: 85
End: 2020-07-29
Payer: MEDICARE

## 2020-07-29 VITALS
OXYGEN SATURATION: 97 % | TEMPERATURE: 98.3 F | BODY MASS INDEX: 27.34 KG/M2 | WEIGHT: 140 LBS | HEART RATE: 63 BPM | SYSTOLIC BLOOD PRESSURE: 146 MMHG | DIASTOLIC BLOOD PRESSURE: 80 MMHG | RESPIRATION RATE: 18 BRPM

## 2020-07-29 PROCEDURE — 3331090001 HH PPS REVENUE CREDIT

## 2020-07-29 PROCEDURE — 3331090002 HH PPS REVENUE DEBIT

## 2020-07-29 PROCEDURE — G0300 HHS/HOSPICE OF LPN EA 15 MIN: HCPCS

## 2020-07-30 PROCEDURE — 3331090002 HH PPS REVENUE DEBIT

## 2020-07-30 PROCEDURE — 3331090001 HH PPS REVENUE CREDIT

## 2020-07-31 PROCEDURE — 3331090001 HH PPS REVENUE CREDIT

## 2020-07-31 PROCEDURE — 3331090002 HH PPS REVENUE DEBIT

## 2020-08-01 PROCEDURE — 3331090002 HH PPS REVENUE DEBIT

## 2020-08-01 PROCEDURE — 3331090001 HH PPS REVENUE CREDIT

## 2020-08-02 PROCEDURE — 3331090002 HH PPS REVENUE DEBIT

## 2020-08-02 PROCEDURE — 3331090001 HH PPS REVENUE CREDIT

## 2020-08-03 PROCEDURE — 3331090001 HH PPS REVENUE CREDIT

## 2020-08-03 PROCEDURE — 3331090002 HH PPS REVENUE DEBIT

## 2020-08-04 PROCEDURE — 3331090001 HH PPS REVENUE CREDIT

## 2020-08-04 PROCEDURE — 3331090002 HH PPS REVENUE DEBIT

## 2020-08-05 PROCEDURE — 3331090001 HH PPS REVENUE CREDIT

## 2020-08-05 PROCEDURE — 3331090002 HH PPS REVENUE DEBIT

## 2020-08-06 PROCEDURE — 3331090002 HH PPS REVENUE DEBIT

## 2020-08-06 PROCEDURE — 3331090001 HH PPS REVENUE CREDIT

## 2020-08-07 ENCOUNTER — HOME CARE VISIT (OUTPATIENT)
Dept: SCHEDULING | Facility: HOME HEALTH | Age: 85
End: 2020-08-07
Payer: MEDICARE

## 2020-08-07 PROCEDURE — G0299 HHS/HOSPICE OF RN EA 15 MIN: HCPCS

## 2020-08-07 PROCEDURE — 3331090002 HH PPS REVENUE DEBIT

## 2020-08-07 PROCEDURE — 400013 HH SOC

## 2020-08-07 PROCEDURE — 3331090001 HH PPS REVENUE CREDIT

## 2020-08-08 VITALS
RESPIRATION RATE: 14 BRPM | DIASTOLIC BLOOD PRESSURE: 70 MMHG | SYSTOLIC BLOOD PRESSURE: 148 MMHG | TEMPERATURE: 98.7 F | HEART RATE: 61 BPM | OXYGEN SATURATION: 97 %

## 2020-08-08 PROCEDURE — 3331090002 HH PPS REVENUE DEBIT

## 2020-08-08 PROCEDURE — 3331090001 HH PPS REVENUE CREDIT

## 2020-08-09 PROCEDURE — 3331090001 HH PPS REVENUE CREDIT

## 2020-08-09 PROCEDURE — 3331090002 HH PPS REVENUE DEBIT

## 2020-08-10 PROCEDURE — 3331090002 HH PPS REVENUE DEBIT

## 2020-08-10 PROCEDURE — 3331090001 HH PPS REVENUE CREDIT

## 2020-08-11 PROCEDURE — 3331090002 HH PPS REVENUE DEBIT

## 2020-08-11 PROCEDURE — 3331090001 HH PPS REVENUE CREDIT

## 2020-08-12 PROCEDURE — 3331090001 HH PPS REVENUE CREDIT

## 2020-08-12 PROCEDURE — 3331090002 HH PPS REVENUE DEBIT

## 2020-08-13 PROCEDURE — 3331090002 HH PPS REVENUE DEBIT

## 2020-08-13 PROCEDURE — 3331090001 HH PPS REVENUE CREDIT

## 2020-08-14 ENCOUNTER — HOME CARE VISIT (OUTPATIENT)
Dept: HOME HEALTH SERVICES | Facility: HOME HEALTH | Age: 85
End: 2020-08-14
Payer: MEDICARE

## 2020-08-14 DIAGNOSIS — E78.2 MIXED HYPERLIPIDEMIA: ICD-10-CM

## 2020-08-14 PROCEDURE — 3331090001 HH PPS REVENUE CREDIT

## 2020-08-14 PROCEDURE — 3331090002 HH PPS REVENUE DEBIT

## 2020-08-14 RX ORDER — SIMVASTATIN 40 MG/1
TABLET, FILM COATED ORAL
Qty: 90 TAB | Refills: 3 | Status: SHIPPED | OUTPATIENT
Start: 2020-08-14 | End: 2021-09-09

## 2020-08-15 PROCEDURE — 3331090002 HH PPS REVENUE DEBIT

## 2020-08-15 PROCEDURE — 3331090001 HH PPS REVENUE CREDIT

## 2020-08-16 PROCEDURE — 3331090002 HH PPS REVENUE DEBIT

## 2020-08-16 PROCEDURE — 3331090001 HH PPS REVENUE CREDIT

## 2020-08-17 PROCEDURE — 3331090002 HH PPS REVENUE DEBIT

## 2020-08-17 PROCEDURE — 3331090001 HH PPS REVENUE CREDIT

## 2020-08-18 PROCEDURE — 3331090001 HH PPS REVENUE CREDIT

## 2020-08-18 PROCEDURE — 3331090002 HH PPS REVENUE DEBIT

## 2020-08-19 PROCEDURE — 3331090002 HH PPS REVENUE DEBIT

## 2020-08-19 PROCEDURE — 3331090001 HH PPS REVENUE CREDIT

## 2020-08-20 PROCEDURE — 3331090002 HH PPS REVENUE DEBIT

## 2020-08-20 PROCEDURE — 3331090001 HH PPS REVENUE CREDIT

## 2020-08-21 PROCEDURE — 3331090002 HH PPS REVENUE DEBIT

## 2020-08-21 PROCEDURE — 3331090001 HH PPS REVENUE CREDIT

## 2020-08-22 ENCOUNTER — HOME CARE VISIT (OUTPATIENT)
Dept: SCHEDULING | Facility: HOME HEALTH | Age: 85
End: 2020-08-22
Payer: MEDICARE

## 2020-08-22 VITALS
OXYGEN SATURATION: 95 % | SYSTOLIC BLOOD PRESSURE: 139 MMHG | HEART RATE: 87 BPM | DIASTOLIC BLOOD PRESSURE: 78 MMHG | TEMPERATURE: 98.2 F | RESPIRATION RATE: 18 BRPM

## 2020-08-22 PROCEDURE — 3331090002 HH PPS REVENUE DEBIT

## 2020-08-22 PROCEDURE — 3331090001 HH PPS REVENUE CREDIT

## 2020-08-22 PROCEDURE — G0299 HHS/HOSPICE OF RN EA 15 MIN: HCPCS

## 2020-08-23 PROCEDURE — 3331090002 HH PPS REVENUE DEBIT

## 2020-08-23 PROCEDURE — 3331090001 HH PPS REVENUE CREDIT

## 2020-09-30 ENCOUNTER — HOSPITAL ENCOUNTER (OUTPATIENT)
Dept: CT IMAGING | Age: 85
Discharge: HOME | End: 2020-09-30
Attending: NURSE PRACTITIONER
Payer: MEDICARE

## 2020-09-30 ENCOUNTER — HOSPITAL ENCOUNTER (OUTPATIENT)
Dept: GENERAL RADIOLOGY | Age: 85
Discharge: HOME | End: 2020-09-30
Attending: NURSE PRACTITIONER
Payer: MEDICARE

## 2020-09-30 VITALS
HEART RATE: 79 BPM | TEMPERATURE: 98 F | DIASTOLIC BLOOD PRESSURE: 74 MMHG | OXYGEN SATURATION: 96 % | SYSTOLIC BLOOD PRESSURE: 125 MMHG | RESPIRATION RATE: 18 BRPM

## 2020-09-30 DIAGNOSIS — Z98.1 S/P LUMBAR FUSION: ICD-10-CM

## 2020-09-30 DIAGNOSIS — M51.36 DDD (DEGENERATIVE DISC DISEASE), LUMBAR: ICD-10-CM

## 2020-09-30 DIAGNOSIS — M46.1 SACROILIITIS, NOT ELSEWHERE CLASSIFIED (HCC): ICD-10-CM

## 2020-09-30 DIAGNOSIS — M54.16 RIGHT LUMBAR RADICULOPATHY: ICD-10-CM

## 2020-09-30 DIAGNOSIS — M54.50 LOW BACK PAIN, UNSPECIFIED BACK PAIN LATERALITY, UNSPECIFIED CHRONICITY, UNSPECIFIED WHETHER SCIATICA PRESENT: ICD-10-CM

## 2020-09-30 PROCEDURE — 62304 MYELOGRAPHY LUMBAR INJECTION: CPT

## 2020-09-30 PROCEDURE — 72132 CT LUMBAR SPINE W/DYE: CPT

## 2020-09-30 PROCEDURE — 74011000636 HC RX REV CODE- 636: Performed by: RADIOLOGY

## 2020-09-30 RX ADMIN — IOHEXOL 15 ML: 180 INJECTION INTRAVENOUS at 10:39

## 2020-09-30 NOTE — DISCHARGE INSTRUCTIONS
[facility]  Radiology Department  [phone]    Radiologist: Dr. Vineet Roland    Date: 9/30/2020      Myelogram Discharge Instructions    Go home and rest and restrict your activity the next 24 - 48 hours. Rest in a reclined position, keeping your head elevated to minimize post procedure complications. Resume your previous diet and prescribed medications. Increase your fluid intake over the next 1-2 days to help the kidneys flush out the dye that was injected during your procedure. You may take Tylenol if allowed, as directed on the label, for pain or discomfort. Avoid Ibuprofen (Advil, Motrin etc.) and Aspirin today as they may increase your risk of bleeding. Avoid heavy lifting (nothing greater than 5 pounds),  excessive bending, pushing or pulling movements for 2 days to minimize your risk of post procedure headache. You may shower in 24 hours. Do not soak or swim until the site has healed completely. Results will be sent to your physician as soon as they become available. Follow up with your physician as previously discussed and be sure to bring the CD to that was provided for you today to your appointment. If you have any questions regarding your procedure please call and ask to speak to a radiology nurse.

## 2020-09-30 NOTE — PROGRESS NOTES
1100 - Pt arrived in xray recovery post procedure. VSS. No C/O pain. Dressing to site D&I. No bleeding or hematoma noted to site. Pt resting comfortably on stretcher. 1140 - NAD noted. D/C instructions given. Copy on chart and copy given to pt. CD given to pt. Pt identifiers used. Pt taken to car by wheelchair and taken home by family.

## 2020-10-01 ENCOUNTER — HOSPITAL ENCOUNTER (OUTPATIENT)
Dept: PREADMISSION TESTING | Age: 85
Discharge: HOME | End: 2020-10-01
Payer: MEDICARE

## 2020-10-01 PROCEDURE — 87635 SARS-COV-2 COVID-19 AMP PRB: CPT

## 2020-10-02 LAB
HEALTH STATUS, XMCV2T: NORMAL
SARS-COV-2, COV2NT: NOT DETECTED
SOURCE, COVRS: NORMAL
SPECIMEN SOURCE, FCOV2M: NORMAL
SPECIMEN TYPE, XMCV1T: NORMAL

## 2020-10-02 RX ORDER — ACETAMINOPHEN 500 MG
1000 TABLET ORAL
COMMUNITY

## 2020-10-05 ENCOUNTER — ANESTHESIA (OUTPATIENT)
Dept: ENDOSCOPY | Age: 85
End: 2020-10-05
Payer: MEDICARE

## 2020-10-05 ENCOUNTER — ANESTHESIA EVENT (OUTPATIENT)
Dept: ENDOSCOPY | Age: 85
End: 2020-10-05
Payer: MEDICARE

## 2020-10-05 ENCOUNTER — HOSPITAL ENCOUNTER (OUTPATIENT)
Age: 85
Setting detail: OUTPATIENT SURGERY
Discharge: HOME | End: 2020-10-05
Attending: SPECIALIST | Admitting: SPECIALIST
Payer: MEDICARE

## 2020-10-05 VITALS
HEIGHT: 61 IN | HEART RATE: 63 BPM | BODY MASS INDEX: 26.45 KG/M2 | TEMPERATURE: 97.4 F | RESPIRATION RATE: 19 BRPM | SYSTOLIC BLOOD PRESSURE: 163 MMHG | OXYGEN SATURATION: 92 % | DIASTOLIC BLOOD PRESSURE: 70 MMHG

## 2020-10-05 PROBLEM — Z87.11 HISTORY OF GASTRIC ULCER: Status: ACTIVE | Noted: 2020-10-05

## 2020-10-05 PROCEDURE — 76060000031 HC ANESTHESIA FIRST 0.5 HR: Performed by: SPECIALIST

## 2020-10-05 PROCEDURE — 2709999900 HC NON-CHARGEABLE SUPPLY: Performed by: SPECIALIST

## 2020-10-05 PROCEDURE — 74011250636 HC RX REV CODE- 250/636: Performed by: SPECIALIST

## 2020-10-05 PROCEDURE — 74011000250 HC RX REV CODE- 250: Performed by: ANESTHESIOLOGY

## 2020-10-05 PROCEDURE — 76040000019: Performed by: SPECIALIST

## 2020-10-05 PROCEDURE — 74011250636 HC RX REV CODE- 250/636: Performed by: ANESTHESIOLOGY

## 2020-10-05 RX ORDER — SODIUM CHLORIDE 0.9 % (FLUSH) 0.9 %
5-40 SYRINGE (ML) INJECTION EVERY 8 HOURS
Status: DISCONTINUED | OUTPATIENT
Start: 2020-10-05 | End: 2020-10-05 | Stop reason: HOSPADM

## 2020-10-05 RX ORDER — PROPOFOL 10 MG/ML
INJECTION, EMULSION INTRAVENOUS AS NEEDED
Status: DISCONTINUED | OUTPATIENT
Start: 2020-10-05 | End: 2020-10-05 | Stop reason: HOSPADM

## 2020-10-05 RX ORDER — DEXTROMETHORPHAN/PSEUDOEPHED 2.5-7.5/.8
1.2 DROPS ORAL
Status: DISCONTINUED | OUTPATIENT
Start: 2020-10-05 | End: 2020-10-05 | Stop reason: HOSPADM

## 2020-10-05 RX ORDER — ATROPINE SULFATE 0.1 MG/ML
0.5 INJECTION INTRAVENOUS
Status: DISCONTINUED | OUTPATIENT
Start: 2020-10-05 | End: 2020-10-05 | Stop reason: HOSPADM

## 2020-10-05 RX ORDER — FLUMAZENIL 0.1 MG/ML
0.2 INJECTION INTRAVENOUS
Status: DISCONTINUED | OUTPATIENT
Start: 2020-10-05 | End: 2020-10-05 | Stop reason: HOSPADM

## 2020-10-05 RX ORDER — SODIUM CHLORIDE 9 MG/ML
75 INJECTION, SOLUTION INTRAVENOUS CONTINUOUS
Status: DISCONTINUED | OUTPATIENT
Start: 2020-10-05 | End: 2020-10-05 | Stop reason: HOSPADM

## 2020-10-05 RX ORDER — NALOXONE HYDROCHLORIDE 0.4 MG/ML
0.4 INJECTION, SOLUTION INTRAMUSCULAR; INTRAVENOUS; SUBCUTANEOUS
Status: DISCONTINUED | OUTPATIENT
Start: 2020-10-05 | End: 2020-10-05 | Stop reason: HOSPADM

## 2020-10-05 RX ORDER — LIDOCAINE HYDROCHLORIDE 20 MG/ML
INJECTION, SOLUTION EPIDURAL; INFILTRATION; INTRACAUDAL; PERINEURAL AS NEEDED
Status: DISCONTINUED | OUTPATIENT
Start: 2020-10-05 | End: 2020-10-05 | Stop reason: HOSPADM

## 2020-10-05 RX ORDER — SODIUM CHLORIDE 0.9 % (FLUSH) 0.9 %
5-40 SYRINGE (ML) INJECTION AS NEEDED
Status: DISCONTINUED | OUTPATIENT
Start: 2020-10-05 | End: 2020-10-05 | Stop reason: HOSPADM

## 2020-10-05 RX ORDER — EPINEPHRINE 0.1 MG/ML
1 INJECTION INTRACARDIAC; INTRAVENOUS
Status: DISCONTINUED | OUTPATIENT
Start: 2020-10-05 | End: 2020-10-05 | Stop reason: HOSPADM

## 2020-10-05 RX ADMIN — PROPOFOL 80 MG: 10 INJECTION, EMULSION INTRAVENOUS at 13:22

## 2020-10-05 RX ADMIN — SODIUM CHLORIDE 75 ML/HR: 900 INJECTION, SOLUTION INTRAVENOUS at 13:12

## 2020-10-05 RX ADMIN — LIDOCAINE HYDROCHLORIDE 80 MG: 20 INJECTION, SOLUTION EPIDURAL; INFILTRATION; INTRACAUDAL; PERINEURAL at 13:13

## 2020-10-05 NOTE — PROCEDURES
Esophagogastroduodenoscopy    Indications: Follow up gastric ulcer  Anemia, unspecified    Medications:  See anesthesia form    Assistants:  Olive dash      Post procedure diagnosis:  Incomplete exam. Food in esophagus. Description of Procedure:    Prior to the procedure its objectives, risks, consequences and alternatives were discussed with the patient who then elected to proceed. The Olympus video endoscope was inserted under direct vision into the mouth and then into the esophagus. The esophagus looked normal to the mid esophagus. I saw three pieces of food in the lumen. I removed the scope. Complications: There were no apparent complications and the patient tolerated the procedure well. Implants:  none    Estimated Blood Loss:  none  Specimens Removed:  none  Impressions:  Food in esophagus  Incomplete exam to mid esophagus  The presence of food in the esophagus made continuing the procedure risky.   She will need bas and then consideration of egd with general anesthesia in the future      Signed By: Skye Lee MD                        October 5, 2020     1:25 PM

## 2020-10-05 NOTE — PERIOP NOTES
Corinne Revel  10/9/1933  [id number]    Situation:  Verbal report received from: Terri Werner  Procedure: Procedure(s):  UPPER EGD    Background:    Preoperative diagnosis: MORGAN'S EPITHELIUM  DIARRHEA  EPIGASTRIC PAIN  GERD  REFLUX  Postoperative diagnosis: Incomplete exam. Food in esophagus. :  Dr. Dianna Zimmerman  Assistant(s): Endoscopy Technician-1: Ambreen Christie  Endoscopy RN-1: Kami Lopez    Specimens: * No specimens in log *  H. Pylori  no    Assessment:  Intra-procedure medications   Anesthesia gave intra-procedure sedation and medications, see anesthesia flow sheet yes    Intravenous fluids: NS@ KVO     Vital signs stable     Abdominal assessment: round and soft     Recommendation:  Discharge patient per MD order.   Family or Friend   Permission to share finding with family or friend yes

## 2020-10-05 NOTE — ANESTHESIA PREPROCEDURE EVALUATION
Anesthetic History   No history of anesthetic complications            Review of Systems / Medical History  Patient summary reviewed, nursing notes reviewed and pertinent labs reviewed    Pulmonary  Within defined limits                 Neuro/Psych       CVA (foot drop)      Comments: Neuropathy  Hx Foot drop   Peripheral neuropathy, feet   Cardiovascular    Hypertension        Dysrhythmias : atrial fibrillation  Hyperlipidemia    Exercise tolerance: <4 METS: Uses walker  Comments: H/o heart murmur  1-2009 ECHO: 60-65% EF  Currently in Sinus Rhythm   GI/Hepatic/Renal     GERD: poorly controlled      PUD (Gastric)    Comments: Lewis's Esophagus  Hx Gastric ulcer   Hx Gastroparesis  Endo/Other        Arthritis, cancer (Ovarian CA, with 9 months chemo  1986) and anemia     Other Findings   Comments: Scoliosis  Hearing loss  Hx Ovarian cancer            Physical Exam    Airway  Mallampati: I  TM Distance: < 4 cm  Neck ROM: normal range of motion   Mouth opening: Normal     Cardiovascular  Regular rate and rhythm,  S1 and S2 normal,  no murmur, click, rub, or gallop  Rhythm: regular  Rate: normal         Dental    Dentition: Upper partial plate     Pulmonary  Breath sounds clear to auscultation               Abdominal  GI exam deferred       Other Findings            Anesthetic Plan    ASA: 3  Anesthesia type: total IV anesthesia          Induction: Intravenous  Anesthetic plan and risks discussed with: Patient      Propofol MAC

## 2020-10-05 NOTE — ANESTHESIA POSTPROCEDURE EVALUATION
Procedure(s):  UPPER EGD. total IV anesthesia    Anesthesia Post Evaluation        Patient location during evaluation: PACU  Note status: Adequate. Level of consciousness: responsive to verbal stimuli and sleepy but conscious  Pain management: satisfactory to patient  Airway patency: patent  Anesthetic complications: no  Cardiovascular status: acceptable  Respiratory status: acceptable  Hydration status: acceptable  Comments: +Post-Anesthesia Evaluation and Assessment    Patient: Wayland Bosworth MRN: [id number]  SSN: [id number]   YOB: 1933  Age: 80 y.o. Sex: female      Cardiovascular Function/Vital Signs    BP (!) (P) 171/65   Pulse (P) 65   Temp 36.3 °C (97.4 °F)   Resp (P) 17   Ht 5' 1\" (1.549 m)   SpO2 (P) 93%   BMI 26.45 kg/m²     Patient is status post Procedure(s):  UPPER EGD. Nausea/Vomiting: Controlled. Postoperative hydration reviewed and adequate. Pain:  Pain Scale 1: (P) Numeric (0 - 10) (10/05/20 1355)  Pain Intensity 1: (P) 0 (10/05/20 1355)   Managed. Neurological Status: At baseline. Mental Status and Level of Consciousness: Arousable. Pulmonary Status:   O2 Device: (P) Room air (10/05/20 1355)   Adequate oxygenation and airway patent. Complications related to anesthesia: None    Post-anesthesia assessment completed. No concerns. Signed By: Yanira Guerin DO    10/5/2020  Post anesthesia nausea and vomiting:  controlled      INITIAL Post-op Vital signs:   Vitals Value Taken Time   /65 10/5/2020  1:54 PM   Temp     Pulse 63 10/5/2020  1:56 PM   Resp 21 10/5/2020  1:56 PM   SpO2 93 % 10/5/2020  1:53 PM   Vitals shown include unvalidated device data.

## 2020-10-05 NOTE — DISCHARGE INSTRUCTIONS
Ekta Duran  [id number]  10/9/1933              Procedure  Discharge Instructions:      Discomfort:  Redness at IV site- apply warm compress to area; if redness or soreness persist- contact your physician  There may be a slight amount of blood passed from the rectum  Gaseous discomfort- walking, belching will help relieve any discomfort  You may not operate a vehicle for 12 hours  You may not engage in an occupation involving machinery or appliances for rest of today  You may not drink alcoholic beverages for at least 12 hours  Avoid making any critical decisions for at least 24 hour  DIET:   You may resume your normal diet today. You should not overeat or \"feast\" today as your abdomen may become distended or uncomfortable. MEDICATIONS:   I reconciled this list from the list you gave us when you came today for the procedure. Please clarify with me, your primary care physician and the nurse who is discharging you if we have any discrepancies. Aspirin and or non-steroidal medication (Ibuprofen, Motrin, naproxen, etc.) is ok in limited quantities. ACTIVITY:  You may resume your normal daily activities it is recommended that you spend the remainder of the day resting -  avoid any strenuous activity. CALL M.D. ANY SIGN OF:  Increasing pain, nausea, vomiting  Abdominal distension (swelling)  New increased bleeding (oral or rectal)  Fever (chills)  Pain in chest area  Bloody discharge from nose or mouth  Shortness of breath          Follow-up Instructions:   No biopsies. There was food in the esophagus and I did not do a complete procedure. I will arrange barium swallow, upper gi series and then follow up. Telephone #  [phone]  Follow up visit as previously scheduled.      I spoke to Juan jaredcathy at      Tomi Aguirre MD  1:27 PM  10/5/2020

## 2020-10-05 NOTE — H&P
Pre-endoscopy H and P    The patient was seen and examined in the endoscopy suite. The airway was assessed and docuemented. The problem list, past medical history, and medications were reviewed. The history is:  Hospital consult on 7/1/20 for coffee ground emesis on Eliquis and hgb 7.2    EGD 7/2/20: Findings:  Esophagus:+ NG tube in place with dark material (mixture of clot and food debris at GEjunction)++ Friability and slight oozing at GE junction with linear ulceration s/p Singleclip placement. There was also LA Grade B ulcerative esophagitis in the lower1/3 of esophagus. We removed NG tube. + 4 cm Hiatal hernia    Stomach:+ Dark clot mixed with food particles in stomach but NO Ulcers seen in stomach. Mild erythema but no active bleeding. Duodenum:- NORMAL bulb and 2nd portion. Here with her daughter for hospital follow up. 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 Has been having loose stool for the past 3 days. Not severe. Passes a little stool when she goes to urinate. Currently taking Cipro for UTI and has 2 more days. Isn't taking the miralax. She has been taken off Eliquis by cardiology. Was on it for Afib. Not felt to be a good Watchman candidate. She takes rabeprazole 20mg bid and famotidine 20mg daily in AM. Reflux is \"okay. \" She still has it occasionally which she attributes to snacking late at night. Fills up quickly when she eats. She has mild gastroparesis dx'd previously with GES. No nausea or vomiting. Supplements her food with a protein shake, drinks 1/2 at a time.      7/4/2020 03:57  HGB: 8.5 (L)  HCT: 30.9 (L)      Patient Active Problem List   Diagnosis Code    GERD (gastroesophageal reflux disease) K21.9    Gastric ulcer K25.9    Lewis's esophagus without dysplasia K22.70    Lewis's esophagus K22.70    Diarrhea R19.7    Ovarian cancer ([facility] [address] C56.9    Insomnia G47.00    Essential hypertension I10    Hypomagnesemia E83.42    Hypokalemia E87.6    Mixed hyperlipidemia E78.2    Thoracogenic scoliosis M41.30    Foot drop, bilateral M21.371, M21.372    Age-related cataract of both eyes H25.9    Previous back surgery Z98.890    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    History of replacement of both shoulder joints Z96.611, Z96.612    Neuropathy G62.9    Back pain M54.9    Small bowel obstruction (HCC) K56.609    SBO (small bowel obstruction) ([address] K56.609    Upper GI bleed K92.2    Atrial fibrillation with rapid ventricular response (HCC) I48.91     Social History     Socioeconomic History    Marital status:      Spouse name: Not on file    Number of children: Not on file    Years of education: Not on file    Highest education level: Not on file   Occupational History    Not on file   Social Needs    Financial resource strain: Not on file    Food insecurity     Worry: Not on file     Inability: Not on file    Transportation needs     Medical: Not on file     Non-medical: Not on file   Tobacco Use    Smoking status: Never Smoker    Smokeless tobacco: Never Used   Substance and Sexual Activity    Alcohol use: No    Drug use: No    Sexual activity: Not on file   Lifestyle    Physical activity     Days per week: Not on file     Minutes per session: Not on file    Stress: Not on file   Relationships    Social connections     Talks on phone: Not on file     Gets together: Not on file     Attends Jehovah's witness service: Not on file     Active member of club or organization: Not on file     Attends meetings of clubs or organizations: Not on file     Relationship status: Not on file    Intimate partner violence     Fear of current or ex partner: Not on file     Emotionally abused: Not on file     Physically abused: Not on file     Forced sexual activity: Not on file   Other Topics Concern    Not on file   Social History Narrative    Not on file     Past Medical History:   Diagnosis Date    Arrhythmia     A fib    Arthritis     Diarrhea 6/6/2016    Foot drop     Gastric ulcer 6/14/2012    Gastroparesis     GERD (gastroesophageal reflux disease)     High cholesterol     Hypertension     Neuropathy     Ovarian cancer ([facility] [address] 1986    chemo x 9 mos    Scoliosis     Stroke [facility]) 2002    ? stroke with drop feet, per patient CT scans were negative     The patient has a family history of na    Prior to Admission Medications   Prescriptions Last Dose Informant Patient Reported? Taking? CALCIUM PO   Yes Yes   Sig: Take  by mouth. RABEprazole (ACIPHEX) 20 mg tablet   No Yes   Sig: Take 1 Tab by mouth two (2) times a day. acetaminophen (Tylenol Extra Strength) 500 mg tablet   Yes Yes   Sig: Take  by mouth every six (6) hours as needed for Pain. cholecalciferol (VITAMIN D3) 1,000 unit tablet  Self Yes Yes   Sig: Take 1,000 Units by mouth daily. famotidine (PEPCID) 20 mg tablet  Self Yes Yes   Sig: Take 20 mg by mouth daily. gabapentin (NEURONTIN) 800 mg tablet  Self No Yes   Sig: TAKE 1 TAB BY MOUTH FOUR  TIMES DAILY AS NEEDED. Patient taking differently: TAKE 1 TAB BY MOUTH FOUR  TIMES DAILY AS NEEDED FOR PAIN   hydroCHLOROthiazide (HYDRODIURIL) 25 mg tablet  Self No Yes   Sig: TAKE 1 TABLET BY MOUTH  DAILY   magnesium oxide (MAG-OX) 400 mg tablet  Self Yes Yes   Sig: Take 250 mg by mouth daily. melatonin 300 mcg tab  Self No Yes   Sig: Take 1.5 mg by mouth nightly. Patient taking differently: Take 5 mg by mouth nightly. 2 hours before bedtime  OTC   multivitamin (ONE A DAY) tablet  Self Yes Yes   Sig: Take 1 Tab by mouth daily. oxyCODONE-acetaminophen (Percocet) 5-325 mg per tablet  Self Yes Yes   Sig: Take 1 Tab by mouth every eight (8) hours as needed for Pain.   polyethylene glycol 3350 (MIRALAX PO)   Yes Yes   Sig: Take  by mouth daily as needed. potassium chloride SR (KLOR-CON 10) 10 mEq tablet   No Yes   Sig: Take 1 Tab by mouth daily. propafenone (RYTHMOL) 150 mg tablet   No Yes   Sig: Take 1 Tab by mouth two (2) times a day.    simvastatin (ZOCOR) 40 mg tablet   No Yes   Sig: TAKE 1 TABLET BY MOUTH  EVERY DAY      Facility-Administered Medications: None           The review of systems is:  negative for shortness of breath or chest pain      The heart, lungs, and mental status were satisfactory for the administration of anesthesia sedation and for the procedure. I discussed with the patient the objectives, risks, consequences and alternatives to the procedure. The patient was counseled at length about the risks of deborah Covid-19 during their perioperative period and any recovery window from their procedure. The patient was made aware that deborah Covid-19  may worsen their prognosis for recovering from their procedure and lend to a higher morbidity and/or mortality risk. All material risks, benefits, and reasonable alternatives including postponing the procedure were discussed. The patient does  wish to proceed with the procedure at this time.         Becky Arana MD  10/5/2020  12:27 PM

## 2020-10-05 NOTE — PROGRESS NOTES
Endoscope was pre-cleaned at the bedside immediately following procedure by Lynette Hauser. For medications administered by anesthesia, see anesthesia chart.

## 2020-10-14 ENCOUNTER — TRANSCRIBE ORDER (OUTPATIENT)
Dept: SCHEDULING | Age: 85
End: 2020-10-14

## 2020-10-14 DIAGNOSIS — D62 ACUTE POSTHEMORRHAGIC ANEMIA: Primary | ICD-10-CM

## 2020-10-14 DIAGNOSIS — R13.19 CONSTANT LOW-GRADE DYSPHAGIA: ICD-10-CM

## 2020-10-14 DIAGNOSIS — K21.00 REFLUX ESOPHAGITIS: ICD-10-CM

## 2020-10-20 ENCOUNTER — HOSPITAL ENCOUNTER (OUTPATIENT)
Dept: GENERAL RADIOLOGY | Age: 85
Discharge: HOME | End: 2020-10-20
Attending: PHYSICIAN ASSISTANT
Payer: MEDICARE

## 2020-10-20 DIAGNOSIS — D62 ACUTE POSTHEMORRHAGIC ANEMIA: ICD-10-CM

## 2020-10-20 DIAGNOSIS — K21.00 REFLUX ESOPHAGITIS: ICD-10-CM

## 2020-10-20 DIAGNOSIS — R13.19 CONSTANT LOW-GRADE DYSPHAGIA: ICD-10-CM

## 2020-10-20 PROCEDURE — 74220 X-RAY XM ESOPHAGUS 1CNTRST: CPT

## 2020-11-05 RX ORDER — FUROSEMIDE 20 MG/1
20 TABLET ORAL DAILY
COMMUNITY

## 2020-11-05 NOTE — PERIOP NOTES
[facility]  Ambulatory Surgery Unit  Pre-operative Instructions for Endo Procedures    Procedure Date  11/13            Tentative Arrival Time 0915      1. On the day of your procedure, please report to the Ambulatory Surgery Unit Registration Desk and sign in at your designated time. The Ambulatory Surgery Unit is located in [facility] on the [facility] side of the [facility] [place] across from the [address] [place] [address]. Please have all of your health insurance cards and a photo ID. 2. You must have someone with you to drive you home, as you should not drive a car for 24 hours following anesthesia. Please make arrangements for a responsible adult friend or family member to stay with you for at least the first 24 hours after your procedure. 3. Do not have anything to eat or drink (including water, gum, mints, coffee, juice) after 11:59 PM 11/12. This may not apply to medications prescribed by your physician. (Please note below the special instructions with medications to take the morning of your procedure.)    4. If applicable, follow the clear liquid diet and bowel prep instructions provided by your physician's office. If you do not have this information, or have any questions, please contact your physician's office. 5. We recommend you do not drink any alcoholic beverages for 24 hours before and after your procedure. 6. Contact your surgeons office for instructions on the following medications: non-steroidal anti-inflammatory drugs (i.e. Advil, Aleve), vitamins, and supplements. (Some surgeons will want you to stop these medications prior to surgery and others may allow you to take them)   **If you are currently taking Plavix, Coumadin, Aspirin and/or other blood-thinning agents, contact your surgeon for instructions. ** Your surgeon will partner with the physician prescribing these medications to determine if it is safe to stop or if you need to continue taking.  Please do not stop taking these medications without instructions from your surgeon. 7. In an effort to help prevent surgical site infection, we ask that you shower with an anti-bacterial soap (i.e. Dial or Safeguard) on the morning of your procedure. Do not apply any lotions, powders, or deodorants after showering. 8. Wear comfortable clothes. Wear glasses instead of contacts. Do not bring any jewelry or money (other than copays or fees as instructed). Do not wear make-up, particularly mascara, the morning of your procedure. Wear your hair loose or down, no ponytails, buns, salvador pins or clips. All body piercings must be removed. 9. You should understand that if you do not follow these instructions your procedure may be cancelled. If your physical condition changes (i.e. fever, cold or flu) please contact your surgeon as soon as possible. 10. It is important that you be on time. If a situation occurs where you may be late, or if you have any questions or problems, please call [phone]. 11. Your procedure time may be subject to change. You will receive a phone call the day prior to confirm your arrival time. Special Instructions: Take all medications and inhalers, as prescribed, on the morning of surgery with a sip of water EXCEPT: n/a      Insulin Dependent Diabetic patients: Take your diabetic medications as prescribed the day before surgery. Hold all diabetic medications the day of surgery. If you are scheduled to arrive for surgery after 8:00 AM, and your AM blood sugar is >200, please call Ambulatory Surgery. I understand a pre-operative phone call will be made to verify my procedure time. In the event that I am not available, I give permission for a message to be left on my answering service and/or with another person?       yes         ___________________      ___________________      ___________________  (Signature of Patient)          (Witness)                   (Date and Time)

## 2020-11-09 ENCOUNTER — HOSPITAL ENCOUNTER (OUTPATIENT)
Dept: PREADMISSION TESTING | Age: 85
Discharge: HOME | End: 2020-11-09
Payer: MEDICARE

## 2020-11-09 PROCEDURE — 87635 SARS-COV-2 COVID-19 AMP PRB: CPT

## 2020-11-09 NOTE — PERIOP NOTES
Patient given permission for us to speak to daughter Jaylin Coley for any and all information at [phone] or [phone].

## 2020-11-10 ENCOUNTER — ANESTHESIA EVENT (OUTPATIENT)
Dept: SURGERY | Age: 85
End: 2020-11-10
Payer: MEDICARE

## 2020-11-13 ENCOUNTER — HOSPITAL ENCOUNTER (OUTPATIENT)
Age: 85
Setting detail: OUTPATIENT SURGERY
Discharge: HOME | End: 2020-11-13
Attending: SPECIALIST | Admitting: SPECIALIST
Payer: MEDICARE

## 2020-11-13 ENCOUNTER — ANESTHESIA (OUTPATIENT)
Dept: SURGERY | Age: 85
End: 2020-11-13
Payer: MEDICARE

## 2020-11-13 ENCOUNTER — APPOINTMENT (OUTPATIENT)
Dept: GENERAL RADIOLOGY | Age: 85
End: 2020-11-13
Attending: SPECIALIST
Payer: MEDICARE

## 2020-11-13 VITALS
OXYGEN SATURATION: 92 % | SYSTOLIC BLOOD PRESSURE: 147 MMHG | HEART RATE: 68 BPM | RESPIRATION RATE: 11 BRPM | BODY MASS INDEX: 26.43 KG/M2 | WEIGHT: 140 LBS | DIASTOLIC BLOOD PRESSURE: 70 MMHG | HEIGHT: 61 IN | TEMPERATURE: 97.6 F

## 2020-11-13 PROBLEM — R93.5 ABNORMAL X-RAY OF ABDOMEN: Status: ACTIVE | Noted: 2020-11-13

## 2020-11-13 PROBLEM — R13.19 ESOPHAGEAL DYSPHAGIA: Status: ACTIVE | Noted: 2020-11-13

## 2020-11-13 LAB
ANION GAP BLD CALC-SCNC: 16 MMOL/L (ref 10–20)
BUN BLD-MCNC: 34 MG/DL (ref 9–20)
CA-I BLD-MCNC: 1.34 MMOL/L (ref 1.12–1.32)
CHLORIDE BLD-SCNC: 102 MMOL/L (ref 98–107)
CO2 BLD-SCNC: 28 MMOL/L (ref 21–32)
CREAT BLD-MCNC: 0.9 MG/DL (ref 0.6–1.3)
GLUCOSE BLD-MCNC: 96 MG/DL (ref 65–100)
HCT VFR BLD CALC: 32 % (ref 35–47)
POTASSIUM BLD-SCNC: 3.2 MMOL/L (ref 3.5–5.1)
SERVICE CMNT-IMP: ABNORMAL
SODIUM BLD-SCNC: 142 MMOL/L (ref 136–145)

## 2020-11-13 PROCEDURE — 88312 SPECIAL STAINS GROUP 1: CPT

## 2020-11-13 PROCEDURE — 77030021352 HC CBL LD SYS DISP COVD -B: Performed by: SPECIALIST

## 2020-11-13 PROCEDURE — 77030008684 HC TU ET CUF COVD -B: Performed by: REGISTERED NURSE

## 2020-11-13 PROCEDURE — 77030019988 HC FCPS ENDOSC DISP BSC -B: Performed by: SPECIALIST

## 2020-11-13 PROCEDURE — 76000 FLUOROSCOPY <1 HR PHYS/QHP: CPT

## 2020-11-13 PROCEDURE — 74011250636 HC RX REV CODE- 250/636: Performed by: REGISTERED NURSE

## 2020-11-13 PROCEDURE — 76030000000 HC AMB SURG OR TIME 0.5 TO 1: Performed by: SPECIALIST

## 2020-11-13 PROCEDURE — 88305 TISSUE EXAM BY PATHOLOGIST: CPT

## 2020-11-13 PROCEDURE — 76060000061 HC AMB SURG ANES 0.5 TO 1 HR: Performed by: SPECIALIST

## 2020-11-13 PROCEDURE — 76210000034 HC AMBSU PH I REC 0.5 TO 1 HR: Performed by: SPECIALIST

## 2020-11-13 PROCEDURE — 2709999900 HC NON-CHARGEABLE SUPPLY: Performed by: SPECIALIST

## 2020-11-13 PROCEDURE — 80047 BASIC METABLC PNL IONIZED CA: CPT

## 2020-11-13 PROCEDURE — 74011000250 HC RX REV CODE- 250: Performed by: REGISTERED NURSE

## 2020-11-13 PROCEDURE — 76210000046 HC AMBSU PH II REC FIRST 0.5 HR: Performed by: SPECIALIST

## 2020-11-13 PROCEDURE — 77030026438 HC STYL ET INTUB CARD -A: Performed by: REGISTERED NURSE

## 2020-11-13 PROCEDURE — 74011250636 HC RX REV CODE- 250/636: Performed by: ANESTHESIOLOGY

## 2020-11-13 PROCEDURE — 88112 CYTOPATH CELL ENHANCE TECH: CPT

## 2020-11-13 RX ORDER — LIDOCAINE HYDROCHLORIDE 10 MG/ML
0.1 INJECTION, SOLUTION EPIDURAL; INFILTRATION; INTRACAUDAL; PERINEURAL AS NEEDED
Status: DISCONTINUED | OUTPATIENT
Start: 2020-11-13 | End: 2020-11-13 | Stop reason: HOSPADM

## 2020-11-13 RX ORDER — SODIUM CHLORIDE 0.9 % (FLUSH) 0.9 %
5-40 SYRINGE (ML) INJECTION EVERY 8 HOURS
Status: DISCONTINUED | OUTPATIENT
Start: 2020-11-13 | End: 2020-11-13 | Stop reason: HOSPADM

## 2020-11-13 RX ORDER — FENTANYL CITRATE 50 UG/ML
25 INJECTION, SOLUTION INTRAMUSCULAR; INTRAVENOUS
Status: DISCONTINUED | OUTPATIENT
Start: 2020-11-13 | End: 2020-11-13 | Stop reason: HOSPADM

## 2020-11-13 RX ORDER — MORPHINE SULFATE 10 MG/ML
2 INJECTION, SOLUTION INTRAMUSCULAR; INTRAVENOUS
Status: DISCONTINUED | OUTPATIENT
Start: 2020-11-13 | End: 2020-11-13 | Stop reason: HOSPADM

## 2020-11-13 RX ORDER — SODIUM CHLORIDE, SODIUM LACTATE, POTASSIUM CHLORIDE, CALCIUM CHLORIDE 600; 310; 30; 20 MG/100ML; MG/100ML; MG/100ML; MG/100ML
25 INJECTION, SOLUTION INTRAVENOUS CONTINUOUS
Status: DISCONTINUED | OUTPATIENT
Start: 2020-11-13 | End: 2020-11-13 | Stop reason: HOSPADM

## 2020-11-13 RX ORDER — FLUCONAZOLE 100 MG/1
100 TABLET ORAL DAILY
Qty: 12 TAB | Refills: 0 | Status: SHIPPED | OUTPATIENT
Start: 2020-11-13 | End: 2020-11-25

## 2020-11-13 RX ORDER — LIDOCAINE HYDROCHLORIDE 20 MG/ML
INJECTION, SOLUTION EPIDURAL; INFILTRATION; INTRACAUDAL; PERINEURAL AS NEEDED
Status: DISCONTINUED | OUTPATIENT
Start: 2020-11-13 | End: 2020-11-13 | Stop reason: HOSPADM

## 2020-11-13 RX ORDER — OXYCODONE AND ACETAMINOPHEN 5; 325 MG/1; MG/1
1 TABLET ORAL
Status: DISCONTINUED | OUTPATIENT
Start: 2020-11-13 | End: 2020-11-13 | Stop reason: HOSPADM

## 2020-11-13 RX ORDER — DIPHENHYDRAMINE HYDROCHLORIDE 50 MG/ML
12.5 INJECTION, SOLUTION INTRAMUSCULAR; INTRAVENOUS AS NEEDED
Status: DISCONTINUED | OUTPATIENT
Start: 2020-11-13 | End: 2020-11-13 | Stop reason: HOSPADM

## 2020-11-13 RX ORDER — SUCCINYLCHOLINE CHLORIDE 20 MG/ML
INJECTION INTRAMUSCULAR; INTRAVENOUS AS NEEDED
Status: DISCONTINUED | OUTPATIENT
Start: 2020-11-13 | End: 2020-11-13 | Stop reason: HOSPADM

## 2020-11-13 RX ORDER — SODIUM CHLORIDE 0.9 % (FLUSH) 0.9 %
5-40 SYRINGE (ML) INJECTION AS NEEDED
Status: DISCONTINUED | OUTPATIENT
Start: 2020-11-13 | End: 2020-11-13 | Stop reason: HOSPADM

## 2020-11-13 RX ORDER — HYDROMORPHONE HYDROCHLORIDE 1 MG/ML
.2-.5 INJECTION, SOLUTION INTRAMUSCULAR; INTRAVENOUS; SUBCUTANEOUS ONCE
Status: DISCONTINUED | OUTPATIENT
Start: 2020-11-13 | End: 2020-11-13 | Stop reason: HOSPADM

## 2020-11-13 RX ORDER — PROPOFOL 10 MG/ML
INJECTION, EMULSION INTRAVENOUS AS NEEDED
Status: DISCONTINUED | OUTPATIENT
Start: 2020-11-13 | End: 2020-11-13 | Stop reason: HOSPADM

## 2020-11-13 RX ADMIN — SUCCINYLCHOLINE CHLORIDE 100 MG: 20 INJECTION, SOLUTION INTRAMUSCULAR; INTRAVENOUS at 11:27

## 2020-11-13 RX ADMIN — LIDOCAINE HYDROCHLORIDE 40 MG: 20 INJECTION, SOLUTION EPIDURAL; INFILTRATION; INTRACAUDAL; PERINEURAL at 11:27

## 2020-11-13 RX ADMIN — SODIUM CHLORIDE, SODIUM LACTATE, POTASSIUM CHLORIDE, AND CALCIUM CHLORIDE 25 ML/HR: 600; 310; 30; 20 INJECTION, SOLUTION INTRAVENOUS at 10:25

## 2020-11-13 RX ADMIN — PROPOFOL 100 MG: 10 INJECTION, EMULSION INTRAVENOUS at 11:27

## 2020-11-13 NOTE — PERIOP NOTES
Patient: Kaushik Luque MRN: [id number]  SSN: [id number]   YOB: 1933  Age: 80 y.o. Sex: female     Patient is status post Procedure(s):  ESOPHAGOGASTRODUODENOSCOPY (EGD)  ESOPHAGEAL DILATION WITH FLUORO  ESOPHAGOGASTRODUODENAL (EGD) BIOPSY. Surgeon(s) and Role:     * Leny Lopez MD - Primary                Peripheral IV 11/13/20 Right Arm (Active)   Site Assessment Clean, dry, & intact 11/13/20 1022   Phlebitis Assessment 0 11/13/20 1022   Infiltration Assessment 0 11/13/20 1022   Dressing Status New 11/13/20 1022   Dressing Type Tape;Transparent 11/13/20 1022   Hub Color/Line Status Infusing;Blue 11/13/20 1022            Airway - Endotracheal Tube 11/13/20 Oral (Active)                   Dressing/Packing:     NONE  Other:  Endoscope was pre-cleaned at bedside immediately by [facility]

## 2020-11-13 NOTE — PERIOP NOTES
Pt tolerating ice chips w/o difficulty. Vss, discussed blood pressure with Dr Eric Paul, no new orders received, pt will take lasix when she gets back to her home. 1246-D/c instructions reviewed, all questions answered. Reviewed when to call the doctor, diet, activity. Reviewed that pt is not to be alone today, her family  Either needs to stay with her or she needs to stay with them. Pt does report it feels like her upper lip is swollen, explained to pt that for this procedure a bite block is put in place, and sometimes that pinches the lip. 1320-Transported via pt's wheelchair to awaiting transportation.

## 2020-11-13 NOTE — PERIOP NOTES
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 Coronado    Assistant(s): Circ-1: Cesilia Jones RN  Scrub Tech-2: Cookie Garcia  Scrub RN-1: Eder Voss RN    Specimens:   ID Type Source Tests Collected by Time Destination   1 : DUODENUM BIOPSY Preservative Duodenum  Sarah Haddad MD 11/13/2020 1139 Pathology   2 : STOMACH BIOPSY Preservative Stomach  Sarah Haddad MD 11/13/2020 1140 Pathology   3 : [address] BIOPSY Preservative GE Júnior Aparicio MD 11/13/2020 1142 Pathology   1 : ESOPHAGEAL BRUSHINGS Fresh Esophageal Brushing  Sarah Haddad MD 11/13/2020 1144 Cytology       Assessment:  Intra-procedure medications   Propofol 100 mg      Anesthesia gave intra-procedure sedation and medications, see anesthesia flow sheet     Intravenous fluids: LR@ KVO     Vital signs stable     Abdominal assessment: round and soft nontender      Recommendation:        All side rails up, bed in low position, wheels locked. Nurse at bedside.

## 2020-11-13 NOTE — ANESTHESIA PREPROCEDURE EVALUATION
Anesthetic History   No history of anesthetic complications            Review of Systems / Medical History  Patient summary reviewed, nursing notes reviewed and pertinent labs reviewed    Pulmonary  Within defined limits                 Neuro/Psych       CVA (foot drop)      Comments: Neuropathy  Hx Foot drop   Peripheral neuropathy, feet   Cardiovascular    Hypertension        Dysrhythmias : atrial fibrillation  Hyperlipidemia    Exercise tolerance: <4 METS: Uses walker  Comments: Ejection fraction was estimated to be 60 %.    GI/Hepatic/Renal     GERD: poorly controlled      PUD (Gastric)    Comments: Lewis's Esophagus  Hx Gastric ulcer   Hx Gastroparesis  Endo/Other        Arthritis, cancer (Ovarian CA, with 9 months chemo  1986) and anemia     Other Findings   Comments: Scoliosis  Hearing loss  Hx Ovarian cancer            Physical Exam    Airway  Mallampati: II  TM Distance: 4 - 6 cm  Neck ROM: normal range of motion   Mouth opening: Normal     Cardiovascular  Regular rate and rhythm,  S1 and S2 normal,  no murmur, click, rub, or gallop  Rhythm: regular  Rate: normal         Dental    Dentition: Upper partial plate     Pulmonary  Breath sounds clear to auscultation               Abdominal  GI exam deferred       Other Findings            Anesthetic Plan    ASA: 3  Anesthesia type: general    Monitoring Plan: BIS      Induction: Intravenous and RSI  Anesthetic plan and risks discussed with: Patient

## 2020-11-13 NOTE — ANESTHESIA POSTPROCEDURE EVALUATION
Procedure(s):  ESOPHAGOGASTRODUODENOSCOPY (EGD)  ESOPHAGEAL DILATION WITH FLUORO  ESOPHAGOGASTRODUODENAL (EGD) BIOPSY. general    Anesthesia Post Evaluation        Patient location during evaluation: PACU  Note status: Adequate. Level of consciousness: responsive to verbal stimuli and sleepy but conscious  Pain management: satisfactory to patient  Airway patency: patent  Anesthetic complications: no  Cardiovascular status: acceptable  Respiratory status: acceptable  Hydration status: acceptable  Comments: +Post-Anesthesia Evaluation and Assessment    Patient: Lequita Riedel MRN: [id number]  SSN: [id number]   YOB: 1933  Age: 80 y.o. Sex: female      Cardiovascular Function/Vital Signs    BP (!) 158/76   Pulse 65   Temp 36.4 °C (97.6 °F)   Resp 10   Ht 5' 1\" (1.549 m)   Wt 63.5 kg (140 lb)   SpO2 96%   Breastfeeding No   BMI 26.45 kg/m²     Patient is status post Procedure(s):  ESOPHAGOGASTRODUODENOSCOPY (EGD)  ESOPHAGEAL DILATION WITH FLUORO  ESOPHAGOGASTRODUODENAL (EGD) BIOPSY. Nausea/Vomiting: Controlled. Postoperative hydration reviewed and adequate. Pain:  Pain Scale 1: Numeric (0 - 10) (11/13/20 1227)  Pain Intensity 1: 0 (11/13/20 1227)   Managed. Neurological Status: At baseline. Mental Status and Level of Consciousness: Arousable. Pulmonary Status:   O2 Device: Nasal cannula (11/13/20 1227)   Adequate oxygenation and airway patent. Complications related to anesthesia: None    Post-anesthesia assessment completed. No concerns. Signed By: Felix Friedman MD    11/13/2020  Post anesthesia nausea and vomiting:  controlled      INITIAL Post-op Vital signs:   Vitals Value Taken Time   /65 11/13/2020 12:47 PM   Temp 36.4 °C (97.6 °F) 11/13/2020 12:16 PM   Pulse 64 11/13/2020 12:48 PM   Resp 10 11/13/2020 12:48 PM   SpO2 93 % 11/13/2020 12:48 PM   Vitals shown include unvalidated device data.

## 2020-11-13 NOTE — H&P
Pre-endoscopy H and P    The patient was seen and examined in the [facility] pre op sute. The history is:  Hospital consult on 7/1/20 for coffee ground emesis on Eliquis and hgb 7.2    EGD 7/2/20: Findings:  Esophagus:+ NG tube in place with dark material (mixture of clot and food debris at GEjunction)++ Friability and slight oozing at GE junction with linear ulceration s/p Singleclip placement. There was also LA Grade B ulcerative esophagitis in the lower1/3 of esophagus. We removed NG tube. + 4 cm Hiatal hernia    Stomach:+ Dark clot mixed with food particles in stomach but NO Ulcers seen in stomach. Mild erythema but no active bleeding. Duodenum:- NORMAL bulb and 2nd portion. Here with her daughter for hospital follow up. 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 Has been having loose stool for the past 3 days. Not severe. Passes a little stool when she goes to urinate. Currently taking Cipro for UTI and has 2 more days. Isn't taking the miralax. She has been taken off Eliquis by cardiology. Was on it for Afib. Not felt to be a good Watchman candidate. She takes rabeprazole 20mg bid and famotidine 20mg daily in AM. Reflux is \"okay. \" She still has it occasionally which she attributes to snacking late at night. Fills up quickly when she eats. She has mild gastroparesis dx'd previously with GES. No nausea or vomiting. Supplements her food with a protein shake, drinks 1/2 at a time. 7/4/2020 03:57  HGB: 8.5 (L)  HCT: 30.9 (L)  The airway was assessed and docuemented. The problem list, past medical history, and medications were reviewed.      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    History of replacement of both shoulder joints Z96.611, Z96.612    Neuropathy G62.9    Back pain M54.9    Small bowel obstruction (HCC) K56.609    SBO (small bowel obstruction) ([address] K56.609    Upper GI bleed K92.2    Atrial fibrillation with rapid ventricular response (HCC) I48.91    History of gastric ulcer Z87.19    Esophageal dysphagia R13.10    Abnormal x-ray of abdomen R93.5     Social History     Socioeconomic History    Marital status:      Spouse name: Not on file    Number of children: Not on file    Years of education: Not on file    Highest education level: Not on file   Occupational History    Not on file   Social Needs    Financial resource strain: Not on file    Food insecurity     Worry: Not on file     Inability: Not on file    Transportation needs     Medical: Not on file     Non-medical: Not on file   Tobacco Use    Smoking status: Never Smoker    Smokeless tobacco: Never Used   Substance and Sexual Activity    Alcohol use: No    Drug use: No    Sexual activity: Not on file   Lifestyle    Physical activity     Days per week: Not on file     Minutes per session: Not on file    Stress: Not on file   Relationships    Social connections     Talks on phone: Not on file     Gets together: Not on file     Attends Judaism service: Not on file     Active member of club or organization: Not on file     Attends meetings of clubs or organizations: Not on file     Relationship status: Not on file    Intimate partner violence     Fear of current or ex partner: Not on file     Emotionally abused: Not on file     Physically abused: Not on file     Forced sexual activity: Not on file   Other Topics Concern    Not on file   Social History Narrative    Not on file     Past Medical History: Diagnosis Date    Abnormal x-ray of abdomen 11/13/2020    Bas showed decreased peristalsis and a cervical web      Arrhythmia     A fib    Arthritis     Diarrhea 6/6/2016    Esophageal dysphagia 11/13/2020    Foot drop     Gastric ulcer 6/14/2012    Gastroparesis     GERD (gastroesophageal reflux disease)     High cholesterol     Hypertension     Neuropathy     Ovarian cancer ([address] 1986    chemo x 9 mos    Scoliosis     Stroke [facility]) 2002    ? stroke with drop feet, per patient CT scans were negative     The patient has a family history of na    Medications Prior to Admission   Medication Sig    furosemide (Lasix) 20 mg tablet Take 20 mg by mouth daily.  CALCIUM PO Take 1 Tab by mouth daily.  polyethylene glycol 3350 (MIRALAX PO) Take 1 Each by mouth daily as needed.  acetaminophen (Tylenol Extra Strength) 500 mg tablet Take 1,000 mg by mouth every six (6) hours as needed for Pain.  simvastatin (ZOCOR) 40 mg tablet TAKE 1 TABLET BY MOUTH  EVERY DAY    propafenone (RYTHMOL) 150 mg tablet Take 1 Tab by mouth two (2) times a day.  RABEprazole (ACIPHEX) 20 mg tablet Take 1 Tab by mouth two (2) times a day.  potassium chloride SR (KLOR-CON 10) 10 mEq tablet Take 1 Tab by mouth daily.  magnesium oxide (MAG-OX) 400 mg tablet Take 250 mg by mouth daily.  famotidine (PEPCID) 20 mg tablet Take 20 mg by mouth daily.  oxyCODONE-acetaminophen (Percocet) 5-325 mg per tablet Take 1 Tab by mouth every eight (8) hours as needed for Pain.  melatonin 300 mcg tab Take 1.5 mg by mouth nightly. (Patient taking differently: Take 5 mg by mouth nightly. 2 hours before bedtime  OTC)    gabapentin (NEURONTIN) 800 mg tablet TAKE 1 TAB BY MOUTH FOUR  TIMES DAILY AS NEEDED. (Patient taking differently: TAKE 1 TAB BY MOUTH FOUR  TIMES DAILY AS NEEDED FOR PAIN)    cholecalciferol (VITAMIN D3) 1,000 unit tablet Take 1,000 Units by mouth daily.     multivitamin (ONE A DAY) tablet Take 1 Tab by mouth daily.          The review of systems is:  negative for shortness of breath or chest pain      The heart, lungs, and mental status were satisfactory for the administration of anesthesia sedation and for the procedure. I discussed with the patient the objectives, risks, consequences and alternatives to the procedure. The patient was counseled at length about the risks of deborah Covid-19 during their perioperative period and any recovery window from their procedure. The patient was made aware that deborah Covid-19  may worsen their prognosis for recovering from their procedure and lend to a higher morbidity and/or mortality risk. All material risks, benefits, and reasonable alternatives including postponing the procedure were discussed. The patient does  wish to proceed with the procedure at this time.         Vicky Pedraza MD  11/13/2020  8:37 AM

## 2020-11-13 NOTE — DISCHARGE INSTRUCTIONS
Destin Armas  [id number]  10/9/1933              Procedure  Discharge Instructions:      Discomfort:  Redness at IV site- apply warm compress to area; if redness or soreness persist- contact your physician  There may be a slight amount of blood passed from the rectum  Gaseous discomfort- walking, belching will help relieve any discomfort  You may not operate a vehicle for 12 hours  You may not engage in an occupation involving machinery or appliances for rest of today  You may not drink alcoholic beverages for at least 12 hours  Avoid making any critical decisions for at least 24 hour  DIET:   You may resume your normal diet today. You should not overeat or \"feast\" today as your abdomen may become distended or uncomfortable. MEDICATIONS:   I reconciled this list from the list you gave us when you came today for the procedure. Please clarify with me, your primary care physician and the nurse who is discharging you if we have any discrepancies. Aspirin and or non-steroidal medication (Ibuprofen, Motrin, naproxen, etc.) is ok in limited quantities. ACTIVITY:  You may resume your normal daily activities it is recommended that you spend the remainder of the day resting -  avoid any strenuous activity. CALL M.D. ANY SIGN OF:  Increasing pain, nausea, vomiting  Abdominal distension (swelling)  New increased bleeding (oral or rectal)  Fever (chills)  Pain in chest area  Bloody discharge from nose or mouth  Shortness of breath    Follow-up Instructions:   Call Dr. Ender Ny for the results of  biopsy in approximately one week  Telephone #  [phone]  Follow up visit as previously scheduled. Please have your pharmacist check all drug interactions with the diflucan. Bridger Merino MD  12:10 PM  11/13/2020       DO NOT DRIVE TODAY    DO NOT TAKE SLEEPING MEDICATIONS OR ANTIANXIETY MEDICATIONS ,  ESPECIALLY THE NIGHT OF ANESTHESIA!     CPAP PATIENTS BE SURE TO WEAR MACHINE WHENEVER NAPPING OR SLEEPING! DISCHARGE SUMMARY from Nurse    The following personal items collected during your admission are returned to you:   Dental Appliance: Dental Appliances: At home  Vision: Visual Aid: Glasses  Hearing Aid:    Jewelry:    Clothing:    Other Valuables: Other Valuables: Eyeglasses  Valuables sent to safe:        PATIENT INSTRUCTIONS:    After General Anesthesia or Intravenous Sedation, for 24 hours or while taking prescription Narcotics:        Someone should be with you for the next 24 hours. For your own safety, a responsible adult must drive you home. · Limit your activities  · Recommended activity: Rest today, up with assistance today. Do not climb stairs or shower unattended for the next 24 hours. · Please start with a soft bland diet and advance as tolerated (no nausea) to regular diet. · If you have a sore throat you should try the following: fluids, warm salt water gargles, or throat lozenges. If it does not improve after several days please follow up with your primary physician. · Do not drive and operate hazardous machinery  · Do not make important personal or business decisions  · Do  not drink alcoholic beverages  · If you have not urinated within 8 hours after discharge, please contact your surgeon on call. Report the following to your surgeon:  · Excessive pain, swelling, redness or odor of or around the surgical area  · Temperature over 100.5  · Nausea and vomiting lasting longer than 4 hours or if unable to take medications  · Any signs of decreased circulation or nerve impairment to extremity: change in color, persistent  numbness, tingling, coldness or increase pain      · You will receive a Post Operative Call from one of the Recovery Room Nurses on the day after your surgery to check on you. It is very important for us to know how you are recovering after your surgery.  If you have an issue or need to speak with someone, please call your surgeon, do not wait for the post operative call. · You may receive an e-mail or letter in the mail from [employer] regarding your experience with us in the Ambulatory Surgery Unit. Your feedback is valuable to us and we appreciate your participation in the survey. · If the above instructions are not adequate or you are having problems after your surgery, call the physician at their office number. · We wish you a speedy recovery ? What to do at Home:      *  Please give a list of your current medications to your Primary Care Provider. *  Please update this list whenever your medications are discontinued, doses are      changed, or new medications (including over-the-counter products) are added. *  Please carry medication information at all times in case of emergency situations. If you have not received your influenza and/or pneumococcal vaccine, please follow up with your primary care physician. The discharge information has been reviewed with the patient and caregiver. The patient and caregiver verbalized understanding.

## 2020-11-13 NOTE — PROCEDURES
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 The Olympus video endoscope was inserted under direct vision into the mouth and then into the esophagus. There was moderate white exudate attached to the wall of the esophagus, suggesting candida. The underlying esophagus looked normal to the distal esophagus. The z-line was located at 37 cm. There was a 2 cm non circumferential tongue of columnar mucosa proximal to the z line. This suggests Morgan's. There was a subtle narrowing at the zline. A three cm hiatal hernia was present with the diaphragm pinch at 40 cm. There were no diagnostic abnormalities of the body, fundus, antrum, cardia and incisura of the stomach. This included direct and retroflexion examination. The first and second portion of the duodenum appeared normal.  I took biopsies of the duodenum, stomach and ge junction. I took brushings of the esophagus. I placed a spring tipped guide wire in the lumen of the duodenum and confirmed it fluoroscopically. I removed the scope. I then passed an [employer] dilator size 39 Fr over the wire. Using active fluoroscopy I saw that the widest portion of the dilator passed the radiographic G-E junction. I repeated this with a 42Fr and a 45Fr dilator, each time with active fluoroscopy. A brief repeat egd showed no complication and no esophageal web. Additional diagnostic [id number]) Radiiological supervision and interpretation         Complications:  There were no apparent complications and the patient tolerated the procedure well.         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

## 2021-09-09 DIAGNOSIS — E78.2 MIXED HYPERLIPIDEMIA: ICD-10-CM

## 2021-09-09 RX ORDER — SIMVASTATIN 40 MG/1
TABLET, FILM COATED ORAL
Qty: 90 TABLET | Refills: 3 | Status: SHIPPED | OUTPATIENT
Start: 2021-09-09

## 2021-10-16 NOTE — TELEPHONE ENCOUNTER
Pt calls our main office asking for her lab results from her visit on Wednesday July 19th. I informed her Glenis Salinas was out of the clinic today, but she or the nurse will be able to reach out on Monday to report the results. Pt verbalized understanding. The patient is a 74y Female complaining of urinary symptoms.

## 2022-01-17 ENCOUNTER — HOSPITAL ENCOUNTER (OUTPATIENT)
Dept: PREADMISSION TESTING | Age: 87
Discharge: HOME | End: 2022-01-17
Payer: MEDICARE

## 2022-01-17 PROCEDURE — U0005 INFEC AGEN DETEC AMPLI PROBE: HCPCS

## 2022-01-18 LAB
SARS-COV-2, XPLCVT: NOT DETECTED
SOURCE, COVRS: NORMAL

## 2022-01-26 RX ORDER — HYDROCHLOROTHIAZIDE 25 MG/1
TABLET ORAL
Qty: 90 TABLET | Refills: 3 | Status: SHIPPED | OUTPATIENT
Start: 2022-01-26 | End: 2022-08-12

## 2022-03-18 PROBLEM — M21.372 FOOT DROP, BILATERAL: Status: ACTIVE | Noted: 2017-07-19

## 2022-03-18 PROBLEM — H25.9 AGE-RELATED CATARACT OF BOTH EYES: Status: ACTIVE | Noted: 2017-07-19

## 2022-03-18 PROBLEM — G47.00 INSOMNIA: Status: ACTIVE | Noted: 2017-07-19

## 2022-03-18 PROBLEM — M21.371 FOOT DROP, BILATERAL: Status: ACTIVE | Noted: 2017-07-19

## 2022-03-19 PROBLEM — E78.2 MIXED HYPERLIPIDEMIA: Status: ACTIVE | Noted: 2017-07-19

## 2022-03-19 PROBLEM — E87.6 HYPOKALEMIA: Status: ACTIVE | Noted: 2017-07-19

## 2022-03-19 PROBLEM — Z87.11 HISTORY OF GASTRIC ULCER: Status: ACTIVE | Noted: 2020-10-05

## 2022-03-19 PROBLEM — K56.609 SMALL BOWEL OBSTRUCTION (HCC): Status: ACTIVE | Noted: 2019-02-27

## 2022-03-19 PROBLEM — Z96.612 HISTORY OF REPLACEMENT OF BOTH SHOULDER JOINTS: Status: ACTIVE | Noted: 2017-07-19

## 2022-03-19 PROBLEM — R13.19 ESOPHAGEAL DYSPHAGIA: Status: ACTIVE | Noted: 2020-11-13

## 2022-03-19 PROBLEM — K92.2 UPPER GI BLEED: Status: ACTIVE | Noted: 2020-07-01

## 2022-03-19 PROBLEM — C56.9 OVARIAN CANCER (HCC): Status: ACTIVE | Noted: 2017-07-19

## 2022-03-19 PROBLEM — I10 ESSENTIAL HYPERTENSION: Status: ACTIVE | Noted: 2017-07-19

## 2022-03-19 PROBLEM — Z98.890 PREVIOUS BACK SURGERY: Status: ACTIVE | Noted: 2017-07-19

## 2022-03-19 PROBLEM — R93.5 ABNORMAL X-RAY OF ABDOMEN: Status: ACTIVE | Noted: 2020-11-13

## 2022-03-19 PROBLEM — K56.609 SBO (SMALL BOWEL OBSTRUCTION) (HCC): Status: ACTIVE | Noted: 2019-02-27

## 2022-03-19 PROBLEM — Z96.611 HISTORY OF REPLACEMENT OF BOTH SHOULDER JOINTS: Status: ACTIVE | Noted: 2017-07-19

## 2022-03-19 PROBLEM — G62.9 NEUROPATHY: Status: ACTIVE | Noted: 2017-07-19

## 2022-03-20 PROBLEM — M54.9 BACK PAIN: Status: ACTIVE | Noted: 2018-11-01

## 2022-03-20 PROBLEM — Z96.659 H/O TOTAL KNEE REPLACEMENT: Status: ACTIVE | Noted: 2017-07-19

## 2022-03-20 PROBLEM — I48.91 ATRIAL FIBRILLATION WITH RAPID VENTRICULAR RESPONSE (HCC): Status: ACTIVE | Noted: 2020-07-01

## 2022-03-20 PROBLEM — E83.42 HYPOMAGNESEMIA: Status: ACTIVE | Noted: 2017-07-19

## 2022-03-20 PROBLEM — M41.30 THORACOGENIC SCOLIOSIS: Status: ACTIVE | Noted: 2017-07-19

## 2022-04-20 ENCOUNTER — ANESTHESIA (OUTPATIENT)
Dept: ENDOSCOPY | Age: 87
End: 2022-04-20
Payer: MEDICARE

## 2022-04-20 ENCOUNTER — HOSPITAL ENCOUNTER (OUTPATIENT)
Age: 87
Setting detail: OUTPATIENT SURGERY
Discharge: HOME | End: 2022-04-20
Attending: SPECIALIST | Admitting: SPECIALIST
Payer: MEDICARE

## 2022-04-20 ENCOUNTER — ANESTHESIA EVENT (OUTPATIENT)
Dept: ENDOSCOPY | Age: 87
End: 2022-04-20
Payer: MEDICARE

## 2022-04-20 VITALS
TEMPERATURE: 97.1 F | SYSTOLIC BLOOD PRESSURE: 148 MMHG | HEART RATE: 79 BPM | HEIGHT: 60 IN | RESPIRATION RATE: 16 BRPM | DIASTOLIC BLOOD PRESSURE: 66 MMHG | BODY MASS INDEX: 27.48 KG/M2 | WEIGHT: 140 LBS | OXYGEN SATURATION: 96 %

## 2022-04-20 PROCEDURE — 74011250636 HC RX REV CODE- 250/636: Performed by: SPECIALIST

## 2022-04-20 PROCEDURE — 77030018712 HC DEV BLLN INFL BSC -B: Performed by: SPECIALIST

## 2022-04-20 PROCEDURE — 74011250636 HC RX REV CODE- 250/636: Performed by: NURSE ANESTHETIST, CERTIFIED REGISTERED

## 2022-04-20 PROCEDURE — 88305 TISSUE EXAM BY PATHOLOGIST: CPT

## 2022-04-20 PROCEDURE — 77030019988 HC FCPS ENDOSC DISP BSC -B: Performed by: SPECIALIST

## 2022-04-20 PROCEDURE — 88342 IMHCHEM/IMCYTCHM 1ST ANTB: CPT

## 2022-04-20 PROCEDURE — 76060000031 HC ANESTHESIA FIRST 0.5 HR: Performed by: SPECIALIST

## 2022-04-20 PROCEDURE — 2709999900 HC NON-CHARGEABLE SUPPLY: Performed by: SPECIALIST

## 2022-04-20 PROCEDURE — 88112 CYTOPATH CELL ENHANCE TECH: CPT

## 2022-04-20 PROCEDURE — 74011000250 HC RX REV CODE- 250: Performed by: NURSE ANESTHETIST, CERTIFIED REGISTERED

## 2022-04-20 PROCEDURE — 88312 SPECIAL STAINS GROUP 1: CPT

## 2022-04-20 PROCEDURE — 76040000019: Performed by: SPECIALIST

## 2022-04-20 RX ORDER — SODIUM CHLORIDE 0.9 % (FLUSH) 0.9 %
5-40 SYRINGE (ML) INJECTION AS NEEDED
Status: DISCONTINUED | OUTPATIENT
Start: 2022-04-20 | End: 2022-04-20 | Stop reason: HOSPADM

## 2022-04-20 RX ORDER — SODIUM CHLORIDE 9 MG/ML
50 INJECTION, SOLUTION INTRAVENOUS CONTINUOUS
Status: DISCONTINUED | OUTPATIENT
Start: 2022-04-20 | End: 2022-04-20 | Stop reason: HOSPADM

## 2022-04-20 RX ORDER — SODIUM CHLORIDE 0.9 % (FLUSH) 0.9 %
5-40 SYRINGE (ML) INJECTION EVERY 8 HOURS
Status: DISCONTINUED | OUTPATIENT
Start: 2022-04-20 | End: 2022-04-20 | Stop reason: HOSPADM

## 2022-04-20 RX ORDER — PROPOFOL 10 MG/ML
INJECTION, EMULSION INTRAVENOUS AS NEEDED
Status: DISCONTINUED | OUTPATIENT
Start: 2022-04-20 | End: 2022-04-20 | Stop reason: HOSPADM

## 2022-04-20 RX ORDER — DICLOFENAC SODIUM 10 MG/G
2 GEL TOPICAL 4 TIMES DAILY
COMMUNITY

## 2022-04-20 RX ORDER — LIDOCAINE HYDROCHLORIDE 20 MG/ML
INJECTION, SOLUTION EPIDURAL; INFILTRATION; INTRACAUDAL; PERINEURAL AS NEEDED
Status: DISCONTINUED | OUTPATIENT
Start: 2022-04-20 | End: 2022-04-20 | Stop reason: HOSPADM

## 2022-04-20 RX ORDER — DEXTROMETHORPHAN/PSEUDOEPHED 2.5-7.5/.8
1.2 DROPS ORAL
Status: DISCONTINUED | OUTPATIENT
Start: 2022-04-20 | End: 2022-04-20 | Stop reason: HOSPADM

## 2022-04-20 RX ADMIN — PROPOFOL 50 MG: 10 INJECTION, EMULSION INTRAVENOUS at 09:17

## 2022-04-20 RX ADMIN — SODIUM CHLORIDE 50 ML/HR: 9 INJECTION, SOLUTION INTRAVENOUS at 09:08

## 2022-04-20 RX ADMIN — PROPOFOL 30 MG: 10 INJECTION, EMULSION INTRAVENOUS at 09:18

## 2022-04-20 RX ADMIN — LIDOCAINE HYDROCHLORIDE 80 MG: 20 INJECTION, SOLUTION EPIDURAL; INFILTRATION; INTRACAUDAL; PERINEURAL at 09:17

## 2022-04-20 NOTE — PROCEDURES
Esophagogastroduodenoscopy Procedure Note      Cecile Hyman  10/9/1933  [id number]    Indication:  Dysphagia, h/o esophageal web, GERD w stricture     Endoscopist: Ludin Short MD    Referring Provider:  Kay Moura MD    Sedation:  MAC anesthesia Propofol    Procedure Details:  After infomed consent was obtained for the procedure, with all risks and benefits of procedure explained the patient was taken to the endoscopy suite and placed in the left lateral decubitus position. Following sequential administration of sedation as per above, the endoscope was inserted into the mouth and advanced under direct vision to second portion of the duodenum. A careful inspection was made as the gastroscope was withdrawn, including a retroflexed view of the proximal stomach; findings and interventions are described below. Findings:     Esophagus:   + There was mild resistance at the UES easily traversed with scope. In the distal esophagus we saw some liquid contents regurgitating into the distal 1/3 of the esophagus. There was some ? Yellow exudate vs. Particulate matter s/p brushing to r/o Candida.  + Irregular Z line with columnar mucosa extending proximally to 35 cm from incisors with decrease LES tone. + 5 cm Hiatal hernia with some food content seen in sac. S/P Bx of mid and distal esophagus    We elected to not extend procedure beyond the dilation. Placed guidewire into stomach and then advanced 45 FR Savary over wire with some resistance. Stomach:   + Food in stomach with patent pylorus. Therapies:  See above    Specimen: Specimens were collected as described and send to the laboratory. Complications:   None were encountered during the procedure. EBL: < 10 ml.           Recommendations:   -f/u path  -elevate HOB, small meals  -will need to f/u to discuss tx of her gastroparesis      Ludin Short MD  4/20/2022  9:32 AM

## 2022-04-20 NOTE — PROGRESS NOTES
Endoscope was pre-cleaned at bedside immediately following procedure by MARIE Ricketts Glasses returned to patient immediately post-procedure.

## 2022-04-20 NOTE — ROUTINE PROCESS
Patient  And daughter given verbal and written discharge instructions. Patient given opportunity to ask questions. Patient able to verbalize understanding of these instructions.

## 2022-04-20 NOTE — PROGRESS NOTES
Patient came to department with current medication list from [facility] which has been updated in connect care. Daughter present to assists with preop questions, however patient is alert and oriented (but is [facility] and does not have hearing aids in).

## 2022-04-20 NOTE — ANESTHESIA PREPROCEDURE EVALUATION
Relevant Problems   CARDIOVASCULAR   (+) Atrial fibrillation with rapid ventricular response (HCC)   (+) Essential hypertension      GASTROINTESTINAL   (+) GERD (gastroesophageal reflux disease)   (+) Gastric ulcer      PERSONAL HX & FAMILY HX OF CANCER   (+) Ovarian cancer (HCC)       Anesthetic History   No history of anesthetic complications            Review of Systems / Medical History  Patient summary reviewed, nursing notes reviewed and pertinent labs reviewed    Pulmonary  Within defined limits                 Neuro/Psych       CVA  TIA     Cardiovascular    Hypertension: well controlled        Dysrhythmias       Exercise tolerance: >4 METS  Comments: Exercise tolerance: <4 METS: Uses walker  Comments: Ejection fraction was estimated to be 60 %.    GI/Hepatic/Renal     GERD      PUD     Endo/Other        Arthritis     Other Findings              Physical Exam    Airway  Mallampati: II  TM Distance: 4 - 6 cm  Neck ROM: normal range of motion   Mouth opening: Normal     Cardiovascular  Regular rate and rhythm,  S1 and S2 normal,  no murmur, click, rub, or gallop  Rhythm: regular  Rate: normal         Dental  No notable dental hx       Pulmonary  Breath sounds clear to auscultation               Abdominal  GI exam deferred       Other Findings            Anesthetic Plan    ASA: 3  Anesthesia type: MAC          Induction: Intravenous  Anesthetic plan and risks discussed with: Patient

## 2022-04-20 NOTE — H&P
Gastroenterology Outpatient History and Physical    Patient: Perline Comment    Physician: Jennifer Nichols MD    Vital Signs: Blood pressure (!) 141/76, pulse 71, temperature 98.3 °F (36.8 °C), resp. rate 16, height 5' (1.524 m), weight 63.5 kg (140 lb), SpO2 96 %, not currently breastfeeding. 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     Justification for Procedure: above    History:  Past Medical History:   Diagnosis Date    Abnormal x-ray of abdomen 11/13/2020    Bas showed decreased peristalsis and a cervical web      Arrhythmia     A fib    Arthritis     Diarrhea 6/6/2016    Esophageal dysphagia 11/13/2020    Foot drop     Gastric ulcer 6/14/2012    Gastroparesis     GERD (gastroesophageal reflux disease)     High cholesterol     Hypertension     Neuropathy     Ovarian cancer ([facility] [address] 1986    chemo x 9 mos    Scoliosis     Stroke ([facility] [address] 2002    ? stroke with drop feet, per patient CT scans were negative      Past Surgical History:   Procedure Laterality Date    FLEXIBLE SIGMOIDOSCOPY N/A 6/6/2016    SIGMOIDOSCOPY FLEXIBLE performed by Pedro Luis Guzmán MD at [facility] ENDOSCOPY    HX ORTHOPAEDIC      back    HX ORTHOPAEDIC      bilateral shoulder replacements    HX ORTHOPAEDIC      left knee replacement    HX LYDIA AND BSO  1986    (ovarian cancer)    NM EGD TRANSORAL BIOPSY SINGLE/MULTIPLE  1/26/2012         NM EGD TRANSORAL BIOPSY SINGLE/MULTIPLE  6/14/2012         UPPER GI ENDOSCOPY,BIOPSY  11/13/2020         UPPER GI ENDOSCOPY,DIAGNOSIS  10/5/2020         UPPER GI ENDOSCOPY,DILATN W GUIDE  11/13/2020           Social History     Socioeconomic History    Marital status:    Tobacco Use    Smoking status: Never Smoker    Smokeless tobacco: Never Used   Vaping Use    Vaping Use: Never used   Substance and Sexual Activity    Alcohol use: No    Drug use: No    Sexual activity: Not Currently      Family History   Problem Relation Age of Onset    Heart Disease Father     Cancer Sister         endometrial    Diabetes Sister        Medications:   Prior to Admission medications    Medication Sig Start Date End Date Taking? Authorizing Provider   diclofenac (VOLTAREN) 1 % gel Apply 2 g to affected area four (4) times daily. Yes Provider, Historical   simvastatin (ZOCOR) 40 mg tablet TAKE 1 TABLET BY MOUTH  DAILY 9/9/21  Yes Bert Moser MD   polyethylene glycol 3350 (MIRALAX PO) Take 1 Each by mouth daily as needed. Yes Provider, Historical   propafenone (RYTHMOL) 150 mg tablet Take 1 Tab by mouth two (2) times a day. 7/4/20  Yes Broaddus, Lajoyce Klinefelter,    RABEprazole (ACIPHEX) 20 mg tablet Take 1 Tab by mouth two (2) times a day. 7/4/20  Yes Broaddus, Lajoyce Klinefelter,    potassium chloride SR (KLOR-CON 10) 10 mEq tablet Take 1 Tab by mouth daily. 7/5/20  Yes Broaddus, Lajoyce Klinefelter,    magnesium oxide (MAG-OX) 400 mg tablet Take 250 mg by mouth daily. Yes Provider, Historical   famotidine (PEPCID) 20 mg tablet Take 20 mg by mouth daily. Yes Provider, Historical   oxyCODONE-acetaminophen (Percocet) 5-325 mg per tablet Take 1 Tab by mouth every eight (8) hours as needed for Pain. Yes Provider, Historical   gabapentin (NEURONTIN) 800 mg tablet TAKE 1 TAB BY MOUTH FOUR  TIMES DAILY AS NEEDED. Patient taking differently: TAKE 1 TAB BY MOUTH FOUR  TIMES DAILY AS NEEDED FOR PAIN 3/21/18  Yes Cintia Saleh NP   cholecalciferol (VITAMIN D3) 1,000 unit tablet Take 1,000 Units by mouth daily. Yes Provider, Historical   multivitamin (ONE A DAY) tablet Take 1 Tab by mouth daily. Yes Provider, Historical   hydroCHLOROthiazide (HYDRODIURIL) 25 mg tablet TAKE 1 TABLET BY MOUTH  DAILY 1/26/22   Bert Moser MD   furosemide (Lasix) 20 mg tablet Take 20 mg by mouth daily.     Provider, Historical CALCIUM PO Take 1 Tab by mouth daily. Provider, Historical   acetaminophen (Tylenol Extra Strength) 500 mg tablet Take 1,000 mg by mouth every six (6) hours as needed for Pain. Provider, Historical   melatonin 300 mcg tab Take 1.5 mg by mouth nightly. Patient taking differently: Take 5 mg by mouth nightly. 2 hours before bedtime  OTC 11/8/18   Lizz Galloway MD       Physical Exam:   General: alert, no distress   HEENT: Head: Normocephalic, no lesions, without obvious abnormality.    Heart: regular rate and rhythm, S1, S2 normal, no murmur, click, rub or gallop   Lungs: chest clear, no wheezing, rales, normal symmetric air entry   Abdominal: soft, NT/ND+ BS   Neurological: Grossly normal   Extremities: extremities normal, atraumatic, no cyanosis or edema     Findings/Diagnosis: Dysphagia    Plan of Care/Planned Procedure: EGD w dilation

## 2022-04-20 NOTE — DISCHARGE INSTRUCTIONS
Hubert Virk  [id number]  10/9/1933    EGD DISCHARGE INSTRUCTIONS  Discomfort:  Sore throat- throat lozenges or warm salt water gargle  redness at IV site- apply warm compress to area; if redness or soreness persist- contact your physician  Gaseous discomfort- walking, belching will help relieve any discomfort  You may not operate a vehicle for 12 hours  You may not engage in an occupation involving machinery or appliances for rest of today. You may not drink alcoholic beverages for at least 12 hours  Avoid making any critical decisions for at least 24 hour  DIET  You may resume your regular diet - however -  remember your colon is empty and a heavy meal will produce gas. Avoid these foods:  vegetables, fried / greasy foods, carbonated drinks  MEDICATIONS        Regarding Aspirin or Nonsteroidal medications specifically, please see below. ACTIVITY  You may resume your normal daily activities. Spend the remainder of the day resting -  avoid any strenuous activity. CALL M.D. ANY SIGN OF   Increasing pain, nausea, vomiting  Abdominal distension (swelling)  New increased bleeding (oral or rectal)  Fever (chills)  Pain in chest area  Bloody discharge from nose or mouth  Shortness of breath    Tylenol as needed for pain. Follow-up Instructions:   Call Dr. Blaine Lawrence for results of procedure / biopsy in 4-5 days at telephone #  [phone]              Continue current soft diet, small meals and early supper; elevate your head of the bed.               Make a follow up appointment at [facility] office of [facility] at [phone]

## 2022-04-20 NOTE — ANESTHESIA POSTPROCEDURE EVALUATION
Procedure(s):  ESOPHAGOGASTRODUODENOSCOPY (EGD)  ESOPHAGEAL DILATION  ESOPHAGOGASTRODUODENAL (EGD) BIOPSY  ENDOSCOPIC BRUSHING. MAC    Anesthesia Post Evaluation      Multimodal analgesia: multimodal analgesia used between 6 hours prior to anesthesia start to PACU discharge  Patient location during evaluation: PACU  Patient participation: complete - patient participated  Level of consciousness: sleepy but conscious and responsive to verbal stimuli  Pain score: 2  Pain management: adequate  Airway patency: patent  Anesthetic complications: no  Cardiovascular status: acceptable  Respiratory status: acceptable  Hydration status: acceptable  Comments: +Post-Anesthesia Evaluation and Assessment    Patient: Shaji Gibson MRN: [id number]  SSN: [id number]   YOB: 1933  Age: 80 y.o. Sex: female      Cardiovascular Function/Vital Signs    BP (!) 148/66   Pulse 79   Temp 36.8 °C (98.3 °F)   Resp 16   Ht 5' (1.524 m)   Wt 63.5 kg (140 lb)   SpO2 96%   Breastfeeding No   BMI 27.34 kg/m²     Patient is status post Procedure(s):  ESOPHAGOGASTRODUODENOSCOPY (EGD)  ESOPHAGEAL DILATION  ESOPHAGOGASTRODUODENAL (EGD) BIOPSY  ENDOSCOPIC BRUSHING. Nausea/Vomiting: Controlled. Postoperative hydration reviewed and adequate. Pain:  Pain Scale 1: Numeric (0 - 10) (04/20/22 0902)  Pain Intensity 1: 4 (04/20/22 0902)   Managed. Neurological Status: At baseline. Mental Status and Level of Consciousness: Arousable. Pulmonary Status:   O2 Device: Nasal cannula (04/20/22 0928)   Adequate oxygenation and airway patent. Complications related to anesthesia: None    Post-anesthesia assessment completed. No concerns.     Signed By: Arthur Dooley MD    4/20/2022  Post anesthesia nausea and vomiting:  controlled  Final Post Anesthesia Temperature Assessment:  Normothermia (36.0-37.5 degrees C)      INITIAL Post-op Vital signs:   Vitals Value Taken Time   /65 04/20/22 0948   Temp     Pulse 74 04/20/22 0949   Resp 11 04/20/22 0949   SpO2 97 % 04/20/22 0949   Vitals shown include unvalidated device data.

## 2022-04-20 NOTE — ROUTINE PROCESS
Claytonhoracio Barney  10/9/1933  [id number]    Situation:  Verbal report received from: Bety Mcmanus  Procedure: Procedure(s):  ESOPHAGOGASTRODUODENOSCOPY (EGD)  ESOPHAGEAL DILATION  ESOPHAGOGASTRODUODENAL (EGD) BIOPSY  ENDOSCOPIC BRUSHING    Background:    Preoperative diagnosis: EPIGASTRIC PAIN, DYSPHAGIA, BARRETTS, GERD  Postoperative diagnosis: Hiatal hernia, gastroparesis, dysphagia     :  Dr. Gabi Enriquez  Assistant(s): Endoscopy Technician-1: Skip Rubinstein  Endoscopy RN-1: Kerry Ny    Specimens:   ID Type Source Tests Collected by Time Destination   1 : BX Preservative Esophagus, Mid  Gracie Ba MD 4/20/2022 0929 Pathology   2 : BX: Distal GE Junction  Preservative OTHER (use comments)  Gracie Ba MD 4/20/2022 5348 Pathology   1 : Esophgeal Brushing  Fresh Esophageal Brushing  Gracie Ba MD 4/20/2022 6015 Cytology     H. Pylori  no    Assessment:  Intra-procedure medications       Anesthesia gave intra-procedure sedation and medications, see anesthesia flow sheet yes    Intravenous fluids: NS@ KVO     Vital signs stable yes    Abdominal assessment: round and soft yes    Recommendation:

## 2022-05-06 ENCOUNTER — TRANSCRIBE ORDER (OUTPATIENT)
Dept: SCHEDULING | Age: 87
End: 2022-05-06

## 2022-05-06 DIAGNOSIS — D62 ACUTE POSTHEMORRHAGIC ANEMIA: Primary | ICD-10-CM

## 2022-05-06 DIAGNOSIS — R19.7 DIARRHEA: ICD-10-CM

## 2022-05-06 DIAGNOSIS — K21.9 GERD (GASTROESOPHAGEAL REFLUX DISEASE): ICD-10-CM

## 2022-05-06 DIAGNOSIS — R11.2 NAUSEA WITH VOMITING: ICD-10-CM

## 2022-05-06 DIAGNOSIS — K21.00 GASTRO-ESOPHAGEAL REFLUX DISEASE WITH ESOPHAGITIS: ICD-10-CM

## 2022-05-06 DIAGNOSIS — R10.13 EPIGASTRIC PAIN: ICD-10-CM

## 2022-05-06 DIAGNOSIS — K21.00 REFLUX ESOPHAGITIS: ICD-10-CM

## 2022-05-06 DIAGNOSIS — R13.19 ESOPHAGEAL DYSPHAGIA: ICD-10-CM

## 2022-05-06 DIAGNOSIS — K31.84 GASTROPARESIS: ICD-10-CM

## 2022-08-09 ENCOUNTER — APPOINTMENT (OUTPATIENT)
Dept: CT IMAGING | Age: 87
DRG: 379 | End: 2022-08-09
Attending: HOSPITALIST
Payer: MEDICARE

## 2022-08-09 ENCOUNTER — HOSPITAL ENCOUNTER (INPATIENT)
Age: 87
LOS: 1 days | Discharge: HOME HEALTH | DRG: 379 | End: 2022-08-12
Attending: STUDENT IN AN ORGANIZED HEALTH CARE EDUCATION/TRAINING PROGRAM | Admitting: INTERNAL MEDICINE
Payer: MEDICARE

## 2022-08-09 DIAGNOSIS — K92.0 COFFEE GROUND EMESIS: Primary | ICD-10-CM

## 2022-08-09 PROBLEM — R10.13 EPIGASTRIC PAIN: Status: ACTIVE | Noted: 2022-08-09

## 2022-08-09 LAB
ABO + RH BLD: NORMAL
ALBUMIN SERPL-MCNC: 3.8 G/DL (ref 3.5–5)
ALBUMIN/GLOB SERPL: 0.9 {RATIO} (ref 1.1–2.2)
ALP SERPL-CCNC: 75 U/L (ref 45–117)
ALT SERPL-CCNC: 18 U/L (ref 12–78)
ANION GAP SERPL CALC-SCNC: 4 MMOL/L (ref 5–15)
AST SERPL-CCNC: 17 U/L (ref 15–37)
ATRIAL RATE: 101 BPM
BASOPHILS # BLD: 0 K/UL (ref 0–0.1)
BASOPHILS NFR BLD: 0 % (ref 0–1)
BILIRUB SERPL-MCNC: 0.4 MG/DL (ref 0.2–1)
BLOOD GROUP ANTIBODIES SERPL: NORMAL
BUN SERPL-MCNC: 36 MG/DL (ref 6–20)
BUN/CREAT SERPL: 39 (ref 12–20)
CALCIUM SERPL-MCNC: 10.5 MG/DL (ref 8.5–10.1)
CALCULATED R AXIS, ECG10: -52 DEGREES
CALCULATED T AXIS, ECG11: 87 DEGREES
CHLORIDE SERPL-SCNC: 105 MMOL/L (ref 97–108)
CO2 SERPL-SCNC: 31 MMOL/L (ref 21–32)
CREAT SERPL-MCNC: 0.92 MG/DL (ref 0.55–1.02)
DIAGNOSIS, 93000: NORMAL
DIFFERENTIAL METHOD BLD: ABNORMAL
EOSINOPHIL # BLD: 0 K/UL (ref 0–0.4)
EOSINOPHIL NFR BLD: 0 % (ref 0–7)
ERYTHROCYTE [DISTWIDTH] IN BLOOD BY AUTOMATED COUNT: 13.6 % (ref 11.5–14.5)
GASTROCULT GAST QL: POSITIVE
GLOBULIN SER CALC-MCNC: 4.2 G/DL (ref 2–4)
GLUCOSE SERPL-MCNC: 146 MG/DL (ref 65–100)
HCT VFR BLD AUTO: 39 % (ref 35–47)
HGB BLD-MCNC: 11.9 G/DL (ref 11.5–16)
IMM GRANULOCYTES # BLD AUTO: 0 K/UL (ref 0–0.04)
IMM GRANULOCYTES NFR BLD AUTO: 0 % (ref 0–0.5)
INR PPP: 1 (ref 0.9–1.1)
LIPASE SERPL-CCNC: 155 U/L (ref 73–393)
LYMPHOCYTES # BLD: 0.6 K/UL (ref 0.8–3.5)
LYMPHOCYTES NFR BLD: 6 % (ref 12–49)
MCH RBC QN AUTO: 26.3 PG (ref 26–34)
MCHC RBC AUTO-ENTMCNC: 30.5 G/DL (ref 30–36.5)
MCV RBC AUTO: 86.1 FL (ref 80–99)
MONOCYTES # BLD: 0.6 K/UL (ref 0–1)
MONOCYTES NFR BLD: 6 % (ref 5–13)
NEUTS SEG # BLD: 9.1 K/UL (ref 1.8–8)
NEUTS SEG NFR BLD: 88 % (ref 32–75)
NRBC # BLD: 0 K/UL (ref 0–0.01)
NRBC BLD-RTO: 0 PER 100 WBC
P-R INTERVAL, ECG05: 162 MS
PH GAST: 3 [PH] (ref 1.5–3.5)
PLATELET # BLD AUTO: 324 K/UL (ref 150–400)
PMV BLD AUTO: 10.2 FL (ref 8.9–12.9)
POTASSIUM SERPL-SCNC: 3.9 MMOL/L (ref 3.5–5.1)
PROT SERPL-MCNC: 8 G/DL (ref 6.4–8.2)
PROTHROMBIN TIME: 10.5 SEC (ref 9–11.1)
Q-T INTERVAL, ECG07: 366 MS
QRS DURATION, ECG06: 126 MS
QTC CALCULATION (BEZET), ECG08: 474 MS
RBC # BLD AUTO: 4.53 M/UL (ref 3.8–5.2)
RBC MORPH BLD: ABNORMAL
SODIUM SERPL-SCNC: 140 MMOL/L (ref 136–145)
SPECIMEN EXP DATE BLD: NORMAL
TROPONIN-HIGH SENSITIVITY: 10 NG/L (ref 0–51)
VENTRICULAR RATE, ECG03: 101 BPM
WBC # BLD AUTO: 10.3 K/UL (ref 3.6–11)

## 2022-08-09 PROCEDURE — 82271 OCCULT BLOOD OTHER SOURCES: CPT

## 2022-08-09 PROCEDURE — G0378 HOSPITAL OBSERVATION PER HR: HCPCS

## 2022-08-09 PROCEDURE — 84484 ASSAY OF TROPONIN QUANT: CPT

## 2022-08-09 PROCEDURE — 80053 COMPREHEN METABOLIC PANEL: CPT

## 2022-08-09 PROCEDURE — 94761 N-INVAS EAR/PLS OXIMETRY MLT: CPT

## 2022-08-09 PROCEDURE — 99285 EMERGENCY DEPT VISIT HI MDM: CPT

## 2022-08-09 PROCEDURE — C9113 INJ PANTOPRAZOLE SODIUM, VIA: HCPCS | Performed by: STUDENT IN AN ORGANIZED HEALTH CARE EDUCATION/TRAINING PROGRAM

## 2022-08-09 PROCEDURE — 96374 THER/PROPH/DIAG INJ IV PUSH: CPT

## 2022-08-09 PROCEDURE — 85610 PROTHROMBIN TIME: CPT

## 2022-08-09 PROCEDURE — 74011250636 HC RX REV CODE- 250/636: Performed by: STUDENT IN AN ORGANIZED HEALTH CARE EDUCATION/TRAINING PROGRAM

## 2022-08-09 PROCEDURE — 96375 TX/PRO/DX INJ NEW DRUG ADDON: CPT

## 2022-08-09 PROCEDURE — 74011250636 HC RX REV CODE- 250/636: Performed by: HOSPITALIST

## 2022-08-09 PROCEDURE — 96376 TX/PRO/DX INJ SAME DRUG ADON: CPT

## 2022-08-09 PROCEDURE — 74011250636 HC RX REV CODE- 250/636: Performed by: PHYSICIAN ASSISTANT

## 2022-08-09 PROCEDURE — 36415 COLL VENOUS BLD VENIPUNCTURE: CPT

## 2022-08-09 PROCEDURE — 93005 ELECTROCARDIOGRAM TRACING: CPT

## 2022-08-09 PROCEDURE — 83690 ASSAY OF LIPASE: CPT

## 2022-08-09 PROCEDURE — 74011000250 HC RX REV CODE- 250: Performed by: STUDENT IN AN ORGANIZED HEALTH CARE EDUCATION/TRAINING PROGRAM

## 2022-08-09 PROCEDURE — 74011000250 HC RX REV CODE- 250: Performed by: HOSPITALIST

## 2022-08-09 PROCEDURE — 86900 BLOOD TYPING SEROLOGIC ABO: CPT

## 2022-08-09 PROCEDURE — 85025 COMPLETE CBC W/AUTO DIFF WBC: CPT

## 2022-08-09 PROCEDURE — 74011000636 HC RX REV CODE- 636: Performed by: HOSPITALIST

## 2022-08-09 PROCEDURE — C9113 INJ PANTOPRAZOLE SODIUM, VIA: HCPCS | Performed by: HOSPITALIST

## 2022-08-09 PROCEDURE — 74178 CT ABD&PLV WO CNTR FLWD CNTR: CPT

## 2022-08-09 RX ORDER — ONDANSETRON 2 MG/ML
4 INJECTION INTRAMUSCULAR; INTRAVENOUS ONCE
Status: COMPLETED | OUTPATIENT
Start: 2022-08-09 | End: 2022-08-09

## 2022-08-09 RX ORDER — MORPHINE SULFATE 2 MG/ML
1 INJECTION, SOLUTION INTRAMUSCULAR; INTRAVENOUS
Status: DISCONTINUED | OUTPATIENT
Start: 2022-08-09 | End: 2022-08-11

## 2022-08-09 RX ORDER — ACETAMINOPHEN 650 MG/1
650 SUPPOSITORY RECTAL
Status: DISCONTINUED | OUTPATIENT
Start: 2022-08-09 | End: 2022-08-12 | Stop reason: HOSPADM

## 2022-08-09 RX ORDER — SODIUM CHLORIDE 0.9 % (FLUSH) 0.9 %
5-40 SYRINGE (ML) INJECTION AS NEEDED
Status: DISCONTINUED | OUTPATIENT
Start: 2022-08-09 | End: 2022-08-12 | Stop reason: HOSPADM

## 2022-08-09 RX ORDER — ACETAMINOPHEN 325 MG/1
650 TABLET ORAL
Status: DISCONTINUED | OUTPATIENT
Start: 2022-08-09 | End: 2022-08-12 | Stop reason: HOSPADM

## 2022-08-09 RX ORDER — SODIUM CHLORIDE 0.9 % (FLUSH) 0.9 %
5-40 SYRINGE (ML) INJECTION EVERY 8 HOURS
Status: DISCONTINUED | OUTPATIENT
Start: 2022-08-09 | End: 2022-08-12 | Stop reason: HOSPADM

## 2022-08-09 RX ORDER — METOCLOPRAMIDE HYDROCHLORIDE 5 MG/ML
5 INJECTION INTRAMUSCULAR; INTRAVENOUS 3 TIMES DAILY
Status: DISCONTINUED | OUTPATIENT
Start: 2022-08-09 | End: 2022-08-11

## 2022-08-09 RX ORDER — SODIUM CHLORIDE 9 MG/ML
75 INJECTION, SOLUTION INTRAVENOUS CONTINUOUS
Status: DISCONTINUED | OUTPATIENT
Start: 2022-08-09 | End: 2022-08-11

## 2022-08-09 RX ORDER — ONDANSETRON 4 MG/1
4 TABLET, ORALLY DISINTEGRATING ORAL
Status: DISCONTINUED | OUTPATIENT
Start: 2022-08-09 | End: 2022-08-12 | Stop reason: HOSPADM

## 2022-08-09 RX ORDER — POLYETHYLENE GLYCOL 3350 17 G/17G
17 POWDER, FOR SOLUTION ORAL DAILY PRN
Status: DISCONTINUED | OUTPATIENT
Start: 2022-08-09 | End: 2022-08-11

## 2022-08-09 RX ORDER — ONDANSETRON 2 MG/ML
4 INJECTION INTRAMUSCULAR; INTRAVENOUS
Status: DISCONTINUED | OUTPATIENT
Start: 2022-08-09 | End: 2022-08-12 | Stop reason: HOSPADM

## 2022-08-09 RX ADMIN — IOPAMIDOL 100 ML: 755 INJECTION, SOLUTION INTRAVENOUS at 13:31

## 2022-08-09 RX ADMIN — SODIUM CHLORIDE, PRESERVATIVE FREE 10 ML: 5 INJECTION INTRAVENOUS at 14:07

## 2022-08-09 RX ADMIN — METOCLOPRAMIDE 5 MG: 5 INJECTION, SOLUTION INTRAMUSCULAR; INTRAVENOUS at 21:15

## 2022-08-09 RX ADMIN — SODIUM CHLORIDE 75 ML/HR: 9 INJECTION, SOLUTION INTRAVENOUS at 14:06

## 2022-08-09 RX ADMIN — ONDANSETRON 4 MG: 2 INJECTION INTRAMUSCULAR; INTRAVENOUS at 10:39

## 2022-08-09 RX ADMIN — METOCLOPRAMIDE 5 MG: 5 INJECTION, SOLUTION INTRAMUSCULAR; INTRAVENOUS at 17:56

## 2022-08-09 RX ADMIN — SODIUM CHLORIDE, PRESERVATIVE FREE 80 MG: 5 INJECTION INTRAVENOUS at 10:40

## 2022-08-09 RX ADMIN — SODIUM CHLORIDE, PRESERVATIVE FREE 5 ML: 5 INJECTION INTRAVENOUS at 21:16

## 2022-08-09 RX ADMIN — SODIUM CHLORIDE 75 ML/HR: 9 INJECTION, SOLUTION INTRAVENOUS at 17:57

## 2022-08-09 RX ADMIN — SODIUM CHLORIDE 1000 ML: 9 INJECTION, SOLUTION INTRAVENOUS at 10:38

## 2022-08-09 RX ADMIN — SODIUM CHLORIDE 40 MG: 9 INJECTION, SOLUTION INTRAMUSCULAR; INTRAVENOUS; SUBCUTANEOUS at 21:16

## 2022-08-09 NOTE — CONSULTS
Gastroenterology Consult  ([place] [place], [address] for Dr. Gage Garza)     Referring Physician: Dr. Daina Mcintosh Date: 8/9/2022     Subjective:     Chief Complaint: nausea, vomiting    History of Present Illness: Juliana Baldwin is a 80 y.o. female who is seen in consultation for nausea, vomiting. Started vomiting last night around 2300. At that time it was clear but this AM it became dark. Not black but the color concerned the assisted living and she was brought to the ER. HGB 11.9, previously 8.5. Hx of gastroparesis and 5cm hiatal hernia. They have not wanted to use Reglan in the past.  C/o upper abd tightness and bloating  Has been taking oxycodone bid for the past month for neck pain. 4/20/22 EGD Dr. Mariana Pierce  Findings:     Esophagus:   +          There was mild resistance at the UES easily traversed with scope. In the distal esophagus we saw some liquid contents regurgitating into the distal 1/3 of the esophagus. There was some ? Yellow exudate vs. Particulate matter s/p brushing to r/o Candida.  +          Irregular Z line with columnar mucosa extending proximally to 35 cm from incisors with decrease LES tone. +          5 cm Hiatal hernia with some food content seen in sac. S/P Bx of mid and distal esophagus     We elected to not extend procedure beyond the dilation. Placed guidewire into stomach and then advanced 45 FR Savary over wire with some resistance. Stomach:   +          Food in stomach with patent pylorus. Therapies:  See above     Specimen: Specimens were collected as described and send to the laboratory. Complications:   None were encountered during the procedure. EBL: < 10 ml.           Recommendations:   -f/u path  -elevate HOB, small meals  -will need to f/u to discuss tx of her gastroparesis    Past Medical History:   Diagnosis Date    Abnormal x-ray of abdomen 11/13/2020    Bas showed decreased peristalsis and a cervical web      Arrhythmia     A fib    Arthritis     Diarrhea 6/6/2016    Esophageal dysphagia 11/13/2020    Foot drop     Gastric ulcer 6/14/2012    Gastroparesis     GERD (gastroesophageal reflux disease)     High cholesterol     Hypertension     Neuropathy     Ovarian cancer ([address] 1986    chemo x 9 mos    Scoliosis     Stroke [facility]) 2002    ? stroke with drop feet, per patient CT scans were negative     Past Surgical History:   Procedure Laterality Date    FLEXIBLE SIGMOIDOSCOPY N/A 6/6/2016    SIGMOIDOSCOPY FLEXIBLE performed by Tigist Ray MD at [facility] ENDOSCOPY    HX ORTHOPAEDIC      back    HX ORTHOPAEDIC      bilateral shoulder replacements    HX ORTHOPAEDIC      left knee replacement    HX LYDIA AND BSO  1986    (ovarian cancer)    OK EGD TRANSORAL BIOPSY SINGLE/MULTIPLE  1/26/2012         OK EGD TRANSORAL BIOPSY SINGLE/MULTIPLE  6/14/2012         UPPER GI ENDOSCOPY,BIOPSY  11/13/2020         UPPER GI ENDOSCOPY,DIAGNOSIS  10/5/2020         UPPER GI ENDOSCOPY,DILATN W GUIDE  11/13/2020           Family History   Problem Relation Age of Onset    Heart Disease Father     Cancer Sister         endometrial    Diabetes Sister      Social History     Tobacco Use    Smoking status: Never    Smokeless tobacco: Never   Substance Use Topics    Alcohol use: No      No Known Allergies  Current Facility-Administered Medications   Medication Dose Route Frequency    sodium chloride (NS) flush 5-40 mL  5-40 mL IntraVENous Q8H    sodium chloride (NS) flush 5-40 mL  5-40 mL IntraVENous PRN    acetaminophen (TYLENOL) tablet 650 mg  650 mg Oral Q6H PRN    Or    acetaminophen (TYLENOL) suppository 650 mg  650 mg Rectal Q6H PRN    polyethylene glycol (MIRALAX) packet 17 g  17 g Oral DAILY PRN    ondansetron (ZOFRAN ODT) tablet 4 mg  4 mg Oral Q8H PRN    Or    ondansetron (ZOFRAN) injection 4 mg  4 mg IntraVENous Q6H PRN    0.9% sodium chloride infusion  75 mL/hr IntraVENous CONTINUOUS    pantoprazole (PROTONIX) 40 mg in 0.9% sodium chloride 10 mL injection  40 mg IntraVENous Q12H    iopamidoL (ISOVUE-370) 76 % injection 100 mL  100 mL IntraVENous RAD ONCE    morphine injection 1 mg  1 mg IntraVENous Q4H PRN     Current Outpatient Medications   Medication Sig    diclofenac (VOLTAREN) 1 % gel Apply 2 g to affected area four (4) times daily. hydroCHLOROthiazide (HYDRODIURIL) 25 mg tablet TAKE 1 TABLET BY MOUTH  DAILY    simvastatin (ZOCOR) 40 mg tablet TAKE 1 TABLET BY MOUTH  DAILY    furosemide (Lasix) 20 mg tablet Take 20 mg by mouth daily. CALCIUM PO Take 1 Tab by mouth daily. polyethylene glycol 3350 (MIRALAX PO) Take 1 Each by mouth daily as needed. acetaminophen (Tylenol Extra Strength) 500 mg tablet Take 1,000 mg by mouth every six (6) hours as needed for Pain.    propafenone (RYTHMOL) 150 mg tablet Take 1 Tab by mouth two (2) times a day. RABEprazole (ACIPHEX) 20 mg tablet Take 1 Tab by mouth two (2) times a day. potassium chloride SR (KLOR-CON 10) 10 mEq tablet Take 1 Tab by mouth daily. magnesium oxide (MAG-OX) 400 mg tablet Take 250 mg by mouth daily. famotidine (PEPCID) 20 mg tablet Take 20 mg by mouth daily. oxyCODONE-acetaminophen (Percocet) 5-325 mg per tablet Take 1 Tab by mouth every eight (8) hours as needed for Pain.    melatonin 300 mcg tab Take 1.5 mg by mouth nightly. (Patient taking differently: Take 5 mg by mouth nightly. 2 hours before bedtime  OTC)    gabapentin (NEURONTIN) 800 mg tablet TAKE 1 TAB BY MOUTH FOUR  TIMES DAILY AS NEEDED. (Patient taking differently: TAKE 1 TAB BY MOUTH FOUR  TIMES DAILY AS NEEDED FOR PAIN)    cholecalciferol (VITAMIN D3) 1,000 unit tablet Take 1,000 Units by mouth daily. multivitamin (ONE A DAY) tablet Take 1 Tab by mouth daily. Review of Systems:  A detailed review of systems was performed as follows:  Constitutional:  Negative  Eyes:  No ocular sensitivity to the sun, blurred vision or double vision. ENMT:  No nose or sinus problems.   Respiratory: No coughing, wheezing or sob  Cardiac:  No chest pain, exertional chest pain or palpitations  Gastrointestinal:  See history of the present illness  :   No pain with urination or hematuria  Musculoskeletal:  No arthritis or hot swollen joints. Endocrine:  No thyroid disease or diabetes  Psychiatric: No depression or feeling blue  Integumentary:  No skin rash or sensitivity to the sun. Neurologic:  No stroke or seizure; no numbness or tingling of the extremities. Heme-Lymphatic:  No history of anemia, no unexplained lumps or bumps      Objective:     Physical Exam:  Visit Vitals  /65   Pulse 94   Temp 97.6 °F (36.4 °C)   Resp 22   Ht 5' (1.524 m)   Wt 63.5 kg (140 lb)   SpO2 95%   BMI 27.34 kg/m²        Gen: elderly white female resting in nad  Skin:  Extremities and face reveal no rashes. HEENT: Sclerae anicteric. No abnormal pigmentation of the lips. Cardiovascular: Regular rate and rhythm. No murmurs, gallops, or rubs. Respiratory:  Comfortable breathing with no accessory muscle use. Clear breath sounds with no wheezes, rales, or rhonchi. GI:  Abdomen nondistended, soft, and nontender. Normal active bowel sounds. No enlargement of the liver or spleen. No masses palpable. Rectal:  Deferred  Musculoskeletal:  No pitting edema of the lower legs. Neurological:  Gross memory appears intact. Patient is alert and oriented. Psychiatric:  Mood appears appropriate with judgement intact. Lymphatic:  No cervical or supraclavicular adenopathy.     Lab/Data Review:  CMP:   Lab Results   Component Value Date/Time     08/09/2022 10:34 AM    K 3.9 08/09/2022 10:34 AM     08/09/2022 10:34 AM    CO2 31 08/09/2022 10:34 AM    AGAP 4 (L) 08/09/2022 10:34 AM     (H) 08/09/2022 10:34 AM    BUN 36 (H) 08/09/2022 10:34 AM    CREA 0.92 08/09/2022 10:34 AM    GFRAA >60 08/09/2022 10:34 AM    GFRNA 58 (L) 08/09/2022 10:34 AM    CA 10.5 (H) 08/09/2022 10:34 AM    ALB 3.8 08/09/2022 10:34 AM    TP 8.0 08/09/2022 10:34 AM GLOB 4.2 (H) 08/09/2022 10:34 AM    AGRAT 0.9 (L) 08/09/2022 10:34 AM    ALT 18 08/09/2022 10:34 AM     CBC:   Lab Results   Component Value Date/Time    WBC 10.3 08/09/2022 10:34 AM    HGB 11.9 08/09/2022 10:34 AM    HCT 39.0 08/09/2022 10:34 AM     08/09/2022 10:34 AM         Assessment/Plan:     Nausea, vomiting  Gastroparesis  Opioid use  Dark emesis  Hiatal hernia  Hypercalcemia    Patient has CT abd/pelvis to rule out GIB pending. She is being admitted for observation. She has serial H/H ordered and has been started on bid ppi. She is HD stable. I suspect patient's sxs are related to a gastroparesis flare up from recent opioid use which she has been taking for neck pain. I recommend starting low dose reglan and monitoring for tolerance. I recommend limiting opioids as much as possible. I discussed this with patient/daughter. She can have clear liquids today and I will make npo after mn in case she declares herself as an ugib and needs a repeat egd tomorrow. PEGGY Thomas  08/09/22  2:52 PM      CT Results (most recent):  Results from Hospital Encounter encounter on 08/09/22    CT ABD PELV W WO CONT    Narrative  EXAM: CT ABD PELV W WO CONT    INDICATION: coffee ground emesis, epigastric pain, eval for active gi bleeding  or bowel obstruction    COMPARISON: 7/1/2020. IV CONTRAST: 100 mL of Isovue-370. ORAL CONTRAST: None    TECHNIQUE:  Multislice helical CT was performed from the diaphragm to the iliac crest prior  to intravenous contrast administration and from the diaphragm to the symphysis  pubis during uneventful rapid bolus intravenous contrast administration. Precontrast, arterial and venous phases are submitted. Contiguous thin axial  images were reconstructed and lung and soft tissue windows were generated. Coronal and sagittal reformations were generated.   CT dose reduction was  achieved through use of a standardized protocol tailored for this examination  and automatic exposure control for dose modulation. FINDINGS:  LOWER THORAX: Bibasilar subsegmental atelectasis. Dilated cardiomyopathy. Mild  coronary artery calcification. Fluid within a hiatal hernia. LIVER: No mass. BILIARY TREE: Gallbladder is within normal limits. CBD is not dilated. SPLEEN: within normal limits. PANCREAS: No mass or ductal dilatation. ADRENALS: Unremarkable. KIDNEYS: No mass, calculus, or hydronephrosis. Multiple bilateral renal cysts,  for which no additional evaluation is needed. STOMACH: Fluid filled distended stomach  SMALL BOWEL: No dilatation or wall thickening. Proximal small bowel distended up  to 2.9 cm in axial diameter. .  COLON: No dilatation or wall thickening. Moderate stool throughout the colon. APPENDIX: Not visualized  PERITONEUM: No ascites or pneumoperitoneum. RETROPERITONEUM: No lymphadenopathy or aortic aneurysm. REPRODUCTIVE ORGANS: Prior hysterectomy  URINARY BLADDER: No mass or calculus. BONES: No destructive bone lesion. Lumbosacral fusion with the anterior  subluxation of the L5-S1 intercalated segment. Underlying scoliosis. ABDOMINAL WALL: No mass or hernia. ADDITIONAL COMMENTS: N/A    Impression  Source of hemorrhage is not identified  Markedly distended esophagus and stomach. Mildly distended proximal small bowel,  suggesting an adhesion. Incidental constipation, dilated cardiomyopathy, coronary artery disease    I reviewed CT with radiology. The proximal SB loops are a little more distended than normal but not distended enough to meet criteria for SBO. Prior surgical hx includes LYDIA. We will change diet to NPO with sips of liquids only for comfort purposes (pt c/o dry mouth) and have an NGT placed for decompression and repeat kub in the AM.    PEGGY Hinojosa  08/09/22  3:39 PM  I have examined the patient.   I have reviewed the chart and agree with the documentation recorded by the REED, including the assessment, treatment plan, and disposition. Patient seen and examined by me. I personally performed all components of the history, physical, and medical decision making and agree with the assessment and plan with minor modifications as noted. Exam:  Alert and awake  HEENT AT  GI: soft w/ decreased bowel sounds    Plan: We will plan with NG placement,  KUB tomorrow to follow up on SB diameter. Consider EGD if she has a drop in her crit of if NG aspirate looks melenic. Supportive care for now. Barb Clemons MD  [facility](A)

## 2022-08-09 NOTE — ED TRIAGE NOTES
Comes from [facility] assisted facility via ems. C/o nausea, vomiting and abd discomfort onset last night approx 2330. Pt states she first started throwing up [place], light green enmesis and now its dark green. Endorses bloating like feeling. Does not know when was her last BM, but reports taking miralax with no success.

## 2022-08-09 NOTE — PROGRESS NOTES
End of Shift Note    Bedside shift change report given to Tatiana Brower (oncoming nurse) by Ivania Nguyen (offgoing nurse). Report included the following information SBAR and Kardex    Shift worked:  7a-7p     Shift summary and any significant changes:    Received patient near end of shift. Patient had 2 episodes of emesis upon arrival. Reglan given, See MAR. Patient settled into room and vitals taken. Daughter at the bedside. Hourly rounding completed. Dual skin completed and charted. Pure wick set was changed. Concerns for physician to address:       Zone phone for oncoming shift:          Activity:  Activity Level: Bed Rest  Number times ambulated in hallways past shift: 0  Number of times OOB to chair past shift: 0    Cardiac:   Cardiac Monitoring: Yes      Cardiac Rhythm: Sinus Rhythm    Access:  Current line(s): PIV     Genitourinary:   Urinary status: external catheter    Respiratory:   O2 Device: None (Room air)  Chronic home O2 use?: NO  Incentive spirometer at bedside: N/A       GI:  Last Bowel Movement Date:  (unable to recall)  Current diet:  DIET NPO Sips of Clear Liquids, Sips of Water with Meds, Ice Chips  Passing flatus: YES  Tolerating current diet: YES       Pain Management:   Patient states pain is manageable on current regimen: YES    Skin:  Cristino Score: 15  Interventions: PT/OT consult, internal/external urinary devices, and nutritional support     Patient Safety:  Fall Score:  Total Score: 2  Interventions: bed/chair alarm, gripper socks, and pt to call before getting OOB       Length of Stay:  Expected LOS: - - -  Actual LOS: 0      Ivania Nguyen

## 2022-08-09 NOTE — ED PROVIDER NOTES
EMERGENCY DEPARTMENT HISTORY AND PHYSICAL EXAM      Date: 8/9/2022  Patient Name: Dieter Díaz    History of Presenting Illness     Chief Complaint   Patient presents with    Abdominal Pain         HPI: Dieter Díaz, 80 y.o. female presents to the ED with cc of nausea and vomiting. This started last night. She reports about 7-8 episodes of emesis. It was initially yellow, however now it is coffee brown in color. She reports some epigastric discomfort but only with vomiting. No diarrhea or fevers. She has been having issues with constipation, with small hard stools, took MiraLAX yesterday, has not had a bowel movement in 2 to 3 days. She denies dysuria or hematuria. Reports history of gastroparesis, as well as history of prior upper GI bleeds. She does not take any blood thinners. There are no other complaints, changes, or physical findings at this time. PCP: Yaneli Rizvi MD    No current facility-administered medications on file prior to encounter. Current Outpatient Medications on File Prior to Encounter   Medication Sig Dispense Refill    diclofenac (VOLTAREN) 1 % gel Apply 2 g to affected area four (4) times daily. hydroCHLOROthiazide (HYDRODIURIL) 25 mg tablet TAKE 1 TABLET BY MOUTH  DAILY 90 Tablet 3    simvastatin (ZOCOR) 40 mg tablet TAKE 1 TABLET BY MOUTH  DAILY 90 Tablet 3    furosemide (Lasix) 20 mg tablet Take 20 mg by mouth daily. CALCIUM PO Take 1 Tab by mouth daily. polyethylene glycol 3350 (MIRALAX PO) Take 1 Each by mouth daily as needed. acetaminophen (Tylenol Extra Strength) 500 mg tablet Take 1,000 mg by mouth every six (6) hours as needed for Pain.      propafenone (RYTHMOL) 150 mg tablet Take 1 Tab by mouth two (2) times a day. 60 Tab 2    RABEprazole (ACIPHEX) 20 mg tablet Take 1 Tab by mouth two (2) times a day. 60 Tab 3    potassium chloride SR (KLOR-CON 10) 10 mEq tablet Take 1 Tab by mouth daily.  30 Tab 3    magnesium oxide (MAG-OX) 400 mg tablet Take 250 mg by mouth daily. famotidine (PEPCID) 20 mg tablet Take 20 mg by mouth daily. oxyCODONE-acetaminophen (Percocet) 5-325 mg per tablet Take 1 Tab by mouth every eight (8) hours as needed for Pain.      melatonin 300 mcg tab Take 1.5 mg by mouth nightly. (Patient taking differently: Take 5 mg by mouth nightly. 2 hours before bedtime  OTC) 1 Tab 0    gabapentin (NEURONTIN) 800 mg tablet TAKE 1 TAB BY MOUTH FOUR  TIMES DAILY AS NEEDED. (Patient taking differently: TAKE 1 TAB BY MOUTH FOUR  TIMES DAILY AS NEEDED FOR PAIN) 360 Tab 3    cholecalciferol (VITAMIN D3) 1,000 unit tablet Take 1,000 Units by mouth daily. multivitamin (ONE A DAY) tablet Take 1 Tab by mouth daily.          Past History     Past Medical History:  Past Medical History:   Diagnosis Date    Abnormal x-ray of abdomen 11/13/2020    Bas showed decreased peristalsis and a cervical web      Arrhythmia     A fib    Arthritis     Diarrhea 6/6/2016    Esophageal dysphagia 11/13/2020    Foot drop     Gastric ulcer 6/14/2012    Gastroparesis     GERD (gastroesophageal reflux disease)     High cholesterol     Hypertension     Neuropathy     Ovarian cancer ([address] 1986    chemo x 9 mos    Scoliosis     Stroke ([facility] [address] 2002    ? stroke with drop feet, per patient CT scans were negative       Past Surgical History:  Past Surgical History:   Procedure Laterality Date    FLEXIBLE SIGMOIDOSCOPY N/A 6/6/2016    SIGMOIDOSCOPY FLEXIBLE performed by Yadira Dasilva MD at [facility] ENDOSCOPY    HX ORTHOPAEDIC      back    HX ORTHOPAEDIC      bilateral shoulder replacements    HX ORTHOPAEDIC      left knee replacement    HX LYDIA AND BSO  1986    (ovarian cancer)    MS EGD TRANSORAL BIOPSY SINGLE/MULTIPLE  1/26/2012         MS EGD TRANSORAL BIOPSY SINGLE/MULTIPLE  6/14/2012         UPPER GI ENDOSCOPY,BIOPSY  11/13/2020         UPPER GI ENDOSCOPY,DIAGNOSIS  10/5/2020         UPPER GI ENDOSCOPY,DILATN W GUIDE  11/13/2020            Family History:  Family History   Problem Relation Age of Onset    Heart Disease Father     Cancer Sister         endometrial    Diabetes Sister        Social History:  Social History     Tobacco Use    Smoking status: Never    Smokeless tobacco: Never   Vaping Use    Vaping Use: Never used   Substance Use Topics    Alcohol use: No    Drug use: No       Allergies:  No Known Allergies      Review of Systems   no fever  No ear pain  No eye pain  no shortness of breath  no chest pain  Reports vomiting  no dysuria  no leg pain  No rash  No lymphadenopathy  No weight gain    Physical Exam   Physical Exam  Constitutional:       General: She is not in acute distress. Appearance: She is not toxic-appearing. HENT:      Head: Normocephalic. Eyes:      Extraocular Movements: Extraocular movements intact. Cardiovascular:      Rate and Rhythm: Normal rate and regular rhythm. Pulmonary:      Effort: Pulmonary effort is normal.      Breath sounds: Normal breath sounds. Abdominal:      Palpations: Abdomen is soft. Tenderness: There is no abdominal tenderness. Musculoskeletal:         General: No deformity. Cervical back: Neck supple. Skin:     General: Skin is warm and dry. Neurological:      General: No focal deficit present. Mental Status: She is alert.    Psychiatric:         Mood and Affect: Mood normal.       Diagnostic Study Results     Labs -     Recent Results (from the past 24 hour(s))   CBC WITH AUTOMATED DIFF    Collection Time: 08/09/22 10:34 AM   Result Value Ref Range    WBC 10.3 3.6 - 11.0 K/uL    RBC 4.53 3.80 - 5.20 M/uL    HGB 11.9 11.5 - 16.0 g/dL    HCT 39.0 35.0 - 47.0 %    MCV 86.1 80.0 - 99.0 FL    MCH 26.3 26.0 - 34.0 PG    MCHC 30.5 30.0 - 36.5 g/dL    RDW 13.6 11.5 - 14.5 %    PLATELET 320 520 - 805 K/uL    MPV 10.2 8.9 - 12.9 FL    NRBC 0.0 0  WBC    ABSOLUTE NRBC 0.00 0.00 - 0.01 K/uL    NEUTROPHILS 88 (H) 32 - 75 %    LYMPHOCYTES 6 (L) 12 - 49 %    MONOCYTES 6 5 - 13 % EOSINOPHILS 0 0 - 7 %    BASOPHILS 0 0 - 1 %    IMMATURE GRANULOCYTES 0 0.0 - 0.5 %    ABS. NEUTROPHILS 9.1 (H) 1.8 - 8.0 K/UL    ABS. LYMPHOCYTES 0.6 (L) 0.8 - 3.5 K/UL    ABS. MONOCYTES 0.6 0.0 - 1.0 K/UL    ABS. EOSINOPHILS 0.0 0.0 - 0.4 K/UL    ABS. BASOPHILS 0.0 0.0 - 0.1 K/UL    ABS. IMM. GRANS. 0.0 0.00 - 0.04 K/UL    DF SMEAR SCANNED      RBC COMMENTS NORMOCYTIC, NORMOCHROMIC     METABOLIC PANEL, COMPREHENSIVE    Collection Time: 08/09/22 10:34 AM   Result Value Ref Range    Sodium 140 136 - 145 mmol/L    Potassium 3.9 3.5 - 5.1 mmol/L    Chloride 105 97 - 108 mmol/L    CO2 31 21 - 32 mmol/L    Anion gap 4 (L) 5 - 15 mmol/L    Glucose 146 (H) 65 - 100 mg/dL    BUN 36 (H) 6 - 20 MG/DL    Creatinine 0.92 0.55 - 1.02 MG/DL    BUN/Creatinine ratio 39 (H) 12 - 20      GFR est AA >60 >60 ml/min/1.73m2    GFR est non-AA 58 (L) >60 ml/min/1.73m2    Calcium 10.5 (H) 8.5 - 10.1 MG/DL    Bilirubin, total 0.4 0.2 - 1.0 MG/DL    ALT (SGPT) 18 12 - 78 U/L    AST (SGOT) 17 15 - 37 U/L    Alk.  phosphatase 75 45 - 117 U/L    Protein, total 8.0 6.4 - 8.2 g/dL    Albumin 3.8 3.5 - 5.0 g/dL    Globulin 4.2 (H) 2.0 - 4.0 g/dL    A-G Ratio 0.9 (L) 1.1 - 2.2     LIPASE    Collection Time: 08/09/22 10:34 AM   Result Value Ref Range    Lipase 155 73 - 393 U/L   TYPE & SCREEN    Collection Time: 08/09/22 10:34 AM   Result Value Ref Range    Crossmatch Expiration 08/12/2022,2359     ABO/Rh(D) B POSITIVE     Antibody screen NEG    PROTHROMBIN TIME + INR    Collection Time: 08/09/22 10:34 AM   Result Value Ref Range    INR 1.0 0.9 - 1.1      Prothrombin time 10.5 9.0 - 11.1 sec   TROPONIN-HIGH SENSITIVITY    Collection Time: 08/09/22 10:34 AM   Result Value Ref Range    Troponin-High Sensitivity 10 0 - 51 ng/L   EKG, 12 LEAD, INITIAL    Collection Time: 08/09/22 10:56 AM   Result Value Ref Range    Ventricular Rate 101 BPM    Atrial Rate 101 BPM    P-R Interval 162 ms    QRS Duration 126 ms    Q-T Interval 366 ms    QTC Calculation (Bezet) 474 ms    Calculated R Axis -52 degrees    Calculated T Axis 87 degrees    Diagnosis       Sinus tachycardia with premature atrial complexes with aberrant conduction  Left axis deviation  Nonspecific intraventricular block  Cannot rule out Septal infarct , age undetermined  Possible Lateral infarct , age undetermined  When compared with ECG of 01-JUL-2020 12:31,  Sinus rhythm has replaced Atrial fibrillation     OCCULT BLOOD, GASTRIC    Collection Time: 08/09/22  1:01 PM   Result Value Ref Range    OCCULT BLOOD,GASTRIC Positive (A) NEG      pH,GASTRIC 3.0 1.5 - 3.5         Radiologic Studies -   CT ABD PELV W WO CONT    (Results Pending)     CT Results  (Last 48 hours)      None          CXR Results  (Last 48 hours)      None              Medical Decision Making   I am the first provider for this patient. I reviewed the vital signs, available nursing notes, past medical history, past surgical history, family history and social history. Vital Signs-Reviewed the patient's vital signs. Patient Vitals for the past 24 hrs:   Temp Pulse Resp BP SpO2   08/09/22 1200 -- 94 -- 123/65 95 %   08/09/22 1100 -- -- -- (!) 122/92 95 %   08/09/22 1030 -- -- -- 130/85 --   08/09/22 1008 97.6 °F (36.4 °C) (!) 109 22 (!) 140/97 94 %         Provider Notes (Medical Decision Making):   80-year-old female presenting with brown emesis. Concern for gastroparesis, gastritis or GERD, gastroenteritis, peptic ulcer disease, upper GI bleed, electrolyte or metabolic abnormalities, dehydration. She is initially tachycardic, otherwise hemodynamically stable. Abdominal exam benign and nontender, unlikely any other acute intra-abdominal infection or obstruction. She is given Protonix, IV fluids, antiemetics. ED Course:     Initial assessment performed. The patients presenting problems have been discussed, and they are in agreement with the care plan formulated and outlined with them.   I have encouraged them to ask questions as they arise throughout their visit. EKG is performed at 10: 56, shows sinus tachycardia at a rate of 101, , , QTc 474, left axis deviation, no ST segment elevation or depression concerning for ACS, nonspecific intraventricular block, PVC present, this is interpreted as sinus tachycardia with premature beats, left axis deviation and nonspecific block. CBC negative for leukocytosis or anemia, basic metabolic panel with normal renal function, elevated BUN to creatinine ratio concerning for GI bleed. Troponin is not significantly elevated. Gastric occult blood is positive. She has had 1 episode of coffee-ground emesis on arrival, however on reevaluation, her nausea is improved and she has not had further emesis. Per chart review, she had admission in 7/2020 for upper GI bleed with ulcerative esophagitis, oozing of GE junction that required clip placement. On reevaluation, patient is resting comfortably, no longer tachycardic, blood pressure stable. She will be admitted. Critical Care Time:         Disposition:  Admit    PLAN:  1. Current Discharge Medication List        2.    Follow-up Information    None       Return to ED if worse     Diagnosis     Clinical Impression: Acute nausea and vomiting, acute upper GI bleed

## 2022-08-09 NOTE — H&P
Hospitalist Admission Note    NAME: Darrick Ahmadi   :  10/9/1933   MRN:  [id number]     Date/Time:  2022 12:09 PM    Patient PCP: Edwin Wilkinson MD  Cardiology: Dr. Timothy Edmonds    Please note that this dictation was completed with [employer], the computer voice recognition software. Quite often unanticipated grammatical, syntax, homophones, and other interpretive errors are inadvertently transcribed by the computer software. Please disregard these errors. Please excuse any errors that have escaped final proofreading  ______________________________________________________________________   Assessment & Plan:  Intractable nausea and vomiting, POA  Upper GI bleed with coffee-ground emesis, POA  Sinus tachycardia, POA  Chronic gastroparesis  History of upper GI bleed with ulcerative esophagitis 2020  --Hemoglobin 11.9, previously 8.5 in 2020. --Observation status. NPO. CT abdomen bleeding scan to evaluate for active bleeding or bowel obstruction. --gastroccult vomitus pending  --IV Protonix every 12 hours. Consult GI Dr. Stephanie Robles  -- Serial hemoglobin and transfuse as needed for hemoglobin less than 7    Mild hypercalcemia, POA  --gentle IVF and recheck in AM.    Neuropathy  Paroxysmal A. fib, currently in sinus, not on aspirin or anticoagulation  Spinal stenosis  Chronic bilateral foot drop  Remote history of bowel obstruction  Body mass index is 27.34 kg/m².     Code: Discussed with patient with daughter Elijah [facility] present, DNR/DNI  DVT prophylaxis: SCDs  Surrogate decision maker: LAURENA daughter Laurie Phillips    Diet:  npo except sips of water        Subjective:   CHIEF COMPLAINT: Nausea and vomiting 8 times since 11 PM with emesis turning dark brown at 8:30 AM, epigastric pain    HISTORY OF PRESENT ILLNESS:     Darrick Ahmadi is a 80 y.o. female past medical history of upper GI bleed with ulcerative esophagitis, esophageal narrowing, gastroparesis presents from [facility] assisted living with above symptoms. She has been constipated due to being on oxycodone twice daily for chronic neck pain. She asked for MiraLAX last night. Her last bowel movement was last Friday, normal brown color. At 11:30 PM she started vomiting with yellow emesis and had a total 8 episodes. This morning, emesis changed to dark brown, close to black and assisted living facility staff sent to ER for evaluation. In the ER, she has had a few more episodes of dark brown to black emesis. She is also complaining of epigastric tightness. Denies any chest pain, SOB. She also had nerve ablation in her neck for chronic neck pain last week. 4/20/22 EGD Dr. Mariscal Masters  Findings:     Esophagus:   +          There was mild resistance at the UES easily traversed with scope. In the distal esophagus we saw some liquid contents regurgitating into the distal 1/3 of the esophagus. There was some ? Yellow exudate vs. Particulate matter s/p brushing to r/o Candida.  +          Irregular Z line with columnar mucosa extending proximally to 35 cm from incisors with decrease LES tone. +          5 cm Hiatal hernia with some food content seen in sac. S/P Bx of mid and distal esophagus     We elected to not extend procedure beyond the dilation. Placed guidewire into stomach and then advanced 45 FR Savary over wire with some resistance. Stomach:   +          Food in stomach with patent pylorus. Therapies:  See above     Specimen: Specimens were collected as described and send to the laboratory. Complications:   None were encountered during the procedure. EBL: < 10 ml. Recommendations:   -f/u path  -elevate HOB, small meals  -will need to f/u to discuss tx of her gastroparesis    We were asked to admit for work up and evaluation of the above problems.      Past Medical History:   Diagnosis Date    Abnormal x-ray of abdomen 11/13/2020    Bas showed decreased peristalsis and a cervical web Arrhythmia     A fib    Arthritis     Diarrhea 6/6/2016    Esophageal dysphagia 11/13/2020    Foot drop     Gastric ulcer 6/14/2012    Gastroparesis     GERD (gastroesophageal reflux disease)     High cholesterol     Hypertension     Neuropathy     Ovarian cancer ([address] 1986    chemo x 9 mos    Scoliosis     Stroke [facility]) 2002    ? stroke with drop feet, per patient CT scans were negative      Past Surgical History:   Procedure Laterality Date    FLEXIBLE SIGMOIDOSCOPY N/A 6/6/2016    SIGMOIDOSCOPY FLEXIBLE performed by Lisa Dawson MD at [facility] ENDOSCOPY    HX ORTHOPAEDIC      back    HX ORTHOPAEDIC      bilateral shoulder replacements    HX ORTHOPAEDIC      left knee replacement    HX LYDIA AND BSO  1986    (ovarian cancer)    MA EGD TRANSORAL BIOPSY SINGLE/MULTIPLE  1/26/2012         MA EGD TRANSORAL BIOPSY SINGLE/MULTIPLE  6/14/2012         UPPER GI ENDOSCOPY,BIOPSY  11/13/2020         UPPER GI ENDOSCOPY,DIAGNOSIS  10/5/2020         UPPER GI ENDOSCOPY,DILATN W GUIDE  11/13/2020          Social History     Tobacco Use    Smoking status: Never    Smokeless tobacco: Never   Substance Use Topics    Alcohol use: No          Family History   Problem Relation Age of Onset    Heart Disease Father     Cancer Sister         endometrial    Diabetes Sister      No Known Allergies     Prior to Admission medications    Medication Sig Start Date End Date Taking? Authorizing Provider   diclofenac (VOLTAREN) 1 % gel Apply 2 g to affected area four (4) times daily. Provider, Historical   hydroCHLOROthiazide (HYDRODIURIL) 25 mg tablet TAKE 1 TABLET BY MOUTH  DAILY 1/26/22   Leora Brittle, MD   simvastatin (ZOCOR) 40 mg tablet TAKE 1 TABLET BY MOUTH  DAILY 9/9/21   Leora Brittle, MD   furosemide (Lasix) 20 mg tablet Take 20 mg by mouth daily. Provider, Historical   CALCIUM PO Take 1 Tab by mouth daily. Provider, Historical   polyethylene glycol 3350 (MIRALAX PO) Take 1 Each by mouth daily as needed.     Provider, Historical   acetaminophen (Tylenol Extra Strength) 500 mg tablet Take 1,000 mg by mouth every six (6) hours as needed for Pain. Provider, Historical   propafenone (RYTHMOL) 150 mg tablet Take 1 Tab by mouth two (2) times a day. 7/4/20   Roby Downey, DO   RABEprazole (ACIPHEX) 20 mg tablet Take 1 Tab by mouth two (2) times a day. 7/4/20   Roby Downey, DO   potassium chloride SR (KLOR-CON 10) 10 mEq tablet Take 1 Tab by mouth daily. 7/5/20   Roby Downey, DO   magnesium oxide (MAG-OX) 400 mg tablet Take 250 mg by mouth daily. Provider, Historical   famotidine (PEPCID) 20 mg tablet Take 20 mg by mouth daily. Provider, Historical   oxyCODONE-acetaminophen (Percocet) 5-325 mg per tablet Take 1 Tab by mouth every eight (8) hours as needed for Pain. Provider, Historical   melatonin 300 mcg tab Take 1.5 mg by mouth nightly. Patient taking differently: Take 5 mg by mouth nightly. 2 hours before bedtime  OTC 11/8/18   August Mullen MD   gabapentin (NEURONTIN) 800 mg tablet TAKE 1 TAB BY MOUTH FOUR  TIMES DAILY AS NEEDED. Patient taking differently: TAKE 1 TAB BY MOUTH FOUR  TIMES DAILY AS NEEDED FOR PAIN 3/21/18   Alvarez Saelh NP   cholecalciferol (VITAMIN D3) 1,000 unit tablet Take 1,000 Units by mouth daily. Provider, Historical   multivitamin (ONE A DAY) tablet Take 1 Tab by mouth daily. Provider, Historical     REVIEW OF SYSTEMS:  POSITIVE= Bold.   Negative = normal text  General:  fever, chills, sweats, generalized weakness, weight loss/gain, loss of appetite  Eyes:  blurred vision, eye pain, loss of vision, diplopia  Ear Nose and Throat:  rhinorrhea, pharyngitis  Respiratory:   cough, sputum production, SOB, wheezing, LEVINE, pleuritic pain  Cardiology:  chest pain, palpitations, orthopnea, PND, edema, syncope   Gastrointestinal:  abdominal pain, N/V, dysphagia, diarrhea, constipation, bleeding  Genitourinary:  frequency, urgency, dysuria, hematuria, incontinence  Muskuloskeletal :  arthralgia, myalgia  Hematology:  easy bruising, bleeding, lymphadenopathy  Dermatological:  rash, ulceration, pruritis  Endocrine:  hot flashes or polydipsia  Neurological:  headache, dizziness, confusion, focal weakness, paresthesia, memory loss, gait disturbance  Psychological: anxiety, depression, agitation      Objective:   VITALS:    Visit Vitals  BP (!) 122/92   Pulse (!) 109   Temp 97.6 °F (36.4 °C)   Resp 22   Ht 5' (1.524 m)   Wt 63.5 kg (140 lb)   SpO2 95%   BMI 27.34 kg/m²     Temp (24hrs), Av.6 °F (36.4 °C), Min:97.6 °F (36.4 °C), Max:97.6 °F (36.4 °C)    Body mass index is 27.34 kg/m². PHYSICAL EXAM:    General:    Alert, cooperative, no distress, appears stated age.   ~100ml grayish dark brown liquid in emesis bag  HEENT: Atraumatic, anicteric sclerae, pink conjunctivae     No oral ulcers, mucosa dry, throat clear. Hearing intact. Neck:  Supple, symmetrical,  thyroid: non tender  Lungs:   Bibasilar crackles. No Wheezing or Rhonchi. Chest wall:  No tenderness  No Accessory muscle use. Heart:   Regular  rhythm,  3/6 systolic  murmur   No gallop. No edema. Abdomen:   Soft, mild distended and tympanitic, mild epigastric pain. Bowel sounds normal. No masses  Extremities: No cyanosis. No clubbing  Skin:     Not pale Not Jaundiced  No rashes   Psych:  Good insight. Not depressed. Not anxious or agitated. Neurologic: EOMs intact. No facial asymmetry. No aphasia or slurred speech. Symmetrical strength, Alert and oriented X 3.  Bilateral foot drop  Peripheral pulse: Bilateral, DP, 1+  Capillary refill:  normal    IMAGING RESULTS:   []       I have personally reviewed the actual   []     CXR  []     CT scan  CXR:  CT :  EKG:   ________________________________________________________________________  Care Plan discussed with:    Comments   Patient y    Family  y Daughter Ysabel Obrien bedside   RN     Care Manager                    Consultant: ________________________________________________________________________  Prophylaxis:  GI PPI   DVT SCD   ________________________________________________________________________  Recommended Disposition:   Home with Family y   HH/PT/OT/RN y   SNF/LTC    PHILIPPE    ________________________________________________________________________  Code Status:  Full Code    DNR/DNI y   ________________________________________________________________________  TOTAL TIME:  60 minutes      Comments    y Reviewed previous records   >50% of visit spent in counseling and coordination of care  Discussion with patient and/or family and questions answered         ______________________________________________________________________  Blaine Altamirano MD      Procedures: see electronic medical records for all procedures/Xrays and details which were not copied into this note but were reviewed prior to creation of Plan. LAB DATA REVIEWED:    Recent Results (from the past 24 hour(s))   CBC WITH AUTOMATED DIFF    Collection Time: 08/09/22 10:34 AM   Result Value Ref Range    WBC 10.3 3.6 - 11.0 K/uL    RBC 4.53 3.80 - 5.20 M/uL    HGB 11.9 11.5 - 16.0 g/dL    HCT 39.0 35.0 - 47.0 %    MCV 86.1 80.0 - 99.0 FL    MCH 26.3 26.0 - 34.0 PG    MCHC 30.5 30.0 - 36.5 g/dL    RDW 13.6 11.5 - 14.5 %    PLATELET 423 060 - 791 K/uL    MPV 10.2 8.9 - 12.9 FL    NRBC 0.0 0  WBC    ABSOLUTE NRBC 0.00 0.00 - 0.01 K/uL    NEUTROPHILS 88 (H) 32 - 75 %    LYMPHOCYTES 6 (L) 12 - 49 %    MONOCYTES 6 5 - 13 %    EOSINOPHILS 0 0 - 7 %    BASOPHILS 0 0 - 1 %    IMMATURE GRANULOCYTES 0 0.0 - 0.5 %    ABS. NEUTROPHILS 9.1 (H) 1.8 - 8.0 K/UL    ABS. LYMPHOCYTES 0.6 (L) 0.8 - 3.5 K/UL    ABS. MONOCYTES 0.6 0.0 - 1.0 K/UL    ABS. EOSINOPHILS 0.0 0.0 - 0.4 K/UL    ABS. BASOPHILS 0.0 0.0 - 0.1 K/UL    ABS. IMM.  GRANS. 0.0 0.00 - 0.04 K/UL    DF SMEAR SCANNED      RBC COMMENTS NORMOCYTIC, NORMOCHROMIC     METABOLIC PANEL, COMPREHENSIVE    Collection Time: 08/09/22 10:34 AM   Result Value Ref Range    Sodium 140 136 - 145 mmol/L    Potassium 3.9 3.5 - 5.1 mmol/L    Chloride 105 97 - 108 mmol/L    CO2 31 21 - 32 mmol/L    Anion gap 4 (L) 5 - 15 mmol/L    Glucose 146 (H) 65 - 100 mg/dL    BUN 36 (H) 6 - 20 MG/DL    Creatinine 0.92 0.55 - 1.02 MG/DL    BUN/Creatinine ratio 39 (H) 12 - 20      GFR est AA >60 >60 ml/min/1.73m2    GFR est non-AA 58 (L) >60 ml/min/1.73m2    Calcium 10.5 (H) 8.5 - 10.1 MG/DL    Bilirubin, total 0.4 0.2 - 1.0 MG/DL    ALT (SGPT) 18 12 - 78 U/L    AST (SGOT) 17 15 - 37 U/L    Alk.  phosphatase 75 45 - 117 U/L    Protein, total 8.0 6.4 - 8.2 g/dL    Albumin 3.8 3.5 - 5.0 g/dL    Globulin 4.2 (H) 2.0 - 4.0 g/dL    A-G Ratio 0.9 (L) 1.1 - 2.2     LIPASE    Collection Time: 08/09/22 10:34 AM   Result Value Ref Range    Lipase 155 73 - 393 U/L   TYPE & SCREEN    Collection Time: 08/09/22 10:34 AM   Result Value Ref Range    Crossmatch Expiration 08/12/2022,2359     ABO/Rh(D) B POSITIVE     Antibody screen NEG    PROTHROMBIN TIME + INR    Collection Time: 08/09/22 10:34 AM   Result Value Ref Range    INR 1.0 0.9 - 1.1      Prothrombin time 10.5 9.0 - 11.1 sec   TROPONIN-HIGH SENSITIVITY    Collection Time: 08/09/22 10:34 AM   Result Value Ref Range    Troponin-High Sensitivity 10 0 - 51 ng/L   EKG, 12 LEAD, INITIAL    Collection Time: 08/09/22 10:56 AM   Result Value Ref Range    Ventricular Rate 101 BPM    Atrial Rate 101 BPM    P-R Interval 162 ms    QRS Duration 126 ms    Q-T Interval 366 ms    QTC Calculation (Bezet) 474 ms    Calculated R Axis -52 degrees    Calculated T Axis 87 degrees    Diagnosis       Sinus tachycardia with premature atrial complexes with aberrant conduction  Left axis deviation  Nonspecific intraventricular block  Cannot rule out Septal infarct , age undetermined  Possible Lateral infarct , age undetermined  When compared with ECG of 01-JUL-2020 12:31,  Sinus rhythm has replaced Atrial fibrillation

## 2022-08-09 NOTE — PROGRESS NOTES
Report called to Anish Garcia on [facility]. Recent vitals and reason for admission reported. On cardiac monitor. Radha Wilson, primary ED RN to call Anish Garcia at  with additional details as soon as he is available.

## 2022-08-10 ENCOUNTER — APPOINTMENT (OUTPATIENT)
Dept: GENERAL RADIOLOGY | Age: 87
DRG: 379 | End: 2022-08-10
Attending: PHYSICIAN ASSISTANT
Payer: MEDICARE

## 2022-08-10 LAB
ANION GAP SERPL CALC-SCNC: 8 MMOL/L (ref 5–15)
BUN SERPL-MCNC: 35 MG/DL (ref 6–20)
BUN/CREAT SERPL: 50 (ref 12–20)
CALCIUM SERPL-MCNC: 8.8 MG/DL (ref 8.5–10.1)
CHLORIDE SERPL-SCNC: 109 MMOL/L (ref 97–108)
CO2 SERPL-SCNC: 24 MMOL/L (ref 21–32)
CREAT SERPL-MCNC: 0.7 MG/DL (ref 0.55–1.02)
ERYTHROCYTE [DISTWIDTH] IN BLOOD BY AUTOMATED COUNT: 14 % (ref 11.5–14.5)
GLUCOSE SERPL-MCNC: 106 MG/DL (ref 65–100)
HCT VFR BLD AUTO: 35.8 % (ref 35–47)
HGB BLD-MCNC: 10.5 G/DL (ref 11.5–16)
MCH RBC QN AUTO: 26.1 PG (ref 26–34)
MCHC RBC AUTO-ENTMCNC: 29.3 G/DL (ref 30–36.5)
MCV RBC AUTO: 89.1 FL (ref 80–99)
NRBC # BLD: 0 K/UL (ref 0–0.01)
NRBC BLD-RTO: 0 PER 100 WBC
PLATELET # BLD AUTO: 281 K/UL (ref 150–400)
PMV BLD AUTO: 9.9 FL (ref 8.9–12.9)
POTASSIUM SERPL-SCNC: 3.6 MMOL/L (ref 3.5–5.1)
RBC # BLD AUTO: 4.02 M/UL (ref 3.8–5.2)
SODIUM SERPL-SCNC: 141 MMOL/L (ref 136–145)
WBC # BLD AUTO: 11.1 K/UL (ref 3.6–11)

## 2022-08-10 PROCEDURE — 36415 COLL VENOUS BLD VENIPUNCTURE: CPT

## 2022-08-10 PROCEDURE — 80048 BASIC METABOLIC PNL TOTAL CA: CPT

## 2022-08-10 PROCEDURE — G0378 HOSPITAL OBSERVATION PER HR: HCPCS

## 2022-08-10 PROCEDURE — 74018 RADEX ABDOMEN 1 VIEW: CPT

## 2022-08-10 PROCEDURE — 74011250636 HC RX REV CODE- 250/636: Performed by: HOSPITALIST

## 2022-08-10 PROCEDURE — 74011250636 HC RX REV CODE- 250/636: Performed by: PHYSICIAN ASSISTANT

## 2022-08-10 PROCEDURE — 74011250637 HC RX REV CODE- 250/637: Performed by: NURSE PRACTITIONER

## 2022-08-10 PROCEDURE — 74011250637 HC RX REV CODE- 250/637: Performed by: INTERNAL MEDICINE

## 2022-08-10 PROCEDURE — 74011000250 HC RX REV CODE- 250

## 2022-08-10 PROCEDURE — 85027 COMPLETE CBC AUTOMATED: CPT

## 2022-08-10 PROCEDURE — 94760 N-INVAS EAR/PLS OXIMETRY 1: CPT

## 2022-08-10 PROCEDURE — 74011000250 HC RX REV CODE- 250: Performed by: HOSPITALIST

## 2022-08-10 PROCEDURE — 96376 TX/PRO/DX INJ SAME DRUG ADON: CPT

## 2022-08-10 PROCEDURE — 96375 TX/PRO/DX INJ NEW DRUG ADDON: CPT

## 2022-08-10 PROCEDURE — C9113 INJ PANTOPRAZOLE SODIUM, VIA: HCPCS | Performed by: HOSPITALIST

## 2022-08-10 RX ORDER — METOPROLOL TARTRATE 5 MG/5ML
INJECTION INTRAVENOUS
Status: COMPLETED
Start: 2022-08-10 | End: 2022-08-10

## 2022-08-10 RX ORDER — METOPROLOL TARTRATE 5 MG/5ML
2.5 INJECTION INTRAVENOUS ONCE
Status: COMPLETED | OUTPATIENT
Start: 2022-08-10 | End: 2022-08-10

## 2022-08-10 RX ORDER — PROPAFENONE HYDROCHLORIDE 150 MG/1
150 TABLET, FILM COATED ORAL 2 TIMES DAILY
Status: DISCONTINUED | OUTPATIENT
Start: 2022-08-10 | End: 2022-08-11

## 2022-08-10 RX ORDER — GABAPENTIN 100 MG/1
100 CAPSULE ORAL 3 TIMES DAILY
Status: DISCONTINUED | OUTPATIENT
Start: 2022-08-10 | End: 2022-08-12

## 2022-08-10 RX ADMIN — PROPAFENONE HYDROCHLORIDE 150 MG: 150 TABLET, FILM COATED ORAL at 13:45

## 2022-08-10 RX ADMIN — GABAPENTIN 100 MG: 100 CAPSULE ORAL at 13:44

## 2022-08-10 RX ADMIN — GABAPENTIN 100 MG: 100 CAPSULE ORAL at 21:23

## 2022-08-10 RX ADMIN — MORPHINE SULFATE 1 MG: 2 INJECTION, SOLUTION INTRAMUSCULAR; INTRAVENOUS at 00:53

## 2022-08-10 RX ADMIN — SODIUM CHLORIDE 40 MG: 9 INJECTION, SOLUTION INTRAMUSCULAR; INTRAVENOUS; SUBCUTANEOUS at 21:23

## 2022-08-10 RX ADMIN — METOCLOPRAMIDE 5 MG: 5 INJECTION, SOLUTION INTRAMUSCULAR; INTRAVENOUS at 21:23

## 2022-08-10 RX ADMIN — METOCLOPRAMIDE 5 MG: 5 INJECTION, SOLUTION INTRAMUSCULAR; INTRAVENOUS at 08:32

## 2022-08-10 RX ADMIN — AMIODARONE HYDROCHLORIDE 150 MG: 1.5 INJECTION, SOLUTION INTRAVENOUS at 05:55

## 2022-08-10 RX ADMIN — SODIUM CHLORIDE 75 ML/HR: 9 INJECTION, SOLUTION INTRAVENOUS at 15:13

## 2022-08-10 RX ADMIN — SODIUM CHLORIDE 40 MG: 9 INJECTION, SOLUTION INTRAMUSCULAR; INTRAVENOUS; SUBCUTANEOUS at 08:32

## 2022-08-10 RX ADMIN — MORPHINE SULFATE 1 MG: 2 INJECTION, SOLUTION INTRAMUSCULAR; INTRAVENOUS at 21:24

## 2022-08-10 RX ADMIN — METOPROLOL TARTRATE 2.5 MG: 5 INJECTION INTRAVENOUS at 04:38

## 2022-08-10 RX ADMIN — METOCLOPRAMIDE 5 MG: 5 INJECTION, SOLUTION INTRAMUSCULAR; INTRAVENOUS at 15:13

## 2022-08-10 RX ADMIN — MORPHINE SULFATE 1 MG: 2 INJECTION, SOLUTION INTRAMUSCULAR; INTRAVENOUS at 10:29

## 2022-08-10 RX ADMIN — MORPHINE SULFATE 1 MG: 2 INJECTION, SOLUTION INTRAMUSCULAR; INTRAVENOUS at 15:53

## 2022-08-10 NOTE — PROGRESS NOTES
Hospitalist Progress Note    Subjective:   Daily Progress Note: 8/10/2022 6:37 PM    Hospital Course:  Pt admitted for constipation, hematemesis and upper gastric pain. CT abdomen and pelvis showing distended esophagus and small bowel distension. NGT was inserted for bowel decompression, stool was negative for occult blood. Subjective: Pt seen in room, NGT is clamped, no nausea/vomiting. Current Facility-Administered Medications   Medication Dose Route Frequency    propafenone (RYTHMOL) tablet 150 mg  150 mg Per NG tube BID    gabapentin (NEURONTIN) capsule 100 mg  100 mg Oral TID    sodium chloride (NS) flush 5-40 mL  5-40 mL IntraVENous Q8H    sodium chloride (NS) flush 5-40 mL  5-40 mL IntraVENous PRN    acetaminophen (TYLENOL) tablet 650 mg  650 mg Oral Q6H PRN    Or    acetaminophen (TYLENOL) suppository 650 mg  650 mg Rectal Q6H PRN    polyethylene glycol (MIRALAX) packet 17 g  17 g Oral DAILY PRN    ondansetron (ZOFRAN ODT) tablet 4 mg  4 mg Oral Q8H PRN    Or    ondansetron (ZOFRAN) injection 4 mg  4 mg IntraVENous Q6H PRN    0.9% sodium chloride infusion  75 mL/hr IntraVENous CONTINUOUS    pantoprazole (PROTONIX) 40 mg in 0.9% sodium chloride 10 mL injection  40 mg IntraVENous Q12H    morphine injection 1 mg  1 mg IntraVENous Q4H PRN    metoclopramide HCl (REGLAN) injection 5 mg  5 mg IntraVENous TID        Review of Systems:    Review of Systems   Constitutional:  Negative for fever and weight loss. Respiratory:  Negative for cough and shortness of breath. Gastrointestinal:  Negative for heartburn, nausea and vomiting. Genitourinary:  Negative for dysuria and urgency. Neurological:  Negative for dizziness and headaches.       Objective:     Visit Vitals  BP (!) 125/58   Pulse 68   Temp 97.8 °F (36.6 °C)   Resp 16   Ht 5' (1.524 m)   Wt 63.5 kg (140 lb)   SpO2 98%   BMI 27.34 kg/m²      O2 Device: None (Room air)    Temp (24hrs), Av °F (36.7 °C), Min:97.7 °F (36.5 °C), Max:98.6 °F (37 °C)      No intake/output data recorded. 08/08 1901 - 08/10 0700  In: 900 [I.V.:900]  Out: -     PHYSICAL EXAM:    Physical Exam  Constitutional:       General: She is not in acute distress. Cardiovascular:      Rate and Rhythm: Normal rate. Rhythm irregular. Pulmonary:      Effort: Pulmonary effort is normal.      Breath sounds: Normal breath sounds. Abdominal:      Palpations: Abdomen is soft. Comments: NGT clamped, hypo bowel sounds   Musculoskeletal:         General: Normal range of motion. Skin:     General: Skin is warm and dry. Neurological:      Mental Status: She is oriented to person, place, and time. Psychiatric:         Mood and Affect: Mood normal.         Behavior: Behavior normal.          Data Review    Recent Results (from the past 24 hour(s))   METABOLIC PANEL, BASIC    Collection Time: 08/10/22  4:46 AM   Result Value Ref Range    Sodium 141 136 - 145 mmol/L    Potassium 3.6 3.5 - 5.1 mmol/L    Chloride 109 (H) 97 - 108 mmol/L    CO2 24 21 - 32 mmol/L    Anion gap 8 5 - 15 mmol/L    Glucose 106 (H) 65 - 100 mg/dL    BUN 35 (H) 6 - 20 MG/DL    Creatinine 0.70 0.55 - 1.02 MG/DL    BUN/Creatinine ratio 50 (H) 12 - 20      GFR est AA >60 >60 ml/min/1.73m2    GFR est non-AA >60 >60 ml/min/1.73m2    Calcium 8.8 8.5 - 10.1 MG/DL   CBC W/O DIFF    Collection Time: 08/10/22  4:46 AM   Result Value Ref Range    WBC 11.1 (H) 3.6 - 11.0 K/uL    RBC 4.02 3.80 - 5.20 M/uL    HGB 10.5 (L) 11.5 - 16.0 g/dL    HCT 35.8 35.0 - 47.0 %    MCV 89.1 80.0 - 99.0 FL    MCH 26.1 26.0 - 34.0 PG    MCHC 29.3 (L) 30.0 - 36.5 g/dL    RDW 14.0 11.5 - 14.5 %    PLATELET 484 143 - 092 K/uL    MPV 9.9 8.9 - 12.9 FL    NRBC 0.0 0  WBC    ABSOLUTE NRBC 0.00 0.00 - 0.01 K/uL       XR ABD (KUB)   Final Result   Dilated fluid-filled small bowel loops on recent CT are not   demonstrated radiographically.           CT ABD PELV W WO CONT   Final Result   Source of hemorrhage is not identified   Markedly distended esophagus and stomach. Mildly distended proximal small bowel,   suggesting an adhesion. Incidental constipation, dilated cardiomyopathy, coronary artery disease             Active Problems:    Epigastric pain (8/9/2022)      Coffee ground emesis (8/9/2022)        Assessment/Plan:   Upper GI bleed- HGB is stable, GI is following, no further hematemesis, no EGD planned at this time, repeat cbc in am,PPI    2. Constipation- NGT is clamped, KUB showing mild distension in small bowel. On reglan IV, clear liquids    3.  Hx of atrial fibrillation- cardiology following, on rythmol        DVT Prophylaxis:  Code Status:  DNR  POA: NOK daughter Jarrett Mathias     BFJS JXGP discussed with:   _____patient, staff nurse, IVETH, family__________________________________________________________    Juan M Sharp NP

## 2022-08-10 NOTE — CONSULTS
Cardiology Consult Note    CC: N/V; gastroparesis    Beata Christina MD  Reason for consult:  PAfib  Requesting MD:  Dr. Viky Choe. Admit Date: 8/9/2022   Today's Date: 8/10/2022   Cardiologist:  Dr Villa Mojica. Cardiac Assessment/Plan:   1. Paroxysmal atrial fibrillation with RVR diagnosed in 11/2018, minimal to no symptoms. Back in sinus during that hospital stay. Recurrent but transient RVR during 7/2020 admission. Anticoagulation stopped at that time due to GIB. Will continue propafenone and avoid anticoagulation. Patient and family understand risk. 2. Chronic LBBB. 3. PVC's. 4. Hyperlipidemia. 5. Says she is not diabetic. 6. Hypercholesterolemia. 7. CKD stage 3.  8. Anemia NOS. 9. Scoliosis/arthritis s/p extensive thoracolumbar fusion 2002, now with broken hardware, s/p LUCY 12/2013 (effective), neuropathy since knee surgery 2009. 10. History of ovarian cancer s/p chemo. 11. GERD/Lewis's esophagus, gastroparesis. 12. Back pain. Fractured thoracolumbar fusion rods, stable. Protruding pedicle screws, stable. Neuroforaminal stenoses. 13. Walks with a walker. Peripheral neuropathy in her legs. 14. SBO 3/2019, resolved without surgery    Admitted with N/V, ?partial SBO; PAfib noted on tele; sinus now; NG in.  Current cardiac meds: none. Rec: Restart propafenone; No AC as noted below. Hospital Problem List:  Active Problems:    Epigastric pain (8/9/2022)      Coffee ground emesis (8/9/2022)       ____________________________________________________________________  [facility] is a 80 y.o. female presents with Coffee ground emesis [K92.0]  Epigastric pain [R10.13]. As noted in H&P: \"80 y.o. female past medical history of upper GI bleed with ulcerative esophagitis, esophageal narrowing, gastroparesis presents from [facility] assisted living with above symptoms. She has been constipated due to being on oxycodone twice daily for chronic neck pain.   She asked for MiraLAX last night.  Her last bowel movement was last Friday, normal brown color. At 11:30 PM she started vomiting with yellow emesis and had a total 8 episodes. This morning, emesis changed to dark brown, close to black and assisted living facility staff sent to ER for evaluation. In the ER, she has had a few more episodes of dark brown to black emesis. She is also complaining of epigastric tightness. Denies any chest pain, SOB. She also had nerve ablation in her neck for chronic neck pain last week. 4/20/22 EGD Dr. Leif Melton  Findings:  Esophagus: Mild resistance at the UES easily traversed with scope. In the distal esophagus we saw some liquid contents regurgitating into the distal 1/3 of the esophagus. There was some ? Yellow exudate vs. Particulate matter s/p brushing to r/o Candida.  +          Irregular Z line with columnar mucosa extending proximally to 35 cm from incisors with decrease LES tone. +          5 cm Hiatal hernia with some food content seen in sac. S/P Bx of mid and distal esophagus  We elected to not extend procedure beyond the dilation. Placed guidewire into stomach and then advanced 45 FR Savary over wire with some resistance. Stomach: +          Food in stomach with patent pylorus. Recommendations: -f/u path  -elevate HOB, small meals -will need to f/u to discuss tx of her gastroparesis\"  ______________________________________________________________________    The patient reports no CP, dyspnea, palps. No PND, orthopnea, palpitations, pre-syncope, syncope, peripheral edema. No current complaints. ECG: Sinus; LBBB  Tele: Sinus then PAFIb; sinus back. CXR: none  Abd CT: \"Source of hemorrhage is not identified; Markedly distended esophagus and stomach. Mildly distended proximal small bowel, suggesting an adhesion. Incidental constipation, dilated cardiomyopathy, coronary artery disease\"    Notable labs: WBC 11; Hg 10.5; K 3.6; Cr 0.7;  Nl trop.  ________________________________________  Notable prior cardiac history:  @ [facility] 2/23/22  HISTORY OF PRESENT ILLNESS:   1. Paroxysmal atrial fibrillation with RVR diagnosed in 11/2018, minimal to no symptoms. Back in sinus during that hospital stay. Recurrent but transient RVR during 7/2020 admission. Anticoagulation stopped at that time due to GIB. Will continue propafenone and avoid anticoagulation. Patient and family understand risk. 2. Chronic LBBB. 3. PVC's. 4. Hyperlipidemia. 5. Says she is not diabetic. 6. Hypercholesterolemia. 7. CKD stage 3.  8. Anemia NOS. 9. Scoliosis/arthritis s/p extensive thoracolumbar fusion 2002, now with broken hardware, s/p LUCY 12/2013 (effective), neuropathy since knee surgery 2009. 10. History of ovarian cancer s/p chemo. 11. GERD/Lewis's esophagus, gastroparesis. 12. Back pain. Fractured thoracolumbar fusion rods, stable. Protruding pedicle screws, stable. Neuroforaminal stenoses. 13. Walks with a walker. Peripheral neuropathy in her legs. She had SBO 3/2019, now resolved without surgery. 2/2022 EP update:  Denies syncope, dizziness, palpitations. No chest, jaw, neck, shoulder, or arm pain. No orthopnea, PND, or worsening pedal edema. Patient denies claudication. There is no discoloration or ulceration of the lower extremities. The patient has had no TIA or stroke-like symptoms. IMPRESSION AND PLAN  01. Preprocedural cardiovascular examination (Z01.810):  Overall assessment suggests that the patient's risk of periprocedural cardiac complications should be low. 02. Other dysphagia (R13.19):  Sees Dr. Roseann Sharp. 03. Paroxysmal atrial fibrillation (I48.0): The patient is maintaining sinus rhythm. We will continue the current therapy. Patient has a CHADSVASc score of 4 or greater. Patient HAS-BLED score is 2, indicating  risk of major bleeding as ~2/100 patient years. She had recurrent GIB 7/2020. Anticoagulation stopped.   Per hospital note, \"I would NOT use anticoagulation or antiplatelet therapy. Very reluctant to do it in 2019, gave it a try later, now know that it is not something to do going forward. Not a good candidate for a Watchman device (JOHN occlusion) as it requires 6 weeks of anticoagulation upfront. Not a good candidate for Afib ablation as it requires 2-3 months of anticoagulation post-procedure. \"  ECG done I have made no changes to the present regimen. David Dodson 04. LBBB (left bundle branch block) (I44.7): The patient has had no syncopal episodes. No clinical bradyarrhythmias are noted. 05. Essential hypertension (I10): Adequately controlled. We will continue the current therapy. I have made no changes to the present regimen. 06. Mixed hyperlipidemia (E78.2): On chronic medical therapy. labs per PCP.   07. Venous insufficiency of both lower extremities (I87.2): This condition is stable. I have recommended a salt restricted diet, compliance with medications and regular compression stocking use. 08. Body mass index [BMI] 27.0-27.9, adult (R88.39): The patient was instructed on AHA diet and regular exercise. ORDERS:  1 ECG done    2 Dietary management education, guidance, and counseling    3 Return office visit with Claudia Munoz MD in 1 Year. 4 The patient was instructed on AHA diet and regular exercise. 2/23/22 MEDICATION LIST  Medication Sig Desc   AcipHex 20 mg tablet,delayed release take 1 tablet by oral route  every day swallowing whole. Do not crush, chew and/or divide. cholecalciferol (vitamin D3) 25 mcg (1,000 unit) tablet Take 1,000 Units by mouth daily. famotidine 20 mg tablet Take 20 mg by mouth daily. gabapentin 800 mg tablet take 1 tablet by oral route 4 times every day   hydrochlorothiazide 25 mg tablet take 1 tablet by oral route  every day   lisinopril 10 mg tablet take 1 tablet by oral route  every day   magnesium oxide 400 mg (241.3 mg magnesium) tablet Take 400 mg by mouth daily.    melatonin 3 mg tablet    Miralax 17 gram oral powder packet take 1 packet by oral route  every day mixed with 8 oz. water, juice, soda, coffee or tea   multivitamin tablet Take 1 Tab by mouth daily. potassium chloride ER 10 mEq tablet,extended release Take 1 Tab by mouth daily. propafenone 150 mg tablet Take 1 Tab by mouth two (2) times a day. simvastatin 40 mg tablet take 1 tablet by oral route  every day in the evening   Tylenol 325 mg capsule  as needed         __________________________________________________  CARDIAC HISTORY  ARRHYTHMIA:  1  -      RISK FACTORS:  1 Diabetes   2 Hypertension   3 Dyslipidemia       CARDIOVASCULAR PROCEDURES  Procedure Date Results   EKG 2020 Sinus Rhythm, LBBB       ACTIVE ALLERGIES:  Ingredient Reaction Comment   NO KNOWN ALLERGIES       None    PROBLEM LIST:  Problem Description Chronic   PAF Y   PAF N       PAST MEDICAL/SURGICAL HISTORY  (Detailed)    Disease/disorder Onset Date Surgical History Date Comments   Arthritis       GERD       Hypercholesterolemia       Osteoporosis       PAF         Family History  (Detailed)  Relationship Family Member Name  Age at Death Condition Onset Age Cause of Death       No family history of Coronary artery disease, premature  N       No family history of Coronary artery disease  N     SOCIAL HISTORY  (Detailed)  Preferred language is [place]. Smoking status: Never smoker. ALCOHOL  There is no history of alcohol use. LIFESTYLE  Never exercises.       ______________________________________________________________________  Patient Active Problem List    Diagnosis Date Noted    Epigastric pain 2022    Coffee ground emesis 2022    Esophageal dysphagia 2020    Abnormal x-ray of abdomen 2020    History of gastric ulcer 10/05/2020    Upper GI bleed 2020    Atrial fibrillation with rapid ventricular response ([address] 2020    Small bowel obstruction ([address] 2019    SBO (small bowel obstruction) [address] [address] 02/27/2019    Back pain 11/01/2018    Ovarian cancer ([facility] [address] 07/19/2017    Insomnia 07/19/2017    Essential hypertension 07/19/2017    Hypomagnesemia 07/19/2017    Hypokalemia 07/19/2017    Mixed hyperlipidemia 07/19/2017    Thoracogenic scoliosis 07/19/2017    Foot drop, bilateral 07/19/2017    Age-related cataract of both eyes 07/19/2017    Previous back surgery 07/19/2017    H/O total knee replacement 07/19/2017    History of replacement of both shoulder joints 07/19/2017    Neuropathy 07/19/2017    Lewis's esophagus without dysplasia 06/06/2016    Lewis's esophagus 06/06/2016    Diarrhea 06/06/2016    Gastric ulcer 06/14/2012    GERD (gastroesophageal reflux disease) 01/26/2012        Past Medical History:   Diagnosis Date    Abnormal x-ray of abdomen 11/13/2020    Bas showed decreased peristalsis and a cervical web      Arrhythmia     A fib    Arthritis     Diarrhea 6/6/2016    Esophageal dysphagia 11/13/2020    Foot drop     Gastric ulcer 6/14/2012    Gastroparesis     GERD (gastroesophageal reflux disease)     High cholesterol     Hypertension     Neuropathy     Ovarian cancer ([facility] [address] 1986    chemo x 9 mos    Scoliosis     Stroke ([facility] [address] 2002    ? stroke with drop feet, per patient CT scans were negative      Past Surgical History:   Procedure Laterality Date    FLEXIBLE SIGMOIDOSCOPY N/A 6/6/2016    SIGMOIDOSCOPY FLEXIBLE performed by Elia Joshua MD at [facility] ENDOSCOPY    HX ORTHOPAEDIC      back    HX ORTHOPAEDIC      bilateral shoulder replacements    HX ORTHOPAEDIC      left knee replacement    HX LYDIA AND BSO  1986    (ovarian cancer)    CA EGD TRANSORAL BIOPSY SINGLE/MULTIPLE  1/26/2012         CA EGD TRANSORAL BIOPSY SINGLE/MULTIPLE  6/14/2012         UPPER GI ENDOSCOPY,BIOPSY  11/13/2020         UPPER GI ENDOSCOPY,DIAGNOSIS  10/5/2020         UPPER GI ENDOSCOPY,DILATN W GUIDE  11/13/2020          No Known Allergies   Family History   Problem Relation Age of Onset    Heart Disease Father Cancer Sister         endometrial    Diabetes Sister       Social History     Socioeconomic History    Marital status:      Spouse name: Not on file    Number of children: Not on file    Years of education: Not on file    Highest education level: Not on file   Occupational History    Not on file   Tobacco Use    Smoking status: Never    Smokeless tobacco: Never   Vaping Use    Vaping Use: Never used   Substance and Sexual Activity    Alcohol use: No    Drug use: No    Sexual activity: Not Currently   Other Topics Concern    Not on file   Social History Narrative    Not on file     Social Determinants of Health     Financial Resource Strain: Not on file   Food Insecurity: Not on file   Transportation Needs: Not on file   Physical Activity: Not on file   Stress: Not on file   Social Connections: Not on file   Intimate Partner Violence: Not on file   Housing Stability: Not on file     Current Facility-Administered Medications   Medication Dose Route Frequency    sodium chloride (NS) flush 5-40 mL  5-40 mL IntraVENous Q8H    sodium chloride (NS) flush 5-40 mL  5-40 mL IntraVENous PRN    acetaminophen (TYLENOL) tablet 650 mg  650 mg Oral Q6H PRN    Or    acetaminophen (TYLENOL) suppository 650 mg  650 mg Rectal Q6H PRN    polyethylene glycol (MIRALAX) packet 17 g  17 g Oral DAILY PRN    ondansetron (ZOFRAN ODT) tablet 4 mg  4 mg Oral Q8H PRN    Or    ondansetron (ZOFRAN) injection 4 mg  4 mg IntraVENous Q6H PRN    0.9% sodium chloride infusion  75 mL/hr IntraVENous CONTINUOUS    pantoprazole (PROTONIX) 40 mg in 0.9% sodium chloride 10 mL injection  40 mg IntraVENous Q12H    morphine injection 1 mg  1 mg IntraVENous Q4H PRN    metoclopramide HCl (REGLAN) injection 5 mg  5 mg IntraVENous TID        No reported FHx of early CAD or SCD    Prior to Admission Medications:  Prior to Admission medications    Medication Sig Start Date End Date Taking?  Authorizing Provider   diclofenac (VOLTAREN) 1 % gel Apply 2 g to affected area four (4) times daily. Provider, Historical   hydroCHLOROthiazide (HYDRODIURIL) 25 mg tablet TAKE 1 TABLET BY MOUTH  DAILY 1/26/22   Moo Rouse MD   simvastatin (ZOCOR) 40 mg tablet TAKE 1 TABLET BY MOUTH  DAILY 9/9/21   Moo Rouse MD   furosemide (Lasix) 20 mg tablet Take 20 mg by mouth daily. Provider, Historical   CALCIUM PO Take 1 Tab by mouth daily. Provider, Historical   polyethylene glycol 3350 (MIRALAX PO) Take 1 Each by mouth daily as needed. Provider, Historical   acetaminophen (Tylenol Extra Strength) 500 mg tablet Take 1,000 mg by mouth every six (6) hours as needed for Pain. Provider, Historical   propafenone (RYTHMOL) 150 mg tablet Take 1 Tab by mouth two (2) times a day. 7/4/20   [place], Sandrine Ground, DO   RABEprazole (ACIPHEX) 20 mg tablet Take 1 Tab by mouth two (2) times a day. 7/4/20   [place], Sandrine Ground, DO   potassium chloride SR (KLOR-CON 10) 10 mEq tablet Take 1 Tab by mouth daily. 7/5/20   [place], Sandrine Ground, DO   magnesium oxide (MAG-OX) 400 mg tablet Take 250 mg by mouth daily. Provider, Historical   famotidine (PEPCID) 20 mg tablet Take 20 mg by mouth daily. Provider, Historical   oxyCODONE-acetaminophen (Percocet) 5-325 mg per tablet Take 1 Tab by mouth every eight (8) hours as needed for Pain. Provider, Historical   melatonin 300 mcg tab Take 1.5 mg by mouth nightly. Patient taking differently: Take 5 mg by mouth nightly. 2 hours before bedtime  OTC 11/8/18   Duane Mesa MD   gabapentin (NEURONTIN) 800 mg tablet TAKE 1 TAB BY MOUTH FOUR  TIMES DAILY AS NEEDED. Patient taking differently: TAKE 1 TAB BY MOUTH FOUR  TIMES DAILY AS NEEDED FOR PAIN 3/21/18   Sara Saleh NP   cholecalciferol (VITAMIN D3) 1,000 unit tablet Take 1,000 Units by mouth daily. Provider, Historical   multivitamin (ONE A DAY) tablet Take 1 Tab by mouth daily.     Provider, Historical        Review of Symptoms: As noted in H&P:  \"REVIEW OF SYSTEMS:  POSITIVE= Bold. Negative = normal text  General:  fever, chills, sweats, generalized weakness, weight loss/gain, loss of appetite  Eyes:  blurred vision, eye pain, loss of vision, diplopia  Ear Nose and Throat:  rhinorrhea, pharyngitis  Respiratory:   cough, sputum production, SOB, wheezing, LEVINE, pleuritic pain  Cardiology:  chest pain, palpitations, orthopnea, PND, edema, syncope  Gastrointestinal:  abdominal pain, N/V, dysphagia, diarrhea, constipation, bleeding  Genitourinary:  frequency, urgency, dysuria, hematuria, incontinence  Muskuloskeletal :  arthralgia, myalgia  Hematology:  easy bruising, bleeding, lymphadenopathy  Dermatological:  rash, ulceration, pruritis  Endocrine:  hot flashes or polydipsia  Neurological:  headache, dizziness, confusion, focal weakness, paresthesia, memory loss, gait disturbance  Psychological: anxiety, depression, agitation\". 24 hr VS reviewed, overall VSSAF  Temp (24hrs), Av.1 °F (36.7 °C), Min:97.7 °F (36.5 °C), Max:98.6 °F (37 °C)    Patient Vitals for the past 8 hrs:   Pulse   08/10/22 0800 69   08/10/22 0757 69   08/10/22 0500 (!) 141   08/10/22 0445 (!) 122   08/10/22 0438 (!) 145   08/10/22 0421 (!) 140   08/10/22 0342 90     Patient Vitals for the past 8 hrs:   Resp   08/10/22 0757 17   08/10/22 0438 18   08/10/22 0342 18     Patient Vitals for the past 8 hrs:   BP   08/10/22 0757 94/60   08/10/22 0500 108/69   08/10/22 0445 114/69   08/10/22 0438 126/70   08/10/22 0421 134/87   08/10/22 0342 123/60       Intake/Output Summary (Last 24 hours) at 8/10/2022 1042  Last data filed at 8/10/2022 0630  Gross per 24 hour   Intake 900 ml   Output --   Net 900 ml         Physical Exam (complete single organ system exam)    Cons: The patient is no distress. Appears stated age. NG in.  HEENT: Normal conjunctivae and palate. No xanthelasma. Neck: Flat JVP without appreciable HJR. Resp: Normal respiratory effort with clear lungs bilaterally. CV: Regular rate and rhythm. PMI not palpated. Normal S1,S2  No gallop or rubs appreciated. Soft apical murmur appreciated. Intact carotid upstroke bilaterally without appreciated bruits. Abdominal aorta not palpated; no abdominal bruit noted. Intact pedal pulses. Trivial peripheral edema. GI: No abd mass noted, soft; no organomegaly noted. Bowel sounds present. Muscular:  No significant kyphosis. Strength WNL for age. Ext: No cyanosis, clubbing, or stigmata of peripheral embolization. Derm: No ulcers or stasis dermatitis of lower extremities. Neuro: Alert and oriented x 3;  Grossly non-focal. Normal mood and affect.      Labs:   Recent Results (from the past 24 hour(s))   EKG, 12 LEAD, INITIAL    Collection Time: 08/09/22 10:56 AM   Result Value Ref Range    Ventricular Rate 101 BPM    Atrial Rate 101 BPM    P-R Interval 162 ms    QRS Duration 126 ms    Q-T Interval 366 ms    QTC Calculation (Bezet) 474 ms    Calculated R Axis -52 degrees    Calculated T Axis 87 degrees    Diagnosis       Sinus tachycardia with premature atrial complexes with aberrant conduction  Left axis deviation  Nonspecific intraventricular block    Confirmed by Kay Silvestre ([id number]) on 8/9/2022 4:07:43 PM     OCCULT BLOOD, GASTRIC    Collection Time: 08/09/22  1:01 PM   Result Value Ref Range    OCCULT BLOOD,GASTRIC Positive (A) NEG      pH,GASTRIC 3.0 1.5 - 3.5     METABOLIC PANEL, BASIC    Collection Time: 08/10/22  4:46 AM   Result Value Ref Range    Sodium 141 136 - 145 mmol/L    Potassium 3.6 3.5 - 5.1 mmol/L    Chloride 109 (H) 97 - 108 mmol/L    CO2 24 21 - 32 mmol/L    Anion gap 8 5 - 15 mmol/L    Glucose 106 (H) 65 - 100 mg/dL    BUN 35 (H) 6 - 20 MG/DL    Creatinine 0.70 0.55 - 1.02 MG/DL    BUN/Creatinine ratio 50 (H) 12 - 20      GFR est AA >60 >60 ml/min/1.73m2    GFR est non-AA >60 >60 ml/min/1.73m2    Calcium 8.8 8.5 - 10.1 MG/DL   CBC W/O DIFF    Collection Time: 08/10/22  4:46 AM   Result Value Ref Range WBC 11.1 (H) 3.6 - 11.0 K/uL    RBC 4.02 3.80 - 5.20 M/uL    HGB 10.5 (L) 11.5 - 16.0 g/dL    HCT 35.8 35.0 - 47.0 %    MCV 89.1 80.0 - 99.0 FL    MCH 26.1 26.0 - 34.0 PG    MCHC 29.3 (L) 30.0 - 36.5 g/dL    RDW 14.0 11.5 - 14.5 %    PLATELET 090 917 - 719 K/uL    MPV 9.9 8.9 - 12.9 FL    NRBC 0.0 0  WBC    ABSOLUTE NRBC 0.00 0.00 - 0.01 K/uL     No results for input(s): CPK, CKMB, CKNDX, TROIQ in the last 72 hours.     Recent Labs     08/10/22  0446 08/09/22  1034    140   K 3.6 3.9   * 105   CO2 24 31   BUN 35* 36*   CREA 0.70 0.92   GFRAA >60 >60   * 146*   CA 8.8 10.5*   ALB  --  3.8   WBC 11.1* 10.3   HGB 10.5* 11.9   HCT 35.8 39.0    324       Chris Valdovinos MD

## 2022-08-10 NOTE — PROGRESS NOTES
Problem: Pressure Injury - Risk of  Goal: *Prevention of pressure injury  Description: Document Cristino Scale and appropriate interventions in the flowsheet. Outcome: Progressing Towards Goal  Note: Pressure Injury Interventions:  Sensory Interventions: Assess changes in LOC    Moisture Interventions: Absorbent underpads, Check for incontinence Q2 hours and as needed, Limit adult briefs, Internal/External urinary devices    Activity Interventions: Pressure redistribution bed/mattress(bed type)    Mobility Interventions: Pressure redistribution bed/mattress (bed type)    Nutrition Interventions: Document food/fluid/supplement intake, Offer support with meals,snacks and hydration                     Problem: Patient Education: Go to Patient Education Activity  Goal: Patient/Family Education  Outcome: Progressing Towards Goal     Problem: Falls - Risk of  Goal: *Absence of Falls  Description: Document Babs Fall Risk and appropriate interventions in the flowsheet.   Outcome: Progressing Towards Goal  Note: Fall Risk Interventions:  Mobility Interventions: Patient to call before getting OOB         Medication Interventions: Patient to call before getting OOB    Elimination Interventions: Call light in reach, Patient to call for help with toileting needs              Problem: Patient Education: Go to Patient Education Activity  Goal: Patient/Family Education  Outcome: Progressing Towards Goal

## 2022-08-10 NOTE — PROGRESS NOTES
RAPID RESPONSE TEAM    Overhead rapid response paged to room # 1135/01  at  0421    Reason for rapid response:  Elevated HR    Initial assessment: Upon arrival, patient is alert and oriented, denies SOB or CP, HR 140s- Afib RVR, BP stable 126/70.      Patient Vitals for the past 4 hrs:   Temp Pulse Resp BP SpO2   08/10/22 0500 -- (!) 141 -- 108/69 --   08/10/22 0445 -- (!) 122 -- 114/69 --   08/10/22 0438 97.8 °F (36.6 °C) (!) 145 18 126/70 90 %   08/10/22 0342 97.8 °F (36.6 °C) 90 18 123/60 90 %          Interventions:     - EKG- Afib RVR confirmed by Dr. Tai Trinh  - Metoprolol 2.5 mg IV  - NS bolus       Outcome: pt is stable, remains in Afib HR 120s- tele-hospitalist notified, Primary RN to await orders, pt to remain in room # 1135/01     Please call with any questions or concerns    Don Enriquez RN  Rapid Response Team  Ext [phone]     Recent Results (from the past 8 hour(s))   CBC W/O DIFF    Collection Time: 08/10/22  4:46 AM   Result Value Ref Range    WBC 11.1 (H) 3.6 - 11.0 K/uL    RBC 4.02 3.80 - 5.20 M/uL    HGB 10.5 (L) 11.5 - 16.0 g/dL    HCT 35.8 35.0 - 47.0 %    MCV 89.1 80.0 - 99.0 FL    MCH 26.1 26.0 - 34.0 PG    MCHC 29.3 (L) 30.0 - 36.5 g/dL    RDW 14.0 11.5 - 14.5 %    PLATELET 871 786 - 452 K/uL    MPV 9.9 8.9 - 12.9 FL    NRBC 0.0 0  WBC    ABSOLUTE NRBC 0.00 0.00 - 0.01 K/uL

## 2022-08-10 NOTE — PROGRESS NOTES
Per RN pt has Afib with RVR 140s   RRT was called and  Lopressors iv and fluid Boluse given   But still  Afib with RVR s 140s  /67  Of note pt has h/o  [facility]  Currently admitted for GI Bleed    Plan: Will give amio bolus  Rate  controlled meds as tolrated  IVF  PRN  Card consulted for am  Cont.  To monitor

## 2022-08-10 NOTE — PROGRESS NOTES
Physical Therapy Note    PT orders received and acknowledged. Chart reviewed. Patient reports a hx of bilateral drop foot for which she typically wears bilateral AFOS. She also ambulates with a 4 wheeled walker. Patient does not have her braces here but she reports she can ambulate without them short distances. Her daughter is to bring HER RW which patient prefers to use. She declines mobility without her RW    Will follow up with PT evaluation tomorrow.

## 2022-08-10 NOTE — PROGRESS NOTES
Gastroenterology Progress Note    8/10/2022    Admit Date: 8/9/2022    Subjective: Follow up for: N/V, hx of HH and gastroparesis, ? partial SBO     Has had no N/V.NGT with minimal aspirate. Hgb is stable. KUB yesterday with no SBO picture. Not passing flatus or has had a BM though. At this time, the patient is resting comfortably. The patient has no new complaints today. There is no bleeding. Current Facility-Administered Medications   Medication Dose Route Frequency    sodium chloride (NS) flush 5-40 mL  5-40 mL IntraVENous Q8H    sodium chloride (NS) flush 5-40 mL  5-40 mL IntraVENous PRN    acetaminophen (TYLENOL) tablet 650 mg  650 mg Oral Q6H PRN    Or    acetaminophen (TYLENOL) suppository 650 mg  650 mg Rectal Q6H PRN    polyethylene glycol (MIRALAX) packet 17 g  17 g Oral DAILY PRN    ondansetron (ZOFRAN ODT) tablet 4 mg  4 mg Oral Q8H PRN    Or    ondansetron (ZOFRAN) injection 4 mg  4 mg IntraVENous Q6H PRN    0.9% sodium chloride infusion  75 mL/hr IntraVENous CONTINUOUS    pantoprazole (PROTONIX) 40 mg in 0.9% sodium chloride 10 mL injection  40 mg IntraVENous Q12H    morphine injection 1 mg  1 mg IntraVENous Q4H PRN    metoclopramide HCl (REGLAN) injection 5 mg  5 mg IntraVENous TID        Objective:     Blood pressure 94/60, pulse 69, temperature 97.7 °F (36.5 °C), resp. rate 17, height 5' (1.524 m), weight 63.5 kg (140 lb), SpO2 95 %. No intake/output data recorded.     08/08 1901 - 08/10 0700  In: 900 [I.V.:900]  Out: -         Physical Examination:       General:AAO x 3, NGT in  HEENT:  EOMI, MMM  Chest:  CTA  Heart: S1, S2, RRR  GI: Soft, NT, Extremities: No edema or cyanosis  CNS: CNs grossly normal.    Data Review    Recent Results (from the past 24 hour(s))   CBC WITH AUTOMATED DIFF    Collection Time: 08/09/22 10:34 AM   Result Value Ref Range    WBC 10.3 3.6 - 11.0 K/uL    RBC 4.53 3.80 - 5.20 M/uL    HGB 11.9 11.5 - 16.0 g/dL    HCT 39.0 35.0 - 47.0 %    MCV 86.1 80.0 - 99.0 FL    MCH 26.3 26.0 - 34.0 PG    MCHC 30.5 30.0 - 36.5 g/dL    RDW 13.6 11.5 - 14.5 %    PLATELET 751 598 - 532 K/uL    MPV 10.2 8.9 - 12.9 FL    NRBC 0.0 0  WBC    ABSOLUTE NRBC 0.00 0.00 - 0.01 K/uL    NEUTROPHILS 88 (H) 32 - 75 %    LYMPHOCYTES 6 (L) 12 - 49 %    MONOCYTES 6 5 - 13 %    EOSINOPHILS 0 0 - 7 %    BASOPHILS 0 0 - 1 %    IMMATURE GRANULOCYTES 0 0.0 - 0.5 %    ABS. NEUTROPHILS 9.1 (H) 1.8 - 8.0 K/UL    ABS. LYMPHOCYTES 0.6 (L) 0.8 - 3.5 K/UL    ABS. MONOCYTES 0.6 0.0 - 1.0 K/UL    ABS. EOSINOPHILS 0.0 0.0 - 0.4 K/UL    ABS. BASOPHILS 0.0 0.0 - 0.1 K/UL    ABS. IMM. GRANS. 0.0 0.00 - 0.04 K/UL    DF SMEAR SCANNED      RBC COMMENTS NORMOCYTIC, NORMOCHROMIC     METABOLIC PANEL, COMPREHENSIVE    Collection Time: 08/09/22 10:34 AM   Result Value Ref Range    Sodium 140 136 - 145 mmol/L    Potassium 3.9 3.5 - 5.1 mmol/L    Chloride 105 97 - 108 mmol/L    CO2 31 21 - 32 mmol/L    Anion gap 4 (L) 5 - 15 mmol/L    Glucose 146 (H) 65 - 100 mg/dL    BUN 36 (H) 6 - 20 MG/DL    Creatinine 0.92 0.55 - 1.02 MG/DL    BUN/Creatinine ratio 39 (H) 12 - 20      GFR est AA >60 >60 ml/min/1.73m2    GFR est non-AA 58 (L) >60 ml/min/1.73m2    Calcium 10.5 (H) 8.5 - 10.1 MG/DL    Bilirubin, total 0.4 0.2 - 1.0 MG/DL    ALT (SGPT) 18 12 - 78 U/L    AST (SGOT) 17 15 - 37 U/L    Alk.  phosphatase 75 45 - 117 U/L    Protein, total 8.0 6.4 - 8.2 g/dL    Albumin 3.8 3.5 - 5.0 g/dL    Globulin 4.2 (H) 2.0 - 4.0 g/dL    A-G Ratio 0.9 (L) 1.1 - 2.2     LIPASE    Collection Time: 08/09/22 10:34 AM   Result Value Ref Range    Lipase 155 73 - 393 U/L   TYPE & SCREEN    Collection Time: 08/09/22 10:34 AM   Result Value Ref Range    Crossmatch Expiration 08/12/2022,2359     ABO/Rh(D) B POSITIVE     Antibody screen NEG    PROTHROMBIN TIME + INR    Collection Time: 08/09/22 10:34 AM   Result Value Ref Range    INR 1.0 0.9 - 1.1      Prothrombin time 10.5 9.0 - 11.1 sec   TROPONIN-HIGH SENSITIVITY    Collection Time: 08/09/22 10:34 AM   Result Value Ref Range    Troponin-High Sensitivity 10 0 - 51 ng/L   EKG, 12 LEAD, INITIAL    Collection Time: 08/09/22 10:56 AM   Result Value Ref Range    Ventricular Rate 101 BPM    Atrial Rate 101 BPM    P-R Interval 162 ms    QRS Duration 126 ms    Q-T Interval 366 ms    QTC Calculation (Bezet) 474 ms    Calculated R Axis -52 degrees    Calculated T Axis 87 degrees    Diagnosis       Sinus tachycardia with premature atrial complexes with aberrant conduction  Left axis deviation  Nonspecific intraventricular block    Confirmed by Kameron Farfan ([id number]) on 8/9/2022 4:07:43 PM     OCCULT BLOOD, GASTRIC    Collection Time: 08/09/22  1:01 PM   Result Value Ref Range    OCCULT BLOOD,GASTRIC Positive (A) NEG      pH,GASTRIC 3.0 1.5 - 3.5     METABOLIC PANEL, BASIC    Collection Time: 08/10/22  4:46 AM   Result Value Ref Range    Sodium 141 136 - 145 mmol/L    Potassium 3.6 3.5 - 5.1 mmol/L    Chloride 109 (H) 97 - 108 mmol/L    CO2 24 21 - 32 mmol/L    Anion gap 8 5 - 15 mmol/L    Glucose 106 (H) 65 - 100 mg/dL    BUN 35 (H) 6 - 20 MG/DL    Creatinine 0.70 0.55 - 1.02 MG/DL    BUN/Creatinine ratio 50 (H) 12 - 20      GFR est AA >60 >60 ml/min/1.73m2    GFR est non-AA >60 >60 ml/min/1.73m2    Calcium 8.8 8.5 - 10.1 MG/DL   CBC W/O DIFF    Collection Time: 08/10/22  4:46 AM   Result Value Ref Range    WBC 11.1 (H) 3.6 - 11.0 K/uL    RBC 4.02 3.80 - 5.20 M/uL    HGB 10.5 (L) 11.5 - 16.0 g/dL    HCT 35.8 35.0 - 47.0 %    MCV 89.1 80.0 - 99.0 FL    MCH 26.1 26.0 - 34.0 PG    MCHC 29.3 (L) 30.0 - 36.5 g/dL    RDW 14.0 11.5 - 14.5 %    PLATELET 016 666 - 750 K/uL    MPV 9.9 8.9 - 12.9 FL    NRBC 0.0 0  WBC    ABSOLUTE NRBC 0.00 0.00 - 0.01 K/uL     Recent Labs     08/10/22  0446 08/09/22  1034   WBC 11.1* 10.3   HGB 10.5* 11.9   HCT 35.8 39.0    324     Recent Labs     08/10/22  0446 08/09/22  1034    140   K 3.6 3.9   * 105   CO2 24 31   BUN 35* 36*   CREA 0.70 0.92   * 146*   CA 8.8 10.5*     Recent Labs     08/09/22  1034   AP 75   TP 8.0   ALB 3.8   GLOB 4.2*   LPSE 155     Recent Labs     08/09/22  1034   INR 1.0   PTP 10.5      No results for input(s): FE, TIBC, PSAT, FERR in the last 72 hours. Lab Results   Component Value Date/Time    Folate 50.4 (H) 06/16/2010 10:35 AM      No results for input(s): PH, PCO2, PO2 in the last 72 hours. No results for input(s): CPK, CKNDX, TROIQ in the last 72 hours. No lab exists for component: CPKMB  Lab Results   Component Value Date/Time    Cholesterol, total 138 07/20/2018 12:00 AM    HDL Cholesterol 46 07/20/2018 12:00 AM    LDL, calculated 66 07/20/2018 12:00 AM    Triglyceride 132 07/20/2018 12:00 AM    CHOL/HDL Ratio 3.2 09/20/2010 10:16 AM     No components found for: [place]  Lab Results   Component Value Date/Time    Color YELLOW/STRAW 07/01/2020 02:28 PM    Appearance CLEAR 07/01/2020 02:28 PM    Specific gravity 1.012 07/01/2020 02:28 PM    Specific gravity 1.010 02/27/2019 08:49 PM    pH (UA) 7.5 07/01/2020 02:28 PM    Protein Negative 07/01/2020 02:28 PM    Glucose Negative 07/01/2020 02:28 PM    Ketone 15 (A) 07/01/2020 02:28 PM    Bilirubin Negative 07/01/2020 02:28 PM    Urobilinogen 0.2 07/01/2020 02:28 PM    Nitrites Negative 07/01/2020 02:28 PM    Leukocyte Esterase Negative 07/01/2020 02:28 PM    Epithelial cells FEW 07/01/2020 02:28 PM    Bacteria Negative 07/01/2020 02:28 PM    WBC 0-4 07/01/2020 02:28 PM    RBC 0-5 07/01/2020 02:28 PM        ROS: -CP, SOB, Dysuria, palpitations, cough. Assessment:    Active Problems:    Epigastric pain (8/9/2022)      Coffee ground emesis (8/9/2022)             Plan/Discussion:     I reviewed her KUB. Will clamp NGT and start her on liquids. If she  tolerates it start full liquids and d/c NG tube. IV PPI and anti emetics. Follow hgb. Hold off on EGD as this juncture.    Recent 4/20/22 EGD Dr. Eli Macdonald  Esophagus:   +          There was mild resistance at the UES easily traversed with scope. In the distal esophagus we saw some liquid contents regurgitating into the distal 1/3 of the esophagus. There was some ? Yellow exudate vs. Particulate matter s/p brushing to r/o Candida.  +          Irregular Z line with columnar mucosa extending proximally to 35 cm from incisors with decrease LES tone. +          5 cm Hiatal hernia with some food content seen in sac. S/P Bx of mid and distal esophagus     We elected to not extend procedure beyond the dilation. Placed guidewire into stomach and then advanced 45 FR Savary over wire with some resistance. Stomach:   +          Food in stomach with patent pylorus. We will follow with you. Plan d/w her RN. Signed By: Saeid Ames.  Leatha Epley, MD    8/10/2022  9:07 AM

## 2022-08-10 NOTE — PROGRESS NOTES
Notified by telemetry staff that patient's heart rate was sustaining in the 140s. Assessed patient VS and EKG and found heart rate in the 140s to 150s. Rapid Response Called.

## 2022-08-11 ENCOUNTER — APPOINTMENT (OUTPATIENT)
Dept: GENERAL RADIOLOGY | Age: 87
DRG: 379 | End: 2022-08-11
Attending: PHYSICIAN ASSISTANT
Payer: MEDICARE

## 2022-08-11 LAB
ERYTHROCYTE [DISTWIDTH] IN BLOOD BY AUTOMATED COUNT: 13.9 % (ref 11.5–14.5)
HCT VFR BLD AUTO: 37.8 % (ref 35–47)
HGB BLD-MCNC: 11.1 G/DL (ref 11.5–16)
MCH RBC QN AUTO: 26.4 PG (ref 26–34)
MCHC RBC AUTO-ENTMCNC: 29.4 G/DL (ref 30–36.5)
MCV RBC AUTO: 90 FL (ref 80–99)
NRBC # BLD: 0 K/UL (ref 0–0.01)
NRBC BLD-RTO: 0 PER 100 WBC
PLATELET # BLD AUTO: 241 K/UL (ref 150–400)
PMV BLD AUTO: 10.1 FL (ref 8.9–12.9)
RBC # BLD AUTO: 4.2 M/UL (ref 3.8–5.2)
WBC # BLD AUTO: 8.5 K/UL (ref 3.6–11)

## 2022-08-11 PROCEDURE — 97535 SELF CARE MNGMENT TRAINING: CPT | Performed by: OCCUPATIONAL THERAPIST

## 2022-08-11 PROCEDURE — 74018 RADEX ABDOMEN 1 VIEW: CPT

## 2022-08-11 PROCEDURE — 96376 TX/PRO/DX INJ SAME DRUG ADON: CPT

## 2022-08-11 PROCEDURE — 97162 PT EVAL MOD COMPLEX 30 MIN: CPT

## 2022-08-11 PROCEDURE — 74011250637 HC RX REV CODE- 250/637: Performed by: NURSE PRACTITIONER

## 2022-08-11 PROCEDURE — 74011250636 HC RX REV CODE- 250/636: Performed by: HOSPITALIST

## 2022-08-11 PROCEDURE — 65270000046 HC RM TELEMETRY

## 2022-08-11 PROCEDURE — G0378 HOSPITAL OBSERVATION PER HR: HCPCS

## 2022-08-11 PROCEDURE — 97530 THERAPEUTIC ACTIVITIES: CPT

## 2022-08-11 PROCEDURE — 85027 COMPLETE CBC AUTOMATED: CPT

## 2022-08-11 PROCEDURE — 74011250636 HC RX REV CODE- 250/636: Performed by: PHYSICIAN ASSISTANT

## 2022-08-11 PROCEDURE — C9113 INJ PANTOPRAZOLE SODIUM, VIA: HCPCS | Performed by: HOSPITALIST

## 2022-08-11 PROCEDURE — 74011250637 HC RX REV CODE- 250/637: Performed by: PHYSICIAN ASSISTANT

## 2022-08-11 PROCEDURE — 36415 COLL VENOUS BLD VENIPUNCTURE: CPT

## 2022-08-11 PROCEDURE — 74011250637 HC RX REV CODE- 250/637: Performed by: INTERNAL MEDICINE

## 2022-08-11 PROCEDURE — 97165 OT EVAL LOW COMPLEX 30 MIN: CPT | Performed by: OCCUPATIONAL THERAPIST

## 2022-08-11 PROCEDURE — 94760 N-INVAS EAR/PLS OXIMETRY 1: CPT

## 2022-08-11 PROCEDURE — 94761 N-INVAS EAR/PLS OXIMETRY MLT: CPT

## 2022-08-11 PROCEDURE — 74011000250 HC RX REV CODE- 250: Performed by: HOSPITALIST

## 2022-08-11 RX ORDER — METOCLOPRAMIDE HYDROCHLORIDE 5 MG/ML
5 INJECTION INTRAMUSCULAR; INTRAVENOUS
Status: DISCONTINUED | OUTPATIENT
Start: 2022-08-11 | End: 2022-08-12 | Stop reason: HOSPADM

## 2022-08-11 RX ORDER — POLYETHYLENE GLYCOL 3350 17 G/17G
17 POWDER, FOR SOLUTION ORAL 2 TIMES DAILY
Status: DISCONTINUED | OUTPATIENT
Start: 2022-08-11 | End: 2022-08-12 | Stop reason: HOSPADM

## 2022-08-11 RX ORDER — POLYETHYLENE GLYCOL 3350 17 G/17G
17 POWDER, FOR SOLUTION ORAL DAILY
Status: DISCONTINUED | OUTPATIENT
Start: 2022-08-12 | End: 2022-08-11

## 2022-08-11 RX ORDER — FACIAL-BODY WIPES
10 EACH TOPICAL ONCE
Status: COMPLETED | OUTPATIENT
Start: 2022-08-11 | End: 2022-08-11

## 2022-08-11 RX ORDER — PROPAFENONE HYDROCHLORIDE 150 MG/1
150 TABLET, FILM COATED ORAL 2 TIMES DAILY
Status: DISCONTINUED | OUTPATIENT
Start: 2022-08-11 | End: 2022-08-12 | Stop reason: HOSPADM

## 2022-08-11 RX ORDER — POLYETHYLENE GLYCOL 3350 17 G/17G
17 POWDER, FOR SOLUTION ORAL DAILY
Status: DISCONTINUED | OUTPATIENT
Start: 2022-08-11 | End: 2022-08-11

## 2022-08-11 RX ORDER — BISACODYL 5 MG
5 TABLET, DELAYED RELEASE (ENTERIC COATED) ORAL DAILY PRN
Status: DISCONTINUED | OUTPATIENT
Start: 2022-08-11 | End: 2022-08-12 | Stop reason: HOSPADM

## 2022-08-11 RX ORDER — PANTOPRAZOLE SODIUM 40 MG/1
40 TABLET, DELAYED RELEASE ORAL
Status: DISCONTINUED | OUTPATIENT
Start: 2022-08-12 | End: 2022-08-12 | Stop reason: HOSPADM

## 2022-08-11 RX ORDER — DOCUSATE SODIUM 100 MG/1
200 CAPSULE, LIQUID FILLED ORAL DAILY
Status: DISCONTINUED | OUTPATIENT
Start: 2022-08-12 | End: 2022-08-12 | Stop reason: HOSPADM

## 2022-08-11 RX ADMIN — SODIUM CHLORIDE 40 MG: 9 INJECTION, SOLUTION INTRAMUSCULAR; INTRAVENOUS; SUBCUTANEOUS at 10:19

## 2022-08-11 RX ADMIN — ONDANSETRON 4 MG: 2 INJECTION INTRAMUSCULAR; INTRAVENOUS at 09:08

## 2022-08-11 RX ADMIN — GABAPENTIN 100 MG: 100 CAPSULE ORAL at 21:56

## 2022-08-11 RX ADMIN — GABAPENTIN 100 MG: 100 CAPSULE ORAL at 17:49

## 2022-08-11 RX ADMIN — PROPAFENONE HYDROCHLORIDE 150 MG: 150 TABLET, FILM COATED ORAL at 21:56

## 2022-08-11 RX ADMIN — SODIUM CHLORIDE, PRESERVATIVE FREE 10 ML: 5 INJECTION INTRAVENOUS at 21:58

## 2022-08-11 RX ADMIN — GABAPENTIN 100 MG: 100 CAPSULE ORAL at 10:18

## 2022-08-11 RX ADMIN — POLYETHYLENE GLYCOL 3350 17 G: 17 POWDER, FOR SOLUTION ORAL at 10:19

## 2022-08-11 RX ADMIN — BISACODYL 10 MG: 10 SUPPOSITORY RECTAL at 12:20

## 2022-08-11 RX ADMIN — PROPAFENONE HYDROCHLORIDE 150 MG: 150 TABLET, FILM COATED ORAL at 10:18

## 2022-08-11 RX ADMIN — METOCLOPRAMIDE 5 MG: 5 INJECTION, SOLUTION INTRAMUSCULAR; INTRAVENOUS at 10:18

## 2022-08-11 RX ADMIN — POLYETHYLENE GLYCOL 3350 17 G: 17 POWDER, FOR SOLUTION ORAL at 17:49

## 2022-08-11 RX ADMIN — SODIUM CHLORIDE 75 ML/HR: 9 INJECTION, SOLUTION INTRAVENOUS at 01:41

## 2022-08-11 NOTE — PROGRESS NOTES
GI note:  KUB shows increased/worsened colonic stool. I will increase Miralax to BID,add colace and try a fleet enema. Repeat KUB tomorrow.     Suresh Hernandez MD

## 2022-08-11 NOTE — PROGRESS NOTES
Gastroenterology Progress Note  Suzi Long, [id number] for Dr. Akanksha Feliz)    8/11/2022    Admit Date: 8/9/2022    Subjective: Follow up for: N/V, hx of HH and gastroparesis, ? partial SBO     Has had no N/V. NGT clamped yesterday but diet never advanced  On reglan tid  Hgb is stable. KUB yesterday with no SBO picture. Passing flatus but no bm  C/o abd bloating   Getting morphine regularly  Is being followed by cardiology for Afib with RVR      Current Facility-Administered Medications   Medication Dose Route Frequency    propafenone (RYTHMOL) tablet 150 mg  150 mg Per NG tube BID    gabapentin (NEURONTIN) capsule 100 mg  100 mg Oral TID    sodium chloride (NS) flush 5-40 mL  5-40 mL IntraVENous Q8H    sodium chloride (NS) flush 5-40 mL  5-40 mL IntraVENous PRN    acetaminophen (TYLENOL) tablet 650 mg  650 mg Oral Q6H PRN    Or    acetaminophen (TYLENOL) suppository 650 mg  650 mg Rectal Q6H PRN    polyethylene glycol (MIRALAX) packet 17 g  17 g Oral DAILY PRN    ondansetron (ZOFRAN ODT) tablet 4 mg  4 mg Oral Q8H PRN    Or    ondansetron (ZOFRAN) injection 4 mg  4 mg IntraVENous Q6H PRN    0.9% sodium chloride infusion  75 mL/hr IntraVENous CONTINUOUS    pantoprazole (PROTONIX) 40 mg in 0.9% sodium chloride 10 mL injection  40 mg IntraVENous Q12H    morphine injection 1 mg  1 mg IntraVENous Q4H PRN    metoclopramide HCl (REGLAN) injection 5 mg  5 mg IntraVENous TID        Objective:     Blood pressure (!) 143/73, pulse 92, temperature 97.7 °F (36.5 °C), resp. rate 17, height 5' (1.524 m), weight 63.5 kg (140 lb), SpO2 96 %. No intake/output data recorded.     08/09 1901 - 08/11 0700  In: 1800 [I.V.:1800]  Out: -         Physical Examination:       General:AAO x 3, NGT in but campled  HEENT:  sclera anicteric  MMM  Chest:  CTA  Heart: S1, S2, RRR  GI: Soft, ND, +BS, NT  Extremities: No edema or cyanosis  CNS: CNs grossly normal.    Data Review    Recent Results (from the past 24 hour(s))   CBC W/O DIFF Collection Time: 08/11/22  4:31 AM   Result Value Ref Range    WBC 8.5 3.6 - 11.0 K/uL    RBC 4.20 3.80 - 5.20 M/uL    HGB 11.1 (L) 11.5 - 16.0 g/dL    HCT 37.8 35.0 - 47.0 %    MCV 90.0 80.0 - 99.0 FL    MCH 26.4 26.0 - 34.0 PG    MCHC 29.4 (L) 30.0 - 36.5 g/dL    RDW 13.9 11.5 - 14.5 %    PLATELET 282 909 - 840 K/uL    MPV 10.1 8.9 - 12.9 FL    NRBC 0.0 0  WBC    ABSOLUTE NRBC 0.00 0.00 - 0.01 K/uL     Recent Labs     08/11/22  0431 08/10/22  0446   WBC 8.5 11.1*   HGB 11.1* 10.5*   HCT 37.8 35.8    281     Recent Labs     08/10/22  0446 08/09/22  1034    140   K 3.6 3.9   * 105   CO2 24 31   BUN 35* 36*   CREA 0.70 0.92   * 146*   CA 8.8 10.5*     Recent Labs     08/09/22  1034   AP 75   TP 8.0   ALB 3.8   GLOB 4.2*   LPSE 155     Recent Labs     08/09/22  1034   INR 1.0   PTP 10.5      No results for input(s): FE, TIBC, PSAT, FERR in the last 72 hours. Lab Results   Component Value Date/Time    Folate 50.4 (H) 06/16/2010 10:35 AM      No results for input(s): PH, PCO2, PO2 in the last 72 hours. No results for input(s): CPK, CKNDX, TROIQ in the last 72 hours.     No lab exists for component: CPKMB  Lab Results   Component Value Date/Time    Cholesterol, total 138 07/20/2018 12:00 AM    HDL Cholesterol 46 07/20/2018 12:00 AM    LDL, calculated 66 07/20/2018 12:00 AM    Triglyceride 132 07/20/2018 12:00 AM    CHOL/HDL Ratio 3.2 09/20/2010 10:16 AM     No components found for: [place]  Lab Results   Component Value Date/Time    Color YELLOW/STRAW 07/01/2020 02:28 PM    Appearance CLEAR 07/01/2020 02:28 PM    Specific gravity 1.012 07/01/2020 02:28 PM    Specific gravity 1.010 02/27/2019 08:49 PM    pH (UA) 7.5 07/01/2020 02:28 PM    Protein Negative 07/01/2020 02:28 PM    Glucose Negative 07/01/2020 02:28 PM    Ketone 15 (A) 07/01/2020 02:28 PM    Bilirubin Negative 07/01/2020 02:28 PM    Urobilinogen 0.2 07/01/2020 02:28 PM    Nitrites Negative 07/01/2020 02:28 PM    Leukocyte Esterase Negative 07/01/2020 02:28 PM    Epithelial cells FEW 07/01/2020 02:28 PM    Bacteria Negative 07/01/2020 02:28 PM    WBC 0-4 07/01/2020 02:28 PM    RBC 0-5 07/01/2020 02:28 PM        ROS: -CP, SOB, Dysuria, palpitations, cough. Assessment:    Active Problems:    Epigastric pain (8/9/2022)      Coffee ground emesis (8/9/2022)           Plan/Discussion:     I spoke with RN. For some reason diet was never advanced yesterday and remains NPO. Will trial clear liquids for breakfast and if tolerates can advance to full liquid diet for lunch. Will repeat KUB. If all goes well, can remove NGT. Daily miralax. Limit opioids. Up out of bed and PT as able. IV PPI   anti emetics prn. Continue reglan for now  Hgb stable. Hold off on EGD as this juncture. Recent 4/20/22 EGD Dr. Lillian Ga  Esophagus:   +          There was mild resistance at the UES easily traversed with scope. In the distal esophagus we saw some liquid contents regurgitating into the distal 1/3 of the esophagus. There was some ? Yellow exudate vs. Particulate matter s/p brushing to r/o Candida.  +          Irregular Z line with columnar mucosa extending proximally to 35 cm from incisors with decrease LES tone. +          5 cm Hiatal hernia with some food content seen in sac. S/P Bx of mid and distal esophagus     We elected to not extend procedure beyond the dilation. Placed guidewire into stomach and then advanced 45 FR Savary over wire with some resistance. Stomach:   +          Food in stomach with patent pylorus. We will follow with you. Plan d/w her RN. PEGGY Keita  7:56 AM  8/11/2022      I have examined the patient. I have reviewed the chart and agree with the documentation recorded by the REED, including the assessment, treatment plan, and disposition. Patient seen and examined by me.  I personally performed all components of the history, physical, and medical decision making and agree with the assessment and plan with minor modifications as noted. Exam:  Alert and awake. NGT in  HEENT AT  GI: soft w/ normal bowel sounds    Plan: We will d/c NGT and see how she does with diet advancement trial.  Limit opiates,  Continue Reglan and PPI  If symptoms resolve, work towards d/c. If there is no improvement consider EGD tomorrow. D/w her RN. Sophia Clemons MD  [facility])

## 2022-08-11 NOTE — PROGRESS NOTES
Problem: Self Care Deficits Care Plan (Adult)  Goal: *Acute Goals and Plan of Care (Insert Text)  Description: FUNCTIONAL STATUS PRIOR TO ADMISSION: Patient was modified independent using a four wheeled rolling walker for functional mobility wearing bilateral AFOs in her apartment and scooter outside of the apartment. Performed all ADLS on her own but gets supervision for showering while seated on shower chair. [facility] [employer]) assists with IADLS. HOME SUPPORT PRIOR TO ADMISSION: The patient lived alone with [facility] staff to provide assistance as needed. Occupational Therapy Goals:  Initiated 8/11/2022  1. Patient will perform grooming standing with modified independence within 7 days. 2. Patient will perform upper body dressing and lower body dressing with modified independence within 7 days. 3. Patient will perform toileting with modified independence within 7 days. 4. Patient will transfer from toilet with modified independence using the least restrictive device and appropriate durable medical equipment within 7 days. Outcome: Not Met   OCCUPATIONAL THERAPY EVALUATION  Patient: Carrolyn Kayser (85 y.o. female)  Date: 8/11/2022  Primary Diagnosis: Coffee ground emesis [K92.0]  Epigastric pain [R10.13]  Small bowel obstruction (HCC) [K56.609]       Precautions: DNR       ASSESSMENT  Based on the objective data described below, the patient presents with fatigue and pt has just been started on liquid diet. NG tub remains in place. She was initially hesitant to get OOB due to getting very nauseous earlier this morning with nursing and needing nausea medication. Pt does have her four wheeled walker at bedside but doesn't have her bilateral AFOs. Pt is able to transfer without braces but is unable to ambulate. Her daughter was at bedside and was informed that pt will need her braces while in the hospital in order to progress with therapy. Her daughter reports that she will bring them in. Overall pt is performing short distance mobility to bedside recliner chair using four wheeled walker with CGA. She is performing ADLS at a CGA to min assist level overall. Recommend return to home with initial home health at discharge. Current Level of Function Impacting Discharge (ADLs/self-care): CGA mobility with RW, CGA to min assist for ADLS    Functional Outcome Measure: The patient scored 55/100 on the barthel outcome measure which is indicative of moderate decline in mobility and ADLS. Other factors to consider for discharge: none     Patient will benefit from skilled therapy intervention to address the above noted impairments. PLAN :  Recommendations and Planned Interventions: self care training, functional mobility training, therapeutic exercise, balance training, therapeutic activities, patient education, home safety training, and family training/education    Frequency/Duration: Patient will be followed by occupational therapy 4 times a week to address goals. Recommendation for discharge: (in order for the patient to meet his/her long term goals)  Occupational therapy at least 2 days/week in the home AND ensure assist and/or supervision for safety with ADLs as needed    This discharge recommendation:  Has not yet been discussed the attending provider and/or case management    IF patient discharges home will need the following DME: none       SUBJECTIVE:   Patient stated Lui Robledo got really nauseous the last time I got up.     OBJECTIVE DATA SUMMARY:   HISTORY:   Past Medical History:   Diagnosis Date    Abnormal x-ray of abdomen 11/13/2020    Bas showed decreased peristalsis and a cervical web      Arrhythmia     A fib    Arthritis     Diarrhea 6/6/2016    Esophageal dysphagia 11/13/2020    Foot drop     Gastric ulcer 6/14/2012    Gastroparesis     GERD (gastroesophageal reflux disease)     High cholesterol     Hypertension     Neuropathy     Ovarian cancer ([facility] [address] 1986    chemo x 9 mos Scoliosis     Stroke [facility]) 2002    ? stroke with drop feet, per patient CT scans were negative     Past Surgical History:   Procedure Laterality Date    FLEXIBLE SIGMOIDOSCOPY N/A 6/6/2016    SIGMOIDOSCOPY FLEXIBLE performed by Deonte Pan MD at [facility] ENDOSCOPY    HX ORTHOPAEDIC      back    HX ORTHOPAEDIC      bilateral shoulder replacements    HX ORTHOPAEDIC      left knee replacement    HX LYDIA AND BSO  1986    (ovarian cancer)    [place] EGD TRANSORAL BIOPSY SINGLE/MULTIPLE  1/26/2012         [place] EGD TRANSORAL BIOPSY SINGLE/MULTIPLE  6/14/2012         UPPER GI ENDOSCOPY,BIOPSY  11/13/2020         UPPER GI ENDOSCOPY,DIAGNOSIS  10/5/2020         UPPER GI ENDOSCOPY,DILATN W GUIDE  11/13/2020            Expanded or extensive additional review of patient history:     Home Situation  Home Environment: [address] Name: Mauri Doll  One/Two Story Residence: One story  Living Alone: Yes  Support Systems: Child(oracio), Caregiver/Home Care Staff  Patient Expects to be Discharged to[de-identified] Assisted Living  Current DME Used/Available at Home: Grab bars, Raised toilet seat, Walker, rolling, Wheelchair, power, Shower chair, Other (comment) (bilateral LE braces)  Tub or Shower Type: Shower    Hand dominance: Right    EXAMINATION OF PERFORMANCE DEFICITS:  Cognitive/Behavioral Status:  Neurologic State: Alert  Orientation Level: Oriented X4  Cognition: Appropriate decision making; Appropriate for age attention/concentration; Appropriate safety awareness  Perception: Appears intact  Perseveration: No perseveration noted  Safety/Judgement: Awareness of environment; Fall prevention;Home safety; Insight into deficits    Hearing:   Auditory  Auditory Impairment: None    Vision/Perceptual:                                Corrective Lenses: Glasses    Range of Motion:    AROM: Grossly decreased, non-functional (bilateral foot drop (baseline), WFL all other)  PROM: Within functional limits                      Strength:    Strength: Generally decreased, functional (except for ankles bilateral foot drop)                Coordination:  Coordination: Grossly decreased, non-functional (bilateral ankles (foot drop, wears AFO's), all other WFL)  Fine Motor Skills-Upper: Left Intact; Right Intact    Gross Motor Skills-Upper: Left Intact; Right Intact    Tone & Sensation:    Tone: Abnormal (bilateral ankles)                         Balance:  Sitting: Intact  Standing: Impaired  Standing - Static: Good  Standing - Dynamic : Fair    Functional Mobility and Transfers for ADLs:  Bed Mobility:  Rolling: Supervision  Supine to Sit: Supervision  Scooting: Supervision    Transfers:  Sit to Stand: Contact guard assistance  Stand to Sit: Contact guard assistance  Bed to Chair: Contact guard assistance (with 4 wheeled non rollator walker)  Bathroom Mobility:  (unable due to braces not being present for LE)  Toilet Transfer : Contact guard assistance (to bedside commode)    ADL Assessment:  Feeding: Independent    Oral Facial Hygiene/Grooming: Contact guard assistance    Bathing: Contact guard assistance    Type of Bath: Basin/Soap/Water    Upper Body Dressing: Contact guard assistance    Lower Body Dressing: Contact guard assistance;Minimum assistance/min assist    Toileting: Contact guard assistance;Minimum assistance/min assist     Cognitive Retraining  Safety/Judgement: Awareness of environment; Fall prevention;Home safety; Insight into deficits    Functional Measure:    Barthel Index:  Bathin  Bladder: 10  Bowels: 10  Groomin  Dressin  Feeding: 10  Mobility: 0  Stairs: 0  Toilet Use: 5  Transfer (Bed to Chair and Back): 10  Total: 55/100      The Barthel ADL Index: Guidelines  1. The index should be used as a record of what a patient does, not as a record of what a patient could do. 2. The main aim is to establish degree of independence from any help, physical or verbal, however minor and for whatever reason.   3. The need for supervision renders the patient not independent. 4. A patient's performance should be established using the best available evidence. Asking the patient, friends/relatives and nurses are the usual sources, but direct observation and common sense are also important. However direct testing is not needed. 5. Usually the patient's performance over the preceding 24-48 hours is important, but occasionally longer periods will be relevant. 6. Middle categories imply that the patient supplies over 50 per cent of the effort. 7. Use of aids to be independent is allowed. Score Interpretation (from [address] [address])    Independent   60-79 Minimally independent   40-59 Partially dependent   20-39 Very dependent   <20 Totally dependent     -Cedric Adorno., Barthel, D.W. (1965). Functional evaluation: the Barthel Index. [address] [facility] ([address] (1997). The Barthel activities of daily living index: self-reporting versus actual performance in the old (> or = 75 years). Journal of [address] [place] [address] 45(7), [facility], OMEGA, Teri Helms., Marcy Mora. (1999). Measuring the change in disability after inpatient rehabilitation; comparison of the responsiveness of the Barthel Index and Functional [place] Measure. Journal of Neurology, Neurosurgery, and Psychiatry, 66(4), 584-218. Cyril Harrell, N.J.A, GABI Sandy, & Krystle Francis M.A. (2004) Assessment of post-stroke quality of life in cost-effectiveness studies: The usefulness of the Barthel Index and the EuroQoL-5D.  Quality of Life Research, 15, 176-90         Occupational Therapy Evaluation Charge Determination   History Examination Decision-Making   LOW Complexity : Brief history review  LOW Complexity : 1-3 performance deficits relating to physical, cognitive , or psychosocial skils that result in activity limitations and / or participation restrictions  LOW Complexity : No comorbidities that affect functional and no verbal or physical assistance needed to complete eval tasks       Based on the above components, the patient evaluation is determined to be of the following complexity level: LOW   Pain Ratin/10    Activity Tolerance:   Fair and requires rest breaks    After treatment patient left in no apparent distress:    Sitting in chair and Call bell within reach    COMMUNICATION/EDUCATION:   The patients plan of care was discussed with: Physical therapist, Registered nurse, and patient and her daughter . Home safety education was provided and the patient/caregiver indicated understanding., Patient/family have participated as able in goal setting and plan of care. , and Patient/family agree to work toward stated goals and plan of care. This patients plan of care is appropriate for delegation to [facility].     Thank you for this referral.  Seymour Heller, OTR/L  Time Calculation: 19 mins

## 2022-08-11 NOTE — PROGRESS NOTES
Cardiology Progress Note  8/11/2022     Admit Date: 8/9/2022  Admit Diagnosis: Coffee ground emesis [K92.0]  Epigastric pain [R10.13]  CC: none currently  Cardiologist:  Dr Lan Weiner. Cardiac Assessment/Plan:    1. Paroxysmal atrial fibrillation with RVR diagnosed in 11/2018, minimal to no symptoms. Back in sinus during that hospital stay. Recurrent but transient RVR during 7/2020 admission. Anticoagulation stopped at that time due to GIB. Will continue propafenone and avoid anticoagulation. Patient and family understand risk. 2. Chronic LBBB. 3. PVC's. 4. Hyperlipidemia. 5. Says she is not diabetic. 6. Hypercholesterolemia. 7. CKD stage 3.  8. Anemia NOS. 9. Scoliosis/arthritis s/p extensive thoracolumbar fusion 2002, now with broken hardware, s/p LUCY 12/2013 (effective), neuropathy since knee surgery 2009. 10. History of ovarian cancer s/p chemo. 11. GERD/Lewis's esophagus, gastroparesis. 12. Back pain. Fractured thoracolumbar fusion rods, stable. Protruding pedicle screws, stable. Neuroforaminal stenoses. 13. Walks with a walker. Peripheral neuropathy in her legs. 14. SBO 3/2019, resolved without surgery     Admitted with N/V, ?partial SBO; PAfib noted on tele; sinus now; NG in.  Current cardiac meds: none. Rec: Restart propafenone; No AC as noted below. 8/11:No CP/dyspnea; \"full abd\"  BPs 90-150s; HR 60-90; 130-140s w/ PAfib; 96% RA  Nl WBC; Hg 11.1;     Cardiac meds: propafenone  bid (has only got 1 dose). Rec: if needed, propafenone to tid.   ____________________________________________________________________  Hallie Barajas is a 80 y.o. female presents with Coffee ground emesis [K92.0]  Epigastric pain [R10.13]. As noted in H&P: \"80 y.o. female past medical history of upper GI bleed with ulcerative esophagitis, esophageal narrowing, gastroparesis presents from [facility] assisted living with above symptoms.   She has been constipated due to being on oxycodone twice daily for chronic neck pain. She asked for MiraLAX last night. Her last bowel movement was last Friday, normal brown color. At 11:30 PM she started vomiting with yellow emesis and had a total 8 episodes. This morning, emesis changed to dark brown, close to black and assisted living facility staff sent to ER for evaluation. In the ER, she has had a few more episodes of dark brown to black emesis. She is also complaining of epigastric tightness. Denies any chest pain, SOB. She also had nerve ablation in her neck for chronic neck pain last week. 4/20/22 EGD Dr. Yousuf Maria  Findings:  Esophagus: Mild resistance at the UES easily traversed with scope. In the distal esophagus we saw some liquid contents regurgitating into the distal 1/3 of the esophagus. There was some ? Yellow exudate vs. Particulate matter s/p brushing to r/o Candida.  +          Irregular Z line with columnar mucosa extending proximally to 35 cm from incisors with decrease LES tone. +          5 cm Hiatal hernia with some food content seen in sac. S/P Bx of mid and distal esophagus  We elected to not extend procedure beyond the dilation. Placed guidewire into stomach and then advanced 45 FR Savary over wire with some resistance. Stomach: +          Food in stomach with patent pylorus. Recommendations: -f/u path  -elevate HOB, small meals -will need to f/u to discuss tx of her gastroparesis\"  ______________________________________________________________________     The patient reports no CP, dyspnea, palps. No PND, orthopnea, palpitations, pre-syncope, syncope, peripheral edema. No current complaints. ECG: Sinus; LBBB  Tele: Sinus then PAFIb; sinus back. CXR: none  Abd CT: \"Source of hemorrhage is not identified; Markedly distended esophagus and stomach. Mildly distended proximal small bowel, suggesting an adhesion.  Incidental constipation, dilated cardiomyopathy, coronary artery disease\" Notable labs: WBC 11; Hg 10.5; K 3.6; Cr 0.7; Nl trop.  ________________________________________  Notable prior cardiac history:  @ [facility] 2/23/22  HISTORY OF PRESENT ILLNESS:  1. Paroxysmal atrial fibrillation with RVR diagnosed in 11/2018, minimal to no symptoms. Back in sinus during that hospital stay. Recurrent but transient RVR during 7/2020 admission. Anticoagulation stopped at that time due to GIB. Will continue propafenone and avoid anticoagulation. Patient and family understand risk. 2. Chronic LBBB. 3. PVC's. 4. Hyperlipidemia. 5. Says she is not diabetic. 6. Hypercholesterolemia. 7. CKD stage 3.  8. Anemia NOS. 9. Scoliosis/arthritis s/p extensive thoracolumbar fusion 2002, now with broken hardware, s/p LUCY 12/2013 (effective), neuropathy since knee surgery 2009. 10. History of ovarian cancer s/p chemo. 11. GERD/Lewis's esophagus, gastroparesis. 12. Back pain. Fractured thoracolumbar fusion rods, stable. Protruding pedicle screws, stable. Neuroforaminal stenoses. 13. Walks with a walker. Peripheral neuropathy in her legs. She had SBO 3/2019, now resolved without surgery. 2/2022 EP update:  Denies syncope, dizziness, palpitations. No chest, jaw, neck, shoulder, or arm pain. No orthopnea, PND, or worsening pedal edema. Patient denies claudication. There is no discoloration or ulceration of the lower extremities. The patient has had no TIA or stroke-like symptoms. IMPRESSION AND PLAN  01. Preprocedural cardiovascular examination (Z01.810):  Overall assessment suggests that the patient's risk of periprocedural cardiac complications should be low. 02. Other dysphagia (R13.19):  Sees Dr. Vandana Skelton. 03. Paroxysmal atrial fibrillation (I48.0): The patient is maintaining sinus rhythm. We will continue the current therapy. Patient has a CHADSVASc score of 4 or greater. Patient HAS-BLED score is 2, indicating  risk of major bleeding as ~2/100 patient years.  She had recurrent GIB 7/2020. Anticoagulation stopped. Per hospital note, \"I would NOT use anticoagulation or antiplatelet therapy. Very reluctant to do it in 2019, gave it a try later, now know that it is not something to do going forward. Not a good candidate for a Watchman device (JOHN occlusion) as it requires 6 weeks of anticoagulation upfront. Not a good candidate for Afib ablation as it requires 2-3 months of anticoagulation post-procedure. \"  ECG done I have made no changes to the present regimen. David Dodson 04. LBBB (left bundle branch block) (I44.7): The patient has had no syncopal episodes. No clinical bradyarrhythmias are noted. 05. Essential hypertension (I10): Adequately controlled. We will continue the current therapy. I have made no changes to the present regimen. 06. Mixed hyperlipidemia (E78.2): On chronic medical therapy. labs per PCP.   07. Venous insufficiency of both lower extremities (I87.2): This condition is stable. I have recommended a salt restricted diet, compliance with medications and regular compression stocking use. 08. Body mass index [BMI] 27.0-27.9, adult (K15.63): The patient was instructed on AHA diet and regular exercise. ORDERS:  1 ECG done     2 Dietary management education, guidance, and counseling     3 Return office visit with Claudia Munoz MD in 1 Year. 4 The patient was instructed on AHA diet and regular exercise. 2/23/22 MEDICATION LIST  Medication Sig Desc   AcipHex 20 mg tablet,delayed release take 1 tablet by oral route  every day swallowing whole. Do not crush, chew and/or divide. cholecalciferol (vitamin D3) 25 mcg (1,000 unit) tablet Take 1,000 Units by mouth daily. famotidine 20 mg tablet Take 20 mg by mouth daily.    gabapentin 800 mg tablet take 1 tablet by oral route 4 times every day   hydrochlorothiazide 25 mg tablet take 1 tablet by oral route  every day   lisinopril 10 mg tablet take 1 tablet by oral route  every day   magnesium oxide 400 mg (241.3 mg magnesium) tablet Take 400 mg by mouth daily. melatonin 3 mg tablet     Miralax 17 gram oral powder packet take 1 packet by oral route  every day mixed with 8 oz. water, juice, soda, coffee or tea   multivitamin tablet Take 1 Tab by mouth daily. potassium chloride ER 10 mEq tablet,extended release Take 1 Tab by mouth daily. propafenone 150 mg tablet Take 1 Tab by mouth two (2) times a day. simvastatin 40 mg tablet take 1 tablet by oral route  every day in the evening   Tylenol 325 mg capsule  as needed     For other plans, see orders.   Hospital problem list     Active Hospital Problems    Diagnosis Date Noted    Epigastric pain 2022    Coffee ground emesis 2022        Subjective: Kelly Later reports       Chest pain X none  consistent with:  Non-cardiac CP         Atypical CP     None now  On going  Anginal CP     Dyspnea X none  at rest  with exertion         improved  unchanged  worse              PND X none  overnight       Orthopnea X none  improved  unchanged  worse   Presyncope X none  improved  unchanged  worse     Ambulated in hallway without symptoms   Yes   Ambulated in room without symptoms  Yes   Objective:     Physical Exam:  Overall VSSAF;  Visit Vitals  BP (!) 143/73 (BP 1 Location: Left upper arm, BP Patient Position: At rest)   Pulse 92   Temp 97.7 °F (36.5 °C)   Resp 17   Ht 5' (1.524 m)   Wt 63.5 kg (140 lb)   SpO2 96%   BMI 27.34 kg/m²     Temp (24hrs), Av.8 °F (36.6 °C), Min:97.4 °F (36.3 °C), Max:98.1 °F (36.7 °C)    Patient Vitals for the past 8 hrs:   Pulse   22 0742 92   22 0402 94     Patient Vitals for the past 8 hrs:   Resp   22 0742 17   222 16     Patient Vitals for the past 8 hrs:   BP   22 0742 (!) 143/73   22 0402 (!) 158/95       Intake/Output Summary (Last 24 hours) at 2022 0824  Last data filed at 2022 0803  Gross per 24 hour   Intake 1035 ml   Output --   Net 1035 ml     General Appearance: Well developed, well nourished, no acute distress. Ears/Nose/Mouth/Throat:   Normal MM; anicteric. JVP: WNL   Resp:   Lungs clear to auscultation bilaterally. Nl resp effort. Cardiovascular:  RRR, S1, S2 normal, no new murmur. No gallop or rub. Abdomen:   Soft, non-tender, bowel sounds are present. Extremities: No edema bilaterally. Skin:  Neuro: Warm and dry. A/O x3, grossly nonfocal       cath site intact w/o hematoma or new bruit; distal pulse unchanged  Yes   Data Review:     Telemetry independently reviewed  sinus  chronic afib  parox afib  NSVT     ECG independently reviewed  NSR  afib  no significant changes  NSST-Tw chgs      no new ECG provided for review   Lab results reviewed as noted below. Current medications reviewed as noted below. No results for input(s): PH, PCO2, PO2 in the last 72 hours. No results for input(s): CPK, CKMB, CKNDX, TROIQ in the last 72 hours.   Recent Labs     08/11/22  0431 08/10/22  0446 08/09/22  1034   NA  --  141 140   K  --  3.6 3.9   CL  --  109* 105   CO2  --  24 31   BUN  --  35* 36*   CREA  --  0.70 0.92   GFRAA  --  >60 >60   GLU  --  106* 146*   CA  --  8.8 10.5*   ALB  --   --  3.8   WBC 8.5 11.1* 10.3   HGB 11.1* 10.5* 11.9   HCT 37.8 35.8 39.0    281 324     Lab Results   Component Value Date/Time    Cholesterol, total 138 07/20/2018 12:00 AM    HDL Cholesterol 46 07/20/2018 12:00 AM    LDL, calculated 66 07/20/2018 12:00 AM    Triglyceride 132 07/20/2018 12:00 AM    CHOL/HDL Ratio 3.2 09/20/2010 10:16 AM     Recent Labs     08/09/22  1034   AP 75   TP 8.0   ALB 3.8   GLOB 4.2*   LPSE 155     Recent Labs     08/09/22  1034   INR 1.0   PTP 10.5      No components found for: GLPOC    Current Facility-Administered Medications   Medication Dose Route Frequency    polyethylene glycol (MIRALAX) packet 17 g  17 g Oral DAILY    propafenone (RYTHMOL) tablet 150 mg  150 mg Per NG tube BID    gabapentin (NEURONTIN) capsule 100 mg  100 mg Oral TID sodium chloride (NS) flush 5-40 mL  5-40 mL IntraVENous Q8H    sodium chloride (NS) flush 5-40 mL  5-40 mL IntraVENous PRN    acetaminophen (TYLENOL) tablet 650 mg  650 mg Oral Q6H PRN    Or    acetaminophen (TYLENOL) suppository 650 mg  650 mg Rectal Q6H PRN    ondansetron (ZOFRAN ODT) tablet 4 mg  4 mg Oral Q8H PRN    Or    ondansetron (ZOFRAN) injection 4 mg  4 mg IntraVENous Q6H PRN    0.9% sodium chloride infusion  75 mL/hr IntraVENous CONTINUOUS    pantoprazole (PROTONIX) 40 mg in 0.9% sodium chloride 10 mL injection  40 mg IntraVENous Q12H    morphine injection 1 mg  1 mg IntraVENous Q4H PRN    metoclopramide HCl (REGLAN) injection 5 mg  5 mg IntraVENous TID        Anyi Santo MD

## 2022-08-11 NOTE — PROGRESS NOTES
CM informed that pt currently has NG Tube in place. CM will consult with clinical team to determine when NG tube will be removed. Pt received obs letter (signed) placed in chart. CM will continue to follow and enter referral as deemed necessary.     JAMSHID Hernández, [address] [place] [address]

## 2022-08-11 NOTE — PROGRESS NOTES
Hospitalist Progress Note    Subjective:   Daily Progress Note: 8/11/2022 3:41 PM    Hospital Course:  Pt admitted for constipation, hematemesis and upper gastric pain. CT abdomen and pelvis showing distended esophagus and small bowel distension. NGT was inserted for bowel decompression, stool was negative for occult blood. Pt's NGT was clamped and PO full liquid diet is ordered,  Pt had one episode of nausea, no vomiting, NGT was placed to suction and minimal clear drainage returned. KUB done today, there is an increase in stool. Given one dulcalox suppository and pt had large bowel movement, tolerated full liquid diet. NGT will be discontinued. Subjective:    Current Facility-Administered Medications   Medication Dose Route Frequency    metoclopramide HCl (REGLAN) injection 5 mg  5 mg IntraVENous Q6H PRN    propafenone (RYTHMOL) tablet 150 mg  150 mg Oral BID    [START ON 8/12/2022] pantoprazole (PROTONIX) tablet 40 mg  40 mg Oral ACB    bisacodyL (DULCOLAX) tablet 5 mg  5 mg Oral DAILY PRN    sodium phosphate (FLEET'S) enema 1 Enema  1 Enema Rectal NOW    polyethylene glycol (MIRALAX) packet 17 g  17 g Oral BID    [START ON 8/12/2022] docusate sodium (COLACE) capsule 200 mg  200 mg Oral DAILY    gabapentin (NEURONTIN) capsule 100 mg  100 mg Oral TID    sodium chloride (NS) flush 5-40 mL  5-40 mL IntraVENous Q8H    sodium chloride (NS) flush 5-40 mL  5-40 mL IntraVENous PRN    acetaminophen (TYLENOL) tablet 650 mg  650 mg Oral Q6H PRN    Or    acetaminophen (TYLENOL) suppository 650 mg  650 mg Rectal Q6H PRN    ondansetron (ZOFRAN ODT) tablet 4 mg  4 mg Oral Q8H PRN    Or    ondansetron (ZOFRAN) injection 4 mg  4 mg IntraVENous Q6H PRN        Review of Systems:    Review of Systems   Respiratory:  Negative for cough and shortness of breath. Gastrointestinal:  Positive for nausea. Negative for heartburn and vomiting. Genitourinary:  Negative for dysuria and urgency.    Neurological:  Negative for dizziness and headaches. Objective:     Visit Vitals  BP (!) 156/56 (BP 1 Location: Right upper arm, BP Patient Position: At rest)   Pulse 86   Temp 98.3 °F (36.8 °C)   Resp 18   Ht 5' (1.524 m)   Wt 63.5 kg (140 lb)   SpO2 98%   BMI 27.34 kg/m²      O2 Device: None (Room air)    Temp (24hrs), Av °F (36.7 °C), Min:97.4 °F (36.3 °C), Max:98.3 °F (36.8 °C)      701 - 1900  In: 135 [I.V.:135]  Out: 600 [Urine:600]  1901 -  0700  In: 1800 [I.V.:1800]  Out: -     PHYSICAL EXAM:    Physical Exam   Constitutional:       General: She is not in acute distress. Cardiovascular:     Rate and Rhythm: Normal rate. Rhythm irregular. Pulmonary:     Effort: Pulmonary effort is normal.     Breath sounds: Normal breath sounds. Abdominal:     Palpations: Abdomen is soft. Comments: BS x 4 quadrants  Musculoskeletal:         General: Normal range of motion. Skin:     General: Skin is warm and dry. Neurological:     Mental Status: She is oriented to person, place, and time. Psychiatric:         Mood and Affect: Mood normal.         Behavior: Behavior normal.    Data Review    Recent Results (from the past 24 hour(s))   CBC W/O DIFF    Collection Time: 22  4:31 AM   Result Value Ref Range    WBC 8.5 3.6 - 11.0 K/uL    RBC 4.20 3.80 - 5.20 M/uL    HGB 11.1 (L) 11.5 - 16.0 g/dL    HCT 37.8 35.0 - 47.0 %    MCV 90.0 80.0 - 99.0 FL    MCH 26.4 26.0 - 34.0 PG    MCHC 29.4 (L) 30.0 - 36.5 g/dL    RDW 13.9 11.5 - 14.5 %    PLATELET 169 506 - 845 K/uL    MPV 10.1 8.9 - 12.9 FL    NRBC 0.0 0  WBC    ABSOLUTE NRBC 0.00 0.00 - 0.01 K/uL       XR ABD (KUB)   Final Result    impression: Increase in fecal stasis, no significant small bowel gas seen. XR ABD (KUB)   Final Result   Dilated fluid-filled small bowel loops on recent CT are not   demonstrated radiographically.           CT ABD PELV W WO CONT   Final Result   Source of hemorrhage is not identified   Markedly distended esophagus and stomach. Mildly distended proximal small bowel,   suggesting an adhesion. Incidental constipation, dilated cardiomyopathy, coronary artery disease         XR ABD (KUB)    (Results Pending)       Active Problems:    Small bowel obstruction ([address] (2/27/2019)      Epigastric pain (8/9/2022)      Coffee ground emesis (8/9/2022)        Assessment/Plan:   Upper GI bleed- no more hematemesis, HGB 11.1, GI following. 2. Constipation- had large stool, continue miralax bid, full liquid diet, NGT d/c'd,      3. Hx of atrial fibrillation- cardiology following, on rythmol, controlled rate.     4. Discharge -likely tomorrow           DVT Prophylaxis:  Code Status:  DNR  POA: NOK daughter Allison Saldana     Care Plan discussed with:   _______patient, staff nurse, CM, GI________________________________________________________    Antonietta Barber NP

## 2022-08-11 NOTE — PROGRESS NOTES
Problem: Mobility Impaired (Adult and Pediatric)  Goal: *Acute Goals and Plan of Care (Insert Text)  Description: FUNCTIONAL STATUS PRIOR TO ADMISSION: Pt resides at [facility] with meals brought to her room. She utilizes a four-wheeled walker (not a rollator) and B AFO's for short distance gait in her apartment. HOME SUPPORT PRIOR TO ADMISSION: Pt received assistance for showers twice/week via staff at [facility] as well as assist with meals (brought to her room). Physical Therapy Goals  Initiated 8/11/2022  1. Patient will move from supine to sit and sit to supine , scoot up and down, and roll side to side in bed with modified independence within 7 day(s). 2.  Patient will transfer from bed to chair and chair to bed with modified independence using the least restrictive device within 7 day(s). 3.  Patient will perform sit to stand with modified independence within 7 day(s). 4.  Patient will ambulate with modified independence for 50 feet with the least restrictive device within 7 day(s). Outcome: Progressing Towards Goal     PHYSICAL THERAPY EVALUATION  Patient: Alexis Camp (7 y.o. female)  Date: 8/11/2022  Primary Diagnosis: Coffee ground emesis [K92.0]  Epigastric pain [R10.13]  Small bowel obstruction ([address] [K56.609]       Precautions: Fall ; NG tube         ASSESSMENT  Pt's daughter present and she will bring in pt's B AFO's so that she can ambulate further, but until then, short distance amb. Efforts bed<>bedside chair and bed<>BSC. Based on the objective data described below, the patient presents with weakness and debility from recent \"neck nerve burn\" intervention and immobility from hospitalization with earlier c/o nausea when nursing assisted her to EOB. No c/o nausea during PT session. Pt has a scooter to use outside of her apartment and utilizes a four-wheeled walker (not a rollator) for short distances in her apartment (present in room).   Anticipate increased progression as pt able to increase her mobility efforts and nutritional intake. Current Level of Function Impacting Discharge (mobility/balance): bed mob. S ; transfers CGA ; gait CGA with RW    Functional Outcome Measure: The patient scored Total Score: 16/28 on the Tinetti outcome measure which is indicative of high fall risk. Other factors to consider for discharge: pt has supportive family     Patient will benefit from skilled therapy intervention to address the above noted impairments. PLAN :  Recommendations and Planned Interventions: bed mobility training, transfer training, gait training, therapeutic exercises, patient and family training/education, and therapeutic activities      Frequency/Duration: Patient will be followed by physical therapy:  4 times a week to address goals. Recommendation for discharge: (in order for the patient to meet his/her long term goals)  To be determined: back to assisted living likely if pt continues to progress [place])    This discharge recommendation:  Has been made in collaboration with the attending provider and/or case management    IF patient discharges home will need the following DME: patient owns DME required for discharge         SUBJECTIVE:   Patient stated Odella Mainland bring me my meals, and I have scooter to drive around the building too.     OBJECTIVE DATA SUMMARY:   HISTORY:    Past Medical History:   Diagnosis Date    Abnormal x-ray of abdomen 11/13/2020    Bas showed decreased peristalsis and a cervical web      Arrhythmia     A fib    Arthritis     Diarrhea 6/6/2016    Esophageal dysphagia 11/13/2020    Foot drop     Gastric ulcer 6/14/2012    Gastroparesis     GERD (gastroesophageal reflux disease)     High cholesterol     Hypertension     Neuropathy     Ovarian cancer ([address] 1986    chemo x 9 mos    Scoliosis     Stroke [facility]) 2002    ? stroke with drop feet, per patient CT scans were negative     Past Surgical History: Procedure Laterality Date    FLEXIBLE SIGMOIDOSCOPY N/A 6/6/2016    SIGMOIDOSCOPY FLEXIBLE performed by Tigist Ray MD at [facility] ENDOSCOPY    HX ORTHOPAEDIC      back    HX ORTHOPAEDIC      bilateral shoulder replacements    HX ORTHOPAEDIC      left knee replacement    HX LYDIA AND BSO  1986    (ovarian cancer)    OR EGD TRANSORAL BIOPSY SINGLE/MULTIPLE  1/26/2012         OR EGD TRANSORAL BIOPSY SINGLE/MULTIPLE  6/14/2012         UPPER GI ENDOSCOPY,BIOPSY  11/13/2020         UPPER GI ENDOSCOPY,DIAGNOSIS  10/5/2020         UPPER GI ENDOSCOPY,DILATN W GUIDE  11/13/2020            Personal factors and/or comorbidities impacting plan of care:     Home Situation  Home Environment: [address] Name: [facility]  One/Two Story Residence: One story  Living Alone: Yes  Support Systems: Child(oracio), Caregiver/Home Care Staff  Patient Expects to be Discharged to[de-identified] Assisted Living  Current DME Used/Available at Home: Grab bars, Raised toilet seat, Walker, rolling, Wheelchair, power, Shower chair, Other (comment) (bilateral LE braces)  Tub or Shower Type: Shower    EXAMINATION/PRESENTATION/DECISION MAKING:   Critical Behavior:  Neurologic State: Alert  Orientation Level: Oriented X4  Cognition: Appropriate decision making, Appropriate for age attention/concentration, Appropriate safety awareness  Safety/Judgement: Awareness of environment, Fall prevention, Home safety, Insight into deficits  Hearing:   Auditory  Auditory Impairment: None  Skin:  dry  Edema: none  Range Of Motion:  AROM: Grossly decreased, non-functional (bilateral foot drop (baseline), WFL all other)           PROM: Within functional limits           Strength:    Strength: Generally decreased, functional (except for ankles bilateral foot drop)                    Tone & Sensation:   Tone: Abnormal (bilateral ankles)                              Coordination:  Coordination: Grossly decreased, non-functional (bilateral ankles (foot drop, wears AFO's), all other WFL)  Vision:   Corrective Lenses: Glasses  Functional Mobility:  Bed Mobility:  Rolling: Supervision  Supine to Sit: Supervision     Scooting: Supervision  Transfers:  Sit to Stand: Contact guard assistance  Stand to Sit: Contact guard assistance        Bed to Chair: Contact guard assistance (with 4 wheeled non rollator walker)              Balance:   Sitting: Intact  Standing: Impaired  Standing - Static: Good  Standing - Dynamic : Fair  Ambulation/Gait Training:  Distance (ft): 5 Feet (ft) (bed to bedside chair)  Assistive Device: Gait belt; Other (comment) (personal 4 wheeled walker, not a rollator)  Ambulation - Level of Assistance: Contact guard assistance     Gait Description (WDL): Exceptions to WDL  Gait Abnormalities: Decreased step clearance; Foot drop; Other (foot drop B ; wears B AFO's (daughter to bring in))        Base of Support: Widened     Speed/Kaylynn: Slow  Step Length: Right shortened;Left shortened        Interventions: Verbal cues; Tactile cues; Safety awareness training    Functional Measure:  Tinetti test:    Sitting Balance: 1  Arises: 1  Attempts to Rise: 2  Immediate Standing Balance: 1  Standing Balance: 1  Nudged: 2  Eyes Closed: 1  Turn 360 Degrees - Continuous/Discontinuous: 0  Turn 360 Degrees - Steady/Unsteady: 1  Sitting Down: 1  Balance Score: 11 Balance total score  Indication of Gait: 1  R Step Length/Height: 1  L Step Length/Height: 1  R Foot Clearance: 0  L Foot Clearance: 0  Step Symmetry: 1  Step Continuity: 0  Path: 1  Trunk: 0  Walking Time: 0  Gait Score: 5 Gait total score  Total Score: 16/28 Overall total score         Tinetti Tool Score Risk of Falls  <19 = High Fall Risk  19-24 = Moderate Fall Risk  25-28 = Low Fall Risk  Tinetti ME. Performance-Oriented Assessment of Mobility Problems in Elderly Patients. [place] 66; [id number].  (Scoring Description: PT Bulletin Feb. 10, 1993)    Older adults: Angel Wade et al, 2009; n = [address] elderly evaluated with ABC, SULEMAN, ADL, and IADL)  · Mean SULEMAN score for males aged 69-68 years = 26.21(3.40)  · Mean SULEMAN score for females age 69-68 years = 25.16(4.30)  · Mean SULEMAN score for males over 80 years = 23.29(6.02)  · Mean SULEMAN score for females over [de-identified] years = 17.20(8.32)        Physical Therapy Evaluation Charge Determination   History Examination Presentation Decision-Making   HIGH Complexity :3+ comorbidities / personal factors will impact the outcome/ POC  MEDIUM Complexity : 3 Standardized tests and measures addressing body structure, function, activity limitation and / or participation in recreation  MEDIUM Complexity : Evolving with changing characteristics  Other outcome measures Tinetti  MEDIUM      Based on the above components, the patient evaluation is determined to be of the following complexity level: MEDIUM    Pain Rating:  No c/o pain    Activity Tolerance:   Fair      After treatment patient left in no apparent distress:   Sitting in chair, Call bell within reach, and nurse notified     COMMUNICATION/EDUCATION:   The patients plan of care was discussed with: Occupational therapist and Registered nurse. Fall prevention education was provided and the patient/caregiver indicated understanding., Patient/family have participated as able in goal setting and plan of care. , and Patient/family agree to work toward stated goals and plan of care.     Thank you for this referral.  Robert Simmons PT, DPT   Time Calculation: 19 mins

## 2022-08-12 ENCOUNTER — APPOINTMENT (OUTPATIENT)
Dept: GENERAL RADIOLOGY | Age: 87
DRG: 379 | End: 2022-08-12
Attending: INTERNAL MEDICINE
Payer: MEDICARE

## 2022-08-12 VITALS
HEIGHT: 60 IN | SYSTOLIC BLOOD PRESSURE: 157 MMHG | HEART RATE: 87 BPM | WEIGHT: 140 LBS | OXYGEN SATURATION: 94 % | RESPIRATION RATE: 18 BRPM | TEMPERATURE: 97.8 F | BODY MASS INDEX: 27.48 KG/M2 | DIASTOLIC BLOOD PRESSURE: 93 MMHG

## 2022-08-12 LAB
B PERT DNA SPEC QL NAA+PROBE: NOT DETECTED
BORDETELLA PARAPERTUSSIS PCR, BORPAR: NOT DETECTED
C PNEUM DNA SPEC QL NAA+PROBE: NOT DETECTED
COVID-19 RAPID TEST, COVR: NOT DETECTED
FLUAV SUBTYP SPEC NAA+PROBE: NOT DETECTED
FLUBV RNA SPEC QL NAA+PROBE: NOT DETECTED
GLUCOSE BLD STRIP.AUTO-MCNC: 96 MG/DL (ref 65–117)
HADV DNA SPEC QL NAA+PROBE: NOT DETECTED
HCOV 229E RNA SPEC QL NAA+PROBE: NOT DETECTED
HCOV HKU1 RNA SPEC QL NAA+PROBE: NOT DETECTED
HCOV NL63 RNA SPEC QL NAA+PROBE: NOT DETECTED
HCOV OC43 RNA SPEC QL NAA+PROBE: NOT DETECTED
HMPV RNA SPEC QL NAA+PROBE: NOT DETECTED
HPIV1 RNA SPEC QL NAA+PROBE: NOT DETECTED
HPIV2 RNA SPEC QL NAA+PROBE: NOT DETECTED
HPIV3 RNA SPEC QL NAA+PROBE: NOT DETECTED
HPIV4 RNA SPEC QL NAA+PROBE: NOT DETECTED
M PNEUMO DNA SPEC QL NAA+PROBE: NOT DETECTED
RSV RNA SPEC QL NAA+PROBE: NOT DETECTED
RV+EV RNA SPEC QL NAA+PROBE: NOT DETECTED
SARS-COV-2 PCR, COVPCR: NOT DETECTED
SERVICE CMNT-IMP: NORMAL
SOURCE, COVRS: NORMAL

## 2022-08-12 PROCEDURE — 94760 N-INVAS EAR/PLS OXIMETRY 1: CPT

## 2022-08-12 PROCEDURE — 0202U NFCT DS 22 TRGT SARS-COV-2: CPT

## 2022-08-12 PROCEDURE — 74011250637 HC RX REV CODE- 250/637: Performed by: NURSE PRACTITIONER

## 2022-08-12 PROCEDURE — 74018 RADEX ABDOMEN 1 VIEW: CPT

## 2022-08-12 PROCEDURE — 82962 GLUCOSE BLOOD TEST: CPT

## 2022-08-12 PROCEDURE — 74011250637 HC RX REV CODE- 250/637: Performed by: INTERNAL MEDICINE

## 2022-08-12 PROCEDURE — 87635 SARS-COV-2 COVID-19 AMP PRB: CPT

## 2022-08-12 RX ORDER — LORAZEPAM 0.5 MG/1
0.5 TABLET ORAL
Status: DISCONTINUED | OUTPATIENT
Start: 2022-08-12 | End: 2022-08-12 | Stop reason: HOSPADM

## 2022-08-12 RX ADMIN — GABAPENTIN 100 MG: 100 CAPSULE ORAL at 08:40

## 2022-08-12 RX ADMIN — PANTOPRAZOLE SODIUM 40 MG: 40 TABLET, DELAYED RELEASE ORAL at 08:41

## 2022-08-12 RX ADMIN — PROPAFENONE HYDROCHLORIDE 150 MG: 150 TABLET, FILM COATED ORAL at 18:35

## 2022-08-12 RX ADMIN — PROPAFENONE HYDROCHLORIDE 150 MG: 150 TABLET, FILM COATED ORAL at 08:41

## 2022-08-12 RX ADMIN — POLYETHYLENE GLYCOL 3350 17 G: 17 POWDER, FOR SOLUTION ORAL at 08:41

## 2022-08-12 RX ADMIN — DOCUSATE SODIUM 200 MG: 100 CAPSULE ORAL at 08:41

## 2022-08-12 RX ADMIN — POLYETHYLENE GLYCOL 3350 17 G: 17 POWDER, FOR SOLUTION ORAL at 18:35

## 2022-08-12 RX ADMIN — GABAPENTIN 800 MG: 300 CAPSULE ORAL at 15:33

## 2022-08-12 NOTE — PROGRESS NOTES
Transition of Care Plan:    RUR: 12%  Disposition: Return to [facility]   Follow up appointments: Follow up with PCP and/or Specialist   DME needed:Pt has wheelchair, walker, and scooter at home  Transportation [address] or means to access home: N/A  IM Medicare Letter: 2nd IM Medicare Letter to be given   Is patient a [place] and connected with the [place]? N/A        If yes, was [employer] transfer form completed and [facility] notified? Caregiver Contact: Evelio Wagoner (daughter) [phone]  Discharge Caregiver contacted prior to discharge? Family to be contacted  Care Conference needed?:  Not at this time     UPDATE: 12:37PM    CM received call from admin coordinator at [facility], via telephone regarding pt requiring PCR test.  CM informed that if pt receives PCR covid test and it results in 24hrs, they are able to accept pt back to their facility on 8/13/22 vs 8/15/22. CALL REPORT: [phone]    Pts daughter aware of the following. CM complete the need of the pt at this time. JASMHID Mccullough, [facility]        UPDATE: 12:26PM    CM informed by admin director at [facility], that pt will require PCR covid test vs rapid test. Admin coordinator reported that since pt is being recommended for their healthcare unit by facility DON, pt will not be able to admit back to [facility] until 8/15/22-due to staffing. CM will inform clinical team of the following. JAMSHID Mccullough, [facility]      INITIAL NOTE: CM aware that pt will d/c on today and will return back to [facility] on today. CM spoke with pt's daughter: Martha Collins to verify pt's information. Pt has been at [facility] for over [employer]. Pt has recently moved to [facility], and has participated in the healthcare unit at [facility]. Pt has DME at home-wheelchair, walker, and scooter. Martha Collins reported that her sister: Kunal Dempsey will assist with transport home-[facility] on today.   Kunal Dempsey will arrive to [address] between 10:30A-11A. CM will inform clinical team of the following. CM spoke with admin coordinator at [facility], via telephone and sent updated clinicals to facility to review. Pt will transition back to [facility] and will receive therapy at the healthcare unit. CM was provided call report info: [phone], to provide pt's nurse. CM placed 2nd IM Medicare Letter in pt's chart (verbal consent from Dyllan Thao). CM completed the needs of the pt at this time.     JAMSHID Hernández, [address] [place] [address]

## 2022-08-12 NOTE — PROGRESS NOTES
End of Shift Note    Bedside shift change report given to Nicole Torrez RN (oncoming nurse) by Jacki Wilhelm LPN (offgoing nurse). Report included the following information SBAR, Kardex, and MAR    Shift worked:  7p-7:30a     Shift summary and any significant changes:    -  Pt. Administered med's per MAR  - Pt. Thought she was in Afib, during shift, vitals were within normal range. She requested an ice pack which she put over her face, reassured her she was was not in Afib,  that helped her go back to sleep.       Concerns for physician to address:  N/A     Zone phone for oncoming shift:          Jacki Wilhelm LPN

## 2022-08-12 NOTE — PROGRESS NOTES
I have reviewed discharge instructions with the patient and daughter. The patient and daughter verbalized understanding. Report given to [facility]. I spoke w Preeti from [facility] and she confirmed PCR results were received by fax and that facility is ready to receive pt tonight. Family will be transporting pt to facility as originally planned. No IV access. Belongings w patient.

## 2022-08-12 NOTE — PROGRESS NOTES
Cardiology Progress Note  8/12/2022     Admit Date: 8/9/2022  Admit Diagnosis: Coffee ground emesis [K92.0]  Epigastric pain [R10.13]  Small bowel obstruction ([address] [K56.609]  CC: none currently  Cardiologist:  Dr Sebastian Delgadillo. Cardiac Assessment/Plan:    1. Paroxysmal atrial fibrillation with RVR diagnosed in 11/2018, minimal to no symptoms. Back in sinus during that hospital stay. Recurrent but transient RVR during 7/2020 admission. Anticoagulation stopped at that time due to GIB. Will continue propafenone and avoid anticoagulation. Patient and family understand risk. 2. Chronic LBBB. 3. PVC's. 4. Hyperlipidemia. 5. Says she is not diabetic. 6. Hypercholesterolemia. 7. CKD stage 3.  8. Anemia NOS. 9. Scoliosis/arthritis s/p extensive thoracolumbar fusion 2002, now with broken hardware, s/p LUCY 12/2013 (effective), neuropathy since knee surgery 2009. 10. History of ovarian cancer s/p chemo. 11. GERD/Lewis's esophagus, gastroparesis. 12. Back pain. Fractured thoracolumbar fusion rods, stable. Protruding pedicle screws, stable. Neuroforaminal stenoses. 13. Walks with a walker. Peripheral neuropathy in her legs. 14. SBO 3/2019, resolved without surgery     Admitted with N/V, ?partial SBO; PAfib noted on tele; sinus now; NG in.  Current cardiac meds: none. Rec: Restart propafenone; No AC as noted below. 8/11:No CP/dyspnea; \"full abd\"  BPs 90-150s; HR 60-90; 130-140s w/ PAfib; 96% RA  Nl WBC; Hg 11.1;     Cardiac meds: propafenone  bid (has only got 1 dose). Rec: if needed, propafenone to tid.     8/12: No CP/dyspnea; No Afib. VSSAF; No new labs; Stable cardiac status; will sign-off; Please call if questions/issues. Routine F/U with Dr Sebastian Delgadillo after d/c. Thanks. ____________________________________________________________________  Na Rivera is a 80 y.o. female presents with Coffee ground emesis [K92.0]  Epigastric pain [R10.13].      As noted in H&P: \"80 y.o. female past medical history of upper GI bleed with ulcerative esophagitis, esophageal narrowing, gastroparesis presents from [facility] assisted living with above symptoms. She has been constipated due to being on oxycodone twice daily for chronic neck pain. She asked for MiraLAX last night. Her last bowel movement was last Friday, normal brown color. At 11:30 PM she started vomiting with yellow emesis and had a total 8 episodes. This morning, emesis changed to dark brown, close to black and assisted living facility staff sent to ER for evaluation. In the ER, she has had a few more episodes of dark brown to black emesis. She is also complaining of epigastric tightness. Denies any chest pain, SOB. She also had nerve ablation in her neck for chronic neck pain last week. 4/20/22 EGD Dr. Diana Bentley  Findings:  Esophagus: Mild resistance at the UES easily traversed with scope. In the distal esophagus we saw some liquid contents regurgitating into the distal 1/3 of the esophagus. There was some ? Yellow exudate vs. Particulate matter s/p brushing to r/o Candida.  +          Irregular Z line with columnar mucosa extending proximally to 35 cm from incisors with decrease LES tone. +          5 cm Hiatal hernia with some food content seen in sac. S/P Bx of mid and distal esophagus  We elected to not extend procedure beyond the dilation. Placed guidewire into stomach and then advanced 45 FR Savary over wire with some resistance. Stomach: +          Food in stomach with patent pylorus. Recommendations: -f/u path  -elevate HOB, small meals -will need to f/u to discuss tx of her gastroparesis\"  ______________________________________________________________________     The patient reports no CP, dyspnea, palps. No PND, orthopnea, palpitations, pre-syncope, syncope, peripheral edema. No current complaints. ECG: Sinus; LBBB  Tele: Sinus then PAFIb; sinus back.   CXR: none  Abd CT: \"Source of hemorrhage is not identified; Markedly distended esophagus and stomach. Mildly distended proximal small bowel, suggesting an adhesion. Incidental constipation, dilated cardiomyopathy, coronary artery disease\"     Notable labs: WBC 11; Hg 10.5; K 3.6; Cr 0.7; Nl trop.  ________________________________________  Notable prior cardiac history:  @ OV 2/23/22  HISTORY OF PRESENT ILLNESS:  1. Paroxysmal atrial fibrillation with RVR diagnosed in 11/2018, minimal to no symptoms. Back in sinus during that hospital stay. Recurrent but transient RVR during 7/2020 admission. Anticoagulation stopped at that time due to GIB. Will continue propafenone and avoid anticoagulation. Patient and family understand risk. 2. Chronic LBBB. 3. PVC's. 4. Hyperlipidemia. 5. Says she is not diabetic. 6. Hypercholesterolemia. 7. CKD stage 3.  8. Anemia NOS. 9. Scoliosis/arthritis s/p extensive thoracolumbar fusion 2002, now with broken hardware, s/p LUCY 12/2013 (effective), neuropathy since knee surgery 2009. 10. History of ovarian cancer s/p chemo. 11. GERD/Lewis's esophagus, gastroparesis. 12. Back pain. Fractured thoracolumbar fusion rods, stable. Protruding pedicle screws, stable. Neuroforaminal stenoses. 13. Walks with a walker. Peripheral neuropathy in her legs. She had SBO 3/2019, now resolved without surgery. 2/2022 EP update:  Denies syncope, dizziness, palpitations. No chest, jaw, neck, shoulder, or arm pain. No orthopnea, PND, or worsening pedal edema. Patient denies claudication. There is no discoloration or ulceration of the lower extremities. The patient has had no TIA or stroke-like symptoms. IMPRESSION AND PLAN  01. Preprocedural cardiovascular examination (Z01.810):  Overall assessment suggests that the patient's risk of periprocedural cardiac complications should be low. 02. Other dysphagia (R13.19):  Sees Dr. Eli Macdonald. 03. Paroxysmal atrial fibrillation (I48.0):   The patient is maintaining sinus rhythm. We will continue the current therapy. Patient has a CHADSVASc score of 4 or greater. Patient HAS-BLED score is 2, indicating  risk of major bleeding as ~2/100 patient years. She had recurrent GIB 7/2020. Anticoagulation stopped. Per hospital note, \"I would NOT use anticoagulation or antiplatelet therapy. Very reluctant to do it in 2019, gave it a try later, now know that it is not something to do going forward. Not a good candidate for a Watchman device (JOHN occlusion) as it requires 6 weeks of anticoagulation upfront. Not a good candidate for Afib ablation as it requires 2-3 months of anticoagulation post-procedure. \"  ECG done I have made no changes to the present regimen. Rachana Bragg 04. LBBB (left bundle branch block) (I44.7): The patient has had no syncopal episodes. No clinical bradyarrhythmias are noted. 05. Essential hypertension (I10): Adequately controlled. We will continue the current therapy. I have made no changes to the present regimen. 06. Mixed hyperlipidemia (E78.2): On chronic medical therapy. labs per PCP.   07. Venous insufficiency of both lower extremities (I87.2): This condition is stable. I have recommended a salt restricted diet, compliance with medications and regular compression stocking use. 08. Body mass index [BMI] 27.0-27.9, adult (X31.26): The patient was instructed on AHA diet and regular exercise. ORDERS:  1 ECG done     2 Dietary management education, guidance, and counseling     3 Return office visit with Ashley Bunch MD in 1 Year. 4 The patient was instructed on AHA diet and regular exercise. 2/23/22 MEDICATION LIST  Medication Sig Desc   AcipHex 20 mg tablet,delayed release take 1 tablet by oral route  every day swallowing whole. Do not crush, chew and/or divide. cholecalciferol (vitamin D3) 25 mcg (1,000 unit) tablet Take 1,000 Units by mouth daily. famotidine 20 mg tablet Take 20 mg by mouth daily.    gabapentin 800 mg tablet take 1 tablet by oral route 4 times every day   hydrochlorothiazide 25 mg tablet take 1 tablet by oral route  every day   lisinopril 10 mg tablet take 1 tablet by oral route  every day   magnesium oxide 400 mg (241.3 mg magnesium) tablet Take 400 mg by mouth daily. melatonin 3 mg tablet     Miralax 17 gram oral powder packet take 1 packet by oral route  every day mixed with 8 oz. water, juice, soda, coffee or tea   multivitamin tablet Take 1 Tab by mouth daily. potassium chloride ER 10 mEq tablet,extended release Take 1 Tab by mouth daily. propafenone 150 mg tablet Take 1 Tab by mouth two (2) times a day. simvastatin 40 mg tablet take 1 tablet by oral route  every day in the evening   Tylenol 325 mg capsule  as needed     For other plans, see orders. Hospital problem list     Active Hospital Problems    Diagnosis Date Noted    Epigastric pain 2022    Coffee ground emesis 2022    Small bowel obstruction ([facility] [address] 2019        Subjective: [facility] reports       Chest pain X none  consistent with:  Non-cardiac CP         Atypical CP     None now  On going  Anginal CP     Dyspnea X none  at rest  with exertion         improved  unchanged  worse              PND X none  overnight       Orthopnea X none  improved  unchanged  worse   Presyncope X none  improved  unchanged  worse     Ambulated in hallway without symptoms   Yes   Ambulated in room without symptoms  Yes   Objective:     Physical Exam:  Overall VSSAF;  Visit Vitals  BP (!) 153/62   Pulse 83   Temp 98.4 °F (36.9 °C)   Resp 18   Ht 5' (1.524 m)   Wt 63.5 kg (140 lb)   SpO2 94%   BMI 27.34 kg/m²     Temp (24hrs), Av.3 °F (36.8 °C), Min:98.1 °F (36.7 °C), Max:98.4 °F (36.9 °C)    No data found. No data found. No data found.       Intake/Output Summary (Last 24 hours) at 2022 0749  Last data filed at 2022 1025  Gross per 24 hour   Intake 135 ml   Output 600 ml   Net -465 ml       General Appearance: Well developed, well nourished, no acute distress. Ears/Nose/Mouth/Throat:   Normal MM; anicteric. JVP: WNL   Resp:   Lungs clear to auscultation bilaterally. Nl resp effort. Cardiovascular:  RRR, S1, S2 normal, no new murmur. No gallop or rub. Abdomen:   Soft, non-tender, bowel sounds are present. Extremities: No edema bilaterally. Skin:  Neuro: Warm and dry. A/O x3, grossly nonfocal       cath site intact w/o hematoma or new bruit; distal pulse unchanged  Yes   Data Review:     Telemetry independently reviewed x sinus  chronic afib  parox afib  NSVT     ECG independently reviewed  NSR  afib  no significant changes  NSST-Tw chgs     x no new ECG provided for review   Lab results reviewed as noted below. Current medications reviewed as noted below. No results for input(s): PH, PCO2, PO2 in the last 72 hours. No results for input(s): CPK, CKMB, CKNDX, TROIQ in the last 72 hours.   Recent Labs     08/11/22  0431 08/10/22  0446 08/09/22  1034   NA  --  141 140   K  --  3.6 3.9   CL  --  109* 105   CO2  --  24 31   BUN  --  35* 36*   CREA  --  0.70 0.92   GFRAA  --  >60 >60   GLU  --  106* 146*   CA  --  8.8 10.5*   ALB  --   --  3.8   WBC 8.5 11.1* 10.3   HGB 11.1* 10.5* 11.9   HCT 37.8 35.8 39.0    281 324       Lab Results   Component Value Date/Time    Cholesterol, total 138 07/20/2018 12:00 AM    HDL Cholesterol 46 07/20/2018 12:00 AM    LDL, calculated 66 07/20/2018 12:00 AM    Triglyceride 132 07/20/2018 12:00 AM    CHOL/HDL Ratio 3.2 09/20/2010 10:16 AM     Recent Labs     08/09/22  1034   AP 75   TP 8.0   ALB 3.8   GLOB 4.2*   LPSE 155       Recent Labs     08/09/22  1034   INR 1.0   PTP 10.5        No components found for: GLPOC    Current Facility-Administered Medications   Medication Dose Route Frequency    metoclopramide HCl (REGLAN) injection 5 mg  5 mg IntraVENous Q6H PRN    propafenone (RYTHMOL) tablet 150 mg  150 mg Oral BID    pantoprazole (PROTONIX) tablet 40 mg  40 mg Oral ACB bisacodyL (DULCOLAX) tablet 5 mg  5 mg Oral DAILY PRN    polyethylene glycol (MIRALAX) packet 17 g  17 g Oral BID    docusate sodium (COLACE) capsule 200 mg  200 mg Oral DAILY    gabapentin (NEURONTIN) capsule 100 mg  100 mg Oral TID    sodium chloride (NS) flush 5-40 mL  5-40 mL IntraVENous Q8H    sodium chloride (NS) flush 5-40 mL  5-40 mL IntraVENous PRN    acetaminophen (TYLENOL) tablet 650 mg  650 mg Oral Q6H PRN    Or    acetaminophen (TYLENOL) suppository 650 mg  650 mg Rectal Q6H PRN    ondansetron (ZOFRAN ODT) tablet 4 mg  4 mg Oral Q8H PRN    Or    ondansetron (ZOFRAN) injection 4 mg  4 mg IntraVENous Q6H PRN          Andi Haddad MD

## 2022-08-12 NOTE — PROGRESS NOTES
Pharmacist Discharge Medication Reconciliation    Significant PMH:   Past Medical History:   Diagnosis Date    Abnormal x-ray of abdomen 11/13/2020    Bas showed decreased peristalsis and a cervical web      Arrhythmia     A fib    Arthritis     Diarrhea 6/6/2016    Esophageal dysphagia 11/13/2020    Foot drop     Gastric ulcer 6/14/2012    Gastroparesis     GERD (gastroesophageal reflux disease)     High cholesterol     Hypertension     Neuropathy     Ovarian cancer ([address] 1986    chemo x 9 mos    Scoliosis     Stroke [facility]) 2002    ? stroke with drop feet, per patient CT scans were negative     Encounter Diagnoses:   Encounter Diagnosis   Name Primary? Coffee ground emesis Yes     Allergies: Patient has no known allergies. Discharge Medications:   Current Discharge Medication List        CONTINUE these medications which have NOT CHANGED    Details   diclofenac (VOLTAREN) 1 % gel Apply 2 g to affected area four (4) times daily. simvastatin (ZOCOR) 40 mg tablet TAKE 1 TABLET BY MOUTH  DAILY  Qty: 90 Tablet, Refills: 3    Comments: Requesting 1 year supply  Associated Diagnoses: Mixed hyperlipidemia      furosemide (Lasix) 20 mg tablet Take 20 mg by mouth daily. CALCIUM PO Take 1 Tab by mouth daily. polyethylene glycol 3350 (MIRALAX PO) Take 1 Each by mouth daily as needed. acetaminophen (Tylenol Extra Strength) 500 mg tablet Take 1,000 mg by mouth every six (6) hours as needed for Pain.      propafenone (RYTHMOL) 150 mg tablet Take 1 Tab by mouth two (2) times a day. Qty: 60 Tab, Refills: 2      RABEprazole (ACIPHEX) 20 mg tablet Take 1 Tab by mouth two (2) times a day. Qty: 60 Tab, Refills: 3      potassium chloride SR (KLOR-CON 10) 10 mEq tablet Take 1 Tab by mouth daily. Qty: 30 Tab, Refills: 3      magnesium oxide (MAG-OX) 400 mg tablet Take 250 mg by mouth daily. famotidine (PEPCID) 20 mg tablet Take 20 mg by mouth daily.       gabapentin (NEURONTIN) 800 mg tablet TAKE 1 TAB BY MOUTH FOUR  TIMES DAILY AS NEEDED. Qty: 360 Tab, Refills: 3      cholecalciferol (VITAMIN D3) 1,000 unit tablet Take 1,000 Units by mouth daily. multivitamin (ONE A DAY) tablet Take 1 Tab by mouth daily. STOP taking these medications       hydroCHLOROthiazide (HYDRODIURIL) 25 mg tablet Comments:   Reason for Stopping:         oxyCODONE-acetaminophen (Percocet) 5-325 mg per tablet Comments:   Reason for Stopping:         melatonin 300 mcg tab Comments:   Reason for Stopping:               The patient's chart, MAR and AVS were reviewed by Nkechi Melendez RP.     Discharging Provider: Josemanuel Garcia MD    Thank you,     Nkechi Melendez, [facility]

## 2022-08-12 NOTE — PROGRESS NOTES
Problem: Pressure Injury - Risk of  Goal: *Prevention of pressure injury  Description: Document Cristino Scale and appropriate interventions in the flowsheet. Outcome: Resolved/Not Met     Problem: Patient Education: Go to Patient Education Activity  Goal: Patient/Family Education  Outcome: Resolved/Not Met     Problem: Falls - Risk of  Goal: *Absence of Falls  Description: Document Larry Gardner Fall Risk and appropriate interventions in the flowsheet.   Outcome: Resolved/Not Met     Problem: Patient Education: Go to Patient Education Activity  Goal: Patient/Family Education  Outcome: Resolved/Not Met     Problem: Patient Education: Go to Patient Education Activity  Goal: Patient/Family Education  Outcome: Resolved/Not Met     Problem: Patient Education: Go to Patient Education Activity  Goal: Patient/Family Education  Outcome: Resolved/Not Met

## 2022-08-12 NOTE — PROGRESS NOTES
Gastroenterology Progress Note  Te[place] Vigil [place] for Dr. Sherry Mike)    8/12/2022    Admit Date: 8/9/2022    Subjective: Follow up for: N/V, hx of HH and gastroparesis, ? partial SBO     NGT has been removed  On a regular diet  Ate a chicken salad sandwich last night and no nausea, vomiting  Off reglan  Had a good bm yesterday  Wants to go home    XR Results (most recent):  Results from Hospital Encounter encounter on 08/09/22    XR ABD (KUB)    Narrative  EXAM:  XR ABD (KUB)    INDICATION: Abdominal pain. Evaluate fecal burden. COMPARISON: 8/11/2022 at 0817 hours. TECHNIQUE: Portable AP supine abdomen view at 0458 hours    FINDINGS: There is a decreased moderate amount of colonic stool. There are no  dilated bowel loops. The bones are stable. Impression  Decreased moderate amount of colonic stool. No evidence for bowel  obstruction. Current Facility-Administered Medications   Medication Dose Route Frequency    metoclopramide HCl (REGLAN) injection 5 mg  5 mg IntraVENous Q6H PRN    propafenone (RYTHMOL) tablet 150 mg  150 mg Oral BID    pantoprazole (PROTONIX) tablet 40 mg  40 mg Oral ACB    bisacodyL (DULCOLAX) tablet 5 mg  5 mg Oral DAILY PRN    polyethylene glycol (MIRALAX) packet 17 g  17 g Oral BID    docusate sodium (COLACE) capsule 200 mg  200 mg Oral DAILY    gabapentin (NEURONTIN) capsule 100 mg  100 mg Oral TID    sodium chloride (NS) flush 5-40 mL  5-40 mL IntraVENous Q8H    sodium chloride (NS) flush 5-40 mL  5-40 mL IntraVENous PRN    acetaminophen (TYLENOL) tablet 650 mg  650 mg Oral Q6H PRN    Or    acetaminophen (TYLENOL) suppository 650 mg  650 mg Rectal Q6H PRN    ondansetron (ZOFRAN ODT) tablet 4 mg  4 mg Oral Q8H PRN    Or    ondansetron (ZOFRAN) injection 4 mg  4 mg IntraVENous Q6H PRN        Objective:     Blood pressure (!) 153/62, pulse 83, temperature 98.4 °F (36.9 °C), resp. rate 18, height 5' (1.524 m), weight 63.5 kg (140 lb), SpO2 94 %.     No intake/output data recorded. 08/10 1901 - 08/12 0700  In: 1035 [I.V.:1035]  Out: 600 [Urine:600]        Physical Examination:       General:elderly white female, AAO x 3  HEENT:  sclera anicteric  MMM  Chest:  CTA  Heart: S1, S2, RRR  GI: Soft, ND, +BS, NT  Extremities: No edema or cyanosis  CNS: CNs grossly normal.    Data Review    No results found for this or any previous visit (from the past 24 hour(s)). Recent Labs     08/11/22  0431 08/10/22  0446   WBC 8.5 11.1*   HGB 11.1* 10.5*   HCT 37.8 35.8    281     Recent Labs     08/10/22  0446 08/09/22  1034    140   K 3.6 3.9   * 105   CO2 24 31   BUN 35* 36*   CREA 0.70 0.92   * 146*   CA 8.8 10.5*     Recent Labs     08/09/22  1034   AP 75   TP 8.0   ALB 3.8   GLOB 4.2*   LPSE 155     Recent Labs     08/09/22  1034   INR 1.0   PTP 10.5      No results for input(s): FE, TIBC, PSAT, FERR in the last 72 hours. Lab Results   Component Value Date/Time    Folate 50.4 (H) 06/16/2010 10:35 AM      No results for input(s): PH, PCO2, PO2 in the last 72 hours. No results for input(s): CPK, CKNDX, TROIQ in the last 72 hours.     No lab exists for component: CPKMB  Lab Results   Component Value Date/Time    Cholesterol, total 138 07/20/2018 12:00 AM    HDL Cholesterol 46 07/20/2018 12:00 AM    LDL, calculated 66 07/20/2018 12:00 AM    Triglyceride 132 07/20/2018 12:00 AM    CHOL/HDL Ratio 3.2 09/20/2010 10:16 AM     No components found for: [place]  Lab Results   Component Value Date/Time    Color YELLOW/STRAW 07/01/2020 02:28 PM    Appearance CLEAR 07/01/2020 02:28 PM    Specific gravity 1.012 07/01/2020 02:28 PM    Specific gravity 1.010 02/27/2019 08:49 PM    pH (UA) 7.5 07/01/2020 02:28 PM    Protein Negative 07/01/2020 02:28 PM    Glucose Negative 07/01/2020 02:28 PM    Ketone 15 (A) 07/01/2020 02:28 PM    Bilirubin Negative 07/01/2020 02:28 PM    Urobilinogen 0.2 07/01/2020 02:28 PM    Nitrites Negative 07/01/2020 02:28 PM    Leukocyte Esterase Negative 07/01/2020 02:28 PM    Epithelial cells FEW 07/01/2020 02:28 PM    Bacteria Negative 07/01/2020 02:28 PM    WBC 0-4 07/01/2020 02:28 PM    RBC 0-5 07/01/2020 02:28 PM        ROS: -CP, SOB, Dysuria, palpitations, cough. Assessment:    Active Problems:    Small bowel obstruction (HCC) (2/27/2019)      Epigastric pain (8/9/2022)      Coffee ground emesis (8/9/2022)           Plan/Discussion:     Much better, tolerating regular diet, off reglan, had a good bm, kub is much improved  Discharge planning per primary team  Continue miralax and avoid opioids at discharge  Discussed with patient. Recent 4/20/22 EGD Dr. Patrick Enter  Esophagus:   +          There was mild resistance at the UES easily traversed with scope. In the distal esophagus we saw some liquid contents regurgitating into the distal 1/3 of the esophagus. There was some ? Yellow exudate vs. Particulate matter s/p brushing to r/o Candida.  +          Irregular Z line with columnar mucosa extending proximally to 35 cm from incisors with decrease LES tone. +          5 cm Hiatal hernia with some food content seen in sac. S/P Bx of mid and distal esophagus     We elected to not extend procedure beyond the dilation. Placed guidewire into stomach and then advanced 45 FR Savary over wire with some resistance. Stomach:   +          Food in stomach with patent pylorus. We will follow with you. Plan d/w her RN. PEGGY Jackson  7:43 AM   8/12/2022    I have examined the patient. I have reviewed the chart and agree with the documentation recorded by the REED, including the assessment, treatment plan, and disposition. Patient seen and examined by me. I personally performed all components of the history, physical, and medical decision making and agree with the assessment and plan with minor modifications as noted.     Exam:  Alert and awake  HEENT AT  GI: soft w/ normal bowel sounds    Plan:   She has had resolution of symptoms and is now tolerating a regular diet. Daughter is at bedside. I spoke w/ her in detail. All qs answered. KUB looks better. Agree with D/c planning. We will sign off the case. Nya Clemons MD  [facility](A)

## 2022-08-12 NOTE — DISCHARGE SUMMARY
Hospitalist Discharge Summary     Patient ID:    Baljinder Mack  [id number]  80 y.o.  10/9/1933    Admit date: 8/9/2022    Discharge date : 8/12/2022    Chronic Diagnoses:    Problem List as of 8/12/2022 Date Reviewed: 4/20/2022            Codes Class Noted - Resolved    Epigastric pain ICD-10-CM: R10.13  ICD-9-CM: 789.06  8/9/2022 - Present        Coffee ground emesis ICD-10-CM: K92.0  ICD-9-CM: 578.0  8/9/2022 - Present        Esophageal dysphagia ICD-10-CM: R13.19  ICD-9-CM: 787.29  11/13/2020 - Present        Abnormal x-ray of abdomen ICD-10-CM: R93.5  ICD-9-CM: 793.6  11/13/2020 - Present    Overview Signed 11/13/2020  8:37 AM by Hayley Zee MD     Bas showed decreased peristalsis and a cervical web               History of gastric ulcer ICD-10-CM: Z87.11  ICD-9-CM: V12.79  10/5/2020 - Present        Upper GI bleed ICD-10-CM: K92.2  ICD-9-CM: 578.9  7/1/2020 - Present        Atrial fibrillation with rapid ventricular response ([facility] ICD-10-CM: I48.91  ICD-9-CM: 427.31  7/1/2020 - Present        Small bowel obstruction ([facility] ICD-10-CM: X07.147  ICD-9-CM: 560.9  2/27/2019 - Present        SBO (small bowel obstruction) ([facility] ICD-10-CM: W65.375  ICD-9-CM: 560.9  2/27/2019 - Present        Back pain ICD-10-CM: M54.9  ICD-9-CM: 724.5  11/1/2018 - Present        Ovarian cancer ([facility] ICD-10-CM: C56.9  ICD-9-CM: 183.0  7/19/2017 - Present        Insomnia ICD-10-CM: G47.00  ICD-9-CM: 780.52  7/19/2017 - Present        Essential hypertension ICD-10-CM: I10  ICD-9-CM: 401.9  7/19/2017 - Present        Hypomagnesemia ICD-10-CM: E83.42  ICD-9-CM: 275.2  7/19/2017 - Present        Hypokalemia ICD-10-CM: E87.6  ICD-9-CM: 276.8  7/19/2017 - Present        Mixed hyperlipidemia ICD-10-CM: E78.2  ICD-9-CM: 272.2  7/19/2017 - Present        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  ICD-9-CM: 737.34  7/19/2017 - Present        Foot drop, bilateral ICD-10-CM: M21.371, M21.372  ICD-9-CM: 736.79  7/19/2017 - Present        Age-related cataract of both eyes ICD-10-CM: H25.9  ICD-9-CM: 366.10  7/19/2017 - Present        Previous back surgery ICD-10-CM: Z98.890  ICD-9-CM: V45.89  7/19/2017 - Present        H/O total knee replacement ICD-10-CM: Z96.659  ICD-9-CM: V43.65  7/19/2017 - Present    Overview Signed 7/19/2017  4:04 PM by Zoila Iyer NP     left             History of replacement of both shoulder joints ICD-10-CM: O93.484, Z96.612  ICD-9-CM: V43.61  7/19/2017 - Present        Neuropathy ICD-10-CM: G62.9  ICD-9-CM: 355.9  7/19/2017 - Present        Lewis's esophagus without dysplasia ICD-10-CM: K22.70  ICD-9-CM: 530.85  6/6/2016 - Present        Lewis's esophagus ICD-10-CM: K22.70  ICD-9-CM: 530.85  6/6/2016 - Present    Overview Signed 6/6/2016  1:06 PM by Norma Mariee MD     FINAL PATHOLOGIC DIAGNOSIS  GE junction, biopsy:  Ulcerative esophagitis  Focal (rare gland) specialized intestinal epithelium compatible with Lewis's; negative for dysplasia  No fungal or viral inclusions             Diarrhea ICD-10-CM: R19.7  ICD-9-CM: 787.91  6/6/2016 - Present        Gastric ulcer ICD-10-CM: K25.9  ICD-9-CM: 531.90  6/14/2012 - Present    Overview Signed 6/14/2012 11:53 AM by Norma Mariee MD     1/2012             GERD (gastroesophageal reflux disease) ICD-10-CM: K21.9  ICD-9-CM: 530.81  1/26/2012 - Present       22    Final Diagnoses: Active Problems:    Small bowel obstruction ([address] (2/27/2019)      Epigastric pain (8/9/2022)      Coffee ground emesis (8/9/2022)        Reason for Hospitalization:  Hematemesis, constipation    Hospital Course:   Pt admitted for constipation, hematemesis and upper gastric pain. CT abdomen and pelvis showing distended esophagus and small bowel distension. NGT was inserted for bowel decompression, stool was negative for occult blood. Pt improved and subsequent bowel movement and able to take PO diet without nausea/vomiting. GI did not recommend any EGD at this time. Hemoglobin has remained stable and did not require blood transfusion. Pt to continue with current medications and avoid all opioids and continue with miralax daily to avoid constipation. Pt is stable for discharge home today at her assisted living facility for OT and PT services. Discharge Medications:   Current Discharge Medication List        CONTINUE these medications which have NOT CHANGED    Details   diclofenac (VOLTAREN) 1 % gel Apply 2 g to affected area four (4) times daily. simvastatin (ZOCOR) 40 mg tablet TAKE 1 TABLET BY MOUTH  DAILY  Qty: 90 Tablet, Refills: 3    Comments: Requesting 1 year supply  Associated Diagnoses: Mixed hyperlipidemia      furosemide (Lasix) 20 mg tablet Take 20 mg by mouth daily. CALCIUM PO Take 1 Tab by mouth daily. polyethylene glycol 3350 (MIRALAX PO) Take 1 Each by mouth daily as needed. acetaminophen (Tylenol Extra Strength) 500 mg tablet Take 1,000 mg by mouth every six (6) hours as needed for Pain.      propafenone (RYTHMOL) 150 mg tablet Take 1 Tab by mouth two (2) times a day. Qty: 60 Tab, Refills: 2      RABEprazole (ACIPHEX) 20 mg tablet Take 1 Tab by mouth two (2) times a day. Qty: 60 Tab, Refills: 3      potassium chloride SR (KLOR-CON 10) 10 mEq tablet Take 1 Tab by mouth daily. Qty: 30 Tab, Refills: 3      magnesium oxide (MAG-OX) 400 mg tablet Take 250 mg by mouth daily. famotidine (PEPCID) 20 mg tablet Take 20 mg by mouth daily. gabapentin (NEURONTIN) 800 mg tablet TAKE 1 TAB BY MOUTH FOUR  TIMES DAILY AS NEEDED. Qty: 360 Tab, Refills: 3      cholecalciferol (VITAMIN D3) 1,000 unit tablet Take 1,000 Units by mouth daily. multivitamin (ONE A DAY) tablet Take 1 Tab by mouth daily.            STOP taking these medications       hydroCHLOROthiazide (HYDRODIURIL) 25 mg tablet Comments:   Reason for Stopping:         oxyCODONE-acetaminophen (Percocet) 5-325 mg per tablet Comments:   Reason for Stopping:         melatonin 300 mcg tab Comments:   Reason for Stopping: Follow up Care:    1. Amalia Amin MD in 1 week      Follow-up Information       Follow up With Specialties Details Why Contact Info    Amalia Amin MD Family Medicine Follow up in 1 week(s)  [address]  [phone]                * Follow-up Care/Patient Instructions: Activity: Activity as tolerated  Diet: Regular Diet  Wound Care: None needed      Condition at Discharge:  Stable  __________________________________________________________________    Disposition  Home Health Care Svc  ____________________________________________________________________    Code Status:  DNR  ___________________________________________________________________    Discharge Exam:  Patient seen and examined by me on discharge day. Physical Exam   Constitutional:       General: She is not in acute distress. Cardiovascular:     Rate and Rhythm: Normal rate. Rhythm irregular. Pulmonary:     Effort: Pulmonary effort is normal.     Breath sounds: Normal breath sounds. Abdominal:     Palpations: Abdomen is soft. Comments: BS x 4 quadrants  Musculoskeletal:         General: Normal range of motion. Skin:     General: Skin is warm and dry. Neurological:     Mental Status: She is oriented to person, place, and time. Psychiatric:         Mood and Affect: Mood normal.         Behavior: Behavior normal.     CONSULTATIONS: GI    Significant Diagnostic Studies:   No results found for this or any previous visit (from the past 24 hour(s)). XR ABD (KUB)   Final Result   Decreased moderate amount of colonic stool. No evidence for bowel   obstruction. XR ABD (KUB)   Final Result    impression: Increase in fecal stasis, no significant small bowel gas seen. XR ABD (KUB)   Final Result   Dilated fluid-filled small bowel loops on recent CT are not   demonstrated radiographically.           CT ABD PELV W WO CONT   Final Result   Source of hemorrhage is not identified   Markedly distended esophagus and stomach. Mildly distended proximal small bowel,   suggesting an adhesion. Incidental constipation, dilated cardiomyopathy, coronary artery disease             Discharge: time spent 35  minutes in discharge  Education and counseling.      Signed:  Magnolia Tello NP  8/12/2022  9:08 AM

## 2022-10-01 ENCOUNTER — APPOINTMENT (OUTPATIENT)
Dept: GENERAL RADIOLOGY | Age: 87
DRG: 388 | End: 2022-10-01
Attending: EMERGENCY MEDICINE
Payer: MEDICARE

## 2022-10-01 ENCOUNTER — APPOINTMENT (OUTPATIENT)
Dept: CT IMAGING | Age: 87
DRG: 388 | End: 2022-10-01
Attending: EMERGENCY MEDICINE
Payer: MEDICARE

## 2022-10-01 ENCOUNTER — HOSPITAL ENCOUNTER (INPATIENT)
Age: 87
LOS: 4 days | Discharge: NURSING, REHAB OR LONG-TERM CARE | DRG: 388 | End: 2022-10-05
Attending: EMERGENCY MEDICINE | Admitting: INTERNAL MEDICINE
Payer: MEDICARE

## 2022-10-01 DIAGNOSIS — K92.0 HEMATEMESIS WITH NAUSEA: ICD-10-CM

## 2022-10-01 DIAGNOSIS — K56.600 PARTIAL SMALL BOWEL OBSTRUCTION (HCC): Primary | ICD-10-CM

## 2022-10-01 DIAGNOSIS — M54.10 RADICULOPATHY, UNSPECIFIED SPINAL REGION: Chronic | ICD-10-CM

## 2022-10-01 LAB
ALBUMIN SERPL-MCNC: 3.7 G/DL (ref 3.5–5)
ALBUMIN/GLOB SERPL: 0.9 {RATIO} (ref 1.1–2.2)
ALP SERPL-CCNC: 82 U/L (ref 45–117)
ALT SERPL-CCNC: 17 U/L (ref 12–78)
ANION GAP SERPL CALC-SCNC: 7 MMOL/L (ref 5–15)
APPEARANCE UR: CLEAR
AST SERPL-CCNC: 17 U/L (ref 15–37)
BACTERIA URNS QL MICRO: NEGATIVE /HPF
BASE EXCESS BLD CALC-SCNC: 5.3 MMOL/L
BASOPHILS # BLD: 0.2 K/UL (ref 0–0.1)
BASOPHILS NFR BLD: 1 % (ref 0–1)
BILIRUB SERPL-MCNC: 0.4 MG/DL (ref 0.2–1)
BILIRUB UR QL: NEGATIVE
BUN SERPL-MCNC: 44 MG/DL (ref 6–20)
BUN/CREAT SERPL: 41 (ref 12–20)
CA-I BLD-MCNC: 1.37 MMOL/L (ref 1.12–1.32)
CALCIUM SERPL-MCNC: 10.8 MG/DL (ref 8.5–10.1)
CHLORIDE BLD-SCNC: 103 MMOL/L (ref 100–108)
CHLORIDE SERPL-SCNC: 102 MMOL/L (ref 97–108)
CO2 BLD-SCNC: 32 MMOL/L (ref 19–24)
CO2 SERPL-SCNC: 31 MMOL/L (ref 21–32)
COLOR UR: ABNORMAL
CREAT SERPL-MCNC: 1.08 MG/DL (ref 0.55–1.02)
CREAT UR-MCNC: 0.8 MG/DL (ref 0.6–1.3)
DIFFERENTIAL METHOD BLD: ABNORMAL
EOSINOPHIL # BLD: 0 K/UL (ref 0–0.4)
EOSINOPHIL NFR BLD: 0 % (ref 0–7)
EPITH CASTS URNS QL MICRO: ABNORMAL /LPF
ERYTHROCYTE [DISTWIDTH] IN BLOOD BY AUTOMATED COUNT: 15.3 % (ref 11.5–14.5)
GLOBULIN SER CALC-MCNC: 4.2 G/DL (ref 2–4)
GLUCOSE BLD STRIP.AUTO-MCNC: 163 MG/DL (ref 74–106)
GLUCOSE SERPL-MCNC: 163 MG/DL (ref 65–100)
GLUCOSE UR STRIP.AUTO-MCNC: NEGATIVE MG/DL
HCO3 BLDA-SCNC: 32 MMOL/L
HCT VFR BLD AUTO: 40.1 % (ref 35–47)
HGB BLD-MCNC: 11.9 G/DL (ref 11.5–16)
HGB UR QL STRIP: NEGATIVE
HYALINE CASTS URNS QL MICRO: ABNORMAL /LPF (ref 0–2)
IMM GRANULOCYTES # BLD AUTO: 0.2 K/UL (ref 0–0.04)
IMM GRANULOCYTES NFR BLD AUTO: 1 % (ref 0–0.5)
KETONES UR QL STRIP.AUTO: NEGATIVE MG/DL
LACTATE BLD-SCNC: 1.47 MMOL/L (ref 0.4–2)
LEUKOCYTE ESTERASE UR QL STRIP.AUTO: NEGATIVE
LIPASE SERPL-CCNC: 249 U/L (ref 73–393)
LYMPHOCYTES # BLD: 0.8 K/UL (ref 0.8–3.5)
LYMPHOCYTES NFR BLD: 5 % (ref 12–49)
MCH RBC QN AUTO: 25.1 PG (ref 26–34)
MCHC RBC AUTO-ENTMCNC: 29.7 G/DL (ref 30–36.5)
MCV RBC AUTO: 84.4 FL (ref 80–99)
MONOCYTES # BLD: 0.8 K/UL (ref 0–1)
MONOCYTES NFR BLD: 5 % (ref 5–13)
NEUTS SEG # BLD: 13.2 K/UL (ref 1.8–8)
NEUTS SEG NFR BLD: 88 % (ref 32–75)
NITRITE UR QL STRIP.AUTO: NEGATIVE
NRBC # BLD: 0 K/UL (ref 0–0.01)
NRBC BLD-RTO: 0 PER 100 WBC
PCO2 BLDV: 51.7 MMHG (ref 41–51)
PH BLDV: 7.39 [PH] (ref 7.32–7.42)
PH UR STRIP: 5.5 [PH] (ref 5–8)
PLATELET # BLD AUTO: 365 K/UL (ref 150–400)
PMV BLD AUTO: 9.7 FL (ref 8.9–12.9)
PO2 BLDV: 20 MMHG (ref 25–40)
POTASSIUM BLD-SCNC: 3.4 MMOL/L (ref 3.5–5.5)
POTASSIUM SERPL-SCNC: 3.7 MMOL/L (ref 3.5–5.1)
PROT SERPL-MCNC: 7.9 G/DL (ref 6.4–8.2)
PROT UR STRIP-MCNC: 30 MG/DL
RBC # BLD AUTO: 4.75 M/UL (ref 3.8–5.2)
RBC #/AREA URNS HPF: ABNORMAL /HPF (ref 0–5)
RBC MORPH BLD: ABNORMAL
SODIUM BLD-SCNC: 144 MMOL/L (ref 136–145)
SODIUM SERPL-SCNC: 140 MMOL/L (ref 136–145)
SP GR UR REFRACTOMETRY: 1.03
SPECIMEN SITE: ABNORMAL
TROPONIN-HIGH SENSITIVITY: 8 NG/L (ref 0–51)
UA: UC IF INDICATED,UAUC: ABNORMAL
UROBILINOGEN UR QL STRIP.AUTO: 0.2 EU/DL (ref 0.2–1)
WBC # BLD AUTO: 15.2 K/UL (ref 3.6–11)
WBC URNS QL MICRO: ABNORMAL /HPF (ref 0–4)

## 2022-10-01 PROCEDURE — 83690 ASSAY OF LIPASE: CPT

## 2022-10-01 PROCEDURE — 74011000250 HC RX REV CODE- 250: Performed by: INTERNAL MEDICINE

## 2022-10-01 PROCEDURE — 81001 URINALYSIS AUTO W/SCOPE: CPT

## 2022-10-01 PROCEDURE — 93005 ELECTROCARDIOGRAM TRACING: CPT

## 2022-10-01 PROCEDURE — 80053 COMPREHEN METABOLIC PANEL: CPT

## 2022-10-01 PROCEDURE — 74011250636 HC RX REV CODE- 250/636: Performed by: INTERNAL MEDICINE

## 2022-10-01 PROCEDURE — 36415 COLL VENOUS BLD VENIPUNCTURE: CPT

## 2022-10-01 PROCEDURE — 87040 BLOOD CULTURE FOR BACTERIA: CPT

## 2022-10-01 PROCEDURE — 65270000046 HC RM TELEMETRY

## 2022-10-01 PROCEDURE — 96375 TX/PRO/DX INJ NEW DRUG ADDON: CPT

## 2022-10-01 PROCEDURE — 0D9670Z DRAINAGE OF STOMACH WITH DRAINAGE DEVICE, VIA NATURAL OR ARTIFICIAL OPENING: ICD-10-PCS | Performed by: EMERGENCY MEDICINE

## 2022-10-01 PROCEDURE — 96374 THER/PROPH/DIAG INJ IV PUSH: CPT

## 2022-10-01 PROCEDURE — 82947 ASSAY GLUCOSE BLOOD QUANT: CPT

## 2022-10-01 PROCEDURE — 99221 1ST HOSP IP/OBS SF/LOW 40: CPT | Performed by: SURGERY

## 2022-10-01 PROCEDURE — 85025 COMPLETE CBC W/AUTO DIFF WBC: CPT

## 2022-10-01 PROCEDURE — 74018 RADEX ABDOMEN 1 VIEW: CPT

## 2022-10-01 PROCEDURE — 74011250636 HC RX REV CODE- 250/636: Performed by: EMERGENCY MEDICINE

## 2022-10-01 PROCEDURE — 84484 ASSAY OF TROPONIN QUANT: CPT

## 2022-10-01 PROCEDURE — 74177 CT ABD & PELVIS W/CONTRAST: CPT

## 2022-10-01 PROCEDURE — 74011000636 HC RX REV CODE- 636: Performed by: EMERGENCY MEDICINE

## 2022-10-01 PROCEDURE — C9113 INJ PANTOPRAZOLE SODIUM, VIA: HCPCS | Performed by: INTERNAL MEDICINE

## 2022-10-01 PROCEDURE — 74011000258 HC RX REV CODE- 258: Performed by: EMERGENCY MEDICINE

## 2022-10-01 PROCEDURE — 85018 HEMOGLOBIN: CPT

## 2022-10-01 PROCEDURE — 99285 EMERGENCY DEPT VISIT HI MDM: CPT

## 2022-10-01 PROCEDURE — 71045 X-RAY EXAM CHEST 1 VIEW: CPT

## 2022-10-01 PROCEDURE — C9113 INJ PANTOPRAZOLE SODIUM, VIA: HCPCS | Performed by: EMERGENCY MEDICINE

## 2022-10-01 RX ORDER — MORPHINE SULFATE 2 MG/ML
0.5 INJECTION, SOLUTION INTRAMUSCULAR; INTRAVENOUS ONCE
Status: ACTIVE | OUTPATIENT
Start: 2022-10-01 | End: 2022-10-02

## 2022-10-01 RX ORDER — ACETAMINOPHEN 650 MG/1
650 SUPPOSITORY RECTAL
Status: DISCONTINUED | OUTPATIENT
Start: 2022-10-01 | End: 2022-10-05 | Stop reason: HOSPADM

## 2022-10-01 RX ORDER — PANTOPRAZOLE SODIUM 40 MG/10ML
40 INJECTION, POWDER, LYOPHILIZED, FOR SOLUTION INTRAVENOUS
Status: COMPLETED | OUTPATIENT
Start: 2022-10-01 | End: 2022-10-01

## 2022-10-01 RX ORDER — ONDANSETRON 2 MG/ML
4 INJECTION INTRAMUSCULAR; INTRAVENOUS
Status: DISCONTINUED | OUTPATIENT
Start: 2022-10-01 | End: 2022-10-05 | Stop reason: HOSPADM

## 2022-10-01 RX ORDER — ONDANSETRON 2 MG/ML
4 INJECTION INTRAMUSCULAR; INTRAVENOUS
Status: COMPLETED | OUTPATIENT
Start: 2022-10-01 | End: 2022-10-01

## 2022-10-01 RX ORDER — SODIUM CHLORIDE AND POTASSIUM CHLORIDE .9; .15 G/100ML; G/100ML
SOLUTION INTRAVENOUS CONTINUOUS
Status: DISCONTINUED | OUTPATIENT
Start: 2022-10-01 | End: 2022-10-02

## 2022-10-01 RX ORDER — ONDANSETRON 4 MG/1
4 TABLET, ORALLY DISINTEGRATING ORAL
Status: DISCONTINUED | OUTPATIENT
Start: 2022-10-01 | End: 2022-10-05 | Stop reason: HOSPADM

## 2022-10-01 RX ORDER — ACETAMINOPHEN 325 MG/1
650 TABLET ORAL
Status: DISCONTINUED | OUTPATIENT
Start: 2022-10-01 | End: 2022-10-05 | Stop reason: HOSPADM

## 2022-10-01 RX ADMIN — ONDANSETRON 4 MG: 2 INJECTION INTRAMUSCULAR; INTRAVENOUS at 14:41

## 2022-10-01 RX ADMIN — POTASSIUM CHLORIDE AND SODIUM CHLORIDE: 900; 150 INJECTION, SOLUTION INTRAVENOUS at 17:23

## 2022-10-01 RX ADMIN — SODIUM CHLORIDE 1000 ML: 9 INJECTION, SOLUTION INTRAVENOUS at 14:40

## 2022-10-01 RX ADMIN — IOPAMIDOL 100 ML: 755 INJECTION, SOLUTION INTRAVENOUS at 14:31

## 2022-10-01 RX ADMIN — SODIUM CHLORIDE 1 G: 900 INJECTION INTRAVENOUS at 14:55

## 2022-10-01 RX ADMIN — SODIUM CHLORIDE, PRESERVATIVE FREE 40 MG: 5 INJECTION INTRAVENOUS at 23:02

## 2022-10-01 RX ADMIN — PANTOPRAZOLE SODIUM 40 MG: 40 INJECTION, POWDER, FOR SOLUTION INTRAVENOUS at 14:41

## 2022-10-01 NOTE — PROGRESS NOTES
Pt admit from ED at 18:30, NGT at 55cm with brown output to sux. Please order gabapentin 800mg 4x a day (home dose), thanks.

## 2022-10-01 NOTE — H&P
Hospitalist Admission Note    NAME: Rajesh Upton   :  10/9/1933   MRN:  [id number]     Date/Time:  10/1/2022 4:02 PM    Patient PCP: Terry Hudson MD  _____________________________________________________________________  Given the patient's current clinical presentation, I have a high level of concern for decompensation if discharged from the emergency department. Complex decision making was performed, which includes reviewing the patient's available past medical records, laboratory results, and x-ray films. My assessment of this patient's clinical condition and my plan of care is as follows. Assessment / Plan:    Abdominal pain  Hematemesis   Partial SBO  -CT abdomen:  IMPRESSION  1. Distention of the esophagus, stomach, and small bowel with gradual  normalization of caliber of small bowel loops in the ileum consistent with  partial small bowel obstruction. 2.  Thickened small bowel loops in the right lower quadrant possibly secondary  to inflammatory bowel disease. 3.  Large volume stool in the rectum. 4.  19 x 18 mm enhancing left renal mass concerning for renal cell carcinoma,  stable from prior imaging.  -last EGD 2022  -serial Hg/Hct. Transfuse prn  -start IV protonix  -consult GI and Gen Surg. GI recommending EGD  -NG to LIWS    Volume depletion   -pre renal by labs, continue IVFs after bolus  -add K to IVFs    Possible aspiration PNA  -CXR with right infrahilar and retocardiac ASD  -check PCT  -continue Rocephin started in ER. If leukocytosis or clinically worse, should expand coverage to zosyn    Left renal mass  -will need Urology referral or OP follow up when stable    Paroxysmal atrial fibrillation, 2018  Chronic LBBB.   Hyperlipidemia.  -restart propafenone and statin when able    Others:  Scoliosis/arthritis s/p extensive thoracolumbar fusion   Fractured thoracolumbar fusion rods, stable  Back pain / Neuropathy in her feet  -have to hold gabapentin for now due to NG suction and SBO. Family concerned about holding this too long as the patient experiences significant neuropathic pain if held to long. History of ovarian cancer s/p chemo      Code Status:  Full  Surrogate Decision Maker: children    DVT Prophylaxis:   GI Prophylaxis: not indicated    Baseline: . Lives at [address] [place] [address]. Uses walker, scooter         Subjective:   CHIEF COMPLAINT:  nausea and vomiting     HISTORY OF PRESENT ILLNESS:     Maame Phillip is a 80 y.o. female with a past history of PAF and recent admission last August for hematemesis with SBO who presents today with recurrent hematemesis. This started last night and her emesis was described as dark and greenish. She also experienced some abdominal discomfort. [facility] at [facility] and staff called for EMS this morning. ER evaluation included CT abdomen which demonstrates distention of the esophagus, stomach, and small bowel. Thickened small bowel loops in the right lower quadrant possibly secondary to inflammatory bowel disease. Large volume stool in the rectum. CXR shows right ASD  Hg 11    NG inserted in ED and 1200cc black gastric contents was drained out immediately. Patient was started on IVFs, IV protonix and IV antibiotics. We were asked to admit for work up and evaluation of the above problems.      Past Medical History:   Diagnosis Date    Abnormal x-ray of abdomen 11/13/2020    Bas showed decreased peristalsis and a cervical web      Arrhythmia     A fib    Arthritis     Diarrhea 6/6/2016    Esophageal dysphagia 11/13/2020    Foot drop     Gastric ulcer 6/14/2012    Gastroparesis     GERD (gastroesophageal reflux disease)     High cholesterol     Hypertension     Neuropathy     Ovarian cancer ([address] 1986    chemo x 9 mos    Scoliosis     Stroke [facility]) 2002    ? stroke with drop feet, per patient CT scans were negative        Past Surgical History:   Procedure Laterality Date    FLEXIBLE SIGMOIDOSCOPY N/A 6/6/2016    SIGMOIDOSCOPY FLEXIBLE performed by Jarrod Santos MD at [facility] ENDOSCOPY    HX ORTHOPAEDIC      back    HX ORTHOPAEDIC      bilateral shoulder replacements    HX ORTHOPAEDIC      left knee replacement    HX LYDIA AND BSO  1986    (ovarian cancer)    CO EGD TRANSORAL BIOPSY SINGLE/MULTIPLE  1/26/2012         CO EGD TRANSORAL BIOPSY SINGLE/MULTIPLE  6/14/2012         UPPER GI ENDOSCOPY,BIOPSY  11/13/2020         UPPER GI ENDOSCOPY,DIAGNOSIS  10/5/2020         UPPER GI ENDOSCOPY,DILATN W GUIDE  11/13/2020            Social History     Tobacco Use    Smoking status: Never    Smokeless tobacco: Never   Substance Use Topics    Alcohol use: No        Family History   Problem Relation Age of Onset    Heart Disease Father     Cancer Sister         endometrial    Diabetes Sister      No Known Allergies     Prior to Admission medications    Medication Sig Start Date End Date Taking? Authorizing Provider   diclofenac (VOLTAREN) 1 % gel Apply 2 g to affected area four (4) times daily. Provider, Historical   simvastatin (ZOCOR) 40 mg tablet TAKE 1 TABLET BY MOUTH  DAILY 9/9/21   Trudy Hoyt MD   furosemide (Lasix) 20 mg tablet Take 20 mg by mouth daily. Provider, Historical   CALCIUM PO Take 1 Tab by mouth daily. Provider, Historical   polyethylene glycol 3350 (MIRALAX PO) Take 1 Each by mouth daily as needed. Provider, Historical   acetaminophen (Tylenol Extra Strength) 500 mg tablet Take 1,000 mg by mouth every six (6) hours as needed for Pain. Provider, Historical   propafenone (RYTHMOL) 150 mg tablet Take 1 Tab by mouth two (2) times a day. 7/4/20   Florence, Johnathon Dustin, DO   RABEprazole (ACIPHEX) 20 mg tablet Take 1 Tab by mouth two (2) times a day. 7/4/20   Florence, Johnathon Dustin, DO   potassium chloride SR (KLOR-CON 10) 10 mEq tablet Take 1 Tab by mouth daily. 7/5/20   Florence, Johnathon Dustin, DO   magnesium oxide (MAG-OX) 400 mg tablet Take 250 mg by mouth daily. Provider, Historical   famotidine (PEPCID) 20 mg tablet Take 20 mg by mouth daily. Provider, Historical   gabapentin (NEURONTIN) 800 mg tablet TAKE 1 TAB BY MOUTH FOUR  TIMES DAILY AS NEEDED. 3/21/18   Jyoti Saleh Rater, NP   cholecalciferol (VITAMIN D3) 1,000 unit tablet Take 1,000 Units by mouth daily. Provider, Historical   multivitamin (ONE A DAY) tablet Take 1 Tab by mouth daily. Provider, Historical       REVIEW OF SYSTEMS:       Total of 12 systems reviewed as follows:       POSITIVE= underlined text  Negative = text not underlined  General:  fever, chills, sweats, generalized weakness, weight loss/gain,      loss of appetite   Eyes:    blurred vision, eye pain, loss of vision, double vision  ENT:    rhinorrhea, pharyngitis   Respiratory:   cough, sputum production, SOB, LEVINE, wheezing, pleuritic pain   Cardiology:   chest pain, palpitations, orthopnea, PND, edema, syncope   Gastrointestinal:  abdominal pain , N/V, diarrhea, dysphagia, constipation, bleeding   Genitourinary:  frequency, urgency, dysuria, hematuria, incontinence   Muskuloskeletal :  arthralgia, myalgia, back pain  Hematology:  easy bruising, nose or gum bleeding, lymphadenopathy   Dermatological: rash, ulceration, pruritis, color change / jaundice  Endocrine:   hot flashes or polydipsia   Neurological:  headache, dizziness, confusion, focal weakness, paresthesia,     Speech difficulties, memory loss, gait difficulty  Psychological: Feelings of anxiety, depression, agitation    Objective:   VITALS:    Visit Vitals  /72   Pulse 87   Temp 98.1 °F (36.7 °C)   Resp 17   Ht 5' (1.524 m)   Wt 65.8 kg (145 lb)   SpO2 90%   BMI 28.32 kg/m²       PHYSICAL EXAM:    General:    Alert, cooperative, no distress, appears stated age. HEENT: Atraumatic, anicteric sclerae, pink conjunctivae     No oral ulcers, mucosa moist, throat clear, dentition fair  Neck:  Supple, symmetrical,  thyroid: non tender  Lungs:   Diminished BS.   Chest wall:  No tenderness  No Accessory muscle use. Heart:   Regular  rhythm,  No  murmur   No edema  Abdomen:   mildly tender. Not distended. Bowel sounds normal  Extremities: No cyanosis. No clubbing,      Skin turgor normal, Capillary refill normal, Radial dial pulse 2+  Skin:     Not pale. Not Jaundiced  No rashes   Psych:  Not depressed. Not anxious or agitated. Neurologic: EOMs intact. No facial asymmetry. No aphasia or slurred speech. Symmetrical strength, Sensation grossly intact. Alert and oriented X 4.     _______________________________________________________________________  Care Plan discussed with:    Comments   Patient x    Family  x Lisandro   RN     Care Manager                    Consultant:      _______________________________________________________________________  Expected  Disposition:   Home with Family x   HH/PT/OT/RN    SNF/LTC    PHILIPPE    ________________________________________________________________________  TOTAL TIME:  54  Minutes    Critical Care Provided     Minutes non procedure based      Comments    x Reviewed previous records   >50% of visit spent in counseling and coordination of care  Discussion with patient and/or family and questions answered       Given the patient's current clinical presentation, I have a high level of concern for decompensation if discharged from the ED. Complex decision making was performed which includes reviewing the patient's available past medical records, laboratory results, and Xray films. I have also directly communicated my plan and discussed this case with the involved ED physician.         ____________________________________________________________________  Sam Hollins MD    Please note that this dictation was completed with [employer], the computer voice recognition software. Quite often unanticipated grammatical, syntax, homophones, and other interpretive errors are inadvertently transcribed by the computer software. Please disregard these errors.   Please excuse any errors that have escaped final proofreading    Procedures: see electronic medical records for all procedures/Xrays and details which were not copied into this note but were reviewed prior to creation of Plan. LAB DATA REVIEWED:    Recent Results (from the past 24 hour(s))   EKG, 12 LEAD, INITIAL    Collection Time: 10/01/22  1:23 PM   Result Value Ref Range    Ventricular Rate 105 BPM    Atrial Rate 105 BPM    P-R Interval 178 ms    QRS Duration 126 ms    Q-T Interval 378 ms    QTC Calculation (Bezet) 499 ms    Calculated P Axis 51 degrees    Calculated R Axis -59 degrees    Calculated T Axis 69 degrees    Diagnosis       Sinus tachycardia with premature atrial complexes  Possible Left atrial enlargement  Left axis deviation  Left ventricular hypertrophy with QRS widening and repolarization abnormality  Cannot rule out Septal infarct (cited on or before 01-OCT-2022)  Possible Lateral infarct (cited on or before 01-OCT-2022)  When compared with ECG of 09-AUG-2022 10:56,  Serial changes of Septal infarct present     CBC WITH AUTOMATED DIFF    Collection Time: 10/01/22  1:27 PM   Result Value Ref Range    WBC 15.2 (H) 3.6 - 11.0 K/uL    RBC 4.75 3.80 - 5.20 M/uL    HGB 11.9 11.5 - 16.0 g/dL    HCT 40.1 35.0 - 47.0 %    MCV 84.4 80.0 - 99.0 FL    MCH 25.1 (L) 26.0 - 34.0 PG    MCHC 29.7 (L) 30.0 - 36.5 g/dL    RDW 15.3 (H) 11.5 - 14.5 %    PLATELET 011 551 - 493 K/uL    MPV 9.7 8.9 - 12.9 FL    NRBC 0.0 0  WBC    ABSOLUTE NRBC 0.00 0.00 - 0.01 K/uL    NEUTROPHILS 88 (H) 32 - 75 %    LYMPHOCYTES 5 (L) 12 - 49 %    MONOCYTES 5 5 - 13 %    EOSINOPHILS 0 0 - 7 %    BASOPHILS 1 0 - 1 %    IMMATURE GRANULOCYTES 1 (H) 0.0 - 0.5 %    ABS. NEUTROPHILS 13.2 (H) 1.8 - 8.0 K/UL    ABS. LYMPHOCYTES 0.8 0.8 - 3.5 K/UL    ABS. MONOCYTES 0.8 0.0 - 1.0 K/UL    ABS. EOSINOPHILS 0.0 0.0 - 0.4 K/UL    ABS. BASOPHILS 0.2 (H) 0.0 - 0.1 K/UL    ABS. IMM.  GRANS. 0.2 (H) 0.00 - 0.04 K/UL    DF SMEAR SCANNED      RBC COMMENTS NORMOCYTIC, NORMOCHROMIC     METABOLIC PANEL, COMPREHENSIVE    Collection Time: 10/01/22  1:27 PM   Result Value Ref Range    Sodium 140 136 - 145 mmol/L    Potassium 3.7 3.5 - 5.1 mmol/L    Chloride 102 97 - 108 mmol/L    CO2 31 21 - 32 mmol/L    Anion gap 7 5 - 15 mmol/L    Glucose 163 (H) 65 - 100 mg/dL    BUN 44 (H) 6 - 20 MG/DL    Creatinine 1.08 (H) 0.55 - 1.02 MG/DL    BUN/Creatinine ratio 41 (H) 12 - 20      GFR est AA 58 (L) >60 ml/min/1.73m2    GFR est non-AA 48 (L) >60 ml/min/1.73m2    Calcium 10.8 (H) 8.5 - 10.1 MG/DL    Bilirubin, total 0.4 0.2 - 1.0 MG/DL    ALT (SGPT) 17 12 - 78 U/L    AST (SGOT) 17 15 - 37 U/L    Alk.  phosphatase 82 45 - 117 U/L    Protein, total 7.9 6.4 - 8.2 g/dL    Albumin 3.7 3.5 - 5.0 g/dL    Globulin 4.2 (H) 2.0 - 4.0 g/dL    A-G Ratio 0.9 (L) 1.1 - 2.2     LIPASE    Collection Time: 10/01/22  1:27 PM   Result Value Ref Range    Lipase 249 73 - 393 U/L   URINALYSIS W/ REFLEX CULTURE    Collection Time: 10/01/22  1:27 PM    Specimen: Urine   Result Value Ref Range    Color YELLOW/STRAW      Appearance CLEAR CLEAR      Specific gravity 1.027      pH (UA) 5.5 5.0 - 8.0      Protein 30 (A) NEG mg/dL    Glucose Negative NEG mg/dL    Ketone Negative NEG mg/dL    Bilirubin Negative NEG      Blood Negative NEG      Urobilinogen 0.2 0.2 - 1.0 EU/dL    Nitrites Negative NEG      Leukocyte Esterase Negative NEG      UA:UC IF INDICATED CULTURE NOT INDICATED BY UA RESULT      WBC 0-4 0 - 4 /hpf    RBC 0-5 0 - 5 /hpf    Epithelial cells FEW FEW /lpf    Bacteria Negative NEG /hpf    Hyaline cast 2-5 0 - 2 /lpf   TROPONIN-HIGH SENSITIVITY    Collection Time: 10/01/22  1:27 PM   Result Value Ref Range    Troponin-High Sensitivity 8 0 - 51 ng/L   BLOOD GAS,CHEM8,LACTIC ACID POC    Collection Time: 10/01/22  1:34 PM   Result Value Ref Range    Calcium, ionized (POC) 1.37 (H) 1.12 - 1.32 mmol/L    BICARBONATE 32 mmol/L    Base excess (POC) 5.3 mmol/L    Sample source VENOUS BLOOD CO2, POC 32 (H) 19 - 24 MMOL/L    Sodium,  136 - 145 MMOL/L    Potassium, POC 3.4 (L) 3.5 - 5.5 MMOL/L    Chloride,  100 - 108 MMOL/L    Glucose,  (H) 74 - 106 MG/DL    Creatinine, POC 0.8 0.6 - 1.3 MG/DL    Lactic Acid (POC) 1.47 0.40 - 2.00 mmol/L    pH, venous (POC) 7.39 7.32 - 7.42      pCO2, venous (POC) 51.7 (H) 41 - 51 MMHG    pO2, venous (POC) 20 (L) 25 - 40 mmHg

## 2022-10-01 NOTE — CONSULTS
Surgery Consult    Subjective:      Aris Hardin is a 80 y.o. female with past medical history of upper GI bleed with ulcerative esophagitis, esophageal narrowing, gastroparesis recently hospitalized early August with same. She is followed by Dr. Maco Mckeon. She is a resident at [facility]. She now presents with recurrent abdominal pain, bloating, constipation, and hematemesis. NGT placed has yielded >1400 cc dark output. CT shows distention of the esophagus, stomach, and small bowel with gradual  normalization of caliber of small bowel loops in the ileum consistent with  partial small bowel obstruction, and thickened small bowel loops in the right lower quadrant possibly secondary to inflammatory bowel disease. Pt has been taking miralax daily but hasn't taken it in several days. PSH hysterectomy and subsequent resection for ovarian cancer, numerous orthopedic surgeries.     Past Medical History:   Diagnosis Date    Abnormal x-ray of abdomen 11/13/2020    Bas showed decreased peristalsis and a cervical web      Arrhythmia     A fib    Arthritis     Diarrhea 6/6/2016    Esophageal dysphagia 11/13/2020    Foot drop     Gastric ulcer 6/14/2012    Gastroparesis     GERD (gastroesophageal reflux disease)     High cholesterol     Hypertension     Neuropathy     Ovarian cancer ([address] 1986    chemo x 9 mos    Scoliosis     Stroke ([address] 2002    ? stroke with drop feet, per patient CT scans were negative     Past Surgical History:   Procedure Laterality Date    FLEXIBLE SIGMOIDOSCOPY N/A 6/6/2016    SIGMOIDOSCOPY FLEXIBLE performed by Shelley Marshall MD at [facility] ENDOSCOPY    HX ORTHOPAEDIC      back    HX ORTHOPAEDIC      bilateral shoulder replacements    HX ORTHOPAEDIC      left knee replacement    HX LYDIA AND BSO  1986    (ovarian cancer)    [place] E[place] TRANSORAL BIOPSY SINGLE/MULTIPLE  1/26/2012         [place] E[place] TRANSORAL BIOPSY SINGLE/MULTIPLE  6/14/2012         UPPER GI ENDOSCOPY,BIOPSY  11/13/2020         UPPER GI ENDOSCOPY,DIAGNOSIS  10/5/2020         UPPER GI ENDOSCOPY,BECKY W GUIDE  11/13/2020           Family History   Problem Relation Age of Onset    Heart Disease Father     Cancer Sister         endometrial    Diabetes Sister      Social History     Tobacco Use    Smoking status: Never    Smokeless tobacco: Never   Substance Use Topics    Alcohol use: No      Prior to Admission medications    Medication Sig Start Date End Date Taking? Authorizing Provider   diclofenac (VOLTAREN) 1 % gel Apply 2 g to affected area four (4) times daily. Provider, Historical   simvastatin (ZOCOR) 40 mg tablet TAKE 1 TABLET BY MOUTH  DAILY 9/9/21   Paty Jones MD   furosemide (Lasix) 20 mg tablet Take 20 mg by mouth daily. Provider, Historical   CALCIUM PO Take 1 Tab by mouth daily. Provider, Historical   polyethylene glycol 3350 (MIRALAX PO) Take 1 Each by mouth daily as needed. Provider, Historical   acetaminophen (Tylenol Extra Strength) 500 mg tablet Take 1,000 mg by mouth every six (6) hours as needed for Pain. Provider, Historical   propafenone (RYTHMOL) 150 mg tablet Take 1 Tab by mouth two (2) times a day. 7/4/20   Sharla Downey, DO   RABEprazole (ACIPHEX) 20 mg tablet Take 1 Tab by mouth two (2) times a day. 7/4/20   Sharla Downey, DO   potassium chloride SR (KLOR-CON 10) 10 mEq tablet Take 1 Tab by mouth daily. 7/5/20   Sharla Downey, DO   magnesium oxide (MAG-OX) 400 mg tablet Take 250 mg by mouth daily. Provider, Historical   famotidine (PEPCID) 20 mg tablet Take 20 mg by mouth daily. Provider, Historical   gabapentin (NEURONTIN) 800 mg tablet TAKE 1 TAB BY MOUTH FOUR  TIMES DAILY AS NEEDED. 3/21/18   Chris Saleh, NP   cholecalciferol (VITAMIN D3) 1,000 unit tablet Take 1,000 Units by mouth daily. Provider, Historical   multivitamin (ONE A DAY) tablet Take 1 Tab by mouth daily.     Provider, Historical      No Known Allergies    Review of Systems Constitutional:  Positive for appetite change. Negative for chills, diaphoresis and fever. Respiratory:  Negative for shortness of breath and wheezing. Cardiovascular:  Negative for chest pain and palpitations. Gastrointestinal:  Positive for abdominal pain, constipation, nausea and vomiting. Negative for diarrhea. Musculoskeletal:  Negative for myalgias. Hematological:  Does not bruise/bleed easily. All other systems reviewed and are negative. Objective:     Patient Vitals for the past 8 hrs:   BP Temp Pulse Resp SpO2 Height Weight   10/01/22 1628 101/76 -- 98 21 96 % -- --   10/01/22 1613 116/67 -- 91 21 95 % -- --   10/01/22 1558 126/66 -- 90 18 98 % -- --   10/01/22 1543 109/65 -- 87 15 96 % -- --   10/01/22 1443 121/72 -- 87 17 -- -- --   10/01/22 1413 (!) 90/59 -- 90 18 90 % -- --   10/01/22 1358 (!) 92/57 -- 94 20 90 % -- --   10/01/22 1313 93/63 98.1 °F (36.7 °C) (!) 108 20 91 % 5' (1.524 m) 145 lb (65.8 kg)       Temp (24hrs), Av.1 °F (36.7 °C), Min:98.1 °F (36.7 °C), Max:98.1 °F (36.7 °C)      Physical Exam  Constitutional:       General: She is not in acute distress. Appearance: She is well-developed. HENT:      Head: Normocephalic and atraumatic. Eyes:      General: No scleral icterus. Pupils: Pupils are equal, round, and reactive to light. Cardiovascular:      Rate and Rhythm: Normal rate and regular rhythm. Heart sounds: Normal heart sounds. Pulmonary:      Breath sounds: Normal breath sounds. No wheezing or rales. Abdominal:      General: Abdomen is protuberant. Bowel sounds are normal. There is distension. Palpations: Abdomen is soft. There is no mass. Tenderness: There is generalized abdominal tenderness. There is no guarding or rebound. Comments: No peritoneal signs   Musculoskeletal:         General: Normal range of motion. Lymphadenopathy:      Cervical: No cervical adenopathy.    Neurological:      General: No focal deficit present. Mental Status: She is alert and oriented to person, place, and time. Assessment:     81 yo woman with chronic gastroparesis and erosive esophagitis, laxative dependent, now readmitted with increased stool burden and obstructive type symptoms and hematemesis.       Plan:     Agree with NGT decompression, iv PPI  Serial films  Plan contrast challenge tomorrow   GI to see in am.  Doubt will require surgical intervention, will continue to follow with primary team.

## 2022-10-01 NOTE — REMOTE MONITORING
Attempted to contact primary RN in regards to the sepsis bundle.      Breana Rodríguez 20  Callback # [phone]

## 2022-10-01 NOTE — PROGRESS NOTES
Please see dictated note    I did not see patient today but spoke to ER MD and reviewed films. I  had left [facility] and was at another hospital    Patient appears to have recurrent partial SBO. CT shows the following    EXAM: CT ABD PELV W CONT   INDICATION: pain, recent obstruction   COMPARISON: August 9, 2022    CONTRAST: 100 mL of Isovue-370. ORAL CONTRAST: None     FINDINGS:   LOWER THORAX: Bibasilar atelectasis. LIVER: No mass. BILIARY TREE: Gallbladder is within normal limits. CBD is not dilated. SPLEEN: within normal limits. PANCREAS: No mass or ductal dilatation. ADRENALS: Unremarkable. KIDNEYS: Bilateral renal cysts. Solid left renal mass measures 19 x 18 mm,  stable in size compared to August 9, 2022. This is concerning for a renal cell  carcinoma. Urologic follow-up recommended. STOMACH: Fluid distended distal esophagus and stomach. SMALL BOWEL: Severe narrowing of the duodenum at the ligament of Treitz. Beyond  this, there is distention of small bowel loops with gradual normalization of  caliber in the distal ileum. The nondistended loops of distal bowel demonstrates  circumferential wall thickening and mucosal hyperenhancement. The terminal ileum  is normal in caliber. COLON: Decompressed proximal colon. Significant fecal stasis in the rectum. APPENDIX: Nonvisualized  PERITONEUM: No ascites or pneumoperitoneum. RETROPERITONEUM: No lymphadenopathy or aortic aneurysm. REPRODUCTIVE ORGANS: Status post hysterectomy. Small volume simple fluid in the  pelvis. URINARY BLADDER: No mass or calculus. BONES: Levoconvex scoliosis of the lumbar spine with multilevel thoracolumbar  fusion. ABDOMINAL WALL: No mass or hernia. ADDITIONAL COMMENTS: N/A   IMPRESSION  1. Distention of the esophagus, stomach, and small bowel with gradual  normalization of caliber of small bowel loops in the ileum consistent with  partial small bowel obstruction.   2.  Thickened small bowel loops in the right lower quadrant possibly secondary  to inflammatory bowel disease. 3.  Large volume stool in the rectum. 4.  19 x 18 mm enhancing left renal mass concerning for renal cell carcinoma,  stable from prior imaging. She is 80years old. I would suggest NG suction  Eventual EGD with long endoscope to get to the area of interest at the the ligament of Trietz might help make diagnosis.  Need to rule out adhesions, small bowel tumor or IBD(though this is unuusal in an 80year old)    I will see tomorrow    MTF    Will give IV Protonix as well (given coffee ground emesis)

## 2022-10-01 NOTE — REMOTE MONITORING
Attempted to contact primary RN in regards to the sepsis bundle.      Breana Dorsey 20  Callback # [phone]

## 2022-10-01 NOTE — ED PROVIDER NOTES
EMERGENCY DEPARTMENT HISTORY AND PHYSICAL EXAM      Date: 10/1/2022  Patient Name: Kayleen Enriquez    History of Presenting Illness     Chief Complaint   Patient presents with    Abdominal Pain     Presents via EMS from [facility], new onset N/V and abdominal starting yesterday evening, pt states she feels bloated and hot and feels slightly better after she vomits, green vomit per EMS, pt not on blood thinners       History Provided By: Patient, EMS, and  records. HPI: Kayleen Enriquez, 80 y.o. female presents to the ED with cc of vomiting and abdominal pain. The patient has a history of A. fib, obstruction, GI bleed and hypertension. She was admitted August 9 for hematemesis and small bowel distention. Her symptoms started last night. She has had a 4 out of 10 mid abdominal pain associated with 5 episodes of vomiting. She has remnants of what appears to have been hematemesis on her face. She is not on a blood thinner. She denies fever or chills. She has not passed gas since yesterday. She denies dysuria, chest pain or shortness of breath. Says her abdomen feels bloated. She denies cough. She is followed by [facility]. There are no other complaints, changes, or physical findings at this time. PCP: Jame Moncada MD    No current facility-administered medications on file prior to encounter. Current Outpatient Medications on File Prior to Encounter   Medication Sig Dispense Refill    diclofenac (VOLTAREN) 1 % gel Apply 2 g to affected area four (4) times daily. simvastatin (ZOCOR) 40 mg tablet TAKE 1 TABLET BY MOUTH  DAILY 90 Tablet 3    furosemide (Lasix) 20 mg tablet Take 20 mg by mouth daily. CALCIUM PO Take 1 Tab by mouth daily. polyethylene glycol 3350 (MIRALAX PO) Take 1 Each by mouth daily as needed.       acetaminophen (Tylenol Extra Strength) 500 mg tablet Take 1,000 mg by mouth every six (6) hours as needed for Pain.      propafenone (RYTHMOL) 150 mg tablet Take 1 Tab by mouth two (2) times a day. 60 Tab 2    RABEprazole (ACIPHEX) 20 mg tablet Take 1 Tab by mouth two (2) times a day. 60 Tab 3    potassium chloride SR (KLOR-CON 10) 10 mEq tablet Take 1 Tab by mouth daily. 30 Tab 3    magnesium oxide (MAG-OX) 400 mg tablet Take 250 mg by mouth daily. famotidine (PEPCID) 20 mg tablet Take 20 mg by mouth daily. gabapentin (NEURONTIN) 800 mg tablet TAKE 1 TAB BY MOUTH FOUR  TIMES DAILY AS NEEDED. 360 Tab 3    cholecalciferol (VITAMIN D3) 1,000 unit tablet Take 1,000 Units by mouth daily. multivitamin (ONE A DAY) tablet Take 1 Tab by mouth daily.          Past History     Past Medical History:  Past Medical History:   Diagnosis Date    Abnormal x-ray of abdomen 11/13/2020    Bas showed decreased peristalsis and a cervical web      Arrhythmia     A fib    Arthritis     Diarrhea 6/6/2016    Esophageal dysphagia 11/13/2020    Foot drop     Gastric ulcer 6/14/2012    Gastroparesis     GERD (gastroesophageal reflux disease)     High cholesterol     Hypertension     Neuropathy     Ovarian cancer ([address] 1986    chemo x 9 mos    Scoliosis     Stroke [facility]) 2002    ? stroke with drop feet, per patient CT scans were negative       Past Surgical History:  Past Surgical History:   Procedure Laterality Date    FLEXIBLE SIGMOIDOSCOPY N/A 6/6/2016    SIGMOIDOSCOPY FLEXIBLE performed by Preet Barrientos MD at [facility] ENDOSCOPY    HX ORTHOPAEDIC      back    HX ORTHOPAEDIC      bilateral shoulder replacements    HX ORTHOPAEDIC      left knee replacement    HX LYDIA AND BSO  1986    (ovarian cancer)    [place] EGD TRANSORAL BIOPSY SINGLE/MULTIPLE  1/26/2012         [place] E[place] TRANSORAL BIOPSY SINGLE/MULTIPLE  6/14/2012         UPPER GI ENDOSCOPY,BIOPSY  11/13/2020         UPPER GI ENDOSCOPY,DIAGNOSIS  10/5/2020         UPPER GI ENDOSCOPY,DILATN W GUIDE  11/13/2020            Family History:  Family History   Problem Relation Age of Onset    Heart Disease Father     Cancer Sister         endometrial    Diabetes Sister        Social History:  Social History     Tobacco Use    Smoking status: Never    Smokeless tobacco: Never   Vaping Use    Vaping Use: Never used   Substance Use Topics    Alcohol use: No    Drug use: No       Allergies:  No Known Allergies      Review of Systems   Review of Systems   Constitutional:  Negative for fever. HENT:  Negative for congestion. Eyes: Negative. Respiratory:  Negative for shortness of breath. Cardiovascular:  Negative for chest pain. Gastrointestinal:  Positive for abdominal pain, nausea and vomiting. Endocrine: Negative for heat intolerance. Genitourinary: Negative. Musculoskeletal:  Negative for back pain. Skin:  Negative for rash. Allergic/Immunologic: Negative for immunocompromised state. Neurological:  Negative for headaches. Hematological:  Does not bruise/bleed easily. Psychiatric/Behavioral: Negative. All other systems reviewed and are negative. Physical Exam   Physical Exam  Vitals and nursing note reviewed. Constitutional:       General: She is not in acute distress. Appearance: She is well-developed. HENT:      Head: Normocephalic. Cardiovascular:      Rate and Rhythm: Normal rate and regular rhythm. Heart sounds: Normal heart sounds. Pulmonary:      Effort: Pulmonary effort is normal.      Breath sounds: Normal breath sounds. Abdominal:      General: Bowel sounds are normal.      Palpations: Abdomen is soft. Tenderness: There is abdominal tenderness. Musculoskeletal:         General: Normal range of motion. Cervical back: Normal range of motion and neck supple. Skin:     General: Skin is warm and dry. Neurological:      General: No focal deficit present. Mental Status: She is alert and oriented to person, place, and time.    Psychiatric:         Mood and Affect: Mood normal.         Behavior: Behavior normal.       Diagnostic Study Results     Labs -     Recent Results (from the past 12 hour(s))   EKG, 12 LEAD, INITIAL    Collection Time: 10/01/22  1:23 PM   Result Value Ref Range    Ventricular Rate 105 BPM    Atrial Rate 105 BPM    P-R Interval 178 ms    QRS Duration 126 ms    Q-T Interval 378 ms    QTC Calculation (Bezet) 499 ms    Calculated P Axis 51 degrees    Calculated R Axis -59 degrees    Calculated T Axis 69 degrees    Diagnosis       Sinus tachycardia with premature atrial complexes  Possible Left atrial enlargement  Left axis deviation  Left ventricular hypertrophy with QRS widening and repolarization abnormality  Cannot rule out Septal infarct (cited on or before 01-OCT-2022)  Possible Lateral infarct (cited on or before 01-OCT-2022)  When compared with ECG of 09-AUG-2022 10:56,  Serial changes of Septal infarct present     CBC WITH AUTOMATED DIFF    Collection Time: 10/01/22  1:27 PM   Result Value Ref Range    WBC 15.2 (H) 3.6 - 11.0 K/uL    RBC 4.75 3.80 - 5.20 M/uL    HGB 11.9 11.5 - 16.0 g/dL    HCT 40.1 35.0 - 47.0 %    MCV 84.4 80.0 - 99.0 FL    MCH 25.1 (L) 26.0 - 34.0 PG    MCHC 29.7 (L) 30.0 - 36.5 g/dL    RDW 15.3 (H) 11.5 - 14.5 %    PLATELET 433 381 - 650 K/uL    MPV 9.7 8.9 - 12.9 FL    NRBC 0.0 0  WBC    ABSOLUTE NRBC 0.00 0.00 - 0.01 K/uL    NEUTROPHILS 88 (H) 32 - 75 %    LYMPHOCYTES 5 (L) 12 - 49 %    MONOCYTES 5 5 - 13 %    EOSINOPHILS 0 0 - 7 %    BASOPHILS 1 0 - 1 %    IMMATURE GRANULOCYTES 1 (H) 0.0 - 0.5 %    ABS. NEUTROPHILS 13.2 (H) 1.8 - 8.0 K/UL    ABS. LYMPHOCYTES 0.8 0.8 - 3.5 K/UL    ABS. MONOCYTES 0.8 0.0 - 1.0 K/UL    ABS. EOSINOPHILS 0.0 0.0 - 0.4 K/UL    ABS. BASOPHILS 0.2 (H) 0.0 - 0.1 K/UL    ABS. IMM.  GRANS. 0.2 (H) 0.00 - 0.04 K/UL    DF SMEAR SCANNED      RBC COMMENTS NORMOCYTIC, NORMOCHROMIC     METABOLIC PANEL, COMPREHENSIVE    Collection Time: 10/01/22  1:27 PM   Result Value Ref Range    Sodium 140 136 - 145 mmol/L    Potassium 3.7 3.5 - 5.1 mmol/L    Chloride 102 97 - 108 mmol/L    CO2 31 21 - 32 mmol/L Anion gap 7 5 - 15 mmol/L    Glucose 163 (H) 65 - 100 mg/dL    BUN 44 (H) 6 - 20 MG/DL    Creatinine 1.08 (H) 0.55 - 1.02 MG/DL    BUN/Creatinine ratio 41 (H) 12 - 20      GFR est AA 58 (L) >60 ml/min/1.73m2    GFR est non-AA 48 (L) >60 ml/min/1.73m2    Calcium 10.8 (H) 8.5 - 10.1 MG/DL    Bilirubin, total 0.4 0.2 - 1.0 MG/DL    ALT (SGPT) 17 12 - 78 U/L    AST (SGOT) 17 15 - 37 U/L    Alk.  phosphatase 82 45 - 117 U/L    Protein, total 7.9 6.4 - 8.2 g/dL    Albumin 3.7 3.5 - 5.0 g/dL    Globulin 4.2 (H) 2.0 - 4.0 g/dL    A-G Ratio 0.9 (L) 1.1 - 2.2     LIPASE    Collection Time: 10/01/22  1:27 PM   Result Value Ref Range    Lipase 249 73 - 393 U/L   URINALYSIS W/ REFLEX CULTURE    Collection Time: 10/01/22  1:27 PM    Specimen: Urine   Result Value Ref Range    Color YELLOW/STRAW      Appearance CLEAR CLEAR      Specific gravity 1.027      pH (UA) 5.5 5.0 - 8.0      Protein 30 (A) NEG mg/dL    Glucose Negative NEG mg/dL    Ketone Negative NEG mg/dL    Bilirubin Negative NEG      Blood Negative NEG      Urobilinogen 0.2 0.2 - 1.0 EU/dL    Nitrites Negative NEG      Leukocyte Esterase Negative NEG      UA:UC IF INDICATED CULTURE NOT INDICATED BY UA RESULT      WBC 0-4 0 - 4 /hpf    RBC 0-5 0 - 5 /hpf    Epithelial cells FEW FEW /lpf    Bacteria Negative NEG /hpf    Hyaline cast 2-5 0 - 2 /lpf   TROPONIN-HIGH SENSITIVITY    Collection Time: 10/01/22  1:27 PM   Result Value Ref Range    Troponin-High Sensitivity 8 0 - 51 ng/L   BLOOD GAS,CHEM8,LACTIC ACID POC    Collection Time: 10/01/22  1:34 PM   Result Value Ref Range    Calcium, ionized (POC) 1.37 (H) 1.12 - 1.32 mmol/L    BICARBONATE 32 mmol/L    Base excess (POC) 5.3 mmol/L    Sample source VENOUS BLOOD      CO2, POC 32 (H) 19 - 24 MMOL/L    Sodium,  136 - 145 MMOL/L    Potassium, POC 3.4 (L) 3.5 - 5.5 MMOL/L    Chloride,  100 - 108 MMOL/L    Glucose,  (H) 74 - 106 MG/DL    Creatinine, POC 0.8 0.6 - 1.3 MG/DL    Lactic Acid (POC) 1.47 0.40 - 2.00 mmol/L    pH, venous (POC) 7.39 7.32 - 7.42      pCO2, venous (POC) 51.7 (H) 41 - 51 MMHG    pO2, venous (POC) 20 (L) 25 - 40 mmHg       Radiologic Studies -   XR ABD (KUB)   Final Result   NG tube in the stomach. XR CHEST PORT   Final Result      Streaky right infrahilar and retrocardiac atelectasis/airspace disease. CT ABD PELV W CONT   Final Result   1. Distention of the esophagus, stomach, and small bowel with gradual   normalization of caliber of small bowel loops in the ileum consistent with   partial small bowel obstruction. 2.  Thickened small bowel loops in the right lower quadrant possibly secondary   to inflammatory bowel disease. 3.  Large volume stool in the rectum. 4.  19 x 18 mm enhancing left renal mass concerning for renal cell carcinoma,   stable from prior imaging. CT Results  (Last 48 hours)      None          CXR Results  (Last 48 hours)      None            Medical Decision Making   I am the first provider for this patient. I reviewed the vital signs, available nursing notes, past medical history, past surgical history, family history and social history. Vital Signs-Reviewed the patient's vital signs. Patient Vitals for the past 12 hrs:   Temp Pulse Resp BP SpO2   10/01/22 1313 98.1 °F (36.7 °C) (!) 108 20 93/63 91 %       EKG interpretation: (Preliminary)  Rhythm: sinus tachycardia and PAC's; and regular . Rate (approx.): 105; Axis: left; PA interval: normal; QRS interval: prolonged; ST/T wave: non-specific changes; Other findings: .    Records Reviewed: Nursing Notes, Old Medical Records, Previous electrocardiograms, Ambulance Run Sheet, Previous Radiology Studies, and Previous Laboratory Studies    Provider Notes (Medical Decision Making):   Hematemesis, upper GI bleed, obstruction, dehydration, electrolyte abnormality    ED Course:   Initial assessment performed.  The patients presenting problems have been discussed, and they are in agreement with the care plan formulated and outlined with them. I have encouraged them to ask questions as they arise throughout their visit. ED [facility] ED SEPSIS NOTE:     2:43 PM The patient now meets criteria for: Severe Sepsis    Fluid resuscitation with: Patient does not require a 30cc/kg bolus because they do not meet criteria for septic shock (SBP<90 or decreased by >40 mmHG from baseline, MAP<65, Lactate 4 or greater). Due to concern for rapidly advancing infection and deterioration of patient's condition, antibiotics are started STAT and cultures ordered. Consult note:    Patient will be admitted by Dr. Rancho Merchant, hospitalist      Consult note:      I discussed the case with Dr. Kash Reyna, gastroenterology. He recommends NG tube, general surgery consult, and place consult for him to see the patient in the morning    Consult note:    Patient has been evaluated by Dr. Kylie Sun, general surgery      Critical Care Time:   CRITICAL CARE NOTE :    1:49 PM    IMPENDING DETERIORATION -Respiratory, Cardiovascular, and Metabolic  ASSOCIATED RISK FACTORS - Hypotension, Hypoxia, Dysrhythmia, Metabolic changes, and Dehydration bleeding  MANAGEMENT- Bedside Assessment and Supervision of Care  INTERPRETATION -  Xrays, CT Scan, ECG, and Blood Pressure  INTERVENTIONS - hemodynamic mngmt, gastric tube, and Metobolic interventions  CASE REVIEW - Hospitalist/Intensivist, Medical Sub-Specialist, Nursing, and Family  TREATMENT RESPONSE -Improved  PERFORMED BY - Self    NOTES   :  I have spent 50 minutes of critical care time involved in lab review, consultations with specialist, family decision- making, bedside attention and documentation. This time excludes time spent in any separate billed procedures. During this entire length of time I was immediately available to the patient . Jerome Jones MD      Disposition:  admit    DISCHARGE PLAN:  1. Current Discharge Medication List        2. Follow-up Information    None       3.   Return to ED if worse     Diagnosis     Clinical Impression:   1. Partial small bowel obstruction ([address]    2. Hematemesis with nausea        Attestations:    Isabell Elias MD        Please note that this dictation was completed with [employer], the computer voice recognition software. Quite often unanticipated grammatical, syntax, homophones, and other interpretive errors are inadvertently transcribed by the computer software. Please disregard these errors. Please excuse any errors that have escaped final proofreading. Thank you.

## 2022-10-01 NOTE — REMOTE MONITORING
Attempted to contact primary RN in regards to the sepsis bundle.      Monalisa Phoenix, [address]  Callback # [phone]

## 2022-10-01 NOTE — REMOTE MONITORING
Attempted to contact primary RN in regards to the sepsis bundle.      Breana Arteaga 20  Callback # [phone]

## 2022-10-01 NOTE — REMOTE MONITORING
Attempted to contact primary RN regarding this patient. Left message with Charge RN regarding 3 hour Sepsis bundle.      Breana Gutierrez 20  Callback # [phone]

## 2022-10-02 LAB
ALBUMIN SERPL-MCNC: 2.9 G/DL (ref 3.5–5)
ALBUMIN/GLOB SERPL: 0.8 {RATIO} (ref 1.1–2.2)
ALP SERPL-CCNC: 61 U/L (ref 45–117)
ALT SERPL-CCNC: 14 U/L (ref 12–78)
ANION GAP SERPL CALC-SCNC: 4 MMOL/L (ref 5–15)
AST SERPL-CCNC: 14 U/L (ref 15–37)
ATRIAL RATE: 105 BPM
BASOPHILS # BLD: 0.1 K/UL (ref 0–0.1)
BASOPHILS NFR BLD: 1 % (ref 0–1)
BILIRUB SERPL-MCNC: 0.3 MG/DL (ref 0.2–1)
BUN SERPL-MCNC: 47 MG/DL (ref 6–20)
BUN/CREAT SERPL: 47 (ref 12–20)
CALCIUM SERPL-MCNC: 9.1 MG/DL (ref 8.5–10.1)
CALCULATED P AXIS, ECG09: 51 DEGREES
CALCULATED R AXIS, ECG10: -59 DEGREES
CALCULATED T AXIS, ECG11: 69 DEGREES
CHLORIDE SERPL-SCNC: 111 MMOL/L (ref 97–108)
CO2 SERPL-SCNC: 29 MMOL/L (ref 21–32)
CREAT SERPL-MCNC: 0.99 MG/DL (ref 0.55–1.02)
DIAGNOSIS, 93000: NORMAL
DIFFERENTIAL METHOD BLD: ABNORMAL
EOSINOPHIL # BLD: 0 K/UL (ref 0–0.4)
EOSINOPHIL NFR BLD: 0 % (ref 0–7)
ERYTHROCYTE [DISTWIDTH] IN BLOOD BY AUTOMATED COUNT: 15.4 % (ref 11.5–14.5)
GLOBULIN SER CALC-MCNC: 3.7 G/DL (ref 2–4)
GLUCOSE SERPL-MCNC: 151 MG/DL (ref 65–100)
HCT VFR BLD AUTO: 31.9 % (ref 35–47)
HCT VFR BLD AUTO: 32 % (ref 35–47)
HGB BLD-MCNC: 9.6 G/DL (ref 11.5–16)
HGB BLD-MCNC: 9.7 G/DL (ref 11.5–16)
IMM GRANULOCYTES # BLD AUTO: 0 K/UL (ref 0–0.04)
IMM GRANULOCYTES NFR BLD AUTO: 0 % (ref 0–0.5)
LYMPHOCYTES # BLD: 0.7 K/UL (ref 0.8–3.5)
LYMPHOCYTES NFR BLD: 7 % (ref 12–49)
MAGNESIUM SERPL-MCNC: 1.7 MG/DL (ref 1.6–2.4)
MCH RBC QN AUTO: 25.2 PG (ref 26–34)
MCHC RBC AUTO-ENTMCNC: 30 G/DL (ref 30–36.5)
MCV RBC AUTO: 84 FL (ref 80–99)
MONOCYTES # BLD: 1.1 K/UL (ref 0–1)
MONOCYTES NFR BLD: 11 % (ref 5–13)
NEUTS SEG # BLD: 7.8 K/UL (ref 1.8–8)
NEUTS SEG NFR BLD: 81 % (ref 32–75)
NRBC # BLD: 0 K/UL (ref 0–0.01)
NRBC BLD-RTO: 0 PER 100 WBC
P-R INTERVAL, ECG05: 178 MS
PLATELET # BLD AUTO: 275 K/UL (ref 150–400)
PMV BLD AUTO: 10.1 FL (ref 8.9–12.9)
POTASSIUM SERPL-SCNC: 3.6 MMOL/L (ref 3.5–5.1)
PROT SERPL-MCNC: 6.6 G/DL (ref 6.4–8.2)
Q-T INTERVAL, ECG07: 378 MS
QRS DURATION, ECG06: 126 MS
QTC CALCULATION (BEZET), ECG08: 499 MS
RBC # BLD AUTO: 3.81 M/UL (ref 3.8–5.2)
RBC MORPH BLD: ABNORMAL
SODIUM SERPL-SCNC: 144 MMOL/L (ref 136–145)
VENTRICULAR RATE, ECG03: 105 BPM
WBC # BLD AUTO: 9.7 K/UL (ref 3.6–11)

## 2022-10-02 PROCEDURE — 74011000636 HC RX REV CODE- 636: Performed by: SURGERY

## 2022-10-02 PROCEDURE — 36415 COLL VENOUS BLD VENIPUNCTURE: CPT

## 2022-10-02 PROCEDURE — C9113 INJ PANTOPRAZOLE SODIUM, VIA: HCPCS | Performed by: INTERNAL MEDICINE

## 2022-10-02 PROCEDURE — 83735 ASSAY OF MAGNESIUM: CPT

## 2022-10-02 PROCEDURE — 85018 HEMOGLOBIN: CPT

## 2022-10-02 PROCEDURE — 74011250636 HC RX REV CODE- 250/636: Performed by: INTERNAL MEDICINE

## 2022-10-02 PROCEDURE — 80053 COMPREHEN METABOLIC PANEL: CPT

## 2022-10-02 PROCEDURE — 65270000046 HC RM TELEMETRY

## 2022-10-02 PROCEDURE — 74011250636 HC RX REV CODE- 250/636: Performed by: HOSPITALIST

## 2022-10-02 PROCEDURE — 85025 COMPLETE CBC W/AUTO DIFF WBC: CPT

## 2022-10-02 PROCEDURE — 74011000258 HC RX REV CODE- 258: Performed by: INTERNAL MEDICINE

## 2022-10-02 PROCEDURE — 74011250636 HC RX REV CODE- 250/636: Performed by: SURGERY

## 2022-10-02 PROCEDURE — 99232 SBSQ HOSP IP/OBS MODERATE 35: CPT | Performed by: SURGERY

## 2022-10-02 PROCEDURE — 74011000250 HC RX REV CODE- 250: Performed by: INTERNAL MEDICINE

## 2022-10-02 RX ORDER — POTASSIUM CHLORIDE AND SODIUM CHLORIDE 450; 150 MG/100ML; MG/100ML
INJECTION, SOLUTION INTRAVENOUS CONTINUOUS
Status: DISCONTINUED | OUTPATIENT
Start: 2022-10-02 | End: 2022-10-05

## 2022-10-02 RX ORDER — DIAZEPAM 10 MG/2ML
5 INJECTION INTRAMUSCULAR
Status: DISCONTINUED | OUTPATIENT
Start: 2022-10-02 | End: 2022-10-05 | Stop reason: HOSPADM

## 2022-10-02 RX ORDER — MORPHINE SULFATE 2 MG/ML
0.5 INJECTION, SOLUTION INTRAMUSCULAR; INTRAVENOUS ONCE
Status: COMPLETED | OUTPATIENT
Start: 2022-10-02 | End: 2022-10-02

## 2022-10-02 RX ADMIN — SODIUM CHLORIDE 1 G: 900 INJECTION INTRAVENOUS at 14:32

## 2022-10-02 RX ADMIN — DIAZEPAM 5 MG: 5 INJECTION, SOLUTION INTRAMUSCULAR; INTRAVENOUS at 08:53

## 2022-10-02 RX ADMIN — ONDANSETRON 4 MG: 2 INJECTION INTRAMUSCULAR; INTRAVENOUS at 00:15

## 2022-10-02 RX ADMIN — ONDANSETRON 4 MG: 2 INJECTION INTRAMUSCULAR; INTRAVENOUS at 12:23

## 2022-10-02 RX ADMIN — POTASSIUM CHLORIDE AND SODIUM CHLORIDE: 900; 150 INJECTION, SOLUTION INTRAVENOUS at 04:44

## 2022-10-02 RX ADMIN — MORPHINE SULFATE 0.5 MG: 2 INJECTION, SOLUTION INTRAMUSCULAR; INTRAVENOUS at 20:11

## 2022-10-02 RX ADMIN — POTASSIUM CHLORIDE AND SODIUM CHLORIDE: 450; 150 INJECTION, SOLUTION INTRAVENOUS at 20:44

## 2022-10-02 RX ADMIN — DIATRIZOATE MEGLUMINE AND DIATRIZOATE SODIUM 80 ML: 660; 100 LIQUID ORAL; RECTAL at 08:54

## 2022-10-02 RX ADMIN — POTASSIUM CHLORIDE AND SODIUM CHLORIDE: 450; 150 INJECTION, SOLUTION INTRAVENOUS at 10:56

## 2022-10-02 RX ADMIN — SODIUM CHLORIDE, PRESERVATIVE FREE 40 MG: 5 INJECTION INTRAVENOUS at 08:54

## 2022-10-02 RX ADMIN — SODIUM CHLORIDE, PRESERVATIVE FREE 40 MG: 5 INJECTION INTRAVENOUS at 20:11

## 2022-10-02 RX ADMIN — DIAZEPAM 5 MG: 5 INJECTION, SOLUTION INTRAMUSCULAR; INTRAVENOUS at 15:58

## 2022-10-02 NOTE — PROGRESS NOTES
Transition of Care Plan  RUR: 15%  DISPOSITION: The disposition plan is return to [facility]  F/U with PCP/Specialist    Transport: Daughters       Reason for Admission:   SBO                    RUR Score:    15%              PCP: First and Last name:   Manuel Lock MD     Name of Practice:    Are you a current patient: Yes/No:    Approximate date of last visit:    Can you participate in a virtual visit if needed:     Do you (patient/family) have any concerns for transition/discharge?     none              Plan for utilizing home health:  not recommended      Current Advanced Directive/Advance Care Plan:  Full Code      Healthcare Decision Maker:   Click here to complete [address] including selection of the Healthcare Decision Maker Relationship (ie \"Primary\")            Primary Decision MakerBrodzaria Snider - Daughter - [phone]    Secondary Decision Maker: Cari Lombardi - Daughter - [phone]    Supplemental (Other) Decision Maker: Candi Cowan - Daughter - [phone]    Transition of Care Plan:          CRM spoke with patient's daughter, introduced self, explained role, verified demographics, and offered assistance. Patient lives at [address]. Patient has a wheelchair, walker, and scooter. Care Management Interventions  PCP Verified by CM: Yes  Palliative Care Criteria Met (RRAT>21 & CHF Dx)?: No  Mode of Transport at Discharge:  Other (see comment) (daughters)  Transition of Care Consult (CM Consult): Discharge Planning  MyChart Signup: No  Discharge Durable Medical Equipment: No  Health Maintenance Reviewed: Yes  Physical Therapy Consult: No  Occupational Therapy Consult: No  Speech Therapy Consult: No  Support Systems: Child(oracio)  Confirm Follow Up Transport: Family  [employer] Information Provided?: No  Discharge Location  Patient Expects to be Discharged to[de-identified] Assisted Living    3:28 PM  ASHLEIGH Razo

## 2022-10-02 NOTE — PROGRESS NOTES
Hospitalist Progress Note    NAME: Darshan Youngblood   :  10/9/1933   MRN:  [id number]     Interim Hospital Summary: 80 y.o. female whom presented on 10/1/2022 with      Assessment / Plan:  RADHA:  Barriers:   Clinical improvement    Abdominal pain  Hematemesis   Partial SBO  -CT abdomen: IMPRESSION  1. Distention of the esophagus, stomach, and small bowel with gradual  normalization of caliber of small bowel loops in the ileum consistent with  partial small bowel obstruction. 2.  Thickened small bowel loops in the right lower quadrant possibly secondary  to inflammatory bowel disease. 3.  Large volume stool in the rectum. 4.  19 x 18 mm enhancing left renal mass concerning for renal cell carcinoma,  stable from prior imaging.  -last EGD 2022  -serial Hg/Hct stable  -continue IV protonix  -continue NG to suction. 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 Leukocytosis resolved. No need to expand coverage - will check procalcitonin with AM labs    Left renal mass  -will need Urology referral or OP follow up when stable     Paroxysmal atrial fibrillation, 2018  Chronic LBBB. Hyperlipidemia.  -restart propafenone and statin when cleared for PO     Others:  Scoliosis/arthritis s/p extensive thoracolumbar fusion 2002  Fractured thoracolumbar fusion rods, stable  Back pain / Neuropathy in her feet  -have to hold gabapentin for now due to NG suction and SBO. Family concerned about holding this too long as the patient experiences significant neuropathic pain if held to long.       History of ovarian cancer s/p chemo       25.0 - 29.9 Overweight / Body mass index is 28.32 kg/m². Code status: Full  Prophylaxis: SCD's    Baseline: . Lives at [address] [place] [address]. Uses walker, scooter      Subjective:     Chief Complaint / Reason for Physician Visit  Follow up of partial SBO, PNA, volume depletion   Chart reviewed in detail. Discussed with RN events overnight. Review of Systems:  Symptom Y/N Comments  Symptom Y/N Comments   Fever/Chills    Chest Pain     Poor Appetite    Edema     Cough    Abdominal Pain     Sputum    Joint Pain     SOB/LEVINE    Pruritis/Rash     Nausea/vomit    Tolerating PT/OT     Diarrhea    Tolerating Diet     Constipation    Other       Could NOT obtain due to:      PO intake: No data found. Objective:     VITALS:   Last 24hrs VS reviewed since prior progress note. Most recent are:  Patient Vitals for the past 24 hrs:   Temp Pulse Resp BP SpO2   10/02/22 0730 98.5 °F (36.9 °C) 80 15 131/87 96 %   10/02/22 0321 98.1 °F (36.7 °C) 87 18 (!) 118/52 97 %   10/01/22 2112 98.1 °F (36.7 °C) (!) 54 16 (!) 99/51 98 %   10/01/22 1828 98.9 °F (37.2 °C) 67 16 (!) 107/59 93 %   10/01/22 1658 -- 87 16 107/73 99 %   10/01/22 1643 -- 98 13 102/82 99 %   10/01/22 1628 -- 98 21 101/76 96 %   10/01/22 1613 -- 91 21 116/67 95 %   10/01/22 1558 -- 90 18 126/66 98 %   10/01/22 1543 -- 87 15 109/65 96 %   10/01/22 1443 -- 87 17 121/72 --   10/01/22 1413 -- 90 18 (!) 90/59 90 %   10/01/22 1358 -- 94 20 (!) 92/57 90 %   10/01/22 1313 98.1 °F (36.7 °C) (!) 108 20 93/63 91 %       Intake/Output Summary (Last 24 hours) at 10/2/2022 0857  Last data filed at 10/2/2022 6318  Gross per 24 hour   Intake 1865.42 ml   Output 2800 ml   Net -934.58 ml        I had a face to face encounter, and independently examined this patient on 10/2/2022, as outlined below:  PHYSICAL EXAM:  General: WD, WN. Alert, cooperative, no acute distress    EENT:  EOMI. Anicteric sclerae. MMM  Resp:  CTA bilaterally, no wheezing or rales.   No accessory muscle use  CV:  Regular rhythm,  No edema  GI:  Soft, Non distended, Non tender. + hypoactive Bowel sounds  Neurologic:  Alert and oriented X 3, normal speech,   Psych:   Good insight. Not anxious nor agitated  Skin:  No rashes. No jaundice    Reviewed most current lab test results and cultures  YES  Reviewed most current radiology test results   YES  Review and summation of old records today    NO  Reviewed patient's current orders and MAR    YES  PMH/SH reviewed - no change compared to H&P  ________________________________________________________________________  Care Plan discussed with:    Comments   Patient x    Family      RN     Care Manager     Consultant                        Multidiciplinary team rounds were held today with , nursing, pharmacist and clinical coordinator. Patient's plan of care was discussed; medications were reviewed and discharge planning was addressed. ________________________________________________________________________  Total NON critical care TIME:  25   Minutes    Total CRITICAL CARE TIME Spent:   Minutes non procedure based      Comments   >50% of visit spent in counseling and coordination of care x     This includes time during multidisciplinary rounds if indicated above   ________________________________________________________________________  Harleen Toledo MD     Procedures: see electronic medical records for all procedures/Xrays and details which were not copied into this note but were reviewed prior to creation of Plan. LABS:  I reviewed today's most current labs and imaging studies.   Pertinent labs include:  Recent Labs     10/02/22  0446 10/01/22  6878 10/01/22  1327   WBC 9.7  --  15.2*   HGB 9.6* 9.7* 11.9   HCT 32.0* 31.9* 40.1     --  365     Recent Labs     10/02/22  0446 10/01/22  1327    140   K 3.6 3.7   * 102   CO2 29 31   * 163*   BUN 47* 44*   CREA 0.99 1.08*   CA 9.1 10.8*   MG 1.7  --    ALB 2.9* 3.7   TBILI 0.3 0.4   ALT 14 17

## 2022-10-02 NOTE — PROGRESS NOTES
End of Shift Note    Bedside shift change report given to Rashel Wilkins RN (oncoming nurse) by Broderick Feliz LPN (offgoing nurse). Report included the following information SBAR, Kardex, Procedure Summary, Intake/Output, MAR, and Recent Results    Shift worked:  7p-730a     Shift summary and any significant changes:     Pt rested in bed through shift. Pt had no c/o pain. NGT flushed last night and put out 1220cc. Labs drawn. Concerns for physician to address:       Zone phone for oncoming shift:   [phone]       Activity:  Activity Level: Up with Assistance  Number times ambulated in hallways past shift: 0  Number of times OOB to chair past shift: 0    Cardiac:   Cardiac Monitoring: Yes           Access:   Current line(s): PIV     Genitourinary:   Urinary status: voiding    Respiratory:   O2 Device: Nasal cannula  Chronic home O2 use?: NO  Incentive spirometer at bedside: NO     GI:  Last Bowel Movement Date:  (prior to admission, pt does not remember)  Current diet:  DIET NPO  Passing flatus: YES  Tolerating current diet: YES       Pain Management:   Patient states pain is manageable on current regimen: YES    Skin:  Cristino Score: 20  Interventions: float heels and increase time out of bed    Patient Safety:  Fall Score:  Total Score: 2  Interventions: bed/chair alarm, gripper socks, and pt to call before getting OOB       Length of Stay:  Expected LOS: - - -  Actual LOS: 1      Broderick Feliz LPN

## 2022-10-02 NOTE — CONSULTS
Please see dictated note. Patient admitted with vomiting and abdominal distention. She is 80years old and lives at [facility]. She carries a diagnosis of gastroparesis, EGD in the spring showed a patent pylorus. Important CT findings on admission here    Findings:    KIDNEYS: Bilateral renal cysts. Solid left renal mass measures 19 x 18 mm,  stable in size compared to August 9, 2022. This is concerning for a renal cell  carcinoma. Urologic follow-up recommended. STOMACH: Fluid distended distal esophagus and stomach. SMALL BOWEL:   Severe narrowing of the duodenum at the ligament of Treitz. Beyond this, there is distention of small bowel loops with gradual normalization of  caliber in the distal ileum. The non distended loops of distal bowel demonstrates  circumferential wall thickening and mucosal hyper enhancement. The terminal ileum  is normal in caliber. COLON: Decompressed proximal colon. Significant fecal stasis in the rectum. Patient carries diagnosis of gastroparesis. It may be that in fact there is a mechanical obstruction. The area of the ligament of Namon Sleight is noted to be very narrowed. This can be reached with a long upper scope or pediatric colonoscope from above. Will schedule this for Monday. Patient received contrast today. Will need NG suction tonight to suction out all contrast before scope tomorrow. If no obvious pathology found on Upper with enteroscopy could consider CT enterography to look at the ileum.     MTF     Please note - dictation line was not working, sorrry, no dictated note available

## 2022-10-02 NOTE — PROGRESS NOTES
Notified Dr. Tai Gonzalez via perfect serve that pt vomited x2 times with NGT clamped for gastrograffin (clamped for 3hrs 15min). Reconnected to suction. Instructed to continue suction for now.

## 2022-10-02 NOTE — PROGRESS NOTES
Admit Date: 10/1/2022    Subjective:     Patient c/o abdomen feeling tight. No flatus or BM. Objective:     Blood pressure (!) 118/52, pulse 87, temperature 98.1 °F (36.7 °C), resp. rate 18, height 5' (1.524 m), weight 145 lb (65.8 kg), SpO2 97 %. Temp (24hrs), Av.3 °F (36.8 °C), Min:98.1 °F (36.7 °C), Max:98.9 °F (37.2 °C)    3100 cc NG o/p. Now bilious, not bloody appearing.     Physical Exam:  GENERAL: alert, cooperative, no distress, appears stated age, LUNG: clear to auscultation bilaterally, HEART: regular rate and rhythm, ABDOMEN: mildly distended, mildly tender throughout, EXTREMITIES:  extremities normal, atraumatic, no cyanosis or edema    Labs:   Recent Results (from the past 24 hour(s))   EKG, 12 LEAD, INITIAL    Collection Time: 10/01/22  1:23 PM   Result Value Ref Range    Ventricular Rate 105 BPM    Atrial Rate 105 BPM    P-R Interval 178 ms    QRS Duration 126 ms    Q-T Interval 378 ms    QTC Calculation (Bezet) 499 ms    Calculated P Axis 51 degrees    Calculated R Axis -59 degrees    Calculated T Axis 69 degrees    Diagnosis       Sinus tachycardia with premature atrial complexes  Possible Left atrial enlargement  Left axis deviation  Left ventricular hypertrophy with QRS widening and repolarization abnormality  Cannot rule out Septal infarct (cited on or before 01-OCT-2022)  Possible Lateral infarct (cited on or before 01-OCT-2022)  When compared with ECG of 09-AUG-2022 10:56,  Serial changes of Septal infarct present  Confirmed by Ami Luz ([id number]) on 10/2/2022 8:29:51 AM     CBC WITH AUTOMATED DIFF    Collection Time: 10/01/22  1:27 PM   Result Value Ref Range    WBC 15.2 (H) 3.6 - 11.0 K/uL    RBC 4.75 3.80 - 5.20 M/uL    HGB 11.9 11.5 - 16.0 g/dL    HCT 40.1 35.0 - 47.0 %    MCV 84.4 80.0 - 99.0 FL    MCH 25.1 (L) 26.0 - 34.0 PG    MCHC 29.7 (L) 30.0 - 36.5 g/dL    RDW 15.3 (H) 11.5 - 14.5 %    PLATELET 648 385 - 820 K/uL    MPV 9.7 8.9 - 12.9 FL    NRBC 0.0 0  WBC ABSOLUTE NRBC 0.00 0.00 - 0.01 K/uL    NEUTROPHILS 88 (H) 32 - 75 %    LYMPHOCYTES 5 (L) 12 - 49 %    MONOCYTES 5 5 - 13 %    EOSINOPHILS 0 0 - 7 %    BASOPHILS 1 0 - 1 %    IMMATURE GRANULOCYTES 1 (H) 0.0 - 0.5 %    ABS. NEUTROPHILS 13.2 (H) 1.8 - 8.0 K/UL    ABS. LYMPHOCYTES 0.8 0.8 - 3.5 K/UL    ABS. MONOCYTES 0.8 0.0 - 1.0 K/UL    ABS. EOSINOPHILS 0.0 0.0 - 0.4 K/UL    ABS. BASOPHILS 0.2 (H) 0.0 - 0.1 K/UL    ABS. IMM. GRANS. 0.2 (H) 0.00 - 0.04 K/UL    DF SMEAR SCANNED      RBC COMMENTS NORMOCYTIC, NORMOCHROMIC     METABOLIC PANEL, COMPREHENSIVE    Collection Time: 10/01/22  1:27 PM   Result Value Ref Range    Sodium 140 136 - 145 mmol/L    Potassium 3.7 3.5 - 5.1 mmol/L    Chloride 102 97 - 108 mmol/L    CO2 31 21 - 32 mmol/L    Anion gap 7 5 - 15 mmol/L    Glucose 163 (H) 65 - 100 mg/dL    BUN 44 (H) 6 - 20 MG/DL    Creatinine 1.08 (H) 0.55 - 1.02 MG/DL    BUN/Creatinine ratio 41 (H) 12 - 20      GFR est AA 58 (L) >60 ml/min/1.73m2    GFR est non-AA 48 (L) >60 ml/min/1.73m2    Calcium 10.8 (H) 8.5 - 10.1 MG/DL    Bilirubin, total 0.4 0.2 - 1.0 MG/DL    ALT (SGPT) 17 12 - 78 U/L    AST (SGOT) 17 15 - 37 U/L    Alk.  phosphatase 82 45 - 117 U/L    Protein, total 7.9 6.4 - 8.2 g/dL    Albumin 3.7 3.5 - 5.0 g/dL    Globulin 4.2 (H) 2.0 - 4.0 g/dL    A-G Ratio 0.9 (L) 1.1 - 2.2     LIPASE    Collection Time: 10/01/22  1:27 PM   Result Value Ref Range    Lipase 249 73 - 393 U/L   URINALYSIS W/ REFLEX CULTURE    Collection Time: 10/01/22  1:27 PM    Specimen: Urine   Result Value Ref Range    Color YELLOW/STRAW      Appearance CLEAR CLEAR      Specific gravity 1.027      pH (UA) 5.5 5.0 - 8.0      Protein 30 (A) NEG mg/dL    Glucose Negative NEG mg/dL    Ketone Negative NEG mg/dL    Bilirubin Negative NEG      Blood Negative NEG      Urobilinogen 0.2 0.2 - 1.0 EU/dL    Nitrites Negative NEG      Leukocyte Esterase Negative NEG      UA:UC IF INDICATED CULTURE NOT INDICATED BY UA RESULT      WBC 0-4 0 - 4 /hpf    RBC 0-5 0 - 5 /hpf    Epithelial cells FEW FEW /lpf    Bacteria Negative NEG /hpf    Hyaline cast 2-5 0 - 2 /lpf   TROPONIN-HIGH SENSITIVITY    Collection Time: 10/01/22  1:27 PM   Result Value Ref Range    Troponin-High Sensitivity 8 0 - 51 ng/L   BLOOD GAS,CHEM8,LACTIC ACID POC    Collection Time: 10/01/22  1:34 PM   Result Value Ref Range    Calcium, ionized (POC) 1.37 (H) 1.12 - 1.32 mmol/L    BICARBONATE 32 mmol/L    Base excess (POC) 5.3 mmol/L    Sample source VENOUS BLOOD      CO2, POC 32 (H) 19 - 24 MMOL/L    Sodium,  136 - 145 MMOL/L    Potassium, POC 3.4 (L) 3.5 - 5.5 MMOL/L    Chloride,  100 - 108 MMOL/L    Glucose,  (H) 74 - 106 MG/DL    Creatinine, POC 0.8 0.6 - 1.3 MG/DL    Lactic Acid (POC) 1.47 0.40 - 2.00 mmol/L    pH, venous (POC) 7.39 7.32 - 7.42      pCO2, venous (POC) 51.7 (H) 41 - 51 MMHG    pO2, venous (POC) 20 (L) 25 - 40 mmHg   HGB & HCT    Collection Time: 10/01/22 11:48 PM   Result Value Ref Range    HGB 9.7 (L) 11.5 - 16.0 g/dL    HCT 31.9 (L) 35.0 - 05.9 %   METABOLIC PANEL, COMPREHENSIVE    Collection Time: 10/02/22  4:46 AM   Result Value Ref Range    Sodium 144 136 - 145 mmol/L    Potassium 3.6 3.5 - 5.1 mmol/L    Chloride 111 (H) 97 - 108 mmol/L    CO2 29 21 - 32 mmol/L    Anion gap 4 (L) 5 - 15 mmol/L    Glucose 151 (H) 65 - 100 mg/dL    BUN 47 (H) 6 - 20 MG/DL    Creatinine 0.99 0.55 - 1.02 MG/DL    BUN/Creatinine ratio 47 (H) 12 - 20      GFR est AA >60 >60 ml/min/1.73m2    GFR est non-AA 53 (L) >60 ml/min/1.73m2    Calcium 9.1 8.5 - 10.1 MG/DL    Bilirubin, total 0.3 0.2 - 1.0 MG/DL    ALT (SGPT) 14 12 - 78 U/L    AST (SGOT) 14 (L) 15 - 37 U/L    Alk.  phosphatase 61 45 - 117 U/L    Protein, total 6.6 6.4 - 8.2 g/dL    Albumin 2.9 (L) 3.5 - 5.0 g/dL    Globulin 3.7 2.0 - 4.0 g/dL    A-G Ratio 0.8 (L) 1.1 - 2.2     MAGNESIUM    Collection Time: 10/02/22  4:46 AM   Result Value Ref Range    Magnesium 1.7 1.6 - 2.4 mg/dL   CBC WITH AUTOMATED DIFF    Collection Time: 10/02/22  4:46 AM   Result Value Ref Range    WBC 9.7 3.6 - 11.0 K/uL    RBC 3.81 3.80 - 5.20 M/uL    HGB 9.6 (L) 11.5 - 16.0 g/dL    HCT 32.0 (L) 35.0 - 47.0 %    MCV 84.0 80.0 - 99.0 FL    MCH 25.2 (L) 26.0 - 34.0 PG    MCHC 30.0 30.0 - 36.5 g/dL    RDW 15.4 (H) 11.5 - 14.5 %    PLATELET 719 144 - 485 K/uL    MPV 10.1 8.9 - 12.9 FL    NRBC 0.0 0  WBC    ABSOLUTE NRBC 0.00 0.00 - 0.01 K/uL    NEUTROPHILS 81 (H) 32 - 75 %    LYMPHOCYTES 7 (L) 12 - 49 %    MONOCYTES 11 5 - 13 %    EOSINOPHILS 0 0 - 7 %    BASOPHILS 1 0 - 1 %    IMMATURE GRANULOCYTES 0 0.0 - 0.5 %    ABS. NEUTROPHILS 7.8 1.8 - 8.0 K/UL    ABS. LYMPHOCYTES 0.7 (L) 0.8 - 3.5 K/UL    ABS. MONOCYTES 1.1 (H) 0.0 - 1.0 K/UL    ABS. EOSINOPHILS 0.0 0.0 - 0.4 K/UL    ABS. BASOPHILS 0.1 0.0 - 0.1 K/UL    ABS. IMM. GRANS. 0.0 0.00 - 0.04 K/UL    DF AUTOMATED      RBC COMMENTS NORMOCYTIC, NORMOCHROMIC         Data Review images and reports reviewed    Assessment:     Active Problems:    [facility] (2/27/2019)      Hematemesis (10/1/2022)        Plan/Recommendations/Medical Decision Making:     Continue present treatment  Gastrografin challenge today  Agree with long endoscopy to look at ligament of Treitz during this hospitalization.      Lorna Weir MD  [id number] [facility]

## 2022-10-03 ENCOUNTER — APPOINTMENT (OUTPATIENT)
Dept: GENERAL RADIOLOGY | Age: 87
DRG: 388 | End: 2022-10-03
Attending: SURGERY
Payer: MEDICARE

## 2022-10-03 ENCOUNTER — ANESTHESIA EVENT (OUTPATIENT)
Dept: ENDOSCOPY | Age: 87
DRG: 388 | End: 2022-10-03
Payer: MEDICARE

## 2022-10-03 ENCOUNTER — APPOINTMENT (OUTPATIENT)
Dept: GENERAL RADIOLOGY | Age: 87
DRG: 388 | End: 2022-10-03
Attending: INTERNAL MEDICINE
Payer: MEDICARE

## 2022-10-03 ENCOUNTER — ANESTHESIA (OUTPATIENT)
Dept: ENDOSCOPY | Age: 87
DRG: 388 | End: 2022-10-03
Payer: MEDICARE

## 2022-10-03 LAB
HCT VFR BLD AUTO: 26.5 % (ref 35–47)
HGB BLD-MCNC: 7.8 G/DL (ref 11.5–16)

## 2022-10-03 PROCEDURE — 94760 N-INVAS EAR/PLS OXIMETRY 1: CPT

## 2022-10-03 PROCEDURE — 74011000250 HC RX REV CODE- 250: Performed by: ANESTHESIOLOGY

## 2022-10-03 PROCEDURE — 88305 TISSUE EXAM BY PATHOLOGIST: CPT

## 2022-10-03 PROCEDURE — 0DB68ZX EXCISION OF STOMACH, VIA NATURAL OR ARTIFICIAL OPENING ENDOSCOPIC, DIAGNOSTIC: ICD-10-PCS | Performed by: INTERNAL MEDICINE

## 2022-10-03 PROCEDURE — 71045 X-RAY EXAM CHEST 1 VIEW: CPT

## 2022-10-03 PROCEDURE — 77030003657 HC NDL SCLER BSC -B: Performed by: INTERNAL MEDICINE

## 2022-10-03 PROCEDURE — 76060000034 HC ANESTHESIA 1.5 TO 2 HR: Performed by: INTERNAL MEDICINE

## 2022-10-03 PROCEDURE — 77010033678 HC OXYGEN DAILY

## 2022-10-03 PROCEDURE — 65270000046 HC RM TELEMETRY

## 2022-10-03 PROCEDURE — 74011250636 HC RX REV CODE- 250/636: Performed by: HOSPITALIST

## 2022-10-03 PROCEDURE — 0DBA8ZX EXCISION OF JEJUNUM, VIA NATURAL OR ARTIFICIAL OPENING ENDOSCOPIC, DIAGNOSTIC: ICD-10-PCS | Performed by: INTERNAL MEDICINE

## 2022-10-03 PROCEDURE — 74011000258 HC RX REV CODE- 258: Performed by: INTERNAL MEDICINE

## 2022-10-03 PROCEDURE — 74011250636 HC RX REV CODE- 250/636: Performed by: SURGERY

## 2022-10-03 PROCEDURE — 74011250636 HC RX REV CODE- 250/636: Performed by: INTERNAL MEDICINE

## 2022-10-03 PROCEDURE — C9113 INJ PANTOPRAZOLE SODIUM, VIA: HCPCS | Performed by: INTERNAL MEDICINE

## 2022-10-03 PROCEDURE — 77030019988 HC FCPS ENDOSC DISP BSC -B: Performed by: INTERNAL MEDICINE

## 2022-10-03 PROCEDURE — 88342 IMHCHEM/IMCYTCHM 1ST ANTB: CPT

## 2022-10-03 PROCEDURE — 74011000250 HC RX REV CODE- 250: Performed by: INTERNAL MEDICINE

## 2022-10-03 PROCEDURE — 2709999900 HC NON-CHARGEABLE SUPPLY: Performed by: INTERNAL MEDICINE

## 2022-10-03 PROCEDURE — 76040000009: Performed by: INTERNAL MEDICINE

## 2022-10-03 PROCEDURE — 77030038665 HC SOL ELEVIEW INJ AGNT ARPH -B: Performed by: INTERNAL MEDICINE

## 2022-10-03 PROCEDURE — 74011250636 HC RX REV CODE- 250/636: Performed by: ANESTHESIOLOGY

## 2022-10-03 PROCEDURE — 74011250637 HC RX REV CODE- 250/637: Performed by: INTERNAL MEDICINE

## 2022-10-03 PROCEDURE — 74019 RADEX ABDOMEN 2 VIEWS: CPT

## 2022-10-03 RX ORDER — EPINEPHRINE 0.1 MG/ML
1 INJECTION INTRACARDIAC; INTRAVENOUS
Status: CANCELLED | OUTPATIENT
Start: 2022-10-03 | End: 2022-10-04

## 2022-10-03 RX ORDER — PROPAFENONE HYDROCHLORIDE 150 MG/1
150 TABLET, FILM COATED ORAL 2 TIMES DAILY
Status: DISCONTINUED | OUTPATIENT
Start: 2022-10-04 | End: 2022-10-05 | Stop reason: HOSPADM

## 2022-10-03 RX ORDER — FACIAL-BODY WIPES
10 EACH TOPICAL ONCE
Status: COMPLETED | OUTPATIENT
Start: 2022-10-03 | End: 2022-10-03

## 2022-10-03 RX ORDER — ATROPINE SULFATE 0.1 MG/ML
0.5 INJECTION INTRAVENOUS
Status: CANCELLED | OUTPATIENT
Start: 2022-10-03 | End: 2022-10-04

## 2022-10-03 RX ORDER — POLYETHYLENE GLYCOL 3350 17 G/17G
17 POWDER, FOR SOLUTION ORAL DAILY
Status: DISCONTINUED | OUTPATIENT
Start: 2022-10-04 | End: 2022-10-05 | Stop reason: HOSPADM

## 2022-10-03 RX ORDER — LIDOCAINE HYDROCHLORIDE 20 MG/ML
INJECTION, SOLUTION EPIDURAL; INFILTRATION; INTRACAUDAL; PERINEURAL AS NEEDED
Status: DISCONTINUED | OUTPATIENT
Start: 2022-10-03 | End: 2022-10-03 | Stop reason: HOSPADM

## 2022-10-03 RX ORDER — SODIUM CHLORIDE 9 MG/ML
50 INJECTION, SOLUTION INTRAVENOUS CONTINUOUS
Status: DISCONTINUED | OUTPATIENT
Start: 2022-10-03 | End: 2022-10-03

## 2022-10-03 RX ORDER — DIPHENHYDRAMINE HYDROCHLORIDE 50 MG/ML
50 INJECTION, SOLUTION INTRAMUSCULAR; INTRAVENOUS ONCE
Status: CANCELLED | OUTPATIENT
Start: 2022-10-03 | End: 2022-10-03

## 2022-10-03 RX ORDER — MORPHINE SULFATE 2 MG/ML
1 INJECTION, SOLUTION INTRAMUSCULAR; INTRAVENOUS ONCE
Status: COMPLETED | OUTPATIENT
Start: 2022-10-03 | End: 2022-10-03

## 2022-10-03 RX ORDER — SODIUM CHLORIDE 9 MG/ML
125 INJECTION, SOLUTION INTRAVENOUS CONTINUOUS
Status: DISCONTINUED | OUTPATIENT
Start: 2022-10-03 | End: 2022-10-03

## 2022-10-03 RX ORDER — NALOXONE HYDROCHLORIDE 0.4 MG/ML
0.4 INJECTION, SOLUTION INTRAMUSCULAR; INTRAVENOUS; SUBCUTANEOUS
Status: CANCELLED | OUTPATIENT
Start: 2022-10-03 | End: 2022-10-03

## 2022-10-03 RX ORDER — AMOXICILLIN 250 MG
1 CAPSULE ORAL DAILY
Status: DISCONTINUED | OUTPATIENT
Start: 2022-10-04 | End: 2022-10-05 | Stop reason: HOSPADM

## 2022-10-03 RX ORDER — FLUMAZENIL 0.1 MG/ML
0.2 INJECTION INTRAVENOUS
Status: CANCELLED | OUTPATIENT
Start: 2022-10-03 | End: 2022-10-03

## 2022-10-03 RX ORDER — ATORVASTATIN CALCIUM 20 MG/1
20 TABLET, FILM COATED ORAL DAILY
Refills: 3 | Status: DISCONTINUED | OUTPATIENT
Start: 2022-10-04 | End: 2022-10-05 | Stop reason: HOSPADM

## 2022-10-03 RX ORDER — PROPOFOL 10 MG/ML
INJECTION, EMULSION INTRAVENOUS AS NEEDED
Status: DISCONTINUED | OUTPATIENT
Start: 2022-10-03 | End: 2022-10-03 | Stop reason: HOSPADM

## 2022-10-03 RX ORDER — DEXTROMETHORPHAN/PSEUDOEPHED 2.5-7.5/.8
1.2 DROPS ORAL
Status: CANCELLED | OUTPATIENT
Start: 2022-10-03

## 2022-10-03 RX ORDER — PROPAFENONE HYDROCHLORIDE 150 MG/1
150 TABLET, FILM COATED ORAL 2 TIMES DAILY
Status: DISCONTINUED | OUTPATIENT
Start: 2022-10-03 | End: 2022-10-03

## 2022-10-03 RX ORDER — MIDAZOLAM HYDROCHLORIDE 1 MG/ML
.25-5 INJECTION, SOLUTION INTRAMUSCULAR; INTRAVENOUS
Status: CANCELLED | OUTPATIENT
Start: 2022-10-03 | End: 2022-10-03

## 2022-10-03 RX ADMIN — GABAPENTIN 800 MG: 300 CAPSULE ORAL at 18:49

## 2022-10-03 RX ADMIN — POTASSIUM CHLORIDE AND SODIUM CHLORIDE: 450; 150 INJECTION, SOLUTION INTRAVENOUS at 05:05

## 2022-10-03 RX ADMIN — POTASSIUM CHLORIDE AND SODIUM CHLORIDE: 450; 150 INJECTION, SOLUTION INTRAVENOUS at 16:05

## 2022-10-03 RX ADMIN — SODIUM CHLORIDE 1 G: 900 INJECTION INTRAVENOUS at 15:59

## 2022-10-03 RX ADMIN — SODIUM CHLORIDE 50 ML/HR: 9 INJECTION, SOLUTION INTRAVENOUS at 10:01

## 2022-10-03 RX ADMIN — MORPHINE SULFATE 1 MG: 2 INJECTION, SOLUTION INTRAMUSCULAR; INTRAVENOUS at 05:26

## 2022-10-03 RX ADMIN — BISACODYL 10 MG: 10 SUPPOSITORY RECTAL at 16:10

## 2022-10-03 RX ADMIN — PROPOFOL 250 MG: 10 INJECTION, EMULSION INTRAVENOUS at 12:02

## 2022-10-03 RX ADMIN — SODIUM CHLORIDE, PRESERVATIVE FREE 40 MG: 5 INJECTION INTRAVENOUS at 21:19

## 2022-10-03 RX ADMIN — SODIUM CHLORIDE, PRESERVATIVE FREE 40 MG: 5 INJECTION INTRAVENOUS at 13:20

## 2022-10-03 RX ADMIN — DIAZEPAM 5 MG: 5 INJECTION, SOLUTION INTRAMUSCULAR; INTRAVENOUS at 00:00

## 2022-10-03 RX ADMIN — LIDOCAINE HYDROCHLORIDE 50 MG: 20 INJECTION, SOLUTION EPIDURAL; INFILTRATION; INTRACAUDAL; PERINEURAL at 11:36

## 2022-10-03 NOTE — PROGRESS NOTES
Problem: Falls - Risk of  Goal: *Absence of Falls  Description: Document Darlen Jerome Fall Risk and appropriate interventions in the flowsheet.   Outcome: Progressing Towards Goal  Note: Fall Risk Interventions:  Mobility Interventions: Patient to call before getting OOB         Medication Interventions: Bed/chair exit alarm, Patient to call before getting OOB, Teach patient to arise slowly    Elimination Interventions: Bed/chair exit alarm, Call light in reach, Patient to call for help with toileting needs

## 2022-10-03 NOTE — PROGRESS NOTES
End of Shift Note    Bedside shift change report given to Phill Bryan (oncoming nurse) by Sully Hutchinson (offgoing nurse). Report included the following information SBAR, Kardex, and MAR    Shift worked:  [id number]     Shift summary and any significant changes:     Stool softener suppository and soap suds nahun, pt had large BM formed brown      Concerns for physician to address:  none     Zone phone for oncoming shift:   [phone]       Activity:  Activity Level: Bed Rest  Number times ambulated in hallways past shift: 0  Number of times OOB to chair past shift: 0    Cardiac:   Cardiac Monitoring: Yes      Cardiac Rhythm: Sinus Rhythm    Access:  Current line(s): PIV     Genitourinary:   Urinary status: voiding    Respiratory:   O2 Device: Nasal cannula  Chronic home O2 use?: NO  Incentive spirometer at bedside: YES       GI:  Last Bowel Movement Date:  (prior to admission, pt does not remember)  Current diet:  DIET NPO Sips of Clear Liquids  Passing flatus: YES  Tolerating current diet: YES       Pain Management:   Patient states pain is manageable on current regimen: YES    Skin:  Cristino Score: 18  Interventions: speciality bed, float heels, and increase time out of bed    Patient Safety:  Fall Score:  Total Score: 3  Interventions: assistive device (walker, cane, etc), gripper socks, and pt to call before getting OOB  High Fall Risk: Yes    Length of Stay:  Expected LOS: 4d 16h  Actual LOS: [address]

## 2022-10-03 NOTE — PROGRESS NOTES
Gastroenterology Daily Progress Note   PEGGY Ralph for Dr. Fina Sullivan)   [address] [place] [address] Dr Munson Date: 10/1/2022     Follow up of partial sbo    Subjective:       NGT placed with 300cc dark liquid in canister  States she has less abd distention since the NGT was placed  No flatus    Was evaluated by general surgery and GI over the weekend and push enteroscopy was scheduled for today to further evaluate the narrowing at ligament of treitz    PSgHx hysterectomy and laparoscopy to evaluate for recurrent ovarian cancer    CT Results (most recent):  Results from Hospital Encounter encounter on 10/01/22    CT ABD PELV W CONT    Narrative  EXAM: CT ABD PELV W CONT    INDICATION: pain, recent obstruction    COMPARISON: August 9, 2022    CONTRAST: 100 mL of Isovue-370. ORAL CONTRAST: None    TECHNIQUE:  Following the uneventful intravenous administration of contrast, thin axial  images were obtained through the abdomen and pelvis. Coronal and sagittal  reconstructions were generated. CT dose reduction was achieved through use of a  standardized protocol tailored for this examination and automatic exposure  control for dose modulation. FINDINGS:  LOWER THORAX: Bibasilar atelectasis. LIVER: No mass. BILIARY TREE: Gallbladder is within normal limits. CBD is not dilated. SPLEEN: within normal limits. PANCREAS: No mass or ductal dilatation. ADRENALS: Unremarkable. KIDNEYS: Bilateral renal cysts. Solid left renal mass measures 19 x 18 mm,  stable in size compared to August 9, 2022. This is concerning for a renal cell  carcinoma. Urologic follow-up recommended. STOMACH: Fluid distended distal esophagus and stomach. SMALL BOWEL: Severe narrowing of the duodenum at the ligament of Treitz. Beyond  this, there is distention of small bowel loops with gradual normalization of  caliber in the distal ileum.  The nondistended loops of distal bowel demonstrates  circumferential wall thickening and mucosal hyperenhancement. The terminal ileum  is normal in caliber. COLON: Decompressed proximal colon. Significant fecal stasis in the rectum. APPENDIX: Nonvisualized  PERITONEUM: No ascites or pneumoperitoneum. RETROPERITONEUM: No lymphadenopathy or aortic aneurysm. REPRODUCTIVE ORGANS: Status post hysterectomy. Small volume simple fluid in the  pelvis. URINARY BLADDER: No mass or calculus. BONES: Levoconvex scoliosis of the lumbar spine with multilevel thoracolumbar  fusion. ABDOMINAL WALL: No mass or hernia. ADDITIONAL COMMENTS: N/A    Impression  1. Distention of the esophagus, stomach, and small bowel with gradual  normalization of caliber of small bowel loops in the ileum consistent with  partial small bowel obstruction. 2.  Thickened small bowel loops in the right lower quadrant possibly secondary  to inflammatory bowel disease. 3.  Large volume stool in the rectum. 4.  19 x 18 mm enhancing left renal mass concerning for renal cell carcinoma,  stable from prior imaging. Current Facility-Administered Medications   Medication Dose Route Frequency    0.45% sodium chloride with KCl 20 mEq/L infusion   IntraVENous CONTINUOUS    diazePAM (VALIUM) injection 5 mg  5 mg IntraVENous Q8H PRN    pantoprazole (PROTONIX) 40 mg in 0.9% sodium chloride 10 mL injection  40 mg IntraVENous Q12H    acetaminophen (TYLENOL) tablet 650 mg  650 mg Oral Q6H PRN    Or    acetaminophen (TYLENOL) suppository 650 mg  650 mg Rectal Q6H PRN    ondansetron (ZOFRAN ODT) tablet 4 mg  4 mg Oral Q8H PRN    Or    ondansetron (ZOFRAN) injection 4 mg  4 mg IntraVENous Q6H PRN    cefTRIAXone (ROCEPHIN) 1 g in 0.9% sodium chloride (MBP/ADV) 50 mL MBP  1 g IntraVENous Q24H        Objective:     Visit Vitals  BP (!) 129/57   Pulse 81   Temp 98 °F (36.7 °C)   Resp 18   Ht 5' (1.524 m)   Wt 65.7 kg (144 lb 14.4 oz)   SpO2 97%   BMI 28.30 kg/m²   Blood pressure (!) 129/57, pulse 81, temperature 98 °F (36.7 °C), resp.  rate 18, height 5' (1.524 m), weight 65.7 kg (144 lb 14.4 oz), SpO2 97 %. No intake/output data recorded. 10/01 1901 - 10/03 0700  In: 1865.4 [I.V.:1685.4]  Out: 2400       Intake/Output Summary (Last 24 hours) at 10/3/2022 3490  Last data filed at 10/2/2022 1508  Gross per 24 hour   Intake --   Output 1100 ml   Net -1100 ml         Physical Exam:       General: elderly white female in no acute distress  HEENT: NGT in place  Chest:  CTA, No rhonchi, rales or rubs. Heart: S1, S2, RRR  GI: Soft, ND, hypoactive bowel sounds, mild diffuse tenderness, no guarding or rebounding  Extremities: no edema   CNS: CN II-XII normal.      Labs:       No results found for this or any previous visit (from the past 24 hour(s)). LABRCNT(wbc:2,hgb:2,hct:2,plt:2,)  Recent Labs     10/02/22  0446 10/01/22  1327    140   K 3.6 3.7   * 102   CO2 29 31   BUN 47* 44*   CREA 0.99 1.08*   * 163*   CA 9.1 10.8*   MG 1.7  --    LABRCNT(sgot:3,gpt:3,ap:3,tbiL:3,TP:3,ALB:3,GLOB:3,ggt:3,aml:3,amyp:3,lpse:3,hlpse:3)No results for input(s): INR, PTP, APTT, INREXT in the last 72 hours. Recent Labs     10/02/22  0446 10/01/22  1327   AP 61 82   TP 6.6 7.9   ALB 2.9* 3.7   GLOB 3.7 4.2*   LPSE  --  249   BRIEFLAB(B12,FOL,FOLAT,RBCF)  Lab Results   Component Value Date/Time    Folate 50.4 (H) 06/16/2010 10:35 AM   LABRCNT(CPK:3,CpKMB:3,ckndx:3,troiq:3)No components found for: GLPOCBRIEFLAB(CHOL,CHOLX,CHOLP,CHLST,CHOLV,HDL,HDLC,HDLP,LDL,DLDL,LDLC,DLDLP,TGL,TGLX,TRIGL,TRIGP,CHHD,CHHDX)No results for input(s): PH, PCO2, PO2 in the last 72 hours. LABRCNT(CPK:3,CpKMB:3,ckndx:3,troiq:3)PEGGY Talbert  No results for input(s): CPK, CKNDX, TROIQ in the last 72 hours.     No lab exists for component: CPKMBMEShPEGGY Azul      Problem List:     Active Problems:    [facility] (2/27[facility]2019)      Hematemesis (10/1/2022)        Impression:  Partial SBO  Abnormal CT         Plan:  Patient is s/p NGT placement with suction and reports improved abd distention and is on the schedule for push enteroscopy today for further evaluation of the narrowed duodenum at the ligament of treitz.          PEGGY Orozco    10/3/2022  [address] [facility], [address] [place] [address]  [address] [place] [address] South: [phone]

## 2022-10-03 NOTE — PROGRESS NOTES
Surgery      Pt just s/p push endoscopy. Findings reviewed with Dr. Shanelle Morales. No extrinsic compression or mucosal lesions noted. Oral contrast given yesterday present throughout mildly dilated distal small bowel and throughout normal caliber colon with prominent rectal stool burden. This does not appear to be a mechanical obstruction. Suspect motility disorder with some component of constipation. Will defer management to GI/primary team.  Please call with any additional surgical concerns. Rapheal Severs.  Guero Fine MD, [facility]

## 2022-10-03 NOTE — ROUTINE PROCESS
0 Not primary nurse in endo. Patient in bed C, call out saying she feels nauseous. She has an NG tube in the Right nares at 55cm (as previously documented) and oxygen to wall suction at 2L NC. Discussed nausea with anesthesia Dr. Edmund Granda. Said okay to flush and place to low wall suction. 1030 NG tube flushed with 40 ml of water with a return of of 60ml of tan/green fluid. Patient said she felt better after NGT was suctioned no complaints at this time. [address] informed.

## 2022-10-03 NOTE — CONSULTS
[address] [facility]    Name:  Kimberly Perez  MR#:  [id number]  :  10/09/1933  ACCOUNT #:  [de-identified]  DATE OF SERVICE:  10/02/2022      REASON FOR CONSULTATION:  Nausea and vomiting. DATABASE:  This is an 26-year-old lady who is a resident of [facility]. She takes  no NSAIDS . Upper endoscopy is requested. Xray showed no gastric obstruction and patent pylorus and noted to be diagnosed with gastroparesis. She came into the emergency room today from the nursing home with complaint of nausea and vomiting. She had a CT scan which showed stomach and duodenum with severe narrowing of the duodenum at the ligament of Treitz. There is also some distention of small bowel beyond this suggested and possible small bowel disease beyond the ligament of Treitz and proximal bowel. The patient had some coffee ground emesis. I advised NG suction and surgical consult. She feels better with the NG tube. Of note, the patient also has a solid renal mass on the left kidney, stable in size, but there is a concern that this may represent renal cell carcinoma. We were asked to see her in consult and help with further management. PAST MEDICAL HISTORY:  As noted above, the patient is 80years old, lives in the nursing home. SOCIAL HISTORY:  Negative. FAMILY HISTORY: Noncontributory. REVIEW OF SYSTEMS:  Otherwise unremarkable. LABORATORY DATA:  Labs on admission show a white count 15,000, hemoglobin 11.9. Chemistries were unremarkable. IMPRESSION:  The patient has nausea and vomiting, possible obstruction at the level of the ligament of Treitz, possible obstruction distally as well. Her nausea and vomiting may not be related to stomach, it is  probably an obstructive issue  in small bowel or distal duodenum. I have  scheduled upper endoscopy with long scope  at the area of ligament of Treitz taking the biopsies may be appropriate. Yue Shore, MD [id number]  D:  10/02/2022 15:42  T:  10/02/2022 19:48  JOB #:  [id number]

## 2022-10-03 NOTE — ANESTHESIA PREPROCEDURE EVALUATION
Anesthetic History     PONV          Review of Systems / Medical History  Patient summary reviewed, nursing notes reviewed and pertinent labs reviewed    Pulmonary  Within defined limits                 Neuro/Psych       CVA (foot drop)  TIA    Comments: Neuropathy  Hx Foot drop   Peripheral neuropathy, feet   Cardiovascular    Hypertension  Valvular problems/murmurs: tricuspid insufficiency      Dysrhythmias : atrial fibrillation  Hyperlipidemia    Exercise tolerance: <4 METS: Uses walker  Comments: ECG (10/1/22): Sinus tachycardia with premature atrial complexes   Possible Left atrial enlargement   Left axis deviation   Left ventricular hypertrophy with QRS widening and repolarization abnormality   Cannot rule out Septal infarct (cited on or before 01-OCT-2022)   Possible Lateral infarct (cited on or before 01-OCT-2022)   When compared with ECG of 09-AUG-2022 10:56,   Serial changes of Septal infarct present     TTE (11/5/18): Left ventricle: Systolic function was normal. Ejection fraction was  estimated to be 60 %. There were no regional wall motion abnormalities. Tricuspid valve:  There was mild regurgitation   GI/Hepatic/Renal     GERD: poorly controlled      PUD (Gastric)    Comments: Vomiting  Abdominal distention    Lewis's Esophagus  Hx Gastric ulcer   Hx Gastroparesis  Endo/Other        Arthritis, cancer (Hx Ovarian cancer s/p chemotherapy + LYDIA/BSO (1986)) and anemia     Other Findings   Comments: Scoliosis  Hearing loss             Physical Exam    Airway  Mallampati: III  TM Distance: 4 - 6 cm  Neck ROM: normal range of motion   Mouth opening: Normal     Cardiovascular  Regular rate and rhythm,  S1 and S2 normal,  no murmur, click, rub, or gallop  Rhythm: regular  Rate: normal         Dental    Dentition: Upper partial plate     Pulmonary  Breath sounds clear to auscultation               Abdominal  GI exam deferred       Other Findings            Anesthetic Plan    ASA: 3  Anesthesia type: MAC          Induction: Intravenous  Anesthetic plan and risks discussed with: Patient

## 2022-10-03 NOTE — PROGRESS NOTES
TRANSFER - IN REPORT:    Verbal report received from [facility] RN(name) on Ricki Echevaria  being received from Endo(unit) for routine progression of care      Report consisted of patients Situation, Background, Assessment and   Recommendations(SBAR). Information from the following report(s) SBAR was reviewed with the receiving nurse. Opportunity for questions and clarification was provided. Assessment completed upon patients arrival to unit and care assumed.

## 2022-10-03 NOTE — ANESTHESIA POSTPROCEDURE EVALUATION
Procedure(s):  ESOPHAGOGASTRODUODENOSCOPY (EGD) WITH PEDI SCOPE  INJECTION  ESOPHAGOGASTRODUODENAL (EGD) BIOPSY. MAC    Anesthesia Post Evaluation        Patient location during evaluation: PACU  Note status: Adequate. Level of consciousness: responsive to verbal stimuli and sleepy but conscious  Pain management: satisfactory to patient  Airway patency: patent  Anesthetic complications: no  Cardiovascular status: acceptable  Respiratory status: acceptable  Hydration status: acceptable  Comments: +Post-Anesthesia Evaluation and Assessment    Patient: Kateryna Bueno MRN: [id number]  SSN: [id number]   YOB: 1933  Age: 80 y.o. Sex: female      Cardiovascular Function/Vital Signs    BP (!) 114/47   Pulse 83   Temp 36.3 °C (97.4 °F)   Resp 18   Ht 5' (1.524 m)   Wt 65.7 kg (144 lb 14.4 oz)   SpO2 92%   BMI 28.30 kg/m²     Patient is status post Procedure(s):  ESOPHAGOGASTRODUODENOSCOPY (EGD) WITH PEDI SCOPE  INJECTION  ESOPHAGOGASTRODUODENAL (EGD) BIOPSY. Nausea/Vomiting: Controlled. Postoperative hydration reviewed and adequate. Pain:  Pain Scale 1: Numeric (0 - 10) (10/03/22 6506)  Pain Intensity 1: 6 (10/03/22 7208)   Managed. Neurological Status: At baseline. Mental Status and Level of Consciousness: Arousable. Pulmonary Status:   O2 Device: Nasal cannula (10/03/22 1206)   Adequate oxygenation and airway patent. Complications related to anesthesia: None    Post-anesthesia assessment completed. No concerns.     Signed By: Penny Guerrero MD    10/3/2022  Post anesthesia nausea and vomiting:  controlled      INITIAL Post-op Vital signs:   Vitals Value Taken Time   /47 10/03/22 1206   Temp     Pulse 83 10/03/22 1206   Resp 18 10/03/22 1206   SpO2 92 % 10/03/22 1206

## 2022-10-03 NOTE — PROGRESS NOTES
Hospitalist Progress Note    NAME: Odilia Griffith   :  10/9/1933   MRN:  [id number]     Interim Hospital Summary: 80 y.o. female whom presented on 10/1/2022 with      Assessment / Plan:  RADHA: 10/05  Barriers:   Return of bowel movement    Abdominal pain  Hematemesis   Partial SBO  -CT abdomen: IMPRESSION  1. Distention of the esophagus, stomach, and small bowel with gradual  normalization of caliber of small bowel loops in the ileum consistent with  partial small bowel obstruction. 2.  Thickened small bowel loops in the right lower quadrant possibly secondary  to inflammatory bowel disease. 3.  Large volume stool in the rectum. 4.  19 x 18 mm enhancing left renal mass concerning for renal cell carcinoma,  stable from prior imaging.  -last EGD 2022  -serial Hg/Hct stable  -continue IV protonix  NG tube and suction, clamped as minimal output  --Appreciate Gen Surgery following  EGD with  push enteroscopy done\"  No obstruction lesion or extrinsic compression seen  Erosive esophagitis  Per surgery, suspect motility disorder with some component of constipation   Clamp NGT, monitor output 4 hours. 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 Leukocytosis resolved. No need to expand coverage - will check procalcitonin with AM labs    Left renal mass  -will need Urology referral or OP follow up when stable     Paroxysmal atrial fibrillation, 2018  Chronic LBBB.   Hyperlipidemia.  -restart propafenone and statin when cleared for PO     Others:  Scoliosis/arthritis s/p extensive thoracolumbar fusion   Fractured thoracolumbar fusion rods, stable  Back pain / Neuropathy in her feet  -NG tube clamped,  restarted patient gabapentin at lower dose at 800 mg twice daily. Patient taking large amount of gabapentin. Her creatinine clearance is only 33 mL/mn. Most likely this dosage needs to be addressed by PCP    History of ovarian cancer s/p chemo       25.0 - 29.9 Overweight / Body mass index is 28.3 kg/m². Code status: Full  Prophylaxis: SCD's    Baseline: . Lives at [address] [place] [address]. Uses walker, scooter      Subjective:     Chief Complaint / Reason for Physician Visit  Follow up of partial SBO, PNA, volume depletion   Chart reviewed in detail. Discussed with RN events overnight. No further nausea or vomiting, abdomen less distended  NGT clamped , allowing sips of clears     Review of Systems:  Symptom Y/N Comments  Symptom Y/N Comments   Fever/Chills n   Chest Pain n    Poor Appetite    Edema     Cough    Abdominal Pain n    Sputum    Joint Pain     SOB/LEVINE n   Pruritis/Rash     Nausea/vomit n   Tolerating PT/OT     Diarrhea    Tolerating Diet     Constipation    Other       Could NOT obtain due to:      PO intake: No data found. Objective:     VITALS:   Last 24hrs VS reviewed since prior progress note. Most recent are:  Patient Vitals for the past 24 hrs:   Temp Pulse Resp BP SpO2   10/03/22 0833 97.4 °F (36.3 °C) 64 18 (!) 146/64 100 %   10/02/22 2343 98 °F (36.7 °C) 81 18 (!) 129/57 97 %   10/02/22 1508 98.8 °F (37.1 °C) 88 20 (!) 139/90 97 %   10/02/22 1053 98.4 °F (36.9 °C) 76 16 131/74 96 %         Intake/Output Summary (Last 24 hours) at 10/3/2022 0920  Last data filed at 10/2/2022 1508  Gross per 24 hour   Intake --   Output 1100 ml   Net -1100 ml          I had a face to face encounter, and independently examined this patient on 10/3/2022, as outlined below:  PHYSICAL EXAM:  General: WD, WN. Alert, cooperative, no acute distress    EENT:  EOMI. Anicteric sclerae.  MMM  Resp:  CTA bilaterally, no wheezing or rales. No accessory muscle use  CV:  Regular  rhythm,  No edema  GI:  Soft, Non distended, Non tender. + hypoactive Bowel sounds  Neurologic:  Alert and oriented X 3, normal speech,   Psych:   Good insight. Not anxious nor agitated  Skin:  No rashes. No jaundice    Reviewed most current lab test results and cultures  YES  Reviewed most current radiology test results   YES  Review and summation of old records today    NO  Reviewed patient's current orders and MAR    YES  PMH/SH reviewed - no change compared to H&P  ________________________________________________________________________  Care Plan discussed with:    Comments   Patient x    Family  x Daughter at bedside   RN x    Care Manager     Consultant                        Multidiciplinary team rounds were held today with , nursing, pharmacist and clinical coordinator. Patient's plan of care was discussed; medications were reviewed and discharge planning was addressed. ________________________________________________________________________  Total NON critical care TIME:  35  Minutes    Total CRITICAL CARE TIME Spent:   Minutes non procedure based      Comments   >50% of visit spent in counseling and coordination of care x     This includes time during multidisciplinary rounds if indicated above   ________________________________________________________________________  Jacinto Hess MD     Procedures: see electronic medical records for all procedures/Xrays and details which were not copied into this note but were reviewed prior to creation of Plan. LABS:  I reviewed today's most current labs and imaging studies.   Pertinent labs include:  Recent Labs     10/02/22  0446 10/02/22  0412 10/01/22  2348 10/01/22  1327   WBC 9.7  --   --  15.2*   HGB 9.6* 7.8* 9.7* 11.9   HCT 32.0* 26.5* 31.9* 40.1     --   --  365       Recent Labs     10/02/22  0446 10/01/22  1327    140   K 3.6 3.7   * 102   CO2 29 31   GLU 151* 163*   BUN 47* 44*   CREA 0.99 1.08*   CA 9.1 10.8*   MG 1.7  --    ALB 2.9* 3.7   TBILI 0.3 0.4   ALT 14 17

## 2022-10-03 NOTE — PROGRESS NOTES
.. TRANSFER - IN REPORT:    Verbal report received from Nallely(name) on Barakat Angles  being received from 3216(unit) for ordered procedure      Report consisted of patients Situation, Background, Assessment and   Recommendations(SBAR). Information from the following report(s) Intake/Output, MAR, Accordion, and Recent Results was reviewed with the receiving nurse. Opportunity for questions and clarification was provided. Assessment completed upon patients arrival to unit and care assumed.

## 2022-10-03 NOTE — PERIOP NOTES
Endoscopy Case End Note:    1202:  Procedure scope was pre-cleaned, per protocol, at bedside by Sarah Forbes. 1209:  Report received from anesthesia - Sanya Don CRNA. See anesthesia flowsheet for intra-procedure vital signs and events. 1210:  Hearing aids returned to patient.

## 2022-10-03 NOTE — PROGRESS NOTES
CM spoke to patient's daughter - Yoana Fernandez - [phone] - who confirms that patient lives in [place] at [address] [facility]. Patient and family are agreeable to patient going to [place] [address] at [address] [facility] if necessary. CM will continue to follow for discharge planning.     Patient will need a PCR to return back to [address] [place] [address] Maite, RN, BSN, [address] [place] [address]  Manager of Case Management  [phone]

## 2022-10-03 NOTE — PROCEDURES
NAME:  Maria A Phelps   :   10/9/1933   MRN:   [id number]     Date/Time:  10/3/2022 12:07 PM    Esophagogastroduodenoscopy (EGD) with small bowel enteroscopy Procedure Note    : Lisandra Romero MD    Staff: Endoscopy Technician-1: Shirin Leavitt  Endoscopy RN-1: Bear Flynn RN     Referring Provider:  Lobito Cabrera MD    Anethesia/Sedation:  MAC anesthesia Propofol    Preoperative diagnosis: xvomiting and abdominal distention    Postoperative diagnosis: x    Procedure Details     After infom consent was obtained for the procedure, with all risks and benefits of procedure explained the patient was taken to the endoscopy suite and placed in the left lateral decubitus position. Following sequential administration of sedation as per above, the  pediatric colonoscope  was inserted into the mouth and advanced under direct vision to jejunum >100cm past pylorus. A careful inspection was made as the gastroscope was withdrawn, including a retroflexed view of the proximal stomach; findings and interventions are described below. Findings:  Esophagus: Bile filled esophagus easily and quickly suctioned. Grade D erosive esophagitis. EGJ at 35cm with a 5cm hiatal hernia. Retroflexion hill-4 GEFV. At the end of procedure a 12F NGT was placed under direct visualization via the R nare, down the esophagus into the stomach. Stomach:moderate bile suctioned quickly and easily, moderate gastritis with erythema and granularity throughout stomach biopsied cold forceps with minimal bleeding antrum, incisura, body, cardia, to r/o H pylori. Duodenum/jejunum: >100cm past pylorus reached to hub of scope, unable to advance further. 2cc madelin ink injected submucosally for localization. Cold forceps biopsies obtained to assess for enteritis w/ minimal bleeding. Small bowel filled with green bile, lavaged and suctioned easily.   Mucosa appeared normal. No mass or obstructing lesion or extrinsic compression noted. EBL: minimal    Complications:   None; patient tolerated the procedure well. Impression:    See Postoperative diagnosis above    Recommendations:  -Continue acid suppression. , -Await pathology.  - No obstructing lesion or extrinsic compression.   - called and left detailed VM on annie Smith [phone] which has a personalized VM box. Discharge disposition:  wards.   Gail Grajeda MD

## 2022-10-04 LAB
B PERT DNA SPEC QL NAA+PROBE: NOT DETECTED
BORDETELLA PARAPERTUSSIS PCR, BORPAR: NOT DETECTED
C PNEUM DNA SPEC QL NAA+PROBE: NOT DETECTED
FLUAV SUBTYP SPEC NAA+PROBE: NOT DETECTED
FLUBV RNA SPEC QL NAA+PROBE: NOT DETECTED
GLUCOSE BLD STRIP.AUTO-MCNC: 86 MG/DL (ref 65–117)
GLUCOSE BLD STRIP.AUTO-MCNC: 90 MG/DL (ref 65–117)
HADV DNA SPEC QL NAA+PROBE: NOT DETECTED
HCOV 229E RNA SPEC QL NAA+PROBE: NOT DETECTED
HCOV HKU1 RNA SPEC QL NAA+PROBE: NOT DETECTED
HCOV NL63 RNA SPEC QL NAA+PROBE: NOT DETECTED
HCOV OC43 RNA SPEC QL NAA+PROBE: NOT DETECTED
HMPV RNA SPEC QL NAA+PROBE: NOT DETECTED
HPIV1 RNA SPEC QL NAA+PROBE: NOT DETECTED
HPIV2 RNA SPEC QL NAA+PROBE: NOT DETECTED
HPIV3 RNA SPEC QL NAA+PROBE: NOT DETECTED
HPIV4 RNA SPEC QL NAA+PROBE: NOT DETECTED
M PNEUMO DNA SPEC QL NAA+PROBE: NOT DETECTED
RSV RNA SPEC QL NAA+PROBE: NOT DETECTED
RV+EV RNA SPEC QL NAA+PROBE: NOT DETECTED
SARS-COV-2 PCR, COVPCR: NOT DETECTED
SERVICE CMNT-IMP: NORMAL
SERVICE CMNT-IMP: NORMAL

## 2022-10-04 PROCEDURE — 74011250636 HC RX REV CODE- 250/636: Performed by: HOSPITALIST

## 2022-10-04 PROCEDURE — 74011250636 HC RX REV CODE- 250/636: Performed by: INTERNAL MEDICINE

## 2022-10-04 PROCEDURE — 97162 PT EVAL MOD COMPLEX 30 MIN: CPT

## 2022-10-04 PROCEDURE — 97116 GAIT TRAINING THERAPY: CPT

## 2022-10-04 PROCEDURE — 74011250637 HC RX REV CODE- 250/637: Performed by: INTERNAL MEDICINE

## 2022-10-04 PROCEDURE — 82962 GLUCOSE BLOOD TEST: CPT

## 2022-10-04 PROCEDURE — 0202U NFCT DS 22 TRGT SARS-COV-2: CPT

## 2022-10-04 PROCEDURE — 65270000046 HC RM TELEMETRY

## 2022-10-04 PROCEDURE — 97165 OT EVAL LOW COMPLEX 30 MIN: CPT

## 2022-10-04 PROCEDURE — 94760 N-INVAS EAR/PLS OXIMETRY 1: CPT

## 2022-10-04 PROCEDURE — 74011000258 HC RX REV CODE- 258: Performed by: INTERNAL MEDICINE

## 2022-10-04 PROCEDURE — 74011000250 HC RX REV CODE- 250: Performed by: INTERNAL MEDICINE

## 2022-10-04 PROCEDURE — 77010033678 HC OXYGEN DAILY

## 2022-10-04 PROCEDURE — 97530 THERAPEUTIC ACTIVITIES: CPT

## 2022-10-04 PROCEDURE — C9113 INJ PANTOPRAZOLE SODIUM, VIA: HCPCS | Performed by: INTERNAL MEDICINE

## 2022-10-04 PROCEDURE — 97535 SELF CARE MNGMENT TRAINING: CPT

## 2022-10-04 RX ADMIN — SENNOSIDES AND DOCUSATE SODIUM 1 TABLET: 50; 8.6 TABLET ORAL at 10:54

## 2022-10-04 RX ADMIN — SODIUM CHLORIDE 1 G: 900 INJECTION INTRAVENOUS at 16:37

## 2022-10-04 RX ADMIN — POLYETHYLENE GLYCOL 3350 17 G: 17 POWDER, FOR SOLUTION ORAL at 10:43

## 2022-10-04 RX ADMIN — ACETAMINOPHEN 650 MG: 325 TABLET ORAL at 10:50

## 2022-10-04 RX ADMIN — SODIUM CHLORIDE, PRESERVATIVE FREE 40 MG: 5 INJECTION INTRAVENOUS at 10:43

## 2022-10-04 RX ADMIN — PROPAFENONE HYDROCHLORIDE 150 MG: 150 TABLET, FILM COATED ORAL at 17:04

## 2022-10-04 RX ADMIN — ATORVASTATIN CALCIUM 20 MG: 20 TABLET, FILM COATED ORAL at 10:53

## 2022-10-04 RX ADMIN — GABAPENTIN 800 MG: 300 CAPSULE ORAL at 10:42

## 2022-10-04 RX ADMIN — ACETAMINOPHEN 650 MG: 325 TABLET ORAL at 21:13

## 2022-10-04 RX ADMIN — POTASSIUM CHLORIDE AND SODIUM CHLORIDE: 450; 150 INJECTION, SOLUTION INTRAVENOUS at 03:48

## 2022-10-04 RX ADMIN — SODIUM CHLORIDE, POTASSIUM CHLORIDE, SODIUM LACTATE AND CALCIUM CHLORIDE 250 ML: 600; 310; 30; 20 INJECTION, SOLUTION INTRAVENOUS at 05:21

## 2022-10-04 RX ADMIN — SODIUM CHLORIDE, PRESERVATIVE FREE 40 MG: 5 INJECTION INTRAVENOUS at 20:52

## 2022-10-04 RX ADMIN — GABAPENTIN 800 MG: 300 CAPSULE ORAL at 17:04

## 2022-10-04 RX ADMIN — PROPAFENONE HYDROCHLORIDE 150 MG: 150 TABLET, FILM COATED ORAL at 10:53

## 2022-10-04 NOTE — PROGRESS NOTES
Problem: Falls - Risk of  Goal: *Absence of Falls  Description: Document Anjelica Duverney Fall Risk and appropriate interventions in the flowsheet.   Outcome: Progressing Towards Goal  Note: Fall Risk Interventions:  Mobility Interventions: Patient to call before getting OOB         Medication Interventions: Bed/chair exit alarm, Patient to call before getting OOB, Teach patient to arise slowly    Elimination Interventions: Bed/chair exit alarm, Call light in reach, Patient to call for help with toileting needs

## 2022-10-04 NOTE — PROGRESS NOTES
Gastroenterology Daily Progress Note   PEGGY Barbosa for Dr. Leeroy Ghotra)   [address] [place] [address] Dr Munson Date: 10/1/2022     Follow up of partial sbo    Subjective:       Feels a little bloated but overall better  NGT had 100cc output yesterday after EGD, has been clamped overnight, no nausea or vomiting  Stool softener suppository and soap suds enama, pt had large BM formed brown   Surgery signed off    EGD 10/3/22: Findings:  Esophagus: Bile filled esophagus easily and quickly suctioned. Grade D erosive esophagitis. EGJ at 35cm with a 5cm hiatal hernia. Retroflexion hill-4 GEFV. At the end of procedure a 12F NGT was placed under direct visualization via the R nare, down the esophagus into the stomach. Stomach:moderate bile suctioned quickly and easily, moderate gastritis with erythema and granularity throughout stomach biopsied cold forceps with minimal bleeding antrum, incisura, body, cardia, to r/o H pylori. Duodenum/jejunum: >100cm past pylorus reached to hub of scope, unable to advance further. 2cc madelin ink injected submucosally for localization. Cold forceps biopsies obtained to assess for enteritis w/ minimal bleeding. Small bowel filled with green bile, lavaged and suctioned easily. Mucosa appeared normal. No mass or obstructing lesion or extrinsic compression noted. Recommendations:  -Continue acid suppression. , -Await pathology.  - No obstructing lesion or extrinsic compression. * * *FINAL PATHOLOGIC DIAGNOSIS* * *     1. Jejunum; biopsy:        Mild acute jejunitis; no dysplasia identified. 2. Stomach; biopsy:        Focal mild active chronic gastritis and mild features of reactive   gastropathy. No intestinal metaplasia and no dysplasia noted. Pending staining for H. pylori-type organisms.          CT Results (most recent):  Results from Hospital Encounter encounter on 10/01/22    CT ABD PELV W CONT    Narrative  EXAM: CT ABD PELV W CONT    INDICATION: pain, recent obstruction    COMPARISON: August 9, 2022    CONTRAST: 100 mL of Isovue-370. ORAL CONTRAST: None    TECHNIQUE:  Following the uneventful intravenous administration of contrast, thin axial  images were obtained through the abdomen and pelvis. Coronal and sagittal  reconstructions were generated. CT dose reduction was achieved through use of a  standardized protocol tailored for this examination and automatic exposure  control for dose modulation. FINDINGS:  LOWER THORAX: Bibasilar atelectasis. LIVER: No mass. BILIARY TREE: Gallbladder is within normal limits. CBD is not dilated. SPLEEN: within normal limits. PANCREAS: No mass or ductal dilatation. ADRENALS: Unremarkable. KIDNEYS: Bilateral renal cysts. Solid left renal mass measures 19 x 18 mm,  stable in size compared to August 9, 2022. This is concerning for a renal cell  carcinoma. Urologic follow-up recommended. STOMACH: Fluid distended distal esophagus and stomach. SMALL BOWEL: Severe narrowing of the duodenum at the ligament of Treitz. Beyond  this, there is distention of small bowel loops with gradual normalization of  caliber in the distal ileum. The nondistended loops of distal bowel demonstrates  circumferential wall thickening and mucosal hyperenhancement. The terminal ileum  is normal in caliber. COLON: Decompressed proximal colon. Significant fecal stasis in the rectum. APPENDIX: Nonvisualized  PERITONEUM: No ascites or pneumoperitoneum. RETROPERITONEUM: No lymphadenopathy or aortic aneurysm. REPRODUCTIVE ORGANS: Status post hysterectomy. Small volume simple fluid in the  pelvis. URINARY BLADDER: No mass or calculus. BONES: Levoconvex scoliosis of the lumbar spine with multilevel thoracolumbar  fusion. ABDOMINAL WALL: No mass or hernia. ADDITIONAL COMMENTS: N/A    Impression  1.   Distention of the esophagus, stomach, and small bowel with gradual  normalization of caliber of small bowel loops in the ileum consistent with  partial small bowel obstruction. 2.  Thickened small bowel loops in the right lower quadrant possibly secondary  to inflammatory bowel disease. 3.  Large volume stool in the rectum. 4.  19 x 18 mm enhancing left renal mass concerning for renal cell carcinoma,  stable from prior imaging. Current Facility-Administered Medications   Medication Dose Route Frequency    gabapentin (NEURONTIN) capsule 800 mg  800 mg Oral BID    polyethylene glycol (MIRALAX) packet 17 g  17 g Oral DAILY    senna-docusate (PERICOLACE) 8.6-50 mg per tablet 1 Tablet  1 Tablet Oral DAILY    atorvastatin (LIPITOR) tablet 20 mg  20 mg Oral DAILY    propafenone (RYTHMOL) tablet 150 mg  150 mg Oral BID    0.45% sodium chloride with KCl 20 mEq/L infusion   IntraVENous CONTINUOUS    diazePAM (VALIUM) injection 5 mg  5 mg IntraVENous Q8H PRN    pantoprazole (PROTONIX) 40 mg in 0.9% sodium chloride 10 mL injection  40 mg IntraVENous Q12H    acetaminophen (TYLENOL) tablet 650 mg  650 mg Oral Q6H PRN    Or    acetaminophen (TYLENOL) suppository 650 mg  650 mg Rectal Q6H PRN    ondansetron (ZOFRAN ODT) tablet 4 mg  4 mg Oral Q8H PRN    Or    ondansetron (ZOFRAN) injection 4 mg  4 mg IntraVENous Q6H PRN    cefTRIAXone (ROCEPHIN) 1 g in 0.9% sodium chloride (MBP/ADV) 50 mL MBP  1 g IntraVENous Q24H        Objective:     Visit Vitals  BP (!) 146/75   Pulse 74   Temp 97.7 °F (36.5 °C)   Resp 18   Ht 5' (1.524 m)   Wt 65.7 kg (144 lb 14.4 oz)   SpO2 93%   BMI 28.30 kg/m²   Blood pressure (!) 146/75, pulse 74, temperature 97.7 °F (36.5 °C), resp. rate 18, height 5' (1.524 m), weight 65.7 kg (144 lb 14.4 oz), SpO2 93 %. No intake/output data recorded.     10/02 1901 - 10/04 0700  In: 150 [I.V.:150]  Out: 100       Intake/Output Summary (Last 24 hours) at 10/4/2022 0841  Last data filed at 10/4/2022 0349  Gross per 24 hour   Intake 150 ml   Output 100 ml   Net 50 ml         Physical Exam:       General: elderly white female in no acute distress  HEENT: NGT in place  Chest:  CTA, No rhonchi, rales or rubs. Heart: S1, S2, RRR  GI: Soft, ND, normal bowel sounds, non tender, no guarding or rebounding  Extremities: no edema   CNS: CN II-XII normal.      Labs:       No results found for this or any previous visit (from the past 24 hour(s)). LABRCNT(wbc:2,hgb:2,hct:2,plt:2,)  Recent Labs     10/02/22  0446 10/01/22  1327    140   K 3.6 3.7   * 102   CO2 29 31   BUN 47* 44*   CREA 0.99 1.08*   * 163*   CA 9.1 10.8*   MG 1.7  --    LABRCNT(sgot:3,gpt:3,ap:3,tbiL:3,TP:3,ALB:3,GLOB:3,ggt:3,aml:3,amyp:3,lpse:3,hlpse:3)No results for input(s): INR, PTP, APTT, INREXT, INREXT in the last 72 hours. Recent Labs     10/02/22  0446 10/01/22  1327   AP 61 82   TP 6.6 7.9   ALB 2.9* 3.7   GLOB 3.7 4.2*   LPSE  --  249   BRIEFLAB(B12,FOL,FOLAT,RBCF)  Lab Results   Component Value Date/Time    Folate 50.4 (H) 06/16/2010 10:35 AM   LABRCNT(CPK:3,CpKMB:3,ckndx:3,troiq:3)No components found for: GLPOCBRIEFLAB(CHOL,CHOLX,CHOLP,CHLST,CHOLV,HDL,HDLC,HDLP,LDL,DLDL,LDLC,DLDLP,TGL,TGLX,TRIGL,TRIGP,CHHD,CHHDX)No results for input(s): PH, PCO2, PO2 in the last 72 hours. LABRCNT(CPK:3,CpKMB:3,ckndx:3,troiq:3)PEGGY Ocampo  No results for input(s): CPK, CKNDX, TROIQ in the last 72 hours. No lab exists for component: CPKMBMEShPEGGY Sanchez      Problem List:     Active Problems:    SBO (small bowel obstruction) ([address] (2/27/2019)      Hematemesis (10/1/2022)      Impression:  Partial SBO  Abnormal CT  Mild acute jejunitis  Constipation  Hx of mild gastroparesis  Large hiatal hernia with esophagitis         Plan:  EGD negative for mechanical obstruction or mucosal inflammation. Bx of jejunum significant for mild acute jejunitis. Patient denies any diarrhea prior to onset. Suspect viral infection. Overall she is clinically improved with resolution of abd distention and N/V. Advance to clear liquid diet and remove NGT.   Continue daily miralax for constipation. Continue PPI given hiatal hernia and esophagitis.          PEGGY Herron  10/4/2022  [address] [facility], [address] [facility]  [address] [address] [address] [address] [place]: [phone]

## 2022-10-04 NOTE — PROGRESS NOTES
Problem: Self Care Deficits Care Plan (Adult)  Goal: *Acute Goals and Plan of Care (Insert Text)  Description: FUNCTIONAL STATUS PRIOR TO ADMISSION: Patient is resident at [facility], receives SPV for bathing and ADLs as needed. Typically is able to perform ADLs MOD I at rollator level. Wear lc AFOs at baseline. HOME SUPPORT: Has 3 supportive daughters and assist form [facility] staff. Occupational Therapy Goals  Initiated 10/4/2022  1. Patient will perform lower body dressing with minimal assistance/contact guard assist within 7 day(s). 2.  Patient will perform bathing with minimal assistance/contact guard assist within 7 day(s). 3.  Patient will perform grooming with independence within 7 day(s). 4.  Patient will perform toilet transfers with supervision/set-up within 7 day(s). 5.  Patient will perform all aspects of toileting with supervision/set-up within 7 day(s). 6.  Patient will participate in upper extremity therapeutic exercise/activities with supervision/set-up for 5 minutes within 7 day(s). 7.  Patient will utilize energy conservation techniques during functional activities with verbal cues within 7 day(s). Outcome: Progressing Towards Goal     OCCUPATIONAL THERAPY EVALUATION  Patient: Stephanie Sprain (89 y.o. female)  Date: 10/4/2022  Primary Diagnosis: SBO (small bowel obstruction) ([facility] [K56.609]  Hematemesis [K92.0]  Procedure(s) (LRB):  ESOPHAGOGASTRODUODENOSCOPY (EGD) WITH PEDI SCOPE (N/A)  INJECTION (N/A)  ESOPHAGOGASTRODUODENAL (EGD) BIOPSY (N/A) 1 Day Post-Op   Precautions: Fall, Bed Alarm    ASSESSMENT  Based on the objective data described below, the patient presents with generalized weakness, limited endurance, mild abdominal pain, and impaired balance. Patient is functioning below their modified independent baseline for self-care and functional mobility, now completing self-care with up to maximal assist and functional mobility with min A / HHA x2.  Patient received with soiled linens agreeable to hygiene and donning clean gown once seated in chair. Patient able to come to EOB and transition to standing with min A x2, able to briefly side step to chair with unsteadiness. Patient reports wearing lc AFOs at baseline, not present at bedside upon eval. Patient remained seated in chair, chair alarm activated, all needs met. Recommend return to [facility] [place] with [facility] and increased ADL support. Anticipate patient will progress well once AFOs are present. Current Level of Function Impacting Discharge (ADLs/self-care):   Feeding: Setup (clear liquids)    Oral Facial Hygiene/Grooming: Setup    Bathing: Minimum assistance    Upper Body Dressing: Minimum assistance    Lower Body Dressing: Maximum assistance    Toileting: Minimum assistance          Functional Outcome Measure: The patient scored Total: 35/100 on the Barthel Index outcome measure which is indicative of being partially dependent in basic self-care. Other factors to consider for discharge: below baseline, shelter resident     Patient will benefit from skilled therapy intervention to address the above noted impairments. PLAN :  Recommendations and Planned Interventions: self care training, functional mobility training, therapeutic exercise, balance training, therapeutic activities, endurance activities, patient education, home safety training, and family training/education    Frequency/Duration: Patient will be followed by occupational therapy 4 times a week to address goals.     Recommendation for discharge: (in order for the patient to meet his/her long term goals)  Occupational therapy at least 2 days/week in the home AND ensure assist and/or supervision for safety with ADLs/IADLs    This discharge recommendation:  Has been made in collaboration with the attending provider and/or case management    IF patient discharges home will need the following DME: patient owns DME required for discharge       SUBJECTIVE: Patient stated I hate the walkers you all have here.     OBJECTIVE DATA SUMMARY:   HISTORY:   Past Medical History:   Diagnosis Date    Abnormal x-ray of abdomen 11/13/2020    Bas showed decreased peristalsis and a cervical web      Arrhythmia     A fib    Arthritis     Diarrhea 6/6/2016    Esophageal dysphagia 11/13/2020    Foot drop     Gastric ulcer 6/14/2012    Gastroparesis     GERD (gastroesophageal reflux disease)     High cholesterol     Hypertension     Neuropathy     Ovarian cancer ([facility] 1986    chemo x 9 mos    Scoliosis     Stroke ([facility] 2002    ? stroke with drop feet, per patient CT scans were negative     Past Surgical History:   Procedure Laterality Date    FLEXIBLE SIGMOIDOSCOPY N/A 6/6/2016    SIGMOIDOSCOPY FLEXIBLE performed by Jennifer Maciel MD at [facility] ENDOSCOPY    HX ORTHOPAEDIC      back    HX ORTHOPAEDIC      bilateral shoulder replacements    HX ORTHOPAEDIC      left knee replacement    HX LYDIA AND BSO  1986    (ovarian cancer)    [facility] EGD TRANSORAL BIOPSY SINGLE/MULTIPLE  1/26/2012         [facility] EGD TRANSORAL BIOPSY SINGLE/MULTIPLE  6/14/2012         UPPER GI ENDOSCOPY,BIOPSY  11/13/2020         UPPER GI ENDOSCOPY,DIAGNOSIS  10/5/2020         UPPER GI ENDOSCOPY,DILATN W GUIDE  11/13/2020            Expanded or extensive additional review of patient history:     Home Situation  Support Systems: Child(oracio)  Patient Expects to be Discharged to[de-identified] Assisted Living        EXAMINATION OF PERFORMANCE DEFICITS:  Cognitive/Behavioral Status:  Neurologic State: Alert  Orientation Level: Oriented X4  Cognition: Appropriate decision making  Perception: Appears intact  Perseveration: No perseveration noted  Safety/Judgement: Awareness of environment        Hearing:   Auditory  Auditory Impairment: None    Vision/Perceptual:                           Acuity: Within Defined Limits         Range of Motion:    AROM: Generally decreased, functional  PROM: Generally decreased, functional Strength:    Strength: Generally decreased, functional                Coordination:  Coordination: Generally decreased, functional  Fine Motor Skills-Upper: Left Intact; Right Intact    Gross Motor Skills-Upper: Left Intact; Right Intact    Tone & Sensation:    Tone: Normal  Sensation: Intact                      Balance:  Sitting: Impaired  Sitting - Static: Fair (occasional)  Sitting - Dynamic: Fair (occasional)  Standing: Impaired  Standing - Static: Fair;Constant support  Standing - Dynamic : Fair;Constant support    Functional Mobility and Transfers for ADLs:  Bed Mobility:  Rolling: Minimum assistance  Supine to Sit: Minimum assistance    Transfers:  Sit to Stand: Minimum assistance;Assist x2  Stand to Sit: Minimum assistance  Bed to Chair: Minimum assistance;Assist x2  Assistive Device :  (dclined RW use)    ADL Assessment:  Feeding: Setup (clear liquids)    Oral Facial Hygiene/Grooming: Setup    Bathing: Minimum assistance      Upper Body Dressing: Minimum assistance    Lower Body Dressing: Maximum assistance    Toileting: Minimum assistance                ADL Intervention and task modifications:       Lower Body Dressing Assistance  Protective [place]: Moderate assistance    Toileting  Toileting Assistance: Moderate assistance  Bladder Hygiene: Minimum assistance  Bowel Hygiene: Moderate assistance  Clothing Management: Moderate assistance    Cognitive Retraining  Safety/Judgement: Awareness of environment     Functional Measure:    Barthel Index:  Bathin  Bladder: 5  Bowels: 5  Groomin  Dressin  Feedin  Mobility: 0  Stairs: 0  Toilet Use: 5  Transfer (Bed to Chair and Back): 10  Total: 35/100      The Barthel ADL Index: Guidelines  1. The index should be used as a record of what a patient does, not as a record of what a patient could do. 2. The main aim is to establish degree of independence from any help, physical or verbal, however minor and for whatever reason.   3. The need for supervision renders the patient not independent. 4. A patient's performance should be established using the best available evidence. Asking the patient, friends/relatives and nurses are the usual sources, but direct observation and common sense are also important. However direct testing is not needed. 5. Usually the patient's performance over the preceding 24-48 hours is important, but occasionally longer periods will be relevant. 6. Middle categories imply that the patient supplies over 50 per cent of the effort. 7. Use of aids to be independent is allowed. Score Interpretation (from [address] [address])    Independent   60-79 Minimally independent   40-59 Partially dependent   20-39 Very dependent   <20 Totally dependent     -Cedric Adorno., Barthel, DKaylaW. (1965). Functional evaluation: the Barthel Index. [facility] ([address] (1997). The Barthel activities of daily living index: self-reporting versus actual performance in the old (> or = 75 years). Journal [address] 45(7), [address] [facility], [place]ME[place], Mauro Navarro., Dyan Rg. (1999). Measuring the change in disability after inpatient rehabilitation; comparison of the responsiveness of the Barthel Index and Functional Stafford Measure. Journal of Neurology, Neurosurgery, and Psychiatry, 66(4), 258-265. Ben Salazar, N.J.A, GABI Sandy, & Martha Ohara M.A. (2004) Assessment of post-stroke quality of life in cost-effectiveness studies: The usefulness of the Barthel Index and the EuroQoL-5D.  Quality of Life Research, 15, 442-61     Occupational Therapy Evaluation Charge Determination   History Examination Decision-Making   LOW Complexity : Brief history review  MEDIUM Complexity : 3-5 performance deficits relating to physical, cognitive , or psychosocial skils that result in activity limitations and / or participation restrictions MEDIUM Complexity : Patient may present with comorbidities that affect occupational performnce. Miniml to moderate modification of tasks or assistance (eg, physical or verbal ) with assesment(s) is necessary to enable patient to complete evaluation       Based on the above components, the patient evaluation is determined to be of the following complexity level: MEDIUM  Pain Rating:  Pt endorsing pain with NGT, RN notified. Activity Tolerance:   Fair and requires rest breaks    After treatment patient left in no apparent distress:    Sitting in chair, Call bell within reach, and Bed / chair alarm activated    COMMUNICATION/EDUCATION:   The patients plan of care was discussed with: Physical therapist, Occupational therapist, and Registered nurse. Patient/family have participated as able in goal setting and plan of care. This patients plan of care is appropriate for delegation to [facility].     Thank you for this referral.  Abeba Contreras OT  Time Calculation: 23 mins

## 2022-10-04 NOTE — PROGRESS NOTES
End of Shift Note    Bedside shift change report given to Lorene Colon LPN (oncoming nurse) by Cathi Anthony, HENRIETTA / Chavo Enriquez RN (offgoing nurse). Report included the following information SBAR, Kardex, Intake/Output, MAR, and Recent Results    Shift worked:  [id number]     Shift summary and any significant changes:    100cc ngt output post clamp trial overnight    Pt. Titrated down to room air from 4L sats @96%    Sherry low urine output-x2 small incont. Episodes-MD ordered 250cc LR bolus. 1BM overnight.       Concerns for physician to address:       Zone phone for oncoming shift:          Length of Stay:  Expected LOS: 4d 16h  Actual LOS: 3      Cathi Anthony, RN

## 2022-10-04 NOTE — PROGRESS NOTES
Problem: Mobility Impaired (Adult and Pediatric)  Goal: *Acute Goals and Plan of Care (Insert Text)  Description: FUNCTIONAL STATUS PRIOR TO ADMISSION: Patient was modified independent using a wheelchair and 4 wheel walker for functional mobility. Patient required stand-by assistance for basic and instrumental ADLs. HOME SUPPORT PRIOR TO ADMISSION: The patient lived alone with [facility] staff assistance as needed. Physical Therapy Goals  Initiated 10/4/2022  1. Patient will move from supine to sit and sit to supine , scoot up and down, and roll side to side in bed with modified independence within 7 day(s). 2.  Patient will transfer from bed to chair and chair to bed with modified independence using the least restrictive device within 7 day(s). 3.  Patient will perform sit to stand with modified independence within 7 day(s). 4.  Patient will ambulate with modified independence for 250 feet with the least restrictive device within 7 day(s). Outcome: Progressing Towards Goal   PHYSICAL THERAPY EVALUATION  Patient: Daisy Burroughs (89 y.o. 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 She was in bed when PT arrived and agreed to evaluation and treatment. Came to sitting with sba. Sitting balance mildly impaired dynamically. Assisted with donning bilateral AFO's. Stood from edge of bed with sba.  Gait tolerated for 160 feet with RW and sba. Gait speed and step lengths decreased but functional with B AFO's and RW. Left patient up in bedside chair with all needs met when the session ended. Patient appropriate to return home with [facility] staff assistance and [facility] PT/OT. Patient performed better with PT this afternoon due to likely use of B AFO's which were not available at time of OT eval.    Current Level of Function Impacting Discharge (mobility/balance): sba supine to sit, sit to stand and bed to chair with RW; sba ambulation with  feet. Functional Outcome Measure: The patient scored 50/100 on the Barthel outcome measure which is indicative of being partially dependent with ADL and mobility skills at this time. Other factors to consider for discharge: modified independent with RW and wheelchair PLOF; near PLOF at this time. Patient will benefit from skilled therapy intervention to address the above noted impairments. PLAN :  Recommendations and Planned Interventions: bed mobility training, transfer training, gait training, therapeutic exercises, neuromuscular re-education, orthotic/prosthetic training, edema management/control, patient and family training/education, and therapeutic activities      Frequency/Duration: Patient will be followed by physical therapy:  4 times a week to address goals. Recommendation for discharge: (in order for the patient to meet his/her long term goals)  Physical therapy at least 2 days/week in the home AND ensure assist and/or supervision for safety with mobility by [facility] staff intermittently. This discharge recommendation:  Has not yet been discussed the attending provider and/or case management    IF patient discharges home will need the following DME: patient owns DME required for discharge       SUBJECTIVE:   Patient stated  I'll do whatever you need me to do.      OBJECTIVE DATA SUMMARY:   HISTORY:    Past Medical History:   Diagnosis Date    Abnormal x-ray of abdomen 11/13/2020    Bas showed decreased peristalsis and a cervical web      Arrhythmia     A fib    Arthritis     Diarrhea 6/6/2016    Esophageal dysphagia 11/13/2020    Foot drop     Gastric ulcer 6/14/2012    Gastroparesis     GERD (gastroesophageal reflux disease)     High cholesterol     Hypertension     Neuropathy     Ovarian cancer ([address] 1986    chemo x 9 mos    Scoliosis     Stroke [facility]) 2002    ? stroke with drop feet, per patient CT scans were negative     Past Surgical History:   Procedure Laterality Date    FLEXIBLE SIGMOIDOSCOPY N/A 6/6/2016    SIGMOIDOSCOPY FLEXIBLE performed by Alyssa Shea MD at [facility] ENDOSCOPY    HX ORTHOPAEDIC      back    HX ORTHOPAEDIC      bilateral shoulder replacements    HX ORTHOPAEDIC      left knee replacement    HX LYDIA AND BSO  1986    (ovarian cancer)    GA EGD TRANSORAL BIOPSY SINGLE/MULTIPLE  1/26/2012         GA EGD TRANSORAL BIOPSY SINGLE/MULTIPLE  6/14/2012         UPPER GI ENDOSCOPY,BIOPSY  11/13/2020         UPPER GI ENDOSCOPY,DIAGNOSIS  10/5/2020         UPPER GI ENDOSCOPY,DILATN W GUIDE  11/13/2020            Personal factors and/or comorbidities impacting plan of care: none    Home Situation  Home Environment: [address] [place] [address] Road Name: [id number] [address]  # Steps to Enter: 0  Wheelchair Ramp: Yes  One/Two Story Residence: One story  Living Alone: Yes  Support Systems: Assisted Living, Child(oracio)  Patient Expects to be Discharged to[de-identified] Assisted Living  Current DME Used/Available at Home: Wheelchair, Other (comment), Shower chair, Walker, rolling (electric scooter; 4 wheel walker)  Tub or Shower Type: Shower    EXAMINATION/PRESENTATION/DECISION MAKING:   Critical Behavior:  Neurologic State: Alert  Orientation Level: Oriented X4  Cognition: Appropriate decision making, Appropriate for age attention/concentration, Appropriate safety awareness, Follows commands  Safety/Judgement: Awareness of environment, Good awareness of safety precautions, Fall prevention (bilateral foot drop)  Hearing: Auditory  Auditory Impairment: None  Skin:  skin dry  Edema: mild in legs  Range Of Motion:  AROM: Generally decreased, functional           PROM: Within functional limits           Strength:    Strength: Generally decreased, functional (bilateral foot drop due to neuropathy - chronic)                    Tone & Sensation:   Tone: Normal              Sensation: Intact               Coordination:  Coordination: Generally decreased, functional  Vision:   Acuity: Within Defined Limits  Functional Mobility:  Bed Mobility:  Rolling: Stand-by assistance  Supine to Sit: Stand-by assistance     Scooting: Stand-by assistance  Transfers:  Sit to Stand: Stand-by assistance  Stand to Sit: Stand-by assistance        Bed to Chair: Stand-by assistance; Adaptive equipment (RW + B AFO's)              Balance:   Sitting: Impaired  Sitting - Static: Good (unsupported)  Sitting - Dynamic: Fair (occasional)  Standing: Impaired  Standing - Static: Good;Constant support  Standing - Dynamic : Fair;Constant support  Ambulation/Gait Training:  Distance (ft): 160 Feet (ft)  Assistive Device: Gait belt;Walker, rolling;Brace/Splint (B AFO's)  Ambulation - Level of Assistance: Stand-by assistance     Gait Description (WDL): Exceptions to WDL  Gait Abnormalities: Other (slight forward trunk lean; foot flat at terminal swing)        Base of Support: Widened     Speed/Kaylynn: Pace decreased (<100 feet/min)  Step Length: Right shortened;Left shortened  Swing Pattern: Right symmetrical;Left symmetrical       Functional Measure:  Barthel Index:    Bathin  Bladder: 5  Bowels: 5  Groomin  Dressin  Feedin  Mobility: 10  Stairs: 0 (no stairs at home)  Toilet Use: 5  Transfer (Bed to Chair and Back): 10  Total: 50/100       The Barthel ADL Index: Guidelines  1. The index should be used as a record of what a patient does, not as a record of what a patient could do.   2. The main aim is to establish degree of independence from any help, physical or verbal, however minor and for whatever reason. 3. The need for supervision renders the patient not independent. 4. A patient's performance should be established using the best available evidence. Asking the patient, friends/relatives and nurses are the usual sources, but direct observation and common sense are also important. However direct testing is not needed. 5. Usually the patient's performance over the preceding 24-48 hours is important, but occasionally longer periods will be relevant. 6. Middle categories imply that the patient supplies over 50 per cent of the effort. 7. Use of aids to be independent is allowed. Score Interpretation (from [facility])    Independent   60-79 Minimally independent   40-59 Partially dependent   20-39 Very dependent   <20 Totally dependent     -Cedric Adorno., Barthel, D.W. (1965). Functional evaluation: the Barthel Index. [address] [facility] ([address] [facility] [address] (1997). The Barthel activities of daily living index: self-reporting versus actual performance in the old (> or = 75 years). Journal [address] 45(7), [facility], [facility], [facility]., Lalo Reap. (1999). Measuring the change in disability after inpatient rehabilitation; comparison of the responsiveness of the Barthel Index and Functional Deuel Measure. Journal of Neurology, Neurosurgery, and Psychiatry, 66(4), 292-188. Theotis FREIDA Srtinger, GABI Sandy, & Katie Romero MKaylaA. (2004) Assessment of post-stroke quality of life in cost-effectiveness studies: The usefulness of the Barthel Index and the EuroQoL-5D.  Quality of Life Research, 15, 353-25        Physical Therapy Evaluation Charge Determination   History Examination Presentation Decision-Making   HIGH Complexity :3+ comorbidities / personal factors will impact the outcome/ POC  HIGH Complexity : 4+ Standardized tests and measures addressing body structure, function, activity limitation and / or participation in recreation  MEDIUM Complexity : Evolving with changing characteristics  Other outcome measures Barthel  MEDIUM      Based on the above components, the patient evaluation is determined to be of the following complexity level: MEDIUM    Pain Rating:  No complaints of pain during the session. Activity Tolerance:   Good and SpO2 stable on RA    After treatment patient left in no apparent distress:   Sitting in chair, Call bell within reach, and Caregiver / family present    COMMUNICATION/EDUCATION:   The patients plan of care was discussed with: Occupational therapist and Registered nurse. Fall prevention education was provided and the patient/caregiver indicated understanding., Patient/family have participated as able in goal setting and plan of care. , and Patient/family agree to work toward stated goals and plan of care.     Thank you for this referral.  Justin Shaw, PT   Time Calculation: 24 mins

## 2022-10-04 NOTE — PROGRESS NOTES
Hospitalist Progress Note    NAME: Ivania Patel   :  10/9/1933   MRN:  [id number]     Interim Hospital Summary: 80 y.o. female whom presented on 10/1/2022 with      Assessment / Plan:  RADHA: 10/05  Barriers:   clinical stability    Abdominal pain POA- resolved now  Hematemesis - resolved  Partial SBO- seems to have resolved  -CT abdomen: IMPRESSION  1. Distention of the esophagus, stomach, and small bowel with gradual  normalization of caliber of small bowel loops in the ileum consistent with  partial small bowel obstruction. 2.  Thickened small bowel loops in the right lower quadrant possibly secondary  to inflammatory bowel disease. 3.  Large volume stool in the rectum. 4.  19 x 18 mm enhancing left renal mass concerning for renal cell carcinoma,  stable from prior imaging.  -last EGD 2022  -serial Hg/Hct stable  -continue IV protonix  S/p NG tube - now out, PO diet - clear liquids for now, advance as tolerated  --Appreciate Gen Surgery- signed off  EGD with  push enteroscopy done \"No obstruction lesion or extrinsic compression seen  Erosive esophagitis  Per surgery, suspect motility disorder with some component of constipation  Restarted patient gabapentin, at reduced dose  Repeat KUB showed large stool burden on the rectal area  Will order Dulcolax, and order soapsuds enema  IP PT/OT eval for DC planning- ? SNF section of [facility]? 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 Leukocytosis resolved. No need to expand coverage - will check procalcitonin with AM labs    Left renal mass  -will need Urology referral or OP follow up when stable     Paroxysmal atrial fibrillation, 2018  Chronic LBBB.   Hyperlipidemia.  -restart propafenone and statin when cleared for PO     Others:  Scoliosis/arthritis s/p extensive thoracolumbar fusion 2002  Fractured thoracolumbar fusion rods, stable  Back pain / Neuropathy in her feet  Restarted patient gabapentin at lower dose at 800 mg twice daily. Patient taking large amount of gabapentin. Her creatinine clearance is only 33 mL/mn. Most likely this dosage needs to be addressed by PCP    History of ovarian cancer s/p chemo       25.0 - 29.9 Overweight / Body mass index is 28.3 kg/m². Code status: Full  Prophylaxis: SCD's    Baseline: . Lives at [address] [place] [address]. Uses walker, scooter      Subjective:     Chief Complaint / Reason for Physician Visit  Follow up of partial SBO, PNA, volume depletion   Chart reviewed in detail. Discussed with RN events overnight. No further nausea or vomiting, abdomen less distended  NGT clamped , allowing sips of clears     Review of Systems:  Symptom Y/N Comments  Symptom Y/N Comments   Fever/Chills n   Chest Pain n    Poor Appetite    Edema     Cough    Abdominal Pain n    Sputum    Joint Pain     SOB/LEVINE n   Pruritis/Rash     Nausea/vomit n   Tolerating PT/OT ? Diarrhea    Tolerating Diet y    Constipation    Other       Could NOT obtain due to:      PO intake: No data found. Objective:     VITALS:   Last 24hrs VS reviewed since prior progress note.  Most recent are:  Patient Vitals for the past 24 hrs:   Temp Pulse Resp BP SpO2   10/04/22 0734 97.7 °F (36.5 °C) 74 18 (!) 146/75 93 %   10/04/22 0605 -- (!) 56 -- -- --   10/04/22 0347 97.5 °F (36.4 °C) 69 16 125/60 96 %   10/03/22 2253 98.7 °F (37.1 °C) 72 17 (!) 117/51 96 %   10/03/22 1940 98.5 °F (36.9 °C) 67 12 (!) 131/51 100 %   10/03/22 1527 98.6 °F (37 °C) 74 -- 137/74 100 %   10/03/22 1423 97.6 °F (36.4 °C) 67 -- 126/74 98 %         Intake/Output Summary (Last 24 hours) at 10/4/2022 1319  Last data filed at 10/4/2022 0349  Gross per 24 hour   Intake --   Output 100 ml   Net -100 ml          I had a face to face encounter, and independently examined this patient on 10/4/2022, as outlined below:  PHYSICAL EXAM:  General: WD, WN. Alert, cooperative, no acute distress    EENT:  EOMI. Anicteric sclerae. MMM  Resp:  CTA bilaterally, no wheezing or rales. No accessory muscle use  CV:  Regular  rhythm,  No edema  GI:  Soft, Non distended, Non tender. + hypoactive Bowel sounds  Neurologic:  Alert and oriented X 3, normal speech,   Psych:   Good insight. Not anxious nor agitated  Skin:  No rashes. No jaundice    Reviewed most current lab test results and cultures  YES  Reviewed most current radiology test results   YES  Review and summation of old records today    NO  Reviewed patient's current orders and MAR    YES  PMH/SH reviewed - no change compared to H&P  ________________________________________________________________________  Care Plan discussed with:    Comments   Patient x    Family  x Daughter at bedside   RN x    Care Manager x    Consultant  x Surgery NP Pia Capps                    x Multidiciplinary team rounds were held today with , nursing, pharmacist and clinical coordinator. Patient's plan of care was discussed; medications were reviewed and discharge planning was addressed. ________________________________________________________________________  Total NON critical care TIME:  26  Minutes    Total CRITICAL CARE TIME Spent:   Minutes non procedure based      Comments   >50% of visit spent in counseling and coordination of care x     This includes time during multidisciplinary rounds if indicated above   ________________________________________________________________________  Cammie Mayfield MD     Procedures: see electronic medical records for all procedures/Xrays and details which were not copied into this note but were reviewed prior to creation of Plan. LABS:  I reviewed today's most current labs and imaging studies.   Pertinent labs include:  Recent Labs     10/02/22  0446 10/02/22  0412 10/01/22  3296 10/01/22  1325   WBC 9.7  --   --  15.2*   HGB 9. 6* 7.8* 9.7* 11.9   HCT 32.0* 26.5* 31.9* 40.1     --   --  365       Recent Labs     10/02/22  0446 10/01/22  1327    140   K 3.6 3.7   * 102   CO2 29 31   * 163*   BUN 47* 44*   CREA 0.99 1.08*   CA 9.1 10.8*   MG 1.7  --    ALB 2.9* 3.7   TBILI 0.3 0.4   ALT 14 17

## 2022-10-05 VITALS
SYSTOLIC BLOOD PRESSURE: 153 MMHG | HEART RATE: 74 BPM | OXYGEN SATURATION: 98 % | TEMPERATURE: 97.4 F | DIASTOLIC BLOOD PRESSURE: 91 MMHG | RESPIRATION RATE: 16 BRPM | HEIGHT: 60 IN | BODY MASS INDEX: 28.45 KG/M2 | WEIGHT: 144.9 LBS

## 2022-10-05 PROBLEM — M54.10 RADICULOPATHY: Chronic | Status: ACTIVE | Noted: 2022-10-05

## 2022-10-05 PROBLEM — M54.10 RADICULOPATHY: Status: ACTIVE | Noted: 2022-10-05

## 2022-10-05 PROCEDURE — 74011250637 HC RX REV CODE- 250/637: Performed by: INTERNAL MEDICINE

## 2022-10-05 PROCEDURE — 74011250636 HC RX REV CODE- 250/636: Performed by: INTERNAL MEDICINE

## 2022-10-05 PROCEDURE — 74011000250 HC RX REV CODE- 250: Performed by: INTERNAL MEDICINE

## 2022-10-05 PROCEDURE — 74011250637 HC RX REV CODE- 250/637: Performed by: NURSE PRACTITIONER

## 2022-10-05 PROCEDURE — C9113 INJ PANTOPRAZOLE SODIUM, VIA: HCPCS | Performed by: INTERNAL MEDICINE

## 2022-10-05 RX ORDER — GABAPENTIN 800 MG/1
800 TABLET ORAL 2 TIMES DAILY
Qty: 360 TABLET | Refills: 3 | Status: SHIPPED
Start: 2022-10-05

## 2022-10-05 RX ORDER — TRAMADOL HYDROCHLORIDE 50 MG/1
50 TABLET ORAL ONCE
Status: COMPLETED | OUTPATIENT
Start: 2022-10-05 | End: 2022-10-05

## 2022-10-05 RX ADMIN — TRAMADOL HYDROCHLORIDE 50 MG: 50 TABLET ORAL at 00:06

## 2022-10-05 RX ADMIN — ATORVASTATIN CALCIUM 20 MG: 20 TABLET, FILM COATED ORAL at 10:02

## 2022-10-05 RX ADMIN — POTASSIUM CHLORIDE AND SODIUM CHLORIDE: 450; 150 INJECTION, SOLUTION INTRAVENOUS at 02:17

## 2022-10-05 RX ADMIN — SODIUM CHLORIDE, PRESERVATIVE FREE 40 MG: 5 INJECTION INTRAVENOUS at 10:02

## 2022-10-05 RX ADMIN — GABAPENTIN 800 MG: 300 CAPSULE ORAL at 10:01

## 2022-10-05 RX ADMIN — PROPAFENONE HYDROCHLORIDE 150 MG: 150 TABLET, FILM COATED ORAL at 10:01

## 2022-10-05 RX ADMIN — SENNOSIDES AND DOCUSATE SODIUM 1 TABLET: 50; 8.6 TABLET ORAL at 10:01

## 2022-10-05 NOTE — PROGRESS NOTES
Transition of Care Plan  RUR: 15%  DISPOSITION: The disposition plan is return to [facility]  F/U with PCP/Specialist    Transport: Daughters         Reason for Admission:   SBO                     RUR Score:    15%              PCP:    First and Last name:   Riky Chapman MD                 Name of Practice:               Are you a current patient: Yes/No:               Approximate date of last visit:               Can you participate in a virtual visit if needed:      Do you (patient/family) have any concerns for transition/discharge?     none              Plan for utilizing home health:  not recommended       Current Advanced Directive/Advance Care Plan:  Full Code        Healthcare Decision Maker:   Click here to complete [address] including selection of the Healthcare Decision Maker Relationship (ie \"Primary\")            Primary Decision MakerEjulisa De Jesus - Daughter - [phone]    Secondary Decision Maker: Renea Charlie - Daughter - [phone]    Supplemental (Other) Decision Maker: Raúl Wright - Daughter - [phone]     Transition of Care Plan:           CRM spoke with patient's daughter, introduced self, explained role, verified demographics, and offered assistance. Patient lives at [address]. Patient has a wheelchair, walker, and scooter. Pt is medically stable for dc back to [facility] today. CM sent clinical update to CW via [facility] and was told verbally by Emaline Mess that pt did not need further screening or orders to return. CW will arrange further PT/OT and PCP f/up. Lisandro's are present and will transport. 2nd IM was presented and signed. Care Management Interventions  PCP Verified by CM: Yes  Palliative Care Criteria Met (RRAT>21 & CHF Dx)?: No  Mode of Transport at Discharge:  Other (see comment) (daughters)  Transition of Care Consult (CM Consult): Discharge Planning  MyChart Signup: No  Discharge Durable Medical Equipment: No  Health Maintenance Reviewed: Yes  Physical Therapy Consult: No  Occupational Therapy Consult: No  Speech Therapy Consult: No  Support Systems: Child(oracio)  Confirm Follow Up Transport: Family  [facility] Information Provided?: No  Discharge Location  Patient Expects to be Discharged to[de-identified] [address], [place]

## 2022-10-05 NOTE — PROGRESS NOTES
Received notification from bedside RN about patient with regards to: neck pain temporarily relieved by Tylenol, now back on and preventing him to rest and sleep, requesting medication for relief  VS: /52, HR 65, RR 16, O2 sat 96%    Intervention given: Tramadol 50 mg PO x 1 dose ordered

## 2022-10-05 NOTE — PROGRESS NOTES
Pharmacist Discharge Medication Reconciliation    Significant PMH:   Past Medical History:   Diagnosis Date    Abnormal x-ray of abdomen 11/13/2020    Bas showed decreased peristalsis and a cervical web      Arrhythmia     A fib    Arthritis     Diarrhea 6/6/2016    Esophageal dysphagia 11/13/2020    Foot drop     Gastric ulcer 6/14/2012    Gastroparesis     GERD (gastroesophageal reflux disease)     High cholesterol     Hypertension     Neuropathy     Ovarian cancer ([address] 1986    chemo x 9 mos    Scoliosis     Stroke [facility]) 2002    ? stroke with drop feet, per patient CT scans were negative     Encounter Diagnoses:   Encounter Diagnoses   Name Primary? Partial small bowel obstruction (HCC) Yes    Hematemesis with nausea     Radiculopathy, unspecified spinal region      Allergies: Patient has no known allergies. Discharge Medications:   Current Discharge Medication List        CONTINUE these medications which have CHANGED    Details   gabapentin (NEURONTIN) 800 mg tablet Take 1 Tablet by mouth two (2) times a day. Max Daily Amount: 1,600 mg. Qty: 360 Tablet, Refills: 3  Start date: 10/5/2022    Associated Diagnoses: Radiculopathy, unspecified spinal region           CONTINUE these medications which have NOT CHANGED    Details   diclofenac (VOLTAREN) 1 % gel Apply 2 g to affected area four (4) times daily. simvastatin (ZOCOR) 40 mg tablet TAKE 1 TABLET BY MOUTH  DAILY  Qty: 90 Tablet, Refills: 3    Comments: Requesting 1 year supply  Associated Diagnoses: Mixed hyperlipidemia      furosemide (LASIX) 20 mg tablet Take 20 mg by mouth daily. CALCIUM PO Take 1 Tab by mouth daily. polyethylene glycol 3350 (MIRALAX PO) Take 1 Each by mouth daily as needed. acetaminophen (TYLENOL) 500 mg tablet Take 1,000 mg by mouth every six (6) hours as needed for Pain.      propafenone (RYTHMOL) 150 mg tablet Take 1 Tab by mouth two (2) times a day.   Qty: 60 Tab, Refills: 2      RABEprazole (ACIPHEX) 20 mg tablet Take 1 Tab by mouth two (2) times a day. Qty: 60 Tab, Refills: 3      potassium chloride SR (KLOR-CON 10) 10 mEq tablet Take 1 Tab by mouth daily. Qty: 30 Tab, Refills: 3      magnesium oxide (MAG-OX) 400 mg tablet Take 250 mg by mouth daily. famotidine (PEPCID) 20 mg tablet Take 20 mg by mouth daily. cholecalciferol (VITAMIN D3) 1,000 unit tablet Take 1,000 Units by mouth daily. multivitamin (ONE A DAY) tablet Take 1 Tab by mouth daily. The patient's chart, MAR and AVS were reviewed by [facility].     Discharging Provider: Kwame Bryan MD    Thank you,     ABHISHEK Puga AVI [facility]

## 2022-10-05 NOTE — PROGRESS NOTES
End of Shift Note    Bedside shift change report given to Yazmin Niño LPN (oncoming nurse) by Jesusita Hawley RN / [facility], RN (offgoing nurse). Report included the following information SBAR, Kardex, and MAR    Shift worked:  7p-7a     Shift summary and any significant changes:     Pt tolerating clear diet. Pt had 1 BM, voiding on shift. Patient had some complaints of neck pain during my shift treated with tramadol which was effective. Concerns for physician to address:  DC bed rest orders ?      Zone phone for oncoming shift:              Jesusita Hawley, RN

## 2022-10-05 NOTE — DISCHARGE SUMMARY
Hospitalist Discharge Summary     Patient ID:  Toñito Womack  [id number]  80 y.o.  10/9/1933  10/1/2022    PCP on record: Tali Castillo MD    Admit date: 10/1/2022  Discharge date and time: 10/5/2022    DISCHARGE DIAGNOSIS:    Abdominal pain POA- resolved now  Hematemesis - resolved, Cont PPI per GI  Partial SBO- seems to have resolved, Tolerating PO diet,   Likely Viral Jejunitis POA - per biopsy during EGD  Possible aspiration PNA POA- resolved, off oxygen, s/p IV Abx, therapy comlpeted  Left renal mass- OP Urology followup recommended  Paroxysmal atrial fibrillation, 2018  Chronic LBBB. Hyperlipidemia  Full code    CONSULTATIONS:  IP CONSULT TO GENERAL SURGERY    Excerpted HPI from H&P of Javier Flood MD:  [age] y.o. female with a past history of PAF and recent admission last August for hematemesis with SBO who presents today with recurrent hematemesis. This started last night and her emesis was described as dark and greenish. She also experienced some abdominal discomfort. She lives at [facility] and staff called for EMS this morning. ER evaluation included CT abdomen which demonstrates distention of the esophagus, stomach, and small bowel. Thickened small bowel loops in the right lower quadrant possibly secondary to inflammatory bowel disease. Large volume stool in the rectum. CXR shows right ASD  Hg 11     NG inserted in ED and 1200cc black gastric contents was drained out immediately. Patient was started on IVFs, IV protonix and IV antibiotics. We were asked to admit for work up and evaluation of the above problems. \"    ______________________________________________________________________  DISCHARGE SUMMARY/HOSPITAL COURSE:  for full details see H&P, daily progress notes, labs, consult notes. Abdominal pain POA- resolved now  Hematemesis - resolved  Partial SBO- seems to have resolved  -CT abdomen: IMPRESSION  1.   Distention of the esophagus, stomach, and small bowel with gradual  normalization of caliber of small bowel loops in the ileum consistent with  partial small bowel obstruction. 2.  Thickened small bowel loops in the right lower quadrant possibly secondary  to inflammatory bowel disease. 3.  Large volume stool in the rectum. 4.  19 x 18 mm enhancing left renal mass concerning for renal cell carcinoma,  stable from prior imaging.  -last EGD 4/20/2022  -serial Hg/Hct stable  -continue IV protonix  S/p NG tube - now out, PO diet - clear liquids for now, advance as tolerated  --Appreciate Gen Surgery- signed off  EGD with  push enteroscopy done \"No obstruction lesion or extrinsic compression seen  Erosive esophagitis  Per surgery, suspect motility disorder with some component of constipation  Restarted patient gabapentin, at reduced dose  Repeat KUB showed large stool burden on the rectal area  Will order Dulcolax, and order soapsuds enema  IP PT/OT eval for DC planning- ? [facility]? 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 Leukocytosis resolved. No need to expand coverage -     Left renal mass  -will need Urology referral or OP follow up when stable     Paroxysmal atrial fibrillation, 2018  Chronic LBBB. Hyperlipidemia.  -restart propafenone and statin when cleared for PO     Others:  Scoliosis/arthritis s/p extensive thoracolumbar fusion 2002  Fractured thoracolumbar fusion rods, stable  Back pain / Neuropathy in her feet  Restarted patient gabapentin at lower dose at 800 mg twice daily. Patient taking large amount of gabapentin. Her creatinine clearance is only 33 mL/mn.   Most likely this dosage needs to be addressed by PCP     History of ovarian cancer s/p chemo      _______________________________________________________________________  Patient seen and examined by me on discharge day.  Pertinent Findings:  Gen:    Not in distress  Chest: Clear lungs  CVS:   Regular rhythm. No edema  Abd:  Soft, not distended, not tender  Neuro:  Alert, oriented x 3  _______________________________________________________________________  DISCHARGE MEDICATIONS:   Current Discharge Medication List        CONTINUE these medications which have CHANGED    Details   gabapentin (NEURONTIN) 800 mg tablet Take 1 Tablet by mouth two (2) times a day. Max Daily Amount: 1,600 mg. Qty: 360 Tablet, Refills: 3  Start date: 10/5/2022    Associated Diagnoses: Radiculopathy, unspecified spinal region           CONTINUE these medications which have NOT CHANGED    Details   diclofenac (VOLTAREN) 1 % gel Apply 2 g to affected area four (4) times daily. simvastatin (ZOCOR) 40 mg tablet TAKE 1 TABLET BY MOUTH  DAILY  Qty: 90 Tablet, Refills: 3    Comments: Requesting 1 year supply  Associated Diagnoses: Mixed hyperlipidemia      furosemide (LASIX) 20 mg tablet Take 20 mg by mouth daily. CALCIUM PO Take 1 Tab by mouth daily. polyethylene glycol 3350 (MIRALAX PO) Take 1 Each by mouth daily as needed. acetaminophen (TYLENOL) 500 mg tablet Take 1,000 mg by mouth every six (6) hours as needed for Pain.      propafenone (RYTHMOL) 150 mg tablet Take 1 Tab by mouth two (2) times a day. Qty: 60 Tab, Refills: 2      RABEprazole (ACIPHEX) 20 mg tablet Take 1 Tab by mouth two (2) times a day. Qty: 60 Tab, Refills: 3      potassium chloride SR (KLOR-CON 10) 10 mEq tablet Take 1 Tab by mouth daily. Qty: 30 Tab, Refills: 3      magnesium oxide (MAG-OX) 400 mg tablet Take 250 mg by mouth daily. famotidine (PEPCID) 20 mg tablet Take 20 mg by mouth daily. cholecalciferol (VITAMIN D3) 1,000 unit tablet Take 1,000 Units by mouth daily. multivitamin (ONE A DAY) tablet Take 1 Tab by mouth daily. Patient Follow Up Instructions:    Activity: Activity as tolerated  Diet: Cardiac Diet and Low fat, Low cholesterol      Follow-up with PCP in 1 week. Follow-up Information       Follow up With Specialties Details Why Bebeto Toscano MD Family Medicine Schedule an appointment as soon as possible for a visit in 1 week(s) Please see primary care after returning to [facility] for hospital follow-up.  [address] [place] [address]  [phone]            ________________________________________________________________    Risk of deterioration: Low    Condition at Discharge:  Stable  __________________________________________________________________    Disposition  Home with family and home health services    ____________________________________________________________________    Code Status: Full Code  ___________________________________________________________________      Total time in minutes spent coordinating this discharge (includes going over instructions, follow-up, prescriptions, and preparing report for sign off to her PCP) :  >30 minutes    Signed:  Sai Ortega MD

## 2022-10-05 NOTE — PROGRESS NOTES
Patient discharged. Disch. Instructions reviewed with patient's daughter. No questions and daughter verbalized understanding.

## 2022-10-05 NOTE — DISCHARGE INSTRUCTIONS
HOSPITALIST DISCHARGE INSTRUCTIONS    NAME: Jose Luis Pope   :  10/9/1933   MRN:  [id number]     Date/Time:  10/5/2022 1:46 PM    ADMIT DATE: 10/1/2022     DISCHARGE DATE: 10/5/2022     DISCHARGE DIAGNOSIS:  Abdominal pain POA- resolved now  Hematemesis - resolved, Cont PPI per GI  Partial SBO- seems to have resolved, Tolerating PO diet,   Likely Viral Jejunitis POA - per biopsy during EGD  Possible aspiration PNA POA- resolved, off oxygen, s/p IV Abx, therapy comlpeted  Left renal mass- OP Urology followup recommended  Paroxysmal atrial fibrillation, 2018  Chronic LBBB. Hyperlipidemia  Full code    MEDICATIONS:  As per medication reconciliation  list  It is important that you take the medication exactly as they are prescribed. Keep your medication in the bottles provided by the pharmacist and keep a list of the medication names, dosages, and times to be taken in your wallet. Do not take other medications without consulting your doctor. Pain Management: per above medications    What to do at Home    Recommended diet:  Cardiac Diet and Low fat, Low cholesterol    Recommended activity: Activity as tolerated    If you have questions regarding the hospital related prescriptions or hospital related issues please call at [phone]. If you experience any of the following symptoms then please call your primary care physician or return to the emergency room if you cannot get hold of your doctor:  Fever, chills, nausea, vomiting, diarrhea, change in mentation, falling, bleeding, shortness of breath,     Follow Up:  PCP  you are to call and set up an appointment to see them in 7-10 days. Urology for L renal mass followup/workup as recommended  GI Dr Eduardo Walter  as needed      Information obtained by :  I understand that if any problems occur once I am at home I am to contact my physician. I understand and acknowledge receipt of the instructions indicated above. Physician's or R.N.'s Signature                                                                  Date/Time                                                                                                                                              Patient or Representative Signature                                                          Date/Time

## 2022-10-07 LAB
BACTERIA SPEC CULT: NORMAL
BACTERIA SPEC CULT: NORMAL
SERVICE CMNT-IMP: NORMAL
SERVICE CMNT-IMP: NORMAL

## 2022-12-29 ENCOUNTER — APPOINTMENT (OUTPATIENT)
Dept: CT IMAGING | Age: 87
DRG: 329 | End: 2022-12-29
Attending: STUDENT IN AN ORGANIZED HEALTH CARE EDUCATION/TRAINING PROGRAM
Payer: MEDICARE

## 2022-12-29 ENCOUNTER — HOSPITAL ENCOUNTER (INPATIENT)
Age: 87
LOS: 12 days | Discharge: NURSING, REHAB OR LONG-TERM CARE | DRG: 329 | End: 2023-01-10
Attending: STUDENT IN AN ORGANIZED HEALTH CARE EDUCATION/TRAINING PROGRAM | Admitting: STUDENT IN AN ORGANIZED HEALTH CARE EDUCATION/TRAINING PROGRAM
Payer: MEDICARE

## 2022-12-29 DIAGNOSIS — K56.609 SBO (SMALL BOWEL OBSTRUCTION) (HCC): Primary | ICD-10-CM

## 2022-12-29 LAB
ALBUMIN SERPL-MCNC: 3.6 G/DL (ref 3.5–5)
ALBUMIN/GLOB SERPL: 1 (ref 1.1–2.2)
ALP SERPL-CCNC: 58 U/L (ref 45–117)
ALT SERPL-CCNC: 14 U/L (ref 12–78)
ANION GAP SERPL CALC-SCNC: 7 MMOL/L (ref 5–15)
APPEARANCE UR: CLEAR
AST SERPL-CCNC: 12 U/L (ref 15–37)
ATRIAL RATE: 95 BPM
BACTERIA URNS QL MICRO: NEGATIVE /HPF
BASOPHILS # BLD: 0 K/UL (ref 0–0.1)
BASOPHILS NFR BLD: 0 % (ref 0–1)
BILIRUB SERPL-MCNC: 0.5 MG/DL (ref 0.2–1)
BILIRUB UR QL: NEGATIVE
BUN SERPL-MCNC: 40 MG/DL (ref 6–20)
BUN/CREAT SERPL: 47 (ref 12–20)
CALCIUM SERPL-MCNC: 10.4 MG/DL (ref 8.5–10.1)
CALCULATED P AXIS, ECG09: 49 DEGREES
CALCULATED R AXIS, ECG10: -51 DEGREES
CALCULATED T AXIS, ECG11: 88 DEGREES
CHLORIDE SERPL-SCNC: 106 MMOL/L (ref 97–108)
CO2 SERPL-SCNC: 28 MMOL/L (ref 21–32)
COLOR UR: ABNORMAL
CREAT SERPL-MCNC: 0.86 MG/DL (ref 0.55–1.02)
DIAGNOSIS, 93000: NORMAL
DIFFERENTIAL METHOD BLD: ABNORMAL
EOSINOPHIL # BLD: 0 K/UL (ref 0–0.4)
EOSINOPHIL NFR BLD: 0 % (ref 0–7)
EPITH CASTS URNS QL MICRO: ABNORMAL /LPF
ERYTHROCYTE [DISTWIDTH] IN BLOOD BY AUTOMATED COUNT: 15.5 % (ref 11.5–14.5)
GLOBULIN SER CALC-MCNC: 3.6 G/DL (ref 2–4)
GLUCOSE SERPL-MCNC: 136 MG/DL (ref 65–100)
GLUCOSE UR STRIP.AUTO-MCNC: NEGATIVE MG/DL
GRAN CASTS URNS QL MICRO: ABNORMAL /LPF
HCT VFR BLD AUTO: 34.1 % (ref 35–47)
HGB BLD-MCNC: 9.7 G/DL (ref 11.5–16)
HGB UR QL STRIP: NEGATIVE
IMM GRANULOCYTES # BLD AUTO: 0 K/UL (ref 0–0.04)
IMM GRANULOCYTES NFR BLD AUTO: 0 % (ref 0–0.5)
KETONES UR QL STRIP.AUTO: ABNORMAL MG/DL
LEUKOCYTE ESTERASE UR QL STRIP.AUTO: ABNORMAL
LIPASE SERPL-CCNC: 142 U/L (ref 73–393)
LYMPHOCYTES # BLD: 0.7 K/UL (ref 0.8–3.5)
LYMPHOCYTES NFR BLD: 6 % (ref 12–49)
MCH RBC QN AUTO: 22.5 PG (ref 26–34)
MCHC RBC AUTO-ENTMCNC: 28.4 G/DL (ref 30–36.5)
MCV RBC AUTO: 79.1 FL (ref 80–99)
MONOCYTES # BLD: 0.6 K/UL (ref 0–1)
MONOCYTES NFR BLD: 5 % (ref 5–13)
NEUTS SEG # BLD: 10.5 K/UL (ref 1.8–8)
NEUTS SEG NFR BLD: 89 % (ref 32–75)
NITRITE UR QL STRIP.AUTO: NEGATIVE
NRBC # BLD: 0 K/UL (ref 0–0.01)
NRBC BLD-RTO: 0 PER 100 WBC
P-R INTERVAL, ECG05: 184 MS
PH UR STRIP: 5.5 (ref 5–8)
PLATELET # BLD AUTO: 277 K/UL (ref 150–400)
PMV BLD AUTO: 9.7 FL (ref 8.9–12.9)
POTASSIUM SERPL-SCNC: 3.7 MMOL/L (ref 3.5–5.1)
PROT SERPL-MCNC: 7.2 G/DL (ref 6.4–8.2)
PROT UR STRIP-MCNC: 30 MG/DL
Q-T INTERVAL, ECG07: 382 MS
QRS DURATION, ECG06: 134 MS
QTC CALCULATION (BEZET), ECG08: 480 MS
RBC # BLD AUTO: 4.31 M/UL (ref 3.8–5.2)
RBC #/AREA URNS HPF: ABNORMAL /HPF (ref 0–5)
RBC MORPH BLD: ABNORMAL
SODIUM SERPL-SCNC: 141 MMOL/L (ref 136–145)
SP GR UR REFRACTOMETRY: 1.03
UR CULT HOLD, URHOLD: NORMAL
UROBILINOGEN UR QL STRIP.AUTO: 0.2 EU/DL (ref 0.2–1)
VENTRICULAR RATE, ECG03: 95 BPM
WBC # BLD AUTO: 11.8 K/UL (ref 3.6–11)
WBC URNS QL MICRO: ABNORMAL /HPF (ref 0–4)

## 2022-12-29 PROCEDURE — 74011250637 HC RX REV CODE- 250/637: Performed by: STUDENT IN AN ORGANIZED HEALTH CARE EDUCATION/TRAINING PROGRAM

## 2022-12-29 PROCEDURE — 96374 THER/PROPH/DIAG INJ IV PUSH: CPT

## 2022-12-29 PROCEDURE — 74011000250 HC RX REV CODE- 250: Performed by: STUDENT IN AN ORGANIZED HEALTH CARE EDUCATION/TRAINING PROGRAM

## 2022-12-29 PROCEDURE — 74177 CT ABD & PELVIS W/CONTRAST: CPT

## 2022-12-29 PROCEDURE — 93005 ELECTROCARDIOGRAM TRACING: CPT

## 2022-12-29 PROCEDURE — 74011250636 HC RX REV CODE- 250/636: Performed by: STUDENT IN AN ORGANIZED HEALTH CARE EDUCATION/TRAINING PROGRAM

## 2022-12-29 PROCEDURE — 99285 EMERGENCY DEPT VISIT HI MDM: CPT

## 2022-12-29 PROCEDURE — 36415 COLL VENOUS BLD VENIPUNCTURE: CPT

## 2022-12-29 PROCEDURE — 74011000636 HC RX REV CODE- 636: Performed by: STUDENT IN AN ORGANIZED HEALTH CARE EDUCATION/TRAINING PROGRAM

## 2022-12-29 PROCEDURE — 83690 ASSAY OF LIPASE: CPT

## 2022-12-29 PROCEDURE — 85025 COMPLETE CBC W/AUTO DIFF WBC: CPT

## 2022-12-29 PROCEDURE — 81001 URINALYSIS AUTO W/SCOPE: CPT

## 2022-12-29 PROCEDURE — 80053 COMPREHEN METABOLIC PANEL: CPT

## 2022-12-29 PROCEDURE — 65270000029 HC RM PRIVATE

## 2022-12-29 RX ORDER — PROPAFENONE HYDROCHLORIDE 150 MG/1
150 TABLET, COATED ORAL 2 TIMES DAILY
Status: DISCONTINUED | OUTPATIENT
Start: 2022-12-29 | End: 2023-01-06

## 2022-12-29 RX ORDER — SODIUM CHLORIDE 9 MG/ML
150 INJECTION, SOLUTION INTRAVENOUS CONTINUOUS
Status: DISPENSED | OUTPATIENT
Start: 2022-12-29 | End: 2022-12-30

## 2022-12-29 RX ORDER — POTASSIUM CHLORIDE 750 MG/1
10 TABLET, FILM COATED, EXTENDED RELEASE ORAL DAILY
Status: DISCONTINUED | OUTPATIENT
Start: 2022-12-30 | End: 2023-01-10 | Stop reason: HOSPADM

## 2022-12-29 RX ORDER — POLYETHYLENE GLYCOL 3350 17 G/17G
17 POWDER, FOR SOLUTION ORAL DAILY PRN
Status: DISCONTINUED | OUTPATIENT
Start: 2022-12-29 | End: 2022-12-30

## 2022-12-29 RX ORDER — CALCIUM CARBONATE 500(1250)
500 TABLET ORAL DAILY
Status: DISCONTINUED | OUTPATIENT
Start: 2022-12-30 | End: 2023-01-10 | Stop reason: HOSPADM

## 2022-12-29 RX ORDER — KETOROLAC TROMETHAMINE 30 MG/ML
15 INJECTION, SOLUTION INTRAMUSCULAR; INTRAVENOUS
Status: DISCONTINUED | OUTPATIENT
Start: 2022-12-29 | End: 2022-12-30

## 2022-12-29 RX ORDER — FENTANYL CITRATE 50 UG/ML
25 INJECTION, SOLUTION INTRAMUSCULAR; INTRAVENOUS
Status: DISCONTINUED | OUTPATIENT
Start: 2022-12-29 | End: 2023-01-02

## 2022-12-29 RX ORDER — ONDANSETRON 2 MG/ML
4 INJECTION INTRAMUSCULAR; INTRAVENOUS
Status: DISCONTINUED | OUTPATIENT
Start: 2022-12-29 | End: 2023-01-02

## 2022-12-29 RX ORDER — ACETAMINOPHEN 500 MG
1000 TABLET ORAL
Status: DISCONTINUED | OUTPATIENT
Start: 2022-12-29 | End: 2023-01-02

## 2022-12-29 RX ORDER — LANOLIN ALCOHOL/MO/W.PET/CERES
400 CREAM (GRAM) TOPICAL DAILY
Status: DISCONTINUED | OUTPATIENT
Start: 2022-12-30 | End: 2023-01-10 | Stop reason: HOSPADM

## 2022-12-29 RX ORDER — ATORVASTATIN CALCIUM 20 MG/1
20 TABLET, FILM COATED ORAL DAILY
Status: DISCONTINUED | OUTPATIENT
Start: 2022-12-30 | End: 2023-01-10 | Stop reason: HOSPADM

## 2022-12-29 RX ADMIN — ALUMINUM HYDROXIDE, MAGNESIUM HYDROXIDE, AND SIMETHICONE 40 ML: 200; 200; 20 SUSPENSION ORAL at 21:25

## 2022-12-29 RX ADMIN — ACETAMINOPHEN 1000 MG: 500 TABLET ORAL at 21:45

## 2022-12-29 RX ADMIN — IOPAMIDOL 100 ML: 755 INJECTION, SOLUTION INTRAVENOUS at 17:03

## 2022-12-29 RX ADMIN — PROPAFENONE HYDROCHLORIDE 150 MG: 150 TABLET, FILM COATED ORAL at 22:22

## 2022-12-29 RX ADMIN — FENTANYL CITRATE 25 MCG: 50 INJECTION, SOLUTION INTRAMUSCULAR; INTRAVENOUS at 21:00

## 2022-12-29 RX ADMIN — SODIUM CHLORIDE 150 ML/HR: 9 INJECTION, SOLUTION INTRAVENOUS at 21:09

## 2022-12-29 NOTE — Clinical Note
Status[de-identified] INPATIENT [101]   Type of Bed: Medical [8]   Inpatient Hospitalization Certified Necessary for the Following Reasons: 9.  Other (further clarification in H&P documentation)   Admitting Diagnosis: SBO (small bowel obstruction) [facility]) [[id number]]   Admitting Physician: Ron Whitney [[id number]]   Attending Physician: Latonya Nogueira   Estimated Length of Stay: 2 Midnights   Discharge Plan[de-identified] Home with Office Follow-up

## 2022-12-30 LAB
ALBUMIN SERPL-MCNC: 2.8 G/DL (ref 3.5–5)
ALBUMIN/GLOB SERPL: 0.9 (ref 1.1–2.2)
ALP SERPL-CCNC: 43 U/L (ref 45–117)
ALT SERPL-CCNC: 13 U/L (ref 12–78)
ANION GAP SERPL CALC-SCNC: 7 MMOL/L (ref 5–15)
AST SERPL-CCNC: 11 U/L (ref 15–37)
ATRIAL RATE: 84 BPM
BILIRUB SERPL-MCNC: 0.2 MG/DL (ref 0.2–1)
BUN SERPL-MCNC: 43 MG/DL (ref 6–20)
BUN/CREAT SERPL: 58 (ref 12–20)
CALCIUM SERPL-MCNC: 9.5 MG/DL (ref 8.5–10.1)
CALCULATED P AXIS, ECG09: 6 DEGREES
CALCULATED R AXIS, ECG10: -53 DEGREES
CALCULATED T AXIS, ECG11: 89 DEGREES
CHLORIDE SERPL-SCNC: 110 MMOL/L (ref 97–108)
CO2 SERPL-SCNC: 26 MMOL/L (ref 21–32)
CREAT SERPL-MCNC: 0.74 MG/DL (ref 0.55–1.02)
DIAGNOSIS, 93000: NORMAL
ERYTHROCYTE [DISTWIDTH] IN BLOOD BY AUTOMATED COUNT: 15.6 % (ref 11.5–14.5)
GLOBULIN SER CALC-MCNC: 3.2 G/DL (ref 2–4)
GLUCOSE SERPL-MCNC: 111 MG/DL (ref 65–100)
HCT VFR BLD AUTO: 27 % (ref 35–47)
HGB BLD-MCNC: 7.7 G/DL (ref 11.5–16)
MCH RBC QN AUTO: 22.6 PG (ref 26–34)
MCHC RBC AUTO-ENTMCNC: 28.5 G/DL (ref 30–36.5)
MCV RBC AUTO: 79.2 FL (ref 80–99)
NRBC # BLD: 0 K/UL (ref 0–0.01)
NRBC BLD-RTO: 0 PER 100 WBC
P-R INTERVAL, ECG05: 186 MS
PLATELET # BLD AUTO: 232 K/UL (ref 150–400)
PMV BLD AUTO: 10 FL (ref 8.9–12.9)
POTASSIUM SERPL-SCNC: 3.2 MMOL/L (ref 3.5–5.1)
PROT SERPL-MCNC: 6 G/DL (ref 6.4–8.2)
Q-T INTERVAL, ECG07: 400 MS
QRS DURATION, ECG06: 144 MS
QTC CALCULATION (BEZET), ECG08: 472 MS
RBC # BLD AUTO: 3.41 M/UL (ref 3.8–5.2)
SODIUM SERPL-SCNC: 143 MMOL/L (ref 136–145)
VENTRICULAR RATE, ECG03: 84 BPM
WBC # BLD AUTO: 9.9 K/UL (ref 3.6–11)

## 2022-12-30 PROCEDURE — 97166 OT EVAL MOD COMPLEX 45 MIN: CPT | Performed by: OCCUPATIONAL THERAPIST

## 2022-12-30 PROCEDURE — 74011250636 HC RX REV CODE- 250/636: Performed by: STUDENT IN AN ORGANIZED HEALTH CARE EDUCATION/TRAINING PROGRAM

## 2022-12-30 PROCEDURE — 80053 COMPREHEN METABOLIC PANEL: CPT

## 2022-12-30 PROCEDURE — 36415 COLL VENOUS BLD VENIPUNCTURE: CPT

## 2022-12-30 PROCEDURE — 74011250637 HC RX REV CODE- 250/637: Performed by: STUDENT IN AN ORGANIZED HEALTH CARE EDUCATION/TRAINING PROGRAM

## 2022-12-30 PROCEDURE — 97161 PT EVAL LOW COMPLEX 20 MIN: CPT

## 2022-12-30 PROCEDURE — C9113 INJ PANTOPRAZOLE SODIUM, VIA: HCPCS | Performed by: STUDENT IN AN ORGANIZED HEALTH CARE EDUCATION/TRAINING PROGRAM

## 2022-12-30 PROCEDURE — 99221 1ST HOSP IP/OBS SF/LOW 40: CPT | Performed by: SURGERY

## 2022-12-30 PROCEDURE — 65270000029 HC RM PRIVATE

## 2022-12-30 PROCEDURE — 74011000636 HC RX REV CODE- 636: Performed by: SURGERY

## 2022-12-30 PROCEDURE — 97530 THERAPEUTIC ACTIVITIES: CPT | Performed by: OCCUPATIONAL THERAPIST

## 2022-12-30 PROCEDURE — 97530 THERAPEUTIC ACTIVITIES: CPT

## 2022-12-30 PROCEDURE — 74011000250 HC RX REV CODE- 250: Performed by: STUDENT IN AN ORGANIZED HEALTH CARE EDUCATION/TRAINING PROGRAM

## 2022-12-30 PROCEDURE — 85027 COMPLETE CBC AUTOMATED: CPT

## 2022-12-30 RX ADMIN — GABAPENTIN 800 MG: 300 CAPSULE ORAL at 17:57

## 2022-12-30 RX ADMIN — MAGNESIUM OXIDE 400 MG (241.3 MG MAGNESIUM) TABLET 400 MG: TABLET at 09:35

## 2022-12-30 RX ADMIN — CALCIUM 500 MG: 500 TABLET ORAL at 09:35

## 2022-12-30 RX ADMIN — GABAPENTIN 800 MG: 300 CAPSULE ORAL at 09:35

## 2022-12-30 RX ADMIN — DIATRIZOATE MEGLUMINE AND DIATRIZOATE SODIUM 80 ML: 660; 100 LIQUID ORAL; RECTAL at 14:25

## 2022-12-30 RX ADMIN — PROPAFENONE HYDROCHLORIDE 150 MG: 150 TABLET, FILM COATED ORAL at 09:35

## 2022-12-30 RX ADMIN — PROPAFENONE HYDROCHLORIDE 150 MG: 150 TABLET, FILM COATED ORAL at 18:16

## 2022-12-30 RX ADMIN — ALUMINUM HYDROXIDE, MAGNESIUM HYDROXIDE, AND SIMETHICONE 40 ML: 200; 200; 20 SUSPENSION ORAL at 17:57

## 2022-12-30 RX ADMIN — SODIUM CHLORIDE, PRESERVATIVE FREE 40 MG: 5 INJECTION INTRAVENOUS at 09:35

## 2022-12-30 RX ADMIN — ACETAMINOPHEN 1000 MG: 500 TABLET ORAL at 09:35

## 2022-12-30 RX ADMIN — ATORVASTATIN CALCIUM 20 MG: 20 TABLET, FILM COATED ORAL at 09:35

## 2022-12-30 RX ADMIN — POTASSIUM CHLORIDE 10 MEQ: 750 TABLET, FILM COATED, EXTENDED RELEASE ORAL at 09:35

## 2022-12-30 RX ADMIN — ALUMINUM HYDROXIDE, MAGNESIUM HYDROXIDE, AND SIMETHICONE 40 ML: 200; 200; 20 SUSPENSION ORAL at 09:34

## 2022-12-30 NOTE — PROGRESS NOTES
Problem: Mobility Impaired (Adult and Pediatric)  Goal: *Acute Goals and Plan of Care (Insert Text)  Description: FUNCTIONAL STATUS PRIOR TO ADMISSION: Pt lives at [facility] in [facility] first floor apt. She states she amb with a \"4 wheeled\" walker within her apt but uses the w/c out of the apt. She states she normally dresses herself, gets assist from staff to bathe and for her meds. She reports no falls. She does not use O2 at baseline. HOME SUPPORT PRIOR TO ADMISSION: [facility], see above    Physical Therapy Goals  Initiated 12/30/2022  1. Patient will move from supine to sit and sit to supine , scoot up and down, and roll side to side in bed with independence within 7 day(s). 2.  Patient will transfer from bed to chair and chair to bed with modified independence using the least restrictive device within 7 day(s). 3.  Patient will perform sit to stand with modified independence within 7 day(s). 4.  Patient will ambulate with modified independence for 100 feet with the least restrictive device within 7 day(s). Outcome: Not Met   PHYSICAL THERAPY EVALUATION  Patient: Cindy Peralta (67 y.o. female)  Date: 12/30/2022  Primary Diagnosis: SBO (small bowel obstruction) ([facility] [K56.609]       Precautions:  Fall    ASSESSMENT  Based on the objective data described below, the patient presents with limitations in gait, functional mobility and activity tolerance below her usual baseline. She is needing overall CGA to min A for mobility, and used a rolling walker to side step at edge of bed but was very fatigued and only able to tolerate a few sidesteps. She was noted upon arrival to be taking pills with RN. After taking pills, she did  exhibit coughing and sneezing. RN aware and informed hospitalist who was rounding. Pt will likely benefit from stay in the [facility] at [facility] prior to return to her [facility] apt.      Current Level of Function Impacting Discharge (mobility/balance): overall CGA to min A but unable to tolerate much activity this session. Functional Outcome Measure: The patient scored 45/100 on the barthel outcome measure which is indicative of 55% functional impairment. Other factors to consider for discharge: lives in [facility] [place]; increased fall risk; functioning below her baseline     Patient will benefit from skilled therapy intervention to address the above noted impairments. PLAN :  Recommendations and Planned Interventions: bed mobility training, transfer training, gait training, therapeutic exercises, patient and family training/education, and therapeutic activities      Frequency/Duration: Patient will be followed by physical therapy:  4 times a week to address goals. Recommendation for discharge: (in order for the patient to meet his/her long term goals)  Therapy up to 5 days/week in SNF setting    This discharge recommendation:  Has been made in collaboration with the attending provider and/or case management    IF patient discharges home will need the following DME: patient owns DME required for discharge         SUBJECTIVE:   Patient stated I'm probably going to have to move to healthcare.     OBJECTIVE DATA SUMMARY:   HISTORY:    Past Medical History:   Diagnosis Date    Abnormal x-ray of abdomen 11/13/2020    Bas showed decreased peristalsis and a cervical web      Arrhythmia     A fib    Arthritis     Diarrhea 6/6/2016    Esophageal dysphagia 11/13/2020    Foot drop     Gastric ulcer 6/14/2012    Gastroparesis     GERD (gastroesophageal reflux disease)     High cholesterol     Hypertension     Neuropathy     Ovarian cancer ([address] 1986    chemo x 9 mos    Scoliosis     Stroke ([facility] 2002    ? stroke with drop feet, per patient CT scans were negative     Past Surgical History:   Procedure Laterality Date    FLEXIBLE SIGMOIDOSCOPY N/A 6/6/2016    SIGMOIDOSCOPY FLEXIBLE performed by Ally Hameed MD at [facility] ENDOSCOPY    HX ORTHOPAEDIC      back    HX ORTHOPAEDIC bilateral shoulder replacements    HX ORTHOPAEDIC      left knee replacement    HX LYDIA AND BSO  1986    (ovarian cancer)    SC EGD TRANSORAL BIOPSY SINGLE/MULTIPLE  1/26/2012         SC EGD TRANSORAL BIOPSY SINGLE/MULTIPLE  6/14/2012         UPPER GI ENDOSCOPY,BIOPSY  11/13/2020         UPPER GI ENDOSCOPY,DIAGNOSIS  10/5/2020         UPPER GI ENDOSCOPY,BECKY GATICA GUIDE  11/13/2020            Personal factors and/or comorbidities impacting plan of care:     Home Situation  Home Environment: Assisted living  [address] Name: Lou Eugene  # Steps to Enter: 0  One/Two Story Residence:  (first floor apartment)  Living Alone:  (apt in [facility])  Support Systems: Other (Comment) (staff at [facility])  Patient Expects to be Discharged to[de-identified] Home  Current DME Used/Available at Home: milly Orosco, [address] chair  Tub or Shower Type: Shower    EXAMINATION/PRESENTATION/DECISION MAKING:   Critical Behavior:  Neurologic State: Alert  Orientation Level: Oriented X4  Cognition: Follows commands  Safety/Judgement: Fall prevention    Range Of Motion:  AROM: Generally decreased, functional           PROM: Generally decreased, functional           Strength:    Strength: Generally decreased, functional                    Tone & Sensation:   Tone: Normal              Sensation:  (grossly intact)               Coordination:  Coordination: Generally decreased, functional  Vision:   Corrective Lenses: Glasses  Functional Mobility:  Bed Mobility:  Rolling: Contact guard assistance  Supine to Sit: Contact guard assistance  Sit to Supine: Minimum assistance  Scooting: Contact guard assistance  Transfers:  Sit to Stand: Minimum assistance  Stand to Sit: Minimum assistance                       Balance:   Sitting: Intact  Standing: Impaired  Standing - Static: Constant support; Fair  Standing - Dynamic : Constant support; Fair  Ambulation/Gait Training:              Gait Description (WDL):  (side step only at edge of bed with min A with RW) Functional Measure:  Barthel Index:    Bathin  Bladder: 5  Bowels: 10  Groomin  Dressin  Feeding: 10  Mobility: 0  Stairs: 0  Toilet Use: 0  Transfer (Bed to Chair and Back): 10  Total: 45/100       The Barthel ADL Index: Guidelines  1. The index should be used as a record of what a patient does, not as a record of what a patient could do. 2. The main aim is to establish degree of independence from any help, physical or verbal, however minor and for whatever reason. 3. The need for supervision renders the patient not independent. 4. A patient's performance should be established using the best available evidence. Asking the patient, friends/relatives and nurses are the usual sources, but direct observation and common sense are also important. However direct testing is not needed. 5. Usually the patient's performance over the preceding 24-48 hours is important, but occasionally longer periods will be relevant. 6. Middle categories imply that the patient supplies over 50 per cent of the effort. 7. Use of aids to be independent is allowed. Score Interpretation (from [address] [place] [address])    Independent   60-79 Minimally independent   40-59 Partially dependent   20-39 Very dependent   <20 Totally dependent     -Cedric Adorno., Barthel, D.W. (1965). Functional evaluation: the Barthel Index. [facility] ([address] (1997). The Barthel activities of daily living index: self-reporting versus actual performance in the old (> or = 75 years). Journal of [address] [place] [address] 45(7), [facility], J.J.M.F, Vini Oakley., Anamaria Martinez. (1999). Measuring the change in disability after inpatient rehabilitation; comparison of the responsiveness of the Barthel Index and Functional [place] Measure. Journal of Neurology, Neurosurgery, and Psychiatry, 66(4), 726-677.   -Omi Silva NJAIMIE.CORONA, GABI Sandy, Liliam Goldsmith, M.A. (2004) Assessment of post-stroke quality of life in cost-effectiveness studies: The usefulness of the Barthel Index and the EuroQoL-5D. Quality of Life Research, 15, 290-82           Physical Therapy Evaluation Charge Determination   History Examination Presentation Decision-Making   HIGH Complexity :3+ comorbidities / personal factors will impact the outcome/ POC  HIGH Complexity : 4+ Standardized tests and measures addressing body structure, function, activity limitation and / or participation in recreation  MEDIUM Complexity : Evolving with changing characteristics  LOW Complexity : FOTO score of       Based on the above components, the patient evaluation is determined to be of the following complexity level: LOW     Pain Rating:  No c/o    Activity Tolerance:   Fair    After treatment patient left in no apparent distress:   Supine in bed, Call bell within reach, and Side rails x 3    COMMUNICATION/EDUCATION:   The patients plan of care was discussed with: Occupational therapist, Registered nurse, and Case management. Fall prevention education was provided and the patient/caregiver indicated understanding., Patient/family have participated as able in goal setting and plan of care. , and Patient/family agree to work toward stated goals and plan of care.     Thank you for this referral.  Jonn Mendez, PT   Time Calculation: 26 mins

## 2022-12-30 NOTE — PROGRESS NOTES
Problem: Self Care Deficits Care Plan (Adult)  Goal: *Acute Goals and Plan of Care (Insert Text)  Description: FUNCTIONAL STATUS PRIOR TO ADMISSION: ambulated room to room with RW, otherwise uses wheelchair, performed all ADLs on her own except for bathing 2x a week in the shower, takes pills with apple sauce    HOME SUPPORT PRIOR TO ADMISSION: lives at [address] and gets assist with showers and meals only, lives alone in her apartment    Occupational Therapy Goals:  Initiated 12/30/2022  1. Patient will perform grooming with supervision/set-up within 7 days. 2. Patient will perform toileting with supervision/set-up within 7 days. 3. Patient will perform upper body dressing and lower body dressing with supervision/set-up within 7 days. 4. Patient will transfer from toilet with supervision/set-up using the least restrictive device and appropriate durable medical equipment within 7 days. Outcome: Not Met   OCCUPATIONAL THERAPY EVALUATION  Patient: Za Lowry (10 y.o. female)  Date: 12/30/2022  Primary Diagnosis: SBO (small bowel obstruction) ([facility] [K56.609]       Precautions:  Fall    ASSESSMENT  Based on the objective data described below, the patient presents with fatigued, functional weakness and reports limited mobility at baseline. She was at  bed level upon arrival with nursing getting pt to take pills. Pt had difficulty with swallowing pills with water and was coughing and sneezing afterwards. Pt reports she takes pills with apple sauce at baseline. CGA to min assist for mobility this session and pt was only able to side step head of stretcher with RW with min assist due to weakness and fatigue. Limited standing tolerance. Recommend SNF for rehab at discharge. Current Level of Function Impacting Discharge (ADLs/self-care): CGA/min assist bed mobility, min assist sit to stand and to side step head of bed with RW    Feeding: Setup    Oral Facial Hygiene/Grooming: Setup    Bathing:  Moderate assistance    Upper Body Dressing: Contact guard assistance    Lower Body Dressing: Moderate assistance    Toileting: Moderate assistance    Functional Outcome Measure: The patient scored 45/100 on the barthel outcome measure which is indicative of moderate decline in mobility and ADLS. Other factors to consider for discharge: reports taking pills with applesauce at baseline, nursing was giving pt water with pills extensive time and effort needed to drink liquids and swallow pills, pt reported that pills were getting stuck and pt was coughing (dr notified)     Patient will benefit from skilled therapy intervention to address the above noted impairments. PLAN :  Recommendations and Planned Interventions: self care training, functional mobility training, therapeutic exercise, balance training, therapeutic activities, patient education, home safety training, and family training/education    Frequency/Duration: Patient will be followed by occupational therapy 4 times a week to address goals. Recommendation for discharge: (in order for the patient to meet his/her long term goals)  Therapy up to 5 days/week in SNF setting    This discharge recommendation:  Has not yet been discussed the attending provider and/or case management    IF patient discharges home will need the following DME: none       SUBJECTIVE:   Patient stated I think I may have to go to the healthcare side.     OBJECTIVE DATA SUMMARY:   HISTORY:   Past Medical History:   Diagnosis Date    Abnormal x-ray of abdomen 11/13/2020    Bas showed decreased peristalsis and a cervical web      Arrhythmia     A fib    Arthritis     Diarrhea 6/6/2016    Esophageal dysphagia 11/13/2020    Foot drop     Gastric ulcer 6/14/2012    Gastroparesis     GERD (gastroesophageal reflux disease)     High cholesterol     Hypertension     Neuropathy     Ovarian cancer ([facility] 1986    chemo x 9 mos    Scoliosis     Stroke ([facility] 2002    ? stroke with drop feet, per patient CT scans were negative     Past Surgical History:   Procedure Laterality Date    FLEXIBLE SIGMOIDOSCOPY N/A 6/6/2016    SIGMOIDOSCOPY FLEXIBLE performed by Britta Garcia MD at [facility] ENDOSCOPY    HX ORTHOPAEDIC      back    HX ORTHOPAEDIC      bilateral shoulder replacements    HX ORTHOPAEDIC      left knee replacement    HX LYDIA AND BSO  1986    (ovarian cancer)    [facility] EGD TRANSORAL BIOPSY SINGLE/MULTIPLE  1/26/2012         [facility] EGD TRANSORAL BIOPSY SINGLE/MULTIPLE  6/14/2012         UPPER GI ENDOSCOPY,BIOPSY  11/13/2020         UPPER GI ENDOSCOPY,DIAGNOSIS  10/5/2020         UPPER GI ENDOSCOPY,DILATN W GUIDE  11/13/2020            Expanded or extensive additional review of patient history:     Home Situation  Home Environment: [address] [place] [address] [address] Name: Jackie Grigsby  One/Two Story Residence:  (first floor apartment)  Patient Expects to be Discharged to[de-identified] Home  Current DME Used/Available at Home: Walker, rolling, Shower chair  Tub or Shower Type: Shower    Hand dominance: Right    EXAMINATION OF PERFORMANCE DEFICITS:  Cognitive/Behavioral Status:  Neurologic State: Alert  Orientation Level: Oriented X4  Cognition: Follows commands  Perception: Cues to maintain midline in standing  Perseveration: No perseveration noted  Safety/Judgement: Fall prevention      Vision/Perceptual:                                Corrective Lenses: Glasses    Range of Motion:    AROM: Generally decreased, functional  PROM: Generally decreased, functional                      Strength:    Strength: Generally decreased, functional                Coordination:  Coordination: Generally decreased, functional  Fine Motor Skills-Upper: Left Intact; Right Intact    Gross Motor Skills-Upper: Left Intact; Right Intact    Tone & Sensation:    Tone: Normal  Sensation:  (grossly intact)                      Balance:  Sitting: Intact  Standing: Impaired  Standing - Static: Constant support; Fair  Standing - Dynamic : Constant support; Fair    Functional Mobility and Transfers for ADLs:  Bed Mobility:  Rolling: Contact guard assistance  Supine to Sit: Contact guard assistance  Sit to Supine: Minimum assistance  Scooting: Contact guard assistance    Transfers:  Sit to Stand: Minimum assistance  Stand to Sit: Minimum assistance  Bathroom Mobility:  (unable today)    ADL Assessment:  Feeding: Setup    Oral Facial Hygiene/Grooming: Setup    Bathing: Moderate assistance    Upper Body Dressing: Contact guard assistance    Lower Body Dressing: Moderate assistance    Toileting: Moderate assistance                  ADL Intervention and task modifications:   See assessment    Cognitive Retraining  Safety/Judgement: Fall prevention     Functional Measure:    Barthel Index:  Bathin  Bladder: 5  Bowels: 10  Groomin  Dressin  Feeding: 10  Mobility: 0  Stairs: 0  Toilet Use: 0  Transfer (Bed to Chair and Back): 10  Total: 45/100      The Barthel ADL Index: Guidelines  1. The index should be used as a record of what a patient does, not as a record of what a patient could do. 2. The main aim is to establish degree of independence from any help, physical or verbal, however minor and for whatever reason. 3. The need for supervision renders the patient not independent. 4. A patient's performance should be established using the best available evidence. Asking the patient, friends/relatives and nurses are the usual sources, but direct observation and common sense are also important. However direct testing is not needed. 5. Usually the patient's performance over the preceding 24-48 hours is important, but occasionally longer periods will be relevant. 6. Middle categories imply that the patient supplies over 50 per cent of the effort. 7. Use of aids to be independent is allowed.     Score Interpretation (from [address] [place] [address])    Independent   60-79 Minimally independent   40-59 Partially dependent   20-39 Very dependent   <20 Totally dependent Mello Askew., Barthel, D.W. (7477). Functional evaluation: the Barthel Index. [facility] ([address] (1997). The Barthel activities of daily living index: self-reporting versus actual performance in the old (> or = 75 years). Journal of [facility] 45(7), 14 [facility], OMEGA, Tara Wright., Arelis Hernandez. (1999). Measuring the change in disability after inpatient rehabilitation; comparison of the responsiveness of the Barthel Index and Functional Stevens Measure. Journal of Neurology, Neurosurgery, and Psychiatry, 66(4), 756-673. Robbin Neff, N.J.A, GABI Sandy, & Milagro Arrieta, M.A. (2004) Assessment of post-stroke quality of life in cost-effectiveness studies: The usefulness of the Barthel Index and the EuroQoL-5D. Quality of Life Research, 15, 207-62         Occupational Therapy Evaluation Charge Determination   History Examination Decision-Making   MEDIUM Complexity : Expanded review of history including physical, cognitive and psychosocial  history  LOW Complexity : 1-3 performance deficits relating to physical, cognitive , or psychosocial skils that result in activity limitations and / or participation restrictions  MEDIUM Complexity : Patient may present with comorbidities that affect occupational performnce.  Miniml to moderate modification of tasks or assistance (eg, physical or verbal ) with assesment(s) is necessary to enable patient to complete evaluation       Based on the above components, the patient evaluation is determined to be of the following complexity level: LOW   Pain Ratin/10    Activity Tolerance:   Poor and requires rest breaks    After treatment patient left in no apparent distress:    Supine in bed, Call bell within reach, and bilateral rails up on stretcher as prior to session    COMMUNICATION/EDUCATION:   The patients plan of care was discussed with: Physical therapist, Registered nurse, and patient . Home safety education was provided and the patient/caregiver indicated understanding., Patient/family have participated as able in goal setting and plan of care. , and Patient/family agree to work toward stated goals and plan of care. This patients plan of care is appropriate for delegation to [facility].     Thank you for this referral.  Natalia Agee, OTR/L  Time Calculation: 25 mins

## 2022-12-30 NOTE — PROGRESS NOTES
Hospitalist Progress Note    NAME: Lorenza Barber   :  10/9/1933   MRN:  [id number]       Assessment / Plan:  Acute SBO in setting of multiple in the past - supp tx, appreciate surg recs for gastrografin challenge given recurrence  Acute intractable nausea, vomiting - supp tx  Hyperlipidemia  GERD  Neuropathy  Overweight - BMI 28.30  VTE prophylaxis - scds  Dispo - PT/OT rec SNF placement    Estimated discharge date:   Barriers: SBO resolution, SNF    Code status: Full  Prophylaxis: scds     Subjective:     Patient was seen and examined. She is in good spirits, states she has never seen a surgeon in the past for this issue. States pain is better but belly still \"hard. \"  No acute events overnight. Discussed with RN overnight events. All patient's questions were answered. Review of Systems:  Symptom Y/N Comments  Symptom Y/N Comments   Fever/Chills n   Chest Pain n    Poor Appetite    Edema     Cough n   Abdominal Pain n    Sputum    Joint Pain     SOB/LEVINE n   Pruritis/Rash     Nausea/vomit n   Tolerating PT/OT     Diarrhea    Tolerating Diet y    Constipation    Other       Could NOT obtain due to:          Objective:     VITALS:   Last 24hrs VS reviewed since prior progress note. Most recent are:  Patient Vitals for the past 24 hrs:   Temp Pulse Resp BP SpO2   22 0730 98.2 °F (36.8 °C) 78 24 130/65 98 %   22 0400 -- -- 22 -- --   22 0302 -- 79 -- -- --   22 0300 -- -- 17 -- --   22 2342 -- 88 -- -- --   22 2222 98.1 °F (36.7 °C) 90 15 135/63 95 %   22 1149 98.6 °F (37 °C) 93 20 114/68 96 %     No intake or output data in the 24 hours ending 22 0947     I had a face to face encounter and independently examined this patient on 2022, as outlined below:  PHYSICAL EXAM:  General: WD, WN. Alert, cooperative, no acute distress    EENT:  EOMI. Anicteric sclerae. MMM  Resp:  CTA bilaterally, no wheezing or rales.   No accessory muscle use  CV:  Regular rhythm,  No edema  GI:  Soft, +distended, Non tender. +Bowel sounds  Neurologic:  EOMs intact. No facial asymmetry. No aphasia or slurred speech. Symmetrical strength, Sensation grossly intact. Alert and oriented X 4.     Psych:   Good insight. Not anxious nor agitated  Skin:  No rashes. No jaundice    Reviewed most current lab test results and cultures  YES  Reviewed most current radiology test results   YES  Review and summation of old records today    NO  Reviewed patient's current orders and MAR    YES  PMH/SH reviewed - no change compared to H&P  ________________________________________________________________________  Care Plan discussed with:    Comments   Patient X    Family      RN X    Care Manager X    Consultant                       X Multidiciplinary team rounds were held today with , nursing, pharmacist and clinical coordinator. Patient's plan of care was discussed; medications were reviewed and discharge planning was addressed. ________________________________________________________________________        Comments   >50% of visit spent in counseling and coordination of care X    ________________________________________________________________________  Jodell Corn, DO     Procedures: see electronic medical records for all procedures/Xrays and details which were not copied into this note but were reviewed prior to creation of Plan. LABS:  I reviewed today's most current labs and imaging studies.   Pertinent labs include:  Recent Labs     12/30/22  0157 12/29/22  1207   WBC 9.9 11.8*   HGB 7.7* 9.7*   HCT 27.0* 34.1*    277     Recent Labs     12/30/22  0157 12/29/22  1207    141   K 3.2* 3.7   * 106   CO2 26 28   * 136*   BUN 43* 40*   CREA 0.74 0.86   CA 9.5 10.4*   ALB 2.8* 3.6   TBILI 0.2 0.5   ALT 13 14       Signed: Dee Barrientos,

## 2022-12-30 NOTE — ED PROVIDER NOTES
EMERGENCY DEPARTMENT HISTORY AND PHYSICAL EXAM      Date: 12/29/2022  Patient Name: Jose Luis Santana    History of Presenting Illness     Chief Complaint   Patient presents with    Abdominal Pain     V/D that started today; Hx gastroparesis       History Provided By: Patient    HPI: Jose Luis Santana, 80 y.o. female with a past medical history significant for medical problems as stated below presents to the ED with cc of abdominal pain and nausea and vomiting. She notes she woke this morning and had persistent vomiting. Denies any hematemesis. She is not able to tolerate any oral intake. She notes she has epigastric pain at this time as well. Since her vomiting is intractable nothing she could do made it better. Has had episodes of this in the past has been diagnosed with small bowel obstructions. She notes since arriving she is started to feel little bit better. She no longer is been vomiting and notes her abdominal pain is mostly gone and her chest feels \"tight\". She notes she has been having diarrhea but notes she does take MiraLAX and is consistent and unchanged. She not sure if she is having any passing of gas but notes her last bowel movement was this morning. Denies any chest pain or difficulty breathing. Denies any fevers recently. Denies any history of abdominal surgeries. There are no associated symptoms. No other exacerbating or ameliorating factors. PCP: Nat Bull MD    No current facility-administered medications on file prior to encounter. Current Outpatient Medications on File Prior to Encounter   Medication Sig Dispense Refill    gabapentin (NEURONTIN) 800 mg tablet Take 1 Tablet by mouth two (2) times a day. Max Daily Amount: 1,600 mg. 360 Tablet 3    diclofenac (VOLTAREN) 1 % gel Apply 2 g to affected area four (4) times daily.       simvastatin (ZOCOR) 40 mg tablet TAKE 1 TABLET BY MOUTH  DAILY 90 Tablet 3    furosemide (LASIX) 20 mg tablet Take 20 mg by mouth daily.      CALCIUM PO Take 1 Tab by mouth daily. polyethylene glycol 3350 (MIRALAX PO) Take 1 Each by mouth daily as needed. acetaminophen (TYLENOL) 500 mg tablet Take 1,000 mg by mouth every six (6) hours as needed for Pain.      propafenone (RYTHMOL) 150 mg tablet Take 1 Tab by mouth two (2) times a day. 60 Tab 2    RABEprazole (ACIPHEX) 20 mg tablet Take 1 Tab by mouth two (2) times a day. 60 Tab 3    potassium chloride SR (KLOR-CON 10) 10 mEq tablet Take 1 Tab by mouth daily. 30 Tab 3    magnesium oxide (MAG-OX) 400 mg tablet Take 250 mg by mouth daily. famotidine (PEPCID) 20 mg tablet Take 20 mg by mouth daily. cholecalciferol (VITAMIN D3) 1,000 unit tablet Take 1,000 Units by mouth daily. multivitamin (ONE A DAY) tablet Take 1 Tab by mouth daily.          Past History     Past Medical History:  Past Medical History:   Diagnosis Date    Abnormal x-ray of abdomen 11/13/2020    Bas showed decreased peristalsis and a cervical web      Arrhythmia     A fib    Arthritis     Diarrhea 6/6/2016    Esophageal dysphagia 11/13/2020    Foot drop     Gastric ulcer 6/14/2012    Gastroparesis     GERD (gastroesophageal reflux disease)     High cholesterol     Hypertension     Neuropathy     Ovarian cancer ([address] 1986    chemo x 9 mos    Scoliosis     Stroke ([facility] [address] 2002    ? stroke with drop feet, per patient CT scans were negative       Past Surgical History:  Past Surgical History:   Procedure Laterality Date    FLEXIBLE SIGMOIDOSCOPY N/A 6/6/2016    SIGMOIDOSCOPY FLEXIBLE performed by Bradly Sheridan MD at [facility] ENDOSCOPY    HX ORTHOPAEDIC      back    HX ORTHOPAEDIC      bilateral shoulder replacements    HX ORTHOPAEDIC      left knee replacement    HX LYDIA AND BSO  1986    (ovarian cancer)    [place] EGD TRANSORAL BIOPSY SINGLE/MULTIPLE  1/26/2012         [place] EGD TRANSORAL BIOPSY SINGLE/MULTIPLE  6/14/2012         UPPER GI ENDOSCOPY,BIOPSY  11/13/2020         UPPER GI ENDOSCOPY,DIAGNOSIS  10/5/2020 UPPER GI ENDOSCOPY,ROBERTYEFRI GATICA GUIDE  11/13/2020            Family History:  Family History   Problem Relation Age of Onset    Heart Disease Father     Cancer Sister         endometrial    Diabetes Sister        Social History:  Social History     Tobacco Use    Smoking status: Never    Smokeless tobacco: Never   Vaping Use    Vaping Use: Never used   Substance Use Topics    Alcohol use: No    Drug use: No       Allergies:  No Known Allergies      Review of Systems   Review of Systems   Constitutional:  Negative for fever. HENT:  Negative for congestion and sore throat. Eyes:  Negative for visual disturbance. Respiratory:  Negative for cough and shortness of breath. Cardiovascular:  Negative for chest pain. Gastrointestinal:  Positive for abdominal pain, diarrhea, nausea and vomiting. Negative for blood in stool and constipation. Genitourinary:  Negative for dysuria. Musculoskeletal:  Negative for myalgias. Skin:  Negative for rash. Neurological:  Negative for dizziness and headaches. Physical Exam   Physical Exam  Vitals and nursing note reviewed. Constitutional:       Appearance: She is well-developed. HENT:      Head: Normocephalic and atraumatic. Eyes:      Extraocular Movements: Extraocular movements intact. Pupils: Pupils are equal, round, and reactive to light. Cardiovascular:      Rate and Rhythm: Normal rate and regular rhythm. Pulmonary:      Effort: Pulmonary effort is normal.      Breath sounds: Normal breath sounds. Abdominal:      General: Abdomen is flat. There is no distension. Palpations: Abdomen is soft. Comments: Mild tenderness to palpation diffusely. Nonperitoneal.   Skin:     General: Skin is warm. Neurological:      General: No focal deficit present. Mental Status: She is alert and oriented to person, place, and time.    Psychiatric:         Mood and Affect: Mood normal.         Behavior: Behavior normal.       Diagnostic Study Results Labs -     Recent Results (from the past 24 hour(s))   EKG, 12 LEAD, INITIAL    Collection Time: 12/29/22 11:59 AM   Result Value Ref Range    Ventricular Rate 95 BPM    Atrial Rate 95 BPM    P-R Interval 184 ms    QRS Duration 134 ms    Q-T Interval 382 ms    QTC Calculation (Bezet) 480 ms    Calculated P Axis 49 degrees    Calculated R Axis -51 degrees    Calculated T Axis 88 degrees    Diagnosis       Sinus rhythm with premature atrial complexes  Possible Left atrial enlargement  Left axis deviation  Left ventricular hypertrophy with QRS widening and repolarization abnormality  When compared with ECG of 01-OCT-2022 13:23,  No significant change was found  Confirmed by [facility] Nohemi STEVENS ([id number]) on 12/29/2022 4:33:03 PM     CBC WITH AUTOMATED DIFF    Collection Time: 12/29/22 12:07 PM   Result Value Ref Range    WBC 11.8 (H) 3.6 - 11.0 K/uL    RBC 4.31 3.80 - 5.20 M/uL    HGB 9.7 (L) 11.5 - 16.0 g/dL    HCT 34.1 (L) 35.0 - 47.0 %    MCV 79.1 (L) 80.0 - 99.0 FL    MCH 22.5 (L) 26.0 - 34.0 PG    MCHC 28.4 (L) 30.0 - 36.5 g/dL    RDW 15.5 (H) 11.5 - 14.5 %    PLATELET 175 567 - 847 K/uL    MPV 9.7 8.9 - 12.9 FL    NRBC 0.0 0  WBC    ABSOLUTE NRBC 0.00 0.00 - 0.01 K/uL    NEUTROPHILS 89 (H) 32 - 75 %    LYMPHOCYTES 6 (L) 12 - 49 %    MONOCYTES 5 5 - 13 %    EOSINOPHILS 0 0 - 7 %    BASOPHILS 0 0 - 1 %    IMMATURE GRANULOCYTES 0 0.0 - 0.5 %    ABS. NEUTROPHILS 10.5 (H) 1.8 - 8.0 K/UL    ABS. LYMPHOCYTES 0.7 (L) 0.8 - 3.5 K/UL    ABS. MONOCYTES 0.6 0.0 - 1.0 K/UL    ABS. EOSINOPHILS 0.0 0.0 - 0.4 K/UL    ABS. BASOPHILS 0.0 0.0 - 0.1 K/UL    ABS. IMM.  GRANS. 0.0 0.00 - 0.04 K/UL    DF SMEAR SCANNED      RBC COMMENTS ANISOCYTOSIS  1+        RBC COMMENTS MICROCYTOSIS  1+        RBC COMMENTS OVALOCYTES  PRESENT        RBC COMMENTS FEW    METABOLIC PANEL, COMPREHENSIVE    Collection Time: 12/29/22 12:07 PM   Result Value Ref Range    Sodium 141 136 - 145 mmol/L    Potassium 3.7 3.5 - 5.1 mmol/L    Chloride 106 97 - 108 mmol/L    CO2 28 21 - 32 mmol/L    Anion gap 7 5 - 15 mmol/L    Glucose 136 (H) 65 - 100 mg/dL    BUN 40 (H) 6 - 20 MG/DL    Creatinine 0.86 0.55 - 1.02 MG/DL    BUN/Creatinine ratio 47 (H) 12 - 20      eGFR >60 >60 ml/min/1.73m2    Calcium 10.4 (H) 8.5 - 10.1 MG/DL    Bilirubin, total 0.5 0.2 - 1.0 MG/DL    ALT (SGPT) 14 12 - 78 U/L    AST (SGOT) 12 (L) 15 - 37 U/L    Alk. phosphatase 58 45 - 117 U/L    Protein, total 7.2 6.4 - 8.2 g/dL    Albumin 3.6 3.5 - 5.0 g/dL    Globulin 3.6 2.0 - 4.0 g/dL    A-G Ratio 1.0 (L) 1.1 - 2.2     LIPASE    Collection Time: 12/29/22 12:07 PM   Result Value Ref Range    Lipase 142 73 - 393 U/L   URINALYSIS W/ RFLX MICROSCOPIC    Collection Time: 12/29/22  3:31 PM   Result Value Ref Range    Color YELLOW/STRAW      Appearance CLEAR CLEAR      Specific gravity 1.030      pH (UA) 5.5 5.0 - 8.0      Protein 30 (A) NEG mg/dL    Glucose Negative NEG mg/dL    Ketone TRACE (A) NEG mg/dL    Bilirubin Negative NEG      Blood Negative NEG      Urobilinogen 0.2 0.2 - 1.0 EU/dL    Nitrites Negative NEG      Leukocyte Esterase SMALL (A) NEG      WBC 5-10 0 - 4 /hpf    RBC 0-5 0 - 5 /hpf    Epithelial cells FEW FEW /lpf    Bacteria Negative NEG /hpf    Granular cast 0-2 (A) NEG /lpf   URINE CULTURE HOLD SAMPLE    Collection Time: 12/29/22  3:31 PM    Specimen: Urine   Result Value Ref Range    Urine culture hold        Urine on hold in Microbiology dept for 2 days. If unpreserved urine is submitted, it cannot be used for addtional testing after 24 hours, recollection will be required. Radiologic Studies -   CT ABD PELV W CONT   Final Result   1. Persistent/recurrent SBO, of uncertain etiology. 2. Stable question of left renal mass. CT Results  (Last 48 hours)                 12/29/22 1702  CT ABD PELV W CONT Final result    Impression:  1. Persistent/recurrent SBO, of uncertain etiology. 2. Stable question of left renal mass.        Narrative:  INDICATION: Abdominal pain, vomit ting, eval for SBO       COMPARISON: 10/1/2022. EXAM: CT Abdomen and Pelvis is performed with 100 mL Isovue 370 contrast IV   without oral contrast. CT dose reduction was achieved through use of a   standardized protocol tailored for this examination and automatic exposure   control for dose modulation. FINDINGS:   There is prominent fluid distention of esophagus, stomach and proximal small   bowel loops--with decompressed distal small bowel loops and colon. This appearance of mid to distal small bowel obstruction is unchanged or   recurrent, etiology again uncertain. Unchanged trace ascites. No pneumoperitoneum. No apparent inflammation. Atherosclerotic aorta without aneurysm. Kidneys show prominent bilateral cysts, without hydronephrosis, with stable 1.9   cm questionable left renal mass. There is no significant adenopathy. Liver shows no significant finding. Bile   ducts are not enlarged. Pancreas shows no mass or inflammation. Spleen is   unremarkable. Adrenal glands are normal in size. Bladder is unremarkable. CXR Results  (Last 48 hours)      None              Medical Decision Making   I am the first provider for this patient. I reviewed the vital signs, available nursing notes, past medical history, past surgical history, family history and social history. Vital Signs-Reviewed the patient's vital signs. Patient Vitals for the past 12 hrs:   Temp Pulse Resp BP SpO2   12/29/22 1149 98.6 °F (37 °C) 93 20 114/68 96 %       Records Reviewed: Nursing records and medical records reviewed    MDM:    Medical Decision Making  80-year-old female with history of gastroparesis presents with nausea vomit abdominal pain.   Vital signs are stable upon arrival.  Exam shows a somewhat benign abdominal exam with mild diffuse tenderness to palpation though she is nonperitoneal.  Does have a history of gastroparesis and small bowel obstructions despite not having any surgical history. As such take basic lab work to evaluate for any abnormalities and obtain CT to evaluate for any SBO or gastric distention. Laboratories mild leukocytosis but no clear infectious source. Urine with no signs of UTI. CT does show a recurrent or chronic SBO. She is not vomited since arriving and that she still feels somewhat okay but given the CT findings understand she is building up fluid in her gastric contents and may have recurrent vomiting. Will make n.p.o. and start fluids and admit for observation. Provider Notes (Medical Decision Making):   DDx includes gastritis, gastroparesis, SBO, colon cancer, appendicitis, gastritis, cholecystitis    ED Course:   Initial assessment performed. The patients presenting problems have been discussed, and they are in agreement with the care plan formulated and outlined with them. I have encouraged them to ask questions as they arise throughout their visit. Medications Administered       iopamidoL (ISOVUE-370) 370 mg iodine /mL (76 %) injection 100 mL       Admin Date  12/29/2022 Action  Given Dose  100 mL Route  IntraVENous Administered By  Alessandra Frances                        Critical Care:  none      Disposition:    1. Admit to Hospitalist    Admission Note:  8:34 PM  Patient is being admitted to the hospital by Dr. Bob Lea. The results of their tests and reasons for their admission have been discussed with them and available family. They convey agreement and understanding for the need to be admitted and for their admission diagnosis. DISCHARGE PLAN:  1. Current Discharge Medication List        2. Follow-up Information    None       3. Return to ED if worse     Diagnosis     Clinical Impression:   1. SBO (small bowel obstruction) ([facility] [address]        Attestations:    Elisa Mariee MD    Please note that this dictation was completed with [employer], the [employer] voice recognition software.   Quite often unanticipated grammatical, syntax, homophones, and other interpretive errors are inadvertently transcribed by the computer software. Please disregard these errors. Please excuse any errors that have escaped final proofreading. Thank you.

## 2022-12-30 NOTE — PROGRESS NOTES
PHysical Therapy    Pt seen for eval. Will likely need stay in [facility] at [facility] post d/c. Full report to follow.     Kiran Groves, PT

## 2022-12-30 NOTE — CONSULTS
Surgery Consult    Subjective:      Devorah Pa is a 80 y.o. female who presents for evaluation of abdominal pain, nausea, vomiting dark brown liquid, and loose stools yesterday morning. No flatus or BM since. She feels mildly distended and mildly uncomfortable at present with no further nausea or vomiting and has not had an NGT placed. She has been admitted twice previously this year for small bowel obstruction and surgery has not been consulted in the past.  Pt has a history of ovarian cancer and underwent radical hysterectomy and chemotherapy for this as well as a reoperation for staging after completing chemotherapy. ED attending note and admitting hospitalist note both state pt has no prior surgical history. Additional surgical history includes shoulder and knee replacements and endoscopies with esophageal dilation by Dr. Sinai Caicedo. PMH includes a-fib, GERD, gastroparesis, HTN, stroke, neuropathy, and h/o ovarian cancer.     Past Medical History:   Diagnosis Date    Abnormal x-ray of abdomen 11/13/2020    Bas showed decreased peristalsis and a cervical web      Arrhythmia     A fib    Arthritis     Diarrhea 6/6/2016    Esophageal dysphagia 11/13/2020    Foot drop     Gastric ulcer 6/14/2012    Gastroparesis     GERD (gastroesophageal reflux disease)     High cholesterol     Hypertension     Neuropathy     Ovarian cancer ([address] 1986    chemo x 9 mos    Scoliosis     Stroke ([facility] [address] 2002    ? stroke with drop feet, per patient CT scans were negative     Past Surgical History:   Procedure Laterality Date    FLEXIBLE SIGMOIDOSCOPY N/A 6/6/2016    SIGMOIDOSCOPY FLEXIBLE performed by Kendra Esteban MD at [facility] ENDOSCOPY    HX ORTHOPAEDIC      back    HX ORTHOPAEDIC      bilateral shoulder replacements    HX ORTHOPAEDIC      left knee replacement    HX LYDIA AND BSO  1986    (ovarian cancer)    [place] EGD TRANSORAL BIOPSY SINGLE/MULTIPLE  1/26/2012         [place] EGD TRANSORAL BIOPSY SINGLE/MULTIPLE  6/14/2012         UPPER GI ENDOSCOPY,BIOPSY  11/13/2020         UPPER GI ENDOSCOPY,DIAGNOSIS  10/5/2020         UPPER GI ENDOSCOPY,DILATN W GUIDE  11/13/2020           Family History   Problem Relation Age of Onset    Heart Disease Father     Cancer Sister         endometrial    Diabetes Sister      Social History     Tobacco Use    Smoking status: Never    Smokeless tobacco: Never   Substance Use Topics    Alcohol use: No      Prior to Admission medications    Medication Sig Start Date End Date Taking? Authorizing Provider   gabapentin (NEURONTIN) 800 mg tablet Take 1 Tablet by mouth two (2) times a day. Max Daily Amount: 1,600 mg. 10/5/22   Doug DUMONT MD   diclofenac (VOLTAREN) 1 % gel Apply 2 g to affected area four (4) times daily. Provider, Historical   simvastatin (ZOCOR) 40 mg tablet TAKE 1 TABLET BY MOUTH  DAILY 9/9/21   Jennifer Lui MD   furosemide (LASIX) 20 mg tablet Take 20 mg by mouth daily. Provider, Historical   CALCIUM PO Take 1 Tab by mouth daily. Provider, Historical   polyethylene glycol 3350 (MIRALAX PO) Take 1 Each by mouth daily as needed. Provider, Historical   acetaminophen (TYLENOL) 500 mg tablet Take 1,000 mg by mouth every six (6) hours as needed for Pain. Provider, Historical   propafenone (RYTHMOL) 150 mg tablet Take 1 Tab by mouth two (2) times a day. 7/4/20   Nik Downey, DO   RABEprazole (ACIPHEX) 20 mg tablet Take 1 Tab by mouth two (2) times a day. 7/4/20   Nik Downey, DO   potassium chloride SR (KLOR-CON 10) 10 mEq tablet Take 1 Tab by mouth daily. 7/5/20   Nik Downey, DO   magnesium oxide (MAG-OX) 400 mg tablet Take 250 mg by mouth daily. Provider, Historical   famotidine (PEPCID) 20 mg tablet Take 20 mg by mouth daily. Provider, Historical   cholecalciferol (VITAMIN D3) 1,000 unit tablet Take 1,000 Units by mouth daily. Provider, Historical   multivitamin (ONE A DAY) tablet Take 1 Tab by mouth daily.     Provider, Historical No Known Allergies    Review of Systems   Constitutional:  Positive for appetite change. Negative for chills, diaphoresis and fever. Respiratory:  Negative for shortness of breath and wheezing. Cardiovascular:  Negative for chest pain and palpitations. Gastrointestinal:  Positive for abdominal pain, constipation, diarrhea, nausea and vomiting. Musculoskeletal:  Negative for myalgias. Hematological:  Does not bruise/bleed easily. All other systems reviewed and are negative. Objective:     Patient Vitals for the past 8 hrs:   BP Temp Pulse Resp SpO2   22 1200 (!) 123/47 98.1 °F (36.7 °C) 78 18 94 %   22 1003 (!) 160/104 -- (!) 103 -- --   22 0900 -- -- (!) 101 27 100 %   22 0730 130/65 98.2 °F (36.8 °C) 78 24 98 %       Temp (24hrs), Av.1 °F (36.7 °C), Min:98.1 °F (36.7 °C), Max:98.2 °F (36.8 °C)      Physical Exam  Constitutional:       General: She is not in acute distress. Appearance: She is well-developed and normal weight. HENT:      Head: Normocephalic and atraumatic. Eyes:      General: No scleral icterus. Pupils: Pupils are equal, round, and reactive to light. Cardiovascular:      Rate and Rhythm: Normal rate and regular rhythm. Heart sounds: Normal heart sounds. Pulmonary:      Breath sounds: Normal breath sounds. No wheezing or rales. Abdominal:      General: Abdomen is flat. Bowel sounds are normal. There is distension. Palpations: Abdomen is soft. There is no mass. Tenderness: There is no abdominal tenderness. There is no guarding or rebound. Musculoskeletal:         General: Normal range of motion. Lymphadenopathy:      Cervical: No cervical adenopathy. Neurological:      General: No focal deficit present. Mental Status: She is alert and oriented to person, place, and time.        Assessment:     81 yo woman with recurrent SBO by CT, with h/o radical hysterectomy and chemo, and staging lap following chemo for ovarian cancer. Surgery has not been involved in her care previously. Pt feels well, only c/o mild distention. No N/V overnight without NGT     Plan:     Suspect this is a partial small bowel obstruction related to postsurgical adhesive disease. Will obtain gastrografin challenge today with repeat films in am.   Surgery will follow along for now. Keep NPO with iv fluid resuscitation. Serial labs. Discontinued toradol in this 79 yo woman with GERD, PUD, and h/o dysphagia with stricture. Discontinued miralax in pt with SBO.

## 2022-12-30 NOTE — H&P
PLEASE NOTE: I HAVE GENERATED THIS NOTE WITH THE ASSISTANCE OF VOICE-RECOGNITION TECHNOLOGY. PLEASE EXCUSE ANY SPELLING, GRAMMATICAL, AND SYNTAX ERRORS YOU MAY FIND. IF YOU NEED CLARIFICATION ON ANYTHING, PLEASE REACH OUT TO ME.  [address] [facility]  Hospitalist Group  History and Physical - Dr. Ondina Hernandez:     80 y.o. female with pertinent medical hx of previous SBOs presented with abdominal pain and n/v. Differential diagnosis, explanation and analysis: Although patient has never had any abdominal surgeries, she has had SBOs many times in the past and she states her current sx feel like that. She is completely HDS, not having any more active vomiting and her abd pain is much better, so I do not see any need for emergent intervention. CT A/P supports this as well. Acute problems:    # Acute SBO  - Patient is hemodynamically stable, will not consult surgery  - Patient is hemodynamically stable and vomiting is under control, will not place NGT  - IVF, pain control, AM labs, ADAT    # Acute Intractable N/V  - EKG to check Qtc  - Qtc is: 472, will give Tigan for nausea  - IVF  - Supportive Tx  - ADAT  - GI cocktail    Chronic Problems:    HLD: C/w home Zocor  GERD: Substitute Protonix  Neuropathy: C/w home neurontin    General Admission Parameters:    GI Prophylaxis: Protonix  Gathering data from Braydon, et. al; Vineet et al; and Ita et al - Please note that routine use of GI PPX in all patients is inappropriate    DVT Prophylaxis: Not indicated; SCD  I have used the Padua score to determine if patient needs DVT PPX.   Please note that routine use of DVT PPX in all patients is inappropriate    Code status: Full  If code status was discussed with the patient, then code status entered as per the orders                                                                                  Subjective:   Primary Care Provider: Wang Schwartz MD  Date of Service:  See Date Note Was Originated  Chief Complaint: Abd pain    80 y.o. female presents with a few hours' duration of gradual onset, unchanging, moderate, abdominal pain that is associated with n/v, remitted by nothing, exacerbated by nothing. Further history: On my exam, patient states she is feeling much better, denies any other sx. Pertinent chart review: Patient has had multiple SBOs; was recently admitted on 08/09/2022 for the same. Review of Systems:  12 point ROS obtained and otherwise negative, except as per HPI and above. Past Medical History:   Diagnosis Date    Abnormal x-ray of abdomen 11/13/2020    Bas showed decreased peristalsis and a cervical web      Arrhythmia     A fib    Arthritis     Diarrhea 6/6/2016    Esophageal dysphagia 11/13/2020    Foot drop     Gastric ulcer 6/14/2012    Gastroparesis     GERD (gastroesophageal reflux disease)     High cholesterol     Hypertension     Neuropathy     Ovarian cancer ([address] 1986    chemo x 9 mos    Scoliosis     Stroke [facility]) 2002    ? stroke with drop feet, per patient CT scans were negative      Past Surgical History:   Procedure Laterality Date    FLEXIBLE SIGMOIDOSCOPY N/A 6/6/2016    SIGMOIDOSCOPY FLEXIBLE performed by Delio Silverio MD at [facility] ENDOSCOPY    HX ORTHOPAEDIC      back    HX ORTHOPAEDIC      bilateral shoulder replacements    HX ORTHOPAEDIC      left knee replacement    HX LYDIA AND BSO  1986    (ovarian cancer)    HI EGD TRANSORAL BIOPSY SINGLE/MULTIPLE  1/26/2012         HI EGD TRANSORAL BIOPSY SINGLE/MULTIPLE  6/14/2012         UPPER GI ENDOSCOPY,BIOPSY  11/13/2020         UPPER GI ENDOSCOPY,DIAGNOSIS  10/5/2020         UPPER GI ENDOSCOPY,DILATN W GUIDE  11/13/2020          Prior to Admission medications    Medication Sig Start Date End Date Taking? Authorizing Provider   gabapentin (NEURONTIN) 800 mg tablet Take 1 Tablet by mouth two (2) times a day.  Max Daily Amount: 1,600 mg. 10/5/22   Rosio DUMONT MD   diclofenac (VOLTAREN) 1 % gel Apply 2 g to affected area four (4) times daily. Provider, Historical   simvastatin (ZOCOR) 40 mg tablet TAKE 1 TABLET BY MOUTH  DAILY 9/9/21   Lou Sam MD   furosemide (LASIX) 20 mg tablet Take 20 mg by mouth daily. Provider, Historical   CALCIUM PO Take 1 Tab by mouth daily. Provider, Historical   polyethylene glycol 3350 (MIRALAX PO) Take 1 Each by mouth daily as needed. Provider, Historical   acetaminophen (TYLENOL) 500 mg tablet Take 1,000 mg by mouth every six (6) hours as needed for Pain. Provider, Historical   propafenone (RYTHMOL) 150 mg tablet Take 1 Tab by mouth two (2) times a day. 7/4/20   [place], Earna Hoit, DO   RABEprazole (ACIPHEX) 20 mg tablet Take 1 Tab by mouth two (2) times a day. 7/4/20   [place], Jamie Hoit, DO   potassium chloride SR (KLOR-CON 10) 10 mEq tablet Take 1 Tab by mouth daily. 7/5/20   [place], Gabbyna Hoit, DO   magnesium oxide (MAG-OX) 400 mg tablet Take 250 mg by mouth daily. Provider, Historical   famotidine (PEPCID) 20 mg tablet Take 20 mg by mouth daily. Provider, Historical   cholecalciferol (VITAMIN D3) 1,000 unit tablet Take 1,000 Units by mouth daily. Provider, Historical   multivitamin (ONE A DAY) tablet Take 1 Tab by mouth daily.     Provider, Historical     No Known Allergies   Family History   Problem Relation Age of Onset    Heart Disease Father     Cancer Sister         endometrial    Diabetes Sister        Social History     Socioeconomic History    Marital status:    Tobacco Use    Smoking status: Never    Smokeless tobacco: Never   Vaping Use    Vaping Use: Never used   Substance and Sexual Activity    Alcohol use: No    Drug use: No    Sexual activity: Not Currently      Objective:   Physical Exam:     VS as below    Const'l:          Normal body habitus, a&o, no acute distress  Head/Neck:       no cervical/head mass  Eyes:     nonicteric sclera, eom intact  ENT:      auditory acuity grossly intact either with or without device, no nasal deformity  Cardio:           reg rate reg rhythm  Pulm:     no accessory muscle use  Abd:       sntnd   Derm:     no rashes, no ulcers, no lesions  Extr:      no edema, no cyanosis, no calf tenderness, no varicosities  Neuro:    cn II-XII intact  Psych:   mood intact, judgement intact    Data Review: All diagnostic labs and studies have been reviewed.

## 2022-12-31 ENCOUNTER — APPOINTMENT (OUTPATIENT)
Dept: GENERAL RADIOLOGY | Age: 87
DRG: 329 | End: 2022-12-31
Attending: SURGERY
Payer: MEDICARE

## 2022-12-31 ENCOUNTER — ANESTHESIA (OUTPATIENT)
Dept: SURGERY | Age: 87
DRG: 329 | End: 2022-12-31
Payer: MEDICARE

## 2022-12-31 ENCOUNTER — ANESTHESIA EVENT (OUTPATIENT)
Dept: SURGERY | Age: 87
DRG: 329 | End: 2022-12-31
Payer: MEDICARE

## 2022-12-31 LAB
ANION GAP SERPL CALC-SCNC: 4 MMOL/L (ref 5–15)
BUN SERPL-MCNC: 28 MG/DL (ref 6–20)
BUN/CREAT SERPL: 42 (ref 12–20)
CALCIUM SERPL-MCNC: 9.3 MG/DL (ref 8.5–10.1)
CHLORIDE SERPL-SCNC: 110 MMOL/L (ref 97–108)
CO2 SERPL-SCNC: 28 MMOL/L (ref 21–32)
COVID-19 RAPID TEST, COVR: NOT DETECTED
CREAT SERPL-MCNC: 0.66 MG/DL (ref 0.55–1.02)
GLUCOSE SERPL-MCNC: 102 MG/DL (ref 65–100)
POTASSIUM SERPL-SCNC: 3.4 MMOL/L (ref 3.5–5.1)
SODIUM SERPL-SCNC: 142 MMOL/L (ref 136–145)
SOURCE, COVRS: NORMAL

## 2022-12-31 PROCEDURE — 74011000250 HC RX REV CODE- 250: Performed by: NURSE ANESTHETIST, CERTIFIED REGISTERED

## 2022-12-31 PROCEDURE — 88307 TISSUE EXAM BY PATHOLOGIST: CPT

## 2022-12-31 PROCEDURE — 76210000006 HC OR PH I REC 0.5 TO 1 HR: Performed by: SURGERY

## 2022-12-31 PROCEDURE — 77030019908 HC STETH ESOPH SIMS -A: Performed by: ANESTHESIOLOGY

## 2022-12-31 PROCEDURE — 36415 COLL VENOUS BLD VENIPUNCTURE: CPT

## 2022-12-31 PROCEDURE — 77030026438 HC STYL ET INTUB CARD -A: Performed by: NURSE ANESTHETIST, CERTIFIED REGISTERED

## 2022-12-31 PROCEDURE — 77030013079 HC BLNKT BAIR HGGR 3M -A: Performed by: ANESTHESIOLOGY

## 2022-12-31 PROCEDURE — 77030011808 HC STPLR ENDOSCOPIC J&J -D: Performed by: SURGERY

## 2022-12-31 PROCEDURE — 77030036732 HC RELD STPLR VASC J&J -F: Performed by: SURGERY

## 2022-12-31 PROCEDURE — 74011250636 HC RX REV CODE- 250/636: Performed by: NURSE ANESTHETIST, CERTIFIED REGISTERED

## 2022-12-31 PROCEDURE — 77030020061 HC IV BLD WRMR ADMIN SET 3M -B: Performed by: ANESTHESIOLOGY

## 2022-12-31 PROCEDURE — 80048 BASIC METABOLIC PNL TOTAL CA: CPT

## 2022-12-31 PROCEDURE — 65270000015 HC RM PRIVATE ONCOLOGY

## 2022-12-31 PROCEDURE — 99231 SBSQ HOSP IP/OBS SF/LOW 25: CPT | Performed by: SURGERY

## 2022-12-31 PROCEDURE — 74011250636 HC RX REV CODE- 250/636: Performed by: STUDENT IN AN ORGANIZED HEALTH CARE EDUCATION/TRAINING PROGRAM

## 2022-12-31 PROCEDURE — 77030005513 HC CATH URETH FOL11 MDII -B: Performed by: SURGERY

## 2022-12-31 PROCEDURE — 74011250637 HC RX REV CODE- 250/637: Performed by: STUDENT IN AN ORGANIZED HEALTH CARE EDUCATION/TRAINING PROGRAM

## 2022-12-31 PROCEDURE — 0DN80ZZ RELEASE SMALL INTESTINE, OPEN APPROACH: ICD-10-PCS | Performed by: SURGERY

## 2022-12-31 PROCEDURE — 77030035236 HC SUT PDS STRATFX BARB J&J -B: Performed by: SURGERY

## 2022-12-31 PROCEDURE — 76060000033 HC ANESTHESIA 1 TO 1.5 HR: Performed by: SURGERY

## 2022-12-31 PROCEDURE — 77030018809 HC RETRCTR ALXSO DISP AMR -B: Performed by: SURGERY

## 2022-12-31 PROCEDURE — 77030021678 HC GLIDESCP STAT DISP VERT -B: Performed by: ANESTHESIOLOGY

## 2022-12-31 PROCEDURE — 76010000149 HC OR TIME 1 TO 1.5 HR: Performed by: SURGERY

## 2022-12-31 PROCEDURE — C1765 ADHESION BARRIER: HCPCS | Performed by: SURGERY

## 2022-12-31 PROCEDURE — 0DB80ZZ EXCISION OF SMALL INTESTINE, OPEN APPROACH: ICD-10-PCS | Performed by: SURGERY

## 2022-12-31 PROCEDURE — C9113 INJ PANTOPRAZOLE SODIUM, VIA: HCPCS | Performed by: STUDENT IN AN ORGANIZED HEALTH CARE EDUCATION/TRAINING PROGRAM

## 2022-12-31 PROCEDURE — 74019 RADEX ABDOMEN 2 VIEWS: CPT

## 2022-12-31 PROCEDURE — 77030002916 HC SUT ETHLN J&J -A: Performed by: SURGERY

## 2022-12-31 PROCEDURE — 2709999900 HC NON-CHARGEABLE SUPPLY: Performed by: SURGERY

## 2022-12-31 PROCEDURE — 87635 SARS-COV-2 COVID-19 AMP PRB: CPT

## 2022-12-31 PROCEDURE — 0DNU0ZZ RELEASE OMENTUM, OPEN APPROACH: ICD-10-PCS | Performed by: SURGERY

## 2022-12-31 PROCEDURE — 77030008771 HC TU NG SALEM SUMP -A: Performed by: NURSE ANESTHETIST, CERTIFIED REGISTERED

## 2022-12-31 PROCEDURE — 0DNL0ZZ RELEASE TRANSVERSE COLON, OPEN APPROACH: ICD-10-PCS | Performed by: SURGERY

## 2022-12-31 PROCEDURE — 77030008462 HC STPLR SKN PROX J&J -A: Performed by: SURGERY

## 2022-12-31 PROCEDURE — 74011250637 HC RX REV CODE- 250/637: Performed by: SURGERY

## 2022-12-31 PROCEDURE — 77030009968 HC RELD STPLR ENDOSC J&J -D: Performed by: SURGERY

## 2022-12-31 PROCEDURE — 77030008684 HC TU ET CUF COVD -B: Performed by: NURSE ANESTHETIST, CERTIFIED REGISTERED

## 2022-12-31 PROCEDURE — 77030002996 HC SUT SLK J&J -A: Performed by: SURGERY

## 2022-12-31 PROCEDURE — 74011000250 HC RX REV CODE- 250: Performed by: STUDENT IN AN ORGANIZED HEALTH CARE EDUCATION/TRAINING PROGRAM

## 2022-12-31 RX ORDER — DEXAMETHASONE SODIUM PHOSPHATE 4 MG/ML
INJECTION, SOLUTION INTRA-ARTICULAR; INTRALESIONAL; INTRAMUSCULAR; INTRAVENOUS; SOFT TISSUE AS NEEDED
Status: DISCONTINUED | OUTPATIENT
Start: 2022-12-31 | End: 2022-12-31 | Stop reason: HOSPADM

## 2022-12-31 RX ORDER — EPHEDRINE SULFATE/0.9% NACL/PF 50 MG/5 ML
SYRINGE (ML) INTRAVENOUS AS NEEDED
Status: DISCONTINUED | OUTPATIENT
Start: 2022-12-31 | End: 2022-12-31 | Stop reason: HOSPADM

## 2022-12-31 RX ORDER — PHENYLEPHRINE HCL IN 0.9% NACL 0.4MG/10ML
SYRINGE (ML) INTRAVENOUS AS NEEDED
Status: DISCONTINUED | OUTPATIENT
Start: 2022-12-31 | End: 2022-12-31 | Stop reason: HOSPADM

## 2022-12-31 RX ORDER — LIDOCAINE HYDROCHLORIDE 20 MG/ML
INJECTION, SOLUTION EPIDURAL; INFILTRATION; INTRACAUDAL; PERINEURAL AS NEEDED
Status: DISCONTINUED | OUTPATIENT
Start: 2022-12-31 | End: 2022-12-31 | Stop reason: HOSPADM

## 2022-12-31 RX ORDER — GLYCOPYRROLATE 0.2 MG/ML
INJECTION INTRAMUSCULAR; INTRAVENOUS AS NEEDED
Status: DISCONTINUED | OUTPATIENT
Start: 2022-12-31 | End: 2022-12-31 | Stop reason: HOSPADM

## 2022-12-31 RX ORDER — ONDANSETRON 2 MG/ML
INJECTION INTRAMUSCULAR; INTRAVENOUS AS NEEDED
Status: DISCONTINUED | OUTPATIENT
Start: 2022-12-31 | End: 2022-12-31 | Stop reason: HOSPADM

## 2022-12-31 RX ORDER — HYDROMORPHONE HYDROCHLORIDE 2 MG/ML
INJECTION, SOLUTION INTRAMUSCULAR; INTRAVENOUS; SUBCUTANEOUS AS NEEDED
Status: DISCONTINUED | OUTPATIENT
Start: 2022-12-31 | End: 2022-12-31 | Stop reason: HOSPADM

## 2022-12-31 RX ORDER — DEXTROSE, SODIUM CHLORIDE, AND POTASSIUM CHLORIDE 5; .45; .15 G/100ML; G/100ML; G/100ML
100 INJECTION INTRAVENOUS CONTINUOUS
Status: DISCONTINUED | OUTPATIENT
Start: 2022-12-31 | End: 2022-12-31 | Stop reason: HOSPADM

## 2022-12-31 RX ORDER — CEFAZOLIN SODIUM 1 G/3ML
INJECTION, POWDER, FOR SOLUTION INTRAMUSCULAR; INTRAVENOUS
Status: DISPENSED
Start: 2022-12-31 | End: 2023-01-01

## 2022-12-31 RX ORDER — SODIUM CHLORIDE, SODIUM LACTATE, POTASSIUM CHLORIDE, CALCIUM CHLORIDE 600; 310; 30; 20 MG/100ML; MG/100ML; MG/100ML; MG/100ML
INJECTION, SOLUTION INTRAVENOUS
Status: DISCONTINUED | OUTPATIENT
Start: 2022-12-31 | End: 2022-12-31 | Stop reason: HOSPADM

## 2022-12-31 RX ORDER — CEFAZOLIN SODIUM/WATER 2 G/20 ML
SYRINGE (ML) INTRAVENOUS AS NEEDED
Status: DISCONTINUED | OUTPATIENT
Start: 2022-12-31 | End: 2022-12-31 | Stop reason: HOSPADM

## 2022-12-31 RX ORDER — SUCCINYLCHOLINE CHLORIDE 20 MG/ML
INJECTION INTRAMUSCULAR; INTRAVENOUS AS NEEDED
Status: DISCONTINUED | OUTPATIENT
Start: 2022-12-31 | End: 2022-12-31 | Stop reason: HOSPADM

## 2022-12-31 RX ORDER — ROCURONIUM BROMIDE 10 MG/ML
INJECTION, SOLUTION INTRAVENOUS AS NEEDED
Status: DISCONTINUED | OUTPATIENT
Start: 2022-12-31 | End: 2022-12-31 | Stop reason: HOSPADM

## 2022-12-31 RX ORDER — PROPOFOL 10 MG/ML
INJECTION, EMULSION INTRAVENOUS AS NEEDED
Status: DISCONTINUED | OUTPATIENT
Start: 2022-12-31 | End: 2022-12-31 | Stop reason: HOSPADM

## 2022-12-31 RX ORDER — FENTANYL CITRATE 50 UG/ML
INJECTION, SOLUTION INTRAMUSCULAR; INTRAVENOUS AS NEEDED
Status: DISCONTINUED | OUTPATIENT
Start: 2022-12-31 | End: 2022-12-31 | Stop reason: HOSPADM

## 2022-12-31 RX ORDER — NEOSTIGMINE METHYLSULFATE 1 MG/ML
INJECTION, SOLUTION INTRAVENOUS AS NEEDED
Status: DISCONTINUED | OUTPATIENT
Start: 2022-12-31 | End: 2022-12-31 | Stop reason: HOSPADM

## 2022-12-31 RX ADMIN — ROCURONIUM BROMIDE 20 MG: 10 INJECTION INTRAVENOUS at 17:26

## 2022-12-31 RX ADMIN — Medication 80 MCG: at 17:57

## 2022-12-31 RX ADMIN — ONDANSETRON HYDROCHLORIDE 4 MG: 2 INJECTION, SOLUTION INTRAMUSCULAR; INTRAVENOUS at 18:25

## 2022-12-31 RX ADMIN — GABAPENTIN 800 MG: 300 CAPSULE ORAL at 09:57

## 2022-12-31 RX ADMIN — FENTANYL CITRATE 50 MCG: 50 INJECTION, SOLUTION INTRAMUSCULAR; INTRAVENOUS at 18:36

## 2022-12-31 RX ADMIN — GLYCOPYRROLATE 0.4 MG: 0.2 INJECTION, SOLUTION INTRAMUSCULAR; INTRAVENOUS at 18:25

## 2022-12-31 RX ADMIN — HYDROMORPHONE HYDROCHLORIDE 0.3 MG: 2 INJECTION, SOLUTION INTRAMUSCULAR; INTRAVENOUS; SUBCUTANEOUS at 17:30

## 2022-12-31 RX ADMIN — SODIUM CHLORIDE, PRESERVATIVE FREE 40 MG: 5 INJECTION INTRAVENOUS at 09:57

## 2022-12-31 RX ADMIN — SODIUM CHLORIDE, POTASSIUM CHLORIDE, SODIUM LACTATE AND CALCIUM CHLORIDE: 600; 310; 30; 20 INJECTION, SOLUTION INTRAVENOUS at 17:08

## 2022-12-31 RX ADMIN — Medication 80 MCG: at 17:34

## 2022-12-31 RX ADMIN — ROCURONIUM BROMIDE 10 MG: 10 INJECTION INTRAVENOUS at 17:22

## 2022-12-31 RX ADMIN — PROPAFENONE HYDROCHLORIDE 150 MG: 150 TABLET, FILM COATED ORAL at 09:58

## 2022-12-31 RX ADMIN — GLYCOPYRROLATE 0.2 MG: 0.2 INJECTION, SOLUTION INTRAMUSCULAR; INTRAVENOUS at 17:55

## 2022-12-31 RX ADMIN — ROCURONIUM BROMIDE 10 MG: 10 INJECTION INTRAVENOUS at 18:15

## 2022-12-31 RX ADMIN — SUCCINYLCHOLINE CHLORIDE 140 MG: 20 INJECTION, SOLUTION INTRAMUSCULAR; INTRAVENOUS at 17:22

## 2022-12-31 RX ADMIN — Medication 2 G: at 17:24

## 2022-12-31 RX ADMIN — Medication 80 MCG: at 17:35

## 2022-12-31 RX ADMIN — DEXAMETHASONE SODIUM PHOSPHATE 8 MG: 4 INJECTION, SOLUTION INTRAMUSCULAR; INTRAVENOUS at 17:40

## 2022-12-31 RX ADMIN — FENTANYL CITRATE 50 MCG: 50 INJECTION, SOLUTION INTRAMUSCULAR; INTRAVENOUS at 17:18

## 2022-12-31 RX ADMIN — PROPOFOL 100 MG: 10 INJECTION, EMULSION INTRAVENOUS at 17:22

## 2022-12-31 RX ADMIN — MAGNESIUM OXIDE 400 MG (241.3 MG MAGNESIUM) TABLET 400 MG: TABLET at 09:58

## 2022-12-31 RX ADMIN — ALUMINUM HYDROXIDE, MAGNESIUM HYDROXIDE, AND SIMETHICONE 40 ML: 200; 200; 20 SUSPENSION ORAL at 09:57

## 2022-12-31 RX ADMIN — ROCURONIUM BROMIDE 10 MG: 10 INJECTION INTRAVENOUS at 17:42

## 2022-12-31 RX ADMIN — FENTANYL CITRATE 25 MCG: 50 INJECTION, SOLUTION INTRAMUSCULAR; INTRAVENOUS at 00:00

## 2022-12-31 RX ADMIN — POTASSIUM CHLORIDE 10 MEQ: 750 TABLET, FILM COATED, EXTENDED RELEASE ORAL at 09:57

## 2022-12-31 RX ADMIN — SODIUM CHLORIDE 30 MCG/MIN: 900 INJECTION, SOLUTION INTRAVENOUS at 17:32

## 2022-12-31 RX ADMIN — LIDOCAINE HYDROCHLORIDE 100 MG: 20 INJECTION, SOLUTION EPIDURAL; INFILTRATION; INTRACAUDAL; PERINEURAL at 17:18

## 2022-12-31 RX ADMIN — Medication 4 MG: at 18:25

## 2022-12-31 RX ADMIN — FENTANYL CITRATE 25 MCG: 50 INJECTION, SOLUTION INTRAMUSCULAR; INTRAVENOUS at 23:51

## 2022-12-31 RX ADMIN — Medication 80 MCG: at 17:32

## 2022-12-31 RX ADMIN — Medication 10 MG: at 17:56

## 2022-12-31 RX ADMIN — Medication 3 AMPULE: at 17:00

## 2022-12-31 RX ADMIN — HYDROMORPHONE HYDROCHLORIDE 0.3 MG: 2 INJECTION, SOLUTION INTRAMUSCULAR; INTRAVENOUS; SUBCUTANEOUS at 18:44

## 2022-12-31 NOTE — PERIOP NOTES
TRANSFER - IN REPORT:    Verbal report received from [address], RN(name) on Haley Barrera  being received from ER 34(unit) for ordered procedure      Report consisted of patients Situation, Background, Assessment and   Recommendations(SBAR). Information from the following report(s) SBAR, Kardex, ED Summary, OR Summary, Procedure Summary, Intake/Output, MAR, Accordion, Recent Results, Med Rec Status, Cardiac Rhythm NSR, Alarm Parameters , Pre Procedure Checklist, Procedure Verification, and Quality Measures was reviewed with the receiving nurse. Opportunity for questions and clarification was provided. Assessment completed upon patients arrival to unit and care assumed.

## 2022-12-31 NOTE — ED NOTES
Pt cleansed of liquid stool and gown changed, call bell repositioned at patient request, pain medicine requested

## 2022-12-31 NOTE — PROGRESS NOTES
Hospitalist Progress Note    NAME: Jennifer Marina   :  10/9/1933   MRN:  [id number]       Assessment / Plan:    1500 pt agreed to have surgery     Acute SBO in setting of multiple in the past -      Hx of radical hysterectomy for ovarian cancer with chemotherapy    - appreciate Surgery input -  exp lap with possible Lysis of adhesions     supp tx, appreciate surg recs for gastrografin challenge given recurrence    Acute intractable nausea, vomiting - supp tx npo IVF     Hyperlipidemia-cw Liptor   GERD- c/w  protonix   Neuropathy - c/w gabapentin increase to qid     VTE prophylaxis - scds   Anemia hgb 7.7 this morning possible hemodilution - will monitor     Dispo - PT/OT rec SNF placement    25.0 - 29.9 Overweight / Body mass index is 27.21 kg/m². Estimated discharge date: January 3  Barriers: recovery     Code status: Full  Prophylaxis: SCD's  Recommended Disposition: Home w/Family     Subjective:     Chief Complaint / Reason for Physician Visit  \" Patient seen with daughter at bedside discussed concern for surgery patient will have discussion with surgeon with daughters present\". Discussed with RN events overnight. Review of Systems:  Symptom Y/N Comments  Symptom Y/N Comments   Fever/Chills n   Chest Pain n    Poor Appetite    Edema     Cough    Abdominal Pain y    Sputum    Joint Pain     SOB/LEVINE n   Pruritis/Rash     Nausea/vomit    Tolerating PT/OT     Diarrhea    Tolerating Diet n    Constipation    Other       Could NOT obtain due to:      Objective:     VITALS:   Last 24hrs VS reviewed since prior progress note.  Most recent are:  Patient Vitals for the past 24 hrs:   Temp Pulse Resp BP SpO2   22 2119 98 °F (36.7 °C) 75 16 (!) 123/53 100 %   22 1200 98.1 °F (36.7 °C) 78 18 (!) 123/47 94 %   22 1003 -- (!) 103 -- (!) 160/104 --   22 0900 -- (!) 101 27 -- 100 %     No intake or output data in the 24 hours ending 22 0750     I had a face to face encounter and independently examined this patient on 12/31/2022, as outlined below:  PHYSICAL EXAM:  General: Thin Alert, cooperative, no acute distress    EENT:  EOMI. Anicteric sclerae. MMM  Resp:   no wheezing or rales. No accessory muscle use  CV:  Regular  rhythm,  No edema  GI:  Soft, Non distended, tender. to palpation     Neurologic:  Alert and oriented X 3, normal speech,   Psych:   Not anxious nor agitated  Skin:  No rashes. No jaundice    Reviewed most current lab test results and cultures  YES  Reviewed most current radiology test results   YES  Review and summation of old records today    NO  Reviewed patient's current orders and MAR    YES  PMH/ reviewed - no change compared to H&P  ________________________________________________________________________  Care Plan discussed with:    Comments   Patient x    Family  x    RN x    Care Manager     Consultant                        Multidiciplinary team rounds were held today with , nursing, pharmacist and clinical coordinator. Patient's plan of care was discussed; medications were reviewed and discharge planning was addressed. ________________________________________________________________________  Total NON critical care TIME:  25   Minutes    Total CRITICAL CARE TIME Spent:   Minutes non procedure based      Comments   >50% of visit spent in counseling and coordination of care     ________________________________________________________________________  Dorise Belt. Nelson Mccracken NP     Procedures: see electronic medical records for all procedures/Xrays and details which were not copied into this note but were reviewed prior to creation of Plan. LABS:  I reviewed today's most current labs and imaging studies.   Pertinent labs include:  Recent Labs     12/30/22  0157 12/29/22  1207   WBC 9.9 11.8*   HGB 7.7* 9.7*   HCT 27.0* 34.1*    277     Recent Labs     12/31/22  0357 12/30/22  0157 12/29/22  1207    143 141   K 3.4* 3.2* 3.7   CL 110* 110* 106   CO2 28 26 28   * 111* 136*   BUN 28* 43* 40*   CREA 0.66 0.74 0.86   CA 9.3 9.5 10.4*   ALB  --  2.8* 3.6   TBILI  --  0.2 0.5   ALT  --  13 14       Signed: Tracey Montoya, NP

## 2022-12-31 NOTE — BRIEF OP NOTE
Brief Postoperative Note    Patient: Reynold Doll  YOB: 1933  MRN: [id number]    Date of Procedure: 12/31/2022     Pre-Op Diagnosis: sbo    Post-Op Diagnosis: Same as preoperative diagnosis. Procedure(s):  SMALL BOWEL RESECTION    Surgeon(s):  Kayla Gutierrez MD    Surgical Assistant: Surg Asst-1: [facility]    Anesthesia: General     Estimated Blood Loss (mL): Minimal    Complications: None    Specimens:   ID Type Source Tests Collected by Time Destination   1 : SMALL BOWEL Preservative Small Bowel  Kayla Gutierrez MD 12/31/2022 175 Pathology        Implants: * No implants in log *    Drains:   Nasogastric Tube 12/31/22 (Active)       [REMOVED] Nasogastric Tube 07/01/20 (Removed)       [REMOVED] Nasogastric Tube 08/10/22 (Removed)       [REMOVED] Nasogastric Tube 10/01/22 (Removed)       [REMOVED] Orogastric Tube 12/31/22 (Removed)       [REMOVED] External Female Catheter 07/02/20 (Removed)       [REMOVED] External Urinary Catheter 08/09/22 (Removed)       [REMOVED] External Urinary Catheter 08/10/22 (Removed)       Findings: 6 cm length of bowel adherent to the anterior abdominal wall with a severe bend causing SBO. Once freed the bend appeared chronic and strictured. There was resistance to milking enteric contents past this area. Therefore a short segment small bowel resection was done.       Electronically Signed by Alan Madera MD on 12/31/2022 at 6:27 PM

## 2022-12-31 NOTE — ANESTHESIA PREPROCEDURE EVALUATION
Anesthetic History     PONV          Review of Systems / Medical History  Patient summary reviewed, nursing notes reviewed and pertinent labs reviewed    Pulmonary  Within defined limits                 Neuro/Psych       CVA (foot drop)  TIA    Comments: Neuropathy  Hx Foot drop   Peripheral neuropathy, feet   Cardiovascular    Hypertension  Valvular problems/murmurs: tricuspid insufficiency      Dysrhythmias : atrial fibrillation  Hyperlipidemia    Exercise tolerance: <4 METS: Uses walker  Comments: ECG (10/1/22): Sinus tachycardia with premature atrial complexes   Possible Left atrial enlargement   Left axis deviation   Left ventricular hypertrophy with QRS widening and repolarization abnormality   Cannot rule out Septal infarct (cited on or before 01-OCT-2022)   Possible Lateral infarct (cited on or before 01-OCT-2022)   When compared with ECG of 09-AUG-2022 10:56,   Serial changes of Septal infarct present     TTE (11/5/18): Left ventricle: Systolic function was normal. Ejection fraction was  estimated to be 60 %. There were no regional wall motion abnormalities. Tricuspid valve:  There was mild regurgitation   GI/Hepatic/Renal     GERD: poorly controlled      PUD (Gastric)    Comments: Vomiting  Abdominal distention    Lewis's Esophagus  Hx Gastric ulcer   Hx Gastroparesis  Endo/Other        Arthritis, cancer (Hx Ovarian cancer s/p chemotherapy + LYDIA/BSO (1986)) and anemia     Other Findings   Comments: Scoliosis  Hearing loss             Physical Exam    Airway  Mallampati: III  TM Distance: 4 - 6 cm  Neck ROM: decreased range of motion   Mouth opening: Normal     Cardiovascular  Regular rate and rhythm,  S1 and S2 normal,  no murmur, click, rub, or gallop  Rhythm: regular  Rate: normal         Dental    Dentition: Upper partial plate     Pulmonary  Breath sounds clear to auscultation               Abdominal  GI exam deferred       Other Findings            Anesthetic Plan    ASA: 3, emergent  Anesthesia type: general          Induction: Intravenous  Anesthetic plan and risks discussed with: Patient      Have alt intubating device available  Pt DOES NOT want chest compressions or electrical defibrillation even in the OR and during the perioperative period.

## 2022-12-31 NOTE — PROGRESS NOTES
Surgery Consult    Subjective:      Patient complains of persistent abdominal pain. Pain is a dull ache. Nausea is better. No flatus but, having some liquid stool. Objective:      Visit Vitals  /78   Pulse 87   Temp 97.9 °F (36.6 °C)   Resp 18   Ht 5' 1\" (1.549 m)   Wt 65.3 kg (144 lb)   SpO2 98%   BMI 27.21 kg/m²       Physical Exam:  GENERAL: alert, cooperative, no distress, appears stated age, LUNG: clear to auscultation bilaterally, HEART: regular rate and rhythm, S1, S2 normal, no murmur, click, rub or gallop, ABDOMEN: soft, tender in epigastric area. Bowel sounds hypoactive. Distended, tympanic No masses,  no organomegaly    Imaging:      Plain flims - persistent SBO. Contrast still in small bowel     Lab/Data Review:  BMP:   Lab Results   Component Value Date/Time     12/31/2022 03:57 AM    K 3.4 (L) 12/31/2022 03:57 AM     (H) 12/31/2022 03:57 AM    CO2 28 12/31/2022 03:57 AM    AGAP 4 (L) 12/31/2022 03:57 AM     (H) 12/31/2022 03:57 AM    BUN 28 (H) 12/31/2022 03:57 AM    CREA 0.66 12/31/2022 03:57 AM     CBC: No results found for: WBC, HGB, HGBEXT, HCT, HCTEXT, PLT, PLTEXT, HGBEXT, HCTEXT, PLTEXT      Assessment:     80year old with SBO. She has not improved since admission two days ago. This is her third admission for SBO in 6 months. Discussed extensively with the patient and her 3 daughters. I recommend surgery because I think her likelihood of resolution without surgery is low and her risk of recurrence is high. Plan:     1. I recommend proceeding with Surgery:  Exploratory laparotomy. 2. Discussed aspects of surgical intervention, methods, risks (including by not limited to infection, bleeding, hematoma, and perforation of the intestines or solid organs), and the risks of general anesthetic. The patient understands the risks; any and all questions were answered to the patient's satisfaction. 3. Patient does wish to proceed with surgery.

## 2022-12-31 NOTE — PERIOP NOTES
Handoff Report from Operating Room to PACU    Report received from BHAKTI Bueno and Ritika Ponce RN regarding Gaye Guerrero. Surgeon(s):  Will Hickman MD  And Procedure(s) (LRB):  SMALL BOWEL RESECTION (N/A)  confirmed   with drains and dressings discussed. Anesthesia type, drugs, patient history, complications, estimated blood loss, vital signs, intake and output, and last pain medication, lines, reversal medications, and temperature were reviewed.

## 2023-01-01 LAB
ANION GAP SERPL CALC-SCNC: 5 MMOL/L (ref 5–15)
BASOPHILS # BLD: 0 K/UL (ref 0–0.1)
BASOPHILS NFR BLD: 0 % (ref 0–1)
BUN SERPL-MCNC: 23 MG/DL (ref 6–20)
BUN/CREAT SERPL: 31 (ref 12–20)
CALCIUM SERPL-MCNC: 9.8 MG/DL (ref 8.5–10.1)
CHLORIDE SERPL-SCNC: 107 MMOL/L (ref 97–108)
CO2 SERPL-SCNC: 29 MMOL/L (ref 21–32)
CREAT SERPL-MCNC: 0.75 MG/DL (ref 0.55–1.02)
DIFFERENTIAL METHOD BLD: ABNORMAL
EOSINOPHIL # BLD: 0 K/UL (ref 0–0.4)
EOSINOPHIL NFR BLD: 0 % (ref 0–7)
ERYTHROCYTE [DISTWIDTH] IN BLOOD BY AUTOMATED COUNT: 15.9 % (ref 11.5–14.5)
GLUCOSE SERPL-MCNC: 122 MG/DL (ref 65–100)
HCT VFR BLD AUTO: 32.9 % (ref 35–47)
HGB BLD-MCNC: 9.4 G/DL (ref 11.5–16)
IMM GRANULOCYTES # BLD AUTO: 0 K/UL (ref 0–0.04)
IMM GRANULOCYTES NFR BLD AUTO: 0 % (ref 0–0.5)
LYMPHOCYTES # BLD: 1 K/UL (ref 0.8–3.5)
LYMPHOCYTES NFR BLD: 10 % (ref 12–49)
MCH RBC QN AUTO: 22.7 PG (ref 26–34)
MCHC RBC AUTO-ENTMCNC: 28.6 G/DL (ref 30–36.5)
MCV RBC AUTO: 79.3 FL (ref 80–99)
MONOCYTES # BLD: 1.3 K/UL (ref 0–1)
MONOCYTES NFR BLD: 13 % (ref 5–13)
NEUTS SEG # BLD: 7.8 K/UL (ref 1.8–8)
NEUTS SEG NFR BLD: 76 % (ref 32–75)
NRBC # BLD: 0 K/UL (ref 0–0.01)
NRBC BLD-RTO: 0 PER 100 WBC
PLATELET # BLD AUTO: 255 K/UL (ref 150–400)
PMV BLD AUTO: 10 FL (ref 8.9–12.9)
POTASSIUM SERPL-SCNC: 3.6 MMOL/L (ref 3.5–5.1)
RBC # BLD AUTO: 4.15 M/UL (ref 3.8–5.2)
SODIUM SERPL-SCNC: 141 MMOL/L (ref 136–145)
WBC # BLD AUTO: 10.2 K/UL (ref 3.6–11)

## 2023-01-01 PROCEDURE — 74011250636 HC RX REV CODE- 250/636: Performed by: SURGERY

## 2023-01-01 PROCEDURE — 80048 BASIC METABOLIC PNL TOTAL CA: CPT

## 2023-01-01 PROCEDURE — 74011250637 HC RX REV CODE- 250/637: Performed by: SURGERY

## 2023-01-01 PROCEDURE — 36415 COLL VENOUS BLD VENIPUNCTURE: CPT

## 2023-01-01 PROCEDURE — 74011000250 HC RX REV CODE- 250: Performed by: SURGERY

## 2023-01-01 PROCEDURE — 94760 N-INVAS EAR/PLS OXIMETRY 1: CPT

## 2023-01-01 PROCEDURE — 85025 COMPLETE CBC W/AUTO DIFF WBC: CPT

## 2023-01-01 PROCEDURE — 65270000015 HC RM PRIVATE ONCOLOGY

## 2023-01-01 PROCEDURE — 77010033678 HC OXYGEN DAILY

## 2023-01-01 PROCEDURE — C9113 INJ PANTOPRAZOLE SODIUM, VIA: HCPCS | Performed by: SURGERY

## 2023-01-01 RX ORDER — MORPHINE SULFATE 2 MG/ML
2 INJECTION, SOLUTION INTRAMUSCULAR; INTRAVENOUS
Status: DISCONTINUED | OUTPATIENT
Start: 2023-01-01 | End: 2023-01-02

## 2023-01-01 RX ORDER — ENOXAPARIN SODIUM 100 MG/ML
40 INJECTION SUBCUTANEOUS EVERY 24 HOURS
Status: DISCONTINUED | OUTPATIENT
Start: 2023-01-02 | End: 2023-01-02

## 2023-01-01 RX ADMIN — GABAPENTIN 800 MG: 300 CAPSULE ORAL at 14:00

## 2023-01-01 RX ADMIN — CALCIUM 500 MG: 500 TABLET ORAL at 09:05

## 2023-01-01 RX ADMIN — GABAPENTIN 800 MG: 300 CAPSULE ORAL at 23:48

## 2023-01-01 RX ADMIN — PROPAFENONE HYDROCHLORIDE 150 MG: 150 TABLET, FILM COATED ORAL at 09:05

## 2023-01-01 RX ADMIN — POTASSIUM CHLORIDE 10 MEQ: 750 TABLET, FILM COATED, EXTENDED RELEASE ORAL at 09:06

## 2023-01-01 RX ADMIN — TRIMETHOBENZAMIDE HYDROCHLORIDE 200 MG: 100 INJECTION INTRAMUSCULAR at 09:20

## 2023-01-01 RX ADMIN — MORPHINE SULFATE 2 MG: 2 INJECTION, SOLUTION INTRAMUSCULAR; INTRAVENOUS at 21:25

## 2023-01-01 RX ADMIN — GABAPENTIN 800 MG: 300 CAPSULE ORAL at 17:51

## 2023-01-01 RX ADMIN — MAGNESIUM OXIDE 400 MG (241.3 MG MAGNESIUM) TABLET 400 MG: TABLET at 09:06

## 2023-01-01 RX ADMIN — MORPHINE SULFATE 2 MG: 2 INJECTION, SOLUTION INTRAMUSCULAR; INTRAVENOUS at 17:30

## 2023-01-01 RX ADMIN — ATORVASTATIN CALCIUM 20 MG: 20 TABLET, FILM COATED ORAL at 09:05

## 2023-01-01 RX ADMIN — MORPHINE SULFATE 2 MG: 2 INJECTION, SOLUTION INTRAMUSCULAR; INTRAVENOUS at 09:05

## 2023-01-01 RX ADMIN — MORPHINE SULFATE 2 MG: 2 INJECTION, SOLUTION INTRAMUSCULAR; INTRAVENOUS at 23:49

## 2023-01-01 RX ADMIN — GABAPENTIN 800 MG: 300 CAPSULE ORAL at 09:05

## 2023-01-01 RX ADMIN — SODIUM CHLORIDE, PRESERVATIVE FREE 40 MG: 5 INJECTION INTRAVENOUS at 09:05

## 2023-01-01 RX ADMIN — Medication 1 AMPULE: at 09:04

## 2023-01-01 NOTE — PROGRESS NOTES
SURGERY PROGRESS NOTE      Admit Date: 2022    POD 1 Day Post-Op    Procedure: Procedure(s):  SMALL BOWEL RESECTION      Subjective:     Patient states pain is controlled. Denies nausea. No flatus yet. NG output non-bilious and low volume       Objective:     Visit Vitals  /70 (BP 1 Location: Right upper arm)   Pulse 88   Temp 99.7 °F (37.6 °C)   Resp 17   Ht 5' 1\" (1.549 m)   Wt 65.3 kg (144 lb)   SpO2 100%   BMI 27.21 kg/m²        Temp (24hrs), Av.4 °F (36.9 °C), Min:97.5 °F (36.4 °C), Max:99.7 °F (37.6 °C)      No intake/output data recorded.   1901 -  0700  In: 920 [I.V.:920]  Out: 160 [Urine:160]    Physical Exam:    General:  alert, cooperative, no distress, appears stated age   Abdomen: soft, bowel sounds hypoactive, non-tender   Incision:   healing well, no drainage, no erythema, no hernia, no seroma, no swelling, no dehiscence, incision well approximated           Lab Results   Component Value Date/Time    WBC 9.9 2022 01:57 AM    HGB 7.7 (L) 2022 01:57 AM    HCT 27.0 (L) 2022 01:57 AM    Hematocrit (POC) 32 (L) 2020 10:36 AM    PLATELET 929  01:57 AM    MCV 79.2 (L) 2022 01:57 AM     Lab Results   Component Value Date/Time    GFR est non-AA 53 (L) 10/02/2022 04:46 AM    GFRNA, POC 59 (L) 2020 10:36 AM    GFR est AA >60 10/02/2022 04:46 AM    GFRAA, POC >60 2020 10:36 AM    Creatinine 0.66 2022 03:57 AM    Creatinine, POC 0.8 10/01/2022 01:34 PM    BUN 28 (H) 2022 03:57 AM    BUN (POC) 34 (H) 2020 10:36 AM    Sodium 142 2022 03:57 AM    Sodium (POC) 142 2020 10:36 AM    Sodium,  10/01/2022 01:34 PM    Potassium 3.4 (L) 2022 03:57 AM    Potassium (POC) 3.2 (L) 2020 10:36 AM    Potassium, POC 3.4 (L) 10/01/2022 01:34 PM    Chloride 110 (H) 2022 03:57 AM    Chloride,  10/01/2022 01:34 PM    CO2 28 2022 03:57 AM    Magnesium 1.7 10/02/2022 04:46 AM Assessment:     Active Problems:    SBO (small bowel obstruction) ([facility] [address] (2/27/2019)    Recovering as expected after SB resection     Plan:       NG removed  CLD  OOB

## 2023-01-01 NOTE — ANESTHESIA POSTPROCEDURE EVALUATION
Procedure(s):  SMALL BOWEL RESECTION. general    Anesthesia Post Evaluation        Patient location during evaluation: PACU  Note status: Adequate. Level of consciousness: responsive to verbal stimuli and sleepy but conscious  Pain management: satisfactory to patient  Airway patency: patent  Anesthetic complications: no  Cardiovascular status: acceptable  Respiratory status: acceptable  Hydration status: acceptable  Comments: +Post-Anesthesia Evaluation and Assessment    Patient: Nadira Jordan MRN: [id number]  SSN: [id number]   YOB: 1933  Age: 80 y.o. Sex: female      Cardiovascular Function/Vital Signs    BP (!) 120/45   Pulse 65   Temp 37.1 °C (98.8 °F)   Resp 16   Ht 5' 1\" (1.549 m)   Wt 65.3 kg (144 lb)   SpO2 97%   BMI 27.21 kg/m²     Patient is status post Procedure(s):  SMALL BOWEL RESECTION. Nausea/Vomiting: Controlled. Postoperative hydration reviewed and adequate. Pain:  Pain Scale 1: Numeric (0 - 10) (12/31/22 1917)  Pain Intensity 1: 0 (12/31/22 1917)   Managed. Neurological Status:   Neuro (WDL): Exceptions to WDL (12/31/22 1845)   At baseline. Mental Status and Level of Consciousness: Arousable. Pulmonary Status:   O2 Device: Nasal cannula (12/31/22 1917)   Adequate oxygenation and airway patent. Complications related to anesthesia: None    Post-anesthesia assessment completed. No concerns. Signed By: Daija Espinosa MD    12/31/2022  Post anesthesia nausea and vomiting:  controlled      INITIAL Post-op Vital signs:   Vitals Value Taken Time   /45 12/31/22 1930   Temp 37.1 °C (98.8 °F) 12/31/22 1840   Pulse 72 12/31/22 1933   Resp 14 12/31/22 1933   SpO2 96 % 12/31/22 1933   Vitals shown include unvalidated device data.

## 2023-01-01 NOTE — PROGRESS NOTES
Hospitalist Progress Note    NAME: Nadira Jordan   :  10/9/1933   MRN:  [id number]       Assessment / Plan:      Acute SBO in setting of multiple in the past -      Hx of radical hysterectomy for ovarian cancer with chemotherapy    - appreciate Surgery input - s/p small bowel resection  NGT  removed    - manage pain , advance diet     Acute intractable nausea, vomiting - resolved   Hyperlipidemia-cw Liptor   GERD- c/w  protonix   Neuropathy - c/w gabapentin increase to qid per home regimen    VTE prophylaxis - scds   Anemia improved hgb 9.4    Dispo - PT/OT rec SNF placement    25.0 - 29.9 Overweight / Body mass index is 27.21 kg/m². Estimated discharge date: January 3  Barriers: recovery     Code status: Full  Prophylaxis: SCD's  Recommended Disposition: Home w/Family     Subjective:     Chief Complaint / Reason for Physician Visit  \" Patient seen this morning c/o mild abd pain . Discussed mobility , pain management and DC soon  Discussed with RN events overnight. Review of Systems:  Symptom Y/N Comments  Symptom Y/N Comments   Fever/Chills n   Chest Pain n    Poor Appetite    Edema     Cough    Abdominal Pain y    Sputum    Joint Pain     SOB/LEVINE n   Pruritis/Rash     Nausea/vomit    Tolerating PT/OT     Diarrhea    Tolerating Diet n    Constipation    Other       Could NOT obtain due to:      Objective:     VITALS:   Last 24hrs VS reviewed since prior progress note.  Most recent are:  Patient Vitals for the past 24 hrs:   Temp Pulse Resp BP SpO2   23 0816 99.7 °F (37.6 °C) 88 17 123/70 100 %   23 0326 98.2 °F (36.8 °C) 85 18 126/62 100 %   22 97.5 °F (36.4 °C) 75 18 (!) 113/53 96 %   22 97.9 °F (36.6 °C) 67 16 (!) 118/45 96 %   22 -- 65 16 -- 97 %   22 -- 67 16 (!) 120/45 93 %   22 -- 70 18 (!) 124/48 95 %   22 -- 74 19 (!) 126/51 96 %   22 -- 78 16 (!) 132/55 95 %   22 -- 83 12 (!) 128/54 94 % 12/31/22 1840 98.8 °F (37.1 °C) 84 15 131/60 94 %   12/31/22 1633 98.3 °F (36.8 °C) 87 14 (!) 156/68 96 %   12/31/22 1200 -- 87 18 139/78 98 %   12/31/22 0958 -- 85 -- (!) 132/55 --       Intake/Output Summary (Last 24 hours) at 1/1/2023 0904  Last data filed at 1/1/2023 0551  Gross per 24 hour   Intake 920 ml   Output 160 ml   Net 760 ml        I had a face to face encounter and independently examined this patient on 1/1/2023, as outlined below:  PHYSICAL EXAM:  General: Thin Alert, cooperative, no acute distress    EENT:  EOMI. Anicteric sclerae. MMM  Resp:   no wheezing or rales. No accessory muscle use  CV:  Regular  rhythm,  No edema  GI:  Soft, Non distended, tender. to palpation     Neurologic:  Alert and oriented X 3, normal speech,   Psych:   Not anxious nor agitated  Skin:  No rashes. No jaundice staples     Reviewed most current lab test results and cultures  YES  Reviewed most current radiology test results   YES  Review and summation of old records today    NO  Reviewed patient's current orders and MAR    YES  PMH/ reviewed - no change compared to H&P  ________________________________________________________________________  Care Plan discussed with:    Comments   Patient x    Family  x    RN x    Care Manager     Consultant                        Multidiciplinary team rounds were held today with , nursing, pharmacist and clinical coordinator. Patient's plan of care was discussed; medications were reviewed and discharge planning was addressed. ________________________________________________________________________  Total NON critical care TIME:  25   Minutes    Total CRITICAL CARE TIME Spent:   Minutes non procedure based      Comments   >50% of visit spent in counseling and coordination of care     ________________________________________________________________________  Ovidio Seo.  Shabbir Saleh NP     Procedures: see electronic medical records for all procedures/Xrays and details which were not copied into this note but were reviewed prior to creation of Plan. LABS:  I reviewed today's most current labs and imaging studies. Pertinent labs include:  Recent Labs     12/30/22  0157 12/29/22  1207   WBC 9.9 11.8*   HGB 7.7* 9.7*   HCT 27.0* 34.1*    277     Recent Labs     12/31/22  0357 12/30/22  0157 12/29/22  1207    143 141   K 3.4* 3.2* 3.7   * 110* 106   CO2 28 26 28   * 111* 136*   BUN 28* 43* 40*   CREA 0.66 0.74 0.86   CA 9.3 9.5 10.4*   ALB  --  2.8* 3.6   TBILI  --  0.2 0.5   ALT  --  13 14       Signed: Tracey Gastelum, NP

## 2023-01-01 NOTE — PROGRESS NOTES
End of Shift Note    Bedside shift change report given to Hailey Bragg RN (oncoming nurse) by Tre Parker RN (offgoing nurse). Report included the following information SBAR, Kardex, OR Summary, MAR, and Recent Results    Shift worked:  7p-7a     Shift summary and any significant changes:     Pt came to unit from PACU after undergoing SBO surgery with small part of her bowels removed. Pt was slightly drowsy at first, then became just slightly agitated as her anesthesia started wearing off. Pt received one dose of prn pain medication per MAR. Safety and yepez care was completed throughout the shift.       Concerns for physician to address:       Zone phone for oncoming shift:              Tre Parker RN

## 2023-01-01 NOTE — PROGRESS NOTES
P&T-Approved DVT Prophylaxis Dosing    Per P&T Committee-approved protocol Enoxaparin has been adjusted to 40 mg SQ daily based on weight and renal function as shown in the table below.          Shanna Hayden

## 2023-01-01 NOTE — ACP (ADVANCE CARE PLANNING)
Advance Care Planning Note      NAME: Toñito Womack   :  10/9/1933   MRN:  [id number]     Date/Time:  2022 8:40 PM    Active Diagnoses:  Hospital Problems  Date Reviewed: 2022            Codes Class Noted POA    SBO (small bowel obstruction) ([facility] ICD-10-CM: O14.489  ICD-9-CM: 560.9  2019 Unknown           These active diagnoses are of sufficient risk that focused discussion on advance care planning is indicated in order to allow the patient to thoughtfully consider personal goals of care, and if situations arise that prevent the ability to personally give input, to ensure appropriate representation of their personal desires for different levels and aggressiveness of care. Discussion:   Code status addressed and wants to be a DNR / DNI. Patient wants central line and vasopressors if needed. Patient  also want a feeding tube, if needed, for nutritional support. Patient  would like to assign Darya Nguyễn - [phone], daughter (primary) Aniceto Carrera - [phone], daughter (secondary)  and Tylor Addison - [phone], daughter as the surrogate decision makers. Persons present and participating in discussion: Dany Valderrama NP, Darya Nguyễn, Aniceto Carrera and Tylor Addison      Time Spent:   Total time spent face-to-face in education and discussion:   16 minutes  minutes.          Jenny Nguyen NP   Hospitalist

## 2023-01-02 ENCOUNTER — APPOINTMENT (OUTPATIENT)
Dept: GENERAL RADIOLOGY | Age: 88
DRG: 329 | End: 2023-01-02
Attending: NURSE PRACTITIONER
Payer: MEDICARE

## 2023-01-02 ENCOUNTER — APPOINTMENT (OUTPATIENT)
Dept: GENERAL RADIOLOGY | Age: 88
DRG: 329 | End: 2023-01-02
Attending: INTERNAL MEDICINE
Payer: MEDICARE

## 2023-01-02 ENCOUNTER — ANESTHESIA EVENT (OUTPATIENT)
Dept: SURGERY | Age: 88
DRG: 329 | End: 2023-01-02
Payer: MEDICARE

## 2023-01-02 ENCOUNTER — ANESTHESIA (OUTPATIENT)
Dept: SURGERY | Age: 88
DRG: 329 | End: 2023-01-02
Payer: MEDICARE

## 2023-01-02 ENCOUNTER — APPOINTMENT (OUTPATIENT)
Dept: CT IMAGING | Age: 88
DRG: 329 | End: 2023-01-02
Attending: NURSE PRACTITIONER
Payer: MEDICARE

## 2023-01-02 LAB
ANION GAP SERPL CALC-SCNC: 7 MMOL/L (ref 5–15)
ANION GAP SERPL CALC-SCNC: 7 MMOL/L (ref 5–15)
BASOPHILS # BLD: 0 K/UL (ref 0–0.1)
BASOPHILS NFR BLD: 0 % (ref 0–1)
BUN SERPL-MCNC: 41 MG/DL (ref 6–20)
BUN SERPL-MCNC: 46 MG/DL (ref 6–20)
BUN/CREAT SERPL: 25 (ref 12–20)
BUN/CREAT SERPL: 26 (ref 12–20)
CALCIUM SERPL-MCNC: 10.1 MG/DL (ref 8.5–10.1)
CALCIUM SERPL-MCNC: 9.7 MG/DL (ref 8.5–10.1)
CHLORIDE SERPL-SCNC: 96 MMOL/L (ref 97–108)
CHLORIDE SERPL-SCNC: 98 MMOL/L (ref 97–108)
CO2 SERPL-SCNC: 28 MMOL/L (ref 21–32)
CO2 SERPL-SCNC: 32 MMOL/L (ref 21–32)
CREAT SERPL-MCNC: 1.6 MG/DL (ref 0.55–1.02)
CREAT SERPL-MCNC: 1.85 MG/DL (ref 0.55–1.02)
DIFFERENTIAL METHOD BLD: ABNORMAL
EOSINOPHIL # BLD: 0 K/UL (ref 0–0.4)
EOSINOPHIL NFR BLD: 0 % (ref 0–7)
ERYTHROCYTE [DISTWIDTH] IN BLOOD BY AUTOMATED COUNT: 15.9 % (ref 11.5–14.5)
ERYTHROCYTE [DISTWIDTH] IN BLOOD BY AUTOMATED COUNT: 16.1 % (ref 11.5–14.5)
GLUCOSE SERPL-MCNC: 148 MG/DL (ref 65–100)
GLUCOSE SERPL-MCNC: 164 MG/DL (ref 65–100)
HCT VFR BLD AUTO: 32.3 % (ref 35–47)
HCT VFR BLD AUTO: 32.5 % (ref 35–47)
HGB BLD-MCNC: 9.2 G/DL (ref 11.5–16)
HGB BLD-MCNC: 9.3 G/DL (ref 11.5–16)
IMM GRANULOCYTES # BLD AUTO: 0.2 K/UL (ref 0–0.04)
IMM GRANULOCYTES NFR BLD AUTO: 1 % (ref 0–0.5)
LACTATE SERPL-SCNC: 1.6 MMOL/L (ref 0.4–2)
LYMPHOCYTES # BLD: 1 K/UL (ref 0.8–3.5)
LYMPHOCYTES NFR BLD: 6 % (ref 12–49)
MAGNESIUM SERPL-MCNC: 2.4 MG/DL (ref 1.6–2.4)
MCH RBC QN AUTO: 22.5 PG (ref 26–34)
MCH RBC QN AUTO: 22.7 PG (ref 26–34)
MCHC RBC AUTO-ENTMCNC: 28.5 G/DL (ref 30–36.5)
MCHC RBC AUTO-ENTMCNC: 28.6 G/DL (ref 30–36.5)
MCV RBC AUTO: 78.5 FL (ref 80–99)
MCV RBC AUTO: 79.8 FL (ref 80–99)
MONOCYTES # BLD: 1.4 K/UL (ref 0–1)
MONOCYTES NFR BLD: 9 % (ref 5–13)
NEUTS SEG # BLD: 13.5 K/UL (ref 1.8–8)
NEUTS SEG NFR BLD: 84 % (ref 32–75)
NRBC # BLD: 0 K/UL (ref 0–0.01)
NRBC # BLD: 0 K/UL (ref 0–0.01)
NRBC BLD-RTO: 0 PER 100 WBC
NRBC BLD-RTO: 0 PER 100 WBC
PLATELET # BLD AUTO: 314 K/UL (ref 150–400)
PLATELET # BLD AUTO: 316 K/UL (ref 150–400)
PMV BLD AUTO: 10 FL (ref 8.9–12.9)
PMV BLD AUTO: 10.3 FL (ref 8.9–12.9)
POTASSIUM SERPL-SCNC: 3.8 MMOL/L (ref 3.5–5.1)
POTASSIUM SERPL-SCNC: 4 MMOL/L (ref 3.5–5.1)
RBC # BLD AUTO: 4.05 M/UL (ref 3.8–5.2)
RBC # BLD AUTO: 4.14 M/UL (ref 3.8–5.2)
RBC MORPH BLD: ABNORMAL
SODIUM SERPL-SCNC: 133 MMOL/L (ref 136–145)
SODIUM SERPL-SCNC: 135 MMOL/L (ref 136–145)
WBC # BLD AUTO: 13.8 K/UL (ref 3.6–11)
WBC # BLD AUTO: 16.1 K/UL (ref 3.6–11)

## 2023-01-02 PROCEDURE — 77030019908 HC STETH ESOPH SIMS -A: Performed by: NURSE ANESTHETIST, CERTIFIED REGISTERED

## 2023-01-02 PROCEDURE — 76937 US GUIDE VASCULAR ACCESS: CPT

## 2023-01-02 PROCEDURE — 74011250636 HC RX REV CODE- 250/636: Performed by: NURSE PRACTITIONER

## 2023-01-02 PROCEDURE — 74011000258 HC RX REV CODE- 258: Performed by: NURSE PRACTITIONER

## 2023-01-02 PROCEDURE — 77010033711 HC HIGH FLOW OXYGEN

## 2023-01-02 PROCEDURE — 76060000032 HC ANESTHESIA 0.5 TO 1 HR: Performed by: SURGERY

## 2023-01-02 PROCEDURE — 77030008684 HC TU ET CUF COVD -B: Performed by: NURSE ANESTHETIST, CERTIFIED REGISTERED

## 2023-01-02 PROCEDURE — 80048 BASIC METABOLIC PNL TOTAL CA: CPT

## 2023-01-02 PROCEDURE — 74011000250 HC RX REV CODE- 250: Performed by: NURSE PRACTITIONER

## 2023-01-02 PROCEDURE — 93005 ELECTROCARDIOGRAM TRACING: CPT

## 2023-01-02 PROCEDURE — 83605 ASSAY OF LACTIC ACID: CPT

## 2023-01-02 PROCEDURE — 0DJD0ZZ INSPECTION OF LOWER INTESTINAL TRACT, OPEN APPROACH: ICD-10-PCS | Performed by: SURGERY

## 2023-01-02 PROCEDURE — 83735 ASSAY OF MAGNESIUM: CPT

## 2023-01-02 PROCEDURE — 74011250637 HC RX REV CODE- 250/637: Performed by: SURGERY

## 2023-01-02 PROCEDURE — 77030013079 HC BLNKT BAIR HGGR 3M -A: Performed by: NURSE ANESTHETIST, CERTIFIED REGISTERED

## 2023-01-02 PROCEDURE — 74011000250 HC RX REV CODE- 250: Performed by: NURSE ANESTHETIST, CERTIFIED REGISTERED

## 2023-01-02 PROCEDURE — 77030021678 HC GLIDESCP STAT DISP VERT -B: Performed by: NURSE ANESTHETIST, CERTIFIED REGISTERED

## 2023-01-02 PROCEDURE — 77030035236 HC SUT PDS STRATFX BARB J&J -B: Performed by: SURGERY

## 2023-01-02 PROCEDURE — 74011250636 HC RX REV CODE- 250/636: Performed by: SURGERY

## 2023-01-02 PROCEDURE — 87040 BLOOD CULTURE FOR BACTERIA: CPT

## 2023-01-02 PROCEDURE — 76210000006 HC OR PH I REC 0.5 TO 1 HR: Performed by: SURGERY

## 2023-01-02 PROCEDURE — 71045 X-RAY EXAM CHEST 1 VIEW: CPT

## 2023-01-02 PROCEDURE — 2709999900 HC NON-CHARGEABLE SUPPLY: Performed by: SURGERY

## 2023-01-02 PROCEDURE — 76010000138 HC OR TIME 0.5 TO 1 HR: Performed by: SURGERY

## 2023-01-02 PROCEDURE — 77030011278 HC ELECTRD LIG IMPT COVD -F: Performed by: SURGERY

## 2023-01-02 PROCEDURE — 51798 US URINE CAPACITY MEASURE: CPT

## 2023-01-02 PROCEDURE — 74011250637 HC RX REV CODE- 250/637: Performed by: NURSE PRACTITIONER

## 2023-01-02 PROCEDURE — 85027 COMPLETE CBC AUTOMATED: CPT

## 2023-01-02 PROCEDURE — 77030026438 HC STYL ET INTUB CARD -A: Performed by: NURSE ANESTHETIST, CERTIFIED REGISTERED

## 2023-01-02 PROCEDURE — 77010033678 HC OXYGEN DAILY

## 2023-01-02 PROCEDURE — 74011250636 HC RX REV CODE- 250/636: Performed by: NURSE ANESTHETIST, CERTIFIED REGISTERED

## 2023-01-02 PROCEDURE — 36415 COLL VENOUS BLD VENIPUNCTURE: CPT

## 2023-01-02 PROCEDURE — 77030008462 HC STPLR SKN PROX J&J -A: Performed by: SURGERY

## 2023-01-02 PROCEDURE — 65270000046 HC RM TELEMETRY

## 2023-01-02 PROCEDURE — 94760 N-INVAS EAR/PLS OXIMETRY 1: CPT

## 2023-01-02 PROCEDURE — 74011000250 HC RX REV CODE- 250: Performed by: SURGERY

## 2023-01-02 PROCEDURE — 85025 COMPLETE CBC W/AUTO DIFF WBC: CPT

## 2023-01-02 RX ORDER — LANOLIN ALCOHOL/MO/W.PET/CERES
3 CREAM (GRAM) TOPICAL
Status: DISCONTINUED | OUTPATIENT
Start: 2023-01-02 | End: 2023-01-10 | Stop reason: HOSPADM

## 2023-01-02 RX ORDER — PHENYLEPHRINE HCL IN 0.9% NACL 0.4MG/10ML
SYRINGE (ML) INTRAVENOUS AS NEEDED
Status: DISCONTINUED | OUTPATIENT
Start: 2023-01-02 | End: 2023-01-03 | Stop reason: HOSPADM

## 2023-01-02 RX ORDER — METOPROLOL TARTRATE 5 MG/5ML
5 INJECTION INTRAVENOUS
Status: DISCONTINUED | OUTPATIENT
Start: 2023-01-02 | End: 2023-01-06

## 2023-01-02 RX ORDER — LEVOFLOXACIN 5 MG/ML
750 INJECTION, SOLUTION INTRAVENOUS
Status: DISCONTINUED | OUTPATIENT
Start: 2023-01-02 | End: 2023-01-06

## 2023-01-02 RX ORDER — GLYCOPYRROLATE 0.2 MG/ML
INJECTION INTRAMUSCULAR; INTRAVENOUS AS NEEDED
Status: DISCONTINUED | OUTPATIENT
Start: 2023-01-02 | End: 2023-01-03 | Stop reason: HOSPADM

## 2023-01-02 RX ORDER — PROPOFOL 10 MG/ML
INJECTION, EMULSION INTRAVENOUS AS NEEDED
Status: DISCONTINUED | OUTPATIENT
Start: 2023-01-02 | End: 2023-01-03 | Stop reason: HOSPADM

## 2023-01-02 RX ORDER — PANTOPRAZOLE SODIUM 40 MG/1
40 TABLET, DELAYED RELEASE ORAL
Status: DISCONTINUED | OUTPATIENT
Start: 2023-01-02 | End: 2023-01-10 | Stop reason: HOSPADM

## 2023-01-02 RX ORDER — SODIUM CHLORIDE 9 MG/ML
50 INJECTION, SOLUTION INTRAVENOUS CONTINUOUS
Status: DISCONTINUED | OUTPATIENT
Start: 2023-01-02 | End: 2023-01-06

## 2023-01-02 RX ORDER — SUCCINYLCHOLINE CHLORIDE 20 MG/ML
INJECTION INTRAMUSCULAR; INTRAVENOUS AS NEEDED
Status: DISCONTINUED | OUTPATIENT
Start: 2023-01-02 | End: 2023-01-03 | Stop reason: HOSPADM

## 2023-01-02 RX ORDER — ACETAMINOPHEN 325 MG/1
650 TABLET ORAL
Status: DISCONTINUED | OUTPATIENT
Start: 2023-01-02 | End: 2023-01-10 | Stop reason: HOSPADM

## 2023-01-02 RX ORDER — METRONIDAZOLE 500 MG/100ML
500 INJECTION, SOLUTION INTRAVENOUS EVERY 12 HOURS
Status: DISCONTINUED | OUTPATIENT
Start: 2023-01-02 | End: 2023-01-06

## 2023-01-02 RX ORDER — AMIODARONE HCL/D5W 450 MG/250
0.5 PLASTIC BAG, INJECTION (ML) INTRAVENOUS CONTINUOUS
Status: DISCONTINUED | OUTPATIENT
Start: 2023-01-03 | End: 2023-01-02 | Stop reason: SDUPTHER

## 2023-01-02 RX ORDER — NEOSTIGMINE METHYLSULFATE 1 MG/ML
INJECTION, SOLUTION INTRAVENOUS AS NEEDED
Status: DISCONTINUED | OUTPATIENT
Start: 2023-01-02 | End: 2023-01-03 | Stop reason: HOSPADM

## 2023-01-02 RX ORDER — ONDANSETRON 2 MG/ML
4 INJECTION INTRAMUSCULAR; INTRAVENOUS
Status: DISCONTINUED | OUTPATIENT
Start: 2023-01-02 | End: 2023-01-02

## 2023-01-02 RX ORDER — AMIODARONE HCL/D5W 450 MG/250
1 PLASTIC BAG, INJECTION (ML) INTRAVENOUS CONTINUOUS
Status: DISCONTINUED | OUTPATIENT
Start: 2023-01-02 | End: 2023-01-02 | Stop reason: SDUPTHER

## 2023-01-02 RX ORDER — ONDANSETRON 2 MG/ML
INJECTION INTRAMUSCULAR; INTRAVENOUS AS NEEDED
Status: DISCONTINUED | OUTPATIENT
Start: 2023-01-02 | End: 2023-01-03 | Stop reason: HOSPADM

## 2023-01-02 RX ORDER — ENOXAPARIN SODIUM 100 MG/ML
30 INJECTION SUBCUTANEOUS EVERY 24 HOURS
Status: DISCONTINUED | OUTPATIENT
Start: 2023-01-03 | End: 2023-01-05

## 2023-01-02 RX ORDER — MORPHINE SULFATE 2 MG/ML
2 INJECTION, SOLUTION INTRAMUSCULAR; INTRAVENOUS
Status: DISCONTINUED | OUTPATIENT
Start: 2023-01-02 | End: 2023-01-10 | Stop reason: HOSPADM

## 2023-01-02 RX ORDER — ONDANSETRON 2 MG/ML
4 INJECTION INTRAMUSCULAR; INTRAVENOUS
Status: DISCONTINUED | OUTPATIENT
Start: 2023-01-02 | End: 2023-01-10 | Stop reason: HOSPADM

## 2023-01-02 RX ORDER — DEXAMETHASONE SODIUM PHOSPHATE 4 MG/ML
INJECTION, SOLUTION INTRA-ARTICULAR; INTRALESIONAL; INTRAMUSCULAR; INTRAVENOUS; SOFT TISSUE AS NEEDED
Status: DISCONTINUED | OUTPATIENT
Start: 2023-01-02 | End: 2023-01-03 | Stop reason: HOSPADM

## 2023-01-02 RX ORDER — ROCURONIUM BROMIDE 10 MG/ML
INJECTION, SOLUTION INTRAVENOUS AS NEEDED
Status: DISCONTINUED | OUTPATIENT
Start: 2023-01-02 | End: 2023-01-03 | Stop reason: HOSPADM

## 2023-01-02 RX ORDER — LIDOCAINE HYDROCHLORIDE 20 MG/ML
INJECTION, SOLUTION EPIDURAL; INFILTRATION; INTRACAUDAL; PERINEURAL AS NEEDED
Status: DISCONTINUED | OUTPATIENT
Start: 2023-01-02 | End: 2023-01-03 | Stop reason: HOSPADM

## 2023-01-02 RX ORDER — SODIUM CHLORIDE 0.9 % (FLUSH) 0.9 %
5-10 SYRINGE (ML) INJECTION AS NEEDED
Status: DISCONTINUED | OUTPATIENT
Start: 2023-01-02 | End: 2023-01-10 | Stop reason: HOSPADM

## 2023-01-02 RX ORDER — SODIUM CHLORIDE, SODIUM LACTATE, POTASSIUM CHLORIDE, CALCIUM CHLORIDE 600; 310; 30; 20 MG/100ML; MG/100ML; MG/100ML; MG/100ML
INJECTION, SOLUTION INTRAVENOUS
Status: DISCONTINUED | OUTPATIENT
Start: 2023-01-02 | End: 2023-01-03 | Stop reason: HOSPADM

## 2023-01-02 RX ADMIN — ROCURONIUM BROMIDE 10 MG: 10 INJECTION INTRAVENOUS at 23:26

## 2023-01-02 RX ADMIN — ALUMINUM HYDROXIDE, MAGNESIUM HYDROXIDE, AND SIMETHICONE 40 ML: 200; 200; 20 SUSPENSION ORAL at 10:37

## 2023-01-02 RX ADMIN — DEXAMETHASONE SODIUM PHOSPHATE 8 MG: 4 INJECTION, SOLUTION INTRAMUSCULAR; INTRAVENOUS at 23:34

## 2023-01-02 RX ADMIN — SODIUM CHLORIDE, POTASSIUM CHLORIDE, SODIUM LACTATE AND CALCIUM CHLORIDE 500 ML: 600; 310; 30; 20 INJECTION, SOLUTION INTRAVENOUS at 22:05

## 2023-01-02 RX ADMIN — SODIUM CHLORIDE, POTASSIUM CHLORIDE, SODIUM LACTATE AND CALCIUM CHLORIDE: 600; 310; 30; 20 INJECTION, SOLUTION INTRAVENOUS at 23:10

## 2023-01-02 RX ADMIN — PROPAFENONE HYDROCHLORIDE 150 MG: 150 TABLET, FILM COATED ORAL at 10:36

## 2023-01-02 RX ADMIN — METRONIDAZOLE 500 MG: 500 INJECTION, SOLUTION INTRAVENOUS at 18:04

## 2023-01-02 RX ADMIN — ROCURONIUM BROMIDE 10 MG: 10 INJECTION INTRAVENOUS at 23:29

## 2023-01-02 RX ADMIN — AMIODARONE HYDROCHLORIDE 1 MG/MIN: 50 INJECTION, SOLUTION INTRAVENOUS at 19:48

## 2023-01-02 RX ADMIN — POTASSIUM CHLORIDE 10 MEQ: 750 TABLET, FILM COATED, EXTENDED RELEASE ORAL at 10:37

## 2023-01-02 RX ADMIN — SODIUM CHLORIDE 40 MCG/MIN: 900 INJECTION, SOLUTION INTRAVENOUS at 23:28

## 2023-01-02 RX ADMIN — ONDANSETRON HYDROCHLORIDE 4 MG: 2 INJECTION, SOLUTION INTRAMUSCULAR; INTRAVENOUS at 23:45

## 2023-01-02 RX ADMIN — FENTANYL CITRATE 25 MCG: 50 INJECTION, SOLUTION INTRAMUSCULAR; INTRAVENOUS at 23:59

## 2023-01-02 RX ADMIN — SODIUM CHLORIDE 1000 ML: 9 INJECTION, SOLUTION INTRAVENOUS at 16:33

## 2023-01-02 RX ADMIN — GABAPENTIN 800 MG: 300 CAPSULE ORAL at 10:35

## 2023-01-02 RX ADMIN — SODIUM CHLORIDE 75 ML/HR: 9 INJECTION, SOLUTION INTRAVENOUS at 10:39

## 2023-01-02 RX ADMIN — ENOXAPARIN SODIUM 40 MG: 100 INJECTION SUBCUTANEOUS at 10:34

## 2023-01-02 RX ADMIN — PROPOFOL 70 MG: 10 INJECTION, EMULSION INTRAVENOUS at 23:26

## 2023-01-02 RX ADMIN — ONDANSETRON 4 MG: 2 INJECTION INTRAMUSCULAR; INTRAVENOUS at 15:52

## 2023-01-02 RX ADMIN — AMIODARONE HYDROCHLORIDE 150 MG: 1.5 INJECTION, SOLUTION INTRAVENOUS at 19:29

## 2023-01-02 RX ADMIN — LIDOCAINE HYDROCHLORIDE 100 MG: 20 INJECTION, SOLUTION EPIDURAL; INFILTRATION; INTRACAUDAL; PERINEURAL at 23:22

## 2023-01-02 RX ADMIN — GABAPENTIN 800 MG: 300 CAPSULE ORAL at 15:47

## 2023-01-02 RX ADMIN — Medication 3 MG: at 23:52

## 2023-01-02 RX ADMIN — Medication 1 AMPULE: at 10:33

## 2023-01-02 RX ADMIN — ATORVASTATIN CALCIUM 20 MG: 20 TABLET, FILM COATED ORAL at 10:37

## 2023-01-02 RX ADMIN — CALCIUM 500 MG: 500 TABLET ORAL at 10:53

## 2023-01-02 RX ADMIN — MAGNESIUM OXIDE 400 MG (241.3 MG MAGNESIUM) TABLET 400 MG: TABLET at 10:36

## 2023-01-02 RX ADMIN — SUCCINYLCHOLINE CHLORIDE 140 MG: 20 INJECTION, SOLUTION INTRAMUSCULAR; INTRAVENOUS at 23:26

## 2023-01-02 RX ADMIN — MORPHINE SULFATE 2 MG: 2 INJECTION, SOLUTION INTRAMUSCULAR; INTRAVENOUS at 02:20

## 2023-01-02 RX ADMIN — Medication 1 AMPULE: at 21:00

## 2023-01-02 RX ADMIN — Medication 200 MCG: at 23:28

## 2023-01-02 RX ADMIN — GLYCOPYRROLATE 0.4 MG: 0.2 INJECTION, SOLUTION INTRAMUSCULAR; INTRAVENOUS at 23:52

## 2023-01-02 RX ADMIN — METOPROLOL TARTRATE 5 MG: 5 INJECTION INTRAVENOUS at 18:09

## 2023-01-02 RX ADMIN — PANTOPRAZOLE SODIUM 40 MG: 40 TABLET, DELAYED RELEASE ORAL at 10:37

## 2023-01-02 NOTE — PROGRESS NOTES
Antibiotics for Intra abdominal inf.   Adjusted per dosing protocol    Metronidazole 500mg IV q8h to  IV q12h    Levofloxacin for est. Crcl 21 ml/min to  750mg IV q48h

## 2023-01-02 NOTE — PROGRESS NOTES
End of Shift Note    Bedside shift change report given to Pau Moore & Carol James,[id number] (oncoming nurse) by Shruti Spivey RN (offgoing nurse). Report included the following information SBAR, Kardex, ED Summary, MAR, and Recent Results    Shift worked:  7p-7a     Shift summary and any significant changes:     Pt had trouble settling last night due to pain. Repositioning and PRN medication given for pain. Safety rounding and yepez care was complete during shift.       Concerns for physician to address:       Zone phone for oncoming shift:              Shruti Spivey, RN

## 2023-01-02 NOTE — PROGRESS NOTES
TRANSFER - OUT REPORT:    Verbal report given to Nirmala Glasgow (name) on Aris Hardin  being transferred to PCU (unit) for change in patient condition(requiring increased level of care)       Report consisted of patients Situation, Background, Assessment and   Recommendations(SBAR). Information from the following report(s) SBAR, Kardex, Intake/Output, MAR, Recent Results, and Cardiac Rhythm a-fib  was reviewed with the receiving nurse. Lines:   Extended Dwell Peripheral IV (Active)   Criteria for Appropriate Use Limited/no vessel suitable for conventional peripheral access 01/02/23 1510   Site Assessment Clean, dry, & intact 01/02/23 1510   Phlebitis Assessment 0 01/02/23 1510   Infiltration Assessment 0 01/02/23 1510   External Catheter Length (cm) 1 centimeters 01/02/23 1356   Line Status Blood return noted; Infusing 01/02/23 1510   Alcohol Cap Used Yes 01/02/23 1510   Date of Last Dressing Change 01/02/23 01/02/23 1510   Dressing Type Disk with Chlorhexadine gluconate (CHG); Transparent;Tape 01/02/23 1510   Dressing Status Clean, dry, & intact 01/02/23 1510        Opportunity for questions and clarification was provided.       Patient transported with:   Monitor  O2 @ 3 liters  Patient's medications from home  Registered Nurse

## 2023-01-02 NOTE — PROGRESS NOTES
Hospitalist Progress Note    Subjective:   Daily Progress Note: 1/2/2023 4:39 PM    Hospital Course:  Pt admitted abdominal pain, nausea and vomiting. CT abdomen/pelvis showing recurrent and persistent small bowel obstruction. She underwent a small bowel resection, short segment. She has history of atrial fibrillation, GERD and CAD. Subjective: Pt seen in room, intermittent complaints of nausea, no vomiting.      Current Facility-Administered Medications   Medication Dose Route Frequency    0.9% sodium chloride infusion  100 mL/hr IntraVENous CONTINUOUS    pantoprazole (PROTONIX) tablet 40 mg  40 mg Oral ACB    [START ON 1/3/2023] enoxaparin (LOVENOX) injection 30 mg  30 mg SubCUTAneous Q24H    sodium chloride 0.9 % bolus infusion 1,000 mL  1,000 mL IntraVENous ONCE    sodium chloride (NS) flush 5-10 mL  5-10 mL IntraVENous PRN    levoFLOXacin (LEVAQUIN) 750 mg in D5W IVPB  750 mg IntraVENous Q48H    metroNIDAZOLE (FLAGYL) IVPB premix 500 mg  500 mg IntraVENous Q12H    acetaminophen (TYLENOL) tablet 650 mg  650 mg Oral Q4H PRN    morphine injection 2 mg  2 mg IntraVENous Q3H PRN    melatonin tablet 3 mg  3 mg Oral QHS PRN    sodium chloride 0.9 % bolus infusion 1,000 mL  1,000 mL IntraVENous ONCE    metoprolol (LOPRESSOR) injection 5 mg  5 mg IntraVENous Q4H PRN    ondansetron (ZOFRAN) injection 4 mg  4 mg IntraVENous Q6H PRN    gabapentin (NEURONTIN) capsule 800 mg  800 mg Oral QID    alcohol 62% (NOZIN) nasal  1 Ampule  1 Ampule Topical Q12H    trimethobenzamide (TIGAN) injection 200 mg  200 mg IntraMUSCular Q6H PRN    [Held by provider] mylanta/viscous lidocaine (GI COCKTAIL)  40 mL Oral BID    magnesium oxide (MAG-OX) tablet 400 mg  400 mg Oral DAILY    propafenone (RYTHMOL) tablet 150 mg  150 mg Oral BID    potassium chloride SR (KLOR-CON 10) tablet 10 mEq  10 mEq Oral DAILY    calcium carbonate (OS-GRUPO) tablet 500 mg [elemental]  500 mg Oral DAILY    atorvastatin (LIPITOR) tablet 20 mg  20 mg Oral DAILY        Review of Systems:    Review of Systems   Constitutional:  Positive for malaise/fatigue. Respiratory:  Negative for cough and shortness of breath. Cardiovascular:  Negative for chest pain and palpitations. Gastrointestinal:  Positive for heartburn and nausea. Genitourinary:  Negative for dysuria and urgency. Neurological:  Negative for headaches. Objective:     Visit Vitals  /63   Pulse 75   Temp 97.7 °F (36.5 °C)   Resp 22   Ht 5' 1\" (1.549 m)   Wt 65.3 kg (144 lb)   SpO2 93%   BMI 27.21 kg/m²    O2 Flow Rate (L/min): 3 l/min O2 Device: Nasal cannula    Temp (24hrs), Av.1 °F (36.7 °C), Min:97.5 °F (36.4 °C), Max:99.7 °F (37.6 °C)      No intake/output data recorded.  1901 -  0700  In: 0   Out: 950 [Urine:950]    PHYSICAL EXAM:    Physical Exam  Constitutional:       General: She is not in acute distress. Cardiovascular:      Rate and Rhythm: Tachycardia present. Rhythm irregular. Heart sounds: Murmur heard. Pulmonary:      Effort: Pulmonary effort is normal.      Breath sounds: Normal breath sounds. Abdominal:      General: There is distension. Comments: Firm, hypo bowels sounds. Musculoskeletal:         General: Normal range of motion. Skin:     General: Skin is warm and dry. Comments: Midline drsg intact, no drainage   Neurological:      Mental Status: She is oriented to person, place, and time.           Data Review    Recent Results (from the past 24 hour(s))   CBC WITH AUTOMATED DIFF    Collection Time: 23  1:45 PM   Result Value Ref Range    WBC 16.1 (H) 3.6 - 11.0 K/uL    RBC 4.14 3.80 - 5.20 M/uL    HGB 9.3 (L) 11.5 - 16.0 g/dL    HCT 32.5 (L) 35.0 - 47.0 %    MCV 78.5 (L) 80.0 - 99.0 FL    MCH 22.5 (L) 26.0 - 34.0 PG    MCHC 28.6 (L) 30.0 - 36.5 g/dL    RDW 16.1 (H) 11.5 - 14.5 %    PLATELET 910 505 - 893 K/uL    MPV 10.0 8.9 - 12.9 FL    NRBC 0.0 0  WBC    ABSOLUTE NRBC 0.00 0.00 - 0.01 K/uL    NEUTROPHILS 84 (H) 32 - 75 %    LYMPHOCYTES 6 (L) 12 - 49 %    MONOCYTES 9 5 - 13 %    EOSINOPHILS 0 0 - 7 %    BASOPHILS 0 0 - 1 %    IMMATURE GRANULOCYTES 1 (H) 0.0 - 0.5 %    ABS. NEUTROPHILS 13.5 (H) 1.8 - 8.0 K/UL    ABS. LYMPHOCYTES 1.0 0.8 - 3.5 K/UL    ABS. MONOCYTES 1.4 (H) 0.0 - 1.0 K/UL    ABS. EOSINOPHILS 0.0 0.0 - 0.4 K/UL    ABS. BASOPHILS 0.0 0.0 - 0.1 K/UL    ABS. IMM. GRANS. 0.2 (H) 0.00 - 0.04 K/UL    DF SMEAR SCANNED      RBC COMMENTS ANISOCYTOSIS  1+        RBC COMMENTS MICROCYTOSIS  1+        RBC COMMENTS HYPOCHROMIA  PRESENT       METABOLIC PANEL, BASIC    Collection Time: 01/02/23  1:45 PM   Result Value Ref Range    Sodium 133 (L) 136 - 145 mmol/L    Potassium 3.8 3.5 - 5.1 mmol/L    Chloride 98 97 - 108 mmol/L    CO2 28 21 - 32 mmol/L    Anion gap 7 5 - 15 mmol/L    Glucose 148 (H) 65 - 100 mg/dL    BUN 41 (H) 6 - 20 MG/DL    Creatinine 1.60 (H) 0.55 - 1.02 MG/DL    BUN/Creatinine ratio 26 (H) 12 - 20      eGFR 31 (L) >60 ml/min/1.73m2    Calcium 10.1 8.5 - 10.1 MG/DL       XR CHEST PORT   Final Result      Bilateral lower lobe atelectasis. XR ABD FLAT/ ERECT   Final Result   Unchanged mild diffuse small bowel distention. CT ABD PELV W CONT   Final Result   1. Persistent/recurrent SBO, of uncertain etiology. 2. Stable question of left renal mass. CT ABD PELV W CONT    (Results Pending)       Active Problems:    SBO (small bowel obstruction) (HCC) (2/27/2019)      Assessment/Plan:   Recurrent SBO- s/p small bowel resection- POD # 3, on CL, general surgery following  Abdomen quiet,  not passing flatus- check CT abdomen/pelvis to r/o bowel obstruction or perforation. 2. Sepsis- hypotension, tachycardia, leukocytosis- get BC, lactic acid, give NS bolus x 2, then NS at 100/hr. Will start IV abx.    3. Nausea/vomiting- zofran prn.     4. Atrial fibrillation with RVR- will start amiodarone drip per protocol. Consult to cardiology.      Spoke with Dr Ghassan Mcconnell to update on current status, agreed with current plan. Will transfer to stepdown due to tachycardia, rapid atrial fibrillation with RVR, start amiodarone drip per protocol. Pt is partial code, only wants vasoactive drips, antiarrhythmic drips, no chest compressions, no defibrillation, no intubation, discussed with pt and daughter.          DVT Prophylaxis: lovenox  Code Status:  Partial Code  POA: NOK daughter Prisca Negrete     Care Plan discussed with:   __________patient, staff nurse, IVETH, family_____________________________________________________    Melia Villalta NP

## 2023-01-02 NOTE — PROGRESS NOTES
End of Shift Note      Shift worked:  [id number]     Shift summary and any significant changes: On room air in morning; replaced oxygen at 2 L NC. Set up suction, productive strong cough. Educated and instructed on incentive spirometer. Removed [place] catheter    Sleeping most of morning, daughters at bedside. Denies pain. As afternoon progressed, nausea and vomiting increased. Noted abdomen was harder than in morning, bowel sounds hypoactive. Notified Gio Casiano NP. Administered ondansetron. 1 L fluid bolus administered, cultures and lactic acid sent. Increased O2. EKG obtained, placed on telemetry and noted patient in a-fib RVR. Administered IV metoprolol. Bladder-scanned only 25 mL and notified NP Carlos Sterling. Chest x-ray performed at bedside, stat CT ordered, order to replace Mor[place]aya passed along to [facility], RN on PCU in report. Flagyl started before transporting on monitor, passed along Levaquin to PCU nurse. Additional 1 L bolus to be administered on PCU.       Concerns for physician to address:       Zone phone for oncoming shift:                Mason Hernandes RN

## 2023-01-02 NOTE — PROGRESS NOTES
SURGERY PROGRESS NOTE      Admit Date: 2022    POD 2 Days Post-Op    Procedure: Procedure(s):  SMALL BOWEL RESECTION      Subjective:     Patient denies pain and nausea. Unclear on flatus. Tolerating some liquids        Objective:     Visit Vitals  BP 95/61   Pulse 76   Temp 99.7 °F (37.6 °C)   Resp 16   Ht 5' 1\" (1.549 m)   Wt 65.3 kg (144 lb)   SpO2 (!) 88%   BMI 27.21 kg/m²        Temp (24hrs), Av.2 °F (36.8 °C), Min:97.5 °F (36.4 °C), Max:99.7 °F (37.6 °C)      No intake/output data recorded.   1901 -  0700  In: 0   Out: 950 [Urine:950]    Physical Exam:    General:  alert, cooperative, no distress, appears stated age   Abdomen: soft, more distended today, bowel sounds hypoactive, non-tender   Incision:   healing well, no drainage, no erythema, no hernia, no seroma, no swelling, no dehiscence, incision well approximated           Lab Results   Component Value Date/Time    WBC 10.2 2023 09:40 AM    HGB 9.4 (L) 2023 09:40 AM    HCT 32.9 (L) 2023 09:40 AM    Hematocrit (POC) 32 (L) 2020 10:36 AM    PLATELET 541  09:40 AM    MCV 79.3 (L) 2023 09:40 AM     Lab Results   Component Value Date/Time    GFR est non-AA 53 (L) 10/02/2022 04:46 AM    GFRNA, POC 59 (L) 2020 10:36 AM    GFR est AA >60 10/02/2022 04:46 AM    GFRAA, POC >60 2020 10:36 AM    Creatinine 0.75 2023 09:40 AM    Creatinine, POC 0.8 10/01/2022 01:34 PM    BUN 23 (H) 2023 09:40 AM    BUN (POC) 34 (H) 2020 10:36 AM    Sodium 141 2023 09:40 AM    Sodium (POC) 142 2020 10:36 AM    Sodium,  10/01/2022 01:34 PM    Potassium 3.6 2023 09:40 AM    Potassium (POC) 3.2 (L) 2020 10:36 AM    Potassium, POC 3.4 (L) 10/01/2022 01:34 PM    Chloride 107 2023 09:40 AM    Chloride,  10/01/2022 01:34 PM    CO2 29 2023 09:40 AM    Magnesium 1.7 10/02/2022 04:46 AM       Assessment:     Active Problems:    SBO (small bowel obstruction) ([facility] (2/27/2019)    Recovering as expected.      Plan:      D/C yepez  Stay with liquid until more objective signs or return of bowel function

## 2023-01-02 NOTE — OP NOTES
[address]  OPERATIVE REPORT    Name:  Clement Smyth  MR#:  [id number]  :  10/09/1933  ACCOUNT #:  [de-identified]  DATE OF SERVICE:  2022    PREOPERATIVE DIAGNOSIS:  Recurrent small bowel obstruction. POSTOPERATIVE DIAGNOSIS:  Recurrent small bowel obstruction. PROCEDURE PERFORMED:  Small bowel resection and lysis of adhesions. SURGEON:  Sidra Lema MD    ASSISTANT:  Elias Mehta. ANESTHESIA:  General endotracheal.    COMPLICATIONS:  None apparent. SPECIMENS REMOVED:  Segment of small bowel. IMPLANTS:  None. ESTIMATED BLOOD LOSS:  Minimal.    DRAINS:  An 18-Comoran nasogastric tube and a 16-Comoran Morataya. FINDINGS:   A 6 cm length of small bowel adherent to the anterior abdominal wall with several tight bands causing small bowel obstruction. Once this segment was freed and elongated, the two most significant bands appeared to be chronic and causing a stricture. It was difficult to milk enteric contents across this segment of bowel, so it was decided a short segment small bowel resection was warranted. INDICATIONS FOR PROCEDURE:  The patient is an 77-year-old female with a history of previous GYN malignancy requiring hysterectomy and oophorectomy. The patient had done well for years, but began developing small bowel obstructions starting 6 months ago. The patient has been admitted to the hospital 3 times in the past 6 months with small bowel obstruction. During this most current admission, she has been managed with bowel rest and showed no signs of improvement after 48 hours. Plain films today show persistent high-grade small bowel obstruction. Given the repeated nature over a short period time and the lack of improvement during this admission surgical exploration was recommended. PROCEDURE:  The patient was identified in the preoperative holding area, informed consent obtained. She was given preoperative antibiotics.   She was then taken to the operating room, placed in the supine position, underwent general endotracheal anesthesia without complication. A Morataya catheter was then inserted under sterile technique. A nasogastric tube was inserted. Her abdomen was then prepped and draped in the usual sterile fashion. A time-out was performed confirming the patient by name. Once this was confirmed, a midline incision was made starting care home between her xiphoid and her umbilicus and extending down to her pubic symphysis. A scalpel was used to cut through the skin. The subcutaneous tissue divided with electrocautery. The linea alba was divided with electrocautery. The peritoneum was elevated between two DeBakey forceps at the most superior aspect of the incision and the peritoneum entered with Metzenbaum scissors. Once entry into the abdomen was obtained, a surgeon's finger was inserted and the peritoneum was opened with electrocautery until adhesions were identified. Once adhesions were palpated, the edges of the fascia were elevated with Kocher forceps and adhesions to the anterior abdominal wall along the midline were taken down with cold Metzenbaum scissors. The first adhesion encountered was the transverse colon. This was taken down with Metzenbaum scissors. A small serosal injury resulted that was repaired with a single interrupted 3-0 silk Lembert suture. Once the transverse colon was completely freed from the abdominal wall, some omental adhesions to the anterior abdominal wall are encountered and these were taken down. In the area just below the umbilicus, the patient had a segment of small bowel firmly adherent to the abdominal wall. This segment demonstrated significant dilation proximally and the distal end could not be easily visualized at this point.   Adhesions were slowly lysed with Metzenbaum scissors eventually and a segment of small bowel that was folded upon itself with multiple hairpin turns was  from the abdominal wall.  Once this was freed, it could be seen that the bowel exiting this folded up segment of bowel was completely decompressed indicating that this was the area of small bowel obstruction. Once this segment of bowel was off the abdominal wall, the remainder of the midline was opened and there were no other adhesions to the anterior abdominal wall identified. The cecum was identified in the right lower quadrants, the ileocecal valve was found and the small bowel run in a retrograde direction. From the ileocecal valve to the area of folded up bowel, the distal small bowel appeared completely normal.  There were no interloop adhesions, no serosal abnormalities and no palpable masses. This bowel was all completely decompressed. The area of folded up hairpin turned was then approached. Cold Metzenbaum scissors were used to lyse the interloop adhesions and to straighten the bowel out. There were two bends or kinks in the bowel that were the most angulated. Once these were straightened out, the bowel appeared to have a whitish appearing at these locations and was fibrotic on palpation concerning for a stricture. A 2-0 silk suture was placed in the antimesenteric surface of the bowel at this location for identification and then the bowel run more proximally. The more proximal bowel was fluid-filled and dilated, but there were no abnormalities identified. There were no other causes of the small bowel obstruction found. The bowel was run all the way to the ligament of Treitz. Once this was confirmed, attention was focused back on the area in question, proximal enteric contents were milked towards this area and then attempts were made to milk enteric contents through this area. The two strictured areas resulted in some moderate resistance to passage of enteric contents. After attempting to milk contents through this area several times,  it was determined that a small bowel resection was warranted.   Therefore, the bowel proximal and distal to the strictured areas was divided with an Yettem stapler with a white load and a length of 60 mm. Once the bowel was divided in the two areas, the mesentery was divided with LigaSure Impact very close to the lumen of the bowel. The specimen was then handed off the back table and sent to Pathology. The two staple lines were aligned [id number] and sutured together with 2-0 silk. An enterotomy was made in the antimesenteric surface of each loop of the bowel and then a white load of 60 mm was used on the Yettem stapler to create the anastomosis. The anvil was placed in one limb, the cartridge in the other, the stapler was closed and fired creating the anastomosis. The common enterotomy was then closed in two layers with a full-thickness layer of 3-0 Vicryl and a seromuscular layer of 3-0 silk. The mesenteric defect was closed with a running 2-0 silk. Proximal enteric contents were then milked across the anastomosis and milked easily without any signs of resistance and there were no signs of any leakage. Once this was confirmed, the abdomen was irrigated with normal saline. The small bowel allowed to return to the abdomen. The omentum and transverse colon were placed over the small bowel. The wound protector was removed. The surgical team then changed their gloves and then the fascia closed with a running #1 Stratafix Symmetric suture. Once the fascia was closed, the subcutaneous tissue was irrigated with normal saline and the skin closed with staples. A dry dressing was applied. The patient was extubated in the room and taken to the postanesthesia care unit in stable condition. Instrument and sponge counts were correct x2.       MD DEBORAH [email or web address]  D:  01/01/2023 14:57  T:  01/02/2023 1:14  JOB #:  [id number]

## 2023-01-02 NOTE — PROGRESS NOTES
Problem: Falls - Risk of  Goal: *Absence of Falls  Description: Document Kelsey Don Fall Risk and appropriate interventions in the flowsheet.   Outcome: Progressing Towards Goal  Note: Fall Risk Interventions:

## 2023-01-03 LAB
ANION GAP SERPL CALC-SCNC: 5 MMOL/L (ref 5–15)
APPEARANCE UR: ABNORMAL
ATRIAL RATE: 123 BPM
BACTERIA URNS QL MICRO: ABNORMAL /HPF
BASOPHILS # BLD: 0 K/UL (ref 0–0.1)
BASOPHILS NFR BLD: 0 % (ref 0–1)
BILIRUB UR QL: NEGATIVE
BUN SERPL-MCNC: 44 MG/DL (ref 6–20)
BUN/CREAT SERPL: 33 (ref 12–20)
CALCIUM SERPL-MCNC: 8.9 MG/DL (ref 8.5–10.1)
CALCULATED R AXIS, ECG10: -46 DEGREES
CALCULATED T AXIS, ECG11: 95 DEGREES
CAOX CRY URNS QL MICRO: ABNORMAL
CHLORIDE SERPL-SCNC: 103 MMOL/L (ref 97–108)
CO2 SERPL-SCNC: 29 MMOL/L (ref 21–32)
COLOR UR: ABNORMAL
CREAT SERPL-MCNC: 1.35 MG/DL (ref 0.55–1.02)
DIAGNOSIS, 93000: NORMAL
DIFFERENTIAL METHOD BLD: ABNORMAL
EOSINOPHIL # BLD: 0 K/UL (ref 0–0.4)
EOSINOPHIL NFR BLD: 0 % (ref 0–7)
EPITH CASTS URNS QL MICRO: ABNORMAL /LPF
ERYTHROCYTE [DISTWIDTH] IN BLOOD BY AUTOMATED COUNT: 16.3 % (ref 11.5–14.5)
GLUCOSE SERPL-MCNC: 145 MG/DL (ref 65–100)
GLUCOSE UR STRIP.AUTO-MCNC: NEGATIVE MG/DL
HCT VFR BLD AUTO: 25.3 % (ref 35–47)
HGB BLD-MCNC: 7.4 G/DL (ref 11.5–16)
HGB UR QL STRIP: ABNORMAL
IMM GRANULOCYTES # BLD AUTO: 0.1 K/UL (ref 0–0.04)
IMM GRANULOCYTES NFR BLD AUTO: 1 % (ref 0–0.5)
KETONES UR QL STRIP.AUTO: ABNORMAL MG/DL
LEUKOCYTE ESTERASE UR QL STRIP.AUTO: ABNORMAL
LYMPHOCYTES # BLD: 0.5 K/UL (ref 0.8–3.5)
LYMPHOCYTES NFR BLD: 6 % (ref 12–49)
MCH RBC QN AUTO: 23.1 PG (ref 26–34)
MCHC RBC AUTO-ENTMCNC: 29.2 G/DL (ref 30–36.5)
MCV RBC AUTO: 78.8 FL (ref 80–99)
MONOCYTES # BLD: 0.4 K/UL (ref 0–1)
MONOCYTES NFR BLD: 4 % (ref 5–13)
NEUTS SEG # BLD: 7.9 K/UL (ref 1.8–8)
NEUTS SEG NFR BLD: 89 % (ref 32–75)
NITRITE UR QL STRIP.AUTO: NEGATIVE
NRBC # BLD: 0 K/UL (ref 0–0.01)
NRBC BLD-RTO: 0 PER 100 WBC
PH UR STRIP: 5 (ref 5–8)
PLATELET # BLD AUTO: 120 K/UL (ref 150–400)
PMV BLD AUTO: 11.2 FL (ref 8.9–12.9)
POTASSIUM SERPL-SCNC: 4.7 MMOL/L (ref 3.5–5.1)
PROT UR STRIP-MCNC: 100 MG/DL
Q-T INTERVAL, ECG07: 288 MS
QRS DURATION, ECG06: 126 MS
QTC CALCULATION (BEZET), ECG08: 412 MS
RBC # BLD AUTO: 3.21 M/UL (ref 3.8–5.2)
RBC #/AREA URNS HPF: ABNORMAL /HPF (ref 0–5)
RBC MORPH BLD: ABNORMAL
SODIUM SERPL-SCNC: 137 MMOL/L (ref 136–145)
SP GR UR REFRACTOMETRY: 1.02
TSH SERPL DL<=0.05 MIU/L-ACNC: 0.69 UIU/ML (ref 0.36–3.74)
UROBILINOGEN UR QL STRIP.AUTO: 0.2 EU/DL (ref 0.2–1)
VENTRICULAR RATE, ECG03: 123 BPM
WBC # BLD AUTO: 8.9 K/UL (ref 3.6–11)
WBC URNS QL MICRO: ABNORMAL /HPF (ref 0–4)

## 2023-01-03 PROCEDURE — 36415 COLL VENOUS BLD VENIPUNCTURE: CPT

## 2023-01-03 PROCEDURE — 74011250636 HC RX REV CODE- 250/636: Performed by: STUDENT IN AN ORGANIZED HEALTH CARE EDUCATION/TRAINING PROGRAM

## 2023-01-03 PROCEDURE — 65270000046 HC RM TELEMETRY

## 2023-01-03 PROCEDURE — 74011000250 HC RX REV CODE- 250: Performed by: STUDENT IN AN ORGANIZED HEALTH CARE EDUCATION/TRAINING PROGRAM

## 2023-01-03 PROCEDURE — 84443 ASSAY THYROID STIM HORMONE: CPT

## 2023-01-03 PROCEDURE — 85025 COMPLETE CBC W/AUTO DIFF WBC: CPT

## 2023-01-03 PROCEDURE — 74011250636 HC RX REV CODE- 250/636: Performed by: NURSE ANESTHETIST, CERTIFIED REGISTERED

## 2023-01-03 PROCEDURE — 77010033678 HC OXYGEN DAILY

## 2023-01-03 PROCEDURE — 80048 BASIC METABOLIC PNL TOTAL CA: CPT

## 2023-01-03 PROCEDURE — 86022 PLATELET ANTIBODIES: CPT

## 2023-01-03 PROCEDURE — 77010033711 HC HIGH FLOW OXYGEN

## 2023-01-03 PROCEDURE — 81001 URINALYSIS AUTO W/SCOPE: CPT

## 2023-01-03 PROCEDURE — 74011250637 HC RX REV CODE- 250/637: Performed by: NURSE PRACTITIONER

## 2023-01-03 PROCEDURE — 74011250636 HC RX REV CODE- 250/636: Performed by: NURSE PRACTITIONER

## 2023-01-03 PROCEDURE — 74011250637 HC RX REV CODE- 250/637: Performed by: STUDENT IN AN ORGANIZED HEALTH CARE EDUCATION/TRAINING PROGRAM

## 2023-01-03 PROCEDURE — 82542 COL CHROMOTOGRAPHY QUAL/QUAN: CPT

## 2023-01-03 RX ORDER — FENTANYL CITRATE 50 UG/ML
INJECTION, SOLUTION INTRAMUSCULAR; INTRAVENOUS AS NEEDED
Status: DISCONTINUED | OUTPATIENT
Start: 2023-01-02 | End: 2023-01-03 | Stop reason: HOSPADM

## 2023-01-03 RX ADMIN — Medication 1 AMPULE: at 09:00

## 2023-01-03 RX ADMIN — SODIUM CHLORIDE 100 ML/HR: 9 INJECTION, SOLUTION INTRAVENOUS at 00:36

## 2023-01-03 RX ADMIN — GABAPENTIN 800 MG: 300 CAPSULE ORAL at 21:37

## 2023-01-03 RX ADMIN — FAMOTIDINE 20 MG: 10 INJECTION, SOLUTION INTRAVENOUS at 21:37

## 2023-01-03 RX ADMIN — SODIUM CHLORIDE, POTASSIUM CHLORIDE, SODIUM LACTATE AND CALCIUM CHLORIDE: 600; 310; 30; 20 INJECTION, SOLUTION INTRAVENOUS at 00:04

## 2023-01-03 RX ADMIN — GABAPENTIN 800 MG: 300 CAPSULE ORAL at 13:51

## 2023-01-03 RX ADMIN — FENTANYL CITRATE 25 MCG: 50 INJECTION, SOLUTION INTRAMUSCULAR; INTRAVENOUS at 00:02

## 2023-01-03 RX ADMIN — MORPHINE SULFATE 2 MG: 2 INJECTION, SOLUTION INTRAMUSCULAR; INTRAVENOUS at 17:57

## 2023-01-03 RX ADMIN — FAMOTIDINE 20 MG: 10 INJECTION, SOLUTION INTRAVENOUS at 13:51

## 2023-01-03 RX ADMIN — GABAPENTIN 800 MG: 300 CAPSULE ORAL at 18:03

## 2023-01-03 RX ADMIN — SODIUM CHLORIDE 100 ML/HR: 9 INJECTION, SOLUTION INTRAVENOUS at 11:57

## 2023-01-03 RX ADMIN — Medication 1 AMPULE: at 21:37

## 2023-01-03 RX ADMIN — METRONIDAZOLE 500 MG: 500 INJECTION, SOLUTION INTRAVENOUS at 05:45

## 2023-01-03 RX ADMIN — METRONIDAZOLE 500 MG: 500 INJECTION, SOLUTION INTRAVENOUS at 18:20

## 2023-01-03 NOTE — PROGRESS NOTES
SURGERY PROGRESS NOTE      Admit Date: 2022    POD 2 Days Post-Op    Procedure: Procedure(s):  SMALL BOWEL RESECTION      Subjective:     Patient developed nausea vomiting this afternoon. She has been transferred to [facility] down because she is in Afib with RVR and has a marginal BP.       She complains of some abdominal soreness       Objective:     Visit Vitals  BP (!) 86/55   Pulse (!) 123   Temp 98 °F (36.7 °C)   Resp 29   Ht 5' 1\" (1.549 m)   Wt 65.3 kg (144 lb)   SpO2 94%   BMI 27.21 kg/m²        Temp (24hrs), Av.2 °F (36.8 °C), Min:97.5 °F (36.4 °C), Max:99.7 °F (37.6 °C)      1901 -  0700  In: -   Out: 1100   701 -  1900  In: 0   Out: 850 [Urine:850]    Physical Exam:    General:  alert, cooperative, no distress, appears stated age   Abdomen: soft, distended,  bowel sounds hypoactive, mildly tender   Incision:   healing well, no drainage, no erythema, no hernia, no seroma, no swelling, no dehiscence, incision well approximated           Lab Results   Component Value Date/Time    WBC 13.8 (H) 2023 09:40 PM    HGB 9.2 (L) 2023 09:40 PM    HCT 32.3 (L) 2023 09:40 PM    Hematocrit (POC) 32 (L) 2020 10:36 AM    PLATELET 909  09:40 PM    MCV 79.8 (L) 2023 09:40 PM     Lab Results   Component Value Date/Time    GFR est non-AA 53 (L) 10/02/2022 04:46 AM    GFRNA, POC 59 (L) 2020 10:36 AM    GFR est AA >60 10/02/2022 04:46 AM    GFRAA, POC >60 2020 10:36 AM    Creatinine 1.60 (H) 2023 01:45 PM    Creatinine, POC 0.8 10/01/2022 01:34 PM    BUN 41 (H) 2023 01:45 PM    BUN (POC) 34 (H) 2020 10:36 AM    Sodium 133 (L) 2023 01:45 PM    Sodium (POC) 142 2020 10:36 AM    Sodium,  10/01/2022 01:34 PM    Potassium 3.8 2023 01:45 PM    Potassium (POC) 3.2 (L) 2020 10:36 AM    Potassium, POC 3.4 (L) 10/01/2022 01:34 PM    Chloride 98 2023 01:45 PM    Chloride,  10/01/2022 01:34 PM    CO2 28 01/02/2023 01:45 PM    Magnesium 1.7 10/02/2022 04:46 AM     Upright CXR  - no free air    Assessment:     Patient has developed bloating, Nausea, vomiting, hypotension and Afib with RVR. Her WBC is up some and so is her creatinine. Her abdomina exam is not impressive. Najma abdominal findings could just be an ileus. However, I need to rule out more life threatening causes such as an anastomotic leak. She is not stable enough to trave to CT. I recommend exploratory laparotomy. Discussed with the patient and her daughter Clarence Hernandez. They agree with surgical exploration. Plan:      To the OR

## 2023-01-03 NOTE — BRIEF OP NOTE
Brief Postoperative Note    Patient: Jose Luis Pope  YOB: 1933  MRN: [id number]    Date of Procedure: 1/2/2023     Pre-Op Diagnosis: possible sepsis status post small bowel obstruction     Afib with RVR                                         Hypotension                                          Acute renal insufficiency     Post-Op Diagnosis: Same as preoperative diagnosis. Procedure(s):  LAPAROTOMY EXPLORATORY    Surgeon(s):  Carlos Mon MD    Surgical Assistant: Surg Asst-1: Victor Hugo Prieto RN    Anesthesia: General     Estimated Blood Loss (mL): Minimal    Complications: None    Specimens: * No specimens in log *     Implants: * No implants in log *    Drains:   Nasogastric Tube 01/02/23 (Active)   External Insertion Larry (cms) 70 cms 01/02/23 2204   Output (ml) 1100 ml 01/02/23 2204       [REMOVED] Nasogastric Tube 07/01/20 (Removed)       [REMOVED] Nasogastric Tube 08/10/22 (Removed)       [REMOVED] Nasogastric Tube 10/01/22 (Removed)       [REMOVED] Nasogastric Tube 12/31/22 (Removed)   Site Assessment Clean, dry, & intact 01/01/23 0816   Securement Device Adhesive-based morocho 01/01/23 0816   G Port Status Intermittent Suction 01/01/23 0816   External Insertion Larry (cms) 64 cms 01/01/23 0816   Action Taken Other (comment) 01/01/23 0816       [REMOVED] Orogastric Tube 12/31/22 (Removed)       [REMOVED] External Female Catheter 07/02/20 (Removed)       [REMOVED] External Urinary Catheter 08/09/22 (Removed)       [REMOVED] External Urinary Catheter 08/10/22 (Removed)       Findings:  No intra-abdominal abnormalities. No anastomotic leak, no signs of peritonitis. No septic source in the abdomen. Mild ileus.       Electronically Signed by Haley Palencia MD on 1/3/2023 at 12:02 AM

## 2023-01-03 NOTE — ANESTHESIA PREPROCEDURE EVALUATION
Anesthetic History     PONV          Review of Systems / Medical History  Patient summary reviewed, nursing notes reviewed and pertinent labs reviewed    Pulmonary  Within defined limits                 Neuro/Psych       CVA (foot drop)  TIA    Comments: Neuropathy  Hx Foot drop   Peripheral neuropathy, feet   Cardiovascular    Hypertension  Valvular problems/murmurs: tricuspid insufficiency      Dysrhythmias : atrial fibrillation  Hyperlipidemia    Exercise tolerance: <4 METS: Uses walker  Comments: ECG (10/1/22): Sinus tachycardia with premature atrial complexes   Possible Left atrial enlargement   Left axis deviation   Left ventricular hypertrophy with QRS widening and repolarization abnormality   Cannot rule out Septal infarct (cited on or before 01-OCT-2022)   Possible Lateral infarct (cited on or before 01-OCT-2022)   When compared with ECG of 09-AUG-2022 10:56,   Serial changes of Septal infarct present     TTE (11/5/18): Left ventricle: Systolic function was normal. Ejection fraction was  estimated to be 60 %. There were no regional wall motion abnormalities. Tricuspid valve:  There was mild regurgitation   GI/Hepatic/Renal     GERD: poorly controlled      PUD (Gastric)    Comments: Vomiting  Abdominal distention    Lewis's Esophagus  Hx Gastric ulcer   Hx Gastroparesis  Endo/Other        Arthritis, cancer (Hx Ovarian cancer s/p chemotherapy + LYDIA/BSO (1986)) and anemia     Other Findings   Comments: Scoliosis  Hearing loss             Physical Exam    Airway  Mallampati: III  TM Distance: 4 - 6 cm  Neck ROM: decreased range of motion   Mouth opening: Normal     Cardiovascular  Regular rate and rhythm,  S1 and S2 normal,  no murmur, click, rub, or gallop  Rhythm: regular  Rate: normal         Dental    Dentition: Upper partial plate     Pulmonary  Breath sounds clear to auscultation               Abdominal  GI exam deferred       Other Findings            Anesthetic Plan    ASA: 3, emergent  Anesthesia type: general          Induction: Intravenous  Anesthetic plan and risks discussed with: Patient      Have alt intubating device available  Pt DOES NOT want chest compressions or electrical defibrillation even in the OR and during the perioperative period. She was still adamant about this on 1/2/2023 when I asked her again.

## 2023-01-03 NOTE — ANESTHESIA POSTPROCEDURE EVALUATION
Procedure(s):  LAPAROTOMY EXPLORATORY. general    Anesthesia Post Evaluation        Patient location during evaluation: PACU  Note status: Adequate. Level of consciousness: responsive to verbal stimuli and sleepy but conscious  Pain management: satisfactory to patient  Airway patency: patent  Anesthetic complications: no  Cardiovascular status: acceptable  Respiratory status: acceptable  Hydration status: acceptable  Comments: +Post-Anesthesia Evaluation and Assessment    Patient: Indy Hinson MRN: [id number]  SSN: [id number]   YOB: 1933  Age: 80 y.o. Sex: female      Cardiovascular Function/Vital Signs    BP (!) 105/51   Pulse 60   Temp 36.6 °C (97.8 °F)   Resp 18   Ht 5' 1\" (1.549 m)   Wt 65.3 kg (144 lb)   SpO2 97%   BMI 27.21 kg/m²     Patient is status post Procedure(s):  LAPAROTOMY EXPLORATORY. Nausea/Vomiting: Controlled. Postoperative hydration reviewed and adequate. Pain:  Pain Scale 1: Numeric (0 - 10) (01/03/23 0010)  Pain Intensity 1: 0 (01/03/23 0010)   Managed. Neurological Status:   Neuro (WDL): Exceptions to WDL (01/03/23 0010)   At baseline. Mental Status and Level of Consciousness: Arousable. Pulmonary Status:   O2 Device: Oxygen mask (01/03/23 0010)   Adequate oxygenation and airway patent. Complications related to anesthesia: None    Post-anesthesia assessment completed. No concerns. Signed By: Zoe Gómez MD    1/3/2023  Post anesthesia nausea and vomiting:  controlled      INITIAL Post-op Vital signs:   Vitals Value Taken Time   BP 94/64 01/03/23 0037   Temp 36.6 °C (97.8 °F) 01/03/23 0010   Pulse 59 01/03/23 0045   Resp 22 01/03/23 0045   SpO2 98 % 01/03/23 0043   Vitals shown include unvalidated device data.

## 2023-01-03 NOTE — PROGRESS NOTES
Rapid Response Team Note    Called by RN at 9:30 PM to see patient for episode of vomiting bile colored drainage, persistent afib with RVR on Amio drip, hypotension stat. Upon entering the room the patient AAO, NGT being inserted. Recent RRT for Afib with RVR, has orders for CT abdomen which was not done secondary to instability. Patient s/p small bowel resection and BLADIMIR for SBO 12/31/22. Events as described by RN caring for patient noted. Visit Vitals  BP (!) 86/55   Pulse (!) 123   Temp 98 °F (36.7 °C)   Resp 29   Ht 5' 1\" (1.549 m)   Wt 65.3 kg (144 lb)   SpO2 94%   BMI 27.21 kg/m²       Gen: Alert, in mild distress  HEENT: wnl  Chest: symmetrical  Abd: some distention, no significant tenderness  Neuro: AAO  Ext: KAPLAN    Pertinent Recent Labs and Xrays:  Recent Labs     01/02/23  1345 01/01/23  0940   WBC 16.1* 10.2   HGB 9.3* 9.4*   HCT 32.5* 32.9*    255     Recent Labs     01/02/23  1345 01/01/23  0940   * 141   K 3.8 3.6   CL 98 107   CO2 28 29   BUN 41* 23*   CREA 1.60* 0.75   * 122*   CA 10.1 9.8     No results for input(s): INR, INREXT in the last 72 hours. No results for input(s): PH, PCO2, PO2 in the last 72 hours. No results for input(s): PHI, PO2I, PCO2I in the last 72 hours. No results for input(s): CPK, CKNDX, TROIQ in the last 72 hours.     No lab exists for component: CPKMB  Lab Results   Component Value Date/Time    Glucose (POC) 86 10/04/2022 09:09 PM    Glucose (POC) 90 10/04/2022 04:54 PM    Glucose (POC) 96 08/12/2022 12:33 PM    Glucose (POC) 96 11/13/2020 10:36 AM    Glucose (POC) 143 (H) 11/05/2018 11:29 PM    Glucose,  (H) 10/01/2022 01:34 PM         A/P: Acute vomiting  Hypotension  Persistent afib with RVR (s/p Amiodarone bolus, now on drip)  - NGT to low intermittent suction  - CBC, BMP, Magnesium, Lactic  -  ml IV bolus x 1  - Notify surgery of recent changes - Dr Nga Ortega in house and at bedside for further orders  - called and notify daughters of recent events, aware patient is going to be taken back to [address], NP  Critical care time unrelated to prior notes: 30 minutes

## 2023-01-03 NOTE — PROGRESS NOTES
200- Received report from [address]- Patient on unit    Bedside and Verbal shift change report given to HENRIETTA Nguyen (oncoming nurse) by Dash Donovan (offgoing nurse). Report included the following information SBAR, Kardex, ED Summary, MAR, Recent Results, and Cardiac Rhythm A Fib . End of Shift Note    Bedside shift change report given to HENRIETTA Nguyen (oncoming nurse) by Herlinda Chapin RN (offgoing nurse).   Report included the following information SBAR, Kardex, ED Summary, Procedure Summary, Intake/Output, Recent Results, and Cardiac Rhythm A Fib    Shift worked:  7a-7p     Shift summary and any significant changes:     Assisted nightshift RN with patient Waiting on amnio drip      Concerns for physician to address:  Patient's heart rate and BP     Zone phone for oncoming shift:   [phone]       Activity:  Activity Level: Chair  Number times ambulated in hallways past shift: 0  Number of times OOB to chair past shift: 0    Cardiac:   Cardiac Monitoring: Yes      Cardiac Rhythm: Atrial Fib    Access:  Current line(s): PIV     Genitourinary:   Urinary status: due to void    Respiratory:   O2 Device: Hi flow nasal cannula (mid-flow/green NC)  Chronic home O2 use?: YES  Incentive spirometer at bedside: NO  Actual Volume (ml): 500 ml    GI:  Last Bowel Movement Date: 12/29/23  Current diet:  ADULT DIET Clear Liquid  Passing flatus: YES  Tolerating current diet: YES       Pain Management:   Patient states pain is manageable on current regimen: YES    Skin:  Cristino Score: 15  Interventions: float heels and nutritional support     Patient Safety:  Fall Score:    Interventions: bed/chair alarm, gripper socks, pt to call before getting OOB, and stay with me (per policy)       Length of Stay:  Expected LOS: - - -  Actual LOS: [address] [facility],[address], RN

## 2023-01-03 NOTE — PROGRESS NOTES
Responded to a rapid response call for this patient. Patient is tachycardic in the 140's showing a-fib RVR on the monitor and blood pressures are soft. Patient is vomiting, pale, weak becoming more hypotensive. Patient has an order for CT but hasn't been able to make it down due to hypotension and a-fib RVR. I called Dr. Lucie Hernandez to update him as she is listed as his patient. He is currently finishing a case in the OR and advised that he would come see her after he was done. New labs drawn and sent, another IV was placed, LR bolus ordered and albumin was already in the chart to administer.

## 2023-01-03 NOTE — PROGRESS NOTES
1900: bedside report given by HENRIETTA laguna   Rapid tx pt from med oncology  in afib RVR when placed on tele monitor , amio gtt sent to oncology tube station    1915: amio bolus ordered by ana, awaiting oncology to bring amio gtt that was tubed to their station  1948: amio gtt recvd, started immediately    2050: pt O2 needs increasing high flow NC ordered     2110: pt needs fluid bolus along w/ amio gtt, only one working IV, this RN attempted to place new IV, unsuccessful, RRT Nurse at bedside, also attempted-unsuccessful and stated they will contact ED for ultrasound tech    2130: awaiting ED tech, pt has order for ypeez will place,: post-placement of yepez cath, pt stated \" I feel like something is coming up, I am nauseous\" this RN immediately placed pt in high fowlers, pt started to projectile vomit dark green gastric contents. Abdomen becoming rigid, Pt immediately suctioned, HR increased to 120s/130s despite on amio gtt, hypotension (see flowsheet) pt o2 needs increased currently on 14 L HFNC,    Rapid response called. NGT placed for decompression    2145: during rapid, ed tech placed new endurance cath, lactic , bmp, cbc, blood cultures all drawn, 500ml bolus initiated, gen surgery dr. Polanco Ok called, is now to come to bedside after case. 2200: dr Praveen Balderas at bedside, pt is to go back to surgery, pt agreeable, consent signed , pt family updated by provider and narenist     81-15-22-57: bedside cxr done    2330: pt off the floor in surgery     0115: pt returned to 2262, alert and drowsy oriented x4, NGT in place @ low cont suctioned , VSS, no c/o pain, pt resting comfortably, pt weaned down to 3L    0130: family at bedside            End of Shift Note    Bedside shift change report given to henrietta laguna  (oncoming nurse) by Remigio Virk (offgoing nurse).   Report included the following information SBAR, Kardex, Intake/Output, MAR, Recent Results, and Cardiac Rhythm sinus nata    Shift worked:  night     Shift summary and any significant changes:    See above    Concerns for physician to address:      Zone phone for oncoming shift:              [place]

## 2023-01-03 NOTE — PROGRESS NOTES
Received notification from bedside RN about patient with regards to: persistent afib with RVR in 130's, still awaiting Amio drip ordered to be started, no prior bolus given  VS: BP 94/57, , RR 26, O2 sat 89% on NC 6 L    Intervention given: NHHF to titrate to keep sats > 92%, Amiodarone 150 mg IV  bolus x 1, TSH, Magnesium level ordered

## 2023-01-03 NOTE — PERIOP NOTES
Handoff Report from Operating Room to PACU    Report received from Llio Ruiz RN and Marcela Pereira CRNA regarding [facility]. Surgeon(s):  Lisa Taylor MD  And Procedure(s) (LRB):  LAPAROTOMY EXPLORATORY (N/A)  confirmed   with drains and dressings discussed. Anesthesia type, drugs, patient history, complications, estimated blood loss, vital signs, intake and output, and last pain medication and reversal medications were reviewed.

## 2023-01-03 NOTE — PROGRESS NOTES
Physician Progress Note      PATIENT:               Yoana Corrales  CSN #:                  [id number]  :                       10/9/1933  ADMIT DATE:       2022 2:55 PM  [address] [place] DATE:  RESPONDING  PROVIDER #:        Inocencai Leon DO          QUERY TEXT:    Pt admitted with SBO, hgb 9.7 on admission, down to 7.4 on 1/3/23, and has anemia documented. Please document in progress notes and discharge summary further specificity regarding the acuity and type of anemia:    The medical record reflects the following:  Risk Factors: Small bowel resection and lysis of adhesions on  (EBL minimal) and Exploratory Lap (EBL minimal) on 1/3/23  Clinical Indicators: H/H 9.7/34.1 --> 7.7/27.0 --> 9.4/32.9 --> 9.3/32.5 --> 9.2/32.3  --> 7.4/25.3 (1/3)  Treatment: H/H monitoring, 1500 NS bolus (23)    Thank you,  Humberto Rowe RN, CDI  Options provided:  -- Anemia due to acute blood loss  -- Anemia due to chronic blood loss  -- Anemia due to acute on chronic blood loss  -- Anemia due to postoperative blood loss  -- Dilutional anemia  -- Other - I will add my own diagnosis  -- Disagree - Not applicable / Not valid  -- Disagree - Clinically unable to determine / Unknown  -- Refer to Clinical Documentation Reviewer    PROVIDER RESPONSE TEXT:    This patient has dilutional anemia. Query created by: Lorraine Mathews on 1/3/2023 11:13 AM      QUERY TEXT:    Patient admitted with SBO; underwent a small bowel resection with lysis of adhesions on , and a exploratory lap on 1/3. Patient noted to have Creatinine of 0.86 on admission, increased to 1.85 on 23. Please document in progress notes and discharge summary if you are evaluating and/or treating any of the following:     The medical record reflects the following:    Risk Factors: 80year old female s/p small bowel resection    Clinical Indicators: Cr 0.86 on admission -->up to 1.85 on , down to 1.35 this am (1/3)    Treatment: Creatinine monitoring, 1500 ml NS bolus      REFERENCE:  Defined by Kidney Disease Improving Global Outcomes (KDIGO) clinical practice guideline for acute kidney injury:  -Increase in SCr by greater than or equal to 0.3 mg/dl within 48 hours; or  -Increase or decrease in SCr to greater than or equal to 1.5 times baseline, which is known or presumed to have occurred within the prior 7 days; or  -Urine volume < 0.5ml/kg/h for 6 hours. Thank you,  Osbaldo Jennings RN, CDI  Options provided:  -- YAMILKA  -- YAMILKA not clinically significant  -- Other - I will add my own diagnosis  -- Disagree - Not applicable / Not valid  -- Disagree - Clinically unable to determine / Unknown  -- Refer to Clinical Documentation Reviewer    PROVIDER RESPONSE TEXT:    This patient has YAMILKA.     Query created by: Rohit Vargas on 1/3/2023 11:23 AM      Electronically signed by:  Daniel Rich DO 1/3/2023 3:53 PM

## 2023-01-03 NOTE — PROGRESS NOTES
Rapid Response    1930 Responded to Rapid call on PCU when staff asked that I look in on this patient as well. Patient transferred to PCU just prior to 7pm tonight for elevated heart rate, patient to receive Amio drip as well. Waiting on Amio from pharmacy, BP's soft, heart rate 130s, Afib. Plan in place to address acute issues. Patient also s/p bowel resection, abdomen tender and semi-soft. Bowel sounds +, patient denies passing gas. Will follow up on this patient. 2130 Responded to Rapid called for low BP and projectile vomiting . Staff reports while placing yepez, patient starting feeling unwell. Patient repositioned in upright position, vomited (some projectile) large amount of green emesis (~ 500ml) , NG placed by PCU staff and drained another ~800ml. Heart rate remains elevated 120-130's and BP low 73/39. Supervisor placed call to Dr. Dmitriy Jerome  (in [place]) who will come see patient momentarily. ED tech came to bedside and started additional IV, bolus underway.   Dmitriy Jerome came to bedside, talking to patient about taking her back to OR.      2200 BP up to 124/96

## 2023-01-03 NOTE — CONSULTS
EP/ ARRHYTHMIA CONSULT    Patient ID:  Patient: Erika Fung  MRN: [id number]  Age: 80 y.o.  : 10/9/1933    Date of  Admission: 2022  2:55 PM   PCP:  Sugey Garner MD    Assessment:   Paroxysmal atrial fibrillation with rapid ventricular response. History of GIB in the past, not on chronic anticoagulation. Chronic LBBB. History of PVC's. Normal EF by echo in 2018. Hyperlipidemia. YAMILKA, improving. SBO s/p partial small bowel resection this admission. History of prior episodes. Peripheral neuropathy. Falls risk. Ovarian cancer s/p chemo. Full code. Plan:     When able to take pills (po or pNGT), will restart propafenone  mg po TID. Only use amiodarone if needed. Rate control with IV metoprolol though she may have mild sinus bradycardia with this. Not an anticoagulation candidate at this time. I answered all her and her daughter's questions. [x]       High complexity decision making was performed in this patient at high risk for decompensation    Erika Fung is a 80 y.o. female with a history of SBO now s/p surgery. Currently, she denies chest pain. No syncope, dizziness. She did have palpitations earlier when in Afib. No TIA or stroke symptoms. Here, she was treated with IV amiodarone before returning to sinus rhythm. She is currently NPO with an NG tube in place. Cardiac Assessment/Plan per prior consult in 2022 with Dr. Ramirez Coppin. Paroxysmal atrial fibrillation with RVR diagnosed in 2018, minimal to no symptoms. Back in sinus during that hospital stay. Recurrent but transient RVR during 2020 admission. Anticoagulation stopped at that time due to GIB. Will continue propafenone and avoid anticoagulation. Patient and family understand risk. 2. Chronic LBBB. 3. PVC's. 4. Hyperlipidemia. 5. Says she is not diabetic. 6. Hypercholesterolemia. 7. CKD stage 3.  8. Anemia NOS.   9. Scoliosis/arthritis s/p extensive thoracolumbar fusion 2002, now with broken hardware, s/p LUCY 12/2013 (effective), neuropathy since knee surgery 2009. 10. History of ovarian cancer s/p chemo. 11. GERD/Lewis's esophagus, gastroparesis. 12. Back pain. Fractured thoracolumbar fusion rods, stable. Protruding pedicle screws, stable. Neuroforaminal stenoses. 13. Walks with a walker. Peripheral neuropathy in her legs. 14. SBO 3/2019, resolved without surgery     Admitted with N/V, ?partial SBO; PAfib noted on tele; sinus now; NG in.  Current cardiac meds: none. Rec: Restart propafenone; No AC as noted below.           Past Medical History:   Diagnosis Date    Abnormal x-ray of abdomen 11/13/2020    Bas showed decreased peristalsis and a cervical web      Arrhythmia     A fib    Arthritis     Diarrhea 6/6/2016    Esophageal dysphagia 11/13/2020    Foot drop     Gastric ulcer 6/14/2012    Gastroparesis     GERD (gastroesophageal reflux disease)     High cholesterol     Hypertension     Neuropathy     Ovarian cancer ([address] 1986    chemo x 9 mos    Scoliosis     Stroke [facility]) 2002    ? stroke with drop feet, per patient CT scans were negative        Past Surgical History:   Procedure Laterality Date    FLEXIBLE SIGMOIDOSCOPY N/A 6/6/2016    SIGMOIDOSCOPY FLEXIBLE performed by Luciano Valentin MD at [facility] ENDOSCOPY    HX ORTHOPAEDIC      back    HX ORTHOPAEDIC      bilateral shoulder replacements    HX ORTHOPAEDIC      left knee replacement    HX LYDIA AND BSO  1986    (ovarian cancer)    AL EGD TRANSORAL BIOPSY SINGLE/MULTIPLE  1/26/2012         AL EGD TRANSORAL BIOPSY SINGLE/MULTIPLE  6/14/2012         UPPER GI ENDOSCOPY,BIOPSY  11/13/2020         UPPER GI ENDOSCOPY,DIAGNOSIS  10/5/2020         UPPER GI ENDOSCOPY,DILATN W GUIDE  11/13/2020            Social History     Tobacco Use    Smoking status: Never    Smokeless tobacco: Never   Substance Use Topics    Alcohol use: No        Family History   Problem Relation Age of Onset    Heart Disease Father Cancer Sister         endometrial    Diabetes Sister         No Known Allergies       Current Facility-Administered Medications   Medication Dose Route Frequency    famotidine (PF) (PEPCID) 20 mg in 0.9% sodium chloride 10 mL injection  20 mg IntraVENous Q12H    gabapentin (NEURONTIN) capsule 800 mg  800 mg Oral QID    0.9% sodium chloride infusion  100 mL/hr IntraVENous CONTINUOUS    [Held by provider] pantoprazole (PROTONIX) tablet 40 mg  40 mg Oral ACB    [Held by provider] enoxaparin (LOVENOX) injection 30 mg  30 mg SubCUTAneous Q24H    sodium chloride (NS) flush 5-10 mL  5-10 mL IntraVENous PRN    levoFLOXacin (LEVAQUIN) 750 mg in D5W IVPB  750 mg IntraVENous Q48H    metroNIDAZOLE (FLAGYL) IVPB premix 500 mg  500 mg IntraVENous Q12H    acetaminophen (TYLENOL) tablet 650 mg  650 mg Oral Q4H PRN    morphine injection 2 mg  2 mg IntraVENous Q3H PRN    melatonin tablet 3 mg  3 mg Oral QHS PRN    metoprolol (LOPRESSOR) injection 5 mg  5 mg IntraVENous Q4H PRN    ondansetron (ZOFRAN) injection 4 mg  4 mg IntraVENous Q6H PRN    amiodarone (CORDARONE) 375 mg/250 mL D5W infusion  0.5 mg/min IntraVENous CONTINUOUS    alcohol 62% (NOZIN) nasal  1 Ampule  1 Ampule Topical Q12H    trimethobenzamide (TIGAN) injection 200 mg  200 mg IntraMUSCular Q6H PRN    [Held by provider] mylanta/viscous lidocaine (GI COCKTAIL)  40 mL Oral BID    magnesium oxide (MAG-OX) tablet 400 mg  400 mg Oral DAILY    [Held by provider] propafenone (RYTHMOL) tablet 150 mg  150 mg Oral BID    [Held by provider] potassium chloride SR (KLOR-CON 10) tablet 10 mEq  10 mEq Oral DAILY    [Held by provider] calcium carbonate (OS-GRUPO) tablet 500 mg [elemental]  500 mg Oral DAILY    [Held by provider] atorvastatin (LIPITOR) tablet 20 mg  20 mg Oral DAILY       Review of Symptoms:  See HPI as well.   General: negative for fever, chills, sweats, weakness, weight loss   Eyes: negative for blurred vision, eye pain, loss of vision, diplopia   Ear Nose and Throat: negative for rhinorrhea, pharyngitis, otalgia, tinnitus, speech or swallowing difficulties   Respiratory: negative for SOB, cough, sputum production, wheezing, LEVINE, pleuritic pain   Cardiology: negative for chest pain, palpitations, orthopnea, PND, edema, syncope   Gastrointestinal: negative for abdominal pain, N/V, dysphagia, change in bowel habits, bleeding   Genitourinary: negative for frequency, urgency, dysuria, hematuria, incontinence   Muskuloskeletal : negative for arthralgia, myalgia   Hematology: negative for easy bruising, bleeding, lymphadenopathy   Dermatological: negative for rash, ulceration, mole change, new lesion   Endocrine: negative for hot flashes or polydipsia   Neurological: negative for headache, dizziness, confusion, focal weakness, paresthesia, memory loss, gait disturbance   Psychological: negative for anxiety, depression, agitation       Objective:      Physical Exam:  Temp (24hrs), Av.7 °F (36.5 °C), Min:97.5 °F (36.4 °C), Max:98 °F (36.7 °C)    Patient Vitals for the past 8 hrs:   Pulse   23 1051 60   23 0900 (!) 58    Patient Vitals for the past 8 hrs:   Resp   23 1051 15   23 0900 15    Patient Vitals for the past 8 hrs:   BP   23 1051 (!) 107/53   23 0900 (!) 97/51        Intake/Output Summary (Last 24 hours) at 1/3/2023 1609  Last data filed at 1/3/2023 0600  Gross per 24 hour   Intake 1500 ml   Output 2000 ml   Net -500 ml       Nondiaphoretic, not in acute distress. Supple, no palpable thyromegaly. No scleral icterus, mucous membranes moist, conjuctivae pink, no xanthelasma. NG tube in place. Unlabored, clear to auscultation bilaterally anteriorly, symmetric air movement. Regular rate and rhythm, no new murmur. No peripheral edema. Palpable radial pulses bilaterally. Abdomen, soft, nontender, nondistended. Extremities without cyanosis or clubbing. Muscle tone and bulk normal for age. Skin warm and dry.   No rashes or ulcers. Neuro grossly nonfocal.  No tremor. Awake and appropriate. CARDIOGRAPHICS and STUDIES, I reviewed:    Telemetry:  SR currently. ECG:  Reviewed. Labs:  No results for input(s): CPK, CKMB, CKNDX, TROIQ in the last 72 hours. No lab exists for component: CPKMB  Lab Results   Component Value Date/Time    Cholesterol, total 138 07/20/2018 12:00 AM    HDL Cholesterol 46 07/20/2018 12:00 AM    LDL, calculated 66 07/20/2018 12:00 AM    Triglyceride 132 07/20/2018 12:00 AM    CHOL/HDL Ratio 3.2 09/20/2010 10:16 AM     No results for input(s): INR, PTP, APTT, INREXT in the last 72 hours. Recent Labs     01/03/23  0548 01/02/23  2140 01/02/23  1345    135* 133*   K 4.7 4.0 3.8    96* 98   CO2 29 32 28   BUN 44* 46* 41*   CREA 1.35* 1.85* 1.60*   * 164* 148*   CA 8.9 9.7 10.1   WBC 8.9 13.8* 16.1*   HGB 7.4* 9.2* 9.3*   HCT 25.3* 32.3* 32.5*   * 314 316     No results for input(s): AP, TBIL, TP, ALB, GLOB, GGT, AML, LPSE in the last 72 hours. No lab exists for component: SGOT, GPT, AMYP, HLPSE  No components found for: GLPOC  No results for input(s): PH, PCO2, PO2 in the last 72 hours.         Lynette Stacy MD  1/3/2023

## 2023-01-03 NOTE — PROGRESS NOTES
Hospitalist Progress Note    Subjective:   Daily Progress Note: 1/3/2023 4:39 PM    Hospital Course:  Pt admitted abdominal pain, nausea and vomiting. CT abdomen/pelvis showing recurrent and persistent small bowel obstruction. She underwent a small bowel resection, short segment. She has history of atrial fibrillation, GERD and CAD. Subjective: Pt seen in room, overnight, patient developed nausea/vomiting with associated abdominal soreness, she was evaluated by surgical team and she was taken for exploratory laparotomy to rule out life-threatening causes. During surgery, no intra-abdominal abnormalities, anastomotic leak or signs of peritonitis was found. Patient with mild ileus. Patient was seen this morning and denies any new complaints. She reports her abdominal pain has improved. NG tube in place. Denies fever, chills, chest pain or shortness of breath.   Discussed with RN  Current Facility-Administered Medications   Medication Dose Route Frequency    0.9% sodium chloride infusion  100 mL/hr IntraVENous CONTINUOUS    pantoprazole (PROTONIX) tablet 40 mg  40 mg Oral ACB    [Held by provider] enoxaparin (LOVENOX) injection 30 mg  30 mg SubCUTAneous Q24H    sodium chloride (NS) flush 5-10 mL  5-10 mL IntraVENous PRN    levoFLOXacin (LEVAQUIN) 750 mg in D5W IVPB  750 mg IntraVENous Q48H    metroNIDAZOLE (FLAGYL) IVPB premix 500 mg  500 mg IntraVENous Q12H    acetaminophen (TYLENOL) tablet 650 mg  650 mg Oral Q4H PRN    morphine injection 2 mg  2 mg IntraVENous Q3H PRN    melatonin tablet 3 mg  3 mg Oral QHS PRN    metoprolol (LOPRESSOR) injection 5 mg  5 mg IntraVENous Q4H PRN    ondansetron (ZOFRAN) injection 4 mg  4 mg IntraVENous Q6H PRN    amiodarone (CORDARONE) 375 mg/250 mL D5W infusion  0.5 mg/min IntraVENous CONTINUOUS    gabapentin (NEURONTIN) capsule 800 mg  800 mg Oral QID    alcohol 62% (NOZIN) nasal  1 Ampule  1 Ampule Topical Q12H    trimethobenzamide (TIGAN) injection 200 mg  200 mg IntraMUSCular Q6H PRN    [Held by provider] mylanta/viscous lidocaine (GI COCKTAIL)  40 mL Oral BID    magnesium oxide (MAG-OX) tablet 400 mg  400 mg Oral DAILY    [Held by provider] propafenone (RYTHMOL) tablet 150 mg  150 mg Oral BID    potassium chloride SR (KLOR-CON 10) tablet 10 mEq  10 mEq Oral DAILY    calcium carbonate (OS-GRUPO) tablet 500 mg [elemental]  500 mg Oral DAILY    atorvastatin (LIPITOR) tablet 20 mg  20 mg Oral DAILY        Review of Systems:    Review of Systems   Constitutional:  Positive for malaise/fatigue. Respiratory:  Negative for cough and shortness of breath. Cardiovascular:  Negative for chest pain and palpitations. Gastrointestinal:  Positive for heartburn and nausea. Genitourinary:  Negative for dysuria and urgency. Neurological:  Negative for headaches. Objective:     Visit Vitals  BP (!) 107/53 (BP 1 Location: Right lower arm, BP Patient Position: At rest)   Pulse 60   Temp 97.5 °F (36.4 °C)   Resp 15   Ht 5' 1\" (1.549 m)   Wt 65.3 kg (144 lb)   SpO2 95%   BMI 27.21 kg/m²    O2 Flow Rate (L/min): 3 l/min O2 Device: Nasal cannula    Temp (24hrs), Av.7 °F (36.5 °C), Min:97.5 °F (36.4 °C), Max:98 °F (36.7 °C)      No intake/output data recorded.  1901 -  0700  In: 1500 [I.V.:1500]  Out: 2850 [Urine:950]    PHYSICAL EXAM:    Physical Exam  Constitutional:       General: She is not in acute distress. Cardiovascular:      Rate and Rhythm: Tachycardia present. Rhythm irregular. Heart sounds: Murmur heard. Pulmonary:      Effort: Pulmonary effort is normal.      Breath sounds: Normal breath sounds. Abdominal:      General: There is distension. Comments: Firm, hypo bowels sounds. Musculoskeletal:         General: Normal range of motion. Skin:     General: Skin is warm and dry. Comments: Midline drsg intact, no drainage   Neurological:      Mental Status: She is oriented to person, place, and time.           Data Review    Recent Results (from the past 24 hour(s))   CBC WITH AUTOMATED DIFF    Collection Time: 01/02/23  1:45 PM   Result Value Ref Range    WBC 16.1 (H) 3.6 - 11.0 K/uL    RBC 4.14 3.80 - 5.20 M/uL    HGB 9.3 (L) 11.5 - 16.0 g/dL    HCT 32.5 (L) 35.0 - 47.0 %    MCV 78.5 (L) 80.0 - 99.0 FL    MCH 22.5 (L) 26.0 - 34.0 PG    MCHC 28.6 (L) 30.0 - 36.5 g/dL    RDW 16.1 (H) 11.5 - 14.5 %    PLATELET 490 861 - 427 K/uL    MPV 10.0 8.9 - 12.9 FL    NRBC 0.0 0  WBC    ABSOLUTE NRBC 0.00 0.00 - 0.01 K/uL    NEUTROPHILS 84 (H) 32 - 75 %    LYMPHOCYTES 6 (L) 12 - 49 %    MONOCYTES 9 5 - 13 %    EOSINOPHILS 0 0 - 7 %    BASOPHILS 0 0 - 1 %    IMMATURE GRANULOCYTES 1 (H) 0.0 - 0.5 %    ABS. NEUTROPHILS 13.5 (H) 1.8 - 8.0 K/UL    ABS. LYMPHOCYTES 1.0 0.8 - 3.5 K/UL    ABS. MONOCYTES 1.4 (H) 0.0 - 1.0 K/UL    ABS. EOSINOPHILS 0.0 0.0 - 0.4 K/UL    ABS. BASOPHILS 0.0 0.0 - 0.1 K/UL    ABS. IMM.  GRANS. 0.2 (H) 0.00 - 0.04 K/UL    DF SMEAR SCANNED      RBC COMMENTS ANISOCYTOSIS  1+        RBC COMMENTS MICROCYTOSIS  1+        RBC COMMENTS HYPOCHROMIA  PRESENT       METABOLIC PANEL, BASIC    Collection Time: 01/02/23  1:45 PM   Result Value Ref Range    Sodium 133 (L) 136 - 145 mmol/L    Potassium 3.8 3.5 - 5.1 mmol/L    Chloride 98 97 - 108 mmol/L    CO2 28 21 - 32 mmol/L    Anion gap 7 5 - 15 mmol/L    Glucose 148 (H) 65 - 100 mg/dL    BUN 41 (H) 6 - 20 MG/DL    Creatinine 1.60 (H) 0.55 - 1.02 MG/DL    BUN/Creatinine ratio 26 (H) 12 - 20      eGFR 31 (L) >60 ml/min/1.73m2    Calcium 10.1 8.5 - 10.1 MG/DL   CULTURE, BLOOD, PAIRED    Collection Time: 01/02/23  5:50 PM    Specimen: Blood   Result Value Ref Range    Special Requests: NO SPECIAL REQUESTS      Culture result: NO GROWTH AFTER 13 HOURS     CULTURE, BLOOD    Collection Time: 01/02/23  9:40 PM    Specimen: Blood   Result Value Ref Range    Special Requests: NO SPECIAL REQUESTS      Culture result: NO GROWTH AFTER 11 HOURS     MAGNESIUM    Collection Time: 01/02/23  9:40 PM   Result Value Ref Range    Magnesium 2.4 1.6 - 2.4 mg/dL   LACTIC ACID    Collection Time: 01/02/23  9:40 PM   Result Value Ref Range    Lactic acid 1.6 0.4 - 2.0 MMOL/L   CBC W/O DIFF    Collection Time: 01/02/23  9:40 PM   Result Value Ref Range    WBC 13.8 (H) 3.6 - 11.0 K/uL    RBC 4.05 3.80 - 5.20 M/uL    HGB 9.2 (L) 11.5 - 16.0 g/dL    HCT 32.3 (L) 35.0 - 47.0 %    MCV 79.8 (L) 80.0 - 99.0 FL    MCH 22.7 (L) 26.0 - 34.0 PG    MCHC 28.5 (L) 30.0 - 36.5 g/dL    RDW 15.9 (H) 11.5 - 14.5 %    PLATELET 981 374 - 248 K/uL    MPV 10.3 8.9 - 12.9 FL    NRBC 0.0 0  WBC    ABSOLUTE NRBC 0.00 0.00 - 3.81 K/uL   METABOLIC PANEL, BASIC    Collection Time: 01/02/23  9:40 PM   Result Value Ref Range    Sodium 135 (L) 136 - 145 mmol/L    Potassium 4.0 3.5 - 5.1 mmol/L    Chloride 96 (L) 97 - 108 mmol/L    CO2 32 21 - 32 mmol/L    Anion gap 7 5 - 15 mmol/L    Glucose 164 (H) 65 - 100 mg/dL    BUN 46 (H) 6 - 20 MG/DL    Creatinine 1.85 (H) 0.55 - 1.02 MG/DL    BUN/Creatinine ratio 25 (H) 12 - 20      eGFR 26 (L) >60 ml/min/1.73m2    Calcium 9.7 8.5 - 10.1 MG/DL   CBC WITH AUTOMATED DIFF    Collection Time: 01/03/23  5:48 AM   Result Value Ref Range    WBC 8.9 3.6 - 11.0 K/uL    RBC 3.21 (L) 3.80 - 5.20 M/uL    HGB 7.4 (L) 11.5 - 16.0 g/dL    HCT 25.3 (L) 35.0 - 47.0 %    MCV 78.8 (L) 80.0 - 99.0 FL    MCH 23.1 (L) 26.0 - 34.0 PG    MCHC 29.2 (L) 30.0 - 36.5 g/dL    RDW 16.3 (H) 11.5 - 14.5 %    PLATELET 375 (L) 604 - 400 K/uL    MPV 11.2 8.9 - 12.9 FL    NRBC 0.0 0  WBC    ABSOLUTE NRBC 0.00 0.00 - 0.01 K/uL    NEUTROPHILS 89 (H) 32 - 75 %    LYMPHOCYTES 6 (L) 12 - 49 %    MONOCYTES 4 (L) 5 - 13 %    EOSINOPHILS 0 0 - 7 %    BASOPHILS 0 0 - 1 %    IMMATURE GRANULOCYTES 1 (H) 0.0 - 0.5 %    ABS. NEUTROPHILS 7.9 1.8 - 8.0 K/UL    ABS. LYMPHOCYTES 0.5 (L) 0.8 - 3.5 K/UL    ABS. MONOCYTES 0.4 0.0 - 1.0 K/UL    ABS. EOSINOPHILS 0.0 0.0 - 0.4 K/UL    ABS. BASOPHILS 0.0 0.0 - 0.1 K/UL    ABS. IMM.  GRANS. 0.1 (H) 0.00 - 0.04 K/UL    DF SMEAR SCANNED      RBC COMMENTS ANISOCYTOSIS  1+        RBC COMMENTS HYPOCHROMIA  PRESENT        RBC COMMENTS MICROCYTOSIS  1+       METABOLIC PANEL, BASIC    Collection Time: 01/03/23  5:48 AM   Result Value Ref Range    Sodium 137 136 - 145 mmol/L    Potassium 4.7 3.5 - 5.1 mmol/L    Chloride 103 97 - 108 mmol/L    CO2 29 21 - 32 mmol/L    Anion gap 5 5 - 15 mmol/L    Glucose 145 (H) 65 - 100 mg/dL    BUN 44 (H) 6 - 20 MG/DL    Creatinine 1.35 (H) 0.55 - 1.02 MG/DL    BUN/Creatinine ratio 33 (H) 12 - 20      eGFR 38 (L) >60 ml/min/1.73m2    Calcium 8.9 8.5 - 10.1 MG/DL   TSH 3RD GENERATION    Collection Time: 01/03/23  5:48 AM   Result Value Ref Range    TSH 0.69 0.36 - 3.74 uIU/mL   URINALYSIS W/ RFLX MICROSCOPIC    Collection Time: 01/03/23  5:49 AM   Result Value Ref Range    Color YELLOW/STRAW      Appearance CLOUDY (A) CLEAR      Specific gravity 1.023      pH (UA) 5.0 5.0 - 8.0      Protein 100 (A) NEG mg/dL    Glucose Negative NEG mg/dL    Ketone TRACE (A) NEG mg/dL    Bilirubin Negative NEG      Blood MODERATE (A) NEG      Urobilinogen 0.2 0.2 - 1.0 EU/dL    Nitrites Negative NEG      Leukocyte Esterase TRACE (A) NEG      WBC 10-20 0 - 4 /hpf    RBC 0-5 0 - 5 /hpf    Epithelial cells MODERATE (A) FEW /lpf    Bacteria 2+ (A) NEG /hpf    CA Oxalate crystals FEW (A) NEG         XR CHEST PORT   Final Result      Nasogastric tube appears to be in satisfactory position. Unchanged bibasilar   atelectasis with small right pleural effusion. XR CHEST PORT   Final Result      Bilateral lower lobe atelectasis. XR ABD FLAT/ ERECT   Final Result   Unchanged mild diffuse small bowel distention. CT ABD PELV W CONT   Final Result   1. Persistent/recurrent SBO, of uncertain etiology. 2. Stable question of left renal mass.           Active Problems:    SBO (small bowel obstruction) (HCC) (2/27/2019)      Assessment/Plan:   Recurrent SBO- s/p small bowel resection- POD # 4, on CL, general surgery following  -Patient was taken for exploratory laparotomy 01/03 with finding of no intra-abdominal abnormalities, no anastomotic leak, no signs of peritonitis. She was found to have mild ileus. NG tube in place. Uvprty-qnnkbmev-vqnloqpy IV antibiotic. Continue IV fluids. Patient seems improved. Leukocytosis resolving. Acute kidney injury, prerenal-improved. Continue to monitor kidney function closely, avoid nephrotoxic agents    Normocytic anemia/thrombocytopenia, suspect secondary to sepsis, exacerbation also related to fluid hydration. No signs of bleeding. We will hold IV Lovenox given rapid drop in platelets. HIT testing, will continue to monitor closely. Nausea/vomiting- zofran prn. Atrial fibrillation with RVR-improved- Now off drip. Consulted cardiology, appreciate recommendations      Pt is partial code, only wants vasoactive drips, antiarrhythmic drips, no chest compressions, no defibrillation, no intubation, discussed with pt and daughter.          DVT Prophylaxis: lovenox  Code Status:  Partial Code  POA: NOK daughter Qi [place]     Care Plan discussed with:   __________patient, staff nurse, CM, family_____________________________________________________    Luly Gibson,

## 2023-01-03 NOTE — PROGRESS NOTES
Problem: Falls - Risk of  Goal: *Absence of Falls  Description: Document Lettie Duverney Fall Risk and appropriate interventions in the flowsheet.   Outcome: Progressing Towards Goal  Note: Fall Risk Interventions:  Mobility Interventions: Bed/chair exit alarm, OT consult for ADLs, Patient to call before getting OOB, PT Consult for mobility concerns, PT Consult for assist device competence         Medication Interventions: Evaluate medications/consider consulting pharmacy, Bed/chair exit alarm, Patient to call before getting OOB, Teach patient to arise slowly    Elimination Interventions: Bed/chair exit alarm, Patient to call for help with toileting needs, Stay With Me (per policy), Toilet paper/wipes in reach              Problem: Patient Education: Go to Patient Education Activity  Goal: Patient/Family Education  Outcome: Progressing Towards Goal     Problem: Infection - Risk of, Urinary Catheter-Associated Urinary Tract Infection  Goal: *Absence of infection signs and symptoms  Outcome: Progressing Towards Goal     Problem: Patient Education: Go to Patient Education Activity  Goal: Patient/Family Education  Outcome: Progressing Towards Goal

## 2023-01-03 NOTE — PERIOP NOTES
TRANSFER - OUT REPORT:    Verbal report given to [address] [place] [address] (name) on Jeevan Starks  being transferred to PCU (unit) for routine progression of care       Report consisted of patients Situation, Background, Assessment and   Recommendations(SBAR). Information from the following report(s) SBAR, Kardex, Intake/Output, MAR, and Accordion was reviewed with the receiving nurse. Opportunity for questions and clarification was provided. RN aware Amio gtt turned off per anesthesia due to sinus bradycardia with HR in upper 50's - see flowsheet/MAR.     Patient transported with:   Monitor  O2 @ 3 liters  Registered Nurse

## 2023-01-04 LAB
ANION GAP SERPL CALC-SCNC: 8 MMOL/L (ref 5–15)
BASOPHILS # BLD: 0 K/UL (ref 0–0.1)
BASOPHILS NFR BLD: 0 % (ref 0–1)
BUN SERPL-MCNC: 44 MG/DL (ref 6–20)
BUN/CREAT SERPL: 49 (ref 12–20)
CALCIUM SERPL-MCNC: 9 MG/DL (ref 8.5–10.1)
CHLORIDE SERPL-SCNC: 107 MMOL/L (ref 97–108)
CO2 SERPL-SCNC: 27 MMOL/L (ref 21–32)
CREAT SERPL-MCNC: 0.89 MG/DL (ref 0.55–1.02)
DIFFERENTIAL METHOD BLD: ABNORMAL
EOSINOPHIL # BLD: 0 K/UL (ref 0–0.4)
EOSINOPHIL NFR BLD: 0 % (ref 0–7)
ERYTHROCYTE [DISTWIDTH] IN BLOOD BY AUTOMATED COUNT: 16.1 % (ref 11.5–14.5)
GLUCOSE SERPL-MCNC: 107 MG/DL (ref 65–100)
HCT VFR BLD AUTO: 23.8 % (ref 35–47)
HGB BLD-MCNC: 6.6 G/DL (ref 11.5–16)
HISTORY CHECKED?,CKHIST: NORMAL
IMM GRANULOCYTES # BLD AUTO: 0 K/UL (ref 0–0.04)
IMM GRANULOCYTES NFR BLD AUTO: 0 % (ref 0–0.5)
LYMPHOCYTES # BLD: 0.7 K/UL (ref 0.8–3.5)
LYMPHOCYTES NFR BLD: 11 % (ref 12–49)
MCH RBC QN AUTO: 22.1 PG (ref 26–34)
MCHC RBC AUTO-ENTMCNC: 27.7 G/DL (ref 30–36.5)
MCV RBC AUTO: 79.9 FL (ref 80–99)
MONOCYTES # BLD: 0.7 K/UL (ref 0–1)
MONOCYTES NFR BLD: 11 % (ref 5–13)
NEUTS SEG # BLD: 4.8 K/UL (ref 1.8–8)
NEUTS SEG NFR BLD: 78 % (ref 32–75)
NRBC # BLD: 0 K/UL (ref 0–0.01)
NRBC BLD-RTO: 0 PER 100 WBC
PLATELET # BLD AUTO: 197 K/UL (ref 150–400)
PMV BLD AUTO: 10.5 FL (ref 8.9–12.9)
POTASSIUM SERPL-SCNC: 3.6 MMOL/L (ref 3.5–5.1)
RBC # BLD AUTO: 2.98 M/UL (ref 3.8–5.2)
RBC MORPH BLD: ABNORMAL
SODIUM SERPL-SCNC: 142 MMOL/L (ref 136–145)
WBC # BLD AUTO: 6.2 K/UL (ref 3.6–11)

## 2023-01-04 PROCEDURE — 74011250637 HC RX REV CODE- 250/637: Performed by: NURSE PRACTITIONER

## 2023-01-04 PROCEDURE — 74011250637 HC RX REV CODE- 250/637: Performed by: STUDENT IN AN ORGANIZED HEALTH CARE EDUCATION/TRAINING PROGRAM

## 2023-01-04 PROCEDURE — 86923 COMPATIBILITY TEST ELECTRIC: CPT

## 2023-01-04 PROCEDURE — 85025 COMPLETE CBC W/AUTO DIFF WBC: CPT

## 2023-01-04 PROCEDURE — 77010033678 HC OXYGEN DAILY

## 2023-01-04 PROCEDURE — 74011250636 HC RX REV CODE- 250/636: Performed by: NURSE PRACTITIONER

## 2023-01-04 PROCEDURE — 74011250637 HC RX REV CODE- 250/637: Performed by: INTERNAL MEDICINE

## 2023-01-04 PROCEDURE — 30233N1 TRANSFUSION OF NONAUTOLOGOUS RED BLOOD CELLS INTO PERIPHERAL VEIN, PERCUTANEOUS APPROACH: ICD-10-PCS | Performed by: SURGERY

## 2023-01-04 PROCEDURE — 65270000046 HC RM TELEMETRY

## 2023-01-04 PROCEDURE — 80048 BASIC METABOLIC PNL TOTAL CA: CPT

## 2023-01-04 PROCEDURE — 74011250636 HC RX REV CODE- 250/636: Performed by: STUDENT IN AN ORGANIZED HEALTH CARE EDUCATION/TRAINING PROGRAM

## 2023-01-04 PROCEDURE — 36430 TRANSFUSION BLD/BLD COMPNT: CPT

## 2023-01-04 PROCEDURE — 74011000250 HC RX REV CODE- 250: Performed by: NURSE PRACTITIONER

## 2023-01-04 PROCEDURE — P9016 RBC LEUKOCYTES REDUCED: HCPCS

## 2023-01-04 PROCEDURE — 74011000250 HC RX REV CODE- 250: Performed by: STUDENT IN AN ORGANIZED HEALTH CARE EDUCATION/TRAINING PROGRAM

## 2023-01-04 PROCEDURE — 86900 BLOOD TYPING SEROLOGIC ABO: CPT

## 2023-01-04 PROCEDURE — 36415 COLL VENOUS BLD VENIPUNCTURE: CPT

## 2023-01-04 RX ORDER — OXYCODONE HYDROCHLORIDE 5 MG/1
5 TABLET ORAL
Status: DISCONTINUED | OUTPATIENT
Start: 2023-01-04 | End: 2023-01-10 | Stop reason: HOSPADM

## 2023-01-04 RX ORDER — SODIUM CHLORIDE 9 MG/ML
250 INJECTION, SOLUTION INTRAVENOUS AS NEEDED
Status: DISCONTINUED | OUTPATIENT
Start: 2023-01-04 | End: 2023-01-10 | Stop reason: HOSPADM

## 2023-01-04 RX ADMIN — METRONIDAZOLE 500 MG: 500 INJECTION, SOLUTION INTRAVENOUS at 05:32

## 2023-01-04 RX ADMIN — GABAPENTIN 800 MG: 300 CAPSULE ORAL at 11:48

## 2023-01-04 RX ADMIN — PROPAFENONE HYDROCHLORIDE 150 MG: 150 TABLET, FILM COATED ORAL at 17:37

## 2023-01-04 RX ADMIN — LEVOFLOXACIN 750 MG: 5 INJECTION, SOLUTION INTRAVENOUS at 19:10

## 2023-01-04 RX ADMIN — FAMOTIDINE 20 MG: 10 INJECTION, SOLUTION INTRAVENOUS at 11:17

## 2023-01-04 RX ADMIN — Medication 1 AMPULE: at 22:11

## 2023-01-04 RX ADMIN — MORPHINE SULFATE 2 MG: 2 INJECTION, SOLUTION INTRAMUSCULAR; INTRAVENOUS at 04:12

## 2023-01-04 RX ADMIN — GABAPENTIN 800 MG: 300 CAPSULE ORAL at 14:27

## 2023-01-04 RX ADMIN — GABAPENTIN 800 MG: 300 CAPSULE ORAL at 22:11

## 2023-01-04 RX ADMIN — Medication 1 AMPULE: at 11:16

## 2023-01-04 RX ADMIN — MAGNESIUM OXIDE 400 MG (241.3 MG MAGNESIUM) TABLET 400 MG: TABLET at 11:48

## 2023-01-04 RX ADMIN — ACETAMINOPHEN 650 MG: 325 TABLET ORAL at 17:38

## 2023-01-04 RX ADMIN — OXYCODONE 5 MG: 5 TABLET ORAL at 14:26

## 2023-01-04 RX ADMIN — METRONIDAZOLE 500 MG: 500 INJECTION, SOLUTION INTRAVENOUS at 17:35

## 2023-01-04 RX ADMIN — SODIUM CHLORIDE 100 ML/HR: 9 INJECTION, SOLUTION INTRAVENOUS at 16:57

## 2023-01-04 RX ADMIN — GABAPENTIN 800 MG: 300 CAPSULE ORAL at 19:11

## 2023-01-04 RX ADMIN — METOPROLOL TARTRATE 5 MG: 5 INJECTION INTRAVENOUS at 09:27

## 2023-01-04 NOTE — PROGRESS NOTES
Problem: Falls - Risk of  Goal: *Absence of Falls  Description: Document [place] Fall Risk and appropriate interventions in the flowsheet.   Outcome: Progressing Towards Goal  Note: Fall Risk Interventions:  Mobility Interventions: Bed/chair exit alarm, Communicate number of staff needed for ambulation/transfer         Medication Interventions: Bed/chair exit alarm, Evaluate medications/consider consulting pharmacy    Elimination Interventions: Call light in reach, Bed/chair exit alarm, Patient to call for help with toileting needs, Stay With Me (per policy)              Problem: Patient Education: Go to Patient Education Activity  Goal: Patient/Family Education  Outcome: Progressing Towards Goal

## 2023-01-04 NOTE — PROGRESS NOTES
0700- Bedside and Verbal shift change report given to HENRIETTA Carranza (oncoming nurse) by Garth Ennis (offgoing nurse). Report included the following information SBAR, Kardex, ED Summary, Intake/Output, MAR, Recent Results, and Cardiac Rhythm Lopez Syed Santo . [address] about patient's PO medication and platelet count. Ekin.Pipchaim- MD held lovenox    5520- Notified MD Malcolm about patient's BP being on lower side. Patient is stable, no shortness of breath or pain. Aura Porter at bedside, assessing patient, per MD, will look into changing medication route due to NGT     1133- Perfect Served MD about PO medication, awaiting response    1145- MD autoheld PO medication at this time  1339- Patient requesting gabapentin, perfect fabien STEVENS    1344- Per MD gave gabapentin through NGT, stopped continuous suctioning for 1 hr.    8573- [facility] MD about patient's diet    1600- Right endurance IV not flushing, and no blood return, removed after PICC nurse looked at site. 7326- Per MD can trial clear liquid. End of Shift Note    Bedside shift change report given to Lasha Johnson (oncoming nurse) by Hermanngarett Roger RN (offgoing nurse).   Report included the following information SBAR, Kardex, Intake/Output, and Cardiac Rhythm A Fib    Shift worked:  7675-3145     Shift summary and any significant changes:     Patient PO medication held by provider, see note above     Concerns for physician to address:  None     Zone phone for oncoming shift:   [phone]       Activity:  Activity Level: Bed Rest  Number times ambulated in hallways past shift: 0  Number of times OOB to chair past shift: 0    Cardiac:   Cardiac Monitoring: Yes      Cardiac Rhythm: Atrial Fib    Access:  Current line(s): PIV     Genitourinary:   Urinary status: yepez    Respiratory:   O2 Device: Nasal cannula  Chronic home O2 use?: NO  Incentive spirometer at bedside: YES  Actual Volume (ml): 500 ml    GI:  Last Bowel Movement Date: 12/29/22  Current diet:  ADULT DIET Clear Liquid  Passing flatus: YES  Tolerating current diet: NO       Pain Management:   Patient states pain is manageable on current regimen: YES    Skin:  Cristino Score: 14  Interventions: float heels, limit briefs, internal/external urinary devices, and nutritional support     Patient Safety:  Fall Score:  Total Score: 3  Interventions: bed/chair alarm, gripper socks, and pt to call before getting OOB  High Fall Risk: Yes    Length of Stay:  Expected LOS: 9d 9h  Actual LOS: [address],

## 2023-01-04 NOTE — PROGRESS NOTES
Chart reviewed. Noted pt with hgb of 6.6 and orders to receive blood transfusion this date. Will hold PT intervention however continue to follow.  Thank you    Missael Mcbride, PT, DPT

## 2023-01-04 NOTE — PROGRESS NOTES
Comprehensive Nutrition Assessment    Type and Reason for Visit: Initial, NPO/clear liquid    Nutrition Recommendations/Plan:   Diet advancement as tolerated  Add ensure clear TID     Malnutrition Assessment:  Malnutrition Status:  No malnutrition (01/04/23 1512)      Nutrition Assessment:    Patient medically noted for SBO s/p small bowel resection. Patient NPO/Clear liquids x 6 days. Tolerating clears today. States she typically has a good appetite; watches what and how much she eats at home due to gastroparesis. She drinks a premier protein cafe latte shake every morning. No recent weight changes reported. Agreeable to ensure clear supplements while receiving clear liquids; can adjust ONS as diet advances. Encouraged PO intake of meals. Nutrition Related Findings:    Labs reviewed  BM 12/29  Famotidine, Mag-ox  Wound Type: Surgical incision    Current Nutrition Intake & Therapies:  Average Meal Intake: 51-75%  Average Supplement Intake: None ordered  ADULT DIET Clear Liquid    Anthropometric Measures:  Height: 5' 1\" (154.9 cm)  Ideal Body Weight (IBW): 105 lbs (48 kg)     Current Body Wt:  65.3 kg (143 lb 15.4 oz), 137.1 % IBW. Current BMI (kg/m2): 27.2                          BMI Category: Overweight (BMI 25.0-29. 9)    Estimated Daily Nutrient Needs:  Energy Requirements Based On: Formula  Weight Used for Energy Requirements: Current  Energy (kcal/day): 1321 kcals (BMR x 1. 3AF)  Weight Used for Protein Requirements: Current  Protein (g/day): 78g (1.2 g/kg bw)  Method Used for Fluid Requirements: 1 ml/kcal  Fluid (ml/day): 1350 mL    Nutrition Diagnosis:   Inadequate protein-energy intake related to altered GI function as evidenced by NPO or clear liquid status due to medical condition    Nutrition Interventions:   Food and/or Nutrient Delivery: Continue current diet, Start oral nutrition supplement  Nutrition Education/Counseling: No recommendations at this time  Coordination of Nutrition Care: Continue to monitor while inpatient       Goals:     Goals: PO intake 50% or greater, by next RD assessment       Nutrition Monitoring and Evaluation:   Behavioral-Environmental Outcomes: None identified  Food/Nutrient Intake Outcomes: Food and nutrient intake, Supplement intake, Diet advancement/tolerance  Physical Signs/Symptoms Outcomes: Biochemical data, Weight, GI status    Discharge Planning:     Too soon to determine    Trenda Ped, RD  Contact: ext [phone]

## 2023-01-04 NOTE — PROGRESS NOTES
SURGERY PROGRESS NOTE      Admit Date: 2022    POD 1 Day Post-Op    Procedure: Procedure(s):  LAPAROTOMY EXPLORATORY      Subjective:     Patient states she has more incisional pain this time. Denies nausea. Objective:     Visit Vitals  BP (!) 119/49 (BP 1 Location: Right lower arm, BP Patient Position: At rest)   Pulse 75   Temp 98.3 °F (36.8 °C)   Resp 18   Ht 5' 1\" (1.549 m)   Wt 65.3 kg (144 lb)   SpO2 96%   BMI 27.21 kg/m²        Temp (24hrs), Av.8 °F (36.6 °C), Min:97.5 °F (36.4 °C), Max:98.3 °F (36.8 °C)      No intake/output data recorded.   701 -  1900  In: 1500 [I.V.:1500]  Out: 2250 [Urine:350]    Physical Exam:    General:  alert, cooperative, no distress   Abdomen: soft, distended, tympanic, bowel sounds hypoactive, appropriately tender   Incision:   healing well, no drainage, no erythema, no hernia, no seroma, no swelling, no dehiscence, incision well approximated           Lab Results   Component Value Date/Time    WBC 8.9 2023 05:48 AM    HGB 7.4 (L) 2023 05:48 AM    HCT 25.3 (L) 2023 05:48 AM    Hematocrit (POC) 32 (L) 2020 10:36 AM    PLATELET 580 (L)  05:48 AM    MCV 78.8 (L) 2023 05:48 AM     Lab Results   Component Value Date/Time    GFR est non-AA 53 (L) 10/02/2022 04:46 AM    GFRNA, POC 59 (L) 2020 10:36 AM    GFR est AA >60 10/02/2022 04:46 AM    GFRAA, POC >60 2020 10:36 AM    Creatinine 1.35 (H) 2023 05:48 AM    Creatinine, POC 0.8 10/01/2022 01:34 PM    BUN 44 (H) 2023 05:48 AM    BUN (POC) 34 (H) 2020 10:36 AM    Sodium 137 2023 05:48 AM    Sodium (POC) 142 2020 10:36 AM    Sodium,  10/01/2022 01:34 PM    Potassium 4.7 2023 05:48 AM    Potassium (POC) 3.2 (L) 2020 10:36 AM    Potassium, POC 3.4 (L) 10/01/2022 01:34 PM    Chloride 103 2023 05:48 AM    Chloride,  10/01/2022 01:34 PM    CO2 29 2023 05:48 AM    Magnesium 2.4 2023 09:40 PM Assessment:     Active Problems:    [facility] (2/27/2019)    Improving after negative laparotomy     Plan:     Continue present treatment

## 2023-01-04 NOTE — PROGRESS NOTES
Bedside shift change report given to [facility], RN (oncoming nurse) by Marina Gustafson RN (offgoing nurse). Report included the following information SBAR, Kardex, Cardiac Rhythm A-fib, and Quality Measures. 0758--Messaged Dr. Casi Case about patient's hemoglobin of 6.6.     0907--Type and screen sent to lab.    0931--Patient's HR went up to 150's and staying in 130's/140's. Gave PRN IV metoprolol. 1000--Messaged Dr about patient having red tinged fluid coming out of NG tube. Holding off on meds through [facility] for now. 1200--Gave meds orally with apple sauce after Dr. Casi Case gave the okay. It turns out patient had had red jello this morning. NG tube taken out per orders. 1211--Blood administration started. 1426--PRN oxycodone given for complaints of pain (see flowsheets for details). 1610--Blood administration complete. Patient tolerated well. 1758--Gave Tylenol for pain. Patient wanted to try Tylenol first before a narcotic. Scanned patient's bladder due to only 425 mL of urine but minimal urine volume scanned. Pulses were able to be heard with doppler. 1830--Patient did well with clear liquid diet today and wants to be advanced if possible tomorrow. End of Shift Note    Bedside shift change report given to Carole Holly RN (oncoming nurse) by Familia Veras RN (offgoing nurse). Report included the following information SBAR, Kardex, Recent Results, Cardiac Rhythm A-fib, Procedure Verification, and Quality Measures    Shift worked:  day     Shift summary and any significant changes:    See above     Concerns for physician to address:  Check hemoglobin in morning; advance diet to full liquid as patient is tolerating clear?      Zone phone for oncoming shift:          Activity:  Activity Level: Bed Rest  Number times ambulated in hallways past shift: 0  Number of times OOB to chair past shift: 0    Cardiac:   Cardiac Monitoring: Yes      Cardiac Rhythm: Atrial Fib    Access:  Current line(s): PIV Genitourinary:   Urinary status: yepez    Respiratory:   O2 Device: Nasal cannula  Chronic home O2 use?: NO  Incentive spirometer at bedside: NO  Actual Volume (ml): 500 ml    GI:  Last Bowel Movement Date: 12/29/22  Current diet:  ADULT DIET Clear Liquid  ADULT ORAL NUTRITION SUPPLEMENT Breakfast, Lunch, Dinner; Clear Liquid  Passing flatus: unsure but has active bowel sounds  Tolerating current diet: YES       Pain Management:   Patient states pain is manageable on current regimen: YES    Skin:  Cristino Score: 14  Interventions: limit briefs, internal/external urinary devices, and nutritional support     Patient Safety:  Fall Score:  Total Score: 3  Interventions: bed/chair alarm, gripper socks, and pt to call before getting OOB  High Fall Risk: Yes    Length of Stay:  Expected LOS: 9d 9h  Actual LOS: 6      Bailey Perez RN

## 2023-01-04 NOTE — OP NOTES
[address]  OPERATIVE REPORT    Name:  Gaye Olsen  MR#:  [id number]  :  10/09/1933  ACCOUNT #:  [de-identified]  DATE OF SERVICE:  2023    PREOPERATIVE DIAGNOSES:  1. Possible sepsis status post small bowel resection. 2.  Atrial fibrillation with rapid ventricular response. 3.  Hypotension. 4.  Acute renal insufficiency. POSTOPERATIVE DIAGNOSES:  1. Atrial fibrillation with rapid ventricular response. 2.  Hypotension. 3.  Acute renal insufficiency. PROCEDURE PERFORMED:  Exploratory laparotomy. SURGEON:  Sim Rivera MD    ASSISTANT:  Soni Dumas    ANESTHESIA:  General endotracheal.    COMPLICATIONS:  None. SPECIMENS REMOVED:  None. IMPLANTS:  None. ESTIMATED BLOOD LOSS:  Minimal.    DRAINS:  None. FINDINGS:  No intra-abdominal abnormalities. All of the small bowel appeared viable. The anastomosis was intact without any signs of ischemia or leak. There were no signs of sepsis within the abdomen. The patient did have pan-dilated small bowel consistent with a mild ileus. INDICATIONS FOR PROCEDURE:  The patient is an 70-year-old female with a history of paroxysmal atrial fibrillation, who underwent laparotomy with small bowel resection for a small bowel obstruction that had not resolved with conservative management and that had been her third small bowel obstruction in the past 6 months. The patient's NG tube was removed on postoperative day #1 and shortly after removing the NG tube, the patient started complaining of bloating and intolerance of p.o. This morning, the patient developed nausea and vomiting, so a nasogastric tube was reinserted throughout the day. The patient developed hypotension, atrial fibrillation with rapid ventricular response, renal insufficiency, and an elevated white blood cell count. Surgery was consulted on an urgent basis to evaluate the patient for a possible intra-abdominal septic source.   The patient's abdomen appeared relatively benign, but without any other reason for the patient's decline. Laparotomy is recommended. DESCRIPTION OF PROCEDURE:  The patient was identified in the preoperative holding area, informed consent obtained. She was then taken to the operating room, placed in supine position, underwent general endotracheal anesthesia without complication. The patient's previous dressing was removed and all of the staples were removed. Her abdomen was then prepped and draped in the usual sterile fashion. A time-out was performed to confirm that the patient was [place] and that she was presenting for exploratory laparotomy. Once this was confirmed, the midline fascial suture was removed with curved Tavera scissors. The fascia was opened. The abdomen appeared benign. There were no signs of peritonitis. There was no inflammation and no erythema. The bowel was pink and viable. The bowel was mildly dilated. The bowel was run from the ligament of Treitz to the ileocecal valve. There was pan-dilation of the bowel consistent with an ileus. The anastomosis was thoroughly examined. There was no signs of leak. There was no abnormalities identified at the anastomosis and the anastomosis appeared completely viable. The ascending colon was examined, it appeared normal.  The transverse colon appeared normal and the descending colon appeared normal.  After a thorough examination, the abdomen was irrigated with a liter of normal saline. The fascia was closed once again with a #1 Stratafix symmetric in a running fashion. The subcutaneous tissue was irrigated with normal saline and skin closed with staples. A dry dressing was applied. By the end of the procedure, the patient had improved. She had converted into a sinus rhythm. She was no longer hypotensive and was making adequate urine. The patient was transferred from the OR to the postanesthesia care unit in stable condition.   Instruments and sponge counts were correct x2.         MD DEBORAH [email or web address]/[email or web address]  D:  01/03/2023 19:34  T:  01/03/2023 22:23  JOB #:  [id number]

## 2023-01-04 NOTE — PROGRESS NOTES
Physician Progress Note      PATIENT:               Basilia Curtis  CSN #:                  [id number]  :                       10/9/1933  ADMIT DATE:       2022 2:55 PM  [address] [place] DATE:  RESPONDING  PROVIDER #:        Jayy Gutierrez MD          QUERY TEXT:    Patient admitted with SBO and noted to have Sepsis documented on [facility] [place] [address] pn. Please document in progress notes and discharge summary the source of sepsis:    [Sepsis, present on admission, due to::This patient has sepsis which was present on admission due to ## Please document source.]]    The medical record reflects the following:    Risk Factors: 66-year-old female s/p small bowel resection with lysis of adhesions on 12/3. Patient was taken for exploratory laparotomy  with finding of no intra-abdominal abnormalities, no anastomotic leak, no signs of peritonitis. She was found to have mild ileus    Clinical Indicators:  22 WBC 11.8, temp 98.6, HR up to 103, RR up to 27  23 WBC up to 16.1, LA 1.6, BP 94/57, , RR 26, O2 sat 89% on 6L nc    Treatment: Transferred to step down unit, 1500 NS bolus, then NS at 100/hr. Flagyl 500 mg bid started on     Thank you,  Hillman Lombard, RN, CDI  Options provided:  -- Sepsis, not present on admission due to, Please document source. -- Sepsis, present on admission, now resolved, due to, Please document source.   -- Other - I will add my own diagnosis  -- Disagree - Not applicable / Not valid  -- Disagree - Clinically unable to determine / Unknown  -- Refer to Clinical Documentation Reviewer    PROVIDER RESPONSE TEXT:    This patient has sepsis which was not present on admission due to please see documentation on H&P    Query created by: Jesenia Hernandez on 2023 9:27 AM      Electronically signed by:  Jayy Gutierrez MD 2023 6:01 PM

## 2023-01-04 NOTE — PROGRESS NOTES
Hospitalist Progress Note    Subjective:   Daily Progress Note: 1/4/2023 4:39 PM    Hospital Course:  Pt admitted abdominal pain, nausea and vomiting. CT abdomen/pelvis showing recurrent and persistent small bowel obstruction. She underwent a small bowel resection, short segment. She has history of atrial fibrillation, GERD and CAD. Subjective: Pt seen in room,. NAD. NGT draining red liquid. Pt also taking CLD.   Discussed with RN  Current Facility-Administered Medications   Medication Dose Route Frequency    0.9% sodium chloride infusion 250 mL  250 mL IntraVENous PRN    oxyCODONE IR (ROXICODONE) tablet 5 mg  5 mg Oral Q6H PRN    famotidine (PF) (PEPCID) 20 mg in 0.9% sodium chloride 10 mL injection  20 mg IntraVENous Q12H    gabapentin (NEURONTIN) capsule 800 mg  800 mg Oral QID    0.9% sodium chloride infusion  100 mL/hr IntraVENous CONTINUOUS    [Held by provider] pantoprazole (PROTONIX) tablet 40 mg  40 mg Oral ACB    [Held by provider] enoxaparin (LOVENOX) injection 30 mg  30 mg SubCUTAneous Q24H    sodium chloride (NS) flush 5-10 mL  5-10 mL IntraVENous PRN    levoFLOXacin (LEVAQUIN) 750 mg in D5W IVPB  750 mg IntraVENous Q48H    metroNIDAZOLE (FLAGYL) IVPB premix 500 mg  500 mg IntraVENous Q12H    acetaminophen (TYLENOL) tablet 650 mg  650 mg Oral Q4H PRN    morphine injection 2 mg  2 mg IntraVENous Q3H PRN    melatonin tablet 3 mg  3 mg Oral QHS PRN    metoprolol (LOPRESSOR) injection 5 mg  5 mg IntraVENous Q4H PRN    ondansetron (ZOFRAN) injection 4 mg  4 mg IntraVENous Q6H PRN    alcohol 62% (NOZIN) nasal  1 Ampule  1 Ampule Topical Q12H    trimethobenzamide (TIGAN) injection 200 mg  200 mg IntraMUSCular Q6H PRN    [Held by provider] mylanta/viscous lidocaine (GI COCKTAIL)  40 mL Oral BID    magnesium oxide (MAG-OX) tablet 400 mg  400 mg Oral DAILY    [Held by provider] propafenone (RYTHMOL) tablet 150 mg  150 mg Oral BID    [Held by provider] potassium chloride SR (KLOR-CON 10) tablet 10 mEq  10 mEq Oral DAILY    [Held by provider] calcium carbonate (OS-GRUPO) tablet 500 mg [elemental]  500 mg Oral DAILY    [Held by provider] atorvastatin (LIPITOR) tablet 20 mg  20 mg Oral DAILY        Review of Systems:    Review of Systems   Constitutional:  Positive for malaise/fatigue. Respiratory:  Negative for cough and shortness of breath. Cardiovascular:  Negative for chest pain and palpitations. Gastrointestinal:  Positive for heartburn and nausea. Genitourinary:  Negative for dysuria and urgency. Neurological:  Negative for headaches. Objective:     Visit Vitals  /63   Pulse 67   Temp 97.7 °F (36.5 °C)   Resp 17   Ht 5' 1\" (1.549 m)   Wt 65.3 kg (144 lb)   SpO2 94%   BMI 27.21 kg/m²    O2 Flow Rate (L/min): 4 l/min O2 Device: Nasal cannula    Temp (24hrs), Av.1 °F (36.7 °C), Min:97.7 °F (36.5 °C), Max:98.7 °F (37.1 °C)       07 -  1900  In: 500 [P.O.:500]  Out: 300 [Urine:300]   190 -  0700  In: 1500 [I.V.:1500]  Out: 2860 [Urine:950]    PHYSICAL EXAM:    Physical Exam  Constitutional:       General: She is not in acute distress. Cardiovascular:      Rate and Rhythm: Tachycardia present. Rhythm irregular. Heart sounds: Murmur heard. Pulmonary:      Effort: Pulmonary effort is normal.      Breath sounds: Normal breath sounds. Abdominal:      General: There is distension. Comments: Firm, hypo bowels sounds. Musculoskeletal:         General: Normal range of motion. Skin:     General: Skin is warm and dry. Comments: Midline drsg intact, no drainage   Neurological:      Mental Status: She is oriented to person, place, and time.         Data Review    Recent Results (from the past 24 hour(s))   CBC WITH AUTOMATED DIFF    Collection Time: 23  4:17 AM   Result Value Ref Range    WBC 6.2 3.6 - 11.0 K/uL    RBC 2.98 (L) 3.80 - 5.20 M/uL    HGB 6.6 (L) 11.5 - 16.0 g/dL    HCT 23.8 (L) 35.0 - 47.0 %    MCV 79.9 (L) 80.0 - 99.0 FL    MCH 22.1 (L) 26.0 - 34.0 PG    MCHC 27.7 (L) 30.0 - 36.5 g/dL    RDW 16.1 (H) 11.5 - 14.5 %    PLATELET 940 849 - 394 K/uL    MPV 10.5 8.9 - 12.9 FL    NRBC 0.0 0  WBC    ABSOLUTE NRBC 0.00 0.00 - 0.01 K/uL    NEUTROPHILS 78 (H) 32 - 75 %    LYMPHOCYTES 11 (L) 12 - 49 %    MONOCYTES 11 5 - 13 %    EOSINOPHILS 0 0 - 7 %    BASOPHILS 0 0 - 1 %    IMMATURE GRANULOCYTES 0 0.0 - 0.5 %    ABS. NEUTROPHILS 4.8 1.8 - 8.0 K/UL    ABS. LYMPHOCYTES 0.7 (L) 0.8 - 3.5 K/UL    ABS. MONOCYTES 0.7 0.0 - 1.0 K/UL    ABS. EOSINOPHILS 0.0 0.0 - 0.4 K/UL    ABS. BASOPHILS 0.0 0.0 - 0.1 K/UL    ABS. IMM. GRANS. 0.0 0.00 - 0.04 K/UL    DF SMEAR SCANNED      RBC COMMENTS ANISOCYTOSIS  1+        RBC COMMENTS MICROCYTOSIS  1+        RBC COMMENTS HYPOCHROMIA  2+       METABOLIC PANEL, BASIC    Collection Time: 01/04/23  4:17 AM   Result Value Ref Range    Sodium 142 136 - 145 mmol/L    Potassium 3.6 3.5 - 5.1 mmol/L    Chloride 107 97 - 108 mmol/L    CO2 27 21 - 32 mmol/L    Anion gap 8 5 - 15 mmol/L    Glucose 107 (H) 65 - 100 mg/dL    BUN 44 (H) 6 - 20 MG/DL    Creatinine 0.89 0.55 - 1.02 MG/DL    BUN/Creatinine ratio 49 (H) 12 - 20      eGFR >60 >60 ml/min/1.73m2    Calcium 9.0 8.5 - 10.1 MG/DL   RBC, ALLOCATE    Collection Time: 01/04/23  8:30 AM   Result Value Ref Range    HISTORY CHECKED? Historical check performed    TYPE & SCREEN    Collection Time: 01/04/23  9:00 AM   Result Value Ref Range    Crossmatch Expiration 01/07/2023,2359     ABO/Rh(D) B POSITIVE     Antibody screen NEG     Unit number [id number]     Blood component type RC LR     Unit division 00     Status of unit ISSUED     Crossmatch result Compatible        XR CHEST PORT   Final Result      Nasogastric tube appears to be in satisfactory position. Unchanged bibasilar   atelectasis with small right pleural effusion. XR CHEST PORT   Final Result      Bilateral lower lobe atelectasis.          XR ABD FLAT/ ERECT   Final Result   Unchanged mild diffuse small bowel distention. CT ABD PELV W CONT   Final Result   1. Persistent/recurrent SBO, of uncertain etiology. 2. Stable question of left renal mass. Active Problems:    SBO (small bowel obstruction) ([address] (2/27/2019)      Assessment/Plan:   Recurrent SBO- s/p small bowel resection- POD # 4, on CL, general surgery following  -Patient was taken for exploratory laparotomy 01/03 with finding of no intra-abdominal abnormalities, no anastomotic leak, no signs of peritonitis. She was found to have mild ileus. NG tube removed today. On CLD. Srenkl-ttrpshpp-gowqrkjq IV antibiotic. Continue IV fluids. Patient seems improved. Leukocytosis resolving. Acute kidney injury, pre-renal, now resolved. Normocytic anemia/thrombocytopenia, suspect secondary to sepsis, exacerbation also related to fluid hydration. PLT wnl today. Hgb low at 6.6 ,1 unit prbc is ordered. Trend h/h. Nausea/vomiting- zofran prn. Atrial fibrillation with RVR-improved- Now off drip. Resume rythmol. Cardiology following, appreciate recommendations    Pt is being transferred to Dr Mike Grajeda service. Hospitalist will stay on board as consultant. Pt is partial code, only wants vasoactive drips, antiarrhythmic drips, no chest compressions, no defibrillation, no intubation, discussed with pt and daughter.          DVT Prophylaxis: SCD  Code Status:  Partial Code  POA: NOK daughter Radha Amen     Care Plan discussed with:   __________patient, staff nurse, CM, family_____________________________________________________    Karen Rojo MD

## 2023-01-04 NOTE — PROGRESS NOTES
EP/ ARRHYTHMIA    Patient ID:  Patient: Toñito Womack  MRN: [id number]  Age: 80 y.o.  : 10/9/1933    Date of  Admission: 2022  2:55 PM   PCP:  Lou Sam MD    Assessment:   Paroxysmal atrial fibrillation with rapid ventricular response. History of GIB in the past, not on and will not be offered chronic anticoagulation. Chronic LBBB. History of PVC's. Normal EF by echo in 2018. Hyperlipidemia. YAMILKA, improving. SBO s/p partial small bowel resection this admission. History of prior episodes. Peripheral neuropathy. Falls risk. Ovarian cancer s/p chemo. Full code. Plan:     When able to take pills (po or pNGT), will restart propafenone  mg po TID. Only use amiodarone if needed. Rate control with IV metoprolol though she may have mild sinus bradycardia with this. Not an anticoagulation candidate at this time. Agree with blood transfusion. I answered all her questions. [x]       High complexity decision making was performed in this patient at high risk for decompensation    Toñito Womack is a 80 y.o. female with a history of SBO now s/p surgery. Currently, she denies chest pain. No syncope, dizziness. She did have palpitations earlier when in Afib. No TIA or stroke symptoms. Here, she was treated with IV amiodarone before returning to sinus rhythm. She is currently NPO with an NG tube in place. Cardiac Assessment/Plan per prior consult in 2022 with Dr. Andrez Mariee:    1. Paroxysmal atrial fibrillation with RVR diagnosed in 2018, minimal to no symptoms. Back in sinus during that hospital stay. Recurrent but transient RVR during 2020 admission. Anticoagulation stopped at that time due to GIB. Will continue propafenone and avoid anticoagulation. Patient and family understand risk. 2. Chronic LBBB. 3. PVC's. 4. Hyperlipidemia. 5. Says she is not diabetic. 6. Hypercholesterolemia. 7. CKD stage 3.  8. Anemia NOS.   9. Scoliosis/arthritis s/p extensive thoracolumbar fusion 2002, now with broken hardware, s/p LUCY 12/2013 (effective), neuropathy since knee surgery 2009. 10. History of ovarian cancer s/p chemo. 11. GERD/Lewis's esophagus, gastroparesis. 12. Back pain. Fractured thoracolumbar fusion rods, stable. Protruding pedicle screws, stable. Neuroforaminal stenoses. 13. Walks with a walker. Peripheral neuropathy in her legs. 14. SBO 3/2019, resolved without surgery     Admitted with N/V, ?partial SBO; PAfib noted on tele; sinus now; NG in.  Current cardiac meds: none. Rec: Restart propafenone; No AC as noted below.           No Known Allergies       Current Facility-Administered Medications   Medication Dose Route Frequency    0.9% sodium chloride infusion 250 mL  250 mL IntraVENous PRN    famotidine (PF) (PEPCID) 20 mg in 0.9% sodium chloride 10 mL injection  20 mg IntraVENous Q12H    gabapentin (NEURONTIN) capsule 800 mg  800 mg Oral QID    0.9% sodium chloride infusion  100 mL/hr IntraVENous CONTINUOUS    [Held by provider] pantoprazole (PROTONIX) tablet 40 mg  40 mg Oral ACB    [Held by provider] enoxaparin (LOVENOX) injection 30 mg  30 mg SubCUTAneous Q24H    sodium chloride (NS) flush 5-10 mL  5-10 mL IntraVENous PRN    levoFLOXacin (LEVAQUIN) 750 mg in D5W IVPB  750 mg IntraVENous Q48H    metroNIDAZOLE (FLAGYL) IVPB premix 500 mg  500 mg IntraVENous Q12H    acetaminophen (TYLENOL) tablet 650 mg  650 mg Oral Q4H PRN    morphine injection 2 mg  2 mg IntraVENous Q3H PRN    melatonin tablet 3 mg  3 mg Oral QHS PRN    metoprolol (LOPRESSOR) injection 5 mg  5 mg IntraVENous Q4H PRN    ondansetron (ZOFRAN) injection 4 mg  4 mg IntraVENous Q6H PRN    alcohol 62% (NOZIN) nasal  1 Ampule  1 Ampule Topical Q12H    trimethobenzamide (TIGAN) injection 200 mg  200 mg IntraMUSCular Q6H PRN    [Held by provider] mylanta/viscous lidocaine (GI COCKTAIL)  40 mL Oral BID    magnesium oxide (MAG-OX) tablet 400 mg  400 mg Oral DAILY    [Held by provider] propafenone (RYTHMOL) tablet 150 mg  150 mg Oral BID    [Held by provider] potassium chloride SR (KLOR-CON 10) tablet 10 mEq  10 mEq Oral DAILY    [Held by provider] calcium carbonate (OS-GRUPO) tablet 500 mg [elemental]  500 mg Oral DAILY    [Held by provider] atorvastatin (LIPITOR) tablet 20 mg  20 mg Oral DAILY       Review of Symptoms:  Respiratory: negative for SOB, cough, sputum production, wheezing, LEVINE, pleuritic pain   Cardiology: negative for chest pain, palpitations, orthopnea, PND, edema, syncope   Genitourinary: negative for frequency, urgency, dysuria, hematuria, incontinence        Objective:      Physical Exam:  Temp (24hrs), Av.1 °F (36.7 °C), Min:97.5 °F (36.4 °C), Max:98.7 °F (37.1 °C)    Patient Vitals for the past 8 hrs:   Pulse   23 0927 (!) 139   23 0755 73   23 0405 78      Patient Vitals for the past 8 hrs:   Resp   23 0755 13   23 0405 13      Patient Vitals for the past 8 hrs:   BP   23 0927 109/86   23 0755 128/77   23 0405 133/72          Intake/Output Summary (Last 24 hours) at 2023 1035  Last data filed at 2023 0837  Gross per 24 hour   Intake 500 ml   Output 760 ml   Net -260 ml         Nondiaphoretic, not in acute distress. NG tube in place. Unlabored, clear to auscultation bilaterally anteriorly, symmetric air movement. IRREGULAR rate and rhythm, no new murmur. No peripheral edema. Palpable radial pulses bilaterally. Abdomen, soft, nontender, nondistended. Extremities without cyanosis or clubbing. Muscle tone and bulk normal for age. Skin warm and dry. No rashes or ulcers. Neuro grossly nonfocal.  No tremor. Awake and appropriate. CARDIOGRAPHICS and STUDIES, I reviewed:    Telemetry:  Back in Afib this AM.    ECG:  Reviewed. Labs:  No results for input(s): CPK, CKMB, CKNDX, TROIQ in the last 72 hours.     No lab exists for component: CPKMB  Lab Results   Component Value Date/Time    Cholesterol, total 138 07/20/2018 12:00 AM    HDL Cholesterol 46 07/20/2018 12:00 AM    LDL, calculated 66 07/20/2018 12:00 AM    Triglyceride 132 07/20/2018 12:00 AM    CHOL/HDL Ratio 3.2 09/20/2010 10:16 AM     No results for input(s): INR, PTP, APTT, INREXT, INREXT in the last 72 hours. Recent Labs     01/04/23  0417 01/03/23  0548 01/02/23  2140    137 135*   K 3.6 4.7 4.0    103 96*   CO2 27 29 32   BUN 44* 44* 46*   CREA 0.89 1.35* 1.85*   * 145* 164*   CA 9.0 8.9 9.7   WBC 6.2 8.9 13.8*   HGB 6.6* 7.4* 9.2*   HCT 23.8* 25.3* 32.3*    120* 314       No results for input(s): AP, TBIL, TP, ALB, GLOB, GGT, AML, LPSE in the last 72 hours. No lab exists for component: SGOT, GPT, AMYP, HLPSE  No components found for: GLPOC  No results for input(s): PH, PCO2, PO2 in the last 72 hours.         Lisle Spurling, MD  1/4/2023

## 2023-01-05 ENCOUNTER — APPOINTMENT (OUTPATIENT)
Dept: GENERAL RADIOLOGY | Age: 88
DRG: 329 | End: 2023-01-05
Attending: INTERNAL MEDICINE
Payer: MEDICARE

## 2023-01-05 LAB
ABO + RH BLD: NORMAL
ANION GAP SERPL CALC-SCNC: 4 MMOL/L (ref 5–15)
BASOPHILS # BLD: 0 K/UL (ref 0–0.1)
BASOPHILS NFR BLD: 0 % (ref 0–1)
BLD PROD TYP BPU: NORMAL
BLOOD GROUP ANTIBODIES SERPL: NORMAL
BPU ID: NORMAL
BUN SERPL-MCNC: 31 MG/DL (ref 6–20)
BUN/CREAT SERPL: 46 (ref 12–20)
CALCIUM SERPL-MCNC: 9.5 MG/DL (ref 8.5–10.1)
CHLORIDE SERPL-SCNC: 104 MMOL/L (ref 97–108)
CO2 SERPL-SCNC: 31 MMOL/L (ref 21–32)
CREAT SERPL-MCNC: 0.67 MG/DL (ref 0.55–1.02)
CROSSMATCH RESULT,%XM: NORMAL
DIFFERENTIAL METHOD BLD: ABNORMAL
EOSINOPHIL # BLD: 0.2 K/UL (ref 0–0.4)
EOSINOPHIL NFR BLD: 2 % (ref 0–7)
ERYTHROCYTE [DISTWIDTH] IN BLOOD BY AUTOMATED COUNT: 15.8 % (ref 11.5–14.5)
GLUCOSE SERPL-MCNC: 113 MG/DL (ref 65–100)
HCT VFR BLD AUTO: 32.8 % (ref 35–47)
HGB BLD-MCNC: 9.6 G/DL (ref 11.5–16)
IMM GRANULOCYTES # BLD AUTO: 0.1 K/UL (ref 0–0.04)
IMM GRANULOCYTES NFR BLD AUTO: 1 % (ref 0–0.5)
LYMPHOCYTES # BLD: 0.6 K/UL (ref 0.8–3.5)
LYMPHOCYTES NFR BLD: 8 % (ref 12–49)
MCH RBC QN AUTO: 23.8 PG (ref 26–34)
MCHC RBC AUTO-ENTMCNC: 29.3 G/DL (ref 30–36.5)
MCV RBC AUTO: 81.2 FL (ref 80–99)
MONOCYTES # BLD: 1 K/UL (ref 0–1)
MONOCYTES NFR BLD: 12 % (ref 5–13)
NEUTS SEG # BLD: 6.6 K/UL (ref 1.8–8)
NEUTS SEG NFR BLD: 78 % (ref 32–75)
NRBC # BLD: 0 K/UL (ref 0–0.01)
NRBC BLD-RTO: 0 PER 100 WBC
PF4 HEPARIN CMPLX AB SER-ACNC: 0.09 OD (ref 0–0.4)
PLATELET # BLD AUTO: 270 K/UL (ref 150–400)
PMV BLD AUTO: 10.2 FL (ref 8.9–12.9)
POTASSIUM SERPL-SCNC: 3.5 MMOL/L (ref 3.5–5.1)
RBC # BLD AUTO: 4.04 M/UL (ref 3.8–5.2)
SODIUM SERPL-SCNC: 139 MMOL/L (ref 136–145)
SPECIMEN EXP DATE BLD: NORMAL
STATUS OF UNIT,%ST: NORMAL
UNIT DIVISION, %UDIV: 0
WBC # BLD AUTO: 8.4 K/UL (ref 3.6–11)

## 2023-01-05 PROCEDURE — 74011250636 HC RX REV CODE- 250/636: Performed by: SURGERY

## 2023-01-05 PROCEDURE — 97530 THERAPEUTIC ACTIVITIES: CPT

## 2023-01-05 PROCEDURE — 74011250637 HC RX REV CODE- 250/637: Performed by: INTERNAL MEDICINE

## 2023-01-05 PROCEDURE — 74011000250 HC RX REV CODE- 250: Performed by: NURSE PRACTITIONER

## 2023-01-05 PROCEDURE — 51798 US URINE CAPACITY MEASURE: CPT

## 2023-01-05 PROCEDURE — 74011250637 HC RX REV CODE- 250/637: Performed by: STUDENT IN AN ORGANIZED HEALTH CARE EDUCATION/TRAINING PROGRAM

## 2023-01-05 PROCEDURE — 97535 SELF CARE MNGMENT TRAINING: CPT

## 2023-01-05 PROCEDURE — 97164 PT RE-EVAL EST PLAN CARE: CPT

## 2023-01-05 PROCEDURE — 97168 OT RE-EVAL EST PLAN CARE: CPT

## 2023-01-05 PROCEDURE — 80048 BASIC METABOLIC PNL TOTAL CA: CPT

## 2023-01-05 PROCEDURE — 36415 COLL VENOUS BLD VENIPUNCTURE: CPT

## 2023-01-05 PROCEDURE — 65270000046 HC RM TELEMETRY

## 2023-01-05 PROCEDURE — 74011250636 HC RX REV CODE- 250/636: Performed by: NURSE PRACTITIONER

## 2023-01-05 PROCEDURE — 97116 GAIT TRAINING THERAPY: CPT

## 2023-01-05 PROCEDURE — 74011250637 HC RX REV CODE- 250/637: Performed by: NURSE PRACTITIONER

## 2023-01-05 PROCEDURE — 71045 X-RAY EXAM CHEST 1 VIEW: CPT

## 2023-01-05 PROCEDURE — 74011250636 HC RX REV CODE- 250/636: Performed by: INTERNAL MEDICINE

## 2023-01-05 PROCEDURE — 77010033678 HC OXYGEN DAILY

## 2023-01-05 PROCEDURE — 85025 COMPLETE CBC W/AUTO DIFF WBC: CPT

## 2023-01-05 RX ORDER — ENOXAPARIN SODIUM 100 MG/ML
40 INJECTION SUBCUTANEOUS EVERY 24 HOURS
Status: DISCONTINUED | OUTPATIENT
Start: 2023-01-06 | End: 2023-01-10 | Stop reason: HOSPADM

## 2023-01-05 RX ORDER — FUROSEMIDE 10 MG/ML
40 INJECTION INTRAMUSCULAR; INTRAVENOUS ONCE
Status: COMPLETED | OUTPATIENT
Start: 2023-01-05 | End: 2023-01-05

## 2023-01-05 RX ORDER — GUAIFENESIN 600 MG/1
600 TABLET, EXTENDED RELEASE ORAL EVERY 12 HOURS
Status: DISCONTINUED | OUTPATIENT
Start: 2023-01-05 | End: 2023-01-10 | Stop reason: HOSPADM

## 2023-01-05 RX ADMIN — CALCIUM 500 MG: 500 TABLET ORAL at 08:15

## 2023-01-05 RX ADMIN — METRONIDAZOLE 500 MG: 500 INJECTION, SOLUTION INTRAVENOUS at 05:01

## 2023-01-05 RX ADMIN — METOPROLOL TARTRATE 5 MG: 5 INJECTION INTRAVENOUS at 18:32

## 2023-01-05 RX ADMIN — ONDANSETRON 4 MG: 2 INJECTION INTRAMUSCULAR; INTRAVENOUS at 03:50

## 2023-01-05 RX ADMIN — METOPROLOL TARTRATE 5 MG: 5 INJECTION INTRAVENOUS at 10:53

## 2023-01-05 RX ADMIN — ACETAMINOPHEN 650 MG: 325 TABLET ORAL at 08:23

## 2023-01-05 RX ADMIN — FUROSEMIDE 40 MG: 10 INJECTION, SOLUTION INTRAMUSCULAR; INTRAVENOUS at 22:21

## 2023-01-05 RX ADMIN — GABAPENTIN 800 MG: 300 CAPSULE ORAL at 18:28

## 2023-01-05 RX ADMIN — GABAPENTIN 800 MG: 300 CAPSULE ORAL at 22:21

## 2023-01-05 RX ADMIN — Medication 1 AMPULE: at 22:20

## 2023-01-05 RX ADMIN — SODIUM CHLORIDE 50 ML/HR: 9 INJECTION, SOLUTION INTRAVENOUS at 18:27

## 2023-01-05 RX ADMIN — MORPHINE SULFATE 2 MG: 2 INJECTION, SOLUTION INTRAMUSCULAR; INTRAVENOUS at 03:45

## 2023-01-05 RX ADMIN — PANTOPRAZOLE SODIUM 40 MG: 40 TABLET, DELAYED RELEASE ORAL at 08:14

## 2023-01-05 RX ADMIN — OXYCODONE 5 MG: 5 TABLET ORAL at 14:33

## 2023-01-05 RX ADMIN — METRONIDAZOLE 500 MG: 500 INJECTION, SOLUTION INTRAVENOUS at 18:29

## 2023-01-05 RX ADMIN — PROPAFENONE HYDROCHLORIDE 150 MG: 150 TABLET, FILM COATED ORAL at 18:38

## 2023-01-05 RX ADMIN — POTASSIUM CHLORIDE 10 MEQ: 750 TABLET, FILM COATED, EXTENDED RELEASE ORAL at 08:11

## 2023-01-05 RX ADMIN — MAGNESIUM OXIDE 400 MG (241.3 MG MAGNESIUM) TABLET 400 MG: TABLET at 08:14

## 2023-01-05 RX ADMIN — PROPAFENONE HYDROCHLORIDE 150 MG: 150 TABLET, FILM COATED ORAL at 08:15

## 2023-01-05 RX ADMIN — GUAIFENESIN 600 MG: 600 TABLET, EXTENDED RELEASE ORAL at 22:21

## 2023-01-05 RX ADMIN — GABAPENTIN 800 MG: 300 CAPSULE ORAL at 14:32

## 2023-01-05 RX ADMIN — Medication 1 AMPULE: at 08:11

## 2023-01-05 RX ADMIN — GUAIFENESIN 600 MG: 600 TABLET, EXTENDED RELEASE ORAL at 11:04

## 2023-01-05 RX ADMIN — GABAPENTIN 800 MG: 300 CAPSULE ORAL at 08:14

## 2023-01-05 RX ADMIN — SODIUM CHLORIDE 100 ML/HR: 9 INJECTION, SOLUTION INTRAVENOUS at 04:58

## 2023-01-05 RX ADMIN — ATORVASTATIN CALCIUM 20 MG: 20 TABLET, FILM COATED ORAL at 08:14

## 2023-01-05 NOTE — PROGRESS NOTES
Hospitalist Progress Note    Subjective:   Daily Progress Note: 1/5/2023 4:39 PM    Hospital Course:  Pt admitted abdominal pain, nausea and vomiting. CT abdomen/pelvis showing recurrent and persistent small bowel obstruction. She underwent a small bowel resection, short segment. She has history of atrial fibrillation, GERD and CAD. Subjective: Pt seen in room,. NAD.   Up in chair.  +cough with sputum  Discussed with RN  Current Facility-Administered Medications   Medication Dose Route Frequency    guaiFENesin ER (MUCINEX) tablet 600 mg  600 mg Oral Q12H    [START ON 1/6/2023] enoxaparin (LOVENOX) injection 40 mg  40 mg SubCUTAneous Q24H    0.9% sodium chloride infusion 250 mL  250 mL IntraVENous PRN    oxyCODONE IR (ROXICODONE) tablet 5 mg  5 mg Oral Q6H PRN    gabapentin (NEURONTIN) capsule 800 mg  800 mg Oral QID    0.9% sodium chloride infusion  50 mL/hr IntraVENous CONTINUOUS    pantoprazole (PROTONIX) tablet 40 mg  40 mg Oral ACB    sodium chloride (NS) flush 5-10 mL  5-10 mL IntraVENous PRN    levoFLOXacin (LEVAQUIN) 750 mg in D5W IVPB  750 mg IntraVENous Q48H    metroNIDAZOLE (FLAGYL) IVPB premix 500 mg  500 mg IntraVENous Q12H    acetaminophen (TYLENOL) tablet 650 mg  650 mg Oral Q4H PRN    morphine injection 2 mg  2 mg IntraVENous Q3H PRN    melatonin tablet 3 mg  3 mg Oral QHS PRN    metoprolol (LOPRESSOR) injection 5 mg  5 mg IntraVENous Q4H PRN    ondansetron (ZOFRAN) injection 4 mg  4 mg IntraVENous Q6H PRN    alcohol 62% (NOZIN) nasal  1 Ampule  1 Ampule Topical Q12H    trimethobenzamide (TIGAN) injection 200 mg  200 mg IntraMUSCular Q6H PRN    [Held by provider] mylanta/viscous lidocaine (GI COCKTAIL)  40 mL Oral BID    magnesium oxide (MAG-OX) tablet 400 mg  400 mg Oral DAILY    propafenone (RYTHMOL) tablet 150 mg  150 mg Oral BID    potassium chloride SR (KLOR-CON 10) tablet 10 mEq  10 mEq Oral DAILY    calcium carbonate (OS-GRUPO) tablet 500 mg [elemental]  500 mg Oral DAILY atorvastatin (LIPITOR) tablet 20 mg  20 mg Oral DAILY        Review of Systems:    Review of Systems   Constitutional:  Positive for malaise/fatigue. Respiratory:  Negative for cough and shortness of breath. Cardiovascular:  Negative for chest pain and palpitations. Gastrointestinal:  Positive for heartburn and nausea. Genitourinary:  Negative for dysuria and urgency. Neurological:  Negative for headaches. Objective:     Visit Vitals  BP (!) 123/55   Pulse 86   Temp 98.5 °F (36.9 °C)   Resp 21   Ht 5' 1\" (1.549 m)   Wt 70.6 kg (155 lb 10.3 oz)   SpO2 94%   BMI 29.41 kg/m²    O2 Flow Rate (L/min): 3 l/min O2 Device: Nasal cannula    Temp (24hrs), Av.1 °F (36.7 °C), Min:97.7 °F (36.5 °C), Max:98.6 °F (37 °C)      701 - 1900  In: -   Out: 086 [WZUXR:134]  1901 -  0700  In: 1038.8 [P.O.:500; I.V.:255]  Out: 750 [Urine:600]    PHYSICAL EXAM:    Physical Exam  Constitutional:       General: She is not in acute distress. Cardiovascular:      Rate and Rhythm: Tachycardia present. Rhythm irregular. Heart sounds: Murmur heard. Pulmonary:      Effort: Pulmonary effort is normal.      Breath sounds: Normal breath sounds. Abdominal:      General: There is distension. Comments: Firm, hypo bowels sounds. Musculoskeletal:         General: Normal range of motion. Skin:     General: Skin is warm and dry. Comments: Midline drsg intact, no drainage   Neurological:      Mental Status: She is oriented to person, place, and time.         Data Review    Recent Results (from the past 24 hour(s))   CBC WITH AUTOMATED DIFF    Collection Time: 23  3:56 AM   Result Value Ref Range    WBC 8.4 3.6 - 11.0 K/uL    RBC 4.04 3.80 - 5.20 M/uL    HGB 9.6 (L) 11.5 - 16.0 g/dL    HCT 32.8 (L) 35.0 - 47.0 %    MCV 81.2 80.0 - 99.0 FL    MCH 23.8 (L) 26.0 - 34.0 PG    MCHC 29.3 (L) 30.0 - 36.5 g/dL    RDW 15.8 (H) 11.5 - 14.5 %    PLATELET 840 587 - 263 K/uL    MPV 10.2 8.9 - 12.9 FL NRBC 0.0 0  WBC    ABSOLUTE NRBC 0.00 0.00 - 0.01 K/uL    NEUTROPHILS 78 (H) 32 - 75 %    LYMPHOCYTES 8 (L) 12 - 49 %    MONOCYTES 12 5 - 13 %    EOSINOPHILS 2 0 - 7 %    BASOPHILS 0 0 - 1 %    IMMATURE GRANULOCYTES 1 (H) 0.0 - 0.5 %    ABS. NEUTROPHILS 6.6 1.8 - 8.0 K/UL    ABS. LYMPHOCYTES 0.6 (L) 0.8 - 3.5 K/UL    ABS. MONOCYTES 1.0 0.0 - 1.0 K/UL    ABS. EOSINOPHILS 0.2 0.0 - 0.4 K/UL    ABS. BASOPHILS 0.0 0.0 - 0.1 K/UL    ABS. IMM. GRANS. 0.1 (H) 0.00 - 0.04 K/UL    DF AUTOMATED     METABOLIC PANEL, BASIC    Collection Time: 01/05/23  3:56 AM   Result Value Ref Range    Sodium 139 136 - 145 mmol/L    Potassium 3.5 3.5 - 5.1 mmol/L    Chloride 104 97 - 108 mmol/L    CO2 31 21 - 32 mmol/L    Anion gap 4 (L) 5 - 15 mmol/L    Glucose 113 (H) 65 - 100 mg/dL    BUN 31 (H) 6 - 20 MG/DL    Creatinine 0.67 0.55 - 1.02 MG/DL    BUN/Creatinine ratio 46 (H) 12 - 20      eGFR >60 >60 ml/min/1.73m2    Calcium 9.5 8.5 - 10.1 MG/DL       XR CHEST PORT   Final Result   No change. XR CHEST PORT   Final Result      Nasogastric tube appears to be in satisfactory position. Unchanged bibasilar   atelectasis with small right pleural effusion. XR CHEST PORT   Final Result      Bilateral lower lobe atelectasis. XR ABD FLAT/ ERECT   Final Result   Unchanged mild diffuse small bowel distention. CT ABD PELV W CONT   Final Result   1. Persistent/recurrent SBO, of uncertain etiology. 2. Stable question of left renal mass. Active Problems:    SBO (small bowel obstruction) ([address] (2/27/2019)      Assessment/Plan:   Recurrent SBO- s/p small bowel resection- POD # 4, on CL, general surgery following  -Patient was taken for exploratory laparotomy 01/03 with finding of no intra-abdominal abnormalities, no anastomotic leak, no signs of peritonitis. She was found to have mild ileus. NG tube removed 1/4. On CLD as per primary team.      Mouelr-hgryuact-zhkwrwiw IV antibiotic. Continue IV fluids. Patient seems improved. Leukocytosis resolved. Acute kidney injury, pre-renal, now resolved. Normocytic anemia/thrombocytopenia, suspect secondary to sepsis, exacerbation also related to fluid hydration. PLT wnl today. Hgb low at 6.6 ,1 unit prbc is ordered. Trend h/h. Nausea/vomiting- zofran prn. Atrial fibrillation with RVR-improved- Now off drip. Cont'  rythmol. Cardiology following, appreciate recommendations          Pt is partial code, only wants vasoactive drips, antiarrhythmic drips, no chest compressions, no defibrillation, no intubation, discussed with pt and daughter.          DVT Prophylaxis: SCD  Code Status:  Partial Code  POA: NOK daughter Kelly Ahn     Care Plan discussed with:   __________patient, staff nurse, CM, family_____________________________________________________    Tara Key MD

## 2023-01-05 NOTE — PROGRESS NOTES
Problem: Falls - Risk of  Goal: *Absence of Falls  Description: Document Alyce Ochoa Fall Risk and appropriate interventions in the flowsheet.   Outcome: Progressing Towards Goal  Note: Fall Risk Interventions:  Mobility Interventions: Bed/chair exit alarm, Patient to call before getting OOB, Strengthening exercises (ROM-active/passive)         Medication Interventions: Bed/chair exit alarm, Patient to call before getting OOB, Teach patient to arise slowly    Elimination Interventions: Bed/chair exit alarm, Call light in reach, Patient to call for help with toileting needs              Problem: Infection - Risk of, Urinary Catheter-Associated Urinary Tract Infection  Goal: *Absence of infection signs and symptoms  Outcome: Progressing Towards Goal

## 2023-01-05 NOTE — PROGRESS NOTES
EP/ ARRHYTHMIA    Patient ID:  Patient: Joi Luna  MRN: [id number]  Age: 80 y.o.  : 10/9/1933    Date of  Admission: 2022  2:55 PM   PCP:  Boo Conner MD    Assessment:   Paroxysmal atrial fibrillation with rapid ventricular response. History of GIB in the past, not on and will not be offered chronic anticoagulation. Chronic LBBB. History of PVC's. Normal EF by echo in 2018. Hyperlipidemia. YAMILKA, improving. SBO s/p partial small bowel resection this admission. History of prior episodes. Peripheral neuropathy. Falls risk. Ovarian cancer s/p chemo. Full code. Plan:     Restarted propafenone  mg po BID (was going to to TID but will wait until off levofloxacin). Rate control as needed with IV metoprolol--can use diltiazem if sinus rates drop too low. Not a chronic anticoagulation candidate. I answered all her questions. Will get a portable CXR to evaluate new cough, congestion. [x]       High complexity decision making was performed in this patient at high risk for decompensation    Joi Luna is a 80 y.o. female with a history of SBO now s/p surgery. Currently, she denies chest pain. No syncope, dizziness. She did have palpitations earlier when in Afib. No TIA or stroke symptoms. Here, she was treated with IV amiodarone before returning to sinus rhythm. She has had paroxysms of Afib here. Today, she feels full in her chest, coughing. NG tube is out. Cardiac Assessment/Plan per prior consult in 2022 with Dr. Dumont Ask:    1. Paroxysmal atrial fibrillation with RVR diagnosed in 2018, minimal to no symptoms. Back in sinus during that hospital stay. Recurrent but transient RVR during 2020 admission. Anticoagulation stopped at that time due to GIB. Will continue propafenone and avoid anticoagulation. Patient and family understand risk. 2. Chronic LBBB. 3. PVC's. 4. Hyperlipidemia. 5. Says she is not diabetic.   6. Hypercholesterolemia. 7. CKD stage 3.  8. Anemia NOS. 9. Scoliosis/arthritis s/p extensive thoracolumbar fusion 2002, now with broken hardware, s/p LUYC 12/2013 (effective), neuropathy since knee surgery 2009. 10. History of ovarian cancer s/p chemo. 11. GERD/Lewis's esophagus, gastroparesis. 12. Back pain. Fractured thoracolumbar fusion rods, stable. Protruding pedicle screws, stable. Neuroforaminal stenoses. 13. Walks with a walker. Peripheral neuropathy in her legs. 14. SBO 3/2019, resolved without surgery     Admitted with N/V, ?partial SBO; PAfib noted on tele; sinus now; NG in.  Current cardiac meds: none. Rec: Restart propafenone; No AC as noted below.           No Known Allergies       Current Facility-Administered Medications   Medication Dose Route Frequency    0.9% sodium chloride infusion 250 mL  250 mL IntraVENous PRN    oxyCODONE IR (ROXICODONE) tablet 5 mg  5 mg Oral Q6H PRN    gabapentin (NEURONTIN) capsule 800 mg  800 mg Oral QID    0.9% sodium chloride infusion  100 mL/hr IntraVENous CONTINUOUS    pantoprazole (PROTONIX) tablet 40 mg  40 mg Oral ACB    [Held by provider] enoxaparin (LOVENOX) injection 30 mg  30 mg SubCUTAneous Q24H    sodium chloride (NS) flush 5-10 mL  5-10 mL IntraVENous PRN    levoFLOXacin (LEVAQUIN) 750 mg in D5W IVPB  750 mg IntraVENous Q48H    metroNIDAZOLE (FLAGYL) IVPB premix 500 mg  500 mg IntraVENous Q12H    acetaminophen (TYLENOL) tablet 650 mg  650 mg Oral Q4H PRN    morphine injection 2 mg  2 mg IntraVENous Q3H PRN    melatonin tablet 3 mg  3 mg Oral QHS PRN    metoprolol (LOPRESSOR) injection 5 mg  5 mg IntraVENous Q4H PRN    ondansetron (ZOFRAN) injection 4 mg  4 mg IntraVENous Q6H PRN    alcohol 62% (NOZIN) nasal  1 Ampule  1 Ampule Topical Q12H    trimethobenzamide (TIGAN) injection 200 mg  200 mg IntraMUSCular Q6H PRN    [Held by provider] mylanta/viscous lidocaine (GI COCKTAIL)  40 mL Oral BID    magnesium oxide (MAG-OX) tablet 400 mg  400 mg Oral DAILY    propafenone (RYTHMOL) tablet 150 mg  150 mg Oral BID    potassium chloride SR (KLOR-CON 10) tablet 10 mEq  10 mEq Oral DAILY    calcium carbonate (OS-GRUPO) tablet 500 mg [elemental]  500 mg Oral DAILY    atorvastatin (LIPITOR) tablet 20 mg  20 mg Oral DAILY       Review of Symptoms:  Respiratory: negative for SOB, cough, sputum production, wheezing, LEVINE, pleuritic pain   Cardiology: negative for chest pain, palpitations, orthopnea, PND, edema, syncope   Genitourinary: negative for frequency, urgency, dysuria, hematuria, incontinence        Objective:      Physical Exam:  Temp (24hrs), Av.1 °F (36.7 °C), Min:97.7 °F (36.5 °C), Max:98.6 °F (37 °C)    Patient Vitals for the past 8 hrs:   Pulse   23 0815 95   23 0714 82   23 0333 79      Patient Vitals for the past 8 hrs:   Resp   23 0714 25   23 0333 25      Patient Vitals for the past 8 hrs:   BP   23 0815 119/68   23 0714 130/75   23 0333 (!) 148/74          Intake/Output Summary (Last 24 hours) at 2023 0857  Last data filed at 2023 0819  Gross per 24 hour   Intake 538.75 ml   Output 1075 ml   Net -536.25 ml         Nondiaphoretic, not in acute distress. NG tube is OUT. Unlabored, +rhonchi bilaterally anteriorly, symmetric air movement. REGULAR rate and rhythm, no new murmur. No peripheral edema. Palpable radial pulses bilaterally. Abdomen, soft, nontender, nondistended. Extremities without cyanosis or clubbing. Muscle tone and bulk normal for age. Skin warm and dry. No rashes or ulcers. Neuro grossly nonfocal.  No tremor. Awake and appropriate. CARDIOGRAPHICS and STUDIES, I reviewed:    Telemetry:  SR.    ECG:  Reviewed. Labs:  No results for input(s): CPK, CKMB, CKNDX, TROIQ in the last 72 hours.     No lab exists for component: CPKMB  Lab Results   Component Value Date/Time    Cholesterol, total 138 2018 12:00 AM    HDL Cholesterol 46 2018 12:00 AM    LDL, calculated 66 07/20/2018 12:00 AM    Triglyceride 132 07/20/2018 12:00 AM    CHOL/HDL Ratio 3.2 09/20/2010 10:16 AM     No results for input(s): INR, PTP, APTT, INREXT, INREXT in the last 72 hours. Recent Labs     01/05/23  0356 01/04/23  0417 01/03/23  0548    142 137   K 3.5 3.6 4.7    107 103   CO2 31 27 29   BUN 31* 44* 44*   CREA 0.67 0.89 1.35*   * 107* 145*   CA 9.5 9.0 8.9   WBC 8.4 6.2 8.9   HGB 9.6* 6.6* 7.4*   HCT 32.8* 23.8* 25.3*    197 120*       No results for input(s): AP, TBIL, TP, ALB, GLOB, GGT, AML, LPSE in the last 72 hours. No lab exists for component: SGOT, GPT, AMYP, HLPSE  No components found for: GLPOC  No results for input(s): PH, PCO2, PO2 in the last 72 hours.         Lorn Sandifer, MD  1/5/2023

## 2023-01-05 NOTE — PROGRESS NOTES
01/05/23 1440   Vitals   Temp 98.2 °F (36.8 °C)   Temp Source Axillary   Pulse (Heart Rate) (!) 119   Heart Rate Source Monitor   Resp Rate 21   O2 Sat (%) 92 %   Level of Consciousness 0   /82   MAP (Monitor) 91   MAP (Calculated) 93   MEWS Score 4       Patient's heart rate has been elevated. Doctor aware.

## 2023-01-05 NOTE — PROGRESS NOTES
Problem: Mobility Impaired (Adult and Pediatric)  Goal: *Acute Goals and Plan of Care (Insert Text)  Description: FUNCTIONAL STATUS PRIOR TO ADMISSION: Pt lives at [facility] in [facility] first floor apt. She states she amb with a \"4 wheeled\" walker within her apt but uses the w/c out of the apt. She states she normally dresses herself, gets assist from staff to bathe and for her meds. She reports no falls. She does not use O2 at baseline. Wears bilateral AFOs due to hx of bilateral foot drop. HOME SUPPORT PRIOR TO ADMISSION: nursing home, see above    Physical Therapy Goals  Initiated 12/30/2022 - remain appropriate 1/5/2023  1. Patient will move from supine to sit and sit to supine , scoot up and down, and roll side to side in bed with independence within 7 day(s). 2.  Patient will transfer from bed to chair and chair to bed with modified independence using the least restrictive device within 7 day(s). 3.  Patient will perform sit to stand with modified independence within 7 day(s). 4.  Patient will ambulate with modified independence for 100 feet with the least restrictive device within 7 day(s). Outcome: Progressing Towards Goal   PHYSICAL THERAPY REEVALUATION  Patient: Kobe Cameron (53 y.o. female)  Date: 1/5/2023  Primary Diagnosis: SBO (small bowel obstruction) ([facility]) [K56.609]  Procedure(s) (LRB):  LAPAROTOMY EXPLORATORY (N/A) 3 Days Post-Op   Precautions:   Fall      ASSESSMENT  Based on the objective data described below, the patient presents with increased weakness, impaired activity tolerance, SOB/LEVINE, baseline bilateral foot drop, impaired sitting and standing balance, increased pain levels, drowsiness, and overall impaired functional mobility. Pt received supine in bed on 3L O2, agreeable to participation with therapy. Pt with baseline bilateral foot drop and bilateral AFOs present in room however pt refused donning of AFO this date.  Pt required modAx2 during sit>>stand transfer as well as facilitation of forward weight shift secondary to strong posterior lean. Once standing, pt only able to tolerate brief ambulation trial from EOB>>bedside chair w/ minAx2 and RW before fatigue. Bilateral foot drop noted as well as slow, shuffled gait pattern. Fair gait stability overall. O2 sats decreased to 87% post ambulation however recovered to 94% with seated rest. Overall, fair tolerance of therapy session. Pt remains well below her baseline mod I level and most appropriate for additional rehab at discharge. Recommend healthcare portion of [employer] at discharge. Current Level of Function Impacting Discharge (mobility/balance): modAx2 bed mobility, modAx2 sit<>stand, minAx2 w/ RW support during brief ambulation    Functional Outcome Measure: The patient scored 25/100 on the Barthel Index outcome measure which is indicative of significant impairments in ADLs and functional mobility. Other factors to consider for discharge: impaired activity tolerance, falls risk, fatigues quickly, lives alone, bilateral foot drop     Patient will benefit from skilled therapy intervention to address the above noted impairments. PLAN :  Recommendations and Planned Interventions: bed mobility training, transfer training, gait training, therapeutic exercises, patient and family training/education, and therapeutic activities      Frequency/Duration: Patient will be followed by physical therapy:  4 times a week to address goals.     Recommendation for discharge: (in order for the patient to meet his/her long term goals)  Therapy up to 5 days/week in SNF setting - healthcare portion of [employer]    This discharge recommendation:  Has been made in collaboration with the attending provider and/or case management    Equipment recommendations for successful discharge (if) home: patient owns DME required for discharge         SUBJECTIVE:   Patient stated I want to drink some chocolate milk, is that chocolate milk?.    OBJECTIVE DATA SUMMARY:   HISTORY:    Past Medical History:   Diagnosis Date    Abnormal x-ray of abdomen 11/13/2020    Bas showed decreased peristalsis and a cervical web      Arrhythmia     A fib    Arthritis     Diarrhea 6/6/2016    Esophageal dysphagia 11/13/2020    Foot drop     Gastric ulcer 6/14/2012    Gastroparesis     GERD (gastroesophageal reflux disease)     High cholesterol     Hypertension     Neuropathy     Ovarian cancer ([address] 1986    chemo x 9 mos    Scoliosis     Stroke [facility]) 2002    ? stroke with drop feet, per patient CT scans were negative     Past Surgical History:   Procedure Laterality Date    FLEXIBLE SIGMOIDOSCOPY N/A 6/6/2016    SIGMOIDOSCOPY FLEXIBLE performed by Preet Barrientos MD at [facility] ENDOSCOPY    HX ORTHOPAEDIC      back    HX ORTHOPAEDIC      bilateral shoulder replacements    HX ORTHOPAEDIC      left knee replacement    HX LYDIA AND BSO  1986    (ovarian cancer)    [place] E[place] TRANSORAL BIOPSY SINGLE/MULTIPLE  1/26/2012         [place] E[place] TRANSORAL BIOPSY SINGLE/MULTIPLE  6/14/2012         UPPER GI ENDOSCOPY,BIOPSY  11/13/2020         UPPER GI ENDOSCOPY,DIAGNOSIS  10/5/2020         UPPER GI ENDOSCOPY,DILATN W GUIDE  11/13/2020          Hospital course since last seen and reason for reevaluation: pt s/p small bowel resection 12/31 and ex lap 1/2    Personal factors and/or comorbidities impacting plan of care:     Home Situation  Home Environment: [address] Name: [place]  # Steps to Enter: 0  One/Two Story Residence:  (first floor apartment)  Living Alone:  (apt in [facility])  [place]: Other (Comment) (staff at [facility])  Patient Expects to be Discharged to[de-identified] Home  Current DME Used/Available at Home: Aline Tushar, rolling, Shower chair  Tub or Shower Type: Shower    EXAMINATION/PRESENTATION/DECISION MAKING:   Critical Behavior:  Neurologic State: Alert  Orientation Level: Oriented X4  Cognition: Appropriate decision making, Appropriate for age attention/concentration, Appropriate safety awareness, Follows commands  Safety/Judgement: Fall prevention  Hearing: Auditory  Auditory Impairment: Hard of hearing, bilateral  Hearing Aids/Status: Bilateral, Other (comment) (with family)  Skin:  dressing clean, dry, intact  Edema: BLEs  Range Of Motion:  AROM: Generally decreased, functional                       Strength:    Strength: Generally decreased, functional                    Tone & Sensation:   Tone: Normal                              Coordination:  Coordination: Generally decreased, functional  Vision:      Functional Mobility:  Bed Mobility:     Supine to Sit: Assist x2; Moderate assistance        Transfers:  Sit to Stand: Assist x2; Moderate assistance  Stand to Sit: Minimum assistance        Bed to Chair: Assist x2;Minimum assistance              Balance:   Sitting: Intact  Standing: Impaired;Pull to stand; With support  Standing - Static: Constant support; Fair  Standing - Dynamic : Constant support; Fair  Ambulation/Gait Training:  Distance (ft): 2 Feet (ft)  Assistive Device: Walker, rolling;Gait belt  Ambulation - Level of Assistance: Minimal assistance;Assist x2        Gait Abnormalities: Decreased step clearance;Shuffling gait; Foot drop (bilateral foot drop)        Base of Support: Widened     Speed/Kaylynn: Pace decreased (<100 feet/min); Shuffled  Step Length: Left shortened;Right shortened                  Functional Measure:  Barthel Index:    Bathin  Bladder: 0  Bowels: 5  Groomin  Dressin  Feedin  Mobility: 0  Stairs: 0  Toilet Use: 5  Transfer (Bed to Chair and Back): 5  Total: 25/100       The Barthel ADL Index: Guidelines  1. The index should be used as a record of what a patient does, not as a record of what a patient could do. 2. The main aim is to establish degree of independence from any help, physical or verbal, however minor and for whatever reason. 3. The need for supervision renders the patient not independent.   4. A patient's performance should be established using the best available evidence. Asking the patient, friends/relatives and nurses are the usual sources, but direct observation and common sense are also important. However direct testing is not needed. 5. Usually the patient's performance over the preceding 24-48 hours is important, but occasionally longer periods will be relevant. 6. Middle categories imply that the patient supplies over 50 per cent of the effort. 7. Use of aids to be independent is allowed. Score Interpretation (from [address] [facility] [address])    Independent   60-79 Minimally independent   40-59 Partially dependent   20-39 Very dependent   <20 Totally dependent     -Cedric Adorno., Barthel, D.W. (1965). Functional evaluation: the Barthel Index. [address] ([address] (1997). The Barthel activities of daily living index: self-reporting versus actual performance in the old (> or = 75 years). Journal of [address] [place] [address] 45(7), [address] [facility], OMEGA, Adolfo Quiles., Joann Hernandez. (1999). Measuring the change in disability after inpatient rehabilitation; comparison of the responsiveness of the Barthel Index and Functional [place] Measure. Journal of Neurology, Neurosurgery, and Psychiatry, 66(4), 745-534. Demetrice Newsome, N.J.A, GABI Sandy, & Aliyah Osei MANASTASIYA. (2004) Assessment of post-stroke quality of life in cost-effectiveness studies: The usefulness of the Barthel Index and the EuroQoL-5D. Quality of Life Research, 13, 427-78             Pain Rating:  Reported generalized abdominal pain however did not quantify    Activity Tolerance:   Fair, O2 sats 87% on 3L O2 post activity, recovering >90% with seated rest and 3L O2.  Fatigues quickly    After treatment patient left in no apparent distress:   Sitting in chair, Call bell within reach, and Bed / chair alarm activated    COMMUNICATION/EDUCATION:   The patients plan of care was discussed with: Occupational therapist and Registered nurse. Fall prevention education was provided and the patient/caregiver indicated understanding., Patient/family have participated as able in goal setting and plan of care. , and Patient/family agree to work toward stated goals and plan of care.     Thank you for this referral.  Kavita Jolly, PT, DPT   Time Calculation: 30 mins

## 2023-01-05 NOTE — PROGRESS NOTES
Bedside shift change report given to [facility] GENERAL ARANGO, RN (oncoming nurse) by Moriah Luther RN (offgoing nurse). Report included the following information SBAR, Kardex, Cardiac Rhythm A-fib, and Quality Measures. 1000--PT and OT came by and got patient up in chair. She is pretty weak so requires 2 people assisting her. 1053--IV metoprolol given due to patient's heart rate going up to 150's when getting back in bed. 1135--Morataya taken out. 1900--Bladder scanned a few times and minimal urine scanned. Patient did urinate 100 mL of hakeem colored urine before end of shift. End of Shift Note    Bedside shift change report given to Moriah Luther RN  (oncoming nurse) by Henry Virgen RN (offgoing nurse). Report included the following information SBAR, Kardex, Cardiac Rhythm A-fib, and Quality Measures    Shift worked:  day     Shift summary and any significant changes:     See above     Concerns for physician to address:  Heart rate control? Zone phone for oncoming shift:          Activity:  Activity Level: Bed Rest  Number times ambulated in hallways past shift: 0  Number of times OOB to chair past shift: 1    Cardiac:   Cardiac Monitoring: Yes      Cardiac Rhythm: Atrial Fib    Access:  Current line(s): PIV     Genitourinary:   Urinary status: voiding and external catheter    Respiratory:   O2 Device: Nasal cannula  Chronic home O2 use?: NO  Incentive spirometer at bedside: NO  Actual Volume (ml): 500 ml    GI:  Last Bowel Movement Date: 01/05/23  Current diet:  ADULT DIET Clear Liquid  ADULT ORAL NUTRITION SUPPLEMENT Breakfast, Lunch, Dinner; Clear Liquid  Passing flatus: YES  Tolerating current diet: YES       Pain Management:   Patient states pain is manageable on current regimen: YES    Skin:  Cristino Score: 14  Interventions: increase time out of bed, limit briefs, and nutritional support     Patient Safety:  Fall Score:  Total Score: 3  Interventions: bed/chair alarm, gripper socks, and pt to call before getting OOB  High Fall Risk: Yes    Length of Stay:  Expected LOS: 9d 9h  Actual LOS: 7      Jatin Arnold RN

## 2023-01-05 NOTE — PROGRESS NOTES
P&T-Approved DVT Prophylaxis Dosing    Per P&T Committee-approved protocol enoxaparin 30 mg daily has been adjusted to enoxaparin 40 mg daily based on weight and renal function as shown in the table below.          Jakob Mart, PHARMD

## 2023-01-05 NOTE — PROGRESS NOTES
Problem: Self Care Deficits Care Plan (Adult)  Goal: *Acute Goals and Plan of Care (Insert Text)  Description: FUNCTIONAL STATUS PRIOR TO ADMISSION: ambulated room to room with RW, otherwise uses wheelchair, performed all ADLs on her own except for bathing 2x a week in the shower, takes pills with apple sauce    HOME SUPPORT PRIOR TO ADMISSION: lives at [address] and gets assist with showers and meals only, lives alone in her apartment    Occupational Therapy Goals:  ReEvaluation 1/4/2023 continue all goals below:   Initiated 12/30/2022  1. Patient will perform grooming with supervision/set-up within 7 days. 2. Patient will perform toileting with supervision/set-up within 7 days. 3. Patient will perform upper body dressing and lower body dressing with supervision/set-up within 7 days. 4. Patient will transfer from toilet with supervision/set-up using the least restrictive device and appropriate durable medical equipment within 7 days. Outcome: Progressing Towards Goal   OCCUPATIONAL THERAPY RE-EVALUATION  Patient: Baldev Rico (50 y.o. female)  Date: 1/5/2023  Diagnosis: [facility] [K56.609] <principal problem not specified>  Procedure(s) (LRB):  LAPAROTOMY EXPLORATORY (N/A) 3 Days Post-Op  Precautions: Fall  Chart, occupational therapy assessment, plan of care, and goals were reviewed. ASSESSMENT  Based on the objective data described below, limited by decreased strength, lethargy, fair balance, pain, elevated HR with activity, SOB at rest with LEVINE with rest breaks required, need for 2L O2 with SPO2 86-93% throughout activity and continues with impaired independence with ADLs and functional mobility s/p SBO with exploratory lap 1/2 with anemia, currently tolerated activity OOB to recliner with RW use and increased time, overall mod A x2 requiring boost to stand and remains with significant A for all ADLs. Recommend SNF prior to correction as patient remains below baseline.     Current Level of Function Impacting Discharge (ADLs): up to total A for LB ADLs, up to setup for UB ADLs, set up for self feeding    Other factors to consider for discharge: recently moved [place] to [facility], asking for SNF/HCU, PLOF uses B AFOs for long distance walking         PLAN :  Recommendations and Planned Interventions: self care training, functional mobility training, therapeutic exercise, balance training, therapeutic activities, endurance activities, and patient education    Frequency/Duration: Patient will be followed by occupational therapy 4 times a week to address goals. Recommend with staff: OOB to [facility] A x2    Recommend next OT session: BSC transfer retraining    Recommendation for discharge: (in order for the patient to meet his/her long term goals)  Therapy up to 5 days/week in SNF setting    This discharge recommendation:  A follow-up discussion with the attending provider and/or case management is planned    Equipment recommendations for successful discharge (if) home: bedside commode and shower chair       SUBJECTIVE:   Patient stated I know I need to get up some.     OBJECTIVE DATA SUMMARY:   Hospital course since last seen and reason for reevaluation: exploratory lap 1/2    Cognitive/Behavioral Status:  Neurologic State: Alert  Orientation Level: Oriented X4  Cognition: Appropriate decision making; Appropriate for age attention/concentration; Appropriate safety awareness; Follows commands             Skin: abdominal incision intact    Edema: mild to moderate total body/ankles    Hearing:   Auditory  Auditory Impairment: Hard of hearing, bilateral  Hearing Aids/Status: Bilateral, Other (comment) (with family)    Vision/Perceptual:                                     Range of Motion:  B UE  AROM: Generally decreased, functional                         Strength:  B UE  Strength: Generally decreased, functional                Coordination:  Coordination: Generally decreased, functional            Tone & Sensation:  B UE  Tone: Normal                           Functional Mobility and Transfers for ADLs:  Bed Mobility:  Supine to Sit: Assist x2; Moderate assistance    Transfers:  Sit to Stand: Assist x2; Moderate assistance  Functional Transfers  Toilet Transfer : Assist x2; Moderate assistance  Bed to Chair: Assist x2;Minimum assistance    Balance:  Sitting: Intact  Standing: Impaired;Pull to stand; With support  Standing - Static: Constant support; Fair  Standing - Dynamic : Constant support; Fair    ADL Assessment:  Feeding: Setup (clear diet)    Oral Facial Hygiene/Grooming: Minimum assistance    Bathing: Maximum assistance    Type of Bath: Chlorhexidine (CHG); Bath Pack; Full    Upper Body Dressing: Minimum assistance    Lower Body Dressing: Maximum assistance    Toileting: Total assistance (yepez)         Completed OT REevaluation and ADLs seated EOB and standing as able with mod A x2 with RW for balance. Educated on safety and endurance training with encouragement for full participation in ADLs while in hospital. Good understanding noted. ADL Intervention and task modifications:  Feeding  Container Management: Total assistance (dependent)  Food to Mouth: Set-up  Drink to Mouth: Set-up  Cues: Visual/perceptual training/retraining       Patient instructed and indicated understanding the benefits of maintaining activity tolerance, functional mobility, and independence with self care tasks during acute stay  to ensure safe return home and to baseline. Encouraged patient to increase frequency and duration OOB, be out of bed for all meals, perform daily ADLs (as approved by RN/MD regarding bathing etc), and performing functional mobility to/from bathroom. Type of Bath: Chlorhexidine (CHG); Bath Pack; Full                   Toileting  Clothing Management: Total assistance (dependent)         Therapeutic Exercises:   Encouraged B UE AROM as tolerated    Functional Measure:    Barthel Index:  Bathin  Bladder: 0  Bowels: 5  Groomin  Dressin  Feedin  Mobility: 0  Stairs: 0  Toilet Use: 5  Transfer (Bed to Chair and Back): 5  Total: 25/100      The Barthel ADL Index: Guidelines  1. The index should be used as a record of what a patient does, not as a record of what a patient could do. 2. The main aim is to establish degree of independence from any help, physical or verbal, however minor and for whatever reason. 3. The need for supervision renders the patient not independent. 4. A patient's performance should be established using the best available evidence. Asking the patient, friends/relatives and nurses are the usual sources, but direct observation and common sense are also important. However direct testing is not needed. 5. Usually the patient's performance over the preceding 24-48 hours is important, but occasionally longer periods will be relevant. 6. Middle categories imply that the patient supplies over 50 per cent of the effort. 7. Use of aids to be independent is allowed. Score Interpretation (from [address] [address])    Independent   60-79 Minimally independent   40-59 Partially dependent   20-39 Very dependent   <20 Totally dependent     -Cedric Adorno., Barthel, DKaylaW. (1965). Functional evaluation: the Barthel Index. [address] [facility] ([address] (). The Barthel activities of daily living index: self-reporting versus actual performance in the old (> or = 75 years). Journal of [address] [place] [address] 45(7), [facility], OMEGA, Cornell Quiles., Jacey Do. (). Measuring the change in disability after inpatient rehabilitation; comparison of the responsiveness of the Barthel Index and Functional [place] Measure. Journal of Neurology, Neurosurgery, and Psychiatry, 66(4), 427-112. Mukesh Seaman, N.J.A, GABI Sandy, & Kenny Roland M.A. (2004) Assessment of post-stroke quality of life in cost-effectiveness studies:  The usefulness of the Barthel Index and the EuroQoL-5D. Quality of Life Research, 13, 427-43         Pain:  Abdominal but did no rate, increased with coughing    Activity Tolerance:   Fair, desaturates with exertion and requires oxygen, requires frequent rest breaks, and observed SOB with activity    After treatment patient left in no apparent distress:   Sitting in chair and Call bell within reach    COMMUNICATION/COLLABORATION:   The patients plan of care was discussed with: Physical therapist and Registered nurse.      Conrad Ramirez OT  Time Calculation: 37 mins

## 2023-01-05 NOTE — PROGRESS NOTES
1900: Bedside and Verbal shift change report given to Jared Rodriguez RN (oncoming nurse) by Elisa Rowell (offgoing nurse). Report included the following information SBAR, Kardex, MAR, and Recent Results. 4129: Patient had an episode of emesis and abdominal pain. Pain med and nausea med given. 1879: Reassessment: patient resting with eyes closed and no complaints of nausea. End of Shift Note    Bedside shift change report given to [facility]ET[facility] (oncoming nurse) by Román Anguiano RN (offgoing nurse). Report included the following information SBAR, Kardex, MAR, and Recent Results    Shift worked:  [id number]     Shift summary and any significant changes:          Concerns for physician to address:       Zone phone for oncoming shift:          Activity:  Activity Level: Bed Rest  Number times ambulated in hallways past shift: 0  Number of times OOB to chair past shift: 0    Cardiac:   Cardiac Monitoring: Yes      Cardiac Rhythm: Atrial Fib    Access:  Current line(s): PIV     Genitourinary:   Urinary status: yepez    Respiratory:   O2 Device: Nasal cannula  Chronic home O2 use?: N/A  Incentive spirometer at bedside: YES  Actual Volume (ml): 500 ml    GI:  Last Bowel Movement Date: 12/29/22  Current diet:  ADULT DIET Clear Liquid  ADULT ORAL NUTRITION SUPPLEMENT Breakfast, Lunch, Dinner; Clear Liquid  Passing flatus: YES, patient stated. Tolerating current diet: YES       Pain Management:   Patient states pain is manageable on current regimen: YES    Skin:  Cristino Score: 15  Interventions: internal/external urinary devices    Patient Safety:  Fall Score:  Total Score: 3  Interventions: bed/chair alarm  High Fall Risk: Yes    Length of Stay:  Expected LOS: 9d 9h  Actual LOS: 7      Román Anguiano RN

## 2023-01-05 NOTE — PROGRESS NOTES
Problem: Falls - Risk of  Goal: *Absence of Falls  Description: Document Dionicio Stovall Fall Risk and appropriate interventions in the flowsheet.   Outcome: Progressing Towards Goal  Note: Fall Risk Interventions:  Mobility Interventions: Bed/chair exit alarm, Patient to call before getting OOB, Strengthening exercises (ROM-active/passive)         Medication Interventions: Bed/chair exit alarm, Patient to call before getting OOB, Teach patient to arise slowly    Elimination Interventions: Bed/chair exit alarm, Call light in reach, Patient to call for help with toileting needs

## 2023-01-05 NOTE — PROGRESS NOTES
SURGERY PROGRESS NOTE      Admit Date: 2022    POD 3 Days Post-Op    Procedure: Procedure(s):  LAPAROTOMY EXPLORATORY      Subjective:     Patient complains of being SOB at rest.  Abdominal pain is minimal.  Tolerating sips. Passing some flatus. Objective:     Visit Vitals  BP (!) 123/55   Pulse 86   Temp 98.5 °F (36.9 °C)   Resp 21   Ht 5' 1\" (1.549 m)   Wt 70.6 kg (155 lb 10.3 oz)   SpO2 94%   BMI 29.41 kg/m²        Temp (24hrs), Av.1 °F (36.7 °C), Min:97.7 °F (36.5 °C), Max:98.6 °F (37 °C)      701 - 1900  In: -   Out: 088 [RRKNL:667]  1901 -  07  In: 1038.8 [P.O.:500;  I.V.:255]  Out: 750 [Urine:600]    Physical Exam:    General:  alert, cooperative, no distress, appears stated age   Abdomen: soft, bowel sounds active, non-tender   Incision:   healing well, no drainage, no erythema, no hernia, no seroma, no swelling, no dehiscence, incision well approximated           Lab Results   Component Value Date/Time    WBC 8.4 2023 03:56 AM    HGB 9.6 (L) 2023 03:56 AM    HCT 32.8 (L) 2023 03:56 AM    Hematocrit (POC) 32 (L) 2020 10:36 AM    PLATELET 404  03:56 AM    MCV 81.2 2023 03:56 AM     Lab Results   Component Value Date/Time    GFR est non-AA 53 (L) 10/02/2022 04:46 AM    GFRNA, POC 59 (L) 2020 10:36 AM    GFR est AA >60 10/02/2022 04:46 AM    GFRAA, POC >60 2020 10:36 AM    Creatinine 0.67 2023 03:56 AM    Creatinine, POC 0.8 10/01/2022 01:34 PM    BUN 31 (H) 2023 03:56 AM    BUN (POC) 34 (H) 2020 10:36 AM    Sodium 139 2023 03:56 AM    Sodium (POC) 142 2020 10:36 AM    Sodium,  10/01/2022 01:34 PM    Potassium 3.5 2023 03:56 AM    Potassium (POC) 3.2 (L) 2020 10:36 AM    Potassium, POC 3.4 (L) 10/01/2022 01:34 PM    Chloride 104 2023 03:56 AM    Chloride,  10/01/2022 01:34 PM    CO2 31 2023 03:56 AM    Magnesium 2.4 2023 09:40 PM Assessment:     Active Problems:    SBO (small bowel obstruction) ([facility] (2/27/2019)    Ileus resolving slowly    Plan:        D/C yepez  follow up CXR  OOB  Decrease IVF  Restart lovenox

## 2023-01-06 LAB
ANION GAP SERPL CALC-SCNC: 3 MMOL/L (ref 5–15)
BASOPHILS # BLD: 0 K/UL (ref 0–0.1)
BASOPHILS NFR BLD: 0 % (ref 0–1)
BUN SERPL-MCNC: 22 MG/DL (ref 6–20)
BUN/CREAT SERPL: 42 (ref 12–20)
CALCIUM SERPL-MCNC: 7.8 MG/DL (ref 8.5–10.1)
CHLORIDE SERPL-SCNC: 112 MMOL/L (ref 97–108)
CO2 SERPL-SCNC: 26 MMOL/L (ref 21–32)
CREAT SERPL-MCNC: 0.52 MG/DL (ref 0.55–1.02)
DIFFERENTIAL METHOD BLD: ABNORMAL
EOSINOPHIL # BLD: 0.1 K/UL (ref 0–0.4)
EOSINOPHIL NFR BLD: 2 % (ref 0–7)
ERYTHROCYTE [DISTWIDTH] IN BLOOD BY AUTOMATED COUNT: 15.8 % (ref 11.5–14.5)
GLUCOSE SERPL-MCNC: 107 MG/DL (ref 65–100)
HCT VFR BLD AUTO: 31.4 % (ref 35–47)
HGB BLD-MCNC: 9 G/DL (ref 11.5–16)
IMM GRANULOCYTES # BLD AUTO: 0.1 K/UL (ref 0–0.04)
IMM GRANULOCYTES NFR BLD AUTO: 1 % (ref 0–0.5)
LYMPHOCYTES # BLD: 0.8 K/UL (ref 0.8–3.5)
LYMPHOCYTES NFR BLD: 11 % (ref 12–49)
MCH RBC QN AUTO: 23.9 PG (ref 26–34)
MCHC RBC AUTO-ENTMCNC: 28.7 G/DL (ref 30–36.5)
MCV RBC AUTO: 83.3 FL (ref 80–99)
MONOCYTES # BLD: 0.8 K/UL (ref 0–1)
MONOCYTES NFR BLD: 12 % (ref 5–13)
NEUTS SEG # BLD: 4.9 K/UL (ref 1.8–8)
NEUTS SEG NFR BLD: 74 % (ref 32–75)
NRBC # BLD: 0 K/UL (ref 0–0.01)
NRBC BLD-RTO: 0 PER 100 WBC
PLATELET # BLD AUTO: 193 K/UL (ref 150–400)
PMV BLD AUTO: 10.6 FL (ref 8.9–12.9)
POTASSIUM SERPL-SCNC: 3.1 MMOL/L (ref 3.5–5.1)
RBC # BLD AUTO: 3.77 M/UL (ref 3.8–5.2)
SODIUM SERPL-SCNC: 141 MMOL/L (ref 136–145)
SRA 100IU/ML UFH SER-ACNC: 2 % (ref 0–20)
SRA UFH SER-IMP: NORMAL
UNFRAC HEPARIN LOW DOSE: 2 % (ref 0–20)
WBC # BLD AUTO: 6.7 K/UL (ref 3.6–11)

## 2023-01-06 PROCEDURE — 74011250637 HC RX REV CODE- 250/637: Performed by: STUDENT IN AN ORGANIZED HEALTH CARE EDUCATION/TRAINING PROGRAM

## 2023-01-06 PROCEDURE — 65270000046 HC RM TELEMETRY

## 2023-01-06 PROCEDURE — 97116 GAIT TRAINING THERAPY: CPT

## 2023-01-06 PROCEDURE — 74011250637 HC RX REV CODE- 250/637: Performed by: NURSE PRACTITIONER

## 2023-01-06 PROCEDURE — 74011250636 HC RX REV CODE- 250/636: Performed by: INTERNAL MEDICINE

## 2023-01-06 PROCEDURE — 74011250637 HC RX REV CODE- 250/637: Performed by: INTERNAL MEDICINE

## 2023-01-06 PROCEDURE — 74011250636 HC RX REV CODE- 250/636: Performed by: SURGERY

## 2023-01-06 PROCEDURE — 74011250636 HC RX REV CODE- 250/636: Performed by: NURSE PRACTITIONER

## 2023-01-06 PROCEDURE — 77010033678 HC OXYGEN DAILY

## 2023-01-06 PROCEDURE — 74011250637 HC RX REV CODE- 250/637: Performed by: SURGERY

## 2023-01-06 PROCEDURE — 74011250637 HC RX REV CODE- 250/637: Performed by: PHYSICIAN ASSISTANT

## 2023-01-06 PROCEDURE — 85025 COMPLETE CBC W/AUTO DIFF WBC: CPT

## 2023-01-06 PROCEDURE — 97530 THERAPEUTIC ACTIVITIES: CPT

## 2023-01-06 PROCEDURE — 36415 COLL VENOUS BLD VENIPUNCTURE: CPT

## 2023-01-06 PROCEDURE — 80048 BASIC METABOLIC PNL TOTAL CA: CPT

## 2023-01-06 PROCEDURE — 74011000250 HC RX REV CODE- 250: Performed by: NURSE PRACTITIONER

## 2023-01-06 PROCEDURE — C9399 UNCLASSIFIED DRUGS OR BIOLOG: HCPCS | Performed by: INTERNAL MEDICINE

## 2023-01-06 RX ORDER — PROPAFENONE HYDROCHLORIDE 150 MG/1
150 TABLET, COATED ORAL EVERY 8 HOURS
Status: DISCONTINUED | OUTPATIENT
Start: 2023-01-06 | End: 2023-01-10 | Stop reason: HOSPADM

## 2023-01-06 RX ORDER — METOPROLOL TARTRATE 5 MG/5ML
2.5 INJECTION INTRAVENOUS
Status: DISCONTINUED | OUTPATIENT
Start: 2023-01-06 | End: 2023-01-10 | Stop reason: HOSPADM

## 2023-01-06 RX ORDER — POTASSIUM CHLORIDE 20 MEQ/1
40 TABLET, EXTENDED RELEASE ORAL
Status: COMPLETED | OUTPATIENT
Start: 2023-01-06 | End: 2023-01-06

## 2023-01-06 RX ORDER — FUROSEMIDE 20 MG/1
20 TABLET ORAL DAILY
Status: DISCONTINUED | OUTPATIENT
Start: 2023-01-06 | End: 2023-01-10 | Stop reason: HOSPADM

## 2023-01-06 RX ADMIN — METOPROLOL TARTRATE 5 MG: 5 INJECTION INTRAVENOUS at 10:22

## 2023-01-06 RX ADMIN — ATORVASTATIN CALCIUM 20 MG: 20 TABLET, FILM COATED ORAL at 08:26

## 2023-01-06 RX ADMIN — SODIUM CHLORIDE, PRESERVATIVE FREE 10 ML: 5 INJECTION INTRAVENOUS at 06:46

## 2023-01-06 RX ADMIN — ENOXAPARIN SODIUM 40 MG: 100 INJECTION SUBCUTANEOUS at 08:12

## 2023-01-06 RX ADMIN — PROPAFENONE HYDROCHLORIDE 150 MG: 150 TABLET, FILM COATED ORAL at 21:19

## 2023-01-06 RX ADMIN — METRONIDAZOLE 500 MG: 500 INJECTION, SOLUTION INTRAVENOUS at 06:50

## 2023-01-06 RX ADMIN — PROPAFENONE HYDROCHLORIDE 150 MG: 150 TABLET, FILM COATED ORAL at 09:29

## 2023-01-06 RX ADMIN — SODIUM CHLORIDE 50 ML/HR: 9 INJECTION, SOLUTION INTRAVENOUS at 06:46

## 2023-01-06 RX ADMIN — GABAPENTIN 800 MG: 300 CAPSULE ORAL at 18:34

## 2023-01-06 RX ADMIN — POTASSIUM CHLORIDE 40 MEQ: 1500 TABLET, EXTENDED RELEASE ORAL at 09:29

## 2023-01-06 RX ADMIN — GUAIFENESIN 600 MG: 600 TABLET, EXTENDED RELEASE ORAL at 21:19

## 2023-01-06 RX ADMIN — PANTOPRAZOLE SODIUM 40 MG: 40 TABLET, DELAYED RELEASE ORAL at 06:46

## 2023-01-06 RX ADMIN — GABAPENTIN 800 MG: 300 CAPSULE ORAL at 15:15

## 2023-01-06 RX ADMIN — GABAPENTIN 800 MG: 300 CAPSULE ORAL at 08:30

## 2023-01-06 RX ADMIN — ALBUMIN HUMAN 25 G: 0.25 SOLUTION INTRAVENOUS at 12:31

## 2023-01-06 RX ADMIN — POTASSIUM CHLORIDE 10 MEQ: 750 TABLET, FILM COATED, EXTENDED RELEASE ORAL at 08:25

## 2023-01-06 RX ADMIN — POTASSIUM CHLORIDE 40 MEQ: 1500 TABLET, EXTENDED RELEASE ORAL at 06:46

## 2023-01-06 RX ADMIN — Medication 1 AMPULE: at 08:12

## 2023-01-06 RX ADMIN — MAGNESIUM OXIDE 400 MG (241.3 MG MAGNESIUM) TABLET 400 MG: TABLET at 08:26

## 2023-01-06 RX ADMIN — Medication 1 AMPULE: at 21:18

## 2023-01-06 RX ADMIN — FUROSEMIDE 20 MG: 20 TABLET ORAL at 08:26

## 2023-01-06 RX ADMIN — CALCIUM 500 MG: 500 TABLET ORAL at 09:29

## 2023-01-06 RX ADMIN — GABAPENTIN 800 MG: 300 CAPSULE ORAL at 22:51

## 2023-01-06 RX ADMIN — GUAIFENESIN 600 MG: 600 TABLET, EXTENDED RELEASE ORAL at 08:26

## 2023-01-06 NOTE — PROGRESS NOTES
Problem: Mobility Impaired (Adult and Pediatric)  Goal: *Acute Goals and Plan of Care (Insert Text)  Description: FUNCTIONAL STATUS PRIOR TO ADMISSION: Pt lives at [facility] in [facility] first floor apt. She states she amb with a \"4 wheeled\" walker within her apt but uses the w/c out of the apt. She states she normally dresses herself, gets assist from staff to bathe and for her meds. She reports no falls. She does not use O2 at baseline. Wears bilateral AFOs due to hx of bilateral foot drop. HOME SUPPORT PRIOR TO ADMISSION: PERFECTO, see above    Physical Therapy Goals  Initiated 12/30/2022 - remain appropriate 1/5/2023  1. Patient will move from supine to sit and sit to supine , scoot up and down, and roll side to side in bed with independence within 7 day(s). 2.  Patient will transfer from bed to chair and chair to bed with modified independence using the least restrictive device within 7 day(s). 3.  Patient will perform sit to stand with modified independence within 7 day(s). 4.  Patient will ambulate with modified independence for 100 feet with the least restrictive device within 7 day(s). Outcome: Progressing Towards Goal    PHYSICAL THERAPY TREATMENT  Patient: Sindy Strickland (58 y.o. female)  Date: 1/6/2023  Diagnosis: [facility] [K56.609] <principal problem not specified>  Procedure(s) (LRB):  LAPAROTOMY EXPLORATORY (N/A) 4 Days Post-Op  Precautions: Fall  Chart, physical therapy assessment, plan of care and goals were reviewed. ASSESSMENT  Patient continues with skilled PT services and is progressing towards goals. Pt received supine in bed with 3.5L 02, willing to work with therapy. Pt continues to be limited by reported abdominal pain with sitting position, decreased strength, endurance and B foot drop with reported decreased food intake.  Pt able to tolerate bed mobility to R side EOB with min/mod A able to advance LE to EOB, however needing trunk support and assistance due to reported increased of pain. Pt continued with SPT with RW with light MIn A for safety due to B foot drop, with stand pt incontinent with loose, assisted with luis care with assistance with nursing staff. Pt completed session seated in recliner with VSS and all needs met at the time. Current Level of Function Impacting Discharge (mobility/balance): min A with mobility    Other factors to consider for discharge: 02 needs?, pain management, fall risk         PLAN :  Patient continues to benefit from skilled intervention to address the above impairments. Continue treatment per established plan of care. to address goals. Recommendation for discharge: (in order for the patient to meet his/her long term goals)  Therapy up to 5 days/week in SNF setting with [facility]. of     This discharge recommendation:  Has not yet been discussed the attending provider and/or case management    IF patient discharges home will need the following DME: patient owns DME required for discharge       SUBJECTIVE:   Patient stated Monica Oliva will get up, up only when with people that know what they are doing.     OBJECTIVE DATA SUMMARY:   Critical Behavior:  Neurologic State: Drowsy  Orientation Level: Oriented X4  Cognition: Follows commands  Safety/Judgement: Fall prevention  Functional Mobility Training:  Bed Mobility:  Rolling: Minimum assistance  Supine to Sit: Moderate assistance  Scooting: Minimum assistance     Transfers:  Sit to Stand: Minimum assistance  Stand to Sit: Minimum assistance  Bed to Chair: Minimum assistance     Balance:  Sitting: Intact  Standing: Impaired; Without support  Standing - Static: Constant support;Good  Standing - Dynamic : Constant support; Fair    Ambulation/Gait Training:  Distance (ft): 3 Feet (ft) (SPT)  Assistive Device: Gait belt;Walker, rolling  Ambulation - Level of Assistance: Minimal assistance (l)  Gait Abnormalities: Foot drop;Decreased step clearance  Base of Support: Narrowed  Speed/Kaylynn: Pace decreased (<100 feet/min)  Step Length: Left shortened;Right shortened  Pain Rating:  Abdominal pain with sitting upright    Activity Tolerance:   Fair, desaturates with exertion and requires oxygen, requires rest breaks, and observed SOB with activity    After treatment patient left in no apparent distress:   Sitting in chair, Call bell within reach, and Bed / chair alarm activated    COMMUNICATION/COLLABORATION:   The patients plan of care was discussed with: Registered nurse.      Gerson Amaya PTA   Time Calculation: 38 mins

## 2023-01-06 NOTE — PROGRESS NOTES
1900: Bedside and Verbal shift change report given to Carol No RN (oncoming nurse) by Vashti Mcneil (offgoing nurse). Report included the following information SBAR, Kardex, MAR, and Recent Results. 0130: Nurse messaged hospitalist about HR varying b/w 110-120s and requested to give PRN metoprolol. Was told to hold off on giving it. End of Shift Note    Bedside shift change report given to Barbara Looney RN (oncoming nurse) by Bernard Jimenez RN (offgoing nurse). Report included the following information SBAR, Kardex, MAR, and Recent Results    Shift worked:  [id number]     Shift summary and any significant changes:          Concerns for physician to address:       Zone phone for oncoming shift:          Activity:  Activity Level: Bed Rest  Number times ambulated in hallways past shift: 0  Number of times OOB to chair past shift: 0    Cardiac:   Cardiac Monitoring: Yes      Cardiac Rhythm: Atrial Fib    Access:  Current line(s): PIV     Genitourinary:   Urinary status: voiding and external catheter    Respiratory:   O2 Device: Nasal cannula  Chronic home O2 use?:   Incentive spirometer at bedside: YES  Actual Volume (ml): 750 ml    GI:  Last Bowel Movement Date: 01/05/23  Current diet:  ADULT ORAL NUTRITION SUPPLEMENT Breakfast, Lunch, Dinner; Clear Liquid  ADULT DIET Regular  Passing flatus: YES  Tolerating current diet: YES       Pain Management:   Patient states pain is manageable on current regimen: YES    Skin:  Cristino Score: 15  Interventions: PT/OT consult and internal/external urinary devices    Patient Safety:  Fall Score:  Total Score: 3  Interventions: bed/chair alarm  High Fall Risk: Yes    Length of Stay:  Expected LOS: 9d 9h  Actual LOS: 8      Bernard Jimenez RN

## 2023-01-06 NOTE — PROGRESS NOTES
CXR did not show worsening pulmonary edema or effusions.   Given the recent volume addition (prbc) and potential for volume overload, will give a one time dose of furosemide 40 mg IV x 1.  Reassess in the AM.

## 2023-01-06 NOTE — PROGRESS NOTES
Hospitalist Progress Note    Subjective:   Daily Progress Note: 1/6/2023 4:39 PM    Hospital Course:  Pt admitted abdominal pain, nausea and vomiting. CT abdomen/pelvis showing recurrent and persistent small bowel obstruction. She underwent a small bowel resection, short segment. She has history of atrial fibrillation, GERD and CAD. Subjective: Pt seen in room,. NAD. Up in chair. Discussed with RN  Current Facility-Administered Medications   Medication Dose Route Frequency    [Held by provider] furosemide (LASIX) tablet 20 mg  20 mg Oral DAILY    guaiFENesin ER (MUCINEX) tablet 600 mg  600 mg Oral Q12H    enoxaparin (LOVENOX) injection 40 mg  40 mg SubCUTAneous Q24H    0.9% sodium chloride infusion 250 mL  250 mL IntraVENous PRN    oxyCODONE IR (ROXICODONE) tablet 5 mg  5 mg Oral Q6H PRN    gabapentin (NEURONTIN) capsule 800 mg  800 mg Oral QID    pantoprazole (PROTONIX) tablet 40 mg  40 mg Oral ACB    sodium chloride (NS) flush 5-10 mL  5-10 mL IntraVENous PRN    acetaminophen (TYLENOL) tablet 650 mg  650 mg Oral Q4H PRN    morphine injection 2 mg  2 mg IntraVENous Q3H PRN    melatonin tablet 3 mg  3 mg Oral QHS PRN    metoprolol (LOPRESSOR) injection 5 mg  5 mg IntraVENous Q4H PRN    ondansetron (ZOFRAN) injection 4 mg  4 mg IntraVENous Q6H PRN    alcohol 62% (NOZIN) nasal  1 Ampule  1 Ampule Topical Q12H    trimethobenzamide (TIGAN) injection 200 mg  200 mg IntraMUSCular Q6H PRN    [Held by provider] mylanta/viscous lidocaine (GI COCKTAIL)  40 mL Oral BID    magnesium oxide (MAG-OX) tablet 400 mg  400 mg Oral DAILY    propafenone (RYTHMOL) tablet 150 mg  150 mg Oral BID    potassium chloride SR (KLOR-CON 10) tablet 10 mEq  10 mEq Oral DAILY    calcium carbonate (OS-GRUPO) tablet 500 mg [elemental]  500 mg Oral DAILY    atorvastatin (LIPITOR) tablet 20 mg  20 mg Oral DAILY        Review of Systems:    Review of Systems   Constitutional:  Positive for malaise/fatigue.    Respiratory:  Negative for cough and shortness of breath. Cardiovascular:  Negative for chest pain and palpitations. Gastrointestinal:  Positive for heartburn and nausea. Genitourinary:  Negative for dysuria and urgency. Neurological:  Negative for headaches. Objective:     Visit Vitals  BP (!) 96/59   Pulse 75   Temp 98.3 °F (36.8 °C)   Resp 17   Ht 5' 1\" (1.549 m)   Wt 68.9 kg (151 lb 14.4 oz)   SpO2 98%   BMI 28.70 kg/m²    O2 Flow Rate (L/min): 2 l/min O2 Device: Nasal cannula    Temp (24hrs), Av.2 °F (36.8 °C), Min:98 °F (36.7 °C), Max:98.4 °F (36.9 °C)      701 -  1900  In: 538.3 [P.O.:100; I.V.:438.3]  Out: -    190 -  0700  In: 1032.5 [P.O.:300; I.V.:732.5]  Out: 2900 [Urine:2900]    PHYSICAL EXAM:    Physical Exam  Constitutional:       General: She is not in acute distress. Cardiovascular:      Rate and Rhythm: Tachycardia present. Rhythm irregular. Heart sounds: Murmur heard. Pulmonary:      Effort: Pulmonary effort is normal.      Breath sounds: Normal breath sounds. Abdominal:      General: There is distension. Comments: Firm, hypo bowels sounds. Musculoskeletal:         General: Normal range of motion. Skin:     General: Skin is warm and dry. Comments: Midline drsg intact, no drainage   Neurological:      Mental Status: She is oriented to person, place, and time.         Data Review    Recent Results (from the past 24 hour(s))   CBC WITH AUTOMATED DIFF    Collection Time: 23  2:51 AM   Result Value Ref Range    WBC 6.7 3.6 - 11.0 K/uL    RBC 3.77 (L) 3.80 - 5.20 M/uL    HGB 9.0 (L) 11.5 - 16.0 g/dL    HCT 31.4 (L) 35.0 - 47.0 %    MCV 83.3 80.0 - 99.0 FL    MCH 23.9 (L) 26.0 - 34.0 PG    MCHC 28.7 (L) 30.0 - 36.5 g/dL    RDW 15.8 (H) 11.5 - 14.5 %    PLATELET 542 140 - 646 K/uL    MPV 10.6 8.9 - 12.9 FL    NRBC 0.0 0  WBC    ABSOLUTE NRBC 0.00 0.00 - 0.01 K/uL    NEUTROPHILS 74 32 - 75 %    LYMPHOCYTES 11 (L) 12 - 49 %    MONOCYTES 12 5 - 13 % EOSINOPHILS 2 0 - 7 %    BASOPHILS 0 0 - 1 %    IMMATURE GRANULOCYTES 1 (H) 0.0 - 0.5 %    ABS. NEUTROPHILS 4.9 1.8 - 8.0 K/UL    ABS. LYMPHOCYTES 0.8 0.8 - 3.5 K/UL    ABS. MONOCYTES 0.8 0.0 - 1.0 K/UL    ABS. EOSINOPHILS 0.1 0.0 - 0.4 K/UL    ABS. BASOPHILS 0.0 0.0 - 0.1 K/UL    ABS. IMM. GRANS. 0.1 (H) 0.00 - 0.04 K/UL    DF AUTOMATED     METABOLIC PANEL, BASIC    Collection Time: 01/06/23  2:51 AM   Result Value Ref Range    Sodium 141 136 - 145 mmol/L    Potassium 3.1 (L) 3.5 - 5.1 mmol/L    Chloride 112 (H) 97 - 108 mmol/L    CO2 26 21 - 32 mmol/L    Anion gap 3 (L) 5 - 15 mmol/L    Glucose 107 (H) 65 - 100 mg/dL    BUN 22 (H) 6 - 20 MG/DL    Creatinine 0.52 (L) 0.55 - 1.02 MG/DL    BUN/Creatinine ratio 42 (H) 12 - 20      eGFR >60 >60 ml/min/1.73m2    Calcium 7.8 (L) 8.5 - 10.1 MG/DL       XR CHEST PORT   Final Result   No change. XR CHEST PORT   Final Result      Nasogastric tube appears to be in satisfactory position. Unchanged bibasilar   atelectasis with small right pleural effusion. XR CHEST PORT   Final Result      Bilateral lower lobe atelectasis. XR ABD FLAT/ ERECT   Final Result   Unchanged mild diffuse small bowel distention. CT ABD PELV W CONT   Final Result   1. Persistent/recurrent SBO, of uncertain etiology. 2. Stable question of left renal mass. Active Problems:    SBO (small bowel obstruction) ([facility] (2/27/2019)      Assessment/Plan:   Recurrent SBO- s/p small bowel resection- POD # 4, on CL, general surgery following  -Patient was taken for exploratory laparotomy 01/03 with finding of no intra-abdominal abnormalities, no anastomotic leak, no signs of peritonitis. She was found to have mild ileus. NG tube removed 1/4. On CLD as per primary team.      [id number] IV abx. Continue IV fluids. Patient seems improved. Leukocytosis resolved. Acute kidney injury, pre-renal, now resolved.       Normocytic anemia/thrombocytopenia, suspect secondary to sepsis, exacerbation also related to fluid hydration. S/p 1 unit prbc on 1/5. Hgb stable. Trend h/h. Nausea/vomiting- zofran prn. Atrial fibrillation with RVR-improved- Now off drip. Cont'  rythmol. Prn BB. Cardiology following, appreciate recommendations          Pt is partial code, only wants vasoactive drips, antiarrhythmic drips, no chest compressions, no defibrillation, no intubation, discussed with pt and daughter.          DVT Prophylaxis: SCD  Code Status:  Partial Code  POA: NOK daughter Brian Regan     Care Plan discussed with:   __________patient, staff nurse, CM, family_____________________________________________________    Katalina Lawton MD

## 2023-01-06 NOTE — PROGRESS NOTES
Problem: Falls - Risk of  Goal: *Absence of Falls  Description: Document Vernia Colder Fall Risk and appropriate interventions in the flowsheet.   Outcome: Progressing Towards Goal  Note: Fall Risk Interventions:  Mobility Interventions: Bed/chair exit alarm, Patient to call before getting OOB, Strengthening exercises (ROM-active/passive)         Medication Interventions: Bed/chair exit alarm, Patient to call before getting OOB, Teach patient to arise slowly    Elimination Interventions: Bed/chair exit alarm, Call light in reach, Patient to call for help with toileting needs

## 2023-01-06 NOTE — PROGRESS NOTES
SURGERY PROGRESS NOTE      Admit Date: 2022    POD 4 Days Post-Op    Procedure: Procedure(s):  LAPAROTOMY EXPLORATORY      Subjective:     Patient denies SOB today. Denies pain. Passsing flatus and having loose stools. Denies nausea. Objective:     Visit Vitals  /60 (BP 1 Location: Right upper arm, BP Patient Position: At rest;Lying)   Pulse (!) 109   Temp 98 °F (36.7 °C)   Resp 18   Ht 5' 1\" (1.549 m)   Wt 68.9 kg (151 lb 14.4 oz)   SpO2 98%   BMI 28.70 kg/m²        Temp (24hrs), Av.2 °F (36.8 °C), Min:98 °F (36.7 °C), Max:98.5 °F (36.9 °C)      701 - 1900  In: 438.3 [I.V.:438.3]  Out: -   1901 -  0700  In: 1032.5 [P.O.:300;  I.V.:732.5]  Out: 2900 [Urine:2900]    Physical Exam:    General:  alert, cooperative, no distress, appears stated age   Abdomen: soft, bowel sounds active, non-tender   Incision:   healing well, no drainage, no erythema, no hernia, no seroma, no swelling, no dehiscence, incision well approximated           Lab Results   Component Value Date/Time    WBC 6.7 2023 02:51 AM    HGB 9.0 (L) 2023 02:51 AM    HCT 31.4 (L) 2023 02:51 AM    Hematocrit (POC) 32 (L) 2020 10:36 AM    PLATELET 851  02:51 AM    MCV 83.3 2023 02:51 AM     Lab Results   Component Value Date/Time    GFR est non-AA 53 (L) 10/02/2022 04:46 AM    GFRNA, POC 59 (L) 2020 10:36 AM    GFR est AA >60 10/02/2022 04:46 AM    GFRAA, POC >60 2020 10:36 AM    Creatinine 0.52 (L) 2023 02:51 AM    Creatinine, POC 0.8 10/01/2022 01:34 PM    BUN 22 (H) 2023 02:51 AM    BUN (POC) 34 (H) 2020 10:36 AM    Sodium 141 2023 02:51 AM    Sodium (POC) 142 2020 10:36 AM    Sodium,  10/01/2022 01:34 PM    Potassium 3.1 (L) 2023 02:51 AM    Potassium (POC) 3.2 (L) 2020 10:36 AM    Potassium, POC 3.4 (L) 10/01/2022 01:34 PM    Chloride 112 (H) 2023 02:51 AM    Chloride,  10/01/2022 01:34 PM CO2 26 01/06/2023 02:51 AM    Magnesium 2.4 01/02/2023 09:40 PM       Assessment:     Active Problems:    [facility]    Recovering as expected    Plan:       Regular diet  D/C antibiotics  Discharge planning

## 2023-01-06 NOTE — PROGRESS NOTES
Transition of Care Plan:    RUR:17%    Disposition:Return to [address] [place] [address]    Follow up appointments: To be done by facility. DME needed:None    Transportation at [address] or means to access home:   Family has keys       IM Medicare Letter: To be given prior to discharge. Is patient a  and connected with the [address] [facility]? No                If yes, was [place] transfer form completed and [facility] notified? N/A    Caregiver Contact:Daughter    Discharge Caregiver contacted prior to discharge? Caregiver to be contacted prior to discharge. Care Conference needed?:  No    Reason for Admission:  Patient came to ed for abdominal pain with nausea and vomiting. RUR Score:   17%               PCP: First and Last name:   Wang Schwartz MD     Name of Practice: [facility]     Are you a current patient: Yes/No: Yes     Approximate date of last visit: With in last two weeks     Can you participate in a virtual visit if needed: No    Do you (patient/family) have any concerns for transition/discharge? No                Plan for utilizing home health: She has had home health physical therapy in the past.      Current Advanced Directive/Advance Care Plan:  Partial Code  Advance Care Planning     General Advance Care Planning (ACP) Conversation      Date of Conversation: 1/6/23  Conducted with: Patient with Decision Making Capacity    Healthcare Decision Maker:     Primary Decision Maker: Cecilia Turk - Daughter - [phone]    Secondary Decision Maker: Sidra Joseph - Daughter - [phone]    Supplemental (Other) Decision Maker: Shellie Dasilva - Daughter - [phone]  Click here to complete [place] including selection of the Healthcare Decision Maker Relationship (ie \"Primary\")        Content/Action Overview:    Has ACP document(s) on file - reflects the patient's care preferences  Reviewed DNR/DNI and patient elects Full Code (Attempt Resuscitation)         Length of Voluntary ACP Conversation in minutes:  <16 minutes (Non-Billable)    Garland Daniel RN                 Healthcare Decision Maker:   Click here to complete [address] including selection of the Healthcare Decision Maker Relationship (ie \"Primary\")            Primary Decision MakerDuane Curtis - Daughter - [phone]    Secondary Decision Maker: Meratiffany Trujillo - Daughter - [phone]    Supplemental (Other) Decision Maker: Jagdish Aaron - Daughter - [phone]        Confirmed demographics and pcp information with patient and daughter. Patient lives at [facility] in the [facility]. She uses a walker to ambulate and when she is in her room she uses her wheelchair. She is able to dress herself and the staff assist her with bathing. She does not use home oxygen or cpap. Referral sent to [facility] and will await their response. Char Fong RN BSN CRM        [phone]

## 2023-01-06 NOTE — PROGRESS NOTES
EP/ ARRHYTHMIA    Patient ID:  Patient: Sarah Amaya  MRN: [id number]  Age: 80 y.o.  : 10/9/1933    Date of  Admission: 2022  2:55 PM   PCP:  Riky Chapman MD    Assessment:   Paroxysmal atrial fibrillation with rapid ventricular response. History of GIB in the past, not on and will not be offered chronic anticoagulation. Chronic LBBB. History of PVC's. Normal EF by echo in 2018. Hyperlipidemia. YAMILKA, improving. SBO s/p partial small bowel resection this admission. History of prior episodes. Peripheral neuropathy. Falls risk. Ovarian cancer s/p chemo. Full code. Plan:     Restarted propafenone  mg po BID. Was going to go to TID after levofloxacin, but need better Afib suppression. Will go to TID now. Rate control as able (metoprolol IR is lowered to 2.5 mg IV q 4h). Not a chronic anticoagulation candidate. I answered all her questions and those of her family. [x]       High complexity decision making was performed in this patient at high risk for decompensation    Sarah Amaya is a 80 y.o. female with a history of SBO now s/p surgery. Currently, she denies chest pain. No syncope, dizziness. She did have palpitations earlier when in Afib. No TIA or stroke symptoms. Here, she was treated with IV amiodarone before returning to sinus rhythm. She has had paroxysms of Afib here. Today, she feels full in her chest, coughing. NG tube is out. Cardiac Assessment/Plan per prior consult in 2022 with Dr. Geovany Deleon:    1. Paroxysmal atrial fibrillation with RVR diagnosed in 2018, minimal to no symptoms. Back in sinus during that hospital stay. Recurrent but transient RVR during 2020 admission. Anticoagulation stopped at that time due to GIB. Will continue propafenone and avoid anticoagulation. Patient and family understand risk. 2. Chronic LBBB. 3. PVC's. 4. Hyperlipidemia. 5. Says she is not diabetic.   6. Hypercholesterolemia. 7. CKD stage 3.  8. Anemia NOS. 9. Scoliosis/arthritis s/p extensive thoracolumbar fusion 2002, now with broken hardware, s/p LUCY 12/2013 (effective), neuropathy since knee surgery 2009. 10. History of ovarian cancer s/p chemo. 11. GERD/Lewis's esophagus, gastroparesis. 12. Back pain. Fractured thoracolumbar fusion rods, stable. Protruding pedicle screws, stable. Neuroforaminal stenoses. 13. Walks with a walker. Peripheral neuropathy in her legs. 14. SBO 3/2019, resolved without surgery     Admitted with N/V, ?partial SBO; PAfib noted on tele; sinus now; NG in.  Current cardiac meds: none. Rec: Restart propafenone; No AC as noted below.           No Known Allergies       Current Facility-Administered Medications   Medication Dose Route Frequency    [Held by provider] furosemide (LASIX) tablet 20 mg  20 mg Oral DAILY    guaiFENesin ER (MUCINEX) tablet 600 mg  600 mg Oral Q12H    enoxaparin (LOVENOX) injection 40 mg  40 mg SubCUTAneous Q24H    0.9% sodium chloride infusion 250 mL  250 mL IntraVENous PRN    oxyCODONE IR (ROXICODONE) tablet 5 mg  5 mg Oral Q6H PRN    gabapentin (NEURONTIN) capsule 800 mg  800 mg Oral QID    pantoprazole (PROTONIX) tablet 40 mg  40 mg Oral ACB    sodium chloride (NS) flush 5-10 mL  5-10 mL IntraVENous PRN    acetaminophen (TYLENOL) tablet 650 mg  650 mg Oral Q4H PRN    morphine injection 2 mg  2 mg IntraVENous Q3H PRN    melatonin tablet 3 mg  3 mg Oral QHS PRN    metoprolol (LOPRESSOR) injection 5 mg  5 mg IntraVENous Q4H PRN    ondansetron (ZOFRAN) injection 4 mg  4 mg IntraVENous Q6H PRN    alcohol 62% (NOZIN) nasal  1 Ampule  1 Ampule Topical Q12H    trimethobenzamide (TIGAN) injection 200 mg  200 mg IntraMUSCular Q6H PRN    [Held by provider] mylanta/viscous lidocaine (GI COCKTAIL)  40 mL Oral BID    magnesium oxide (MAG-OX) tablet 400 mg  400 mg Oral DAILY    propafenone (RYTHMOL) tablet 150 mg  150 mg Oral BID    potassium chloride SR (KLOR-CON 10) tablet 10 mEq  10 mEq Oral DAILY    calcium carbonate (OS-GRUPO) tablet 500 mg [elemental]  500 mg Oral DAILY    atorvastatin (LIPITOR) tablet 20 mg  20 mg Oral DAILY       Review of Symptoms:  Respiratory: negative for SOB, cough, sputum production, wheezing, LEVINE, pleuritic pain   Cardiology: negative for chest pain, palpitations, orthopnea, PND, edema, syncope   Genitourinary: negative for frequency, urgency, dysuria, hematuria, incontinence        Objective:      Physical Exam:  Temp (24hrs), Av.2 °F (36.8 °C), Min:98 °F (36.7 °C), Max:98.4 °F (36.9 °C)    Patient Vitals for the past 8 hrs:   Pulse   23 1330 75   23 1252 77   23 1120 71   23 1034 (!) 124   23 1022 (!) 152   23 0733 (!) 109      Patient Vitals for the past 8 hrs:   Resp   23 1034 17   23 0733 18      Patient Vitals for the past 8 hrs:   BP   23 1330 (!) 96/59   23 1252 (!) 95/59   23 1222 (!) 105/58   23 1152 (!) 89/49   23 1133 (!) 85/51   23 1120 (!) 82/50   23 1034 (!) 79/53   23 1022 (!) 114/54   23 0733 107/60          Intake/Output Summary (Last 24 hours) at 2023 1516  Last data filed at 2023 1034  Gross per 24 hour   Intake 1075.83 ml   Output 2300 ml   Net -1224.17 ml         Nondiaphoretic, not in acute distress. NG tube is OUT. Unlabored, +rhonchi bilaterally anteriorly, symmetric air movement. REGULAR rate and rhythm, no new murmur. No peripheral edema. Palpable radial pulses bilaterally. Abdomen, soft, nontender, nondistended. Extremities without cyanosis or clubbing. Muscle tone and bulk normal for age. Skin warm and dry. No rashes or ulcers. Neuro grossly nonfocal.  No tremor. Awake and appropriate. CARDIOGRAPHICS and STUDIES, I reviewed:    Telemetry:  SR.    ECG:  Reviewed. Labs:  No results for input(s): CPK, CKMB, CKNDX, TROIQ in the last 72 hours.     No lab exists for component: CPKMB  Lab Results   Component Value Date/Time    Cholesterol, total 138 07/20/2018 12:00 AM    HDL Cholesterol 46 07/20/2018 12:00 AM    LDL, calculated 66 07/20/2018 12:00 AM    Triglyceride 132 07/20/2018 12:00 AM    CHOL/HDL Ratio 3.2 09/20/2010 10:16 AM     No results for input(s): INR, PTP, APTT, INREXT, INREXT in the last 72 hours. Recent Labs     01/06/23  0251 01/05/23  0356 01/04/23  0417    139 142   K 3.1* 3.5 3.6   * 104 107   CO2 26 31 27   BUN 22* 31* 44*   CREA 0.52* 0.67 0.89   * 113* 107*   CA 7.8* 9.5 9.0   WBC 6.7 8.4 6.2   HGB 9.0* 9.6* 6.6*   HCT 31.4* 32.8* 23.8*    270 197       No results for input(s): AP, TBIL, TP, ALB, GLOB, GGT, AML, LPSE in the last 72 hours. No lab exists for component: SGOT, GPT, AMYP, HLPSE  No components found for: GLPOC  No results for input(s): PH, PCO2, PO2 in the last 72 hours.         J Carlos Wilson MD  1/6/2023

## 2023-01-06 NOTE — PROGRESS NOTES
Problem: Falls - Risk of  Goal: *Absence of Falls  Description: Document Jose Oakley Fall Risk and appropriate interventions in the flowsheet.   Outcome: Progressing Towards Goal  Note: Fall Risk Interventions:  Mobility Interventions: Bed/chair exit alarm, Patient to call before getting OOB, Strengthening exercises (ROM-active/passive)         Medication Interventions: Bed/chair exit alarm, Patient to call before getting OOB, Teach patient to arise slowly    Elimination Interventions: Bed/chair exit alarm, Call light in reach, Patient to call for help with toileting needs              Problem: Patient Education: Go to Patient Education Activity  Goal: Patient/Family Education  Outcome: Progressing Towards Goal     Problem: Patient Education: Go to Patient Education Activity  Goal: Patient/Family Education  Outcome: Progressing Towards Goal

## 2023-01-06 NOTE — PROGRESS NOTES
Bedside and Verbal shift change report given to Hailey Gutierrez RN (oncoming nurse) by Mary Fritz RN (offgoing nurse). Report included the following information SBAR, Kardex, Intake/Output, MAR, Recent Results, and Cardiac Rhythm Afib . End of Shift Note    Bedside shift change report given to HENRIETTA Álvarez (oncoming nurse) by Sampson Chase RN (offgoing nurse). Report included the following information SBAR, Kardex, Intake/Output, MAR, Recent Results, and Cardiac Rhythm Afib    Shift worked:  7A-7P     Shift summary and any significant changes:     Denies abdominal pain. With some discomfort only with movement and coughing. Up in chair x 1 today. PRN Metoprolol given for uncontrolled afib to 160-170's, provider is aware, BP dropped and albumin given x 1. Cardio adjusted her medications. Patient refusing to be turned in bed. No PI seen during the shift. Concerns for physician to address:       Zone phone for oncoming shift:          Activity:  Activity Level: Bed Rest  Number times ambulated in hallways past shift: 0  Number of times OOB to chair past shift: 1    Cardiac:   Cardiac Monitoring: Yes      Cardiac Rhythm: Atrial Fib    Access:  Current line(s): PIV     Genitourinary:   Urinary status: incontinent and external catheter    Respiratory:   O2 Device: Nasal cannula  Chronic home O2 use?: NO  Incentive spirometer at bedside: YES  Actual Volume (ml): 750 ml    GI:  Last Bowel Movement Date: 01/06/23  Current diet:  ADULT ORAL NUTRITION SUPPLEMENT Breakfast, Lunch, Dinner; Clear Liquid  ADULT DIET Dysphagia - Soft & Bite Sized  Passing flatus: YES  Tolerating current diet: YES       Pain Management:   Patient states pain is manageable on current regimen: YES    Skin:  Cristino Score: 15  Interventions: float heels, increase time out of bed, limit briefs, internal/external urinary devices, and nutritional support     Patient Safety:  Fall Score:  Total Score: 3  Interventions: bed/chair alarm, assistive device (walker, cane, etc), gripper socks, pt to call before getting OOB, and stay with me (per policy)  High Fall Risk: Yes    Length of Stay:  Expected LOS: 9d 9h  Actual LOS: [address], RN

## 2023-01-07 LAB
ANION GAP SERPL CALC-SCNC: 5 MMOL/L (ref 5–15)
BASOPHILS # BLD: 0 K/UL (ref 0–0.1)
BASOPHILS NFR BLD: 1 % (ref 0–1)
BUN SERPL-MCNC: 18 MG/DL (ref 6–20)
BUN/CREAT SERPL: 27 (ref 12–20)
CALCIUM SERPL-MCNC: 8.6 MG/DL (ref 8.5–10.1)
CHLORIDE SERPL-SCNC: 106 MMOL/L (ref 97–108)
CO2 SERPL-SCNC: 29 MMOL/L (ref 21–32)
CREAT SERPL-MCNC: 0.66 MG/DL (ref 0.55–1.02)
DIFFERENTIAL METHOD BLD: ABNORMAL
EOSINOPHIL # BLD: 0.2 K/UL (ref 0–0.4)
EOSINOPHIL NFR BLD: 3 % (ref 0–7)
ERYTHROCYTE [DISTWIDTH] IN BLOOD BY AUTOMATED COUNT: 16.5 % (ref 11.5–14.5)
GLUCOSE SERPL-MCNC: 131 MG/DL (ref 65–100)
HCT VFR BLD AUTO: 28.4 % (ref 35–47)
HGB BLD-MCNC: 8.3 G/DL (ref 11.5–16)
IMM GRANULOCYTES # BLD AUTO: 0.1 K/UL (ref 0–0.04)
IMM GRANULOCYTES NFR BLD AUTO: 2 % (ref 0–0.5)
LYMPHOCYTES # BLD: 1.3 K/UL (ref 0.8–3.5)
LYMPHOCYTES NFR BLD: 20 % (ref 12–49)
MCH RBC QN AUTO: 23.7 PG (ref 26–34)
MCHC RBC AUTO-ENTMCNC: 29.2 G/DL (ref 30–36.5)
MCV RBC AUTO: 81.1 FL (ref 80–99)
MONOCYTES # BLD: 0.8 K/UL (ref 0–1)
MONOCYTES NFR BLD: 12 % (ref 5–13)
NEUTS SEG # BLD: 4 K/UL (ref 1.8–8)
NEUTS SEG NFR BLD: 62 % (ref 32–75)
NRBC # BLD: 0 K/UL (ref 0–0.01)
NRBC BLD-RTO: 0 PER 100 WBC
PLATELET # BLD AUTO: 185 K/UL (ref 150–400)
PMV BLD AUTO: 10.6 FL (ref 8.9–12.9)
POTASSIUM SERPL-SCNC: 3.7 MMOL/L (ref 3.5–5.1)
RBC # BLD AUTO: 3.5 M/UL (ref 3.8–5.2)
SODIUM SERPL-SCNC: 140 MMOL/L (ref 136–145)
WBC # BLD AUTO: 6.4 K/UL (ref 3.6–11)

## 2023-01-07 PROCEDURE — 74011250637 HC RX REV CODE- 250/637: Performed by: NURSE PRACTITIONER

## 2023-01-07 PROCEDURE — 74011250637 HC RX REV CODE- 250/637: Performed by: INTERNAL MEDICINE

## 2023-01-07 PROCEDURE — 80048 BASIC METABOLIC PNL TOTAL CA: CPT

## 2023-01-07 PROCEDURE — 74011250636 HC RX REV CODE- 250/636: Performed by: SURGERY

## 2023-01-07 PROCEDURE — 36415 COLL VENOUS BLD VENIPUNCTURE: CPT

## 2023-01-07 PROCEDURE — 65270000046 HC RM TELEMETRY

## 2023-01-07 PROCEDURE — 74011250637 HC RX REV CODE- 250/637: Performed by: STUDENT IN AN ORGANIZED HEALTH CARE EDUCATION/TRAINING PROGRAM

## 2023-01-07 PROCEDURE — 85025 COMPLETE CBC W/AUTO DIFF WBC: CPT

## 2023-01-07 PROCEDURE — 77010033678 HC OXYGEN DAILY

## 2023-01-07 RX ADMIN — GABAPENTIN 800 MG: 300 CAPSULE ORAL at 18:39

## 2023-01-07 RX ADMIN — PANTOPRAZOLE SODIUM 40 MG: 40 TABLET, DELAYED RELEASE ORAL at 08:44

## 2023-01-07 RX ADMIN — GUAIFENESIN 600 MG: 600 TABLET, EXTENDED RELEASE ORAL at 08:43

## 2023-01-07 RX ADMIN — GABAPENTIN 800 MG: 300 CAPSULE ORAL at 08:45

## 2023-01-07 RX ADMIN — GUAIFENESIN 600 MG: 600 TABLET, EXTENDED RELEASE ORAL at 21:37

## 2023-01-07 RX ADMIN — MELATONIN 3 MG: at 21:37

## 2023-01-07 RX ADMIN — Medication 1 AMPULE: at 21:37

## 2023-01-07 RX ADMIN — PROPAFENONE HYDROCHLORIDE 150 MG: 150 TABLET, FILM COATED ORAL at 14:34

## 2023-01-07 RX ADMIN — GABAPENTIN 800 MG: 300 CAPSULE ORAL at 22:01

## 2023-01-07 RX ADMIN — GABAPENTIN 800 MG: 300 CAPSULE ORAL at 14:34

## 2023-01-07 RX ADMIN — ATORVASTATIN CALCIUM 20 MG: 20 TABLET, FILM COATED ORAL at 08:44

## 2023-01-07 RX ADMIN — ENOXAPARIN SODIUM 40 MG: 100 INJECTION SUBCUTANEOUS at 08:42

## 2023-01-07 RX ADMIN — Medication 1 AMPULE: at 08:41

## 2023-01-07 RX ADMIN — MAGNESIUM OXIDE 400 MG (241.3 MG MAGNESIUM) TABLET 400 MG: TABLET at 08:44

## 2023-01-07 RX ADMIN — CALCIUM 500 MG: 500 TABLET ORAL at 08:44

## 2023-01-07 RX ADMIN — MELATONIN 3 MG: at 00:12

## 2023-01-07 RX ADMIN — PROPAFENONE HYDROCHLORIDE 150 MG: 150 TABLET, FILM COATED ORAL at 21:37

## 2023-01-07 RX ADMIN — POTASSIUM CHLORIDE 10 MEQ: 750 TABLET, FILM COATED, EXTENDED RELEASE ORAL at 08:43

## 2023-01-07 NOTE — PROGRESS NOTES
Problem: Falls - Risk of  Goal: *Absence of Falls  Description: Document Bradenlori Purviser Fall Risk and appropriate interventions in the flowsheet.   Outcome: Progressing Towards Goal  Note: Fall Risk Interventions:  Mobility Interventions: Bed/chair exit alarm         Medication Interventions: Bed/chair exit alarm    Elimination Interventions: Bed/chair exit alarm              Problem: Patient Education: Go to Patient Education Activity  Goal: Patient/Family Education  Outcome: Progressing Towards Goal

## 2023-01-07 NOTE — PROGRESS NOTES
Bedside shift change report given to [facility] RN (oncoming nurse) by Melina Rod (offgoing nurse). Report included the following information SBAR, Kardex, Cardiac Rhythm A-fib, and Quality Measures. End of Shift Note    Bedside shift change report given to HENRIETTA Angulo (oncoming nurse) by Baby Kocher, RN (offgoing nurse). Report included the following information SBAR, Kardex, Cardiac Rhythm 1st degree AV block, and Quality Measures    Shift worked:  day     Shift summary and any significant changes:     Patient had a better day today. Was more alert and blood pressures stable besides this morning early per night shift. Had 3 loose BMs today. HR more stable. Concerns for physician to address:       Zone phone for oncoming shift:          Activity:  Activity Level: Up with Assistance  Number times ambulated in hallways past shift: 0  Number of times OOB to chair past shift: 0    Cardiac:   Cardiac Monitoring: Yes      Cardiac Rhythm: BBB, 1\" AV Block    Access:  Current line(s): PIV     Genitourinary:   Urinary status: voiding and incontinent    Respiratory:   O2 Device: Nasal cannula  Chronic home O2 use?: NO  Incentive spirometer at bedside: NO  Actual Volume (ml): 750 ml    GI:  Last Bowel Movement Date: 01/07/23  Current diet:  ADULT DIET Dysphagia - Soft & Bite Sized  ADULT ORAL NUTRITION SUPPLEMENT Breakfast, Dinner; Low Calorie/High Protein  Passing flatus: YES  Tolerating current diet: YES       Pain Management:   Patient states pain is manageable on current regimen: YES    Skin:  Cristino Score: 16  Interventions: float heels, increase time out of bed, and limit briefs    Patient Safety:  Fall Score:  Total Score: 3  Interventions: bed/chair alarm, gripper socks, and pt to call before getting OOB  High Fall Risk: Yes    Length of Stay:  Expected LOS: 9d 9h  Actual LOS: 9      Baby Kocher, RN

## 2023-01-07 NOTE — PROGRESS NOTES
Comprehensive Nutrition Assessment    Type and Reason for Visit: Reassess    Nutrition Recommendations/Plan:   Continue SB6 diet as tolerated. Continue nutritional supplements with meals; now that diet has progressed beyond clear liquids, will add standard/more kcal-dense option. Please document % meals and supplements consumed in flowsheet I/O's under intake. Malnutrition Assessment:  Malnutrition Status:  No malnutrition (01/04/23 1512)      Nutrition Assessment:    1/7: Chart reviewed; med noted for SBO on admission, POD#5 lap ex. Pt's po intake continues to trend in the positive direction now ~50% of meals. Noted possible dc back to [facility] on Monday. Lab trends stable. No new nutrition needs identified. Patient Vitals for the past 168 hrs:   % Diet Eaten   01/07/23 0848 51 - 75%   01/06/23 1855 26 - 50%   01/06/23 1330 1 - 25%   01/06/23 1034 1 - 25%   01/05/23 1832 1 - 25%   01/05/23 1230 1 - 25%     Last Weight Metric  Weight Loss Metrics 1/6/2023 10/3/2022 8/9/2022 4/20/2022 11/13/2020 11/5/2020 10/5/2020   Today's Wt 151 lb 14.4 oz 144 lb 14.4 oz 140 lb 140 lb - 140 lb -   BMI 28.7 kg/m2 28.3 kg/m2 27.34 kg/m2 27.34 kg/m2 26.45 kg/m2 - 26.45 kg/m2      Nutrition Related Findings:    BM: 1/7; Labs: reviewed; Meds: Lipitor, Os-blane Wound Type: Surgical incision    Current Nutrition Intake & Therapies:  Average Meal Intake: 51-75%  Average Supplement Intake: None ordered  ADULT ORAL NUTRITION SUPPLEMENT Breakfast, Lunch, Dinner; Clear Liquid  ADULT DIET Dysphagia - Soft & Bite Sized    Anthropometric Measures:  Height: 5' 1\" (154.9 cm)  Ideal Body Weight (IBW): 105 lbs (48 kg)     Current Body Wt:  65.3 kg (143 lb 15.4 oz), 137.1 % IBW. Current BMI (kg/m2): 27.2                          BMI Category: Overweight (BMI 25.0-29. 9)    Estimated Daily Nutrient Needs:  Energy Requirements Based On: Formula  Weight Used for Energy Requirements: Current  Energy (kcal/day): 1321 kcals (BMR x 1.3AF)  Weight Used for Protein Requirements: Current  Protein (g/day): 78g (1.2 g/kg bw)  Method Used for Fluid Requirements: 1 ml/kcal  Fluid (ml/day): 1350 mL    Nutrition Diagnosis:   Inadequate protein-energy intake related to altered GI function as evidenced by intake 26-50%, intake 51-75%    Nutrition Interventions:   Food and/or Nutrient Delivery: Continue current diet, Continue oral nutrition supplement  Nutrition Education/Counseling: No recommendations at this time  Coordination of Nutrition Care: Continue to monitor while inpatient       Goals:     Goals: PO intake 50% or greater, by next RD assessment       Nutrition Monitoring and Evaluation:   Behavioral-Environmental Outcomes: None identified  Food/Nutrient Intake Outcomes: Food and nutrient intake, Supplement intake, Diet advancement/tolerance  Physical Signs/Symptoms Outcomes: Biochemical data, Weight, GI status    Discharge Planning:    Continue current diet, Continue oral nutrition supplement    Esther Rey RD  Contact:

## 2023-01-07 NOTE — PROGRESS NOTES
Admit Date: 2022    POD 5 Days Post-Op    Procedure:  Procedure(s):  LAPAROTOMY EXPLORATORY    Subjective:     Patient has no new complaints. Still with loose stools. Kathy mech soft diet    Objective:     Blood pressure (!) 99/53, pulse 66, temperature 98 °F (36.7 °C), resp. rate 17, height 5' 1\" (1.549 m), weight 151 lb 14.4 oz (68.9 kg), SpO2 96 %. Temp (24hrs), Av.4 °F (36.9 °C), Min:98 °F (36.7 °C), Max:99.3 °F (37.4 °C)      Physical Exam:  GENERAL: alert, cooperative, no distress, appears stated age, LUNG: clear to auscultation bilaterally, HEART: regular rate and rhythm, ABDOMEN: soft, NT, wound c/d/i, EXTREMITIES:  extremities normal, atraumatic, no cyanosis or edema    Labs:   Recent Results (from the past 24 hour(s))   CBC WITH AUTOMATED DIFF    Collection Time: 23  3:15 AM   Result Value Ref Range    WBC 6.4 3.6 - 11.0 K/uL    RBC 3.50 (L) 3.80 - 5.20 M/uL    HGB 8.3 (L) 11.5 - 16.0 g/dL    HCT 28.4 (L) 35.0 - 47.0 %    MCV 81.1 80.0 - 99.0 FL    MCH 23.7 (L) 26.0 - 34.0 PG    MCHC 29.2 (L) 30.0 - 36.5 g/dL    RDW 16.5 (H) 11.5 - 14.5 %    PLATELET 949 330 - 054 K/uL    MPV 10.6 8.9 - 12.9 FL    NRBC 0.0 0  WBC    ABSOLUTE NRBC 0.00 0.00 - 0.01 K/uL    NEUTROPHILS 62 32 - 75 %    LYMPHOCYTES 20 12 - 49 %    MONOCYTES 12 5 - 13 %    EOSINOPHILS 3 0 - 7 %    BASOPHILS 1 0 - 1 %    IMMATURE GRANULOCYTES 2 (H) 0.0 - 0.5 %    ABS. NEUTROPHILS 4.0 1.8 - 8.0 K/UL    ABS. LYMPHOCYTES 1.3 0.8 - 3.5 K/UL    ABS. MONOCYTES 0.8 0.0 - 1.0 K/UL    ABS. EOSINOPHILS 0.2 0.0 - 0.4 K/UL    ABS. BASOPHILS 0.0 0.0 - 0.1 K/UL    ABS. IMM.  GRANS. 0.1 (H) 0.00 - 0.04 K/UL    DF AUTOMATED     METABOLIC PANEL, BASIC    Collection Time: 23  3:15 AM   Result Value Ref Range    Sodium 140 136 - 145 mmol/L    Potassium 3.7 3.5 - 5.1 mmol/L    Chloride 106 97 - 108 mmol/L    CO2 29 21 - 32 mmol/L    Anion gap 5 5 - 15 mmol/L    Glucose 131 (H) 65 - 100 mg/dL    BUN 18 6 - 20 MG/DL    Creatinine 0.66 0.55 - 1.02 MG/DL    BUN/Creatinine ratio 27 (H) 12 - 20      eGFR >60 >60 ml/min/1.73m2    Calcium 8.6 8.5 - 10.1 MG/DL       Data Review images and reports reviewed    Assessment:     Active Problems:    SBO (small bowel obstruction) ([facility] [address] (2/27/2019)        Plan/Recommendations/Medical Decision Making:     Continue present treatment  Increase activity  Anticipate d/c back to [facility] Monday    Andres Zurita MD  ED [facility]

## 2023-01-07 NOTE — PROGRESS NOTES
EP/ ARRHYTHMIA    Patient ID:  Patient: Herbert Rios  MRN: [id number]  Age: 80 y.o.  : 10/9/1933    Date of  Admission: 2022  2:55 PM   PCP:  Minnie Jean MD    Assessment:   Paroxysmal atrial fibrillation with rapid ventricular response. History of GIB in the past, not on and will not be offered chronic anticoagulation. Chronic LBBB. History of PVC's. Normal EF by echo in 2018. Hyperlipidemia. YAMILKA, improving. SBO s/p partial small bowel resection this admission. History of prior episodes. Peripheral neuropathy. Falls risk. Ovarian cancer s/p chemo. Full code. Plan:     Restarted propafenone  mg po BID. Was going to go to TID after levofloxacin, but need better Afib suppression. Will go to TID now. Rate control as able (metoprolol IR is lowered to 2.5 mg IV q 4h). Not a chronic anticoagulation candidate. : Mostly in sinus today, continue rhythmol. Bp soft. I answered all her questions and those of her family. [x]       High complexity decision making was performed in this patient at high risk for decompensation    Herbert Rios is a 80 y.o. female with a history of SBO now s/p surgery. Currently, she denies chest pain. No syncope, dizziness. She did have palpitations earlier when in Afib. No TIA or stroke symptoms. Here, she was treated with IV amiodarone before returning to sinus rhythm. She has had paroxysms of Afib here. Today, she feels full in her chest, coughing. NG tube is out. Cardiac Assessment/Plan per prior consult in 2022 with Dr. Frida Luther:    1. Paroxysmal atrial fibrillation with RVR diagnosed in 2018, minimal to no symptoms. Back in sinus during that hospital stay. Recurrent but transient RVR during 2020 admission. Anticoagulation stopped at that time due to GIB. Will continue propafenone and avoid anticoagulation. Patient and family understand risk. 2. Chronic LBBB. 3. PVC's.   4. Hyperlipidemia. 5. Says she is not diabetic. 6. Hypercholesterolemia. 7. CKD stage 3.  8. Anemia NOS. 9. Scoliosis/arthritis s/p extensive thoracolumbar fusion 2002, now with broken hardware, s/p LUCY 12/2013 (effective), neuropathy since knee surgery 2009. 10. History of ovarian cancer s/p chemo. 11. GERD/Lewis's esophagus, gastroparesis. 12. Back pain. Fractured thoracolumbar fusion rods, stable. Protruding pedicle screws, stable. Neuroforaminal stenoses. 13. Walks with a walker. Peripheral neuropathy in her legs. 14. SBO 3/2019, resolved without surgery     Admitted with N/V, ?partial SBO; PAfib noted on tele; sinus now; NG in.  Current cardiac meds: none. Rec: Restart propafenone; No AC as noted below.           No Known Allergies       Current Facility-Administered Medications   Medication Dose Route Frequency    [Held by provider] furosemide (LASIX) tablet 20 mg  20 mg Oral DAILY    metoprolol (LOPRESSOR) injection 2.5 mg  2.5 mg IntraVENous Q4H PRN    propafenone (RYTHMOL) tablet 150 mg  150 mg Oral Q8H    guaiFENesin ER (MUCINEX) tablet 600 mg  600 mg Oral Q12H    enoxaparin (LOVENOX) injection 40 mg  40 mg SubCUTAneous Q24H    0.9% sodium chloride infusion 250 mL  250 mL IntraVENous PRN    oxyCODONE IR (ROXICODONE) tablet 5 mg  5 mg Oral Q6H PRN    gabapentin (NEURONTIN) capsule 800 mg  800 mg Oral QID    pantoprazole (PROTONIX) tablet 40 mg  40 mg Oral ACB    sodium chloride (NS) flush 5-10 mL  5-10 mL IntraVENous PRN    acetaminophen (TYLENOL) tablet 650 mg  650 mg Oral Q4H PRN    morphine injection 2 mg  2 mg IntraVENous Q3H PRN    melatonin tablet 3 mg  3 mg Oral QHS PRN    ondansetron (ZOFRAN) injection 4 mg  4 mg IntraVENous Q6H PRN    alcohol 62% (NOZIN) nasal  1 Ampule  1 Ampule Topical Q12H    trimethobenzamide (TIGAN) injection 200 mg  200 mg IntraMUSCular Q6H PRN    [Held by provider] mylanta/viscous lidocaine (GI COCKTAIL)  40 mL Oral BID    magnesium oxide (MAG-OX) tablet 400 mg  400 mg Oral DAILY    potassium chloride SR (KLOR-CON 10) tablet 10 mEq  10 mEq Oral DAILY    calcium carbonate (OS-GRUPO) tablet 500 mg [elemental]  500 mg Oral DAILY    atorvastatin (LIPITOR) tablet 20 mg  20 mg Oral DAILY       Review of Symptoms:  Respiratory: negative for SOB, cough, sputum production, wheezing, LEVINE, pleuritic pain   Cardiology: negative for chest pain, palpitations, orthopnea, PND, edema, syncope   Genitourinary: negative for frequency, urgency, dysuria, hematuria, incontinence        Objective:      Physical Exam:  Temp (24hrs), Av.5 °F (36.9 °C), Min:98 °F (36.7 °C), Max:99.3 °F (37.4 °C)    Patient Vitals for the past 8 hrs:   Pulse   23 0704 73   23 0634 72   23 0614 63      Patient Vitals for the past 8 hrs:   Resp   23 0704 17   2334 17      Patient Vitals for the past 8 hrs:   BP   23 0704 124/61   23 0634 (!) 84/69   23 0614 (!) 103/50          Intake/Output Summary (Last 24 hours) at 2023 1000  Last data filed at 2023 0848  Gross per 24 hour   Intake 538 ml   Output 500 ml   Net 38 ml         Nondiaphoretic, not in acute distress. NG tube is OUT. Unlabored, +rhonchi bilaterally anteriorly, symmetric air movement. REGULAR rate and rhythm, no new murmur. No peripheral edema. Palpable radial pulses bilaterally. Abdomen, soft, nontender, nondistended. Extremities without cyanosis or clubbing. Muscle tone and bulk normal for age. Skin warm and dry. No rashes or ulcers. Neuro grossly nonfocal.  No tremor. Awake and appropriate. CARDIOGRAPHICS and STUDIES, I reviewed:    Telemetry:  SR.    ECG:  Reviewed. Labs:  No results for input(s): CPK, CKMB, CKNDX, TROIQ in the last 72 hours.     No lab exists for component: CPKMB  Lab Results   Component Value Date/Time    Cholesterol, total 138 2018 12:00 AM    HDL Cholesterol 46 2018 12:00 AM    LDL, calculated 66 2018 12:00 AM    Triglyceride 132 07/20/2018 12:00 AM    CHOL/HDL Ratio 3.2 09/20/2010 10:16 AM     No results for input(s): INR, PTP, APTT, INREXT, INREXT in the last 72 hours. Recent Labs     01/07/23  0315 01/06/23  0251 01/05/23  0356    141 139   K 3.7 3.1* 3.5    112* 104   CO2 29 26 31   BUN 18 22* 31*   CREA 0.66 0.52* 0.67   * 107* 113*   CA 8.6 7.8* 9.5   WBC 6.4 6.7 8.4   HGB 8.3* 9.0* 9.6*   HCT 28.4* 31.4* 32.8*    193 270       No results for input(s): AP, TBIL, TP, ALB, GLOB, GGT, AML, LPSE in the last 72 hours. No lab exists for component: SGOT, GPT, AMYP, HLPSE  No components found for: GLPOC  No results for input(s): PH, PCO2, PO2 in the last 72 hours.         Claudio Doherty MD  1/7/2023

## 2023-01-07 NOTE — PROGRESS NOTES
Hospitalist Progress Note    Subjective:   Daily Progress Note: 1/7/2023 4:39 PM    Hospital Course:  Pt admitted abdominal pain, nausea and vomiting. CT abdomen/pelvis showing recurrent and persistent small bowel obstruction. She underwent a small bowel resection, short segment. She has history of atrial fibrillation, GERD and CAD. Subjective: Pt seen in room,. NAD. Up in chair. Discussed with RN  Current Facility-Administered Medications   Medication Dose Route Frequency    [Held by provider] furosemide (LASIX) tablet 20 mg  20 mg Oral DAILY    metoprolol (LOPRESSOR) injection 2.5 mg  2.5 mg IntraVENous Q4H PRN    propafenone (RYTHMOL) tablet 150 mg  150 mg Oral Q8H    guaiFENesin ER (MUCINEX) tablet 600 mg  600 mg Oral Q12H    enoxaparin (LOVENOX) injection 40 mg  40 mg SubCUTAneous Q24H    0.9% sodium chloride infusion 250 mL  250 mL IntraVENous PRN    oxyCODONE IR (ROXICODONE) tablet 5 mg  5 mg Oral Q6H PRN    gabapentin (NEURONTIN) capsule 800 mg  800 mg Oral QID    pantoprazole (PROTONIX) tablet 40 mg  40 mg Oral ACB    sodium chloride (NS) flush 5-10 mL  5-10 mL IntraVENous PRN    acetaminophen (TYLENOL) tablet 650 mg  650 mg Oral Q4H PRN    morphine injection 2 mg  2 mg IntraVENous Q3H PRN    melatonin tablet 3 mg  3 mg Oral QHS PRN    ondansetron (ZOFRAN) injection 4 mg  4 mg IntraVENous Q6H PRN    alcohol 62% (NOZIN) nasal  1 Ampule  1 Ampule Topical Q12H    trimethobenzamide (TIGAN) injection 200 mg  200 mg IntraMUSCular Q6H PRN    [Held by provider] mylanta/viscous lidocaine (GI COCKTAIL)  40 mL Oral BID    magnesium oxide (MAG-OX) tablet 400 mg  400 mg Oral DAILY    potassium chloride SR (KLOR-CON 10) tablet 10 mEq  10 mEq Oral DAILY    calcium carbonate (OS-GRUPO) tablet 500 mg [elemental]  500 mg Oral DAILY    atorvastatin (LIPITOR) tablet 20 mg  20 mg Oral DAILY        Review of Systems:    Review of Systems   Constitutional:  Positive for malaise/fatigue.    Respiratory:  Negative for cough and shortness of breath. Cardiovascular:  Negative for chest pain and palpitations. Gastrointestinal:  Positive for heartburn and nausea. Genitourinary:  Negative for dysuria and urgency. Neurological:  Negative for headaches. Objective:     Visit Vitals  BP (!) 99/53 (BP 1 Location: Right upper arm, BP Patient Position: At rest)   Pulse 66   Temp 98 °F (36.7 °C)   Resp 17   Ht 5' 1\" (1.549 m)   Wt 68.9 kg (151 lb 14.4 oz)   SpO2 96%   BMI 28.70 kg/m²    O2 Flow Rate (L/min): 2 l/min O2 Device: Nasal cannula    Temp (24hrs), Av.4 °F (36.9 °C), Min:98 °F (36.7 °C), Max:99.3 °F (37.4 °C)      701 - 1900  In: 118 [P.O.:118]  Out: -   1901 -  0700  In: 1395.8 [P.O.:380; I.V.:1015.8]  Out: 2800 [Urine:2800]    PHYSICAL EXAM:    Physical Exam  Constitutional:       General: She is not in acute distress. Cardiovascular:      Rate and Rhythm: Tachycardia present. Rhythm irregular. Heart sounds: Murmur heard. Pulmonary:      Effort: Pulmonary effort is normal.      Breath sounds: Normal breath sounds. Abdominal:      General: There is distension. Comments: Firm, hypo bowels sounds. Musculoskeletal:         General: Normal range of motion. Skin:     General: Skin is warm and dry. Comments: Midline drsg intact, no drainage   Neurological:      Mental Status: She is oriented to person, place, and time.         Data Review    Recent Results (from the past 24 hour(s))   CBC WITH AUTOMATED DIFF    Collection Time: 23  3:15 AM   Result Value Ref Range    WBC 6.4 3.6 - 11.0 K/uL    RBC 3.50 (L) 3.80 - 5.20 M/uL    HGB 8.3 (L) 11.5 - 16.0 g/dL    HCT 28.4 (L) 35.0 - 47.0 %    MCV 81.1 80.0 - 99.0 FL    MCH 23.7 (L) 26.0 - 34.0 PG    MCHC 29.2 (L) 30.0 - 36.5 g/dL    RDW 16.5 (H) 11.5 - 14.5 %    PLATELET 609 263 - 982 K/uL    MPV 10.6 8.9 - 12.9 FL    NRBC 0.0 0  WBC    ABSOLUTE NRBC 0.00 0.00 - 0.01 K/uL    NEUTROPHILS 62 32 - 75 %    LYMPHOCYTES 20 12 - 49 %    MONOCYTES 12 5 - 13 %    EOSINOPHILS 3 0 - 7 %    BASOPHILS 1 0 - 1 %    IMMATURE GRANULOCYTES 2 (H) 0.0 - 0.5 %    ABS. NEUTROPHILS 4.0 1.8 - 8.0 K/UL    ABS. LYMPHOCYTES 1.3 0.8 - 3.5 K/UL    ABS. MONOCYTES 0.8 0.0 - 1.0 K/UL    ABS. EOSINOPHILS 0.2 0.0 - 0.4 K/UL    ABS. BASOPHILS 0.0 0.0 - 0.1 K/UL    ABS. IMM. GRANS. 0.1 (H) 0.00 - 0.04 K/UL    DF AUTOMATED     METABOLIC PANEL, BASIC    Collection Time: 01/07/23  3:15 AM   Result Value Ref Range    Sodium 140 136 - 145 mmol/L    Potassium 3.7 3.5 - 5.1 mmol/L    Chloride 106 97 - 108 mmol/L    CO2 29 21 - 32 mmol/L    Anion gap 5 5 - 15 mmol/L    Glucose 131 (H) 65 - 100 mg/dL    BUN 18 6 - 20 MG/DL    Creatinine 0.66 0.55 - 1.02 MG/DL    BUN/Creatinine ratio 27 (H) 12 - 20      eGFR >60 >60 ml/min/1.73m2    Calcium 8.6 8.5 - 10.1 MG/DL       XR CHEST PORT   Final Result   No change. XR CHEST PORT   Final Result      Nasogastric tube appears to be in satisfactory position. Unchanged bibasilar   atelectasis with small right pleural effusion. XR CHEST PORT   Final Result      Bilateral lower lobe atelectasis. XR ABD FLAT/ ERECT   Final Result   Unchanged mild diffuse small bowel distention. CT ABD PELV W CONT   Final Result   1. Persistent/recurrent SBO, of uncertain etiology. 2. Stable question of left renal mass. Active Problems:    SBO (small bowel obstruction) ([facility] [address] (2/27/2019)      Assessment/Plan:   Recurrent SBO- s/p small bowel resection- POD # 4, on CL, general surgery following  -Patient was taken for exploratory laparotomy 01/03 with finding of no intra-abdominal abnormalities, no anastomotic leak, no signs of peritonitis. She was found to have mild ileus. NG tube removed 1/4. On CLD as per primary team.      [id number] IV abx. Patient seems improved. Leukocytosis resolved. Acute kidney injury, pre-renal, now resolved.       Normocytic anemia/thrombocytopenia, suspect secondary to sepsis, exacerbation also related to fluid hydration. S/p 1 unit prbc on 1/5. Hgb stable. Trend h/h. Nausea/vomiting- zofran prn. Atrial fibrillation with RVR-improved- Now off drip. Cont'  rythmol, titrate dose up to TID. Prn BB. Cardiology following, appreciate recommendations          Pt is partial code, only wants vasoactive drips, antiarrhythmic drips, no chest compressions, no defibrillation, no intubation, discussed with pt and daughter.          DVT Prophylaxis: SCD  Code Status:  Partial Code  POA: NOK daughter Fannie Bledsoe     Care Plan discussed with:   __________patient, staff nurse, CM, family_____________________________________________________    Preston Cruz MD

## 2023-01-08 LAB
ANION GAP SERPL CALC-SCNC: 4 MMOL/L (ref 5–15)
BACTERIA SPEC CULT: NORMAL
BACTERIA SPEC CULT: NORMAL
BASOPHILS # BLD: 0.1 K/UL (ref 0–0.1)
BASOPHILS NFR BLD: 1 % (ref 0–1)
BUN SERPL-MCNC: 17 MG/DL (ref 6–20)
BUN/CREAT SERPL: 32 (ref 12–20)
CALCIUM SERPL-MCNC: 8.7 MG/DL (ref 8.5–10.1)
CHLORIDE SERPL-SCNC: 105 MMOL/L (ref 97–108)
CO2 SERPL-SCNC: 30 MMOL/L (ref 21–32)
CREAT SERPL-MCNC: 0.53 MG/DL (ref 0.55–1.02)
DIFFERENTIAL METHOD BLD: ABNORMAL
EOSINOPHIL # BLD: 0.2 K/UL (ref 0–0.4)
EOSINOPHIL NFR BLD: 3 % (ref 0–7)
ERYTHROCYTE [DISTWIDTH] IN BLOOD BY AUTOMATED COUNT: 16.6 % (ref 11.5–14.5)
GLUCOSE SERPL-MCNC: 123 MG/DL (ref 65–100)
HCT VFR BLD AUTO: 28.1 % (ref 35–47)
HGB BLD-MCNC: 8.1 G/DL (ref 11.5–16)
IMM GRANULOCYTES # BLD AUTO: 0.2 K/UL (ref 0–0.04)
IMM GRANULOCYTES NFR BLD AUTO: 3 % (ref 0–0.5)
LYMPHOCYTES # BLD: 1.1 K/UL (ref 0.8–3.5)
LYMPHOCYTES NFR BLD: 17 % (ref 12–49)
MCH RBC QN AUTO: 23.8 PG (ref 26–34)
MCHC RBC AUTO-ENTMCNC: 28.8 G/DL (ref 30–36.5)
MCV RBC AUTO: 82.6 FL (ref 80–99)
MONOCYTES # BLD: 0.9 K/UL (ref 0–1)
MONOCYTES NFR BLD: 14 % (ref 5–13)
NEUTS SEG # BLD: 4.1 K/UL (ref 1.8–8)
NEUTS SEG NFR BLD: 62 % (ref 32–75)
NRBC # BLD: 0 K/UL (ref 0–0.01)
NRBC BLD-RTO: 0 PER 100 WBC
PLATELET # BLD AUTO: 208 K/UL (ref 150–400)
PMV BLD AUTO: 10.5 FL (ref 8.9–12.9)
POTASSIUM SERPL-SCNC: 3.9 MMOL/L (ref 3.5–5.1)
RBC # BLD AUTO: 3.4 M/UL (ref 3.8–5.2)
RBC MORPH BLD: ABNORMAL
RBC MORPH BLD: ABNORMAL
SERVICE CMNT-IMP: NORMAL
SERVICE CMNT-IMP: NORMAL
SODIUM SERPL-SCNC: 139 MMOL/L (ref 136–145)
WBC # BLD AUTO: 6.6 K/UL (ref 3.6–11)

## 2023-01-08 PROCEDURE — 74011250637 HC RX REV CODE- 250/637: Performed by: STUDENT IN AN ORGANIZED HEALTH CARE EDUCATION/TRAINING PROGRAM

## 2023-01-08 PROCEDURE — 85025 COMPLETE CBC W/AUTO DIFF WBC: CPT

## 2023-01-08 PROCEDURE — 74011250637 HC RX REV CODE- 250/637: Performed by: NURSE PRACTITIONER

## 2023-01-08 PROCEDURE — 74011250636 HC RX REV CODE- 250/636: Performed by: SURGERY

## 2023-01-08 PROCEDURE — 36415 COLL VENOUS BLD VENIPUNCTURE: CPT

## 2023-01-08 PROCEDURE — 80048 BASIC METABOLIC PNL TOTAL CA: CPT

## 2023-01-08 PROCEDURE — 65270000046 HC RM TELEMETRY

## 2023-01-08 PROCEDURE — 74011000250 HC RX REV CODE- 250: Performed by: INTERNAL MEDICINE

## 2023-01-08 PROCEDURE — 74011000250 HC RX REV CODE- 250: Performed by: NURSE PRACTITIONER

## 2023-01-08 PROCEDURE — 77010033678 HC OXYGEN DAILY

## 2023-01-08 PROCEDURE — 74011250637 HC RX REV CODE- 250/637: Performed by: INTERNAL MEDICINE

## 2023-01-08 PROCEDURE — 94640 AIRWAY INHALATION TREATMENT: CPT

## 2023-01-08 RX ORDER — GUAIFENESIN/DEXTROMETHORPHAN 100-10MG/5
5 SYRUP ORAL
Status: DISCONTINUED | OUTPATIENT
Start: 2023-01-08 | End: 2023-01-10 | Stop reason: HOSPADM

## 2023-01-08 RX ORDER — IPRATROPIUM BROMIDE AND ALBUTEROL SULFATE 2.5; .5 MG/3ML; MG/3ML
3 SOLUTION RESPIRATORY (INHALATION)
Status: DISCONTINUED | OUTPATIENT
Start: 2023-01-08 | End: 2023-01-09 | Stop reason: ALTCHOICE

## 2023-01-08 RX ADMIN — GUAIFENESIN AND DEXTROMETHORPHAN 5 ML: 100; 10 SYRUP ORAL at 18:54

## 2023-01-08 RX ADMIN — GUAIFENESIN 600 MG: 600 TABLET, EXTENDED RELEASE ORAL at 21:28

## 2023-01-08 RX ADMIN — POTASSIUM CHLORIDE 10 MEQ: 750 TABLET, FILM COATED, EXTENDED RELEASE ORAL at 09:48

## 2023-01-08 RX ADMIN — Medication 1 AMPULE: at 09:48

## 2023-01-08 RX ADMIN — PROPAFENONE HYDROCHLORIDE 150 MG: 150 TABLET, FILM COATED ORAL at 14:18

## 2023-01-08 RX ADMIN — GABAPENTIN 800 MG: 300 CAPSULE ORAL at 09:48

## 2023-01-08 RX ADMIN — OXYCODONE 5 MG: 5 TABLET ORAL at 02:01

## 2023-01-08 RX ADMIN — CALCIUM 500 MG: 500 TABLET ORAL at 09:49

## 2023-01-08 RX ADMIN — SODIUM CHLORIDE, PRESERVATIVE FREE 10 ML: 5 INJECTION INTRAVENOUS at 21:32

## 2023-01-08 RX ADMIN — ENOXAPARIN SODIUM 40 MG: 100 INJECTION SUBCUTANEOUS at 09:49

## 2023-01-08 RX ADMIN — Medication 1 AMPULE: at 21:31

## 2023-01-08 RX ADMIN — ATORVASTATIN CALCIUM 20 MG: 20 TABLET, FILM COATED ORAL at 09:49

## 2023-01-08 RX ADMIN — MAGNESIUM OXIDE 400 MG (241.3 MG MAGNESIUM) TABLET 400 MG: TABLET at 09:48

## 2023-01-08 RX ADMIN — GABAPENTIN 800 MG: 300 CAPSULE ORAL at 22:06

## 2023-01-08 RX ADMIN — PROPAFENONE HYDROCHLORIDE 150 MG: 150 TABLET, FILM COATED ORAL at 21:28

## 2023-01-08 RX ADMIN — PANTOPRAZOLE SODIUM 40 MG: 40 TABLET, DELAYED RELEASE ORAL at 09:49

## 2023-01-08 RX ADMIN — GABAPENTIN 800 MG: 300 CAPSULE ORAL at 18:51

## 2023-01-08 RX ADMIN — GABAPENTIN 800 MG: 300 CAPSULE ORAL at 14:18

## 2023-01-08 RX ADMIN — PROPAFENONE HYDROCHLORIDE 150 MG: 150 TABLET, FILM COATED ORAL at 05:47

## 2023-01-08 RX ADMIN — IPRATROPIUM BROMIDE AND ALBUTEROL SULFATE 3 ML: 2.5; .5 SOLUTION RESPIRATORY (INHALATION) at 13:06

## 2023-01-08 RX ADMIN — GUAIFENESIN 600 MG: 600 TABLET, EXTENDED RELEASE ORAL at 09:48

## 2023-01-08 NOTE — PROGRESS NOTES
10:50 Patient states that she is short of breath and has requested a breathing treatment. Breathing treatment was not administered. Per Lauren Freeman RT assessment of patient the patient did not need a treatment. Please see her note dated  01/08 at 11:15.     13:03 Patient complaining of shortness of breath and has requested a breathing treatment again. RT Rob notified. 13:15 Breathing treatment administered by Lauren Freeman RT    Bedside, Verbal, and Written shift change report given to Danie Marcum RN  (oncoming nurse) by Leonidas Vargas RN  (offgoing nurse). Report included the following information SBAR, Kardex, MAR, and Recent Results.

## 2023-01-08 NOTE — PROGRESS NOTES
Respiratory Care;    Per patient's assessment, had patient to cough to clear upper course airways, breath sounds are clear/diminished. Treatment was not administered per RT assessment, no respiratory distress present SATs were 94%.

## 2023-01-08 NOTE — PROGRESS NOTES
Problem: Falls - Risk of  Goal: *Absence of Falls  Description: Document [place] Fall Risk and appropriate interventions in the flowsheet.   Outcome: Progressing Towards Goal  Note: Fall Risk Interventions:  Mobility Interventions: Bed/chair exit alarm         Medication Interventions: Bed/chair exit alarm    Elimination Interventions: Bed/chair exit alarm              Problem: Patient Education: Go to Patient Education Activity  Goal: Patient/Family Education  Outcome: Progressing Towards Goal     Problem: Patient Education: Go to Patient Education Activity  Goal: Patient/Family Education  Outcome: Progressing Towards Goal     Problem: Patient Education: Go to Patient Education Activity  Goal: Patient/Family Education  Outcome: Progressing Towards Goal     Problem: Infection - Risk of, Urinary Catheter-Associated Urinary Tract Infection  Goal: *Absence of infection signs and symptoms  Outcome: Progressing Towards Goal

## 2023-01-08 NOTE — PROGRESS NOTES
EP/ ARRHYTHMIA    Patient ID:  Patient: Aris Hardin  MRN: [id number]  Age: 80 y.o.  : 10/9/1933    Date of  Admission: 2022  2:55 PM   PCP:  Dolores Porras MD    Assessment:   Paroxysmal atrial fibrillation with rapid ventricular response. History of GIB in the past, not on and will not be offered chronic anticoagulation. Chronic LBBB. History of PVC's. Normal EF by echo in 2018. Hyperlipidemia. YAMILKA, improving. SBO s/p partial small bowel resection this admission. History of prior episodes. Peripheral neuropathy. Falls risk. Ovarian cancer s/p chemo. Full code. Plan:     Restarted propafenone  mg po BID. Was going to go to TID after levofloxacin, but need better Afib suppression. Will go to TID now. Rate control as able (metoprolol IR is lowered to 2.5 mg IV q 4h). Not a chronic anticoagulation candidate. : Mostly in sinus today, continue rhythmol. Bp soft. : Sinus, continue rhythmol, mobilize. I answered all her questions and those of her family. [x]       High complexity decision making was performed in this patient at high risk for decompensation    Aris Hardin is a 80 y.o. female with a history of SBO now s/p surgery. Currently, she denies chest pain. No syncope, dizziness. She did have palpitations earlier when in Afib. No TIA or stroke symptoms. Here, she was treated with IV amiodarone before returning to sinus rhythm. She has had paroxysms of Afib here. Today, she feels full in her chest, coughing. NG tube is out. Cardiac Assessment/Plan per prior consult in 2022 with Dr. Edward Vasquez:    1. Paroxysmal atrial fibrillation with RVR diagnosed in 2018, minimal to no symptoms. Back in sinus during that hospital stay. Recurrent but transient RVR during 2020 admission. Anticoagulation stopped at that time due to GIB. Will continue propafenone and avoid anticoagulation.  Patient and family understand risk.  2. Chronic LBBB. 3. PVC's. 4. Hyperlipidemia. 5. Says she is not diabetic. 6. Hypercholesterolemia. 7. CKD stage 3.  8. Anemia NOS. 9. Scoliosis/arthritis s/p extensive thoracolumbar fusion 2002, now with broken hardware, s/p LUCY 12/2013 (effective), neuropathy since knee surgery 2009. 10. History of ovarian cancer s/p chemo. 11. GERD/Lewis's esophagus, gastroparesis. 12. Back pain. Fractured thoracolumbar fusion rods, stable. Protruding pedicle screws, stable. Neuroforaminal stenoses. 13. Walks with a walker. Peripheral neuropathy in her legs. 14. SBO 3/2019, resolved without surgery     Admitted with N/V, ?partial SBO; PAfib noted on tele; sinus now; NG in.  Current cardiac meds: none. Rec: Restart propafenone; No AC as noted below.           No Known Allergies       Current Facility-Administered Medications   Medication Dose Route Frequency    albuterol-ipratropium (DUO-NEB) 2.5 MG-0.5 MG/3 ML  3 mL Nebulization Q6H PRN    [Held by provider] furosemide (LASIX) tablet 20 mg  20 mg Oral DAILY    metoprolol (LOPRESSOR) injection 2.5 mg  2.5 mg IntraVENous Q4H PRN    propafenone (RYTHMOL) tablet 150 mg  150 mg Oral Q8H    guaiFENesin ER (MUCINEX) tablet 600 mg  600 mg Oral Q12H    enoxaparin (LOVENOX) injection 40 mg  40 mg SubCUTAneous Q24H    0.9% sodium chloride infusion 250 mL  250 mL IntraVENous PRN    oxyCODONE IR (ROXICODONE) tablet 5 mg  5 mg Oral Q6H PRN    gabapentin (NEURONTIN) capsule 800 mg  800 mg Oral QID    pantoprazole (PROTONIX) tablet 40 mg  40 mg Oral ACB    sodium chloride (NS) flush 5-10 mL  5-10 mL IntraVENous PRN    acetaminophen (TYLENOL) tablet 650 mg  650 mg Oral Q4H PRN    morphine injection 2 mg  2 mg IntraVENous Q3H PRN    melatonin tablet 3 mg  3 mg Oral QHS PRN    ondansetron (ZOFRAN) injection 4 mg  4 mg IntraVENous Q6H PRN    alcohol 62% (NOZIN) nasal  1 Ampule  1 Ampule Topical Q12H    trimethobenzamide (TIGAN) injection 200 mg  200 mg IntraMUSCular Q6H PRN    [Held by provider] mylanta/viscous lidocaine (GI COCKTAIL)  40 mL Oral BID    magnesium oxide (MAG-OX) tablet 400 mg  400 mg Oral DAILY    potassium chloride SR (KLOR-CON 10) tablet 10 mEq  10 mEq Oral DAILY    calcium carbonate (OS-GRUPO) tablet 500 mg [elemental]  500 mg Oral DAILY    atorvastatin (LIPITOR) tablet 20 mg  20 mg Oral DAILY       Review of Symptoms:  Respiratory: negative for SOB, cough, sputum production, wheezing, LEVINE, pleuritic pain   Cardiology: negative for chest pain, palpitations, orthopnea, PND, edema, syncope   Genitourinary: negative for frequency, urgency, dysuria, hematuria, incontinence        Objective:      Physical Exam:  Temp (24hrs), Av.4 °F (36.9 °C), Min:97.8 °F (36.6 °C), Max:99.4 °F (37.4 °C)    Patient Vitals for the past 8 hrs:   Pulse   23 0744 92   23 0429 75   23 0400 66      Patient Vitals for the past 8 hrs:   Resp   23 0744 17   23 0429 15   23 0400 18      Patient Vitals for the past 8 hrs:   BP   23 0744 (!) 139/42   23 0429 (!) 112/51   23 0400 (!) 100/49          Intake/Output Summary (Last 24 hours) at 2023 1007  Last data filed at 2023 0154  Gross per 24 hour   Intake 490 ml   Output 380 ml   Net 110 ml         Nondiaphoretic, not in acute distress. NG tube is OUT. Unlabored, +rhonchi bilaterally anteriorly, symmetric air movement. REGULAR rate and rhythm, no new murmur. No peripheral edema. Palpable radial pulses bilaterally. Abdomen, soft, nontender, nondistended. Extremities without cyanosis or clubbing. Muscle tone and bulk normal for age. Skin warm and dry. No rashes or ulcers. Neuro grossly nonfocal.  No tremor. Awake and appropriate. CARDIOGRAPHICS and STUDIES, I reviewed:    Telemetry:  SR.    ECG:  Reviewed. Labs:  No results for input(s): CPK, CKMB, CKNDX, TROIQ in the last 72 hours.     No lab exists for component: CPKMB  Lab Results   Component Value Date/Time Cholesterol, total 138 07/20/2018 12:00 AM    HDL Cholesterol 46 07/20/2018 12:00 AM    LDL, calculated 66 07/20/2018 12:00 AM    Triglyceride 132 07/20/2018 12:00 AM    CHOL/HDL Ratio 3.2 09/20/2010 10:16 AM     No results for input(s): INR, PTP, APTT, INREXT, INREXT in the last 72 hours. Recent Labs     01/08/23  0314 01/07/23  0315 01/06/23  0251    140 141   K 3.9 3.7 3.1*    106 112*   CO2 30 29 26   BUN 17 18 22*   CREA 0.53* 0.66 0.52*   * 131* 107*   CA 8.7 8.6 7.8*   WBC 6.6 6.4 6.7   HGB 8.1* 8.3* 9.0*   HCT 28.1* 28.4* 31.4*    185 193       No results for input(s): AP, TBIL, TP, ALB, GLOB, GGT, AML, LPSE in the last 72 hours. No lab exists for component: SGOT, GPT, AMYP, HLPSE  No components found for: GLPOC  No results for input(s): PH, PCO2, PO2 in the last 72 hours.         Gt Dickerson MD  1/8/2023

## 2023-01-08 NOTE — PROGRESS NOTES
Admit Date: 2022    POD 6 Days Post-Op    Procedure:  Procedure(s):  LAPAROTOMY EXPLORATORY    Subjective:     Patient has no new complaints. Still with loose stools. Kathy mech soft diet and feeling well. Objective:     Blood pressure (!) 139/42, pulse 92, temperature 98.3 °F (36.8 °C), resp. rate 17, height 5' 1\" (1.549 m), weight 151 lb 14.4 oz (68.9 kg), SpO2 98 %. Temp (24hrs), Av.5 °F (36.9 °C), Min:97.8 °F (36.6 °C), Max:99.4 °F (37.4 °C)      Physical Exam:  GENERAL: alert, cooperative, no distress, appears stated age, LUNG: clear to auscultation bilaterally, HEART: regular rate and rhythm, ABDOMEN: soft, NT, wound c/d/i, EXTREMITIES:  extremities normal, atraumatic, no cyanosis or edema    Labs:   Recent Results (from the past 24 hour(s))   CBC WITH AUTOMATED DIFF    Collection Time: 23  3:14 AM   Result Value Ref Range    WBC 6.6 3.6 - 11.0 K/uL    RBC 3.40 (L) 3.80 - 5.20 M/uL    HGB 8.1 (L) 11.5 - 16.0 g/dL    HCT 28.1 (L) 35.0 - 47.0 %    MCV 82.6 80.0 - 99.0 FL    MCH 23.8 (L) 26.0 - 34.0 PG    MCHC 28.8 (L) 30.0 - 36.5 g/dL    RDW 16.6 (H) 11.5 - 14.5 %    PLATELET 114 948 - 771 K/uL    MPV 10.5 8.9 - 12.9 FL    NRBC 0.0 0  WBC    ABSOLUTE NRBC 0.00 0.00 - 0.01 K/uL    NEUTROPHILS 62 32 - 75 %    LYMPHOCYTES 17 12 - 49 %    MONOCYTES 14 (H) 5 - 13 %    EOSINOPHILS 3 0 - 7 %    BASOPHILS 1 0 - 1 %    IMMATURE GRANULOCYTES 3 (H) 0.0 - 0.5 %    ABS. NEUTROPHILS 4.1 1.8 - 8.0 K/UL    ABS. LYMPHOCYTES 1.1 0.8 - 3.5 K/UL    ABS. MONOCYTES 0.9 0.0 - 1.0 K/UL    ABS. EOSINOPHILS 0.2 0.0 - 0.4 K/UL    ABS. BASOPHILS 0.1 0.0 - 0.1 K/UL    ABS. IMM.  GRANS. 0.2 (H) 0.00 - 0.04 K/UL    DF SMEAR SCANNED      RBC COMMENTS ANISOCYTOSIS  1+        RBC COMMENTS POLYCHROMASIA  PRESENT       METABOLIC PANEL, BASIC    Collection Time: 23  3:14 AM   Result Value Ref Range    Sodium 139 136 - 145 mmol/L    Potassium 3.9 3.5 - 5.1 mmol/L    Chloride 105 97 - 108 mmol/L    CO2 30 21 - 32 mmol/L    Anion gap 4 (L) 5 - 15 mmol/L    Glucose 123 (H) 65 - 100 mg/dL    BUN 17 6 - 20 MG/DL    Creatinine 0.53 (L) 0.55 - 1.02 MG/DL    BUN/Creatinine ratio 32 (H) 12 - 20      eGFR >60 >60 ml/min/1.73m2    Calcium 8.7 8.5 - 10.1 MG/DL       Data Review images and reports reviewed    Assessment:     Active Problems:    [facility] (2/27/2019)      Plan/Recommendations/Medical Decision Making:     Continue present treatment  Increase activity  Anticipate d/c back to [facility]  [id number] [address] [facility]

## 2023-01-08 NOTE — PROGRESS NOTES
Hospitalist Progress Note    Subjective:   Daily Progress Note: 1/8/2023 4:39 PM    Hospital Course:  Pt admitted abdominal pain, nausea and vomiting. CT abdomen/pelvis showing recurrent and persistent small bowel obstruction. She underwent a small bowel resection, short segment. She has history of atrial fibrillation, GERD and CAD. Subjective: Pt seen in room,. NAD. Up in chair.     Discussed with RN  Current Facility-Administered Medications   Medication Dose Route Frequency    albuterol-ipratropium (DUO-NEB) 2.5 MG-0.5 MG/3 ML  3 mL Nebulization Q6H PRN    [Held by provider] furosemide (LASIX) tablet 20 mg  20 mg Oral DAILY    metoprolol (LOPRESSOR) injection 2.5 mg  2.5 mg IntraVENous Q4H PRN    propafenone (RYTHMOL) tablet 150 mg  150 mg Oral Q8H    guaiFENesin ER (MUCINEX) tablet 600 mg  600 mg Oral Q12H    enoxaparin (LOVENOX) injection 40 mg  40 mg SubCUTAneous Q24H    0.9% sodium chloride infusion 250 mL  250 mL IntraVENous PRN    oxyCODONE IR (ROXICODONE) tablet 5 mg  5 mg Oral Q6H PRN    gabapentin (NEURONTIN) capsule 800 mg  800 mg Oral QID    pantoprazole (PROTONIX) tablet 40 mg  40 mg Oral ACB    sodium chloride (NS) flush 5-10 mL  5-10 mL IntraVENous PRN    acetaminophen (TYLENOL) tablet 650 mg  650 mg Oral Q4H PRN    morphine injection 2 mg  2 mg IntraVENous Q3H PRN    melatonin tablet 3 mg  3 mg Oral QHS PRN    ondansetron (ZOFRAN) injection 4 mg  4 mg IntraVENous Q6H PRN    alcohol 62% (NOZIN) nasal  1 Ampule  1 Ampule Topical Q12H    trimethobenzamide (TIGAN) injection 200 mg  200 mg IntraMUSCular Q6H PRN    [Held by provider] mylanta/viscous lidocaine (GI COCKTAIL)  40 mL Oral BID    magnesium oxide (MAG-OX) tablet 400 mg  400 mg Oral DAILY    potassium chloride SR (KLOR-CON 10) tablet 10 mEq  10 mEq Oral DAILY    calcium carbonate (OS-GRUPO) tablet 500 mg [elemental]  500 mg Oral DAILY    atorvastatin (LIPITOR) tablet 20 mg  20 mg Oral DAILY        Review of Systems:    Review of Systems   Constitutional:  Positive for malaise/fatigue. Respiratory:  Negative for cough and shortness of breath. Cardiovascular:  Negative for chest pain and palpitations. Gastrointestinal:  Positive for heartburn and nausea. Genitourinary:  Negative for dysuria and urgency. Neurological:  Negative for headaches. Objective:     Visit Vitals  BP (!) 142/61 (BP 1 Location: Right upper arm, BP Patient Position: Sitting)   Pulse 87   Temp 98.5 °F (36.9 °C)   Resp 20   Ht 5' 1\" (1.549 m)   Wt 68.9 kg (151 lb 14.4 oz)   SpO2 98%   BMI 28.70 kg/m²    O2 Flow Rate (L/min): 2 l/min O2 Device: Nasal cannula    Temp (24hrs), Av.5 °F (36.9 °C), Min:97.8 °F (36.6 °C), Max:99.4 °F (37.4 °C)      No intake/output data recorded.  1901 -  0700  In: 608 [P. O.:608]  Out: 380 [Urine:380]    PHYSICAL EXAM:    Physical Exam  Constitutional:       General: She is not in acute distress. Cardiovascular:      Rate and Rhythm: Tachycardia present. Rhythm irregular. Heart sounds: Murmur heard. Pulmonary:      Effort: Pulmonary effort is normal.      Breath sounds: Normal breath sounds. Abdominal:      General: There is distension. Comments: Firm, hypo bowels sounds. Musculoskeletal:         General: Normal range of motion. Skin:     General: Skin is warm and dry. Comments: Midline drsg intact, no drainage   Neurological:      Mental Status: She is oriented to person, place, and time.         Data Review    Recent Results (from the past 24 hour(s))   CBC WITH AUTOMATED DIFF    Collection Time: 23  3:14 AM   Result Value Ref Range    WBC 6.6 3.6 - 11.0 K/uL    RBC 3.40 (L) 3.80 - 5.20 M/uL    HGB 8.1 (L) 11.5 - 16.0 g/dL    HCT 28.1 (L) 35.0 - 47.0 %    MCV 82.6 80.0 - 99.0 FL    MCH 23.8 (L) 26.0 - 34.0 PG    MCHC 28.8 (L) 30.0 - 36.5 g/dL    RDW 16.6 (H) 11.5 - 14.5 %    PLATELET 945 689 - 621 K/uL    MPV 10.5 8.9 - 12.9 FL    NRBC 0.0 0  WBC    ABSOLUTE NRBC 0.00 0.00 - 0.01 K/uL    NEUTROPHILS 62 32 - 75 %    LYMPHOCYTES 17 12 - 49 %    MONOCYTES 14 (H) 5 - 13 %    EOSINOPHILS 3 0 - 7 %    BASOPHILS 1 0 - 1 %    IMMATURE GRANULOCYTES 3 (H) 0.0 - 0.5 %    ABS. NEUTROPHILS 4.1 1.8 - 8.0 K/UL    ABS. LYMPHOCYTES 1.1 0.8 - 3.5 K/UL    ABS. MONOCYTES 0.9 0.0 - 1.0 K/UL    ABS. EOSINOPHILS 0.2 0.0 - 0.4 K/UL    ABS. BASOPHILS 0.1 0.0 - 0.1 K/UL    ABS. IMM. GRANS. 0.2 (H) 0.00 - 0.04 K/UL    DF SMEAR SCANNED      RBC COMMENTS ANISOCYTOSIS  1+        RBC COMMENTS POLYCHROMASIA  PRESENT       METABOLIC PANEL, BASIC    Collection Time: 01/08/23  3:14 AM   Result Value Ref Range    Sodium 139 136 - 145 mmol/L    Potassium 3.9 3.5 - 5.1 mmol/L    Chloride 105 97 - 108 mmol/L    CO2 30 21 - 32 mmol/L    Anion gap 4 (L) 5 - 15 mmol/L    Glucose 123 (H) 65 - 100 mg/dL    BUN 17 6 - 20 MG/DL    Creatinine 0.53 (L) 0.55 - 1.02 MG/DL    BUN/Creatinine ratio 32 (H) 12 - 20      eGFR >60 >60 ml/min/1.73m2    Calcium 8.7 8.5 - 10.1 MG/DL       XR CHEST PORT   Final Result   No change. XR CHEST PORT   Final Result      Nasogastric tube appears to be in satisfactory position. Unchanged bibasilar   atelectasis with small right pleural effusion. XR CHEST PORT   Final Result      Bilateral lower lobe atelectasis. XR ABD FLAT/ ERECT   Final Result   Unchanged mild diffuse small bowel distention. CT ABD PELV W CONT   Final Result   1. Persistent/recurrent SBO, of uncertain etiology. 2. Stable question of left renal mass. Active Problems:    SBO (small bowel obstruction) ([address] (2/27/2019)      Assessment/Plan:   Recurrent SBO- s/p small bowel resection- POD # 4, on CL, general surgery following  -Patient was taken for exploratory laparotomy 01/03 with finding of no intra-abdominal abnormalities, no anastomotic leak, no signs of peritonitis. She was found to have mild ileus. NG tube removed 1/4. Tolerate po intake.   Pt/ot, dispo planning      Oujvqa-gqcyzimn-clskrlwjw IV abx.  Patient seems improved. Leukocytosis resolved. Acute kidney injury, pre-renal, now resolved. Normocytic anemia/thrombocytopenia, suspect secondary to sepsis, exacerbation also related to fluid hydration. S/p 1 unit prbc on 1/5. Hgb stable. Trend h/h. Nausea/vomiting- zofran prn. Atrial fibrillation with RVR-improved- Now off drip. Cont'  rythmol, titrate dose up to TID. Prn BB. Cardiology following, appreciate recommendations          Pt is partial code, only wants vasoactive drips, antiarrhythmic drips, no chest compressions, no defibrillation, no intubation, discussed with pt and daughter.          DVT Prophylaxis: SCD  Code Status:  Partial Code  POA: NOK daughter Beula Culver     Care Plan discussed with:   __________patient, staff nurse, CM, family_____________________________________________________    Sally Brown MD

## 2023-01-09 LAB
ANION GAP SERPL CALC-SCNC: 5 MMOL/L (ref 5–15)
BASOPHILS # BLD: 0.1 K/UL (ref 0–0.1)
BASOPHILS NFR BLD: 1 % (ref 0–1)
BUN SERPL-MCNC: 14 MG/DL (ref 6–20)
BUN/CREAT SERPL: 25 (ref 12–20)
CALCIUM SERPL-MCNC: 8.9 MG/DL (ref 8.5–10.1)
CHLORIDE SERPL-SCNC: 101 MMOL/L (ref 97–108)
CO2 SERPL-SCNC: 31 MMOL/L (ref 21–32)
CREAT SERPL-MCNC: 0.55 MG/DL (ref 0.55–1.02)
DIFFERENTIAL METHOD BLD: ABNORMAL
EOSINOPHIL # BLD: 0.2 K/UL (ref 0–0.4)
EOSINOPHIL NFR BLD: 3 % (ref 0–7)
ERYTHROCYTE [DISTWIDTH] IN BLOOD BY AUTOMATED COUNT: 16.9 % (ref 11.5–14.5)
GLUCOSE SERPL-MCNC: 114 MG/DL (ref 65–100)
HCT VFR BLD AUTO: 28.6 % (ref 35–47)
HGB BLD-MCNC: 8.3 G/DL (ref 11.5–16)
IMM GRANULOCYTES # BLD AUTO: 0 K/UL (ref 0–0.04)
IMM GRANULOCYTES NFR BLD AUTO: 0 % (ref 0–0.5)
LYMPHOCYTES # BLD: 1.5 K/UL (ref 0.8–3.5)
LYMPHOCYTES NFR BLD: 19 % (ref 12–49)
MCH RBC QN AUTO: 23.4 PG (ref 26–34)
MCHC RBC AUTO-ENTMCNC: 29 G/DL (ref 30–36.5)
MCV RBC AUTO: 80.8 FL (ref 80–99)
MONOCYTES # BLD: 0.9 K/UL (ref 0–1)
MONOCYTES NFR BLD: 11 % (ref 5–13)
NEUTS SEG # BLD: 5.2 K/UL (ref 1.8–8)
NEUTS SEG NFR BLD: 66 % (ref 32–75)
NRBC # BLD: 0 K/UL (ref 0–0.01)
NRBC BLD-RTO: 0 PER 100 WBC
PLATELET # BLD AUTO: 201 K/UL (ref 150–400)
PMV BLD AUTO: 9.9 FL (ref 8.9–12.9)
POTASSIUM SERPL-SCNC: 4.1 MMOL/L (ref 3.5–5.1)
RBC # BLD AUTO: 3.54 M/UL (ref 3.8–5.2)
RBC MORPH BLD: ABNORMAL
SARS-COV-2, XPLCVT: NOT DETECTED
SODIUM SERPL-SCNC: 137 MMOL/L (ref 136–145)
SOURCE, COVRS: NORMAL
WBC # BLD AUTO: 7.9 K/UL (ref 3.6–11)

## 2023-01-09 PROCEDURE — 85025 COMPLETE CBC W/AUTO DIFF WBC: CPT

## 2023-01-09 PROCEDURE — 74011000250 HC RX REV CODE- 250: Performed by: NURSE PRACTITIONER

## 2023-01-09 PROCEDURE — 36415 COLL VENOUS BLD VENIPUNCTURE: CPT

## 2023-01-09 PROCEDURE — 65270000046 HC RM TELEMETRY

## 2023-01-09 PROCEDURE — 74011250637 HC RX REV CODE- 250/637: Performed by: NURSE PRACTITIONER

## 2023-01-09 PROCEDURE — U0005 INFEC AGEN DETEC AMPLI PROBE: HCPCS

## 2023-01-09 PROCEDURE — 51798 US URINE CAPACITY MEASURE: CPT

## 2023-01-09 PROCEDURE — 74011250637 HC RX REV CODE- 250/637: Performed by: INTERNAL MEDICINE

## 2023-01-09 PROCEDURE — 74011250637 HC RX REV CODE- 250/637: Performed by: STUDENT IN AN ORGANIZED HEALTH CARE EDUCATION/TRAINING PROGRAM

## 2023-01-09 PROCEDURE — 74011250636 HC RX REV CODE- 250/636: Performed by: SURGERY

## 2023-01-09 PROCEDURE — 80048 BASIC METABOLIC PNL TOTAL CA: CPT

## 2023-01-09 RX ADMIN — POTASSIUM CHLORIDE 10 MEQ: 750 TABLET, FILM COATED, EXTENDED RELEASE ORAL at 09:42

## 2023-01-09 RX ADMIN — GUAIFENESIN 600 MG: 600 TABLET, EXTENDED RELEASE ORAL at 21:48

## 2023-01-09 RX ADMIN — GUAIFENESIN AND DEXTROMETHORPHAN 5 ML: 100; 10 SYRUP ORAL at 13:06

## 2023-01-09 RX ADMIN — MELATONIN 3 MG: at 21:47

## 2023-01-09 RX ADMIN — GABAPENTIN 800 MG: 300 CAPSULE ORAL at 22:00

## 2023-01-09 RX ADMIN — GABAPENTIN 800 MG: 300 CAPSULE ORAL at 09:34

## 2023-01-09 RX ADMIN — GABAPENTIN 800 MG: 300 CAPSULE ORAL at 18:16

## 2023-01-09 RX ADMIN — Medication 1 AMPULE: at 21:47

## 2023-01-09 RX ADMIN — PROPAFENONE HYDROCHLORIDE 150 MG: 150 TABLET, FILM COATED ORAL at 21:52

## 2023-01-09 RX ADMIN — PROPAFENONE HYDROCHLORIDE 150 MG: 150 TABLET, FILM COATED ORAL at 05:13

## 2023-01-09 RX ADMIN — CALCIUM 500 MG: 500 TABLET ORAL at 09:34

## 2023-01-09 RX ADMIN — ACETAMINOPHEN 650 MG: 325 TABLET ORAL at 14:45

## 2023-01-09 RX ADMIN — GUAIFENESIN AND DEXTROMETHORPHAN 5 ML: 100; 10 SYRUP ORAL at 21:46

## 2023-01-09 RX ADMIN — MAGNESIUM OXIDE 400 MG (241.3 MG MAGNESIUM) TABLET 400 MG: TABLET at 09:35

## 2023-01-09 RX ADMIN — PROPAFENONE HYDROCHLORIDE 150 MG: 150 TABLET, FILM COATED ORAL at 18:16

## 2023-01-09 RX ADMIN — Medication 1 AMPULE: at 09:42

## 2023-01-09 RX ADMIN — GUAIFENESIN AND DEXTROMETHORPHAN 5 ML: 100; 10 SYRUP ORAL at 01:19

## 2023-01-09 RX ADMIN — GUAIFENESIN 600 MG: 600 TABLET, EXTENDED RELEASE ORAL at 09:34

## 2023-01-09 RX ADMIN — ATORVASTATIN CALCIUM 20 MG: 20 TABLET, FILM COATED ORAL at 09:33

## 2023-01-09 RX ADMIN — PANTOPRAZOLE SODIUM 40 MG: 40 TABLET, DELAYED RELEASE ORAL at 09:34

## 2023-01-09 RX ADMIN — OXYCODONE 5 MG: 5 TABLET ORAL at 21:47

## 2023-01-09 RX ADMIN — SODIUM CHLORIDE, PRESERVATIVE FREE 10 ML: 5 INJECTION INTRAVENOUS at 19:04

## 2023-01-09 RX ADMIN — GUAIFENESIN AND DEXTROMETHORPHAN 5 ML: 100; 10 SYRUP ORAL at 18:17

## 2023-01-09 RX ADMIN — IPRATROPIUM BROMIDE AND ALBUTEROL 1 PUFF: 20; 100 SPRAY, METERED RESPIRATORY (INHALATION) at 19:01

## 2023-01-09 RX ADMIN — ENOXAPARIN SODIUM 40 MG: 100 INJECTION SUBCUTANEOUS at 09:35

## 2023-01-09 NOTE — PROGRESS NOTES
Pt can not be discharged require a PCR that we are doing. They have not accepted her yet per Case management.  Dr. Camp Gamma office notified

## 2023-01-09 NOTE — PROGRESS NOTES
Transition of Care Plan:     RUR:17%     Disposition:Return to [address] [place] [address]     Follow up appointments: To be done by facility. DME needed:None     Transportation at [address] [place] [address] Street or means to access home:   Family has keys        Medicare Letter: To be given prior to discharge. Is patient a  and connected with the [place]? No                If yes, was [employer] transfer form completed and [facility] notified? N/A     Caregiver Contact:Daughter     Discharge Caregiver contacted prior to discharge? Caregiver to be contacted prior to discharge. Left several messages and calls for [employer] to return call. Faxed updated medicals to them at [phone] (given by the  )with confirmation . Awaiting results of pcr covid that was done today. Char Fong  RN BSN CRM        [phone]

## 2023-01-09 NOTE — PROGRESS NOTES
EP/ ARRHYTHMIA    Patient ID:  Patient: Kayleen Enriquez  MRN: [id number]  Age: 80 y.o.  : 10/9/1933    Date of  Admission: 2022  2:55 PM   PCP:  Jame Moncada MD    Assessment:   Paroxysmal atrial fibrillation with rapid ventricular response. History of GIB in the past, not on and will not be offered chronic anticoagulation. Chronic LBBB. History of PVC's. Normal EF by echo in 2018. Hyperlipidemia. YAMILKA, improving. SBO s/p partial small bowel resection this admission. History of prior episodes. Peripheral neuropathy. Falls risk. Ovarian cancer s/p chemo. Full code. Plan:     Continue propafenone  mg po TID. Will keep this dose for her going forward and as an OP. Rate control as able. Not a chronic anticoagulation candidate. OK for discharge from a cardiology standpoint. [x]       High complexity decision making was performed in this patient at high risk for decompensation    Kayleen Enriquez is a 80 y.o. female with a history of SBO now s/p surgery. Currently, she denies chest pain. No syncope, dizziness. She did have palpitations earlier when in Afib. No TIA or stroke symptoms. Here, she was treated with IV amiodarone before returning to sinus rhythm. She has had paroxysms of Afib here. Today, no chest pain, syncope, dizziness, palpitations. No orthopnea, PND, or worsening edema. No TIA or stroke symptoms.   Ate breakfast.      No Known Allergies       Current Facility-Administered Medications   Medication Dose Route Frequency    albuterol-ipratropium (DUO-NEB) 2.5 MG-0.5 MG/3 ML  3 mL Nebulization Q6H PRN    guaiFENesin-dextromethorphan (ROBITUSSIN DM) 100-10 mg/5 mL syrup 5 mL  5 mL Oral Q6H PRN    [Held by provider] furosemide (LASIX) tablet 20 mg  20 mg Oral DAILY    metoprolol (LOPRESSOR) injection 2.5 mg  2.5 mg IntraVENous Q4H PRN    propafenone (RYTHMOL) tablet 150 mg  150 mg Oral Q8H    guaiFENesin ER (MUCINEX) tablet 600 mg 600 mg Oral Q12H    enoxaparin (LOVENOX) injection 40 mg  40 mg SubCUTAneous Q24H    0.9% sodium chloride infusion 250 mL  250 mL IntraVENous PRN    oxyCODONE IR (ROXICODONE) tablet 5 mg  5 mg Oral Q6H PRN    gabapentin (NEURONTIN) capsule 800 mg  800 mg Oral QID    pantoprazole (PROTONIX) tablet 40 mg  40 mg Oral ACB    sodium chloride (NS) flush 5-10 mL  5-10 mL IntraVENous PRN    acetaminophen (TYLENOL) tablet 650 mg  650 mg Oral Q4H PRN    morphine injection 2 mg  2 mg IntraVENous Q3H PRN    melatonin tablet 3 mg  3 mg Oral QHS PRN    ondansetron (ZOFRAN) injection 4 mg  4 mg IntraVENous Q6H PRN    alcohol 62% (NOZIN) nasal  1 Ampule  1 Ampule Topical Q12H    trimethobenzamide (TIGAN) injection 200 mg  200 mg IntraMUSCular Q6H PRN    [Held by provider] mylanta/viscous lidocaine (GI COCKTAIL)  40 mL Oral BID    magnesium oxide (MAG-OX) tablet 400 mg  400 mg Oral DAILY    potassium chloride SR (KLOR-CON 10) tablet 10 mEq  10 mEq Oral DAILY    calcium carbonate (OS-GRUPO) tablet 500 mg [elemental]  500 mg Oral DAILY    atorvastatin (LIPITOR) tablet 20 mg  20 mg Oral DAILY       Review of Symptoms:  Respiratory: negative for SOB, cough, sputum production, wheezing, LEVINE, pleuritic pain   Cardiology: negative for chest pain, palpitations, orthopnea, PND, edema, syncope   Genitourinary: negative for frequency, urgency, dysuria, hematuria, incontinence        Objective:      Physical Exam:  Temp (24hrs), Av.9 °F (37.2 °C), Min:98.5 °F (36.9 °C), Max:99.1 °F (37.3 °C)    Patient Vitals for the past 8 hrs:   Pulse   23 0733 74   23 0513 89   23 0256 78      Patient Vitals for the past 8 hrs:   Resp   23 0733 19   23 0256 20      Patient Vitals for the past 8 hrs:   BP   23 0733 (!) 141/91   23 0513 118/64   23 0256 (!) 109/47          Intake/Output Summary (Last 24 hours) at 2023 0843  Last data filed at 2023 0413  Gross per 24 hour   Intake 240 ml Output 150 ml   Net 90 ml         Nondiaphoretic, not in acute distress. Unlabored, CTAB, symmetric air movement. REGULAR rate and rhythm, no new murmur. No peripheral edema. Palpable radial pulses bilaterally. Abdomen, soft, nontender, nondistended. Extremities without cyanosis or clubbing. Muscle tone and bulk normal for age. Skin warm and dry. No rashes or ulcers. Neuro grossly nonfocal.  No tremor. Awake and appropriate. CARDIOGRAPHICS and STUDIES, I reviewed:    Telemetry:  SR.    ECG:  Reviewed. Labs:  No results for input(s): CPK, CKMB, CKNDX, TROIQ in the last 72 hours. No lab exists for component: CPKMB  Lab Results   Component Value Date/Time    Cholesterol, total 138 07/20/2018 12:00 AM    HDL Cholesterol 46 07/20/2018 12:00 AM    LDL, calculated 66 07/20/2018 12:00 AM    Triglyceride 132 07/20/2018 12:00 AM    CHOL/HDL Ratio 3.2 09/20/2010 10:16 AM     No results for input(s): INR, PTP, APTT, INREXT, INREXT in the last 72 hours. Recent Labs     01/09/23  0308 01/08/23  0314 01/07/23  0315    139 140   K 4.1 3.9 3.7    105 106   CO2 31 30 29   BUN 14 17 18   CREA 0.55 0.53* 0.66   * 123* 131*   CA 8.9 8.7 8.6   WBC 7.9 6.6 6.4   HGB 8.3* 8.1* 8.3*   HCT 28.6* 28.1* 28.4*    208 185       No results for input(s): AP, TBIL, TP, ALB, GLOB, GGT, AML, LPSE in the last 72 hours. No lab exists for component: SGOT, GPT, AMYP, HLPSE  No components found for: GLPOC  No results for input(s): PH, PCO2, PO2 in the last 72 hours.         Nishant Mcghee MD  1/9/2023

## 2023-01-09 NOTE — DISCHARGE INSTRUCTIONS
Open Bowel Resection: What to Expect at [address] [place] [address] are likely to have pain that comes and goes for the next few days after bowel surgery. You may have bowel cramps, and your cut (incision) may hurt. You may also feel like you have the flu. You may have a low fever and feel tired and nauseated. This is common. You should feel better after a week and will probably be back to normal in 2 to 3 weeks. This care sheet gives you a general idea about how long it will take for you to recover. But each person recovers at a different pace. Follow the steps below to get better as quickly as possible. How can you care for yourself at home? Activity  Rest when you feel tired. Getting enough sleep will help you recover. Try to walk each day. Start by walking a little more than you did the day before. Bit by bit, increase the amount you walk. Walking boosts blood flow and helps prevent pneumonia and constipation. Avoid strenuous activities, such as biking, jogging, weight lifting, or aerobic exercise, until your doctor says it is okay. Ask your doctor when you can drive again. You will probably need to take 3 to 4 weeks off from work. It depends on the type of work you do and how you feel. You may need to take off 4 to 6 weeks if you lift heavy objects in your job. You may shower 24 to 48 hours after surgery, if your doctor says it is okay. Pat the cut (incision) dry. Do not take a bath for the first 2 weeks, or until your doctor tells you it is okay. Ask your doctor when it is okay for you to have sex. Diet  You may not have much appetite after the surgery. But try to eat a healthy diet. Your doctor will tell you about any foods you should not eat. Eat a low-fiber diet for several weeks after surgery. Eat many small meals throughout the day. Add high-fiber foods a little at a time. Eat yogurt. It puts good bacteria into your colon and helps prevent diarrhea.   Try to avoid nuts, seeds, and corn for a while. They may be hard to digest.  You may need to take vitamins that contain sodium and potassium. Ask your doctor. Drink plenty of fluids to avoid becoming dehydrated. Medicines  Your doctor will tell you if and when you can restart your medicines. He or she will also give you instructions about taking any new medicines. If you take blood thinners, such as warfarin (Coumadin), clopidogrel (Plavix), or aspirin, be sure to talk to your doctor. He or she will tell you if and when to start taking those medicines again. Make sure that you understand exactly what your doctor wants you to do. Take pain medicines exactly as directed. If the doctor gave you a prescription medicine for pain, take it as prescribed. If you are not taking a prescription pain medicine, ask your doctor if you can take an over-the-counter medicine. Do not take two or more pain medicines at the same time unless the doctor told you to. Many pain medicines have acetaminophen, which is Tylenol. Too much acetaminophen (Tylenol) can be harmful. If you think your pain medicine is making you sick to your stomach: Take your medicine after meals (unless your doctor tells you not to). Ask your doctor for a different pain medicine. If your doctor prescribed antibiotics, take them as directed. Do not stop taking them just because you feel better. You need to take the full course of antibiotics. You may need to take some medicines in a different form. You will be told whether to crush pills or take a liquid form of the medicine. If your doctor gives you a stool softener, take it as directed. Incision care  If you have strips of tape on the incision, leave the tape on for a week or until it falls off. Wash the area daily with warm, soapy water, and pat it dry. Follow-up care is a key part of your treatment and safety. Be sure to make and go to all appointments, and call your doctor if you are having problems.  It's also a good idea to know your test results and keep a list of the medicines you take. When should you call for help? Call 911 anytime you think you may need emergency care. For example, call if:  You passed out (lost consciousness). You have sudden chest pain and shortness of breath, or you cough up blood. You have severe pain in your belly. Call your doctor now or seek immediate medical care if:  You are sick to your stomach and cannot drink fluids or keep them down. You have signs of a blood clot, such as:  Pain in your calf, back of the knee, thigh, or groin. Redness and swelling in your leg or groin. You have a lot of diarrhea that smells very bad. You have trouble passing urine or stool, especially if you have mild pain or swelling in your lower belly. You have signs of infection, such as: Increased pain, swelling, warmth, or redness. Red streaks leading from the incision. Pus draining from the incision. A fever. You have pain that does not get better after you take pain medicine. You have loose stitches, or your incision comes open. You are bleeding or have new drainage from the incision. Watch closely for any changes in your health, and be sure to contact your doctor if:  You do not have a bowel movement after taking a laxative. You do not get better as expected. Where can you learn more? Go to http://www.gray.com/. Enter 618 [id number] in the search box to learn more about \"Open Bowel Resection: What to Expect at Home. \"  Current as of: August 9, 2016  Content Version: 11.3  © 6700-3749 [employer]. Care instructions adapted under license by [employer] (which disclaims liability or warranty for this information). If you have questions about a medical condition or this instruction, always ask your healthcare professional. [address] any warranty or liability for your use of this information.   MyChart Activation    Thank you for requesting access to Appetite+. Please follow the instructions below to securely access and download your online medical record. Appetite+ allows you to send messages to your doctor, view your test results, renew your prescriptions, schedule appointments, and more. How Do I Sign Up? In your internet browser, go to [email or web address]  Click on the First Time User? Click Here link in the Sign In box. You will be redirect to the New Member Sign Up page. Enter your Appetite+ Access Code exactly as it appears below. You will not need to use this code after youve completed the sign-up process. If you do not sign up before the expiration date, you must request a new code. Appetite+ Access Code: [email or web address]  Expires: 2023 11:55 AM (This is the date your Appetite+ access code will )    Enter the last four digits of your Social Security Number (xxxx) and Date of Birth (mm/dd/yyyy) as indicated and click Submit. You will be taken to the next sign-up page. Create a Appetite+ ID. This will be your Appetite+ login ID and cannot be changed, so think of one that is secure and easy to remember. Create a Appetite+ password. You can change your password at any time. Enter your Password Reset Question and Answer. This can be used at a later time if you forget your password. Enter your e-mail address. You will receive e-mail notification when new information is available in [address] Click Sign Up. You can now view and download portions of your medical record. Click the [employer] link to download a portable copy of your medical information. Additional Information    If you have questions, please visit the Frequently Asked Questions section of the Appetite+ website at [email or web address] Remember, Appetite+ is NOT to be used for urgent needs. For medical emergencies, dial 911.

## 2023-01-09 NOTE — PROGRESS NOTES
Rehan [employer] and needs pcr covid. Nurhailey aware. PCR  covid results not back as of this time. [place] to get back with me. Patient unable to be discharged today and mdo aware by nursing. Char Fong  RN BSN CRM        [phone]

## 2023-01-09 NOTE — PROGRESS NOTES
Hospitalist Progress Note    Subjective:   Daily Progress Note: 1/9/2023 4:39 PM    Hospital Course:  Pt admitted abdominal pain, nausea and vomiting. CT abdomen/pelvis showing recurrent and persistent small bowel obstruction. She underwent a small bowel resection, short segment. She has history of atrial fibrillation, GERD and CAD. Subjective: Pt seen in room,. NAD. Up in chair.     Discussed with RN  Current Facility-Administered Medications   Medication Dose Route Frequency    albuterol-ipratropium (DUO-NEB) 2.5 MG-0.5 MG/3 ML  3 mL Nebulization Q6H PRN    guaiFENesin-dextromethorphan (ROBITUSSIN DM) 100-10 mg/5 mL syrup 5 mL  5 mL Oral Q6H PRN    [Held by provider] furosemide (LASIX) tablet 20 mg  20 mg Oral DAILY    metoprolol (LOPRESSOR) injection 2.5 mg  2.5 mg IntraVENous Q4H PRN    propafenone (RYTHMOL) tablet 150 mg  150 mg Oral Q8H    guaiFENesin ER (MUCINEX) tablet 600 mg  600 mg Oral Q12H    enoxaparin (LOVENOX) injection 40 mg  40 mg SubCUTAneous Q24H    0.9% sodium chloride infusion 250 mL  250 mL IntraVENous PRN    oxyCODONE IR (ROXICODONE) tablet 5 mg  5 mg Oral Q6H PRN    gabapentin (NEURONTIN) capsule 800 mg  800 mg Oral QID    pantoprazole (PROTONIX) tablet 40 mg  40 mg Oral ACB    sodium chloride (NS) flush 5-10 mL  5-10 mL IntraVENous PRN    acetaminophen (TYLENOL) tablet 650 mg  650 mg Oral Q4H PRN    morphine injection 2 mg  2 mg IntraVENous Q3H PRN    melatonin tablet 3 mg  3 mg Oral QHS PRN    ondansetron (ZOFRAN) injection 4 mg  4 mg IntraVENous Q6H PRN    alcohol 62% (NOZIN) nasal  1 Ampule  1 Ampule Topical Q12H    trimethobenzamide (TIGAN) injection 200 mg  200 mg IntraMUSCular Q6H PRN    [Held by provider] mylanta/viscous lidocaine (GI COCKTAIL)  40 mL Oral BID    magnesium oxide (MAG-OX) tablet 400 mg  400 mg Oral DAILY    potassium chloride SR (KLOR-CON 10) tablet 10 mEq  10 mEq Oral DAILY    calcium carbonate (OS-GRUPO) tablet 500 mg [elemental]  500 mg Oral DAILY atorvastatin (LIPITOR) tablet 20 mg  20 mg Oral DAILY        Review of Systems:    Review of Systems   Constitutional:  Positive for malaise/fatigue. Respiratory:  Negative for cough and shortness of breath. Cardiovascular:  Negative for chest pain and palpitations. Gastrointestinal:  Positive for heartburn and nausea. Genitourinary:  Negative for dysuria and urgency. Neurological:  Negative for headaches. Objective:     Visit Vitals  BP (!) 138/55 (BP 1 Location: Right upper arm, BP Patient Position: Lying)   Pulse 71   Temp 98.7 °F (37.1 °C)   Resp 19   Ht 5' 1\" (1.549 m)   Wt 66.6 kg (146 lb 13.2 oz)   SpO2 95%   BMI 27.74 kg/m²    O2 Flow Rate (L/min): 1 l/min O2 Device: None (Room air)    Temp (24hrs), Av.8 °F (37.1 °C), Min:98.5 °F (36.9 °C), Max:99.1 °F (37.3 °C)      701 - 1900  In: -   Out: 650 [Urine:650]  1901 -  07  In: 56 [P.O.:490]  Out: 530 [Urine:530]    PHYSICAL EXAM:    Physical Exam  Constitutional:       General: She is not in acute distress. Cardiovascular:      Rate and Rhythm: Tachycardia present. Rhythm irregular. Heart sounds: Murmur heard. Pulmonary:      Effort: Pulmonary effort is normal.      Breath sounds: Normal breath sounds. Abdominal:      General: There is distension. Comments: Firm, hypo bowels sounds. Musculoskeletal:         General: Normal range of motion. Skin:     General: Skin is warm and dry. Comments: Midline drsg intact, no drainage   Neurological:      Mental Status: She is oriented to person, place, and time.         Data Review    Recent Results (from the past 24 hour(s))   CBC WITH AUTOMATED DIFF    Collection Time: 23  3:08 AM   Result Value Ref Range    WBC 7.9 3.6 - 11.0 K/uL    RBC 3.54 (L) 3.80 - 5.20 M/uL    HGB 8.3 (L) 11.5 - 16.0 g/dL    HCT 28.6 (L) 35.0 - 47.0 %    MCV 80.8 80.0 - 99.0 FL    MCH 23.4 (L) 26.0 - 34.0 PG    MCHC 29.0 (L) 30.0 - 36.5 g/dL    RDW 16.9 (H) 11.5 - 14.5 % PLATELET 675 048 - 237 K/uL    MPV 9.9 8.9 - 12.9 FL    NRBC 0.0 0  WBC    ABSOLUTE NRBC 0.00 0.00 - 0.01 K/uL    NEUTROPHILS 66 32 - 75 %    LYMPHOCYTES 19 12 - 49 %    MONOCYTES 11 5 - 13 %    EOSINOPHILS 3 0 - 7 %    BASOPHILS 1 0 - 1 %    IMMATURE GRANULOCYTES 0 0.0 - 0.5 %    ABS. NEUTROPHILS 5.2 1.8 - 8.0 K/UL    ABS. LYMPHOCYTES 1.5 0.8 - 3.5 K/UL    ABS. MONOCYTES 0.9 0.0 - 1.0 K/UL    ABS. EOSINOPHILS 0.2 0.0 - 0.4 K/UL    ABS. BASOPHILS 0.1 0.0 - 0.1 K/UL    ABS. IMM. GRANS. 0.0 0.00 - 0.04 K/UL    DF AUTOMATED      RBC COMMENTS NORMOCYTIC, NORMOCHROMIC     METABOLIC PANEL, BASIC    Collection Time: 01/09/23  3:08 AM   Result Value Ref Range    Sodium 137 136 - 145 mmol/L    Potassium 4.1 3.5 - 5.1 mmol/L    Chloride 101 97 - 108 mmol/L    CO2 31 21 - 32 mmol/L    Anion gap 5 5 - 15 mmol/L    Glucose 114 (H) 65 - 100 mg/dL    BUN 14 6 - 20 MG/DL    Creatinine 0.55 0.55 - 1.02 MG/DL    BUN/Creatinine ratio 25 (H) 12 - 20      eGFR >60 >60 ml/min/1.73m2    Calcium 8.9 8.5 - 10.1 MG/DL       XR CHEST PORT   Final Result   No change. XR CHEST PORT   Final Result      Nasogastric tube appears to be in satisfactory position. Unchanged bibasilar   atelectasis with small right pleural effusion. XR CHEST PORT   Final Result      Bilateral lower lobe atelectasis. XR ABD FLAT/ ERECT   Final Result   Unchanged mild diffuse small bowel distention. CT ABD PELV W CONT   Final Result   1. Persistent/recurrent SBO, of uncertain etiology. 2. Stable question of left renal mass. Active Problems:    SBO (small bowel obstruction) ([address] (2/27/2019)      Assessment/Plan:   Recurrent SBO- s/p small bowel resection- POD # 4, on CL, general surgery following  -Patient was taken for exploratory laparotomy 01/03 with finding of no intra-abdominal abnormalities, no anastomotic leak, no signs of peritonitis. She was found to have mild ileus. NG tube removed 1/4. Tolerate po intake. Pt/ot, dispo planning      Rxhfqj-yuuhbple-scojvcetn IV abx. Patient seems improved. Leukocytosis resolved. Acute kidney injury, pre-renal, now resolved. Normocytic anemia/thrombocytopenia, suspect secondary to sepsis, exacerbation also related to fluid hydration. S/p 1 unit prbc on 1/5. Hgb stable. Trend h/h. Nausea/vomiting- zofran prn. Atrial fibrillation with RVR-improved- Now off drip. Cont'  rythmol, titrate dose up to TID. Prn BB. Cardiology following, appreciate recommendations    Appears pt is being discharged by primary team today, will sign off. Pt is partial code, only wants vasoactive drips, antiarrhythmic drips, no chest compressions, no defibrillation, no intubation, discussed with pt and daughter.          DVT Prophylaxis: SCD  Code Status:  Partial Code  POA: NOK daughter Elder Arthur     Care Plan discussed with:   __________patient, staff nurse, CM, family_____________________________________________________    Xiang Schrader MD

## 2023-01-09 NOTE — PROGRESS NOTES
Bedside and Verbal shift change report given to Abigail (oncoming nurse) by John Fritz (offgoing nurse). Report included the following information SBAR, Kardex, Procedure Summary, Intake/Output, and Recent Results. 2215: Patient continues to have very congested cough, no productive. Scheduled Mucinex given, respiratory therapist at bedside to assess. Patient with no wheezing, no breathing treatment given at this time.  Patient oxygen saturations on room air at 88-89%, patient placed on 1 liter nasal cannula while sleeping

## 2023-01-09 NOTE — DISCHARGE SUMMARY
Post- Surgical Discharge Summary    Patient ID:  Kayleen Enriquez  [id number]  female  80 y.o.  10/9/1933    Admit date: 12/29/2022    Discharge date: 01/09/23     Admitting Physician: Eduardo Page DO     Consulting Physician(s):   Treatment Team: Attending Provider: Eboni Youngblood MD; Nurse Practitioner: Power Smith, RN; Nurse Practitioner: Pillo Mathis, NP; Consulting Provider: Hayley Bearden MD; Consulting Provider: Robina Espinosa MD; Care Manager: Nicole Solis, RN; Primary Nurse: Alexander Ramirez, RN; Primary Nurse: Fatmata Jaeger, RN; Physical Therapist: Delmy Jim PT    Date of Procedure: 12/31/2022      Pre-Op Diagnosis: sbo     Post-Op Diagnosis: Same as preoperative diagnosis. Procedure(s):  SMALL BOWEL RESECTION     Surgeon(s):  Benedicto Chao MD    Date of Surgery:   1/2/2023     Preoperative Diagnosis:  possible sepsis status post small bowel obstruction    Postoperative Diagnosis:   possible sepsis    Procedure(s):  LAPAROTOMY EXPLORATORY     Anesthesia Type:   General     Surgeon: Benedicto hCao MD                            HPI:  Pt is a 80 y.o. female who has a history of SBO who presents at this time for a acute care monitoring and treatment.     Problem List:   Problem List as of 1/9/2023 Date Reviewed: 12/31/2022            Codes Class Noted - Resolved    Radiculopathy (Chronic) ICD-10-CM: M54.10  ICD-9-CM: 729.2  10/5/2022 - Present        Hematemesis ICD-10-CM: K92.0  ICD-9-CM: 578.0  10/1/2022 - Present        Epigastric pain ICD-10-CM: R10.13  ICD-9-CM: 789.06  8/9/2022 - Present        Coffee ground emesis ICD-10-CM: K92.0  ICD-9-CM: 578.0  8/9/2022 - Present        Esophageal dysphagia ICD-10-CM: R13.19  ICD-9-CM: 787.29  11/13/2020 - Present        Abnormal x-ray of abdomen ICD-10-CM: R93.5  ICD-9-CM: 793.6  11/13/2020 - Present    Overview Signed 11/13/2020  8:37 AM by Kelley Andino MD     Bas showed decreased peristalsis and a cervical web               History of gastric ulcer ICD-10-CM: Z87.11  ICD-9-CM: V12.79  10/5/2020 - Present        Upper GI bleed ICD-10-CM: K92.2  ICD-9-CM: 578.9  7/1/2020 - Present        Atrial fibrillation with rapid ventricular response (HCC) ICD-10-CM: I48.91  ICD-9-CM: 427.31  7/1/2020 - Present        Small bowel obstruction ([facility] ICD-10-CM: O04.559  ICD-9-CM: 560.9  2/27/2019 - Present        SBO (small bowel obstruction) ([facility] ICD-10-CM: Z27.641  ICD-9-CM: 560.9  2/27/2019 - Present        Back pain ICD-10-CM: M54.9  ICD-9-CM: 724.5  11/1/2018 - Present        Ovarian cancer ([facility] ICD-10-CM: C56.9  ICD-9-CM: 183.0  7/19/2017 - Present        Insomnia ICD-10-CM: G47.00  ICD-9-CM: 780.52  7/19/2017 - Present        Essential hypertension ICD-10-CM: I10  ICD-9-CM: 401.9  7/19/2017 - Present        Hypomagnesemia ICD-10-CM: E83.42  ICD-9-CM: 275.2  7/19/2017 - Present        Hypokalemia ICD-10-CM: E87.6  ICD-9-CM: 276.8  7/19/2017 - Present        Mixed hyperlipidemia ICD-10-CM: E78.2  ICD-9-CM: 272.2  7/19/2017 - Present        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  ICD-9-CM: 737.34  7/19/2017 - Present        Foot drop, bilateral ICD-10-CM: M21.371, M21.372  ICD-9-CM: 736.79  7/19/2017 - Present        Age-related cataract of both eyes ICD-10-CM: H25.9  ICD-9-CM: 366.10  7/19/2017 - Present        Previous back surgery ICD-10-CM: Z98.890  ICD-9-CM: V45.89  7/19/2017 - Present        H/O total knee replacement ICD-10-CM: Z96.659  ICD-9-CM: V43.65  7/19/2017 - Present    Overview Signed 7/19/2017  4:04 PM by Minna Giordano NP     left             History of replacement of both shoulder joints ICD-10-CM: X20.341, [id number]  ICD-9-CM: V43.61  7/19/2017 - Present        Neuropathy ICD-10-CM: G62.9  ICD-9-CM: 355.9  7/19/2017 - Present        Lewis's esophagus without dysplasia ICD-10-CM: K22.70  ICD-9-CM: 530.85  6/6/2016 - Present        Lewis's esophagus ICD-10-CM: K22.70  ICD-9-CM: 530.85  6/6/2016 - Present Overview Signed 6/6/2016  1:06 PM by Jann Smith MD     FINAL PATHOLOGIC DIAGNOSIS  GE junction, biopsy:  Ulcerative esophagitis  Focal (rare gland) specialized intestinal epithelium compatible with Lewis's; negative for dysplasia  No fungal or viral inclusions             Diarrhea ICD-10-CM: R19.7  ICD-9-CM: 787.91  6/6/2016 - Present        Gastric ulcer ICD-10-CM: K25.9  ICD-9-CM: 531.90  6/14/2012 - Present    Overview Signed 6/14/2012 11:53 AM by Jann Smith MD     1/2012             GERD (gastroesophageal reflux disease) ICD-10-CM: K21.9  ICD-9-CM: 530.81  1/26/2012 - Present            Hospital Course:  Patient was managed conservatively with bowel rest but did not improve as expected. The patient underwent surgery. Intra-operative complications: None; patient tolerated the procedure well. Was taken to the PACU in stable condition and then transferred to the surgical floor. On post-op day 3 the patient was declining systemically and there was concern for intrabdominal pathology so she returned to the OR for exploration which was negative. Pathology is final. No additional intervention needed. Perioperative Antibiotics: Cefazolin    Postoperative Pain Management:  Oxycodone    Postoperative transfusions:    Number of units banked PRBCs =   none     Post Op complications: None    Incisions - clean, dry and intact. No significant erythema or swelling. Wound(s) appear to be healing without any evidence of infection. Patient mobilized with nursing and was found to be safe and steady with ambulation. Discharged to: [id number] [facility]    Condition on Discharge: Stable     Discharge instructions:    - Take pain medications as prescribed  - Diet Regular  - Discharge activity:    - Activity as tolerated    - Ambulate several times a day   - No heavy lifting for 4 weeks   - Do not drive for two weeks or while on opioid pain medications  - Wound Care: Keep wound(s) clean and dry.  See discharge instruction sheet. Allergies:  No Known Allergies           -DISCHARGE MEDICATION LIST     Current Discharge Medication List        CONTINUE these medications which have NOT CHANGED    Details   gabapentin (NEURONTIN) 800 mg tablet Take 1 Tablet by mouth two (2) times a day. Max Daily Amount: 1,600 mg. Qty: 360 Tablet, Refills: 3    Associated Diagnoses: Radiculopathy, unspecified spinal region      diclofenac (VOLTAREN) 1 % gel Apply 2 g to affected area four (4) times daily. simvastatin (ZOCOR) 40 mg tablet TAKE 1 TABLET BY MOUTH  DAILY  Qty: 90 Tablet, Refills: 3    Comments: Requesting 1 year supply  Associated Diagnoses: Mixed hyperlipidemia      furosemide (LASIX) 20 mg tablet Take 20 mg by mouth daily. CALCIUM PO Take 1 Tab by mouth daily. polyethylene glycol 3350 (MIRALAX PO) Take 1 Each by mouth daily as needed. acetaminophen (TYLENOL) 500 mg tablet Take 1,000 mg by mouth every six (6) hours as needed for Pain.      propafenone (RYTHMOL) 150 mg tablet Take 1 Tab by mouth two (2) times a day. Qty: 60 Tab, Refills: 2      RABEprazole (ACIPHEX) 20 mg tablet Take 1 Tab by mouth two (2) times a day. Qty: 60 Tab, Refills: 3      potassium chloride SR (KLOR-CON 10) 10 mEq tablet Take 1 Tab by mouth daily. Qty: 30 Tab, Refills: 3      magnesium oxide (MAG-OX) 400 mg tablet Take 250 mg by mouth daily. famotidine (PEPCID) 20 mg tablet Take 20 mg by mouth daily. cholecalciferol (VITAMIN D3) 1,000 unit tablet Take 1,000 Units by mouth daily. multivitamin (ONE A DAY) tablet Take 1 Tab by mouth daily. per medical continuation form      -Follow up in office in 1 week for staple removal      Signed:  Linnette Willis. Pricilla Gannon  MSN, APRN, FNP-C, [facility]  Surgical Nurse Practitioner    1/9/2023  9:20 AM    Addendum:    Discharge delayed due to need for PCR COVID test. This was done and is negative. No clinical changes and patient remains ready for d/c.      Steven Jacobson NP 1/10/2023  9:47 AM

## 2023-01-10 VITALS
HEIGHT: 61 IN | TEMPERATURE: 97.6 F | BODY MASS INDEX: 27.89 KG/M2 | WEIGHT: 147.71 LBS | OXYGEN SATURATION: 91 % | DIASTOLIC BLOOD PRESSURE: 50 MMHG | SYSTOLIC BLOOD PRESSURE: 92 MMHG | RESPIRATION RATE: 17 BRPM | HEART RATE: 69 BPM

## 2023-01-10 LAB
ANION GAP SERPL CALC-SCNC: 5 MMOL/L (ref 5–15)
BASOPHILS # BLD: 0 K/UL (ref 0–0.1)
BASOPHILS NFR BLD: 1 % (ref 0–1)
BUN SERPL-MCNC: 12 MG/DL (ref 6–20)
BUN/CREAT SERPL: 18 (ref 12–20)
CALCIUM SERPL-MCNC: 7.5 MG/DL (ref 8.5–10.1)
CHLORIDE SERPL-SCNC: 109 MMOL/L (ref 97–108)
CO2 SERPL-SCNC: 26 MMOL/L (ref 21–32)
CREAT SERPL-MCNC: 0.68 MG/DL (ref 0.55–1.02)
DIFFERENTIAL METHOD BLD: ABNORMAL
EOSINOPHIL # BLD: 0.1 K/UL (ref 0–0.4)
EOSINOPHIL NFR BLD: 3 % (ref 0–7)
ERYTHROCYTE [DISTWIDTH] IN BLOOD BY AUTOMATED COUNT: 18.6 % (ref 11.5–14.5)
GLUCOSE SERPL-MCNC: 90 MG/DL (ref 65–100)
HCT VFR BLD AUTO: 33.2 % (ref 35–47)
HGB BLD-MCNC: 10.4 G/DL (ref 11.5–16)
IMM GRANULOCYTES # BLD AUTO: 0 K/UL (ref 0–0.04)
IMM GRANULOCYTES NFR BLD AUTO: 1 % (ref 0–0.5)
LYMPHOCYTES # BLD: 1.1 K/UL (ref 0.8–3.5)
LYMPHOCYTES NFR BLD: 25 % (ref 12–49)
MCH RBC QN AUTO: 30.2 PG (ref 26–34)
MCHC RBC AUTO-ENTMCNC: 31.3 G/DL (ref 30–36.5)
MCV RBC AUTO: 96.5 FL (ref 80–99)
MONOCYTES # BLD: 0.4 K/UL (ref 0–1)
MONOCYTES NFR BLD: 10 % (ref 5–13)
NEUTS SEG # BLD: 2.7 K/UL (ref 1.8–8)
NEUTS SEG NFR BLD: 60 % (ref 32–75)
NRBC # BLD: 0 K/UL (ref 0–0.01)
NRBC BLD-RTO: 0 PER 100 WBC
PLATELET # BLD AUTO: 98 K/UL (ref 150–400)
PMV BLD AUTO: 10.6 FL (ref 8.9–12.9)
POTASSIUM SERPL-SCNC: 3.7 MMOL/L (ref 3.5–5.1)
RBC # BLD AUTO: 3.44 M/UL (ref 3.8–5.2)
RBC MORPH BLD: ABNORMAL
SODIUM SERPL-SCNC: 140 MMOL/L (ref 136–145)
WBC # BLD AUTO: 4.3 K/UL (ref 3.6–11)

## 2023-01-10 PROCEDURE — 36415 COLL VENOUS BLD VENIPUNCTURE: CPT

## 2023-01-10 PROCEDURE — 74011250637 HC RX REV CODE- 250/637: Performed by: NURSE PRACTITIONER

## 2023-01-10 PROCEDURE — 74011250637 HC RX REV CODE- 250/637: Performed by: STUDENT IN AN ORGANIZED HEALTH CARE EDUCATION/TRAINING PROGRAM

## 2023-01-10 PROCEDURE — 74011250636 HC RX REV CODE- 250/636: Performed by: SURGERY

## 2023-01-10 PROCEDURE — 85025 COMPLETE CBC W/AUTO DIFF WBC: CPT

## 2023-01-10 PROCEDURE — 77010033678 HC OXYGEN DAILY

## 2023-01-10 PROCEDURE — 74011250637 HC RX REV CODE- 250/637: Performed by: INTERNAL MEDICINE

## 2023-01-10 PROCEDURE — 80048 BASIC METABOLIC PNL TOTAL CA: CPT

## 2023-01-10 RX ADMIN — GUAIFENESIN 600 MG: 600 TABLET, EXTENDED RELEASE ORAL at 09:33

## 2023-01-10 RX ADMIN — ATORVASTATIN CALCIUM 20 MG: 20 TABLET, FILM COATED ORAL at 09:33

## 2023-01-10 RX ADMIN — PROPAFENONE HYDROCHLORIDE 150 MG: 150 TABLET, FILM COATED ORAL at 05:41

## 2023-01-10 RX ADMIN — POTASSIUM CHLORIDE 10 MEQ: 750 TABLET, FILM COATED, EXTENDED RELEASE ORAL at 09:33

## 2023-01-10 RX ADMIN — MAGNESIUM OXIDE 400 MG (241.3 MG MAGNESIUM) TABLET 400 MG: TABLET at 09:32

## 2023-01-10 RX ADMIN — Medication 1 AMPULE: at 09:33

## 2023-01-10 RX ADMIN — IPRATROPIUM BROMIDE AND ALBUTEROL 1 PUFF: 20; 100 SPRAY, METERED RESPIRATORY (INHALATION) at 09:35

## 2023-01-10 RX ADMIN — PANTOPRAZOLE SODIUM 40 MG: 40 TABLET, DELAYED RELEASE ORAL at 09:32

## 2023-01-10 RX ADMIN — CALCIUM 500 MG: 500 TABLET ORAL at 09:32

## 2023-01-10 RX ADMIN — GABAPENTIN 800 MG: 300 CAPSULE ORAL at 09:32

## 2023-01-10 RX ADMIN — ENOXAPARIN SODIUM 40 MG: 100 INJECTION SUBCUTANEOUS at 09:32

## 2023-01-10 RX ADMIN — PROPAFENONE HYDROCHLORIDE 150 MG: 150 TABLET, FILM COATED ORAL at 14:06

## 2023-01-10 NOTE — PROGRESS NOTES
Pt discharged to The facility she was at before Only change in med was made by Dr Carolina Astorga to increase her Rhymol to 3 times a day.   Notified attending MD

## 2023-01-10 NOTE — PROGRESS NOTES
End of Shift Note    Bedside shift change report given to Adele (oncoming nurse) by Diane Finch RN (offgoing nurse). Report included the following information SBAR, Kardex, Procedure Summary, Intake/Output, MAR, and Cardiac Rhythm NSR 1st degree AVB BBB    Shift worked:7-7 7-7       Shift summary and any significant changes:     Pt has had problems with secretions , spoke to MD relayed family questions, tested for PCR for placement. C&DB, OOB for several hours. Encourage IS. Gave pt inhaler, Mucinex and robitussin. It seems to be upper airway. Concerns for physician to address:  Constant cough and congestion     Zone phone for oncoming shift:          Activity:  Activity Level: Up with Assistance, Logroll  Number times ambulated in hallways past shift: 0  Number of times OOB to chair past shift: 1    Cardiac:   Cardiac Monitoring: Yes      Cardiac Rhythm: Sinus Rhythm, BBB, 1\" AV Block    Access:  Current line(s): PIV     Genitourinary:   Urinary status: external catheter    Respiratory:   O2 Device: Nasal cannula  Chronic home O2 use?: NO  Incentive spirometer at bedside: YES  Actual Volume (ml): 750 ml    GI:  Last Bowel Movement Date: 01/08/23  Current diet:  ADULT DIET Dysphagia - Soft & Bite Sized  ADULT ORAL NUTRITION SUPPLEMENT Breakfast, Dinner; Low Calorie/High Protein  Passing flatus: NO  Tolerating current diet: YES       Pain Management:   Patient states pain is manageable on current regimen: YES    Skin:  Cristino Score: 16  Interventions: turn team, float heels, increase time out of bed, foam dressing, PT/OT consult, internal/external urinary devices, and nutritional support     Patient Safety:  Fall Score:  Total Score: 3  Interventions: bed/chair alarm, assistive device (walker, cane, etc), gripper socks, pt to call before getting OOB, and stay with me (per policy)  High Fall Risk: Yes    Length of Stay:  Expected LOS: 9d 9h  Actual LOS: [address], RN

## 2023-01-10 NOTE — PROGRESS NOTES
Transition of Care Plan:Patient is being discharged today and her daughter is here to transport. RUR:17%     Disposition:Return to [facility]. Follow up appointments: To be done by facility. DME needed:None     Transportation at [address] [place] [address] or means to access home:   Family has keys        Medicare Letter: Given on 1/10/23     Is patient a [place] and connected with the [place]? No                If yes, was [employer] transfer form completed and [facility] notified? N/A     Caregiver Contact:Daughter     Discharge Caregiver contacted prior to discharge? Caregiver in patient's room. Covid results faxed to [facility] with confirmation. Spoke with liason and they can accept patient today. . Daughter will be transporting patient and facility wants them to arrive no earlier than 1400pm today. Informed daughter and patient. Patient will be going to the healthcare side of [facility] and family is fine with that. Nursing to call report to [phone] and patient will be going to room C212. Char Fong RN BSN CRM        [phone]

## 2023-01-10 NOTE — PROGRESS NOTES
Discharge reviewed in detail with opportunity to ask questions.  Daughter to transport [facility]

## 2023-01-10 NOTE — PROGRESS NOTES
Call placed to Elisa Strauss and Dr Radha Stevens, re Pt actually take Rhymol 3 times a day . Pt was helped  by Combi vent Respimat 20mcg-100mcg  Q 6 hours. The facility say they need a prescription for gabapentin.

## 2023-01-10 NOTE — PROGRESS NOTES
Problem: Falls - Risk of  Goal: *Absence of Falls  Description: Document Amy Henao Fall Risk and appropriate interventions in the flowsheet. Outcome: Progressing Towards Goal  Note: Fall Risk Interventions:  Mobility Interventions: Bed/chair exit alarm         Medication Interventions: Bed/chair exit alarm    Elimination Interventions: Bed/chair exit alarm, Call light in reach              Problem: Patient Education: Go to Patient Education Activity  Goal: Patient/Family Education  Outcome: Progressing Towards Goal     Problem: Pressure Injury - Risk of  Goal: *Prevention of pressure injury  Description: Document Cristino Scale and appropriate interventions in the flowsheet.   Outcome: Progressing Towards Goal  Note: Pressure Injury Interventions:  Sensory Interventions: Assess changes in LOC, Assess need for specialty bed    Moisture Interventions: Apply protective barrier, creams and emollients    Activity Interventions: PT/OT evaluation, Increase time out of bed    Mobility Interventions: HOB 30 degrees or less    Nutrition Interventions: Document food/fluid/supplement intake    Friction and Shear Interventions: Minimize layers, Apply protective barrier, creams and emollients, HOB 30 degrees or less, Lift sheet, Sit at 90-degree angle

## 2023-01-11 NOTE — PROGRESS NOTES
Postop Progress Note    Subjective    Cheri Torres presents to the office for postop follow up. Pt is a 81 yo female sp Dec 31, 2022 Dr Sonya Ling laparotomy and small bowel resection for SBO and pathology with     Small bowel, resection:        Segment of small bowel with mild ischemic-type changes and serosal   adhesions   No dysplasia or carcinoma identified   Margins appear histologically viable     On Juan C 3, 3023 or thereabouts, pt had decline in medical recovery and decision was made to proceed with abdomen re exploration for potential intrabdomen sepsis, but laparotomy neg. Pt in FU Jan 22, 2023. Patient accompanied by her daughter who works in the medical field. Patient is slowly regaining her strength still is in rehab, eating well good bowel function no other concerns. Objective    Visit Vitals  BP (!) 110/54 (BP 1 Location: Left upper arm, BP Patient Position: Sitting, BP Cuff Size: Adult)   Pulse 60   Temp 97.7 °F (36.5 °C) (Temporal)   Resp 24   Ht 5' (1.524 m)   Wt 66.2 kg (146 lb)   SpO2 93%   BMI 28.51 kg/m²     General: alert, cooperative and no distress  Incision: healing well, I have removed all of the skin staples in her midline incision no evidence of complications or infection or dehiscence and abdomen nontender. Assessment  Doing well postoperatively. Plan  1. Continue any current medications  2. Wound care discussed  3. Pt is to increase activities as tolerated  4. Usual diet  5.  Follow up: as needed, skin staples removed no Steri-Strips needed at this point, patient to increase activities as tolerated and follow-up as needed    Electronically signed by Lupis Jarvis MD on 1/19/2023 at 2:43 PM

## 2023-01-19 ENCOUNTER — OFFICE VISIT (OUTPATIENT)
Dept: SURGERY | Age: 88
End: 2023-01-19
Payer: MEDICARE

## 2023-01-19 VITALS
OXYGEN SATURATION: 93 % | DIASTOLIC BLOOD PRESSURE: 54 MMHG | WEIGHT: 146 LBS | BODY MASS INDEX: 28.66 KG/M2 | TEMPERATURE: 97.7 F | SYSTOLIC BLOOD PRESSURE: 110 MMHG | HEART RATE: 60 BPM | HEIGHT: 60 IN | RESPIRATION RATE: 24 BRPM

## 2023-01-19 DIAGNOSIS — Z09 POSTOPERATIVE EXAMINATION: Primary | ICD-10-CM

## 2023-01-19 PROCEDURE — 99024 POSTOP FOLLOW-UP VISIT: CPT | Performed by: SURGERY

## 2023-01-19 RX ORDER — CARBOXYMETHYLCELLULOSE SODIUM 5 MG/ML
SOLUTION/ DROPS OPHTHALMIC
COMMUNITY
Start: 2023-01-11

## 2023-01-19 RX ORDER — BRIMONIDINE TARTRATE 1.5 MG/ML
SOLUTION/ DROPS OPHTHALMIC
COMMUNITY
Start: 2023-01-11

## 2023-01-19 NOTE — PROGRESS NOTES
Identified pt with two pt identifiers(name and ). Reviewed record in preparation for visit and have obtained necessary documentation. All patient medications has been reviewed. Chief Complaint   Patient presents with    Surgical Follow-up     S/P Exploratory laparotomy. 23       Health Maintenance Due   Topic    Depression Screen     COVID-19 Vaccine (1)    Shingles Vaccine (1 of 2)    Pneumococcal 65+ years (1 - PCV)    DTaP/Tdap/Td series (1 - Tdap)    Medicare Yearly Exam     Lipid Screen     Flu Vaccine (1)       Vitals:    23 1341   BP: (!) 110/54   Pulse: 60   Resp: 24   Temp: 97.7 °F (36.5 °C)   TempSrc: Temporal   SpO2: 93%   Height: 5' (1.524 m)   PainSc:   0 - No pain       4. Have you been to the ER, urgent care clinic since your last visit? Hospitalized since your last visit? No    5. Have you seen or consulted any other health care providers outside of the [address] [place] [address] since your last visit? Include any pap smears or colon screening. No      Patient is accompanied by self I have received verbal consent from Nikolas Solis to discuss any/all medical information while they are present in the room.

## 2023-04-21 NOTE — PROGRESS NOTES
[facility] Medicine Services  PROGRESS NOTE    Patient Name: Angel Blair  : 1975  MRN: [id number]    Date of Admission: 2023  Primary Care Physician: Jay Nevarez MD    Subjective   Subjective     CC: f/u CHF    HPI: Up in bed resting comfortably. Still urinating well. Breathing improving.    ROS:  Gen- No fevers, chills  CV- No chest pain, palpitations  Resp- No cough, dyspnea  GI- No N/V/D, abd pain     Objective   Objective     Vital Signs:   Temp:  [97.9 °F (36.6 °C)-99.5 °F (37.5 °C)] 98.8 °F (37.1 °C)  Heart Rate:  [78-94] 79  Resp:  [16] 16  BP: (130-183)/(78-99) 141/78     Physical Exam:  Constitutional: No acute distress, awake, alert, lying in bed  HENT: NCAT, mucous membranes moist, CPAP  Respiratory: Clear to auscultation bilaterally, respiratory effort normal   Cardiovascular: RRR, no murmurs, rubs, or gallops  Gastrointestinal: Positive bowel sounds, soft, nontender, nondistended, obese abd  Musculoskeletal: No bilateral ankle edema  Psychiatric: Appropriate affect, cooperative  Neurologic: Oriented x 3, strength symmetric in all extremities, Cranial Nerves grossly intact to confrontation, speech clear  Skin: No rashes    Results Reviewed:  LAB RESULTS:      Lab 23  0432 23  1151 23  0527 23  0138 23  2308 23  2151   WBC 8.87  --  9.97  --   --  10.62   HEMOGLOBIN 9.8*  --  9.3*  --   --  10.4*   HEMATOCRIT 30.7*  --  29.9*  --   --  32.8*   PLATELETS 150  --  135*  --   --  153   NEUTROS ABS  --   --  7.43*  --   --  7.79*   IMMATURE GRANS (ABS)  --   --  0.04  --   --  0.05   LYMPHS ABS  --   --  1.68  --   --  1.79   MONOS ABS  --   --  0.52  --   --  0.61   EOS ABS  --   --  0.23  --   --  0.29   MCV 90.6  --  93.4  --   --  91.1   PROCALCITONIN  --   --   --  0.24  --   --    PROTIME  --   --  14.4  --  13.8  --    APTT  --   --   --   --  29.7*  --    HEPARIN ANTI-XA  --  0.15* 0.10*  --  0.10*  --         Hospitalist Progress Note    NAME: Salina Figueroa   :  10/9/1933   MRN:  [id number]       Assessment / Plan:  Upper GIB POA  Ulcerative esophagitis  -GI consulted appreciated, s/p EGD   -continue to hold [facility], discussion re long term resumption of AC (at low dose) ongoing. OP follow-up with GI, possible GI in 2 months to eval healing of ulcerations   -am cbc  -Hb stable 8.9. no hematemesis/n/v. No other signs of active bleeding    Esophagus:   +  NG tube in place with dark material (mixture of clot and food debris at GE junction)    ++ Friability and slight oozing at GE junction with linear ulceration s/p Single clip placement. There was also LA Grade B ulcerative esophagitis in the lower 1/3 of esophagus. We removed NG tube. +  4 cm Hiatal hernia  Stomach:   + Dark clot mixed with food particles in stomach but NO Ulcers seen in stomach. Mild erythema but no active bleeding. Duodenum:   -   NORMAL bulb and 2nd portion. CT abdomen and pelvis with contrast showed:  1. Equivocal source of bleeding in posterior aspect of proximal gastric body,  although This could represent ingested material. Ultimately, endoscopic  correlation may be necessary. 2. Small-moderate hiatal hernia. 3. Moderate retained fecal material in rectum and distal sigmoid colon.   4. Bilateral renal cysts     A. fib with RVR  Currently resolved with IV fluid bolus, Eliquis because of GI bleed   Rate currently around 97  -appreciate cardiology consult  -not watchman or ablation candidate d/t need of temporary anticoagulation in both cases  -note plan for propafenone to suppress Afib in light of inability to anticoagulate     Leucocytosis, most likely reactive to GI bleed  -resolved     Hypokalemia k 2.8  S/p IVK in ED   Repeat BMP tomorrow am   Check mg      Hypertension  BP meds on hold  Chronic back pain  -cont gabapentin    Hopefully DC with [facility] tomorrow or soon    Code Status: DNR, discussed with the patient and daughter at   Lab 04/20/23  0432 04/19/23  0527 04/18/23 2151   SODIUM 142 142 144   POTASSIUM 4.7 4.4 4.8   CHLORIDE 110* 113* 114*   CO2 19.0* 17.0* 18.0*   ANION GAP 13.0 12.0 12.0   BUN 85* 83* 88*   CREATININE 5.47* 5.88* 5.74*   EGFR 12.2* 11.1* 11.5*   GLUCOSE 146* 110* 111*   CALCIUM 8.7 8.8 9.0   MAGNESIUM 1.7 1.4*  --    HEMOGLOBIN A1C  --  6.40*  --          Lab 04/18/23 2151   TOTAL PROTEIN 7.4   ALBUMIN 3.2*   GLOBULIN 4.2   ALT (SGPT) 20   AST (SGOT) 30   BILIRUBIN 0.2   ALK PHOS 110         Lab 04/19/23  0527 04/19/23  0138 04/18/23  2308 04/18/23 2151   PROBNP  --   --   --  11,594.0*   HSTROP T  --  170*  --  177*   PROTIME 14.4  --  13.8  --    INR 1.12  --  1.07  --          Lab 04/19/23  0527   CHOLESTEROL 210*   LDL CHOL 140*   HDL CHOL 44   TRIGLYCERIDES 143         Lab 04/21/23  0758   IRON 17*   IRON SATURATION 7*   TIBC 243*   TRANSFERRIN 163*   FERRITIN 362.80         Brief Urine Lab Results  (Last result in the past 365 days)      Color   Clarity   Blood   Leuk Est   Nitrite   Protein   CREAT   Urine HCG        01/23/23 2223             34.4               Microbiology Results Abnormal     Procedure Component Value - Date/Time    COVID PRE-OP / PRE-PROCEDURE SCREENING ORDER (NO ISOLATION) - Swab, Nasopharynx [[id number]]  (Normal) Collected: 04/18/23 2152    Lab Status: Final result Specimen: Swab from Nasopharynx Updated: 04/18/23 2224    Narrative:      The following orders were created for panel order COVID PRE-OP / PRE-PROCEDURE SCREENING ORDER (NO ISOLATION) - Swab, Nasopharynx.  Procedure                               Abnormality         Status                     ---------                               -----------         ------                     COVID-19 and FLU A/B PCR...[[id number]]  Normal              Final result                 Please view results for these tests on the individual orders.    COVID-19 and FLU A/B PCR - Swab, Nasopharynx [[id number]]  (Normal) Collected: 04/18/23 1523     bedside  Surrogate Decision Maker:     Subjective:     Chief Complaint / Reason for Physician Visit  Patient resting in bed, no distress. Discussed care with daughter also present at bedside    Discussed with RN events overnight. Review of Systems:  Symptom Y/N Comments  Symptom Y/N Comments   Fever/Chills n   Chest Pain n    Poor Appetite n   Edema     Cough n   Abdominal Pain n    Sputum n   Joint Pain     SOB/LEVINE n   Pruritis/Rash     Nausea/vomit n   Tolerating PT/OT     Diarrhea n   Tolerating Diet     Constipation n   Other       Could NOT obtain due to:      Objective:     VITALS:   Last 24hrs VS reviewed since prior progress note. Most recent are:  Patient Vitals for the past 24 hrs:   Temp Pulse Resp BP SpO2   07/03/20 1936 97.5 °F (36.4 °C) 64 18 158/63 97 %   07/03/20 1507 98.1 °F (36.7 °C) (!) 56 14 139/82 97 %   07/03/20 1104 98 °F (36.7 °C) 68 16 106/66 98 %   07/03/20 0739 97.7 °F (36.5 °C) 62 14 137/68 96 %   07/03/20 0626 -- 62 -- -- --   07/03/20 0432 97.9 °F (36.6 °C) 63 20 143/67 99 %   07/02/20 2311 98 °F (36.7 °C) 67 18 160/70 92 %       Intake/Output Summary (Last 24 hours) at 7/3/2020 2002  Last data filed at 7/3/2020 1946  Gross per 24 hour   Intake --   Output 3050 ml   Net -3050 ml        PHYSICAL EXAM:  General: WD, WN. Alert, cooperative, no acute distress    EENT:  EOMI. Anicteric sclerae. MMM  Resp:  CTA bilaterally, no wheezing or rales. No accessory muscle use  CV:  Regular  rhythm,  No edema  GI:  Soft, Non distended, Non tender.  +Bowel sounds  Neurologic:  Alert and oriented X 3, normal speech,   Psych:   Good insight. Not anxious nor agitated  Skin:  No rashes.   No jaundice    Reviewed most current lab test results and cultures  YES  Reviewed most current radiology test results   YES  Review and summation of old records today    NO  Reviewed patient's current orders and MAR    YES  PMH/SH reviewed - no change compared to H&P  ________________________________________________________________________  Care Plan discussed with:    Comments   Patient x    Family  x daughter   RN x    Care Manager     Consultant                        Multidiciplinary team rounds were held today with , nursing, pharmacist and clinical coordinator. Patient's plan of care was discussed; medications were reviewed and discharge planning was addressed. ________________________________________________________________________  Total NON critical care TIME: 35   Minutes    Total CRITICAL CARE TIME Spent:   Minutes non procedure based      Comments   >50% of visit spent in counseling and coordination of care x    ________________________________________________________________________  Reggie Lombard, DO     Procedures: see electronic medical records for all procedures/Xrays and details which were not copied into this note but were reviewed prior to creation of Plan. LABS:  I reviewed today's most current labs and imaging studies. Pertinent labs include:  Recent Labs     07/03/20 0419 07/02/20  2256 07/02/20  1720 07/02/20  0335  07/01/20  1310   WBC 9.1  --   --  8.8  --  14.7*   HGB 8.9* 8.9* 8.0* 7.1*   < > 7.2*   HCT 31.4* 31.3* 28.1* 25.8*   < > 26.9*     --   --  256  --  316    < > = values in this interval not displayed.      Recent Labs     07/03/20 0419 07/02/20  0335 07/01/20  1310    139 137   K 3.1* 3.0* 2.8*   * 106 102   CO2 28 27 27   * 109* 142*   BUN 9 22* 29*   CREA 0.69 0.70 0.77   CA 8.5 8.4* 8.9   MG  --  1.0*  --    ALB  --   --  3.4*   TBILI  --   --  0.4   ALT  --   --  15   INR  --  1.1 1.1       Signed: Reggie Lombard, DO Lab Status: Final result Specimen: Swab from Nasopharynx Updated: 04/18/23 2224     COVID19 Not Detected     Influenza A PCR Not Detected     Influenza B PCR Not Detected    Narrative:      Fact sheet for providers: https://www.fda.gov/media/067528/download    Fact sheet for patients: https://www.fda.gov/media/388797/download    Test performed by PCR.          No radiology results from the last 24 hrs    Results for orders placed during the hospital encounter of 01/22/23    Adult Transthoracic Echo Complete With Contrast if Necessary Per Protocol    Interpretation Summary  •  Left ventricular ejection fraction appears to be 51 - 55%.  •  Left ventricular wall thickness is consistent with mild to moderate concentric hypertrophy.  •  The cardiac valves are anatomically and functionally normal.      Current medications:  Scheduled Meds:atorvastatin, 40 mg, Oral, Nightly  bumetanide, 2 mg, Intravenous, Q12H  hydrALAZINE, 75 mg, Oral, Q8H  insulin lispro, 0-7 Units, Subcutaneous, TID AC  [START ON 4/22/2023] isosorbide mononitrate, 60 mg, Oral, Q24H  metoprolol tartrate, 50 mg, Oral, Q12H  NIFEdipine XL, 90 mg, Oral, Daily  pharmacy consult - MTM, , Does not apply, Daily  sodium chloride, 10 mL, Intravenous, Q12H      Continuous Infusions:   PRN Meds:.•  acetaminophen  •  benzonatate  •  Calcium Replacement - Follow Nurse / BPA Driven Protocol  •  cloNIDine  •  dextrose  •  dextrose  •  glucagon (human recombinant)  •  Magnesium Standard Dose Replacement - Follow Nurse / BPA Driven Protocol  •  Phosphorus Replacement - Follow Nurse / BPA Driven Protocol  •  Potassium Replacement - Follow Nurse / BPA Driven Protocol  •  sodium chloride  •  sodium chloride    Assessment & Plan   Assessment & Plan     Active Hospital Problems    Diagnosis  POA   • Acute on chronic systolic congestive heart failure [I50.23]  Yes   • Hypertensive urgency [I16.0]  Yes   • CKD (chronic kidney disease) stage 5, GFR less than 15 ml/min [N18.5]   Yes   • Elevated troponin [R77.8]  Yes   • Hyperlipidemia [E78.5]  Yes   • Essential hypertension [I10]  Yes   • Type 2 diabetes mellitus with hyperglycemia (HCC) [E11.65]  Yes      Resolved Hospital Problems   No resolved problems to display.        Brief Hospital Course to date:  Angel Blair is a 47 y.o. male with PMH significant for DM 2, HLD, HTN, LEFTY, obesity, CKD V, who presents to the ED with complaint of cough and shortness of breath who was found to have concern for elevated troponin with acute on chronic systolic congestive heart failure and hypertensive urgency.     Acute on chronic systolic congestive heart failure  - Patient has been out of his medication for the past month.  - Cardiology follows. Responding well to Bumex -3L 4/19. Is now on q12 IV dosing. BMP from am pending.  - Echo on 1/22/2023 notes EF 51-55%  - Continue metoprolol     Hypertensive urgency  Essential hypertension  - Patient has been out of home medication for the past month  - Now off cardene gtt. Clonidine added. Continue other home medications including metoprolol, nifedipine, hydralazine and Imdur  - Cardiology/NAL follows.     NSTEMI  Elevated troponin  - Likely NSTEMI type II secondary to hypertensive urgency and acute on chronic CHF but was started on heparin gtt on admission. Will continue for now until Cardiology evaluation   - Trend troponin (177 -> 170)   - EKG without acute changes   - Patient denies chest pain with the exception of when he coughs     CKD V  - Nephrology to evaluate today, patient was lost to follow-up since last discharge secondary to no insurance coverage. D/W CM today - apparently will have insurance coverage ~ 5/1.  - Dr. Herrera following, appears is planning for insertion of PD catheter in near future.     Dyslipidemia  - Continue Lipitor     DM2  - FSBG 3 times daily     Hypomagnesemia  -Replace    Expected Discharge Location and Transportation:   Expected Discharge   Expected Discharge  Date and Time     Expected Discharge Date Expected Discharge Time    Apr 24, 2023            DVT prophylaxis:  No DVT prophylaxis order currently exists.     AM-PAC 6 Clicks Score (PT): 17 (04/20/23 4452)    CODE STATUS:   Code Status and Medical Interventions:   Ordered at: 04/19/23 0323     Code Status (Patient has no pulse and is not breathing):    CPR (Attempt to Resuscitate)     Medical Interventions (Patient has pulse or is breathing):    Full Support       Isa Sullivan II, DO  04/21/23

## 2023-05-26 NOTE — PROGRESS NOTES
SURGERY PROGRESS NOTE      Admit Date: 2022    Procedure: Procedure(s):  LAPAROTOMY EXPLORATORY on     PROCEDURE PERFORMED:  Small bowel resection and lysis of adhesions on       Subjective:     Patient states she has more incisional pain this time. Denies nausea.     Objective:     Visit Vitals  BP (!) 116/47   Pulse 63   Temp 98.3 °F (36.8 °C)   Resp 23   Ht 5' 1\" (1.549 m)   Wt 65.3 kg (144 lb)   SpO2 96%   BMI 27.21 kg/m²        Temp (24hrs), Av.1 °F (36.7 °C), Min:97.8 °F (36.6 °C), Max:98.7 °F (37.1 °C)      701 -  1900  In: 500 [P.O.:500]  Out: -   1901 -  0700  In: 1500 [I.V.:1500]  Out: 2860 [Urine:950]    Physical Exam:    General:  alert, cooperative, no distress   Abdomen: soft, distended, tympanic, bowel sounds hypoactive, appropriately tender   Incision:   healing well, no drainage, no erythema, no hernia, no seroma, no swelling, no dehiscence, incision well approximated           Lab Results   Component Value Date/Time    WBC 6.2 2023 04:17 AM    HGB 6.6 (L) 2023 04:17 AM    HCT 23.8 (L) 2023 04:17 AM    Hematocrit (POC) 32 (L) 2020 10:36 AM    PLATELET 583  04:17 AM    MCV 79.9 (L) 2023 04:17 AM     Lab Results   Component Value Date/Time    GFR est non-AA 53 (L) 10/02/2022 04:46 AM    GFRNA, POC 59 (L) 2020 10:36 AM    GFR est AA >60 10/02/2022 04:46 AM    GFRAA, POC >60 2020 10:36 AM    Creatinine 0.89 2023 04:17 AM    Creatinine, POC 0.8 10/01/2022 01:34 PM    BUN 44 (H) 2023 04:17 AM    BUN (POC) 34 (H) 2020 10:36 AM    Sodium 142 2023 04:17 AM    Sodium (POC) 142 2020 10:36 AM    Sodium,  10/01/2022 01:34 PM    Potassium 3.6 2023 04:17 AM    Potassium (POC) 3.2 (L) 2020 10:36 AM    Potassium, POC 3.4 (L) 10/01/2022 01:34 PM    Chloride 107 2023 04:17 AM    Chloride,  10/01/2022 01:34 PM    CO2 27 2023 04:17 AM    Magnesium 2.4 2023 09:40 PM       Assessment:     Active Problems:    SBO (small bowel obstruction) ([facility] [address] (2/27/2019)  Improving after negative laparotomy     Plan:     - Transfuse today for Expected Acute blood loss post-op anemia  - Remove NGT as patient has been receiving a CLD  - Mobilize  - Await return of bowel function before advancing diet.      Marc Morris NP no

## 2023-11-21 ENCOUNTER — APPOINTMENT (OUTPATIENT)
Facility: HOSPITAL | Age: 88
DRG: 388 | End: 2023-11-21
Payer: MEDICARE

## 2023-11-21 ENCOUNTER — HOSPITAL ENCOUNTER (INPATIENT)
Facility: HOSPITAL | Age: 88
LOS: 6 days | Discharge: HOME | DRG: 388 | End: 2023-11-27
Attending: EMERGENCY MEDICINE | Admitting: SURGERY
Payer: MEDICARE

## 2023-11-21 DIAGNOSIS — K56.609 SBO (SMALL BOWEL OBSTRUCTION) (HCC): Primary | ICD-10-CM

## 2023-11-21 LAB
ALBUMIN SERPL-MCNC: 3.3 G/DL (ref 3.5–5)
ALBUMIN/GLOB SERPL: 0.7 (ref 1.1–2.2)
ALP SERPL-CCNC: 81 U/L (ref 45–117)
ALT SERPL-CCNC: 14 U/L (ref 12–78)
ANION GAP SERPL CALC-SCNC: 7 MMOL/L (ref 5–15)
AST SERPL-CCNC: 14 U/L (ref 15–37)
BASOPHILS # BLD: 0 K/UL (ref 0–0.1)
BASOPHILS NFR BLD: 0 % (ref 0–1)
BILIRUB SERPL-MCNC: 0.4 MG/DL (ref 0.2–1)
BUN SERPL-MCNC: 41 MG/DL (ref 6–20)
BUN/CREAT SERPL: 36 (ref 12–20)
CALCIUM SERPL-MCNC: 10.6 MG/DL (ref 8.5–10.1)
CHLORIDE SERPL-SCNC: 97 MMOL/L (ref 97–108)
CO2 SERPL-SCNC: 37 MMOL/L (ref 21–32)
CREAT SERPL-MCNC: 1.14 MG/DL (ref 0.55–1.02)
DIFFERENTIAL METHOD BLD: ABNORMAL
EKG ATRIAL RATE: 91 BPM
EKG DIAGNOSIS: NORMAL
EKG P AXIS: 56 DEGREES
EKG P-R INTERVAL: 208 MS
EKG Q-T INTERVAL: 386 MS
EKG QRS DURATION: 142 MS
EKG QTC CALCULATION (BAZETT): 474 MS
EKG R AXIS: -67 DEGREES
EKG T AXIS: 78 DEGREES
EKG VENTRICULAR RATE: 91 BPM
EOSINOPHIL # BLD: 0 K/UL (ref 0–0.4)
EOSINOPHIL NFR BLD: 0 % (ref 0–7)
ERYTHROCYTE [DISTWIDTH] IN BLOOD BY AUTOMATED COUNT: 17.2 % (ref 11.5–14.5)
GLOBULIN SER CALC-MCNC: 4.8 G/DL (ref 2–4)
GLUCOSE SERPL-MCNC: 151 MG/DL (ref 65–100)
HCT VFR BLD AUTO: 35.9 % (ref 35–47)
HGB BLD-MCNC: 10.5 G/DL (ref 11.5–16)
IMM GRANULOCYTES # BLD AUTO: 0 K/UL (ref 0–0.04)
IMM GRANULOCYTES NFR BLD AUTO: 0 % (ref 0–0.5)
LIPASE SERPL-CCNC: 57 U/L (ref 13–75)
LYMPHOCYTES # BLD: 0.7 K/UL (ref 0.8–3.5)
LYMPHOCYTES NFR BLD: 5 % (ref 12–49)
MCH RBC QN AUTO: 21.4 PG (ref 26–34)
MCHC RBC AUTO-ENTMCNC: 29.2 G/DL (ref 30–36.5)
MCV RBC AUTO: 73.3 FL (ref 80–99)
MONOCYTES # BLD: 0.8 K/UL (ref 0–1)
MONOCYTES NFR BLD: 6 % (ref 5–13)
NEUTS SEG # BLD: 12.1 K/UL (ref 1.8–8)
NEUTS SEG NFR BLD: 89 % (ref 32–75)
NRBC # BLD: 0 K/UL (ref 0–0.01)
NRBC BLD-RTO: 0 PER 100 WBC
PLATELET # BLD AUTO: 369 K/UL (ref 150–400)
PMV BLD AUTO: 10.3 FL (ref 8.9–12.9)
POTASSIUM SERPL-SCNC: 2.8 MMOL/L (ref 3.5–5.1)
PROT SERPL-MCNC: 8.1 G/DL (ref 6.4–8.2)
RBC # BLD AUTO: 4.9 M/UL (ref 3.8–5.2)
RBC MORPH BLD: ABNORMAL
SODIUM SERPL-SCNC: 141 MMOL/L (ref 136–145)
TROPONIN I SERPL HS-MCNC: 10 NG/L (ref 0–51)
WBC # BLD AUTO: 13.6 K/UL (ref 3.6–11)

## 2023-11-21 PROCEDURE — 71045 X-RAY EXAM CHEST 1 VIEW: CPT

## 2023-11-21 PROCEDURE — 74018 RADEX ABDOMEN 1 VIEW: CPT

## 2023-11-21 PROCEDURE — 85025 COMPLETE CBC W/AUTO DIFF WBC: CPT

## 2023-11-21 PROCEDURE — 6360000002 HC RX W HCPCS: Performed by: EMERGENCY MEDICINE

## 2023-11-21 PROCEDURE — 2580000003 HC RX 258: Performed by: EMERGENCY MEDICINE

## 2023-11-21 PROCEDURE — 36415 COLL VENOUS BLD VENIPUNCTURE: CPT

## 2023-11-21 PROCEDURE — 2500000003 HC RX 250 WO HCPCS: Performed by: SURGERY

## 2023-11-21 PROCEDURE — 6370000000 HC RX 637 (ALT 250 FOR IP): Performed by: EMERGENCY MEDICINE

## 2023-11-21 PROCEDURE — 96374 THER/PROPH/DIAG INJ IV PUSH: CPT

## 2023-11-21 PROCEDURE — 93005 ELECTROCARDIOGRAM TRACING: CPT | Performed by: EMERGENCY MEDICINE

## 2023-11-21 PROCEDURE — 6360000002 HC RX W HCPCS: Performed by: SURGERY

## 2023-11-21 PROCEDURE — 74177 CT ABD & PELVIS W/CONTRAST: CPT

## 2023-11-21 PROCEDURE — 6370000000 HC RX 637 (ALT 250 FOR IP): Performed by: SURGERY

## 2023-11-21 PROCEDURE — 83690 ASSAY OF LIPASE: CPT

## 2023-11-21 PROCEDURE — 6360000004 HC RX CONTRAST MEDICATION: Performed by: EMERGENCY MEDICINE

## 2023-11-21 PROCEDURE — 80053 COMPREHEN METABOLIC PANEL: CPT

## 2023-11-21 PROCEDURE — 84484 ASSAY OF TROPONIN QUANT: CPT

## 2023-11-21 PROCEDURE — 99285 EMERGENCY DEPT VISIT HI MDM: CPT

## 2023-11-21 PROCEDURE — 1100000000 HC RM PRIVATE

## 2023-11-21 PROCEDURE — 2580000003 HC RX 258: Performed by: SURGERY

## 2023-11-21 PROCEDURE — 99221 1ST HOSP IP/OBS SF/LOW 40: CPT | Performed by: SURGERY

## 2023-11-21 RX ORDER — POTASSIUM CHLORIDE 7.45 MG/ML
10 INJECTION INTRAVENOUS
Status: COMPLETED | OUTPATIENT
Start: 2023-11-21 | End: 2023-11-21

## 2023-11-21 RX ORDER — MORPHINE SULFATE 2 MG/ML
2 INJECTION, SOLUTION INTRAMUSCULAR; INTRAVENOUS
Status: DISCONTINUED | OUTPATIENT
Start: 2023-11-21 | End: 2023-11-26

## 2023-11-21 RX ORDER — BRIMONIDINE TARTRATE 2 MG/ML
1 SOLUTION/ DROPS OPHTHALMIC 2 TIMES DAILY
Status: DISCONTINUED | OUTPATIENT
Start: 2023-11-22 | End: 2023-11-27 | Stop reason: HOSPADM

## 2023-11-21 RX ORDER — ONDANSETRON 2 MG/ML
4 INJECTION INTRAMUSCULAR; INTRAVENOUS EVERY 6 HOURS PRN
Status: DISCONTINUED | OUTPATIENT
Start: 2023-11-21 | End: 2023-11-27 | Stop reason: HOSPADM

## 2023-11-21 RX ORDER — CARBOXYMETHYLCELLULOSE SODIUM 5 MG/ML
1 SOLUTION/ DROPS OPHTHALMIC 3 TIMES DAILY
Status: DISCONTINUED | OUTPATIENT
Start: 2023-11-21 | End: 2023-11-21 | Stop reason: CLARIF

## 2023-11-21 RX ORDER — DEXTROSE MONOHYDRATE, SODIUM CHLORIDE, AND POTASSIUM CHLORIDE 50; 1.49; 4.5 G/1000ML; G/1000ML; G/1000ML
INJECTION, SOLUTION INTRAVENOUS CONTINUOUS
Status: DISCONTINUED | OUTPATIENT
Start: 2023-11-21 | End: 2023-11-26 | Stop reason: ALTCHOICE

## 2023-11-21 RX ORDER — DEXTRAN 70, GLYCERIN, HYPROMELLOSE 1; 2; 3 MG/ML; MG/ML; MG/ML
1 SOLUTION/ DROPS OPHTHALMIC 3 TIMES DAILY
Status: DISCONTINUED | OUTPATIENT
Start: 2023-11-21 | End: 2023-11-27 | Stop reason: HOSPADM

## 2023-11-21 RX ORDER — LIDOCAINE HYDROCHLORIDE 20 MG/ML
JELLY TOPICAL PRN
Status: DISCONTINUED | OUTPATIENT
Start: 2023-11-21 | End: 2023-11-27 | Stop reason: HOSPADM

## 2023-11-21 RX ORDER — SODIUM CHLORIDE 0.9 % (FLUSH) 0.9 %
5-40 SYRINGE (ML) INJECTION PRN
Status: DISCONTINUED | OUTPATIENT
Start: 2023-11-21 | End: 2023-11-27 | Stop reason: HOSPADM

## 2023-11-21 RX ORDER — DEXTRAN 70, GLYCERIN, HYPROMELLOSE 1; 2; 3 MG/ML; MG/ML; MG/ML
1 SOLUTION/ DROPS OPHTHALMIC 3 TIMES DAILY
Status: DISCONTINUED | OUTPATIENT
Start: 2023-11-21 | End: 2023-11-21

## 2023-11-21 RX ORDER — GABAPENTIN 800 MG/1
800 TABLET ORAL 2 TIMES DAILY
Status: DISCONTINUED | OUTPATIENT
Start: 2023-11-21 | End: 2023-11-21 | Stop reason: CLARIF

## 2023-11-21 RX ORDER — PROPAFENONE HYDROCHLORIDE 150 MG/1
150 TABLET, COATED ORAL 2 TIMES DAILY
Status: DISCONTINUED | OUTPATIENT
Start: 2023-11-22 | End: 2023-11-25

## 2023-11-21 RX ORDER — ENOXAPARIN SODIUM 100 MG/ML
30 INJECTION SUBCUTANEOUS DAILY
Status: DISCONTINUED | OUTPATIENT
Start: 2023-11-21 | End: 2023-11-27

## 2023-11-21 RX ORDER — PROPAFENONE HYDROCHLORIDE 150 MG/1
150 TABLET, COATED ORAL 2 TIMES DAILY
Status: DISCONTINUED | OUTPATIENT
Start: 2023-11-21 | End: 2023-11-21

## 2023-11-21 RX ORDER — METOCLOPRAMIDE HYDROCHLORIDE 5 MG/ML
10 INJECTION INTRAMUSCULAR; INTRAVENOUS ONCE
Status: COMPLETED | OUTPATIENT
Start: 2023-11-21 | End: 2023-11-21

## 2023-11-21 RX ORDER — 0.9 % SODIUM CHLORIDE 0.9 %
500 INTRAVENOUS SOLUTION INTRAVENOUS ONCE
Status: COMPLETED | OUTPATIENT
Start: 2023-11-21 | End: 2023-11-21

## 2023-11-21 RX ORDER — ONDANSETRON 4 MG/1
4 TABLET, ORALLY DISINTEGRATING ORAL EVERY 8 HOURS PRN
Status: DISCONTINUED | OUTPATIENT
Start: 2023-11-21 | End: 2023-11-27 | Stop reason: HOSPADM

## 2023-11-21 RX ORDER — HYDRALAZINE HYDROCHLORIDE 20 MG/ML
10 INJECTION INTRAMUSCULAR; INTRAVENOUS EVERY 6 HOURS PRN
Status: DISCONTINUED | OUTPATIENT
Start: 2023-11-21 | End: 2023-11-27 | Stop reason: HOSPADM

## 2023-11-21 RX ORDER — SODIUM CHLORIDE 0.9 % (FLUSH) 0.9 %
5-40 SYRINGE (ML) INJECTION EVERY 12 HOURS SCHEDULED
Status: DISCONTINUED | OUTPATIENT
Start: 2023-11-21 | End: 2023-11-27 | Stop reason: HOSPADM

## 2023-11-21 RX ORDER — LORAZEPAM 2 MG/ML
0.5 INJECTION INTRAMUSCULAR EVERY 6 HOURS PRN
Status: DISCONTINUED | OUTPATIENT
Start: 2023-11-21 | End: 2023-11-27 | Stop reason: HOSPADM

## 2023-11-21 RX ORDER — SODIUM CHLORIDE 9 MG/ML
INJECTION, SOLUTION INTRAVENOUS PRN
Status: DISCONTINUED | OUTPATIENT
Start: 2023-11-21 | End: 2023-11-27 | Stop reason: HOSPADM

## 2023-11-21 RX ADMIN — BENZOCAINE, BUTAMBEN, AND TETRACAINE HYDROCHLORIDE 1 SPRAY: .028; .004; .004 AEROSOL, SPRAY TOPICAL at 13:48

## 2023-11-21 RX ADMIN — POTASSIUM CHLORIDE 10 MEQ: 10 INJECTION, SOLUTION INTRAVENOUS at 12:33

## 2023-11-21 RX ADMIN — SODIUM CHLORIDE 500 ML: 9 INJECTION, SOLUTION INTRAVENOUS at 11:36

## 2023-11-21 RX ADMIN — METOCLOPRAMIDE 10 MG: 5 INJECTION, SOLUTION INTRAMUSCULAR; INTRAVENOUS at 11:36

## 2023-11-21 RX ADMIN — POTASSIUM CHLORIDE, DEXTROSE MONOHYDRATE AND SODIUM CHLORIDE: 150; 5; 450 INJECTION, SOLUTION INTRAVENOUS at 20:22

## 2023-11-21 RX ADMIN — ENOXAPARIN SODIUM 30 MG: 100 INJECTION SUBCUTANEOUS at 17:23

## 2023-11-21 RX ADMIN — GABAPENTIN 800 MG: 100 CAPSULE ORAL at 21:59

## 2023-11-21 RX ADMIN — GABAPENTIN 800 MG: 300 CAPSULE ORAL at 17:23

## 2023-11-21 RX ADMIN — SODIUM CHLORIDE, PRESERVATIVE FREE 10 ML: 5 INJECTION INTRAVENOUS at 22:26

## 2023-11-21 RX ADMIN — IOPAMIDOL 100 ML: 755 INJECTION, SOLUTION INTRAVENOUS at 12:47

## 2023-11-21 RX ADMIN — POTASSIUM CHLORIDE 10 MEQ: 10 INJECTION, SOLUTION INTRAVENOUS at 18:03

## 2023-11-21 RX ADMIN — DEXTRAN 70, GLYCERIN, HYPROMELLOSE 1 DROP: 1; 2; 3 SOLUTION/ DROPS OPHTHALMIC at 22:19

## 2023-11-21 RX ADMIN — PROPAFENONE HYDROCHLORIDE 150 MG: 150 TABLET, FILM COATED ORAL at 17:25

## 2023-11-21 RX ADMIN — LIDOCAINE HYDROCHLORIDE: 20 JELLY TOPICAL at 13:49

## 2023-11-21 ASSESSMENT — PAIN - FUNCTIONAL ASSESSMENT: PAIN_FUNCTIONAL_ASSESSMENT: 0-10

## 2023-11-21 ASSESSMENT — PAIN SCALES - GENERAL
PAINLEVEL_OUTOF10: 4
PAINLEVEL_OUTOF10: 0

## 2023-11-21 ASSESSMENT — PAIN DESCRIPTION - FREQUENCY: FREQUENCY: CONTINUOUS

## 2023-11-21 ASSESSMENT — PAIN DESCRIPTION - PAIN TYPE: TYPE: ACUTE PAIN

## 2023-11-21 ASSESSMENT — PAIN DESCRIPTION - LOCATION: LOCATION: ABDOMEN

## 2023-11-21 NOTE — ED TRIAGE NOTES
Patient presents to ED via EMS from [facility] complaining of abdominal pain with nausea and vomiting starting yesterday morning after eating an egg croissant, patient denies vomiting blood, just clear liquid, patient placed on 2 liters oxygen via nasal cannula and oxygen saturation up to 96%.  Patient alert and oriented x 3

## 2023-11-21 NOTE — ED PROVIDER NOTES
Height       Head Circumference       Peak Flow       Pain Score       Pain Loc       Pain Edu? Excl. in [address]? Physical Exam  Vitals and nursing note reviewed. Constitutional:       General: She is not in acute distress. Appearance: Normal appearance. She is not ill-appearing. Comments: Elderly female, appears nauseated, no active vomiting here, had been dry heaving at the time of arrival        HENT:      Head: Normocephalic and atraumatic. Mouth/Throat:      Mouth: Mucous membranes are moist.   Eyes:      General: No scleral icterus. Extraocular Movements: Extraocular movements intact. Pupils: Pupils are equal, round, and reactive to light. Cardiovascular:      Rate and Rhythm: Normal rate and regular rhythm. Pulmonary:      Effort: Pulmonary effort is normal. No respiratory distress. Breath sounds: Normal breath sounds. No stridor. No wheezing, rhonchi or rales. Abdominal:      General: There is no distension. Palpations: Abdomen is soft. Tenderness: There is no abdominal tenderness. Musculoskeletal:      Cervical back: Normal range of motion and neck supple. Right lower leg: No edema. Left lower leg: No edema. Skin:     General: Skin is warm and dry. Capillary Refill: Capillary refill takes less than 2 seconds. Neurological:      Mental Status: She is alert and oriented to person, place, and time. Cranial Nerves: No cranial nerve deficit.    Psychiatric:         Mood and Affect: Mood normal.         Behavior: Behavior normal.          DIAGNOSTIC RESULTS   LABS:     Recent Results (from the past 24 hour(s))   CBC with Auto Differential    Collection Time: 11/21/23 11:24 AM   Result Value Ref Range    WBC 13.6 (H) 3.6 - 11.0 K/uL    RBC 4.90 3.80 - 5.20 M/uL    Hemoglobin 10.5 (L) 11.5 - 16.0 g/dL    Hematocrit 35.9 35.0 - 47.0 %    MCV 73.3 (L) 80.0 - 99.0 FL    MCH 21.4 (L) 26.0 - 34.0 PG    MCHC 29.2 (L) 30.0 - 36.5 g/dL

## 2023-11-22 LAB
ANION GAP SERPL CALC-SCNC: 5 MMOL/L (ref 5–15)
BASOPHILS # BLD: 0.1 K/UL (ref 0–0.1)
BASOPHILS NFR BLD: 1 % (ref 0–1)
BUN SERPL-MCNC: 39 MG/DL (ref 6–20)
BUN/CREAT SERPL: 35 (ref 12–20)
CALCIUM SERPL-MCNC: 10.3 MG/DL (ref 8.5–10.1)
CHLORIDE SERPL-SCNC: 99 MMOL/L (ref 97–108)
CO2 SERPL-SCNC: 36 MMOL/L (ref 21–32)
CREAT SERPL-MCNC: 1.1 MG/DL (ref 0.55–1.02)
DIFFERENTIAL METHOD BLD: ABNORMAL
EOSINOPHIL # BLD: 0.1 K/UL (ref 0–0.4)
EOSINOPHIL NFR BLD: 1 % (ref 0–7)
ERYTHROCYTE [DISTWIDTH] IN BLOOD BY AUTOMATED COUNT: 17.2 % (ref 11.5–14.5)
GLUCOSE SERPL-MCNC: 133 MG/DL (ref 65–100)
HCT VFR BLD AUTO: 35.4 % (ref 35–47)
HGB BLD-MCNC: 10.2 G/DL (ref 11.5–16)
IMM GRANULOCYTES # BLD AUTO: 0 K/UL (ref 0–0.04)
IMM GRANULOCYTES NFR BLD AUTO: 0 % (ref 0–0.5)
LYMPHOCYTES # BLD: 1.3 K/UL (ref 0.8–3.5)
LYMPHOCYTES NFR BLD: 12 % (ref 12–49)
MAGNESIUM SERPL-MCNC: 1.8 MG/DL (ref 1.6–2.4)
MCH RBC QN AUTO: 21.5 PG (ref 26–34)
MCHC RBC AUTO-ENTMCNC: 28.8 G/DL (ref 30–36.5)
MCV RBC AUTO: 74.5 FL (ref 80–99)
MONOCYTES # BLD: 1.1 K/UL (ref 0–1)
MONOCYTES NFR BLD: 10 % (ref 5–13)
NEUTS SEG # BLD: 8.6 K/UL (ref 1.8–8)
NEUTS SEG NFR BLD: 76 % (ref 32–75)
NRBC # BLD: 0 K/UL (ref 0–0.01)
NRBC BLD-RTO: 0 PER 100 WBC
PLATELET # BLD AUTO: 305 K/UL (ref 150–400)
PMV BLD AUTO: 10.4 FL (ref 8.9–12.9)
POTASSIUM SERPL-SCNC: 2.8 MMOL/L (ref 3.5–5.1)
RBC # BLD AUTO: 4.75 M/UL (ref 3.8–5.2)
RBC MORPH BLD: ABNORMAL
SODIUM SERPL-SCNC: 140 MMOL/L (ref 136–145)
WBC # BLD AUTO: 11.2 K/UL (ref 3.6–11)

## 2023-11-22 PROCEDURE — 85025 COMPLETE CBC W/AUTO DIFF WBC: CPT

## 2023-11-22 PROCEDURE — 6370000000 HC RX 637 (ALT 250 FOR IP): Performed by: SURGERY

## 2023-11-22 PROCEDURE — 83735 ASSAY OF MAGNESIUM: CPT

## 2023-11-22 PROCEDURE — 2580000003 HC RX 258: Performed by: SURGERY

## 2023-11-22 PROCEDURE — 6360000004 HC RX CONTRAST MEDICATION: Performed by: SURGERY

## 2023-11-22 PROCEDURE — 80048 BASIC METABOLIC PNL TOTAL CA: CPT

## 2023-11-22 PROCEDURE — 99231 SBSQ HOSP IP/OBS SF/LOW 25: CPT | Performed by: SURGERY

## 2023-11-22 PROCEDURE — 36415 COLL VENOUS BLD VENIPUNCTURE: CPT

## 2023-11-22 PROCEDURE — 2500000003 HC RX 250 WO HCPCS: Performed by: SURGERY

## 2023-11-22 PROCEDURE — 94760 N-INVAS EAR/PLS OXIMETRY 1: CPT

## 2023-11-22 PROCEDURE — 1100000000 HC RM PRIVATE

## 2023-11-22 PROCEDURE — 2700000000 HC OXYGEN THERAPY PER DAY

## 2023-11-22 PROCEDURE — 6360000002 HC RX W HCPCS: Performed by: SURGERY

## 2023-11-22 RX ORDER — POTASSIUM CHLORIDE 7.45 MG/ML
10 INJECTION INTRAVENOUS
Status: COMPLETED | OUTPATIENT
Start: 2023-11-22 | End: 2023-11-22

## 2023-11-22 RX ORDER — HYDROMORPHONE HYDROCHLORIDE 2 MG/1
2 TABLET ORAL EVERY 8 HOURS PRN
COMMUNITY

## 2023-11-22 RX ORDER — POLYETHYLENE GLYCOL 3350 17 G/17G
17 POWDER, FOR SOLUTION ORAL DAILY PRN
COMMUNITY

## 2023-11-22 RX ORDER — PHENOL 1.4 %
1 AEROSOL, SPRAY (ML) MUCOUS MEMBRANE 2 TIMES DAILY PRN
COMMUNITY

## 2023-11-22 RX ORDER — ZOLPIDEM TARTRATE 5 MG/1
5 TABLET ORAL NIGHTLY PRN
COMMUNITY

## 2023-11-22 RX ORDER — METOPROLOL TARTRATE 1 MG/ML
5 INJECTION, SOLUTION INTRAVENOUS EVERY 6 HOURS PRN
Status: DISCONTINUED | OUTPATIENT
Start: 2023-11-22 | End: 2023-11-27 | Stop reason: HOSPADM

## 2023-11-22 RX ORDER — LIDOCAINE 50 MG/G
1 PATCH TOPICAL DAILY
COMMUNITY

## 2023-11-22 RX ORDER — LORATADINE 10 MG/1
10 TABLET ORAL DAILY
COMMUNITY

## 2023-11-22 RX ORDER — DORZOLAMIDE HYDROCHLORIDE AND TIMOLOL MALEATE 20; 5 MG/ML; MG/ML
1 SOLUTION/ DROPS OPHTHALMIC 2 TIMES DAILY
COMMUNITY

## 2023-11-22 RX ADMIN — LORAZEPAM 0.5 MG: 2 INJECTION INTRAMUSCULAR; INTRAVENOUS at 00:27

## 2023-11-22 RX ADMIN — DEXTRAN 70, GLYCERIN, HYPROMELLOSE 1 DROP: 1; 2; 3 SOLUTION/ DROPS OPHTHALMIC at 20:53

## 2023-11-22 RX ADMIN — SODIUM CHLORIDE, PRESERVATIVE FREE 10 ML: 5 INJECTION INTRAVENOUS at 08:52

## 2023-11-22 RX ADMIN — POTASSIUM CHLORIDE 10 MEQ: 7.46 INJECTION, SOLUTION INTRAVENOUS at 08:43

## 2023-11-22 RX ADMIN — BRIMONIDINE TARTRATE 1 DROP: 2 SOLUTION OPHTHALMIC at 08:45

## 2023-11-22 RX ADMIN — PROPAFENONE HYDROCHLORIDE 150 MG: 150 TABLET, FILM COATED ORAL at 08:49

## 2023-11-22 RX ADMIN — POTASSIUM CHLORIDE, DEXTROSE MONOHYDRATE AND SODIUM CHLORIDE: 150; 5; 450 INJECTION, SOLUTION INTRAVENOUS at 21:46

## 2023-11-22 RX ADMIN — GABAPENTIN 800 MG: 100 CAPSULE ORAL at 20:55

## 2023-11-22 RX ADMIN — SODIUM CHLORIDE, PRESERVATIVE FREE 10 ML: 5 INJECTION INTRAVENOUS at 21:34

## 2023-11-22 RX ADMIN — PROPAFENONE HYDROCHLORIDE 150 MG: 150 TABLET, FILM COATED ORAL at 20:55

## 2023-11-22 RX ADMIN — POTASSIUM CHLORIDE 10 MEQ: 7.46 INJECTION, SOLUTION INTRAVENOUS at 11:50

## 2023-11-22 RX ADMIN — MORPHINE SULFATE 2 MG: 2 INJECTION, SOLUTION INTRAMUSCULAR; INTRAVENOUS at 08:49

## 2023-11-22 RX ADMIN — GABAPENTIN 800 MG: 100 CAPSULE ORAL at 17:30

## 2023-11-22 RX ADMIN — GABAPENTIN 800 MG: 100 CAPSULE ORAL at 13:00

## 2023-11-22 RX ADMIN — ENOXAPARIN SODIUM 30 MG: 100 INJECTION SUBCUTANEOUS at 08:49

## 2023-11-22 RX ADMIN — GABAPENTIN 800 MG: 100 CAPSULE ORAL at 08:49

## 2023-11-22 RX ADMIN — DEXTRAN 70, GLYCERIN, HYPROMELLOSE 1 DROP: 1; 2; 3 SOLUTION/ DROPS OPHTHALMIC at 08:50

## 2023-11-22 RX ADMIN — POTASSIUM CHLORIDE 10 MEQ: 7.46 INJECTION, SOLUTION INTRAVENOUS at 11:00

## 2023-11-22 RX ADMIN — POTASSIUM CHLORIDE 10 MEQ: 7.46 INJECTION, SOLUTION INTRAVENOUS at 10:00

## 2023-11-22 RX ADMIN — POTASSIUM CHLORIDE, DEXTROSE MONOHYDRATE AND SODIUM CHLORIDE: 150; 5; 450 INJECTION, SOLUTION INTRAVENOUS at 08:42

## 2023-11-22 RX ADMIN — DEXTRAN 70, GLYCERIN, HYPROMELLOSE 1 DROP: 1; 2; 3 SOLUTION/ DROPS OPHTHALMIC at 15:00

## 2023-11-22 RX ADMIN — DIATRIZOATE MEGLUMINE AND DIATRIZOATE SODIUM 80 ML: 660; 100 LIQUID ORAL; RECTAL at 08:51

## 2023-11-22 RX ADMIN — BRIMONIDINE TARTRATE 1 DROP: 2 SOLUTION OPHTHALMIC at 20:54

## 2023-11-22 ASSESSMENT — PAIN DESCRIPTION - ONSET
ONSET: GRADUAL
ONSET: GRADUAL

## 2023-11-22 ASSESSMENT — PAIN DESCRIPTION - PAIN TYPE
TYPE: ACUTE PAIN
TYPE: ACUTE PAIN

## 2023-11-22 ASSESSMENT — PAIN DESCRIPTION - ORIENTATION
ORIENTATION: ANTERIOR
ORIENTATION: ANTERIOR

## 2023-11-22 ASSESSMENT — PAIN DESCRIPTION - DESCRIPTORS
DESCRIPTORS: ACHING
DESCRIPTORS: BURNING;ACHING

## 2023-11-22 ASSESSMENT — PAIN SCALES - GENERAL
PAINLEVEL_OUTOF10: 5
PAINLEVEL_OUTOF10: 3
PAINLEVEL_OUTOF10: 3
PAINLEVEL_OUTOF10: 0

## 2023-11-22 ASSESSMENT — PAIN - FUNCTIONAL ASSESSMENT
PAIN_FUNCTIONAL_ASSESSMENT: ACTIVITIES ARE NOT PREVENTED
PAIN_FUNCTIONAL_ASSESSMENT: ACTIVITIES ARE NOT PREVENTED

## 2023-11-22 ASSESSMENT — PAIN DESCRIPTION - FREQUENCY
FREQUENCY: CONTINUOUS
FREQUENCY: CONTINUOUS

## 2023-11-22 ASSESSMENT — PAIN DESCRIPTION - LOCATION
LOCATION: ABDOMEN

## 2023-11-23 ENCOUNTER — APPOINTMENT (OUTPATIENT)
Facility: HOSPITAL | Age: 88
DRG: 388 | End: 2023-11-23
Attending: INTERNAL MEDICINE
Payer: MEDICARE

## 2023-11-23 ENCOUNTER — APPOINTMENT (OUTPATIENT)
Facility: HOSPITAL | Age: 88
DRG: 388 | End: 2023-11-23
Payer: MEDICARE

## 2023-11-23 LAB
ANION GAP SERPL CALC-SCNC: 5 MMOL/L (ref 5–15)
ARTERIAL PATENCY WRIST A: YES
BASE EXCESS BLDA CALC-SCNC: 15.1 MMOL/L
BASOPHILS # BLD: 0 K/UL (ref 0–0.1)
BASOPHILS NFR BLD: 0 % (ref 0–1)
BDY SITE: ABNORMAL
BUN SERPL-MCNC: 47 MG/DL (ref 6–20)
BUN/CREAT SERPL: 27 (ref 12–20)
CALCIUM SERPL-MCNC: 10.6 MG/DL (ref 8.5–10.1)
CHLORIDE SERPL-SCNC: 92 MMOL/L (ref 97–108)
CHLORIDE UR-SCNC: <10 MMOL/L
CO2 SERPL-SCNC: 41 MMOL/L (ref 21–32)
CREAT SERPL-MCNC: 1.73 MG/DL (ref 0.55–1.02)
CREAT UR-MCNC: 243 MG/DL
DIFFERENTIAL METHOD BLD: ABNORMAL
EOSINOPHIL # BLD: 0.1 K/UL (ref 0–0.4)
EOSINOPHIL NFR BLD: 1 % (ref 0–7)
ERYTHROCYTE [DISTWIDTH] IN BLOOD BY AUTOMATED COUNT: 17.2 % (ref 11.5–14.5)
GLUCOSE SERPL-MCNC: 151 MG/DL (ref 65–100)
HCO3 BLDA-SCNC: 40 MMOL/L (ref 22–26)
HCT VFR BLD AUTO: 35.8 % (ref 35–47)
HGB BLD-MCNC: 10.2 G/DL (ref 11.5–16)
IMM GRANULOCYTES # BLD AUTO: 0.1 K/UL (ref 0–0.04)
IMM GRANULOCYTES NFR BLD AUTO: 1 % (ref 0–0.5)
LYMPHOCYTES # BLD: 1.3 K/UL (ref 0.8–3.5)
LYMPHOCYTES NFR BLD: 14 % (ref 12–49)
MAGNESIUM SERPL-MCNC: 2.1 MG/DL (ref 1.6–2.4)
MCH RBC QN AUTO: 21.3 PG (ref 26–34)
MCHC RBC AUTO-ENTMCNC: 28.5 G/DL (ref 30–36.5)
MCV RBC AUTO: 74.6 FL (ref 80–99)
MONOCYTES # BLD: 0.9 K/UL (ref 0–1)
MONOCYTES NFR BLD: 10 % (ref 5–13)
NEUTS SEG # BLD: 6.8 K/UL (ref 1.8–8)
NEUTS SEG NFR BLD: 74 % (ref 32–75)
NRBC # BLD: 0 K/UL (ref 0–0.01)
NRBC BLD-RTO: 0 PER 100 WBC
PCO2 BLDA: 49 MMHG (ref 35–45)
PH BLDA: 7.53 (ref 7.35–7.45)
PLATELET # BLD AUTO: 353 K/UL (ref 150–400)
PMV BLD AUTO: 10.7 FL (ref 8.9–12.9)
PO2 BLDA: 79 MMHG (ref 80–100)
POTASSIUM SERPL-SCNC: 3.4 MMOL/L (ref 3.5–5.1)
RBC # BLD AUTO: 4.8 M/UL (ref 3.8–5.2)
RBC MORPH BLD: ABNORMAL
SAO2 % BLD: 97 % (ref 92–97)
SAO2% DEVICE SAO2% SENSOR NAME: ABNORMAL
SODIUM SERPL-SCNC: 138 MMOL/L (ref 136–145)
SODIUM UR-SCNC: <5 MMOL/L
SPECIMEN SITE: ABNORMAL
UUN UR-MCNC: 325 MG/DL
WBC # BLD AUTO: 9.2 K/UL (ref 3.6–11)

## 2023-11-23 PROCEDURE — P9047 ALBUMIN (HUMAN), 25%, 50ML: HCPCS | Performed by: INTERNAL MEDICINE

## 2023-11-23 PROCEDURE — 83735 ASSAY OF MAGNESIUM: CPT

## 2023-11-23 PROCEDURE — 99231 SBSQ HOSP IP/OBS SF/LOW 25: CPT | Performed by: NURSE PRACTITIONER

## 2023-11-23 PROCEDURE — 6370000000 HC RX 637 (ALT 250 FOR IP): Performed by: SURGERY

## 2023-11-23 PROCEDURE — 71045 X-RAY EXAM CHEST 1 VIEW: CPT

## 2023-11-23 PROCEDURE — 6360000002 HC RX W HCPCS: Performed by: INTERNAL MEDICINE

## 2023-11-23 PROCEDURE — 2500000003 HC RX 250 WO HCPCS: Performed by: SURGERY

## 2023-11-23 PROCEDURE — 84540 ASSAY OF URINE/UREA-N: CPT

## 2023-11-23 PROCEDURE — 6360000002 HC RX W HCPCS: Performed by: SURGERY

## 2023-11-23 PROCEDURE — 36415 COLL VENOUS BLD VENIPUNCTURE: CPT

## 2023-11-23 PROCEDURE — 2580000003 HC RX 258: Performed by: SURGERY

## 2023-11-23 PROCEDURE — 74018 RADEX ABDOMEN 1 VIEW: CPT

## 2023-11-23 PROCEDURE — 6370000000 HC RX 637 (ALT 250 FOR IP): Performed by: NURSE PRACTITIONER

## 2023-11-23 PROCEDURE — 2700000000 HC OXYGEN THERAPY PER DAY

## 2023-11-23 PROCEDURE — 84300 ASSAY OF URINE SODIUM: CPT

## 2023-11-23 PROCEDURE — 82803 BLOOD GASES ANY COMBINATION: CPT

## 2023-11-23 PROCEDURE — 82570 ASSAY OF URINE CREATININE: CPT

## 2023-11-23 PROCEDURE — 85025 COMPLETE CBC W/AUTO DIFF WBC: CPT

## 2023-11-23 PROCEDURE — 2500000003 HC RX 250 WO HCPCS: Performed by: INTERNAL MEDICINE

## 2023-11-23 PROCEDURE — 36600 WITHDRAWAL OF ARTERIAL BLOOD: CPT

## 2023-11-23 PROCEDURE — 1100000000 HC RM PRIVATE

## 2023-11-23 PROCEDURE — 94760 N-INVAS EAR/PLS OXIMETRY 1: CPT

## 2023-11-23 PROCEDURE — 80048 BASIC METABOLIC PNL TOTAL CA: CPT

## 2023-11-23 PROCEDURE — 82436 ASSAY OF URINE CHLORIDE: CPT

## 2023-11-23 RX ORDER — ENEMA 19; 7 G/133ML; G/133ML
1 ENEMA RECTAL
Status: DISPENSED | OUTPATIENT
Start: 2023-11-23 | End: 2023-11-24

## 2023-11-23 RX ORDER — POTASSIUM CHLORIDE 7.45 MG/ML
10 INJECTION INTRAVENOUS
Status: COMPLETED | OUTPATIENT
Start: 2023-11-23 | End: 2023-11-23

## 2023-11-23 RX ORDER — POTASSIUM CHLORIDE 7.45 MG/ML
10 INJECTION INTRAVENOUS
Status: DISCONTINUED | OUTPATIENT
Start: 2023-11-23 | End: 2023-11-23

## 2023-11-23 RX ORDER — BISACODYL 10 MG
10 SUPPOSITORY, RECTAL RECTAL DAILY
Status: DISCONTINUED | OUTPATIENT
Start: 2023-11-23 | End: 2023-11-27 | Stop reason: HOSPADM

## 2023-11-23 RX ORDER — ALBUMIN (HUMAN) 12.5 G/50ML
25 SOLUTION INTRAVENOUS EVERY 8 HOURS
Status: COMPLETED | OUTPATIENT
Start: 2023-11-23 | End: 2023-11-24

## 2023-11-23 RX ADMIN — PROPAFENONE HYDROCHLORIDE 150 MG: 150 TABLET, FILM COATED ORAL at 09:38

## 2023-11-23 RX ADMIN — GABAPENTIN 800 MG: 100 CAPSULE ORAL at 17:32

## 2023-11-23 RX ADMIN — POTASSIUM CHLORIDE 10 MEQ: 7.46 INJECTION, SOLUTION INTRAVENOUS at 15:33

## 2023-11-23 RX ADMIN — BRIMONIDINE TARTRATE 1 DROP: 2 SOLUTION OPHTHALMIC at 09:43

## 2023-11-23 RX ADMIN — POTASSIUM CHLORIDE, DEXTROSE MONOHYDRATE AND SODIUM CHLORIDE: 150; 5; 450 INJECTION, SOLUTION INTRAVENOUS at 10:46

## 2023-11-23 RX ADMIN — GABAPENTIN 800 MG: 100 CAPSULE ORAL at 12:58

## 2023-11-23 RX ADMIN — ALBUMIN (HUMAN) 25 G: 0.25 INJECTION, SOLUTION INTRAVENOUS at 21:48

## 2023-11-23 RX ADMIN — GABAPENTIN 800 MG: 100 CAPSULE ORAL at 21:48

## 2023-11-23 RX ADMIN — POTASSIUM CHLORIDE 10 MEQ: 7.46 INJECTION, SOLUTION INTRAVENOUS at 16:45

## 2023-11-23 RX ADMIN — SODIUM CHLORIDE, PRESERVATIVE FREE 10 ML: 5 INJECTION INTRAVENOUS at 21:50

## 2023-11-23 RX ADMIN — POTASSIUM CHLORIDE 10 MEQ: 7.46 INJECTION, SOLUTION INTRAVENOUS at 14:55

## 2023-11-23 RX ADMIN — ENOXAPARIN SODIUM 30 MG: 100 INJECTION SUBCUTANEOUS at 09:37

## 2023-11-23 RX ADMIN — GABAPENTIN 800 MG: 100 CAPSULE ORAL at 09:37

## 2023-11-23 RX ADMIN — DEXTRAN 70, GLYCERIN, HYPROMELLOSE 1 DROP: 1; 2; 3 SOLUTION/ DROPS OPHTHALMIC at 16:43

## 2023-11-23 RX ADMIN — PROPAFENONE HYDROCHLORIDE 150 MG: 150 TABLET, FILM COATED ORAL at 21:50

## 2023-11-23 RX ADMIN — POTASSIUM CHLORIDE, DEXTROSE MONOHYDRATE AND SODIUM CHLORIDE: 150; 5; 450 INJECTION, SOLUTION INTRAVENOUS at 21:49

## 2023-11-23 RX ADMIN — ALBUMIN (HUMAN) 25 G: 0.25 INJECTION, SOLUTION INTRAVENOUS at 13:45

## 2023-11-23 RX ADMIN — BRIMONIDINE TARTRATE 1 DROP: 2 SOLUTION OPHTHALMIC at 21:50

## 2023-11-23 RX ADMIN — BISACODYL 10 MG: 10 SUPPOSITORY RECTAL at 09:38

## 2023-11-23 RX ADMIN — DEXTRAN 70, GLYCERIN, HYPROMELLOSE 1 DROP: 1; 2; 3 SOLUTION/ DROPS OPHTHALMIC at 21:51

## 2023-11-23 RX ADMIN — DEXTRAN 70, GLYCERIN, HYPROMELLOSE 1 DROP: 1; 2; 3 SOLUTION/ DROPS OPHTHALMIC at 09:43

## 2023-11-23 RX ADMIN — POTASSIUM CHLORIDE 10 MEQ: 7.46 INJECTION, SOLUTION INTRAVENOUS at 18:09

## 2023-11-24 LAB
ANION GAP SERPL CALC-SCNC: 3 MMOL/L (ref 5–15)
BASOPHILS # BLD: 0 K/UL (ref 0–0.1)
BASOPHILS NFR BLD: 1 % (ref 0–1)
BUN SERPL-MCNC: 52 MG/DL (ref 6–20)
BUN/CREAT SERPL: 27 (ref 12–20)
CALCIUM SERPL-MCNC: 9.9 MG/DL (ref 8.5–10.1)
CHLORIDE SERPL-SCNC: 93 MMOL/L (ref 97–108)
CO2 SERPL-SCNC: 38 MMOL/L (ref 21–32)
CREAT SERPL-MCNC: 1.94 MG/DL (ref 0.55–1.02)
DIFFERENTIAL METHOD BLD: ABNORMAL
EOSINOPHIL # BLD: 0.2 K/UL (ref 0–0.4)
EOSINOPHIL NFR BLD: 3 % (ref 0–7)
ERYTHROCYTE [DISTWIDTH] IN BLOOD BY AUTOMATED COUNT: 17 % (ref 11.5–14.5)
GLUCOSE SERPL-MCNC: 141 MG/DL (ref 65–100)
HCT VFR BLD AUTO: 29.4 % (ref 35–47)
HGB BLD-MCNC: 8.3 G/DL (ref 11.5–16)
IMM GRANULOCYTES # BLD AUTO: 0 K/UL (ref 0–0.04)
IMM GRANULOCYTES NFR BLD AUTO: 0 % (ref 0–0.5)
LYMPHOCYTES # BLD: 1.2 K/UL (ref 0.8–3.5)
LYMPHOCYTES NFR BLD: 17 % (ref 12–49)
MCH RBC QN AUTO: 21.2 PG (ref 26–34)
MCHC RBC AUTO-ENTMCNC: 28.2 G/DL (ref 30–36.5)
MCV RBC AUTO: 75 FL (ref 80–99)
MONOCYTES # BLD: 0.6 K/UL (ref 0–1)
MONOCYTES NFR BLD: 8 % (ref 5–13)
NEUTS SEG # BLD: 5 K/UL (ref 1.8–8)
NEUTS SEG NFR BLD: 71 % (ref 32–75)
NRBC # BLD: 0 K/UL (ref 0–0.01)
NRBC BLD-RTO: 0 PER 100 WBC
PLATELET # BLD AUTO: 277 K/UL (ref 150–400)
PMV BLD AUTO: 10.8 FL (ref 8.9–12.9)
POTASSIUM SERPL-SCNC: 4 MMOL/L (ref 3.5–5.1)
RBC # BLD AUTO: 3.92 M/UL (ref 3.8–5.2)
SODIUM SERPL-SCNC: 134 MMOL/L (ref 136–145)
WBC # BLD AUTO: 7 K/UL (ref 3.6–11)

## 2023-11-24 PROCEDURE — 6360000002 HC RX W HCPCS: Performed by: INTERNAL MEDICINE

## 2023-11-24 PROCEDURE — P9047 ALBUMIN (HUMAN), 25%, 50ML: HCPCS | Performed by: INTERNAL MEDICINE

## 2023-11-24 PROCEDURE — 94760 N-INVAS EAR/PLS OXIMETRY 1: CPT

## 2023-11-24 PROCEDURE — 6360000004 HC RX CONTRAST MEDICATION: Performed by: NURSE PRACTITIONER

## 2023-11-24 PROCEDURE — 2580000003 HC RX 258: Performed by: SURGERY

## 2023-11-24 PROCEDURE — 99231 SBSQ HOSP IP/OBS SF/LOW 25: CPT | Performed by: NURSE PRACTITIONER

## 2023-11-24 PROCEDURE — 6370000000 HC RX 637 (ALT 250 FOR IP): Performed by: SURGERY

## 2023-11-24 PROCEDURE — 80048 BASIC METABOLIC PNL TOTAL CA: CPT

## 2023-11-24 PROCEDURE — 2500000003 HC RX 250 WO HCPCS: Performed by: INTERNAL MEDICINE

## 2023-11-24 PROCEDURE — 97530 THERAPEUTIC ACTIVITIES: CPT | Performed by: PHYSICAL THERAPIST

## 2023-11-24 PROCEDURE — 97116 GAIT TRAINING THERAPY: CPT | Performed by: PHYSICAL THERAPIST

## 2023-11-24 PROCEDURE — 97161 PT EVAL LOW COMPLEX 20 MIN: CPT | Performed by: PHYSICAL THERAPIST

## 2023-11-24 PROCEDURE — 1100000000 HC RM PRIVATE

## 2023-11-24 PROCEDURE — 36415 COLL VENOUS BLD VENIPUNCTURE: CPT

## 2023-11-24 PROCEDURE — 6360000002 HC RX W HCPCS: Performed by: SURGERY

## 2023-11-24 PROCEDURE — 2700000000 HC OXYGEN THERAPY PER DAY

## 2023-11-24 PROCEDURE — 6370000000 HC RX 637 (ALT 250 FOR IP): Performed by: NURSE PRACTITIONER

## 2023-11-24 PROCEDURE — 85025 COMPLETE CBC W/AUTO DIFF WBC: CPT

## 2023-11-24 RX ORDER — ENEMA 19; 7 G/133ML; G/133ML
1 ENEMA RECTAL
Status: COMPLETED | OUTPATIENT
Start: 2023-11-24 | End: 2023-11-24

## 2023-11-24 RX ORDER — CASTOR OIL AND BALSAM, PERU 788; 87 MG/G; MG/G
OINTMENT TOPICAL 2 TIMES DAILY
Status: DISCONTINUED | OUTPATIENT
Start: 2023-11-25 | End: 2023-11-27 | Stop reason: HOSPADM

## 2023-11-24 RX ADMIN — DEXTRAN 70, GLYCERIN, HYPROMELLOSE 1 DROP: 1; 2; 3 SOLUTION/ DROPS OPHTHALMIC at 21:04

## 2023-11-24 RX ADMIN — GABAPENTIN 800 MG: 100 CAPSULE ORAL at 17:27

## 2023-11-24 RX ADMIN — DEXTRAN 70, GLYCERIN, HYPROMELLOSE 1 DROP: 1; 2; 3 SOLUTION/ DROPS OPHTHALMIC at 15:11

## 2023-11-24 RX ADMIN — BISACODYL 10 MG: 10 SUPPOSITORY RECTAL at 10:07

## 2023-11-24 RX ADMIN — ENOXAPARIN SODIUM 30 MG: 100 INJECTION SUBCUTANEOUS at 10:06

## 2023-11-24 RX ADMIN — POTASSIUM CHLORIDE, DEXTROSE MONOHYDRATE AND SODIUM CHLORIDE: 150; 5; 450 INJECTION, SOLUTION INTRAVENOUS at 15:16

## 2023-11-24 RX ADMIN — PROPAFENONE HYDROCHLORIDE 150 MG: 150 TABLET, FILM COATED ORAL at 21:02

## 2023-11-24 RX ADMIN — BRIMONIDINE TARTRATE 1 DROP: 2 SOLUTION OPHTHALMIC at 21:15

## 2023-11-24 RX ADMIN — GABAPENTIN 800 MG: 100 CAPSULE ORAL at 21:01

## 2023-11-24 RX ADMIN — PROPAFENONE HYDROCHLORIDE 150 MG: 150 TABLET, FILM COATED ORAL at 10:06

## 2023-11-24 RX ADMIN — BRIMONIDINE TARTRATE 1 DROP: 2 SOLUTION OPHTHALMIC at 10:11

## 2023-11-24 RX ADMIN — POTASSIUM CHLORIDE, DEXTROSE MONOHYDRATE AND SODIUM CHLORIDE: 150; 5; 450 INJECTION, SOLUTION INTRAVENOUS at 23:39

## 2023-11-24 RX ADMIN — ALBUMIN (HUMAN) 25 G: 0.25 INJECTION, SOLUTION INTRAVENOUS at 06:17

## 2023-11-24 RX ADMIN — GABAPENTIN 800 MG: 100 CAPSULE ORAL at 10:06

## 2023-11-24 RX ADMIN — POTASSIUM CHLORIDE, DEXTROSE MONOHYDRATE AND SODIUM CHLORIDE: 150; 5; 450 INJECTION, SOLUTION INTRAVENOUS at 07:10

## 2023-11-24 RX ADMIN — DIATRIZOATE MEGLUMINE AND DIATRIZOATE SODIUM 80 ML: 660; 100 LIQUID ORAL; RECTAL at 11:05

## 2023-11-24 RX ADMIN — DEXTRAN 70, GLYCERIN, HYPROMELLOSE 1 DROP: 1; 2; 3 SOLUTION/ DROPS OPHTHALMIC at 10:11

## 2023-11-24 RX ADMIN — GABAPENTIN 800 MG: 100 CAPSULE ORAL at 13:09

## 2023-11-24 RX ADMIN — SODIUM CHLORIDE, PRESERVATIVE FREE 10 ML: 5 INJECTION INTRAVENOUS at 21:04

## 2023-11-24 RX ADMIN — SALINE LAXATIVE 1 ENEMA: 7; 19 ENEMA RECTAL at 10:42

## 2023-11-24 ASSESSMENT — PAIN SCALES - GENERAL: PAINLEVEL_OUTOF10: 0

## 2023-11-24 NOTE — CARE COORDINATION
Care Management Initial Assessment       RUR:16% Moderate Risk  Readmission? No  1st IM letter given? Yes   1st  letter given: N/A      Initial Assessment: Chart reviewed. CM met with pt at the bedside to introduce self and role. Verified contact information and demographics. [facility] resides [facility]. Pt PCP is Dr. Charlie Love with last visit being in October of 2023. Pt states she receive therapy services at [facility]. Pt recently needs assistance with ADL's and uses a rollator to ambulate. Pt is not an active  and daughter will pick her up for discharge. ?She?states there is no concerns for her to return home. Pt has ACP on file and information in updated. CM will continue to follow. Full assessment below:          11/24/23 1650   Service Assessment   Patient Orientation Alert and Oriented;Person;Place;Situation;Self   Cognition Alert   History Provided By Patient   Primary [address] [facility] is: Legal Next of [address] [place] [address]  Damion Billingsley (Child)   [phone])   PCP Verified by CM Yes   Last Visit to PCP Within last 3 months   Prior Functional Level Independent in ADLs/IADLs   Current Functional Level Independent in ADLs/IADLs   Can patient return to prior living arrangement Yes   Social/Functional History   Lives With Other (comment)  (Assited living)   Type of Home Assisted living   [place], [place]; Wheelchair-manual   Active  No   Discharge Planning   Type of Residence Assisted living   Current Services Prior To Admission None   Patient expects to be discharged to: Assisted living       Angie  [phone]

## 2023-11-25 ENCOUNTER — APPOINTMENT (OUTPATIENT)
Facility: HOSPITAL | Age: 88
DRG: 388 | End: 2023-11-25
Payer: MEDICARE

## 2023-11-25 LAB
ANION GAP SERPL CALC-SCNC: 4 MMOL/L (ref 5–15)
BASOPHILS # BLD: 0.1 K/UL (ref 0–0.1)
BASOPHILS NFR BLD: 1 % (ref 0–1)
BUN SERPL-MCNC: 43 MG/DL (ref 6–20)
BUN/CREAT SERPL: 29 (ref 12–20)
CALCIUM SERPL-MCNC: 10.1 MG/DL (ref 8.5–10.1)
CHLORIDE SERPL-SCNC: 91 MMOL/L (ref 97–108)
CO2 SERPL-SCNC: 38 MMOL/L (ref 21–32)
CREAT SERPL-MCNC: 1.48 MG/DL (ref 0.55–1.02)
DIFFERENTIAL METHOD BLD: ABNORMAL
EOSINOPHIL # BLD: 0.2 K/UL (ref 0–0.4)
EOSINOPHIL NFR BLD: 3 % (ref 0–7)
ERYTHROCYTE [DISTWIDTH] IN BLOOD BY AUTOMATED COUNT: 16.7 % (ref 11.5–14.5)
GLUCOSE SERPL-MCNC: 116 MG/DL (ref 65–100)
HCT VFR BLD AUTO: 34.7 % (ref 35–47)
HGB BLD-MCNC: 10 G/DL (ref 11.5–16)
IMM GRANULOCYTES # BLD AUTO: 0.1 K/UL (ref 0–0.04)
IMM GRANULOCYTES NFR BLD AUTO: 1 % (ref 0–0.5)
LYMPHOCYTES # BLD: 1.6 K/UL (ref 0.8–3.5)
LYMPHOCYTES NFR BLD: 21 % (ref 12–49)
MAGNESIUM SERPL-MCNC: 2 MG/DL (ref 1.6–2.4)
MCH RBC QN AUTO: 21.6 PG (ref 26–34)
MCHC RBC AUTO-ENTMCNC: 28.8 G/DL (ref 30–36.5)
MCV RBC AUTO: 74.8 FL (ref 80–99)
MONOCYTES # BLD: 0.9 K/UL (ref 0–1)
MONOCYTES NFR BLD: 11 % (ref 5–13)
NEUTS SEG # BLD: 4.9 K/UL (ref 1.8–8)
NEUTS SEG NFR BLD: 63 % (ref 32–75)
NRBC # BLD: 0 K/UL (ref 0–0.01)
NRBC BLD-RTO: 0 PER 100 WBC
PLATELET # BLD AUTO: 298 K/UL (ref 150–400)
PMV BLD AUTO: 10.7 FL (ref 8.9–12.9)
POTASSIUM SERPL-SCNC: 3.5 MMOL/L (ref 3.5–5.1)
RBC # BLD AUTO: 4.64 M/UL (ref 3.8–5.2)
RBC MORPH BLD: ABNORMAL
SODIUM SERPL-SCNC: 133 MMOL/L (ref 136–145)
WBC # BLD AUTO: 7.8 K/UL (ref 3.6–11)

## 2023-11-25 PROCEDURE — 2580000003 HC RX 258: Performed by: SURGERY

## 2023-11-25 PROCEDURE — 74018 RADEX ABDOMEN 1 VIEW: CPT

## 2023-11-25 PROCEDURE — 2500000003 HC RX 250 WO HCPCS: Performed by: INTERNAL MEDICINE

## 2023-11-25 PROCEDURE — 6370000000 HC RX 637 (ALT 250 FOR IP): Performed by: SURGERY

## 2023-11-25 PROCEDURE — 85025 COMPLETE CBC W/AUTO DIFF WBC: CPT

## 2023-11-25 PROCEDURE — 6370000000 HC RX 637 (ALT 250 FOR IP): Performed by: NURSE PRACTITIONER

## 2023-11-25 PROCEDURE — 80048 BASIC METABOLIC PNL TOTAL CA: CPT

## 2023-11-25 PROCEDURE — 36415 COLL VENOUS BLD VENIPUNCTURE: CPT

## 2023-11-25 PROCEDURE — 83735 ASSAY OF MAGNESIUM: CPT

## 2023-11-25 PROCEDURE — 6360000002 HC RX W HCPCS: Performed by: INTERNAL MEDICINE

## 2023-11-25 PROCEDURE — 1100000000 HC RM PRIVATE

## 2023-11-25 PROCEDURE — 6360000002 HC RX W HCPCS: Performed by: SURGERY

## 2023-11-25 PROCEDURE — 97535 SELF CARE MNGMENT TRAINING: CPT | Performed by: OCCUPATIONAL THERAPIST

## 2023-11-25 PROCEDURE — 97166 OT EVAL MOD COMPLEX 45 MIN: CPT | Performed by: OCCUPATIONAL THERAPIST

## 2023-11-25 PROCEDURE — 6370000000 HC RX 637 (ALT 250 FOR IP)

## 2023-11-25 RX ORDER — POTASSIUM CHLORIDE 7.45 MG/ML
10 INJECTION INTRAVENOUS
Status: COMPLETED | OUTPATIENT
Start: 2023-11-25 | End: 2023-11-25

## 2023-11-25 RX ORDER — PROPAFENONE HYDROCHLORIDE 150 MG/1
150 TABLET, COATED ORAL 2 TIMES DAILY
Status: DISCONTINUED | OUTPATIENT
Start: 2023-11-26 | End: 2023-11-27 | Stop reason: HOSPADM

## 2023-11-25 RX ADMIN — GABAPENTIN 800 MG: 100 CAPSULE ORAL at 14:31

## 2023-11-25 RX ADMIN — POTASSIUM CHLORIDE 10 MEQ: 7.46 INJECTION, SOLUTION INTRAVENOUS at 12:40

## 2023-11-25 RX ADMIN — BISACODYL 10 MG: 10 SUPPOSITORY RECTAL at 10:11

## 2023-11-25 RX ADMIN — DEXTRAN 70, GLYCERIN, HYPROMELLOSE 1 DROP: 1; 2; 3 SOLUTION/ DROPS OPHTHALMIC at 21:34

## 2023-11-25 RX ADMIN — PROPAFENONE HYDROCHLORIDE 150 MG: 150 TABLET, FILM COATED ORAL at 21:34

## 2023-11-25 RX ADMIN — PROPAFENONE HYDROCHLORIDE 150 MG: 150 TABLET, FILM COATED ORAL at 10:10

## 2023-11-25 RX ADMIN — POTASSIUM CHLORIDE, DEXTROSE MONOHYDRATE AND SODIUM CHLORIDE: 150; 5; 450 INJECTION, SOLUTION INTRAVENOUS at 12:38

## 2023-11-25 RX ADMIN — POTASSIUM CHLORIDE 10 MEQ: 7.46 INJECTION, SOLUTION INTRAVENOUS at 17:40

## 2023-11-25 RX ADMIN — POTASSIUM CHLORIDE 10 MEQ: 7.46 INJECTION, SOLUTION INTRAVENOUS at 14:29

## 2023-11-25 RX ADMIN — Medication: at 10:10

## 2023-11-25 RX ADMIN — BRIMONIDINE TARTRATE 1 DROP: 2 SOLUTION OPHTHALMIC at 21:34

## 2023-11-25 RX ADMIN — GABAPENTIN 800 MG: 100 CAPSULE ORAL at 10:11

## 2023-11-25 RX ADMIN — Medication: at 01:23

## 2023-11-25 RX ADMIN — Medication: at 21:34

## 2023-11-25 RX ADMIN — DEXTRAN 70, GLYCERIN, HYPROMELLOSE 1 DROP: 1; 2; 3 SOLUTION/ DROPS OPHTHALMIC at 10:10

## 2023-11-25 RX ADMIN — DEXTRAN 70, GLYCERIN, HYPROMELLOSE 1 DROP: 1; 2; 3 SOLUTION/ DROPS OPHTHALMIC at 15:12

## 2023-11-25 RX ADMIN — GABAPENTIN 800 MG: 100 CAPSULE ORAL at 17:40

## 2023-11-25 RX ADMIN — SODIUM CHLORIDE, PRESERVATIVE FREE 10 ML: 5 INJECTION INTRAVENOUS at 10:11

## 2023-11-25 RX ADMIN — POTASSIUM CHLORIDE 10 MEQ: 7.46 INJECTION, SOLUTION INTRAVENOUS at 16:27

## 2023-11-25 RX ADMIN — SODIUM CHLORIDE, PRESERVATIVE FREE 10 ML: 5 INJECTION INTRAVENOUS at 21:54

## 2023-11-25 RX ADMIN — GABAPENTIN 800 MG: 100 CAPSULE ORAL at 21:34

## 2023-11-25 RX ADMIN — POTASSIUM CHLORIDE, DEXTROSE MONOHYDRATE AND SODIUM CHLORIDE: 150; 5; 450 INJECTION, SOLUTION INTRAVENOUS at 21:40

## 2023-11-25 RX ADMIN — BRIMONIDINE TARTRATE 1 DROP: 2 SOLUTION OPHTHALMIC at 10:10

## 2023-11-25 RX ADMIN — ENOXAPARIN SODIUM 30 MG: 100 INJECTION SUBCUTANEOUS at 10:10

## 2023-11-25 ASSESSMENT — PAIN SCALES - GENERAL
PAINLEVEL_OUTOF10: 0
PAINLEVEL_OUTOF10: 0

## 2023-11-26 ENCOUNTER — APPOINTMENT (OUTPATIENT)
Facility: HOSPITAL | Age: 88
DRG: 388 | End: 2023-11-26
Payer: MEDICARE

## 2023-11-26 LAB
GLUCOSE BLD STRIP.AUTO-MCNC: 122 MG/DL (ref 65–117)
SERVICE CMNT-IMP: ABNORMAL

## 2023-11-26 PROCEDURE — P9047 ALBUMIN (HUMAN), 25%, 50ML: HCPCS | Performed by: NURSE PRACTITIONER

## 2023-11-26 PROCEDURE — 6360000002 HC RX W HCPCS: Performed by: SURGERY

## 2023-11-26 PROCEDURE — 6360000002 HC RX W HCPCS: Performed by: NURSE PRACTITIONER

## 2023-11-26 PROCEDURE — 2580000003 HC RX 258: Performed by: SURGERY

## 2023-11-26 PROCEDURE — 6370000000 HC RX 637 (ALT 250 FOR IP): Performed by: NURSE PRACTITIONER

## 2023-11-26 PROCEDURE — 2700000000 HC OXYGEN THERAPY PER DAY

## 2023-11-26 PROCEDURE — 2500000003 HC RX 250 WO HCPCS: Performed by: INTERNAL MEDICINE

## 2023-11-26 PROCEDURE — 1100000000 HC RM PRIVATE

## 2023-11-26 PROCEDURE — 6370000000 HC RX 637 (ALT 250 FOR IP): Performed by: SURGERY

## 2023-11-26 PROCEDURE — 2580000003 HC RX 258: Performed by: NURSE PRACTITIONER

## 2023-11-26 PROCEDURE — 82962 GLUCOSE BLOOD TEST: CPT

## 2023-11-26 PROCEDURE — 94760 N-INVAS EAR/PLS OXIMETRY 1: CPT

## 2023-11-26 RX ORDER — ALBUMIN (HUMAN) 12.5 G/50ML
25 SOLUTION INTRAVENOUS ONCE
Status: COMPLETED | OUTPATIENT
Start: 2023-11-26 | End: 2023-11-26

## 2023-11-26 RX ORDER — PROCHLORPERAZINE EDISYLATE 5 MG/ML
10 INJECTION INTRAMUSCULAR; INTRAVENOUS EVERY 6 HOURS PRN
Status: DISCONTINUED | OUTPATIENT
Start: 2023-11-26 | End: 2023-11-27 | Stop reason: HOSPADM

## 2023-11-26 RX ORDER — POLYETHYLENE GLYCOL 3350 17 G/17G
17 POWDER, FOR SOLUTION ORAL 2 TIMES DAILY
Status: DISCONTINUED | OUTPATIENT
Start: 2023-11-26 | End: 2023-11-26

## 2023-11-26 RX ORDER — 0.9 % SODIUM CHLORIDE 0.9 %
500 INTRAVENOUS SOLUTION INTRAVENOUS ONCE
Status: COMPLETED | OUTPATIENT
Start: 2023-11-26 | End: 2023-11-26

## 2023-11-26 RX ORDER — ACETAMINOPHEN 325 MG/1
650 TABLET ORAL EVERY 4 HOURS PRN
Status: DISCONTINUED | OUTPATIENT
Start: 2023-11-26 | End: 2023-11-27 | Stop reason: HOSPADM

## 2023-11-26 RX ORDER — DEXTROSE MONOHYDRATE, SODIUM CHLORIDE, AND POTASSIUM CHLORIDE 50; 1.49; 9 G/1000ML; G/1000ML; G/1000ML
INJECTION, SOLUTION INTRAVENOUS CONTINUOUS
Status: DISCONTINUED | OUTPATIENT
Start: 2023-11-26 | End: 2023-11-27 | Stop reason: HOSPADM

## 2023-11-26 RX ADMIN — LORAZEPAM 0.5 MG: 2 INJECTION INTRAMUSCULAR; INTRAVENOUS at 00:01

## 2023-11-26 RX ADMIN — SODIUM CHLORIDE 500 ML: 9 INJECTION, SOLUTION INTRAVENOUS at 16:00

## 2023-11-26 RX ADMIN — SODIUM CHLORIDE, PRESERVATIVE FREE 10 ML: 5 INJECTION INTRAVENOUS at 22:48

## 2023-11-26 RX ADMIN — ENOXAPARIN SODIUM 30 MG: 100 INJECTION SUBCUTANEOUS at 13:21

## 2023-11-26 RX ADMIN — SODIUM CHLORIDE 500 ML: 9 INJECTION, SOLUTION INTRAVENOUS at 15:30

## 2023-11-26 RX ADMIN — BISACODYL 10 MG: 10 SUPPOSITORY RECTAL at 13:22

## 2023-11-26 RX ADMIN — SODIUM CHLORIDE, PRESERVATIVE FREE 10 ML: 5 INJECTION INTRAVENOUS at 13:31

## 2023-11-26 RX ADMIN — PROPAFENONE HYDROCHLORIDE 150 MG: 150 TABLET, FILM COATED ORAL at 13:21

## 2023-11-26 RX ADMIN — Medication: at 13:29

## 2023-11-26 RX ADMIN — PROCHLORPERAZINE EDISYLATE 10 MG: 5 INJECTION INTRAMUSCULAR; INTRAVENOUS at 04:13

## 2023-11-26 RX ADMIN — ONDANSETRON 4 MG: 2 INJECTION INTRAMUSCULAR; INTRAVENOUS at 18:51

## 2023-11-26 RX ADMIN — BRIMONIDINE TARTRATE 1 DROP: 2 SOLUTION OPHTHALMIC at 22:47

## 2023-11-26 RX ADMIN — DEXTRAN 70, GLYCERIN, HYPROMELLOSE 1 DROP: 1; 2; 3 SOLUTION/ DROPS OPHTHALMIC at 22:47

## 2023-11-26 RX ADMIN — Medication: at 19:45

## 2023-11-26 RX ADMIN — BRIMONIDINE TARTRATE 1 DROP: 2 SOLUTION OPHTHALMIC at 13:30

## 2023-11-26 RX ADMIN — POLYETHYLENE GLYCOL 3350 17 G: 17 POWDER, FOR SOLUTION ORAL at 13:29

## 2023-11-26 RX ADMIN — POTASSIUM CHLORIDE, DEXTROSE MONOHYDRATE AND SODIUM CHLORIDE: 150; 5; 450 INJECTION, SOLUTION INTRAVENOUS at 04:34

## 2023-11-26 RX ADMIN — PROPAFENONE HYDROCHLORIDE 150 MG: 150 TABLET, FILM COATED ORAL at 22:48

## 2023-11-26 RX ADMIN — GABAPENTIN 800 MG: 300 CAPSULE ORAL at 13:21

## 2023-11-26 RX ADMIN — ACETAMINOPHEN 650 MG: 325 TABLET ORAL at 18:51

## 2023-11-26 RX ADMIN — POTASSIUM CHLORIDE, DEXTROSE MONOHYDRATE AND SODIUM CHLORIDE: 150; 5; 900 INJECTION, SOLUTION INTRAVENOUS at 19:45

## 2023-11-26 RX ADMIN — ALBUMIN (HUMAN) 25 G: 0.25 INJECTION, SOLUTION INTRAVENOUS at 16:32

## 2023-11-26 RX ADMIN — ONDANSETRON 4 MG: 2 INJECTION INTRAMUSCULAR; INTRAVENOUS at 00:01

## 2023-11-26 ASSESSMENT — PAIN DESCRIPTION - ORIENTATION: ORIENTATION: LEFT

## 2023-11-26 ASSESSMENT — PAIN SCALES - GENERAL
PAINLEVEL_OUTOF10: 2
PAINLEVEL_OUTOF10: 0

## 2023-11-26 ASSESSMENT — PAIN DESCRIPTION - DESCRIPTORS: DESCRIPTORS: SORE

## 2023-11-26 ASSESSMENT — PAIN DESCRIPTION - LOCATION: LOCATION: BACK

## 2023-11-26 NOTE — SIGNIFICANT EVENT
RAPID RESPONSE TEAM    Overhead rapid response paged to room #119 at 4220    Reason for rapid response:  CODE STROKE - cancelled - hypotension and lethargy    Initial assessment:  Pt responsive to verbal stimuli, disoriented. Extremity movement and strength equal, weak bilaterally. Pt able to follow commands appropriately. Per primary RN, pt is more lethargic than baseline and is having right eye droop. FSBS 122. SCARLET Dugan at bedside. VS obtained, hypotension and hypoxia found - pt placed on 2L NC, improving to 96%. Pt has had multiple sedating medications within last 24 hours. Code stroke cancelled per SCARLET Dugan. 500mL bolus ordered for hypotension. 22g IV started in right wrist, good flush and blood draw. Initial failed attempt in right hand. Bolus infusing. Outcome:  pt to remain in room #119     1600: hypotensive after 500mL bolus - SCARLET Dugan notified. Additional 500mL bolus, albumin infusion ordered. Please call with any questions or concerns    Amanda Trinidad.  Gloria Moreland RN  Rapid Response Team  Ext [phone]

## 2023-11-26 NOTE — PLAN OF CARE
Problem: Discharge Planning  Goal: Discharge to home or other facility with appropriate resources  11/22/2023 1036 by Ancelmo Garcia RN  Outcome: Progressing  11/22/2023 0005 by Salena Perez RN  Outcome: Progressing     Problem: Pain  Goal: Verbalizes/displays adequate comfort level or baseline comfort level  11/22/2023 1036 by Ancelmo Garcia RN  Outcome: Progressing  11/22/2023 0005 by Salena Perez RN  Outcome: Progressing     Problem: Skin/Tissue Integrity  Goal: Absence of new skin breakdown  Description: 1. Monitor for areas of redness and/or skin breakdown  2. Assess vascular access sites hourly  3. Every 4-6 hours minimum:  Change oxygen saturation probe site  4. Every 4-6 hours:  If on nasal continuous positive airway pressure, respiratory therapy assess nares and determine need for appliance change or resting period.   11/22/2023 1036 by Ancelmo Garcia RN  Outcome: Progressing  11/22/2023 0005 by Salena Perez RN  Outcome: Progressing     Problem: Safety - Adult  Goal: Free from fall injury  11/22/2023 1036 by Ancelmo Garcia RN  Outcome: Progressing  11/22/2023 0005 by Salena Perez RN  Outcome: Progressing     Problem: Chronic Conditions and Co-morbidities  Goal: Patient's chronic conditions and co-morbidity symptoms are monitored and maintained or improved  Outcome: Progressing
Problem: Discharge Planning  Goal: Discharge to home or other facility with appropriate resources  Outcome: Progressing     Problem: Pain  Goal: Verbalizes/displays adequate comfort level or baseline comfort level  Outcome: Progressing     Problem: Skin/Tissue Integrity  Goal: Absence of new skin breakdown  Description: 1. Monitor for areas of redness and/or skin breakdown  2. Assess vascular access sites hourly  3. Every 4-6 hours minimum:  Change oxygen saturation probe site  4. Every 4-6 hours:  If on nasal continuous positive airway pressure, respiratory therapy assess nares and determine need for appliance change or resting period.   Outcome: Progressing     Problem: Safety - Adult  Goal: Free from fall injury  Outcome: Progressing
Problem: Discharge Planning  Goal: Discharge to home or other facility with appropriate resources  Outcome: Progressing     Problem: Pain  Goal: Verbalizes/displays adequate comfort level or baseline comfort level  Outcome: Progressing     Problem: Skin/Tissue Integrity  Goal: Absence of new skin breakdown  Description: 1. Monitor for areas of redness and/or skin breakdown  2. Assess vascular access sites hourly  3. Every 4-6 hours minimum:  Change oxygen saturation probe site  4. Every 4-6 hours:  If on nasal continuous positive airway pressure, respiratory therapy assess nares and determine need for appliance change or resting period.   Outcome: Progressing     Problem: Safety - Adult  Goal: Free from fall injury  Outcome: Progressing     Problem: Chronic Conditions and Co-morbidities  Goal: Patient's chronic conditions and co-morbidity symptoms are monitored and maintained or improved  Outcome: Progressing
Problem: Occupational Therapy - Adult  Goal: By Discharge: Performs self-care activities at highest level of function for planned discharge setting. See evaluation for individualized goals. Description: FUNCTIONAL STATUS PRIOR TO ADMISSION:     , ADL Assistance: Independent,  ,  ,  ,  ,  ,  , Ambulation Assistance: modified Independent (short distances using RW), Transfer Assistance: Independent, Active : No ; uses W/C for longer distances    HOME SUPPORT: Patient lived in [facility] assisted living, however, she was independent in self care. .    Occupational Therapy Goals:  Initiated 11/25/2023  1. Patient will walk into the bathroom to  perform grooming with Supervision within 7 day(s). 2.  Patient will perform upper body adls  with Minimal Assist within 7 day(s). 3.  Patient will perform lower body adls with Moderate Assist, using adaptive aids prn,  within 7 day(s). 4.  Patient will perform toilet transfers with Supervision  within 7 day(s). 5.  Patient will perform all aspects of toileting with Stand by Assist within 7 day(s). 6.  Patient will participate in upper extremity therapeutic exercise/activities with Supervision for 5 minutes within 7 day(s). 11/25/2023 1705 by Aaron Nagel OTR/L  Outcome: Not Progressing     Problem: Occupational Therapy - Adult  Goal: By Discharge: Performs self-care activities at highest level of function for planned discharge setting. See evaluation for individualized goals. Description: FUNCTIONAL STATUS PRIOR TO ADMISSION:     , ADL Assistance: Independent,  ,  ,  ,  ,  ,  , Ambulation Assistance: modified Independent (short distances using RW), Transfer Assistance: Independent, Active : No ; uses W/C for longer distances    HOME SUPPORT: Patient lived in [facility] assisted living, however, she was independent in self care. .    Occupational Therapy Goals:  Initiated 11/25/2023  1.   Patient will walk into the bathroom to  perform
06/06/2016    SIGMOIDOSCOPY FLEXIBLE performed by Lynn Richardson MD at [facility] ENDOSCOPY    ORTHOPEDIC SURGERY      left knee replacement    ORTHOPEDIC SURGERY      bilateral shoulder replacements    ORTHOPEDIC SURGERY      back    OTHER SURGICAL HISTORY  01/02/2023    Exploratory laparotomy. MARIBETH AND BSO (CERVIX REMOVED)  1986    (ovarian cancer)    UPPER GI ENDOSCOPY,BIOPSY  11/13/2020         UPPER GI ENDOSCOPY,DIAGNOSIS  10/05/2020         UPPER GI ENDOSCOPY,DILATN W GUIDE  11/13/2020            Home Situation:  Social/Functional History  Lives With: Other (comment)  Type of Home: Assisted living  Home Layout: One level  Home Access: Level entry  Home Equipment: Rollator  Has the patient had two or more falls in the past year or any fall with injury in the past year?: No  ADL Assistance: Independent  Ambulation Assistance: Independent  Transfer Assistance: Independent  Active : No    Cognitive/Behavioral Status:   Patient is alert and oriented x 4            Strength:    Strength: Generally decreased, functional    Tone & Sensation:   Tone: Normal       Coordination:  Coordination: Within functional limits    Range Of Motion:  AROM: Generally decreased, functional       Functional Mobility:  Bed Mobility:     Bed Mobility Training  Bed Mobility Training: No  Transfers:     Transfer Training  Transfer Training: Yes  Sit to Stand: Stand-by assistance  Stand to Sit: Stand-by assistance  Bed to Chair: Contact-guard assistance  Balance:               Balance  Sitting: Intact  Standing: Impaired  Standing - Static: Good;Constant support  Standing - Dynamic: Fair;Constant support  Ambulation/Gait Training:          Gait  Overall Level of Assistance: Contact-guard assistance;Minimum assistance  Distance (ft): 20 Feet  Assistive Device: Walker, rolling  Base of Support: Widened  Speed/Vesta: Pace decreased (< 100 feet/min); Slow  Step Length: Left shortened;Right shortened  Gait Abnormalities: Steppage gait
JUAN Ayon (1999). Measuring the change in disability after inpatient rehabilitation; comparison of the responsiveness of the Barthel Index and Functional [place] Measure. Journal of Neurology, Neurosurgery, and Psychiatry, 66(4), 162-267. GLENDA Bermeo, SAVANAH Liu, & Mei Banks M.A. (2004) Assessment of post-stroke quality of life in cost-effectiveness studies: The usefulness of the Barthel Index and the EuroQoL-5D. Quality of Life Research, 13, 427-51                                                                                                                                                                                                                                 Pain Rating:  No complaints of pain  Pain Intervention(s): Activity Tolerance:   Fair , Poor, and requires rest breaks    After treatment:   Patient left in no apparent distress sitting up in chair, Call bell within reach, Bed/ chair alarm activated, Caregiver / family present, Side rails x3, and nurse aware. COMMUNICATION/EDUCATION:   The patient's plan of care was discussed with: registered nurse    Patient Education  Education Given To: Patient  Education Provided: Role of Therapy;Plan of Care;ADL Adaptive Strategies;Transfer Training  Education Method: Demonstration;Verbal;Teach Back  Barriers to Learning: None  Education Outcome: Continued education needed    Thank you for this referral.  ALE Mendez/L  Minutes: 52    Occupational Therapy Evaluation Charge Determination   History Examination Decision-Making   MEDIUM Complexity : Expanded review of history including physical, cognitive and psychocial history  MEDIUM Complexity: 3-5 Performance deficits relating to physical, cognitive, or psychosocial skills that result in activity limitations and/or participation restrictions MEDIUM Complexity: Patient may present with comorbidities that affect occupational performance.

## 2023-11-27 VITALS
SYSTOLIC BLOOD PRESSURE: 101 MMHG | DIASTOLIC BLOOD PRESSURE: 50 MMHG | HEART RATE: 50 BPM | OXYGEN SATURATION: 98 % | TEMPERATURE: 97.9 F | HEIGHT: 60 IN | BODY MASS INDEX: 26.5 KG/M2 | RESPIRATION RATE: 20 BRPM | WEIGHT: 135 LBS

## 2023-11-27 LAB
ANION GAP SERPL CALC-SCNC: 4 MMOL/L (ref 5–15)
BASOPHILS # BLD: 0.1 K/UL (ref 0–0.1)
BASOPHILS NFR BLD: 1 % (ref 0–1)
BUN SERPL-MCNC: 34 MG/DL (ref 6–20)
BUN/CREAT SERPL: 35 (ref 12–20)
CALCIUM SERPL-MCNC: 9.2 MG/DL (ref 8.5–10.1)
CHLORIDE SERPL-SCNC: 104 MMOL/L (ref 97–108)
CO2 SERPL-SCNC: 30 MMOL/L (ref 21–32)
CREAT SERPL-MCNC: 0.96 MG/DL (ref 0.55–1.02)
DIFFERENTIAL METHOD BLD: ABNORMAL
EOSINOPHIL # BLD: 0.2 K/UL (ref 0–0.4)
EOSINOPHIL NFR BLD: 2 % (ref 0–7)
ERYTHROCYTE [DISTWIDTH] IN BLOOD BY AUTOMATED COUNT: 16.8 % (ref 11.5–14.5)
GLUCOSE SERPL-MCNC: 107 MG/DL (ref 65–100)
HCT VFR BLD AUTO: 29.2 % (ref 35–47)
HGB BLD-MCNC: 8.3 G/DL (ref 11.5–16)
IMM GRANULOCYTES # BLD AUTO: 0.1 K/UL (ref 0–0.04)
IMM GRANULOCYTES NFR BLD AUTO: 1 % (ref 0–0.5)
LYMPHOCYTES # BLD: 1.7 K/UL (ref 0.8–3.5)
LYMPHOCYTES NFR BLD: 15 % (ref 12–49)
MCH RBC QN AUTO: 21.6 PG (ref 26–34)
MCHC RBC AUTO-ENTMCNC: 28.4 G/DL (ref 30–36.5)
MCV RBC AUTO: 75.8 FL (ref 80–99)
MONOCYTES # BLD: 0.6 K/UL (ref 0–1)
MONOCYTES NFR BLD: 5 % (ref 5–13)
NEUTS SEG # BLD: 8.4 K/UL (ref 1.8–8)
NEUTS SEG NFR BLD: 76 % (ref 32–75)
NRBC # BLD: 0 K/UL (ref 0–0.01)
NRBC BLD-RTO: 0 PER 100 WBC
PLATELET # BLD AUTO: 241 K/UL (ref 150–400)
PMV BLD AUTO: 10.7 FL (ref 8.9–12.9)
POTASSIUM SERPL-SCNC: 3.6 MMOL/L (ref 3.5–5.1)
RBC # BLD AUTO: 3.85 M/UL (ref 3.8–5.2)
RBC MORPH BLD: ABNORMAL
SODIUM SERPL-SCNC: 138 MMOL/L (ref 136–145)
WBC # BLD AUTO: 11.1 K/UL (ref 3.6–11)

## 2023-11-27 PROCEDURE — 2580000003 HC RX 258: Performed by: SURGERY

## 2023-11-27 PROCEDURE — 6370000000 HC RX 637 (ALT 250 FOR IP): Performed by: SURGERY

## 2023-11-27 PROCEDURE — 85025 COMPLETE CBC W/AUTO DIFF WBC: CPT

## 2023-11-27 PROCEDURE — 6360000002 HC RX W HCPCS: Performed by: SURGERY

## 2023-11-27 PROCEDURE — 80048 BASIC METABOLIC PNL TOTAL CA: CPT

## 2023-11-27 PROCEDURE — 2700000000 HC OXYGEN THERAPY PER DAY

## 2023-11-27 PROCEDURE — 94760 N-INVAS EAR/PLS OXIMETRY 1: CPT

## 2023-11-27 PROCEDURE — 36415 COLL VENOUS BLD VENIPUNCTURE: CPT

## 2023-11-27 PROCEDURE — 6370000000 HC RX 637 (ALT 250 FOR IP): Performed by: NURSE PRACTITIONER

## 2023-11-27 RX ORDER — ENOXAPARIN SODIUM 100 MG/ML
40 INJECTION SUBCUTANEOUS DAILY
Status: DISCONTINUED | OUTPATIENT
Start: 2023-11-28 | End: 2023-11-27 | Stop reason: HOSPADM

## 2023-11-27 RX ADMIN — BISACODYL 10 MG: 10 SUPPOSITORY RECTAL at 09:38

## 2023-11-27 RX ADMIN — Medication: at 09:38

## 2023-11-27 RX ADMIN — DEXTRAN 70, GLYCERIN, HYPROMELLOSE 1 DROP: 1; 2; 3 SOLUTION/ DROPS OPHTHALMIC at 09:41

## 2023-11-27 RX ADMIN — BRIMONIDINE TARTRATE 1 DROP: 2 SOLUTION OPHTHALMIC at 09:41

## 2023-11-27 RX ADMIN — ENOXAPARIN SODIUM 30 MG: 100 INJECTION SUBCUTANEOUS at 09:37

## 2023-11-27 RX ADMIN — SODIUM CHLORIDE, PRESERVATIVE FREE 10 ML: 5 INJECTION INTRAVENOUS at 09:39

## 2023-11-27 RX ADMIN — PROPAFENONE HYDROCHLORIDE 150 MG: 150 TABLET, FILM COATED ORAL at 09:38

## 2023-11-27 NOTE — DISCHARGE SUMMARY
and was found to be safe and steady with ambulation. Discharged to: Home     Condition on Discharge: Stable     Discharge instructions:    - Take medications as prescribed  - Diet Low Fiber  - Discharge activity:    - Activity as tolerated    - Ambulate several times a day   - Do not drive if taking opioid pain medications    Allergies:  No Known Allergies           -DISCHARGE MEDICATION LIST        Medication List        CONTINUE taking these medications      acetaminophen 500 MG tablet  Commonly known as: TYLENOL     brimonidine 0.15 % ophthalmic solution  Commonly known as: ALPHAGAN P     calcium carbonate 600 MG Tabs tablet     carboxymethylcellulose 0.5 % Soln ophthalmic solution  Commonly known as: REFRESH PLUS     diclofenac sodium 1 % Gel  Commonly known as: VOLTAREN     dorzolamide-timolol 2-0.5 % ophthalmic solution  Commonly known as: COSOPT     famotidine 20 MG tablet  Commonly known as: PEPCID     furosemide 20 MG tablet  Commonly known as: LASIX     gabapentin 800 MG tablet  Commonly known as: NEURONTIN     HYDROmorphone 2 MG tablet  Commonly known as: DILAUDID     lidocaine 5 %  Commonly known as: LIDODERM     loratadine 10 MG tablet  Commonly known as: CLARITIN     magnesium oxide 400 MG tablet  Commonly known as: MAG-OX     polyethylene glycol 17 g packet  Commonly known as: GLYCOLAX     potassium chloride 10 MEQ extended release tablet  Commonly known as: KLOR-CON     propafenone 150 MG tablet  Commonly known as: RYTHMOL     RABEprazole 20 MG tablet  Commonly known as: ACIPHEX     simvastatin 40 MG tablet  Commonly known as: ZOCOR     vitamin D 25 MCG (1000 UT) Tabs tablet  Commonly known as: CHOLECALCIFEROL     zolpidem 5 MG tablet  Commonly known as: AMBIEN            STOP taking these medications      CALCIUM PO           per medical continuation form      - Follow up in office not required. Signed:  Juliette Parra  MSN, APRN, FNP-C, [employer], RNAS-C  Surgical Nurse

## 2023-11-27 NOTE — CARE COORDINATION
[facility] side   Call report [phone]  Going to room A220  Daughter to transport (at bedside)     Update  CM received return call from Marty at [facility] confirming they can accept patient to [facility] side today after 2pm. CM made room visit with patient and daughter at bedside. Both in agreement with d/c plan to [facility] at [facility] today and daughter providing transportation. Initial note   CM spoke with Marty at [facility] who reported patient is from [facility] side. CM informed [place] patient will need  side. Referral sent via careport. Marty is going to provide referral to clinical staff to ensure they can accept for [facility] side and let CM know as soon as possible.      [facility] [address]

## 2023-11-27 NOTE — DISCHARGE INSTRUCTIONS
Bowel Obstruction: Care Instructions  Overview  A bowel blockage, also called an obstruction, can prevent gas, fluids, or food from moving through the intestines normally. It can cause constipation and, rarely, diarrhea. You may have pain, nausea, vomiting, and cramping. Most of the time, complete blockages require a stay in the hospital and possibly surgery. But if your bowel is only partly blocked, your doctor may tell you to wait until it clears on its own and you are able to pass gas and stool. If so, there are things you can do at home to help make you feel better. If you have had surgery for a bowel blockage, there are things you can do at home to make sure you heal well. You can also make some changes to keep your bowel from becoming blocked again. Follow-up care is a key part of your treatment and safety. Be sure to make and go to all appointments, and call your doctor if you are having problems. It's also a good idea to know your test results and keep a list of the medicines you take. How can you care for yourself at home? Follow your doctor's instructions. These may include eating a liquid diet to avoid complete blockage. Be safe with medicines. Take your medicines exactly as prescribed. Call your doctor if you think you are having a problem with your medicine. Put a heating pad set on low on your belly to relieve mild cramps and pain. If you had surgery: You may shower 24 to 48 hours after surgery, if your doctor says it is okay. Pat the cut (incision) dry. Do not take a bath for the first 2 weeks, or until your doctor tells you it is okay. If you have strips of tape on the cut (incision), leave the tape on until it falls off. Gently wash the area daily with warm, soapy water. Then rinse and pat it dry. You may not have much of an appetite after the surgery. When you feel like eating, start with small amounts of food. Your doctor will tell you about any foods you should not eat.   What can

## 2024-09-17 ENCOUNTER — ANESTHESIA EVENT (OUTPATIENT)
Facility: HOSPITAL | Age: 89
End: 2024-09-17
Payer: MEDICARE

## 2024-09-17 ENCOUNTER — ANESTHESIA (OUTPATIENT)
Facility: HOSPITAL | Age: 89
End: 2024-09-17
Payer: MEDICARE

## 2024-09-17 ENCOUNTER — HOSPITAL ENCOUNTER (OUTPATIENT)
Facility: HOSPITAL | Age: 89
Setting detail: OUTPATIENT SURGERY
Discharge: HOME | End: 2024-09-17
Attending: SPECIALIST | Admitting: SPECIALIST
Payer: MEDICARE

## 2024-09-17 VITALS
BODY MASS INDEX: 25.52 KG/M2 | HEIGHT: 60 IN | DIASTOLIC BLOOD PRESSURE: 50 MMHG | WEIGHT: 130 LBS | TEMPERATURE: 98 F | SYSTOLIC BLOOD PRESSURE: 108 MMHG | RESPIRATION RATE: 14 BRPM | HEART RATE: 77 BPM | OXYGEN SATURATION: 96 %

## 2024-09-17 PROCEDURE — C1713 ANCHOR/SCREW BN/BN,TIS/BN: HCPCS | Performed by: SPECIALIST

## 2024-09-17 PROCEDURE — 3600007502: Performed by: SPECIALIST

## 2024-09-17 PROCEDURE — 3700000001 HC ADD 15 MINUTES (ANESTHESIA): Performed by: SPECIALIST

## 2024-09-17 PROCEDURE — 7100000011 HC PHASE II RECOVERY - ADDTL 15 MIN: Performed by: SPECIALIST

## 2024-09-17 PROCEDURE — C1769 GUIDE WIRE: HCPCS | Performed by: SPECIALIST

## 2024-09-17 PROCEDURE — 3600007512: Performed by: SPECIALIST

## 2024-09-17 PROCEDURE — 2580000003 HC RX 258: Performed by: SPECIALIST

## 2024-09-17 PROCEDURE — 6360000002 HC RX W HCPCS: Performed by: NURSE ANESTHETIST, CERTIFIED REGISTERED

## 2024-09-17 PROCEDURE — 3700000000 HC ANESTHESIA ATTENDED CARE: Performed by: SPECIALIST

## 2024-09-17 PROCEDURE — 2709999900 HC NON-CHARGEABLE SUPPLY: Performed by: SPECIALIST

## 2024-09-17 PROCEDURE — 2500000003 HC RX 250 WO HCPCS: Performed by: NURSE ANESTHETIST, CERTIFIED REGISTERED

## 2024-09-17 PROCEDURE — 7100000010 HC PHASE II RECOVERY - FIRST 15 MIN: Performed by: SPECIALIST

## 2024-09-17 RX ORDER — SODIUM CHLORIDE 0.9 % (FLUSH) 0.9 %
5-40 SYRINGE (ML) INJECTION EVERY 12 HOURS SCHEDULED
Status: DISCONTINUED | OUTPATIENT
Start: 2024-09-17 | End: 2024-09-17 | Stop reason: HOSPADM

## 2024-09-17 RX ORDER — SODIUM CHLORIDE 9 MG/ML
INJECTION, SOLUTION INTRAVENOUS CONTINUOUS PRN
Status: COMPLETED | OUTPATIENT
Start: 2024-09-17 | End: 2024-09-17

## 2024-09-17 RX ORDER — SODIUM CHLORIDE 0.9 % (FLUSH) 0.9 %
5-40 SYRINGE (ML) INJECTION PRN
Status: DISCONTINUED | OUTPATIENT
Start: 2024-09-17 | End: 2024-09-17 | Stop reason: HOSPADM

## 2024-09-17 RX ORDER — IBUPROFEN 600 MG/1
600 TABLET, FILM COATED ORAL EVERY 6 HOURS PRN
COMMUNITY

## 2024-09-17 RX ORDER — PHENYLEPHRINE HCL IN 0.9% NACL 0.4MG/10ML
SYRINGE (ML) INTRAVENOUS
Status: DISCONTINUED | OUTPATIENT
Start: 2024-09-17 | End: 2024-09-17 | Stop reason: SDUPTHER

## 2024-09-17 RX ORDER — SODIUM CHLORIDE 9 MG/ML
25 INJECTION, SOLUTION INTRAVENOUS PRN
Status: DISCONTINUED | OUTPATIENT
Start: 2024-09-17 | End: 2024-09-17 | Stop reason: HOSPADM

## 2024-09-17 RX ADMIN — PROPOFOL 25 MG: 10 INJECTION, EMULSION INTRAVENOUS at 11:18

## 2024-09-17 RX ADMIN — PROPOFOL 50 MG: 10 INJECTION, EMULSION INTRAVENOUS at 11:15

## 2024-09-17 RX ADMIN — Medication 80 MCG: at 11:23

## 2024-09-17 RX ADMIN — LIDOCAINE HYDROCHLORIDE 60 MG: 20 INJECTION, SOLUTION EPIDURAL; INFILTRATION; INTRACAUDAL; PERINEURAL at 11:15

## 2024-09-17 ASSESSMENT — PAIN - FUNCTIONAL ASSESSMENT: PAIN_FUNCTIONAL_ASSESSMENT: NONE - DENIES PAIN

## 2024-09-19 NOTE — PROGRESS NOTES
TRANSFER - OUT REPORT:    Verbal report given to Nallely(name) on Joana Leos  being transferred to (unit) for routine post - op       Report consisted of patients Situation, Background, Assessment and   Recommendations(SBAR). Information from the following report(s) SBAR, Procedure Summary, Intake/Output, MAR, Recent Results, and Procedure Verification was reviewed with the receiving nurse. Lines:   Peripheral IV 63/42/32 Right Basilic (Active)   Site Assessment Clean, dry, & intact 10/03/22 1001   Phlebitis Assessment 0 10/03/22 1001   Infiltration Assessment 0 10/03/22 1001   Dressing Status Clean, dry, & intact 10/03/22 1001   Dressing Type Transparent;Tape 10/03/22 1001   Hub Color/Line Status Green; Infusing 10/02/22 1508   Action Taken Open ports on tubing capped 10/02/22 0307   Alcohol Cap Used Yes 10/02/22 2406        Opportunity for questions and clarification was provided.       Patient transported with:   O2 @ 4 liters [facility]

## (undated) DEVICE — 1200 GUARD II KIT W/5MM TUBE W/O VAC TUBE: Brand: GUARDIAN

## (undated) DEVICE — SYR 5ML 1/5 GRAD LL NSAF LF --

## (undated) DEVICE — BAG SPEC BIOHZRD 10 X 10 IN --

## (undated) DEVICE — BLOCK BITE ENDOSCP AD 21 MM W/ DIL BLU LF DISP

## (undated) DEVICE — 3M™ TEGADERM™ TRANSPARENT FILM DRESSING FRAME STYLE, 1624W, 2-3/8 IN X 2-3/4 IN (6 CM X 7 CM), 100/CT 4CT/CASE: Brand: 3M™ TEGADERM™

## (undated) DEVICE — DILATOR ENDO SZ 54FR POLYVI OVR THE WIRE TAPR MRK SPR TIP

## (undated) DEVICE — SOLUTION LACTATED RINGERS INJECTION USP

## (undated) DEVICE — SOLIDIFIER MEDC 1200ML -- CONVERT TO 356117

## (undated) DEVICE — FORCEPS BX L160CM DIA8MM GRSP DISECT CUP TIP NONLOCKING ROT

## (undated) DEVICE — SOLIDIFIER FLD 2OZ 1500CC N DISINF IN BTL DISP SAFESORB

## (undated) DEVICE — GLOVE ORANGE PI 7 1/2   MSG9075

## (undated) DEVICE — BASIN EMSIS 16OZ GRAPHITE PLAS KID SHP MOLD GRAD FOR ORAL

## (undated) DEVICE — 3M™ MEDIPORE™ H SOFT CLOTH TAPE SHORT ROLL TAPE 6INCHES X 2 YARDS 16 ROLLS/CASE 2866S: Brand: 3M™ MEDIPORE™

## (undated) DEVICE — TOWEL 4 PLY TISS 19X30 SUE WHT

## (undated) DEVICE — YANKAUER,TAPERED BULBOUS TIP,W/O VENT: Brand: MEDLINE

## (undated) DEVICE — LARGE, DISPOSABLE ALEXIS O C-SECTION PROTECTOR - RETRACTOR: Brand: ALEXIS ® O C-SECTION PROTECTOR - RETRACTOR

## (undated) DEVICE — YANKAUER,POOLE TIP,STERILE,50/CS: Brand: MEDLINE

## (undated) DEVICE — CLIP INT L235CM WRK CHAN DIA2.8MM OPN 11MM LCK MECHANISM MR

## (undated) DEVICE — STAPLER INT L34CM 60MM LNG ENDOSCP ARTC PWR + ECHELON FLX

## (undated) DEVICE — ELECTRODE,RADIOTRANSLUCENT,FOAM,5PK: Brand: MEDLINE

## (undated) DEVICE — SYR ASSEMB INFL BLLN 60ML --

## (undated) DEVICE — Device

## (undated) DEVICE — Z DISCONTINUED PER MEDLINE LINE GAS SAMPLING O2/CO2 LNG AD 13 FT NSL W/ TBNG FILTERLINE

## (undated) DEVICE — HYPODERMIC SAFETY NEEDLE: Brand: MAGELLAN

## (undated) DEVICE — IV START KIT: Brand: MEDLINE

## (undated) DEVICE — CLEANGUIDE DISPOSABLE MARKED SPRING TIP GUIDEWIRE, 1.86 MM X 210 CM: Brand: CLEANGUIDE

## (undated) DEVICE — CONTAINER SPEC 20 ML LID NEUT BUFF FORMALIN 10 % POLYPR STS

## (undated) DEVICE — BITE BLK ENDOSCP AD 54FR GRN POLYETH ENDOSCP W STRP SLD

## (undated) DEVICE — SYR 10ML LUER LOK 1/5ML GRAD --

## (undated) DEVICE — NEONATAL-ADULT SPO2 SENSOR: Brand: NELLCOR

## (undated) DEVICE — SKIN MARKER,REGULAR TIP WITH RULER AND LABELS: Brand: DEVON

## (undated) DEVICE — (D)PREP SKN CHLRAPRP APPL 26ML -- CONVERT TO ITEM 371833

## (undated) DEVICE — PAD,ABDOMINAL,5"X9",ST,LF,25/BX: Brand: MEDLINE INDUSTRIES, INC.

## (undated) DEVICE — SET ADMIN 16ML TBNG L100IN 2 Y INJ SITE IV PIGGY BK DISP

## (undated) DEVICE — SUTURE ETHLN SZ 2-0 L18IN NONABSORBABLE BLK L19MM PS-2 PRIM 593H

## (undated) DEVICE — SPONGE LAPAROTOMY W18XL18IN WHITE STRUNG RADIOPAQUE STERILE

## (undated) DEVICE — TOTAL TRAY, 16FR 10ML SIL FOLEY, URN: Brand: MEDLINE

## (undated) DEVICE — CURVED, LARGE JAW, OPEN SEALER/DIVIDER NANO-COATED: Brand: LIGASURE IMPACT

## (undated) DEVICE — SUTURE PERMAHAND SZ 2-0 L30IN NONABSORBABLE BLK SILK W/O A305H

## (undated) DEVICE — GUIDEWIRE WITH SPRING TIP - SINGLE USE: Brand: SAFEGUIDE®

## (undated) DEVICE — COVIDIEN KENDALL DL DISPOSABLE 3 LEAD SY: Brand: MEDLINE RENEWAL

## (undated) DEVICE — KENDALL DL ECG CABLE AND LEAD WIRE SYSTEM, 3-LEAD, SINGLE PATIENT USE: Brand: KENDALL

## (undated) DEVICE — ENDO CARRY-ON PROCEDURE KIT INCLUDES ENZYMATIC SPONGE, GAUZE, BIOHAZARD LABEL, TRAY, LUBRICANT, DIRTY SCOPE LABEL, WATER LABEL, TRAY, DRAWSTRING PAD, AND DEFENDO 4-PIECE KIT.: Brand: ENDO CARRY-ON PROCEDURE KIT

## (undated) DEVICE — CATH IV AUTOGRD BC PNK 20GA 25 -- INSYTE

## (undated) DEVICE — DRAPE FLD WRM W44XL66IN C6L FOR INTRATEMP SYS THERMABASIN

## (undated) DEVICE — SYR 3ML LL TIP 1/10ML GRAD --

## (undated) DEVICE — OPTIFOAM GENTLE SA, POSTOP, 4X12: Brand: MEDLINE

## (undated) DEVICE — CANISTER, RIGID, 3000CC: Brand: MEDLINE INDUSTRIES, INC.

## (undated) DEVICE — ADULT SPO2 SENSOR: Brand: NELLCOR

## (undated) DEVICE — SURGICAL PROCEDURE PACK TISS 3X5 IN ABSORBABLE SEPRAFILM

## (undated) DEVICE — NEEDLE HYPO 18GA L1.5IN PNK S STL HUB POLYPR SHLD REG BVL

## (undated) DEVICE — CONTINU-FLO SOLUTION SET, 2 INJECTION SITES, MALE LUER LOCK ADAPTER WITH RETRACTABLE COLLAR, LARGE BORE STOPCOCK WITH ROTATING MALE LUER LOCK EXTENSION SET, 2 INJECTION SITES, MALE LUER LOCK ADAPTER WITH RETRACTABLE COLLAR: Brand: INTERLINK/CONTINU-FLO

## (undated) DEVICE — NEEDLE HYPO 25GA L1.5IN BVL ORIENTED ECLIPSE

## (undated) DEVICE — NEEDLE SCLERO 25GA L240CM OD0.51MM ID0.24MM EXTN L4MM SHTH

## (undated) DEVICE — RELOAD STPL L60MM H1.5-3.6MM REG TISS BLU GRIPPING SURF B

## (undated) DEVICE — RELOAD STPL L60MM H1-2.6MM MESENTERY THN TISS WHT 6 ROW

## (undated) DEVICE — SUTURE PERMAHAND SZ 3-0 L18IN NONABSORBABLE BLK L26MM SH C013D

## (undated) DEVICE — SUTURE STRATAFIX SYMMETRIC SZ 1 L18IN ABSRB VLT CT1 L36CM SXPP1A404

## (undated) DEVICE — KIT,1200CC CANISTER,3/16"X6' TUBING: Brand: MEDLINE INDUSTRIES, INC.

## (undated) DEVICE — SET IV EXTN L7IN 05ML PRIMING STD BOR MAXZERO CONN PINCH

## (undated) DEVICE — FILTER IV 5UM L1.75IN FLX STRW FOR FLD ASPIR FR GLS AMP

## (undated) DEVICE — SET GRAV CK VLV NEEDLESS ST 3 GANGED 4WAY STPCOCK HI FLO 10

## (undated) DEVICE — BRUSH CYTO BRONCHSCP 1.5/140MM -- CELLEBRITY

## (undated) DEVICE — SPONGE GZ W4XL4IN COT 12 PLY TYP VII WVN C FLD DSGN

## (undated) DEVICE — SUTURE PERMAHAND SZ 3-0 L30IN NONABSORBABLE BLK SILK BRAID A304H

## (undated) DEVICE — (D)AGENT INJECTN ELEVIEW 10ML -- DISC BY MFG

## (undated) DEVICE — GLOVE ORTHO 7 1/2   MSG9475

## (undated) DEVICE — STERILE POLYISOPRENE POWDER-FREE SURGICAL GLOVES: Brand: PROTEXIS

## (undated) DEVICE — CATHETER IV 20GA L1.16IN OD1.0414-1.1176MM ID0.762-0.8382MM

## (undated) DEVICE — STAIN INDIA INK IN NACL 10ML --

## (undated) DEVICE — WORKING LENGTH 235CM, WORKING CHANNEL 2.8MM: Brand: RESOLUTION 360 CLIP

## (undated) DEVICE — BITEBLOCK 54FR W/ DENT RIM BLOX

## (undated) DEVICE — TOWEL SURG W17XL27IN STD BLU COT NONFENESTRATED PREWASHED

## (undated) DEVICE — MAJOR LAPAROTOMY-MRMC: Brand: MEDLINE INDUSTRIES, INC.

## (undated) DEVICE — POLYLINED TOWEL: Brand: CONVERTORS

## (undated) DEVICE — SOLUTION IRRIG 1000ML 0.9% SOD CHL USP POUR PLAS BTL